# Patient Record
Sex: MALE | Race: WHITE | NOT HISPANIC OR LATINO | Employment: OTHER | URBAN - METROPOLITAN AREA
[De-identification: names, ages, dates, MRNs, and addresses within clinical notes are randomized per-mention and may not be internally consistent; named-entity substitution may affect disease eponyms.]

---

## 2017-03-29 ENCOUNTER — HISTORICAL (OUTPATIENT)
Dept: ADMINISTRATIVE | Facility: HOSPITAL | Age: 77
End: 2017-03-29

## 2017-03-29 LAB
ALBUMIN SERPL-MCNC: 4.2 G/DL (ref 3.1–4.7)
ALP SERPL-CCNC: 37 IU/L (ref 40–104)
ALT (SGPT): 23 IU/L (ref 3–33)
AST SERPL-CCNC: 19 IU/L (ref 10–40)
BASOPHILS NFR BLD: 0 K/UL (ref 0–0.2)
BASOPHILS NFR BLD: 0.5 %
BILIRUB SERPL-MCNC: 0.6 MG/DL (ref 0.3–1)
BUN SERPL-MCNC: 24 MG/DL (ref 8–20)
CALCIUM SERPL-MCNC: 9 MG/DL (ref 7.7–10.4)
CHLORIDE: 103 MMOL/L (ref 98–110)
CO2 SERPL-SCNC: 29.4 MMOL/L (ref 22.8–31.6)
CREATININE RANDOM URINE: 263 MG/DL
CREATININE: 1.29 MG/DL (ref 0.6–1.4)
EOSINOPHIL NFR BLD: 0.2 K/UL (ref 0–0.7)
EOSINOPHIL NFR BLD: 3.8 %
ERYTHROCYTE [DISTWIDTH] IN BLOOD BY AUTOMATED COUNT: 14 % (ref 11.7–14.9)
GLUCOSE: 86 MG/DL (ref 70–99)
GRAN #: 3.6 K/UL (ref 1.4–6.5)
GRAN%: 57 %
HBA1C MFR BLD: 6 % (ref 3.1–6.5)
HCT VFR BLD AUTO: 46.3 % (ref 39–55)
HGB BLD-MCNC: 14.5 G/DL (ref 14–16)
IMMATURE GRANS (ABS): 0 K/UL (ref 0–1)
IMMATURE GRANULOCYTES: 0.2 %
LYMPH #: 1.8 K/UL (ref 1.2–3.4)
LYMPH%: 28.8 %
MCH RBC QN AUTO: 29.3 PG (ref 25–35)
MCHC RBC AUTO-ENTMCNC: 31.3 G/DL (ref 31–36)
MCV RBC AUTO: 93.5 FL (ref 80–100)
MICROALBUM.,U,RANDOM: 15.2 MCG/ML (ref 0–19.9)
MICROALBUMIN/CREATININE RATIO: 6 (ref 0–30)
MONO #: 0.6 K/UL (ref 0.1–0.6)
MONO%: 9.7 %
NUCLEATED RBCS: 0 %
PLATELET # BLD AUTO: 195 K/UL (ref 140–440)
PMV BLD AUTO: 9.8 FL (ref 8.8–12.7)
POTASSIUM SERPL-SCNC: 4.6 MMOL/L (ref 3.5–5)
PROT SERPL-MCNC: 6.9 G/DL (ref 6–8.2)
RBC # BLD AUTO: 4.95 M/UL (ref 4.3–5.9)
SODIUM: 140 MMOL/L (ref 134–144)
TSH SERPL DL<=0.005 MIU/L-ACNC: 2.49 ULU/ML (ref 0.3–5.6)
WBC # BLD AUTO: 6.3 K/UL (ref 5–10)

## 2017-04-12 ENCOUNTER — HISTORICAL (OUTPATIENT)
Dept: ADMINISTRATIVE | Facility: HOSPITAL | Age: 77
End: 2017-04-12

## 2017-08-01 LAB — HBA1C MFR BLD: 6.5 % (ref 3.1–6.5)

## 2017-08-08 ENCOUNTER — OFFICE VISIT (OUTPATIENT)
Dept: FAMILY MEDICINE | Facility: CLINIC | Age: 77
End: 2017-08-08
Payer: MEDICARE

## 2017-08-08 VITALS
DIASTOLIC BLOOD PRESSURE: 64 MMHG | OXYGEN SATURATION: 96 % | HEART RATE: 54 BPM | RESPIRATION RATE: 20 BRPM | SYSTOLIC BLOOD PRESSURE: 110 MMHG | BODY MASS INDEX: 38.55 KG/M2 | HEIGHT: 68 IN | WEIGHT: 254.38 LBS

## 2017-08-08 DIAGNOSIS — E11.9 CONTROLLED TYPE 2 DIABETES MELLITUS WITHOUT COMPLICATION, WITHOUT LONG-TERM CURRENT USE OF INSULIN: ICD-10-CM

## 2017-08-08 DIAGNOSIS — I10 ESSENTIAL HYPERTENSION: ICD-10-CM

## 2017-08-08 DIAGNOSIS — M48.061 SPINAL STENOSIS OF LUMBAR REGION: ICD-10-CM

## 2017-08-08 DIAGNOSIS — R09.89 CHRONIC SINUS COMPLAINTS: Primary | ICD-10-CM

## 2017-08-08 DIAGNOSIS — E78.01 FAMILIAL HYPERCHOLESTEROLEMIA: ICD-10-CM

## 2017-08-08 DIAGNOSIS — I48.20 CHRONIC ATRIAL FIBRILLATION: ICD-10-CM

## 2017-08-08 PROBLEM — I48.91 ATRIAL FIBRILLATION: Status: ACTIVE | Noted: 2017-08-08

## 2017-08-08 PROCEDURE — 3078F DIAST BP <80 MM HG: CPT | Mod: ,,, | Performed by: INTERNAL MEDICINE

## 2017-08-08 PROCEDURE — 1159F MED LIST DOCD IN RCRD: CPT | Mod: ,,, | Performed by: INTERNAL MEDICINE

## 2017-08-08 PROCEDURE — 99215 OFFICE O/P EST HI 40 MIN: CPT | Mod: ,,, | Performed by: INTERNAL MEDICINE

## 2017-08-08 PROCEDURE — 3008F BODY MASS INDEX DOCD: CPT | Mod: ,,, | Performed by: INTERNAL MEDICINE

## 2017-08-08 PROCEDURE — 3074F SYST BP LT 130 MM HG: CPT | Mod: ,,, | Performed by: INTERNAL MEDICINE

## 2017-08-08 RX ORDER — ASPIRIN 81 MG/1
TABLET ORAL
COMMUNITY
End: 2017-08-08 | Stop reason: SDUPTHER

## 2017-08-08 RX ORDER — FLUTICASONE PROPIONATE 50 MCG
2 SPRAY, SUSPENSION (ML) NASAL DAILY
Qty: 3 BOTTLE | Refills: 3 | Status: SHIPPED | OUTPATIENT
Start: 2017-08-08 | End: 2018-04-12 | Stop reason: SDUPTHER

## 2017-08-08 RX ORDER — METOPROLOL TARTRATE 25 MG/1
TABLET, FILM COATED ORAL
COMMUNITY
End: 2017-12-12

## 2017-08-08 RX ORDER — NAFTIFINE HYDROCHLORIDE 20 MG/G
CREAM TOPICAL DAILY PRN
Status: ON HOLD | COMMUNITY
End: 2021-04-25 | Stop reason: HOSPADM

## 2017-08-08 RX ORDER — FUROSEMIDE 40 MG/1
TABLET ORAL
Qty: 45 TABLET | Refills: 3 | Status: SHIPPED | OUTPATIENT
Start: 2017-08-08 | End: 2018-10-11 | Stop reason: SDUPTHER

## 2017-08-08 RX ORDER — MONTELUKAST SODIUM 10 MG/1
10 TABLET ORAL NIGHTLY
Qty: 90 TABLET | Refills: 3 | Status: SHIPPED | OUTPATIENT
Start: 2017-08-08 | End: 2017-09-07

## 2017-08-08 RX ORDER — DICLOFENAC SODIUM 10 MG/G
GEL TOPICAL
COMMUNITY
End: 2020-01-23

## 2017-08-08 RX ORDER — CETIRIZINE HYDROCHLORIDE 10 MG/1
TABLET ORAL
COMMUNITY
End: 2017-08-08

## 2017-08-08 RX ORDER — METFORMIN HYDROCHLORIDE 500 MG/1
500 TABLET, EXTENDED RELEASE ORAL
Qty: 90 TABLET | Refills: 3 | Status: SHIPPED | OUTPATIENT
Start: 2017-08-08 | End: 2017-12-12

## 2017-08-08 RX ORDER — ATORVASTATIN CALCIUM 10 MG/1
TABLET, FILM COATED ORAL
COMMUNITY
End: 2017-08-08 | Stop reason: SDUPTHER

## 2017-08-08 RX ORDER — LISINOPRIL 20 MG/1
20 TABLET ORAL DAILY
Qty: 90 TABLET | Refills: 3 | Status: SHIPPED | OUTPATIENT
Start: 2017-08-08 | End: 2018-04-12 | Stop reason: SDUPTHER

## 2017-08-08 RX ORDER — HYDROCODONE BITARTRATE AND ACETAMINOPHEN 7.5; 325 MG/1; MG/1
TABLET ORAL
COMMUNITY
End: 2017-08-08

## 2017-08-08 RX ORDER — GLIMEPIRIDE 1 MG/1
TABLET ORAL
COMMUNITY
End: 2017-08-08

## 2017-08-08 RX ORDER — MINERAL OIL
ENEMA (ML) RECTAL
COMMUNITY
End: 2018-04-12

## 2017-08-08 RX ORDER — FLUTICASONE PROPIONATE 50 MCG
SPRAY, SUSPENSION (ML) NASAL
COMMUNITY
End: 2017-08-08 | Stop reason: SDUPTHER

## 2017-08-08 RX ORDER — POTASSIUM CHLORIDE 750 MG/1
CAPSULE, EXTENDED RELEASE ORAL
COMMUNITY
End: 2017-08-08 | Stop reason: SDUPTHER

## 2017-08-08 RX ORDER — FUROSEMIDE 40 MG/1
TABLET ORAL
COMMUNITY
End: 2017-08-08 | Stop reason: SDUPTHER

## 2017-08-08 RX ORDER — MONTELUKAST SODIUM 10 MG/1
TABLET ORAL
COMMUNITY
End: 2017-08-08

## 2017-08-08 RX ORDER — POTASSIUM CHLORIDE 750 MG/1
CAPSULE, EXTENDED RELEASE ORAL
Qty: 45 CAPSULE | Refills: 3 | Status: SHIPPED | OUTPATIENT
Start: 2017-08-08 | End: 2017-12-12 | Stop reason: SDUPTHER

## 2017-08-08 RX ORDER — LISINOPRIL 20 MG/1
TABLET ORAL
COMMUNITY
End: 2017-08-08 | Stop reason: SDUPTHER

## 2017-08-08 RX ORDER — ASPIRIN 81 MG/1
81 TABLET ORAL DAILY
Qty: 90 TABLET | Refills: 3 | Status: SHIPPED | OUTPATIENT
Start: 2017-08-08 | End: 2018-04-12 | Stop reason: SDUPTHER

## 2017-08-08 RX ORDER — ATORVASTATIN CALCIUM 10 MG/1
TABLET, FILM COATED ORAL
Qty: 90 TABLET | Refills: 3 | Status: SHIPPED | OUTPATIENT
Start: 2017-08-08 | End: 2018-04-12 | Stop reason: SDUPTHER

## 2017-08-08 RX ORDER — TRAZODONE HYDROCHLORIDE 100 MG/1
TABLET ORAL
COMMUNITY
End: 2018-04-12

## 2017-08-08 NOTE — PROGRESS NOTES
"Subjective:       Patient ID: Yeyo Hollingsworth is a 77 y.o. male.    Chief Complaint: Follow-up; Results; Diabetes; and Hyperlipidemia    Back has been bothering him more and more and he would like to see a "back doctor". Former patient of dr bhatia    Also asked about what dose of tylenol he could take for pain. He deferred pain meds.     He sees Callie, brii, monty, kiera and ralph for sleep apnea (patient's cpap mask comes off at night bc he cannot breath out of his nose).  He is on allegra daily and as needed singular    Recalls having allergies since he was a kid with puffy eyes.       Diabetes   Pertinent negatives for hypoglycemia include no confusion, headaches, nervousness/anxiousness, pallor or tremors. Pertinent negatives for diabetes include no chest pain, no fatigue and no weakness.   Hyperlipidemia   Pertinent negatives include no chest pain, myalgias or shortness of breath.     Review of Systems   Constitutional: Negative for activity change, diaphoresis, fatigue and fever.   HENT: Positive for congestion, facial swelling and sinus pressure. Negative for ear pain, postnasal drip, sore throat and trouble swallowing.    Eyes: Negative for pain.   Respiratory: Negative for cough, chest tightness, shortness of breath and wheezing.    Cardiovascular: Negative for chest pain, palpitations and leg swelling.   Gastrointestinal: Negative for abdominal distention, abdominal pain, blood in stool, constipation and diarrhea.   Endocrine: Negative for cold intolerance and heat intolerance.   Genitourinary: Negative for decreased urine volume, difficulty urinating, dysuria, flank pain, frequency and hematuria.   Musculoskeletal: Positive for back pain. Negative for arthralgias, joint swelling, myalgias and neck pain.   Skin: Negative for pallor, rash and wound.   Neurological: Negative for tremors, syncope, weakness, light-headedness, numbness and headaches.   Hematological: Negative for " adenopathy.   Psychiatric/Behavioral: Negative for confusion, decreased concentration, hallucinations, self-injury, sleep disturbance and suicidal ideas. The patient is not nervous/anxious.        Past Medical History:   Diagnosis Date    Allergy     Anticoagulant long-term use     aspirin    Anxiety     Atrial fibrillation     Cataract     Clotting disorder     Diabetes mellitus     Diabetes mellitus, type 2     Hyperlipidemia     Hypertension        Past Surgical History:   Procedure Laterality Date    ABDOMINAL SURGERY      origunal surg 1986-mult surgeries since    CARPAL TUNNEL RELEASE      right wrist 2009-left wrist 2010    COLON SURGERY      partial colon removal    COLOSTOMY  1986    colostomy reversal  1986    EYE SURGERY      hay fever      HERNIA REPAIR  1949    TONSILLECTOMY  1947       History reviewed. No pertinent family history.    Social History     Social History    Marital status:      Spouse name: N/A    Number of children: N/A    Years of education: N/A     Social History Main Topics    Smoking status: Former Smoker     Types: Cigarettes     Quit date: 2/27/2006    Smokeless tobacco: Never Used    Alcohol use No    Drug use: No    Sexual activity: Not Asked     Other Topics Concern    None     Social History Narrative    None       Current Outpatient Prescriptions   Medication Sig Dispense Refill    apixaban (ELIQUIS) 5 mg Tab Take 1 tablet (5 mg total) by mouth 2 (two) times daily. 180 tablet 3    aspirin (ECOTRIN) 81 MG EC tablet Take 1 tablet (81 mg total) by mouth once daily. 90 tablet 3    atorvastatin (LIPITOR) 10 MG tablet nightly 90 tablet 3    blood sugar diagnostic (BLOOD GLUCOSE TEST) Strp 1 strip by Misc.(Non-Drug; Combo Route) route 2 (two) times daily. 200 strip 3    blood sugar diagnostic Strp 200 strips by Misc.(Non-Drug; Combo Route) route 2 (two) times daily. 100 strip 3    diclofenac sodium (VOLTAREN) 1 % Gel VOLTAREN, 1% (Transdermal  Gel)   as needed (1 %)  Active   Comments: Medication taken as needed.       fexofenadine (ALLEGRA) 180 MG tablet HM Fexofenadine HCl 180 MG Oral Tablet   1 (one) Tablet daily for 90 days   Quantity: 90   Refills: 3     Ordered:10-Apr-2017    Citlali Ward MD, Linda MD;  Start 10-Apr-2017  Active      fluticasone (FLONASE) 50 mcg/actuation nasal spray 2 sprays by Each Nare route once daily. 3 Bottle 3    furosemide (LASIX) 40 MG tablet Every other day 45 tablet 3    lancets Misc 2 Box by Misc.(Non-Drug; Combo Route) route 2 (two) times daily. 100 each 3    lisinopril (PRINIVIL,ZESTRIL) 20 MG tablet Take 1 tablet (20 mg total) by mouth once daily. 90 tablet 3    metoprolol tartrate (LOPRESSOR) 25 MG tablet METOPROLOL TARTRATE, 25MG (Oral Tablet)   1 two times daily (25 MG)  Active      multivit-iron-min-folic acid (MULTIVITAMIN-IRON-MINERALS-FOLIC ACID) 3,500-18-0.4 unit-mg-mg Chew Take by mouth.        naftifine (NAFTIN) 2 % Crea NAFTIN, 2% (External Cream)   as needed (2 %)  Active   Comments: Medication taken as needed.       polyethylene glycol (GLYCOLAX) 17 gram/dose powder Take 17 g by mouth once daily. 6 Bottle 3    potassium chloride (MICRO-K) 10 MEQ CpSR Every other day 45 capsule 3    trazodone (DESYREL) 100 MG tablet TRAZODONE HCL, 100MG (Oral Tablet)   1 (one) Tablet Tablet nightly for 90 days   Quantity: 90  Refills: 4     Ordered:3-May-2016    Citlali Ward MD, Linda MD;  Start 3-May-2016  Active      metformin (GLUCOPHAGE-XR) 500 MG 24 hr tablet Take 1 tablet (500 mg total) by mouth daily with breakfast. 90 tablet 3    montelukast (SINGULAIR) 10 mg tablet Take 1 tablet (10 mg total) by mouth every evening. 90 tablet 3     No current facility-administered medications for this visit.        Review of patient's allergies indicates:   Allergen Reactions    Ativan [lorazepam] Other (See Comments)     Mood alternating      Dilaudid [hydromorphone (bulk)] Other (See  Comments)     Mood alternating      Morphine Other (See Comments)     Mood alternating      Hydromorphone        Objective:      Physical Exam   Constitutional: He is oriented to person, place, and time. He appears well-developed and well-nourished. No distress.   HENT:   Head: Normocephalic and atraumatic.   Right Ear: External ear normal.   Left Ear: External ear normal.   Nose: Nose normal.   Mouth/Throat: Oropharynx is clear and moist.   Eyes: Conjunctivae and EOM are normal. Right eye exhibits no discharge. Left eye exhibits no discharge. No scleral icterus.   Neck: Normal range of motion. Neck supple. No JVD present. No tracheal deviation present. No thyromegaly present.   Cardiovascular: Normal rate, normal heart sounds and intact distal pulses.  Exam reveals no gallop.    No murmur heard.  Irregularly irregular   Pulmonary/Chest: Effort normal and breath sounds normal. No respiratory distress. He has no wheezes. He has no rales.   Abdominal: Soft. Bowel sounds are normal. He exhibits no distension and no mass. There is no tenderness.   Musculoskeletal: Normal range of motion. He exhibits no edema or tenderness.   Lymphadenopathy:     He has no cervical adenopathy.   Neurological: He is alert and oriented to person, place, and time.   Skin: Skin is warm and dry. No rash noted. He is not diaphoretic. No erythema.   Psychiatric: He has a normal mood and affect. His behavior is normal. Judgment and thought content normal.       Lab Results   Component Value Date    HGBA1C 6.5 07/31/2017     Assessment:       1. Chronic sinus complaints    2. Controlled type 2 diabetes mellitus without complication, without long-term current use of insulin    3. Essential hypertension    4. Chronic atrial fibrillation    5. Familial hypercholesterolemia    6. Spinal stenosis of lumbar region        Plan:       Chronic sinus complaints  -     X-Ray Sinuses Min 3 Views; Future; Expected date: 08/08/2017  -     [facility]  ([facility]) 10 mg tablet; Take 1 tablet (10 mg total) by mouth every evening.  Dispense: 90 tablet; Refill: 3  -     fluticasone (FLONASE) 50 mcg/actuation nasal spray; 2 sprays by Each Nare route once daily.  Dispense: 3 Bottle; Refill: 3    Controlled type 2 diabetes mellitus without complication, without long-term current use of insulin  -     metformin (GLUCOPHAGE-XR) 500 MG 24 hr tablet; Take 1 tablet (500 mg total) by mouth daily with breakfast.  Dispense: 90 tablet; Refill: 3    Essential hypertension  -     potassium chloride (MICRO-K) 10 MEQ CpSR; Every other day  Dispense: 45 capsule; Refill: 3  -     lisinopril (PRINIVIL,ZESTRIL) 20 MG tablet; Take 1 tablet (20 mg total) by mouth once daily.  Dispense: 90 tablet; Refill: 3  -     furosemide (LASIX) 40 MG tablet; Every other day  Dispense: 45 tablet; Refill: 3    Chronic atrial fibrillation  -     apixaban (ELIQUIS) 5 mg Tab; Take 1 tablet (5 mg total) by mouth 2 (two) times daily.  Dispense: 180 tablet; Refill: 3  -     aspirin (ECOTRIN) 81 MG EC tablet; Take 1 tablet (81 mg total) by mouth once daily.  Dispense: 90 tablet; Refill: 3    Familial hypercholesterolemia  -     atorvastatin (LIPITOR) 10 MG tablet; nightly  Dispense: 90 tablet; Refill: 3    Spinal stenosis of lumbar region  -     Ambulatory referral to Neurosurgery    Time spent with the patient was 40 min and 50 percent of that was in face to face contact

## 2017-08-08 NOTE — PATIENT INSTRUCTIONS
Chronic Sinusitis  Chronic sinusitis is a long-term swelling or infection of the sinuses. If sinusitis lasts more than 12 weeks, it is called chronic.    What is chronic sinusitis?  Sinuses are air-filled spaces in the skull behind the face. They are kept moist and clean by a lining of mucosa. Things such as pollen, smoke, and chemical fumes can irritate the mucosa. Constant exposure to irritants can cause ongoing inflammation of this lining. It can also damage tiny hairlike cilia that cover the mucosa. Cilia help carry mucus toward the opening of the sinus. Damage to cilia keeps mucus from draining from the sinuses.  Causes of chronic sinusitis  Problems that irritate the mucosa or block drainage can lead to chronic sinusitis. These may include:  · Infections  · Chronic allergies  · Nasal polyps, deviated septum, or other blockages  · Constant exposure to irritants, such as cigarette smoke or fumes  · Asthma  · Acute sinusitis that keeps coming back  Common symptoms of chronic sinusitis  Symptoms may include:  · Facial pain and pressure  · Headache and sinus pain  · Nasal congestion  · Thick, colored drainage from the nose  · Thick mucus draining down the back of the throat (postnasal drainage)  · Loss of smell  · Fever  · Cough  · Sore throat  Diagnosing chronic sinusitis  Your doctor will ask about your symptoms and health history. The doctor will look at your nose and face. You may have imaging studies such as an X-ray or CT scan of the sinuses. Your doctor may also take a sample of the drainage to check for bacteria. You may also have an endoscopy. During this test, the doctor puts a lighted tube through your nose up into your sinuses to look at the sinuses.  Treating chronic sinusitis  Treatment involves reducing irritation and inflammation. Your plan may include:  · Taking medicines. Your doctor may prescribe medicines to reduce the amount of mucus and swelling. These help unblock the sinuses and allow them  to drain. You will need to take antibiotics if you have a bacterial infection.  · Flushing your sinuses. Your doctor may suggest sinus irrigation. This is flushing your sinuses with saltwater or saline solution. This helps to clear out mucus.  · Controlling allergies. If you have allergies, you should have a plan to help control them. This plan may include medicines or allergy shots.  · Having surgery. In some cases, you may need surgery on the nose, sinuses, or both. This can improve sinus drainage or remove nasal blockages.  Date Last Reviewed: 10/1/2016  © [id number] [employer]. [address] [place] [address], [place] [address]. All rights reserved. This information is not intended as a substitute for professional medical care. Always follow your healthcare professional's instructions.

## 2017-08-10 ENCOUNTER — TELEPHONE (OUTPATIENT)
Dept: FAMILY MEDICINE | Facility: CLINIC | Age: 77
End: 2017-08-10

## 2017-08-10 DIAGNOSIS — R19.5 POSITIVE COLORECTAL CANCER SCREENING USING DNA-BASED STOOL TEST: Primary | ICD-10-CM

## 2017-08-10 NOTE — TELEPHONE ENCOUNTER
pat is POSITIVE so he needs a colonoscopy. I will place referral for gastro as he is on blood thinners and they will need to see him first in the office.    Did he have a colonoscopy in April of this year??? As [facility] in  has it there???

## 2017-09-20 ENCOUNTER — OFFICE VISIT (OUTPATIENT)
Dept: PAIN MEDICINE | Facility: CLINIC | Age: 77
End: 2017-09-20
Payer: MEDICARE

## 2017-09-20 VITALS
HEIGHT: 68 IN | DIASTOLIC BLOOD PRESSURE: 63 MMHG | WEIGHT: 254 LBS | BODY MASS INDEX: 38.49 KG/M2 | SYSTOLIC BLOOD PRESSURE: 131 MMHG | HEART RATE: 57 BPM

## 2017-09-20 DIAGNOSIS — M48.8X6 OTHER SPECIFIED SPONDYLOPATHIES, LUMBAR REGION: ICD-10-CM

## 2017-09-20 DIAGNOSIS — M48.061 LUMBAR STENOSIS: ICD-10-CM

## 2017-09-20 DIAGNOSIS — M51.36 DDD (DEGENERATIVE DISC DISEASE), LUMBAR: Primary | ICD-10-CM

## 2017-09-20 PROCEDURE — 1125F AMNT PAIN NOTED PAIN PRSNT: CPT | Mod: ,,, | Performed by: ANESTHESIOLOGY

## 2017-09-20 PROCEDURE — 99999 PR PBB SHADOW E&M-EST. PATIENT-LVL III: CPT | Mod: PBBFAC,,, | Performed by: ANESTHESIOLOGY

## 2017-09-20 PROCEDURE — 1159F MED LIST DOCD IN RCRD: CPT | Mod: ,,, | Performed by: ANESTHESIOLOGY

## 2017-09-20 PROCEDURE — 3075F SYST BP GE 130 - 139MM HG: CPT | Mod: ,,, | Performed by: ANESTHESIOLOGY

## 2017-09-20 PROCEDURE — 3078F DIAST BP <80 MM HG: CPT | Mod: ,,, | Performed by: ANESTHESIOLOGY

## 2017-09-20 PROCEDURE — 99204 OFFICE O/P NEW MOD 45 MIN: CPT | Mod: S$PBB,,, | Performed by: ANESTHESIOLOGY

## 2017-09-20 PROCEDURE — 99213 OFFICE O/P EST LOW 20 MIN: CPT | Mod: PBBFAC,PO | Performed by: ANESTHESIOLOGY

## 2017-09-20 NOTE — PROGRESS NOTES
This note was completed with dictation software and grammatical errors may exist.    Referring Physician: No ref. provider found    PCP: Citlali Parish MD      CC:low back and left leg pain    HPI:   Yeyo Hollingsworth is a 77 y.o. male ho presents with lower back and left leg pain.  Pain is been present since 2010.  No recent traumatic incident.  His intermittent aching, throbbing, electric pain in his lower back.  Pain radiates down his left buttock into his left posterior thigh.  Pain worsens with prolonged standing, walking, getting up and coughing.  Pain improves with rest.  His tried physical therapy with minimal benefit.  He has undergone lumbar OJSE's with Dr. Rendon in the past with moderate benefit.  Most recent injection was performed in September 2016.  Pain has since returned.  He desires repeat injections with us.  He denies any weakness.  No bowel bladder changes.  He rates his pain 6/10 today.    ROS:  CONSTITUTIONAL: No fevers, chills, night sweats, wt. loss, appetite changes  SKIN: no rashes or itching  ENT: No headaches, head trauma, vision changes, or eye pain  LYMPH NODES: None noticed   CV: No chest pain, palpitations.   RESP: No shortness of breath, dyspnea on exertion, cough, wheezing, or hemoptysis  GI: No nausea, emesis, diarrhea, constipation, melena, hematochezia, pain.    : No dysuria, hematuria, urgency, or frequency   HEME: No easy bruising, bleeding problems  PSYCHIATRIC: No depression, anxiety, psychosis, hallucinations.  NEURO: No seizures, memory loss, dizziness or difficulty sleeping  MSK: + history of present illness      Past Medical History:   Diagnosis Date    Allergy     Anticoagulant long-term use     aspirin    Anxiety     Atrial fibrillation     Cataract     Clotting disorder     Diabetes mellitus     Diabetes mellitus, type 2     Hyperlipidemia     Hypertension      Past Surgical History:   Procedure Laterality Date    ABDOMINAL SURGERY      origunal surg  "1986-mult surgeries since    CARPAL TUNNEL RELEASE      right wrist 2009-left wrist 2010    COLON SURGERY      partial colon removal    COLOSTOMY  1986    colostomy reversal  1986    EYE SURGERY      hay fever      HERNIA REPAIR  1949    TONSILLECTOMY  1947     History reviewed. No pertinent family history.  Social History     Social History    Marital status:      Spouse name: N/A    Number of children: N/A    Years of education: N/A     Social History Main Topics    Smoking status: Former Smoker     Types: Cigarettes     Quit date: 2/27/2006    Smokeless tobacco: Never Used    Alcohol use No    Drug use: No    Sexual activity: Not Asked     Other Topics Concern    None     Social History Narrative    None         Medications/Allergies: See med card    Vitals:    09/20/17 1339   BP: 131/63   Pulse: (!) 57   Weight: 115.2 kg (254 lb)   Height: 5' 8" (1.727 m)   PainSc:   6   PainLoc: Back         Physical exam:    GENERAL: A and O x3, the patient appears well groomed and is in no acute distress.  Skin: No rashes or obvious lesions  HEENT: normocephalic, atraumatic  CARDIOVASCULAR:  Palpable peripheral pulses  LUNGS: easy work of breathing  ABDOMEN: soft, nontender   UPPER EXTREMITIES: Normal alignment, normal range of motion, no atrophy, no skin changes,  hair growth and nail growth normal and equal bilaterally. No swelling, no tenderness.    LOWER EXTREMITIES:  Normal alignment, normal range of motion, no atrophy, no skin changes,  hair growth and nail growth normal and equal bilaterally. No swelling, no tenderness.    LUMBAR SPINE  Lumbar spine: ROM is limited with flexion extension and oblique extension with moderate increased pain.    Caden's test causes no increased pain on either side.    Supine straight leg raise is negative bilaterally.    Internal and external rotation of the hip causes no increased pain on either side.  Myofascial exam: No tenderness to palpation across lumbar " paraspinous muscles.      MENTAL STATUS: normal orientation, speech, language, and fund of knowledge for social situation.  Emotional state appropriate.    CRANIAL NERVES:  II:  PERRL bilaterally,   III,IV,VI: EOMI.    V:  Facial sensation equal bilaterally  VII:  Facial motor function normal.  VIII:  Hearing equal to finger rub bilaterally  IX/X: Gag normal, palate symmetric  XI:  Shoulder shrug equal, head turn equal  XII:  Tongue midline without fasciculations      MOTOR: Tone and bulk: normal bilateral upper and lower Strength: normal   Delt Bi Tri WE WF     R 5 5 5 5 5 5   L 5 5 5 5 5 5     IP ADD ABD Quad TA Gas HAM  R 5 5 5 5 5 5 5  L 5 5 5 5 5 5 5    SENSATION: Light touch and pinprick intact bilaterally  REFLEXES: normal, symmetric, nonbrisk.  Toes down, no clonus. No hoffmans.  GAIT: normal rise, base, steps, and arm swing.        Imaging:  Requesting      Assessment:  Patient presents with lower back and left leg pain  1. DDD (degenerative disc disease), lumbar    2. Lumbar stenosis    3. Other specified spondylopathies, lumbar region        Plan:  - I have stressed the importance of physical activity and exercise to improve overall health  - Request records from [facility] and Dr. Rendon  - I think that the patient's back pain and radicular leg symptoms are due to degenerative disc disease and have recommended a lumbar epidural steroid injection to the Left L4-5 and L5-S1 level.    - Follow up after procedure        Thank you for referring this interesting patient, and I look forward to continuing to collaborate in his care.

## 2017-09-21 ENCOUNTER — DOCUMENTATION ONLY (OUTPATIENT)
Dept: PAIN MEDICINE | Facility: CLINIC | Age: 77
End: 2017-09-21

## 2017-09-21 DIAGNOSIS — M54.16 LUMBAR RADICULOPATHY: Primary | ICD-10-CM

## 2017-09-21 NOTE — PROGRESS NOTES
mRI lumbar spine 9/2017 ([facility])  Severe facet arthrosis and ligamentum flavum at L4-5 results in severe spinal canal stenosis. Possible impingement of the bilateral L5 nerve as well as bilateral L4 nerves.  There is a grade 1 anterolisthesis of L4 and L5.  Moderate to severe bilateral neuroforaminal L3-4.

## 2017-09-28 ENCOUNTER — HOSPITAL ENCOUNTER (OUTPATIENT)
Facility: AMBULARY SURGERY CENTER | Age: 77
Discharge: HOME | End: 2017-09-28
Attending: ANESTHESIOLOGY | Admitting: ANESTHESIOLOGY
Payer: MEDICARE

## 2017-09-28 ENCOUNTER — SURGERY (OUTPATIENT)
Age: 77
End: 2017-09-28

## 2017-09-28 DIAGNOSIS — M51.36 DDD (DEGENERATIVE DISC DISEASE), LUMBAR: Primary | ICD-10-CM

## 2017-09-28 PROBLEM — M51.369 DDD (DEGENERATIVE DISC DISEASE), LUMBAR: Status: ACTIVE | Noted: 2017-09-28

## 2017-09-28 LAB — GLUCOSE SERPL-MCNC: 118 MG/DL (ref 70–110)

## 2017-09-28 PROCEDURE — G8907 PT DOC NO EVENTS ON DISCHARG: HCPCS | Performed by: ANESTHESIOLOGY

## 2017-09-28 PROCEDURE — 99152 MOD SED SAME PHYS/QHP 5/>YRS: CPT | Mod: ,,, | Performed by: ANESTHESIOLOGY

## 2017-09-28 PROCEDURE — 64484 NJX AA&/STRD TFRM EPI L/S EA: CPT | Mod: LT,,, | Performed by: ANESTHESIOLOGY

## 2017-09-28 PROCEDURE — 64483 NJX AA&/STRD TFRM EPI L/S 1: CPT | Performed by: ANESTHESIOLOGY

## 2017-09-28 PROCEDURE — 64483 NJX AA&/STRD TFRM EPI L/S 1: CPT | Mod: LT,,, | Performed by: ANESTHESIOLOGY

## 2017-09-28 PROCEDURE — 64484 NJX AA&/STRD TFRM EPI L/S EA: CPT | Performed by: ANESTHESIOLOGY

## 2017-09-28 PROCEDURE — G8918 PT W/O PREOP ORDER IV AB PRO: HCPCS | Performed by: ANESTHESIOLOGY

## 2017-09-28 RX ORDER — ALPRAZOLAM 0.25 MG/1
1 TABLET ORAL ONCE AS NEEDED
Status: COMPLETED | OUTPATIENT
Start: 2017-09-28 | End: 2017-09-28

## 2017-09-28 RX ORDER — SODIUM CHLORIDE, SODIUM LACTATE, POTASSIUM CHLORIDE, CALCIUM CHLORIDE 600; 310; 30; 20 MG/100ML; MG/100ML; MG/100ML; MG/100ML
INJECTION, SOLUTION INTRAVENOUS ONCE AS NEEDED
Status: DISCONTINUED | OUTPATIENT
Start: 2017-09-28 | End: 2017-09-28 | Stop reason: HOSPADM

## 2017-09-28 RX ORDER — DEXAMETHASONE SODIUM PHOSPHATE 10 MG/ML
INJECTION INTRAMUSCULAR; INTRAVENOUS
Status: DISCONTINUED | OUTPATIENT
Start: 2017-09-28 | End: 2017-09-28 | Stop reason: HOSPADM

## 2017-09-28 RX ORDER — BUPIVACAINE HYDROCHLORIDE 2.5 MG/ML
INJECTION, SOLUTION EPIDURAL; INFILTRATION; INTRACAUDAL
Status: DISCONTINUED | OUTPATIENT
Start: 2017-09-28 | End: 2017-09-28 | Stop reason: HOSPADM

## 2017-09-28 RX ORDER — LIDOCAINE HYDROCHLORIDE 10 MG/ML
INJECTION, SOLUTION EPIDURAL; INFILTRATION; INTRACAUDAL; PERINEURAL
Status: DISCONTINUED | OUTPATIENT
Start: 2017-09-28 | End: 2017-09-28 | Stop reason: HOSPADM

## 2017-09-28 RX ADMIN — DEXAMETHASONE SODIUM PHOSPHATE 10 MG: 10 INJECTION INTRAMUSCULAR; INTRAVENOUS at 02:09

## 2017-09-28 RX ADMIN — ALPRAZOLAM 1 MG: 0.25 TABLET ORAL at 02:09

## 2017-09-28 RX ADMIN — LIDOCAINE HYDROCHLORIDE 4 ML: 10 INJECTION, SOLUTION EPIDURAL; INFILTRATION; INTRACAUDAL; PERINEURAL at 02:09

## 2017-09-28 RX ADMIN — BUPIVACAINE HYDROCHLORIDE 3 ML: 2.5 INJECTION, SOLUTION EPIDURAL; INFILTRATION; INTRACAUDAL at 02:09

## 2017-09-28 NOTE — PLAN OF CARE
Pt received from OR via stretcher. VS stable pt denies pain at this time. Offered po fluids. Reviewed discharge instructions with patient and spouse, Jody. Pt ready to go home and eat. Sat on side of bed to dress. Pt ambulated to car with min assist accompanied by RN and spouse without incident.

## 2017-09-28 NOTE — H&P (VIEW-ONLY)
This note was completed with dictation software and grammatical errors may exist.    Referring Physician: No ref. provider found    PCP: Citlali Parish MD      CC:low back and left leg pain    HPI:   Yeyo Hollingsworth is a 77 y.o. male ho presents with lower back and left leg pain.  Pain is been present since 2010.  No recent traumatic incident.  His intermittent aching, throbbing, electric pain in his lower back.  Pain radiates down his left buttock into his left posterior thigh.  Pain worsens with prolonged standing, walking, getting up and coughing.  Pain improves with rest.  His tried physical therapy with minimal benefit.  He has undergone lumbar JOSE's with Dr. Rendon in the past with moderate benefit.  Most recent injection was performed in September 2016.  Pain has since returned.  He desires repeat injections with us.  He denies any weakness.  No bowel bladder changes.  He rates his pain 6/10 today.    ROS:  CONSTITUTIONAL: No fevers, chills, night sweats, wt. loss, appetite changes  SKIN: no rashes or itching  ENT: No headaches, head trauma, vision changes, or eye pain  LYMPH NODES: None noticed   CV: No chest pain, palpitations.   RESP: No shortness of breath, dyspnea on exertion, cough, wheezing, or hemoptysis  GI: No nausea, emesis, diarrhea, constipation, melena, hematochezia, pain.    : No dysuria, hematuria, urgency, or frequency   HEME: No easy bruising, bleeding problems  PSYCHIATRIC: No depression, anxiety, psychosis, hallucinations.  NEURO: No seizures, memory loss, dizziness or difficulty sleeping  MSK: + history of present illness      Past Medical History:   Diagnosis Date    Allergy     Anticoagulant long-term use     aspirin    Anxiety     Atrial fibrillation     Cataract     Clotting disorder     Diabetes mellitus     Diabetes mellitus, type 2     Hyperlipidemia     Hypertension      Past Surgical History:   Procedure Laterality Date    ABDOMINAL SURGERY      origunal surg  "1986-mult surgeries since    CARPAL TUNNEL RELEASE      right wrist 2009-left wrist 2010    COLON SURGERY      partial colon removal    COLOSTOMY  1986    colostomy reversal  1986    EYE SURGERY      hay fever      HERNIA REPAIR  1949    TONSILLECTOMY  1947     History reviewed. No pertinent family history.  Social History     Social History    Marital status:      Spouse name: N/A    Number of children: N/A    Years of education: N/A     Social History Main Topics    Smoking status: Former Smoker     Types: Cigarettes     Quit date: 2/27/2006    Smokeless tobacco: Never Used    Alcohol use No    Drug use: No    Sexual activity: Not Asked     Other Topics Concern    None     Social History Narrative    None         Medications/Allergies: See med card    Vitals:    09/20/17 1339   BP: 131/63   Pulse: (!) 57   Weight: 115.2 kg (254 lb)   Height: 5' 8" (1.727 m)   PainSc:   6   PainLoc: Back         Physical exam:    GENERAL: A and O x3, the patient appears well groomed and is in no acute distress.  Skin: No rashes or obvious lesions  HEENT: normocephalic, atraumatic  CARDIOVASCULAR:  Palpable peripheral pulses  LUNGS: easy work of breathing  ABDOMEN: soft, nontender   UPPER EXTREMITIES: Normal alignment, normal range of motion, no atrophy, no skin changes,  hair growth and nail growth normal and equal bilaterally. No swelling, no tenderness.    LOWER EXTREMITIES:  Normal alignment, normal range of motion, no atrophy, no skin changes,  hair growth and nail growth normal and equal bilaterally. No swelling, no tenderness.    LUMBAR SPINE  Lumbar spine: ROM is limited with flexion extension and oblique extension with moderate increased pain.    Caden's test causes no increased pain on either side.    Supine straight leg raise is negative bilaterally.    Internal and external rotation of the hip causes no increased pain on either side.  Myofascial exam: No tenderness to palpation across lumbar " paraspinous muscles.      MENTAL STATUS: normal orientation, speech, language, and fund of knowledge for social situation.  Emotional state appropriate.    CRANIAL NERVES:  II:  PERRL bilaterally,   III,IV,VI: EOMI.    V:  Facial sensation equal bilaterally  VII:  Facial motor function normal.  VIII:  Hearing equal to finger rub bilaterally  IX/X: Gag normal, palate symmetric  XI:  Shoulder shrug equal, head turn equal  XII:  Tongue midline without fasciculations      MOTOR: Tone and bulk: normal bilateral upper and lower Strength: normal   Delt Bi Tri WE WF     R 5 5 5 5 5 5   L 5 5 5 5 5 5     IP ADD ABD Quad TA Gas HAM  R 5 5 5 5 5 5 5  L 5 5 5 5 5 5 5    SENSATION: Light touch and pinprick intact bilaterally  REFLEXES: normal, symmetric, nonbrisk.  Toes down, no clonus. No hoffmans.  GAIT: normal rise, base, steps, and arm swing.        Imaging:  Requesting      Assessment:  Patient presents with lower back and left leg pain  1. DDD (degenerative disc disease), lumbar    2. Lumbar stenosis    3. Other specified spondylopathies, lumbar region        Plan:  - I have stressed the importance of physical activity and exercise to improve overall health  - Request records from [facility] and Dr. Rendon  - I think that the patient's back pain and radicular leg symptoms are due to degenerative disc disease and have recommended a lumbar epidural steroid injection to the Left L4-5 and L5-S1 level.    - Follow up after procedure        Thank you for referring this interesting patient, and I look forward to continuing to collaborate in his care.

## 2017-09-28 NOTE — DISCHARGE INSTRUCTIONS
Home care instructions  You may apply ice pack to the injection site for 20 minutes periods for the first 24hrs, NO HEAT for 2 days   You may shower today but dont soak in a tub above the injection site for 2 days  Resume Aspirin, Plavix, or Coumadin the day after the procedure unless otherwise instructed.  Gradually increase activity  Do not drive for 24 hr  If diabetic monitor your glucose carefully. Follow up with your primary MD if this is a problem    SEEK  IMMEDIATE MEDICAL HELP FOR:  Severe increase in your usual pain or appearance of new pain  Prolonged or increasing weakness or numbness in the legs or arms       -numbing medicine was injected that may affect the nerves that carry information from  Your muscles to your brain and vice versa. Numbness can last up to 6 hours , so be very careful walking.    Drainage, redness, active bleeding, or increased swelling at the injection site  Temperature over 100.0 degrees F.  Headache that increases when your head is upright and decreases when you lie flat  Shortness of breath, chest pain, or problems breathing

## 2017-09-28 NOTE — OP NOTE
PROCEDURE DATE: 9/28/2017    PROCEDURE: Left L4-5 and L5-S1 transforaminal epidural steroid injection under fluoroscopy    DIAGNOSIS: lumbar disc displacement without myelopathy  Post op diagnosis: Same    PHYSICIAN: Grant Wright MD    MEDICATIONS INJECTED:  Dexamethasone 5mg (0.5ml) and 1.5ml 0.25% bupivicaine at each nerve root.     LOCAL ANESTHETIC INJECTED:  Lidocaine 1%. 2 ml per site.    SEDATION MEDICATIONS: RN IV sedation    ESTIMATED BLOOD LOSS:  None    COMPLICATIONS:  None    TECHNIQUE:   A time-out was taken to identify patient and procedure side prior to starting the procedure. The patient was placed in a prone position, prepped and draped in the usual sterile fashion using ChloraPrep and sterile towels.  The area to be injected was determined under fluoroscopic guidance in AP and oblique view.  Local anesthetic was given by raising a wheal and going down to the hub of a 25-gauge 1.5 inch needle.  In oblique view, a 5 inch 22-gauge bent-tip spinal needle was introduced towards 6 oclock position of the pedicle of each above named nerve root level.  The needle was walked medially then hinged into the neural foramen and position was confirmed in AP and lateral views.  1ml contrast dye was injected to confirm appropriate placement and that there was no vascular uptake.  After negative aspiration for blood or CSF, the medication was then injected. This was performed at the left L4-5 and L5-S1 level(s). The patient tolerated the procedure well.    The patient was monitored after the procedure.  Patient was given post procedure and discharge instructions to follow at home. The patient was discharged in a stable condition.

## 2017-09-28 NOTE — DISCHARGE SUMMARY
[facility]  Discharge Note  Short Stay    Admit Date: 9/28/2017    Discharge Date and Time: 9/28/2017    Attending Physician: Grant Wright MD     Discharge Provider: Grant Wright    Diagnoses:  Active Hospital Problems    Diagnosis  POA    *DDD (degenerative disc disease), lumbar [M51.36]  Yes      Resolved Hospital Problems    Diagnosis Date Resolved POA   No resolved problems to display.       Hospital Course: Lumbar JOSE  Discharged Condition: Good    Final Diagnoses:   Active Hospital Problems    Diagnosis  POA    *DDD (degenerative disc disease), lumbar [M51.36]  Yes      Resolved Hospital Problems    Diagnosis Date Resolved POA   No resolved problems to display.       Disposition: Home or Self Care    Follow up/Patient Instructions:    Medications:  Reconciled Home Medications:   Current Discharge Medication List      CONTINUE these medications which have NOT CHANGED    Details   lisinopril (PRINIVIL,ZESTRIL) 20 MG tablet Take 1 tablet (20 mg total) by mouth once daily.  Qty: 90 tablet, Refills: 3    Associated Diagnoses: Essential hypertension      metoprolol tartrate (LOPRESSOR) 25 MG tablet METOPROLOL TARTRATE, 25MG (Oral Tablet)   1 two times daily (25 MG)  Active      apixaban (ELIQUIS) 5 mg Tab Take 1 tablet (5 mg total) by mouth 2 (two) times daily.  Qty: 180 tablet, Refills: 3    Associated Diagnoses: Chronic atrial fibrillation      aspirin (ECOTRIN) 81 MG EC tablet Take 1 tablet (81 mg total) by mouth once daily.  Qty: 90 tablet, Refills: 3    Associated Diagnoses: Chronic atrial fibrillation      atorvastatin (LIPITOR) 10 MG tablet nightly  Qty: 90 tablet, Refills: 3    Associated Diagnoses: Familial hypercholesterolemia      !! blood sugar diagnostic (BLOOD GLUCOSE TEST) Strp 1 strip by Misc.(Non-Drug; Combo Route) route 2 (two) times daily.  Qty: 200 strip, Refills: 3    Associated Diagnoses: Diabetes mellitus type 2, uncontrolled      !! blood sugar diagnostic Strp 200 strips by  Misc.(Non-Drug; Combo Route) route 2 (two) times daily.  Qty: 100 strip, Refills: 3    Associated Diagnoses: Diabetes mellitus type 2, uncontrolled      diclofenac sodium (VOLTAREN) 1 % Gel VOLTAREN, 1% (Transdermal Gel)   as needed (1 %)  Active   Comments: Medication taken as needed.       fexofenadine (ALLEGRA) 180 MG tablet HM Fexofenadine HCl 180 MG Oral Tablet   1 (one) Tablet daily for 90 days   Quantity: 90 Tablet;  Refills: 3     Ordered:10-Apr-2017    Citlali Ward MD, Linda MD;  Start 10-Apr-2017  Active      fluticasone (FLONASE) 50 mcg/actuation nasal spray 2 sprays by Each Nare route once daily.  Qty: 3 Bottle, Refills: 3    Associated Diagnoses: Chronic sinus complaints      furosemide (LASIX) 40 MG tablet Every other day  Qty: 45 tablet, Refills: 3    Associated Diagnoses: Essential hypertension      lancets Misc 2 Box by Misc.(Non-Drug; Combo Route) route 2 (two) times daily.  Qty: 100 each, Refills: 3    Associated Diagnoses: Diabetes mellitus type 2, uncontrolled      metformin (GLUCOPHAGE-XR) 500 MG 24 hr tablet Take 1 tablet (500 mg total) by mouth daily with breakfast.  Qty: 90 tablet, Refills: 3    Associated Diagnoses: Controlled type 2 diabetes mellitus without complication, without long-term current use of insulin      multivit-iron-min-folic acid (MULTIVITAMIN-IRON-MINERALS-FOLIC ACID) 3,500-18-0.4 unit-mg-mg Chew Take by mouth.        naftifine (NAFTIN) 2 % Crea NAFTIN, 2% (External Cream)   as needed (2 %)  Active   Comments: Medication taken as needed.       polyethylene glycol (GLYCOLAX) 17 gram/dose powder Take 17 g by mouth once daily.  Qty: 6 Bottle, Refills: 3    Associated Diagnoses: Constipation, chronic      potassium chloride (MICRO-K) 10 MEQ CpSR Every other day  Qty: 45 capsule, Refills: 3    Associated Diagnoses: Essential hypertension      trazodone (DESYREL) 100 MG tablet TRAZODONE HCL, 100MG (Oral Tablet)   1 (one) Tablet Tablet nightly for 90 days   Quantity:  90 Tablet;  Refills: 4     Ordered:3-May-2016    Citlali Ward MD, Linda MD;  Start 3-May-2016  Active       !! - Potential duplicate medications found. Please discuss with provider.          Discharge Procedure Orders  Diet general     Activity as tolerated     Call MD for:  temperature >100.4     Call MD for:  persistent nausea and vomiting or diarrhea     Call MD for:  severe uncontrolled pain     Call MD for:  redness, tenderness, or signs of infection (pain, swelling, redness, odor or green/yellow discharge around incision site)     Call MD for:  difficulty breathing or increased cough     Call MD for:  severe persistent headache          Follow up with MD in 2-3 weeks    Discharge Procedure Orders (must include Diet, Follow-up, Activity):    Discharge Procedure Orders (must include Diet, Follow-up, Activity)  Diet general     Activity as tolerated     Call MD for:  temperature >100.4     Call MD for:  persistent nausea and vomiting or diarrhea     Call MD for:  severe uncontrolled pain     Call MD for:  redness, tenderness, or signs of infection (pain, swelling, redness, odor or green/yellow discharge around incision site)     Call MD for:  difficulty breathing or increased cough     Call MD for:  severe persistent headache

## 2017-09-29 VITALS
HEART RATE: 55 BPM | OXYGEN SATURATION: 93 % | DIASTOLIC BLOOD PRESSURE: 63 MMHG | RESPIRATION RATE: 17 BRPM | BODY MASS INDEX: 38.49 KG/M2 | HEIGHT: 68 IN | SYSTOLIC BLOOD PRESSURE: 135 MMHG | WEIGHT: 254 LBS | TEMPERATURE: 97 F

## 2017-10-19 ENCOUNTER — OFFICE VISIT (OUTPATIENT)
Dept: PAIN MEDICINE | Facility: CLINIC | Age: 77
End: 2017-10-19
Payer: MEDICARE

## 2017-10-19 VITALS — DIASTOLIC BLOOD PRESSURE: 68 MMHG | SYSTOLIC BLOOD PRESSURE: 116 MMHG | HEART RATE: 54 BPM

## 2017-10-19 DIAGNOSIS — M51.36 DDD (DEGENERATIVE DISC DISEASE), LUMBAR: ICD-10-CM

## 2017-10-19 DIAGNOSIS — M54.16 LUMBAR RADICULOPATHY: Primary | ICD-10-CM

## 2017-10-19 DIAGNOSIS — M48.061 SPINAL STENOSIS OF LUMBAR REGION, UNSPECIFIED WHETHER NEUROGENIC CLAUDICATION PRESENT: ICD-10-CM

## 2017-10-19 PROCEDURE — 99213 OFFICE O/P EST LOW 20 MIN: CPT | Mod: PBBFAC,PO | Performed by: PHYSICIAN ASSISTANT

## 2017-10-19 PROCEDURE — 99214 OFFICE O/P EST MOD 30 MIN: CPT | Mod: S$PBB,,, | Performed by: PHYSICIAN ASSISTANT

## 2017-10-19 PROCEDURE — 99999 PR PBB SHADOW E&M-EST. PATIENT-LVL III: CPT | Mod: PBBFAC,,, | Performed by: PHYSICIAN ASSISTANT

## 2017-10-19 RX ORDER — MONTELUKAST SODIUM 10 MG/1
TABLET ORAL
COMMUNITY
Start: 2017-10-16 | End: 2018-05-17 | Stop reason: SDUPTHER

## 2017-10-19 RX ORDER — DIAZEPAM 5 MG/1
TABLET ORAL
COMMUNITY
Start: 2017-09-11 | End: 2018-04-12 | Stop reason: SDUPTHER

## 2017-10-19 RX ORDER — GLIMEPIRIDE 2 MG/1
2 TABLET ORAL
COMMUNITY
End: 2017-12-12

## 2017-10-19 RX ORDER — NAPROXEN SODIUM 220 MG/1
TABLET, FILM COATED ORAL
COMMUNITY
Start: 2017-10-16 | End: 2017-12-12

## 2017-10-19 RX ORDER — DESOXIMETASONE 0.5 MG/G
CREAM TOPICAL
COMMUNITY
Start: 2017-09-15 | End: 2019-04-03

## 2017-10-19 NOTE — PROGRESS NOTES
Referring Physician: No ref. provider found    PCP: Citlali Parish MD      CC:low back and left leg pain    Interval History:  Mr. Hollingsworth is a 77 y.o. male with a low back and left leg pain who presents today for f/u s/p lumbar TFESI on left at L4-5 and L5-S1. Reports 80% relief of low back and radiating left LE pain. Continues to have some pain after cutting his 1 acre land. Pain improves with rest. He denies any LE weakness or b/b changes. Pain today is rated 2/10.  Pt has been seen in the clinic before, however pt is new to me.     History below per Dr. Wright    HPI:   Yeyo Hollingsworth is a 77 y.o. male ho presents with lower back and left leg pain.  Pain is been present since 2010.  No recent traumatic incident.  His intermittent aching, throbbing, electric pain in his lower back.  Pain radiates down his left buttock into his left posterior thigh.  Pain worsens with prolonged standing, walking, getting up and coughing.  Pain improves with rest.  His tried physical therapy with minimal benefit.  He has undergone lumbar JOSE's with Dr. Rendon in the past with moderate benefit.  Most recent injection was performed in September 2016.  Pain has since returned.  He desires repeat injections with us.  He denies any weakness.  No bowel bladder changes.  He rates his pain 6/10 today.    ROS:  CONSTITUTIONAL: No fevers, chills, night sweats, wt. loss, appetite changes  SKIN: no rashes or itching  ENT: No headaches, head trauma, vision changes, or eye pain  LYMPH NODES: None noticed   CV: No chest pain, palpitations.   RESP: No shortness of breath, dyspnea on exertion, cough, wheezing, or hemoptysis  GI: No nausea, emesis, diarrhea, constipation, melena, hematochezia, pain.    : No dysuria, hematuria, urgency, or frequency   HEME: No easy bruising, bleeding problems  PSYCHIATRIC: No depression, anxiety, psychosis, hallucinations.  NEURO: No seizures, memory loss, dizziness or difficulty sleeping  MSK: + history of  present illness      Past Medical History:   Diagnosis Date    Allergy     Anticoagulant long-term use     aspirin    Anxiety     Atrial fibrillation     Cataract     Clotting disorder     left leg    Diabetes mellitus     Diabetes mellitus, type 2     Hyperlipidemia     Hypertension     BRANDI on CPAP      Past Surgical History:   Procedure Laterality Date    ABDOMINAL SURGERY      origunal surg 1986-mult surgeries since    CARPAL TUNNEL RELEASE      right wrist 2009-left wrist 2010    COLON SURGERY      partial colon removal    COLOSTOMY  1986    colostomy reversal  1986    EYE SURGERY      hay fever      HERNIA REPAIR  1949    TONSILLECTOMY  1947     No family history on file.  Social History     Social History    Marital status:      Spouse name: N/A    Number of children: N/A    Years of education: N/A     Social History Main Topics    Smoking status: Former Smoker     Types: Cigarettes     Quit date: 2/27/2006    Smokeless tobacco: Never Used    Alcohol use No    Drug use: No    Sexual activity: Not Asked     Other Topics Concern    None     Social History Narrative    None         Medications/Allergies: See med card    Vitals:    10/19/17 0926 10/19/17 0929   BP: 116/68    Pulse: (!) 54    PainSc:   2   1   PainLoc: Back          Physical exam:    GENERAL: A and O x3, the patient appears well groomed and is in no acute distress.  Skin: No rashes or obvious lesions  HEENT: normocephalic, atraumatic  CARDIOVASCULAR:  Bradycardia  LUNGS: non labored breathing  ABDOMEN: soft, nontender   UPPER EXTREMITIES: Normal alignment, normal range of motion, no atrophy, no skin changes,  hair growth and nail growth normal and equal bilaterally. No swelling, no tenderness.    LOWER EXTREMITIES:  Normal alignment, normal range of motion, no atrophy, no skin changes,  hair growth and nail growth normal and equal bilaterally. No swelling, no tenderness.    LUMBAR SPINE  Lumbar spine: ROM is  limited with flexion extension and oblique extension with moderate increased pain.    Caden's test causes no increased pain on either side.    Supine straight leg raise is negative bilaterally.    Internal and external rotation of the hip causes no increased pain on either side.  Myofascial exam: No tenderness to palpation across lumbar paraspinous muscles.      MENTAL STATUS: normal orientation, speech, language, and fund of knowledge for social situation.  Emotional state appropriate.    CRANIAL NERVES:  II:  PERRL bilaterally,   III,IV,VI: EOMI.    V:  Facial sensation equal bilaterally  VII:  Facial motor function normal.  VIII:  Hearing equal to finger rub bilaterally  IX/X: Gag normal, palate symmetric  XI:  Shoulder shrug equal, head turn equal  XII:  Tongue midline without fasciculations      MOTOR: Tone and bulk: normal bilateral upper and lower Strength: normal   Delt Bi Tri WE WF     R 5 5 5 5 5 5   L 5 5 5 5 5 5     IP ADD ABD Quad TA Gas HAM  R 5 5 5 5 5 5 5  L 5 5 5 5 5 5 5    SENSATION: Light touch and pinprick intact bilaterally  REFLEXES: normal, symmetric, nonbrisk.  Toes down, no clonus. No hoffmans.  GAIT: normal rise, base, steps, and arm swing.        Imaging:  mRI lumbar spine 9/2017 ([facility])  Severe facet arthrosis and ligamentum flavum at L4-5 results in severe spinal canal stenosis. Possible impingement of the bilateral L5 nerve as well as bilateral L4 nerves.  There is a grade 1 anterolisthesis of L4 and L5.  Moderate to severe bilateral neuroforaminal L3-4.  This note was completed with dictation software and grammatical errors may exist.      Assessment:  Mr. Hollingsworth is a 77 y.o. male with low back and left leg pain  1. Lumbar radiculopathy    2. DDD (degenerative disc disease), lumbar    3. Spinal stenosis of lumbar region, unspecified whether neurogenic claudication present        Plan:  1.  I have stressed the importance of physical activity and exercise to improve overall  health  2. Monitor progress and consider repeat lumbar epidural steroid injection to the Left L4-5 and L5-S1 level.    3. Reviewed lumbar [facility]  4. Low back pain may benefit from lumbar MBB work up in the future  5. F/u prn

## 2017-12-12 ENCOUNTER — OFFICE VISIT (OUTPATIENT)
Dept: FAMILY MEDICINE | Facility: CLINIC | Age: 77
End: 2017-12-12
Payer: MEDICARE

## 2017-12-12 VITALS
HEIGHT: 68 IN | OXYGEN SATURATION: 96 % | BODY MASS INDEX: 38.51 KG/M2 | RESPIRATION RATE: 16 BRPM | WEIGHT: 254.13 LBS | HEART RATE: 60 BPM | DIASTOLIC BLOOD PRESSURE: 60 MMHG | SYSTOLIC BLOOD PRESSURE: 130 MMHG

## 2017-12-12 DIAGNOSIS — E11.9 CONTROLLED TYPE 2 DIABETES MELLITUS WITHOUT COMPLICATION, WITHOUT LONG-TERM CURRENT USE OF INSULIN: ICD-10-CM

## 2017-12-12 DIAGNOSIS — I48.20 CHRONIC ATRIAL FIBRILLATION: ICD-10-CM

## 2017-12-12 DIAGNOSIS — Z79.01 ANTICOAGULANT LONG-TERM USE: ICD-10-CM

## 2017-12-12 DIAGNOSIS — Z86.718 HISTORY OF DVT (DEEP VEIN THROMBOSIS): ICD-10-CM

## 2017-12-12 DIAGNOSIS — J30.89 CHRONIC NONSEASONAL ALLERGIC RHINITIS DUE TO FUNGAL SPORES: ICD-10-CM

## 2017-12-12 DIAGNOSIS — M48.061 SPINAL STENOSIS OF LUMBAR REGION WITHOUT NEUROGENIC CLAUDICATION: Primary | ICD-10-CM

## 2017-12-12 DIAGNOSIS — I10 ESSENTIAL HYPERTENSION: ICD-10-CM

## 2017-12-12 PROCEDURE — 99215 OFFICE O/P EST HI 40 MIN: CPT | Mod: ,,, | Performed by: INTERNAL MEDICINE

## 2017-12-12 RX ORDER — POTASSIUM CHLORIDE 750 MG/1
CAPSULE, EXTENDED RELEASE ORAL
Qty: 45 CAPSULE | Refills: 3 | Status: SHIPPED | OUTPATIENT
Start: 2017-12-12 | End: 2018-10-11 | Stop reason: SDUPTHER

## 2017-12-12 RX ORDER — METOPROLOL TARTRATE 25 MG/1
25 TABLET, FILM COATED ORAL 2 TIMES DAILY
Qty: 180 TABLET | Refills: 3 | Status: SHIPPED | OUTPATIENT
Start: 2017-12-12 | End: 2018-04-12 | Stop reason: SDUPTHER

## 2017-12-12 RX ORDER — IPRATROPIUM BROMIDE 21 UG/1
2 SPRAY, METERED NASAL 2 TIMES DAILY
Qty: 30 ML | Refills: 5 | Status: SHIPPED | OUTPATIENT
Start: 2017-12-12 | End: 2022-07-26

## 2017-12-12 RX ORDER — TRAMADOL HYDROCHLORIDE 50 MG/1
50 TABLET ORAL 2 TIMES DAILY PRN
Qty: 45 TABLET | Refills: 5 | Status: SHIPPED | OUTPATIENT
Start: 2017-12-12 | End: 2017-12-22

## 2017-12-12 RX ORDER — METFORMIN HYDROCHLORIDE 1000 MG/1
1000 TABLET ORAL 2 TIMES DAILY WITH MEALS
Qty: 180 TABLET | Refills: 3 | Status: SHIPPED | OUTPATIENT
Start: 2017-12-12 | End: 2017-12-18

## 2017-12-12 RX ORDER — AZELASTINE HCL 205.5 UG/1
2 SPRAY NASAL DAILY
COMMUNITY
End: 2017-12-12 | Stop reason: SDUPTHER

## 2017-12-12 NOTE — PATIENT INSTRUCTIONS
Back Care Tips    Caring for your back  These are things you can do to prevent a recurrence of acute back pain and to reduce symptoms from chronic back pain:  · Maintain a healthy weight. If you are overweight, losing weight will help most types of back pain.  · Exercise is an important part of recovery from most types of back pain. The muscles behind and in front of the spine support the back. This means strengthening both the back muscles and the abdominal muscles will provide better support for your spine.   · Swimming and brisk walking are good overall exercises to improve your fitness level.  · Practice safe lifting methods (below).  · Practice good posture when sitting, standing and walking. Avoid prolonged sitting. This puts more stress on the lower back than standing or walking.  · Wear quality shoes with sufficient arch support. Foot and ankle alignment can affect back symptoms. Women should avoid wearing high heels.  · Therapeutic massage can help relax the back muscles without stretching them.  · During the first 24 to 72 hours after an acute injury or flare-up of chronic back pain, apply an ice pack to the painful area for 20 minutes and then remove it for 20 minutes, over a period of 60 to 90 minutes, or several times a day. As a safety precaution, do not use a heating pad at bedtime. Sleeping on a heating pad can lead to skin burns or tissue damage.  · You can alternate ice and heat therapies.  Medications  Talk to your healthcare provider before using medicines, especially if you have other medical problems or are taking other medicines.  · You may use acetaminophen or ibuprofen to control pain, unless your healthcare provider prescribed other pain medicine. If you have chronic conditions like diabetes, liver or kidney disease, stomach ulcers, or gastrointestinal bleeding, or are taking blood thinners, talk with your healthcare provider before taking any medicines.  · Be careful if you are given  prescription pain medicines, narcotics, or medicine for muscle spasm. They can cause drowsiness, affect your coordination, reflexes, and judgment. Do not drive or operate heavy machinery while taking these types of medicines. Take prescription pain medicine only as prescribed by your healthcare provider.  Lumbar stretch  Here is a simple stretching exercise that will help relax muscle spasm and keep your back more limber. If exercise makes your back pain worse, dont do it.  · Lie on your back with your knees bent and both feet on the ground.  · Slowly raise your left knee to your chest as you flatten your lower back against the floor. Hold for 5 seconds.  · Relax and repeat the exercise with your right knee.  · Do 10 of these exercises for each leg.  Safe lifting method  · Dont bend over at the waist to lift an object off the floor.  Instead, bend your knees and hips in a squat.   · Keep your back and head upright  · Hold the object close to your body, directly in front of you.  · Straighten your legs to lift the object.   · Lower the object to the floor in the reverse fashion.  · If you must slide something across the floor, push it.  Posture tips  Sitting  Sit in chairs with straight backs or low-back support. Keep your knees lower than your hips, with your feet flat on the floor.  When driving, sit up straight. Adjust the seat forward so you are not leaning toward the steering wheel.  A small pillow or rolled towel behind your lower back may help if you are driving long distances.   Standing  When standing for long periods, shift most of your weight to one leg at a time. Alternate legs every few minutes.   Sleeping  The best way to sleep is on your side with your knees bent. Put a low pillow under your head to support your neck in a neutral spine position. Avoid thick pillows that bend your neck to one side. Put a pillow between your legs to further relax your lower back. If you sleep on your back, put pillows  under your knees to support your legs in a slightly flexed position. Use a firm mattress. If your mattress sags, replace it, or use a 1/2-inch plywood board under the mattress to add support.  Follow-up care  Follow up with your healthcare provider, or as advised.  If X-rays, a CT scan or an MRI scan were taken, they will be reviewed by a radiologist. You will be notified of any new findings that may affect your care.  Call 911  Seek emergency medical care if any of the following occur:  · Trouble breathing  · Confusion  · Very drowsy  · Fainting or loss of consciousness  · Rapid or very slow heart rate  · Loss of  bowel or bladder control  When to seek medical care  Call your healthcare provider if any of the following occur:  · Pain becomes worse or spreads to your arms or legs  · Weakness or numbness in one or both arms or legs  · Numbness in the groin area  Date Last Reviewed: 6/1/2016  © 1438-2472 [employer]. [address] [place] [address]. All rights reserved. This information is not intended as a substitute for professional medical care. Always follow your healthcare professional's instructions.        A1C  Does this test have other names?  Hemoglobin A1c; HbA1c; glycosylated hemoglobin; glycohemoglobin; Glycated hemoglobin  What is this test?  A1C is a blood test used to screen people to find out whether they have diabetes or prediabetes. It's also used in people who know they have diabetes to measure how well they are controlling their blood sugar and to guide their treatment decisions over time.  Why do I need this test?  You may need this test to check for prediabetes or diabetes. If you already know that you have diabetes or prediabetes, you may need this test to see how well you are controlling your blood sugar.  People with diabetes must track their blood sugar (glucose) levels every day to make sure they arent too high or too low. The A1C test gives results for a longer  period of time. It shows whether your blood sugar has been too high on average in the previous two to three months. When blood sugar is high, more glucose builds up and sticks to your hemoglobin, a protein that carries oxygen in red blood cells. The A1C test measures the percentage of hemoglobin that is coated with blood sugar.  Depending on the type of diabetes you have, how well it's controlled, and your health care providers preferences, you may need to have the A1C test two or more times a year. The American Diabetes Association (ADA) recommends that you have an A1C test at least twice a year if you are meeting your blood sugar goals and your blood sugar is well-controlled. If you arent meeting your goals or your medication has changed lately, you should have the A1C test more often. You also may have the test when your health care provider first starts working with you to treat your diabetes.  What other tests might I have along with this test?   If your health care provider is testing you for diabetes, you may also take a fasting plasma glucose test, or FPG, or an oral glucose tolerance test, or OGTT, as part of your screening and diagnosis. You may also be tested for sugar, ketones or protein in the urine.  What do my test results mean?  Laboratory test results may vary depending on your age, gender, health history, the method used for the test, and many other factors. If your results are different from the results suggested below, this may not mean that you have a problem. Ask your health care provider to explain what the results mean for you.   A1C is reported as a percentage:  · Normal A1C is considered to be below 5.7 percent. Results between 5.7 and 6.4 percent may mean you have prediabetes. This means you have a higher risk of developing diabetes.  · Results of 6.5 percent or higher on two separate occasions may mean that you have diabetes.  · The ADA  recommends that people with diabetes should  maintain an A1C below 7 percent. The American Association of Clinical Endocrinologists recommends an A1C of 6.5 percent or less. Recommendations may vary based on the person's age, medical conditions, or other factors.   How is this test done?  The test requires a blood sample, which is drawn through a needle from a vein in your arm.   Does this test pose any risks?  Taking a blood sample with a needle carries risks that include bleeding, infection, bruising, and a sense of lightheadedness. When the needle pricks your arm, you may feel a slight stinging sensation or pain. Afterward, the site may be slightly sore.  What might affect my test results?  Your blood sugar levels usually vary throughout the day. These variations won't affect the A1C.  If you have sickle cell anemia or other blood disorders, a standard A1C test may be less useful for diagnosing or monitoring diabetes. (Your health care provider may be able to find another diabetes test that will better serve you.) In addition, the test results may be skewed if you have anemia, heavy bleeding, an iron deficiency, kidney failure, or liver disease.  How do I get ready for this test?  You don't need any special preparation for the test.    © 3236-4865 [employer]. [address] [place] [address], [place] [address]. All rights reserved. This information is not intended as a substitute for professional medical care. Always follow your healthcare professional's instructions.

## 2017-12-12 NOTE — PROGRESS NOTES
Subjective:       Patient ID: Yeyo Hollingsworth is a 77 y.o. male.    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 He is requesting a refill on this as well.  Patient is here for his hemoglobin A1c but he did have a check by his cardiologist back at the end of September and it was normal at 6.4 which is actually within normal range and not even considered a diabetic. We tried to get him off of Amaryl at 2 mg in view of this very well controlled diabetes and he was hesitant as he believes that normal blood sugar should be any fasting in the morning. He tried 3 Glucophage is and was still not getting readings of 80 in the morning and so we reviewed the mechanism action of both areas as well as metformin and the importance of his age group is not having any hypoglycemic episodes. He is in agreement to try the metformin again.  He also needs refill on a few other medications and is requesting my office visit the coincided with the cardiologist so he does not have to repeat blood work and see any reason why he cannot do this.  Patient has seen  for a one time epidural injection for his back and this is improved greatly and we discussed having him do some physical therapy now that his pain has quieted down using agreement with this.  He is also requesting a refill on the tramadol I gave him many months ago and he's having a lot of morning stiffness and joint symptoms and of course he cannot take NSAIDs due to his Eliquis  therapy.      Review of Systems   Constitutional: Negative for activity change, diaphoresis, fatigue and fever.   HENT: Negative for congestion, ear pain, postnasal drip, sinus pressure, sore throat and trouble swallowing.    Eyes: Negative for pain.   Respiratory: Negative for cough, chest tightness, shortness of breath and wheezing.    Cardiovascular: Negative for chest pain, palpitations and leg swelling.   Gastrointestinal: Negative for abdominal distention, abdominal pain, blood in stool, constipation and diarrhea.   Endocrine: Negative for cold intolerance and heat intolerance.   Genitourinary: Negative for decreased urine volume, difficulty urinating, dysuria, flank pain, frequency and hematuria.   Musculoskeletal: Positive for arthralgias, back pain and joint swelling. Negative for myalgias and neck pain.   Skin: Negative for pallor, rash and wound.   Neurological: Negative for tremors, syncope, weakness, light-headedness, numbness and headaches.   Hematological: Negative for adenopathy.   Psychiatric/Behavioral: Negative for confusion, decreased concentration, hallucinations, self-injury, sleep disturbance and suicidal ideas. The patient is not nervous/anxious.        Past Medical History:   Diagnosis Date    Allergy     Anticoagulant long-term use     aspirin    Anxiety     Atrial fibrillation     Cataract     Clotting disorder     left leg    Diabetes mellitus     Diabetes mellitus, type 2     Hyperlipidemia     Hypertension     BRANDI on CPAP        Past Surgical History:   Procedure Laterality Date    ABDOMINAL SURGERY      origunal surg 1986-mult surgeries since    CARPAL TUNNEL RELEASE      right wrist 2009-left wrist 2010    COLON SURGERY      partial colon removal    COLOSTOMY  1986    colostomy reversal  1986    EYE SURGERY      hay fever      HERNIA REPAIR  1949    TONSILLECTOMY  1947       History reviewed. No pertinent family history.    Social History     Social History     Marital status:      Spouse name: N/A    Number of children: N/A    Years of education: N/A     Social History Main Topics    Smoking status: Former Smoker     Types: Cigarettes     Quit date: 2/27/2006    Smokeless tobacco: Never Used    Alcohol use No    Drug use: No    Sexual activity: Not Asked     Other Topics Concern    None     Social History Narrative    None       Current Outpatient Prescriptions   Medication Sig Dispense Refill    apixaban (ELIQUIS) 5 mg Tab Take 1 tablet (5 mg total) by mouth 2 (two) times daily. 180 tablet 3    aspirin (ECOTRIN) 81 MG EC tablet Take 1 tablet (81 mg total) by mouth once daily. 90 tablet 3    atorvastatin (LIPITOR) 10 MG tablet nightly 90 tablet 3    blood sugar diagnostic (BLOOD GLUCOSE TEST) Strp 1 strip by Misc.(Non-Drug; Combo Route) route 2 (two) times daily. 200 strip 3    blood sugar diagnostic Strp 200 strips by Misc.(Non-Drug; Combo Route) route 2 (two) times daily. 100 strip 3    desoximetasone (TOPICORT) 0.05 % cream       diclofenac sodium (VOLTAREN) 1 % Gel VOLTAREN, 1% (Transdermal Gel)   as needed (1 %)  Active   Comments: Medication taken as needed.       fexofenadine (ALLEGRA) 180 MG tablet HM Fexofenadine HCl 180 MG Oral Tablet   1 (one) Tablet daily for 90 days   Quantity: 90 ;  Refills: 3     Ordered:10-Apr-2017    Citlali Ward MD, Linda MD;  Start 10-Apr-2017  Active      fluticasone (FLONASE) 50 mcg/actuation nasal spray 2 sprays by Each Nare route once daily. 3 Bottle 3    furosemide (LASIX) 40 MG tablet Every other day 45 tablet 3    ipratropium (ATROVENT) 0.03 % nasal spray 2 sprays by Nasal route 2 (two) times daily. 30 mL 5    lancets Misc 2 Box by Misc.(Non-Drug; Combo Route) route 2 (two) times daily. 100 each 3    lisinopril (PRINIVIL,ZESTRIL) 20 MG tablet Take 1 tablet (20 mg total) by mouth once daily. 90 tablet 3    montelukast (SINGULAIR) 10 mg tablet       multivit-iron-min-folic acid  (MULTIVITAMIN-IRON-MINERALS-FOLIC ACID) 3,500-18-0.4 unit-mg-mg Chew Take by mouth.        naftifine (NAFTIN) 2 % Crea NAFTIN, 2% (External Cream)   as needed (2 %)  Active   Comments: Medication taken as needed.       polyethylene glycol (GLYCOLAX) 17 gram/dose powder Take 17 g by mouth once daily. 6 Bottle 3    potassium chloride (MICRO-K) 10 MEQ CpSR Every other day 45 capsule 3    trazodone (DESYREL) 100 MG tablet TRAZODONE HCL, 100MG (Oral Tablet)   1 (one) Tablet Tablet nightly for 90 days   Quantity: 90 ;  Refills: 4     Ordered:3-May-2016    Citalli Ward MD, Linda MD;  Start 3-May-2016  Active      diazePAM (VALIUM) 5 MG tablet       metFORMIN (GLUCOPHAGE) 1000 MG tablet Take 1 tablet (1,000 mg total) by mouth 2 (two) times daily with meals. 180 tablet 3    metoprolol tartrate (LOPRESSOR) 25 MG tablet Take 1 tablet (25 mg total) by mouth 2 (two) times daily. 180 tablet 3    traMADol (ULTRAM) 50 mg tablet Take 1 tablet (50 mg total) by mouth 2 (two) times daily as needed for Pain. 45 tablet 5     No current facility-administered medications for this visit.        Review of patient's allergies indicates:   Allergen Reactions    Ativan [lorazepam] Other (See Comments)     Mood alternating      Dilaudid [hydromorphone (bulk)] Other (See Comments)     Mood alternating      Morphine Other (See Comments)     Mood alternating      Hydromorphone        Objective:      Physical Exam   Constitutional: He is oriented to person, place, and time. He appears well-developed and well-nourished. No distress.   HENT:   Head: Normocephalic and atraumatic.   Right Ear: External ear normal.   Left Ear: External ear normal.   Nose: Nose normal.   Mouth/Throat: Oropharynx is clear and moist.   Eyes: Conjunctivae and EOM are normal. Right eye exhibits no discharge. Left eye exhibits no discharge. No scleral icterus.   Neck: Normal range of motion. Neck supple. No JVD present. No tracheal deviation present. No  thyromegaly present.   Cardiovascular: Normal rate, regular rhythm, normal heart sounds and intact distal pulses.  Exam reveals no gallop.    No murmur heard.  Pulmonary/Chest: Effort normal and breath sounds normal. No respiratory distress. He has no wheezes. He has no rales.   Abdominal: Soft. Bowel sounds are normal. He exhibits no distension and no mass. There is no tenderness.   Musculoskeletal: Normal range of motion. He exhibits tenderness. He exhibits no edema.   Patient has mild crepitus in both knees with no joint laxity appreciated on precursor exam nor any effusions.  In his PIP and DIP joints does have osteoarthritic changes.   Lymphadenopathy:     He has no cervical adenopathy.   Neurological: He is alert and oriented to person, place, and time.   Skin: Skin is warm and dry. No rash noted. He is not diaphoretic. No erythema.   Psychiatric: He has a normal mood and affect. His behavior is normal. Judgment and thought content normal.       Assessment:       1. Spinal stenosis of lumbar region without neurogenic claudication    2. Controlled type 2 diabetes mellitus without complication, without long-term current use of insulin    3. Essential hypertension    4. Chronic nonseasonal allergic rhinitis due to fungal spores    5. Anticoagulant long-term use    6. History of DVT (deep vein thrombosis)    7. Chronic atrial fibrillation        Plan:       Spinal stenosis of lumbar region without neurogenic claudication  -     Ambulatory consult to Physical Therapy  -     traMADol (ULTRAM) 50 mg tablet; Take 1 tablet (50 mg total) by mouth 2 (two) times daily as needed for Pain.  Dispense: 45 tablet; Refill: 5    Controlled type 2 diabetes mellitus without complication, without long-term current use of insulin  -     metFORMIN (GLUCOPHAGE) 1000 MG tablet; Take 1 tablet (1,000 mg total) by mouth 2 (two) times daily with meals.  Dispense: 180 tablet; Refill: 3  -     Comprehensive metabolic panel; Future; Expected  date: 04/16/2018  -     Hemoglobin A1c; Future; Expected date: 04/16/2018  -     Microalbumin/creatinine urine ratio; Future; Expected date: 04/16/2018    Essential hypertension  -     metoprolol tartrate (LOPRESSOR) 25 MG tablet; Take 1 tablet (25 mg total) by mouth 2 (two) times daily.  Dispense: 180 tablet; Refill: 3  -     potassium chloride (MICRO-K) 10 MEQ CpSR; Every other day  Dispense: 45 capsule; Refill: 3  -     TSH; Future; Expected date: 04/16/2018  -     T4, free; Future; Expected date: 04/16/2018  -     Urinalysis Microscopic; Future; Expected date: 04/16/2018    Chronic nonseasonal allergic rhinitis due to fungal spores  -     ipratropium (ATROVENT) 0.03 % nasal spray; 2 sprays by Nasal route 2 (two) times daily.  Dispense: 30 mL; Refill: 5    Time spent with the patient was 40 min and 50 percent of that was in face to face contact  Opiates prescribed  Diagnosis:   Estimated length of time:   Risk and benefits discussed.  Non-narcotic alternatives discussed.

## 2017-12-18 RX ORDER — GLIMEPIRIDE 2 MG/1
2 TABLET ORAL
Qty: 90 TABLET | Refills: 0
Start: 2017-12-18 | End: 2018-04-12

## 2018-03-27 LAB
ALBUMIN SERPL-MCNC: 4 G/DL (ref 3.1–4.7)
ALP SERPL-CCNC: 32 IU/L (ref 40–104)
ALT (SGPT): 37 IU/L (ref 3–33)
AST SERPL-CCNC: 27 IU/L (ref 10–40)
BILIRUB SERPL-MCNC: 0.6 MG/DL (ref 0.3–1)
BUN SERPL-MCNC: 22 MG/DL (ref 8–20)
CALCIUM SERPL-MCNC: 9.1 MG/DL (ref 7.7–10.4)
CHLORIDE: 105 MMOL/L (ref 98–110)
CO2 SERPL-SCNC: 26.9 MMOL/L (ref 22.8–31.6)
CREATININE RANDOM URINE: 219 MG/DL
CREATININE: 1.14 MG/DL (ref 0.6–1.4)
GLUCOSE: 107 MG/DL (ref 70–99)
HBA1C MFR BLD: 6.6 % (ref 3.1–6.5)
MICROALBUM.,U,RANDOM: 15.2 MCG/ML (ref 0–19.9)
MICROALBUMIN/CREATININE RATIO: 7 (ref 0–30)
POTASSIUM SERPL-SCNC: 4.2 MMOL/L (ref 3.5–5)
PROT SERPL-MCNC: 7 G/DL (ref 6–8.2)
SODIUM: 141 MMOL/L (ref 134–144)
T4 FREE SP9 P CHAL SERPL-SCNC: 0.8 NG/DL (ref 0.45–1.27)
TSH SERPL DL<=0.005 MIU/L-ACNC: 1.67 ULU/ML (ref 0.3–5.6)

## 2018-04-02 ENCOUNTER — TELEPHONE (OUTPATIENT)
Dept: FAMILY MEDICINE | Facility: CLINIC | Age: 78
End: 2018-04-02

## 2018-04-02 NOTE — TELEPHONE ENCOUNTER
----- Message from Citlali Ward MD sent at 4/2/2018  8:49 AM CDT -----  A few abn with lab work but nothing really bad - will discuss in full on follow up next week

## 2018-04-12 ENCOUNTER — OFFICE VISIT (OUTPATIENT)
Dept: FAMILY MEDICINE | Facility: CLINIC | Age: 78
End: 2018-04-12
Payer: MEDICARE

## 2018-04-12 VITALS
SYSTOLIC BLOOD PRESSURE: 110 MMHG | OXYGEN SATURATION: 96 % | HEART RATE: 64 BPM | BODY MASS INDEX: 38.57 KG/M2 | RESPIRATION RATE: 16 BRPM | WEIGHT: 254.5 LBS | DIASTOLIC BLOOD PRESSURE: 62 MMHG | HEIGHT: 68 IN

## 2018-04-12 DIAGNOSIS — F51.01 PRIMARY INSOMNIA: ICD-10-CM

## 2018-04-12 DIAGNOSIS — E11.9 CONTROLLED TYPE 2 DIABETES MELLITUS WITHOUT COMPLICATION, WITHOUT LONG-TERM CURRENT USE OF INSULIN: Primary | ICD-10-CM

## 2018-04-12 DIAGNOSIS — I10 ESSENTIAL HYPERTENSION: ICD-10-CM

## 2018-04-12 DIAGNOSIS — E78.01 FAMILIAL HYPERCHOLESTEROLEMIA: ICD-10-CM

## 2018-04-12 DIAGNOSIS — M43.10 ANTEROLISTHESIS: ICD-10-CM

## 2018-04-12 DIAGNOSIS — Z79.01 ANTICOAGULANT LONG-TERM USE: ICD-10-CM

## 2018-04-12 DIAGNOSIS — M48.061 SPINAL STENOSIS OF LUMBAR REGION WITHOUT NEUROGENIC CLAUDICATION: ICD-10-CM

## 2018-04-12 DIAGNOSIS — R09.89 CHRONIC SINUS COMPLAINTS: ICD-10-CM

## 2018-04-12 DIAGNOSIS — I48.20 CHRONIC ATRIAL FIBRILLATION: ICD-10-CM

## 2018-04-12 DIAGNOSIS — M19.072 OSTEOARTHRITIS OF TOE JOINT, LEFT: ICD-10-CM

## 2018-04-12 PROCEDURE — 99215 OFFICE O/P EST HI 40 MIN: CPT | Mod: ,,, | Performed by: INTERNAL MEDICINE

## 2018-04-12 RX ORDER — TRAMADOL HYDROCHLORIDE 50 MG/1
50 TABLET ORAL EVERY 12 HOURS PRN
Qty: 45 TABLET | Refills: 5 | Status: SHIPPED | OUTPATIENT
Start: 2018-05-07 | End: 2018-07-10 | Stop reason: SDUPTHER

## 2018-04-12 RX ORDER — TRAMADOL HYDROCHLORIDE 50 MG/1
50 TABLET ORAL EVERY 12 HOURS PRN
COMMUNITY
End: 2018-04-12 | Stop reason: SDUPTHER

## 2018-04-12 RX ORDER — ASPIRIN 81 MG/1
81 TABLET ORAL DAILY
Qty: 90 TABLET | Refills: 3 | Status: SHIPPED | OUTPATIENT
Start: 2018-04-12 | End: 2019-07-31 | Stop reason: SDUPTHER

## 2018-04-12 RX ORDER — DIAZEPAM 5 MG/1
5 TABLET ORAL DAILY PRN
Qty: 30 TABLET | Refills: 2 | Status: SHIPPED | OUTPATIENT
Start: 2018-04-12 | End: 2018-10-11 | Stop reason: SDUPTHER

## 2018-04-12 RX ORDER — FLUTICASONE PROPIONATE 50 MCG
2 SPRAY, SUSPENSION (ML) NASAL DAILY
Qty: 3 BOTTLE | Refills: 3 | Status: SHIPPED | OUTPATIENT
Start: 2018-04-12 | End: 2019-04-24 | Stop reason: SDUPTHER

## 2018-04-12 RX ORDER — ATORVASTATIN CALCIUM 10 MG/1
TABLET, FILM COATED ORAL
Qty: 90 TABLET | Refills: 3 | Status: SHIPPED | OUTPATIENT
Start: 2018-04-12 | End: 2019-02-05 | Stop reason: SDUPTHER

## 2018-04-12 RX ORDER — TRAZODONE HYDROCHLORIDE 100 MG/1
100 TABLET ORAL NIGHTLY
Qty: 90 TABLET | Refills: 3 | Status: SHIPPED | OUTPATIENT
Start: 2018-04-12 | End: 2019-02-05 | Stop reason: SDUPTHER

## 2018-04-12 RX ORDER — AZELASTINE 1 MG/ML
1 SPRAY, METERED NASAL 2 TIMES DAILY
COMMUNITY
End: 2018-10-11 | Stop reason: SDUPTHER

## 2018-04-12 RX ORDER — LISINOPRIL 20 MG/1
20 TABLET ORAL DAILY
Qty: 90 TABLET | Refills: 3 | Status: SHIPPED | OUTPATIENT
Start: 2018-04-12 | End: 2019-04-03

## 2018-04-12 RX ORDER — MINERAL OIL
ENEMA (ML) RECTAL
Qty: 90 TABLET | Refills: 3 | Status: CANCELLED | OUTPATIENT
Start: 2018-04-12

## 2018-04-12 RX ORDER — TRAZODONE HYDROCHLORIDE 100 MG/1
TABLET ORAL
Qty: 90 TABLET | Refills: 3 | Status: CANCELLED | OUTPATIENT
Start: 2018-04-12

## 2018-04-12 RX ORDER — MINERAL OIL
180 ENEMA (ML) RECTAL DAILY
Qty: 90 TABLET | Refills: 3 | Status: SHIPPED | OUTPATIENT
Start: 2018-04-12 | End: 2019-02-05 | Stop reason: SDUPTHER

## 2018-04-12 RX ORDER — METOPROLOL TARTRATE 25 MG/1
25 TABLET, FILM COATED ORAL 2 TIMES DAILY
Qty: 180 TABLET | Refills: 3 | Status: SHIPPED | OUTPATIENT
Start: 2018-04-12 | End: 2019-02-05 | Stop reason: SDUPTHER

## 2018-04-12 NOTE — PROGRESS NOTES
Subjective:       Patient ID: Yeyo Hollingsworth is a 77 y.o. male.    Chief Complaint: Follow-up (4 month f/u); Hypertension; and Diabetes    77-year-old was here for follow-up on his diabetes and hypertension. Hemoglobin A1c was 6.6%. Over the past 2 weeks he's been trying to wean off his Amaryl and has been keeping daily records of his fasting blood sugar but no other sugars have been recorded. At one point he was on 1 mg Amaryl none and alternating 2 mg left A1c is possibly reflective of 1 mg, as this would be an average. He cannot tolerate metformin. He denies any low sugars. And all his morning sugars looked within normal limits with most of them below 130 and above 100.  He is also complaining about severe left-sided sciatica or more specifically L4-L5 radiculopathy. He has been seeing pain management did have an epidural injection a few months ago that helped significantly. He also has a severely arthritic left great toe which is a podiatrist wants to surgery on. He is unsure which surgery he should do first. Discomfort on the left side starting about really prohibited him from even ambulating short distances.  He is requesting a motor vehicle disability tag.  We had along discussion on the physiology of insulin resistance and type 2 diabetes and medical to get him off the Amaryl with the need to put him on something POSTPRANDIAL glucoses.      Review of Systems   Constitutional: Negative for activity change, diaphoresis and fever.   HENT: Negative for congestion, ear pain, postnasal drip, sinus pressure, sore throat and trouble swallowing.    Eyes: Negative for pain.   Respiratory: Negative for cough, chest tightness, shortness of breath and wheezing.    Cardiovascular: Negative for palpitations and leg swelling.   Gastrointestinal: Negative for abdominal distention, abdominal pain, blood in stool, constipation and diarrhea.   Endocrine: Negative for cold intolerance and heat intolerance.   Genitourinary:  Negative for decreased urine volume, difficulty urinating, dysuria, flank pain, frequency and hematuria.   Musculoskeletal: Positive for arthralgias and back pain. Negative for joint swelling, myalgias and neck pain.        Per hpi   Skin: Negative for rash and wound.   Neurological: Negative for syncope, light-headedness and numbness.   Hematological: Negative for adenopathy.   Psychiatric/Behavioral: Negative for decreased concentration, hallucinations, self-injury, sleep disturbance and suicidal ideas.       Past Medical History:   Diagnosis Date    Allergy     Anticoagulant long-term use     aspirin    Anxiety     Atrial fibrillation     Cataract     Clotting disorder     left leg    Diabetes mellitus     Diabetes mellitus, type 2     Hyperlipidemia     Hypertension     BRANDI on CPAP        Past Surgical History:   Procedure Laterality Date    ABDOMINAL SURGERY      origunal surg 1986-mult surgeries since    CARPAL TUNNEL RELEASE      right wrist 2009-left wrist 2010    COLON SURGERY      partial colon removal    COLOSTOMY  1986    colostomy reversal  1986    EYE SURGERY      hay fever      HERNIA REPAIR  1949    TONSILLECTOMY  1947       History reviewed. No pertinent family history.    Social History     Social History    Marital status:      Spouse name: N/A    Number of children: N/A    Years of education: N/A     Social History Main Topics    Smoking status: Former Smoker     Types: Cigarettes     Quit date: 2/27/2006    Smokeless tobacco: Never Used    Alcohol use No    Drug use: No    Sexual activity: Not Asked     Other Topics Concern    None     Social History Narrative    None       Current Outpatient Prescriptions   Medication Sig Dispense Refill    apixaban (ELIQUIS) 5 mg Tab Take 1 tablet (5 mg total) by mouth 2 (two) times daily. 180 tablet 3    aspirin (ECOTRIN) 81 MG EC tablet Take 1 tablet (81 mg total) by mouth once daily. 90 tablet 3    atorvastatin  (LIPITOR) 10 MG tablet nightly 90 tablet 3    azelastine (ASTELIN) 137 mcg (0.1 %) nasal spray 1 spray by Nasal route 2 (two) times daily.      blood sugar diagnostic (BLOOD GLUCOSE TEST) Strp 1 strip by Misc.(Non-Drug; Combo Route) route 2 (two) times daily. 200 strip 3    blood sugar diagnostic Strp 200 strips by Misc.(Non-Drug; Combo Route) route 2 (two) times daily. 100 strip 3    desoximetasone (TOPICORT) 0.05 % cream       diclofenac sodium (VOLTAREN) 1 % Gel VOLTAREN, 1% (Transdermal Gel)   as needed (1 %)  Active   Comments: Medication taken as needed.       fluticasone (FLONASE) 50 mcg/actuation nasal spray 2 sprays (100 mcg total) by Each Nare route once daily. 3 Bottle 3    furosemide (LASIX) 40 MG tablet Every other day 45 tablet 3    lancets Misc 2 Box by Misc.(Non-Drug; Combo Route) route 2 (two) times daily. 100 each 3    lisinopril (PRINIVIL,ZESTRIL) 20 MG tablet Take 1 tablet (20 mg total) by mouth once daily. 90 tablet 3    metoprolol tartrate (LOPRESSOR) 25 MG tablet Take 1 tablet (25 mg total) by mouth 2 (two) times daily. 180 tablet 3    montelukast (SINGULAIR) 10 mg tablet       multivit-iron-min-folic acid (MULTIVITAMIN-IRON-MINERALS-FOLIC ACID) 3,500-18-0.4 unit-mg-mg Chew Take by mouth.        naftifine (NAFTIN) 2 % Crea NAFTIN, 2% (External Cream)   as needed (2 %)  Active   Comments: Medication taken as needed.       polyethylene glycol (GLYCOLAX) 17 gram/dose powder Take 17 g by mouth once daily. 6 Bottle 3    potassium chloride (MICRO-K) 10 MEQ CpSR Every other day 45 capsule 3    [START ON 5/7/2018] traMADol (ULTRAM) 50 mg tablet Take 1 tablet (50 mg total) by mouth every 12 (twelve) hours as needed for Pain. 45 tablet 5    diazePAM (VALIUM) 5 MG tablet Take 1 tablet (5 mg total) by mouth daily as needed for Anxiety. 30 tablet 2    fexofenadine (ALLEGRA) 180 MG tablet Take 1 tablet (180 mg total) by mouth once daily. 90 tablet 3    ipratropium (ATROVENT) 0.03 % nasal  spray 2 sprays by Nasal route 2 (two) times daily. 30 mL 5    SITagliptin (JANUVIA) 100 MG Tab Take 1 tablet (100 mg total) by mouth once daily. 90 tablet 3    traZODone (DESYREL) 100 MG tablet Take 1 tablet (100 mg total) by mouth every evening. 90 tablet 3     No current facility-administered medications for this visit.        Review of patient's allergies indicates:   Allergen Reactions    Ativan [lorazepam] Other (See Comments)     Mood alternating      Dilaudid [hydromorphone (bulk)] Other (See Comments)     Mood alternating      Morphine Other (See Comments)     Mood alternating      Hydromorphone        Objective:      Physical Exam   Constitutional: He is oriented to person, place, and time. He appears well-developed and well-nourished. No distress.   HENT:   Head: Normocephalic and atraumatic.   Right Ear: External ear normal.   Left Ear: External ear normal.   Nose: Nose normal.   Mouth/Throat: Oropharynx is clear and moist.   Eyes: Conjunctivae and EOM are normal. Right eye exhibits no discharge. Left eye exhibits no discharge. No scleral icterus.   Neck: Normal range of motion. Neck supple. No JVD present. No tracheal deviation present. No thyromegaly present.   Cardiovascular: Normal rate, regular rhythm, normal heart sounds and intact distal pulses.  Exam reveals no gallop.    No murmur heard.  Pulmonary/Chest: Effort normal and breath sounds normal. No respiratory distress. He has no wheezes. He has no rales.   Abdominal: Soft. Bowel sounds are normal. He exhibits no distension and no mass. There is no tenderness.   Musculoskeletal: He exhibits no edema.        Left knee: Tenderness found.        Lumbar back: He exhibits decreased range of motion, tenderness and spasm. He exhibits no bony tenderness.   Left sided sciatica symptoms - plus positive and crossed straight leg sign at 35 degrees bilaterally   Pt's great toe has tenderness in the DIP and PIP  line   Lymphadenopathy:     He has no  cervical adenopathy.   Neurological: He is alert and oriented to person, place, and time.   Skin: Skin is warm and dry. No rash noted. He is not diaphoretic. No erythema.   Psychiatric: He has a normal mood and affect. His behavior is normal. Judgment and thought content normal.       Lab Results   Component Value Date    WBC 6.3 03/29/2017    HGB 14.5 03/29/2017    HCT 46.3 03/29/2017     03/29/2017    CHOL 214 (H) 03/09/2016    TRIG 180 (H) 03/09/2016    HDL 41 03/09/2016    ALT 41 10/27/2015    AST 27 03/27/2018     03/27/2018    K 4.2 03/27/2018     03/27/2018    CREATININE 1.14 03/27/2018    BUN 22 (H) 03/27/2018    CO2 26.9 03/27/2018    TSH 1.67 03/27/2018    INR 1.0 05/17/2013    GLUF 96 03/09/2016    HGBA1C 6.6 (H) 03/27/2018     Assessment:       1. Controlled type 2 diabetes mellitus without complication, without long-term current use of insulin    2. Anterolisthesis-lumbar L4/l5    3. Spinal stenosis of lumbar region without neurogenic claudication    4. Osteoarthritis of toe joint, left    5. Essential hypertension    6. Familial hypercholesterolemia    7. Chronic sinus complaints    8. Chronic atrial fibrillation    9. Anticoagulant long-term use    10. Primary insomnia        Plan:       Controlled type 2 diabetes mellitus without complication, without long-term current use of insulin  -     SITagliptin (JANUVIA) 100 MG Tab; Take 1 tablet (100 mg total) by mouth once daily.  Dispense: 90 tablet; Refill: 3    Anterolisthesis-lumbar L4/l5  -     diazePAM (VALIUM) 5 MG tablet; Take 1 tablet (5 mg total) by mouth daily as needed for Anxiety.  Dispense: 30 tablet; Refill: 2  -     traMADol (ULTRAM) 50 mg tablet; Take 1 tablet (50 mg total) by mouth every 12 (twelve) hours as needed for Pain.  Dispense: 45 tablet; Refill: 5  Secondary to  Spinal stenosis of lumbar region without neurogenic claudication-sees dr Florencio machado and pt considering surgery    Osteoarthritis of toe joint,  left-suggest podiatry do surgery on this first    Essential hypertension  -     metoprolol tartrate (LOPRESSOR) 25 MG tablet; Take 1 tablet (25 mg total) by mouth 2 (two) times daily.  Dispense: 180 tablet; Refill: 3  -     lisinopril (PRINIVIL,ZESTRIL) 20 MG tablet; Take 1 tablet (20 mg total) by mouth once daily.  Dispense: 90 tablet; Refill: 3    Familial hypercholesterolemia  -     atorvastatin (LIPITOR) 10 MG tablet; nightly  Dispense: 90 tablet; Refill: 3    Chronic sinus complaints  -     fluticasone (FLONASE) 50 mcg/actuation nasal spray; 2 sprays (100 mcg total) by Each Nare route once daily.  Dispense: 3 Bottle; Refill: 3  -     fexofenadine (ALLEGRA) 180 MG tablet; Take 1 tablet (180 mg total) by mouth once daily.  Dispense: 90 tablet; Refill: 3    Chronic atrial fibrillation  -     aspirin (ECOTRIN) 81 MG EC tablet; Take 1 tablet (81 mg total) by mouth once daily.  Dispense: 90 tablet; Refill: 3    Anticoagulant long-term use-on eliquis , noted    Primary insomnia  -     traZODone (DESYREL) 100 MG tablet; Take 1 tablet (100 mg total) by mouth every evening.  Dispense: 90 tablet; Refill: 3    Time spent with the patient was 40 min and 50 percent of that was in face to face contact

## 2018-04-13 ENCOUNTER — TELEPHONE (OUTPATIENT)
Dept: PAIN MEDICINE | Facility: CLINIC | Age: 78
End: 2018-04-13

## 2018-04-13 NOTE — TELEPHONE ENCOUNTER
----- Message from Makenzie Cardozo sent at 4/13/2018 10:01 AM CDT -----  Contact: self  Patient [phone] is calling to schedule another injection/please call patient to schedule

## 2018-04-17 DIAGNOSIS — M54.16 LUMBAR RADICULOPATHY: Primary | ICD-10-CM

## 2018-04-30 ENCOUNTER — SURGERY (OUTPATIENT)
Age: 78
End: 2018-04-30

## 2018-04-30 ENCOUNTER — HOSPITAL ENCOUNTER (OUTPATIENT)
Facility: AMBULARY SURGERY CENTER | Age: 78
Discharge: HOME | End: 2018-04-30
Attending: ANESTHESIOLOGY | Admitting: ANESTHESIOLOGY
Payer: MEDICARE

## 2018-04-30 DIAGNOSIS — M51.36 DDD (DEGENERATIVE DISC DISEASE), LUMBAR: Primary | ICD-10-CM

## 2018-04-30 DIAGNOSIS — M54.16 LUMBAR RADICULITIS: ICD-10-CM

## 2018-04-30 LAB — POCT GLUCOSE: 120 MG/DL (ref 70–110)

## 2018-04-30 PROCEDURE — 64483 NJX AA&/STRD TFRM EPI L/S 1: CPT | Mod: LT,,, | Performed by: ANESTHESIOLOGY

## 2018-04-30 PROCEDURE — 64484 NJX AA&/STRD TFRM EPI L/S EA: CPT | Mod: LT,,, | Performed by: ANESTHESIOLOGY

## 2018-04-30 PROCEDURE — 64484 NJX AA&/STRD TFRM EPI L/S EA: CPT | Performed by: ANESTHESIOLOGY

## 2018-04-30 PROCEDURE — 99152 MOD SED SAME PHYS/QHP 5/>YRS: CPT | Mod: ,,, | Performed by: ANESTHESIOLOGY

## 2018-04-30 PROCEDURE — 64483 NJX AA&/STRD TFRM EPI L/S 1: CPT | Performed by: ANESTHESIOLOGY

## 2018-04-30 PROCEDURE — G8907 PT DOC NO EVENTS ON DISCHARG: HCPCS | Performed by: ANESTHESIOLOGY

## 2018-04-30 RX ORDER — SODIUM CHLORIDE, SODIUM LACTATE, POTASSIUM CHLORIDE, CALCIUM CHLORIDE 600; 310; 30; 20 MG/100ML; MG/100ML; MG/100ML; MG/100ML
INJECTION, SOLUTION INTRAVENOUS ONCE AS NEEDED
Status: COMPLETED | OUTPATIENT
Start: 2018-04-30 | End: 2018-04-30

## 2018-04-30 RX ORDER — DEXAMETHASONE SODIUM PHOSPHATE 10 MG/ML
INJECTION INTRAMUSCULAR; INTRAVENOUS
Status: DISCONTINUED | OUTPATIENT
Start: 2018-04-30 | End: 2018-04-30 | Stop reason: HOSPADM

## 2018-04-30 RX ORDER — MIDAZOLAM HYDROCHLORIDE 1 MG/ML
INJECTION INTRAMUSCULAR; INTRAVENOUS
Status: DISCONTINUED | OUTPATIENT
Start: 2018-04-30 | End: 2018-04-30 | Stop reason: HOSPADM

## 2018-04-30 RX ORDER — MIDAZOLAM HYDROCHLORIDE 1 MG/ML
INJECTION INTRAMUSCULAR; INTRAVENOUS
Status: DISCONTINUED
Start: 2018-04-30 | End: 2018-04-30 | Stop reason: HOSPADM

## 2018-04-30 RX ORDER — BUPIVACAINE HYDROCHLORIDE 2.5 MG/ML
INJECTION, SOLUTION EPIDURAL; INFILTRATION; INTRACAUDAL
Status: DISCONTINUED | OUTPATIENT
Start: 2018-04-30 | End: 2018-04-30 | Stop reason: HOSPADM

## 2018-04-30 RX ORDER — FENTANYL CITRATE 50 UG/ML
INJECTION, SOLUTION INTRAMUSCULAR; INTRAVENOUS
Status: DISCONTINUED
Start: 2018-04-30 | End: 2018-04-30 | Stop reason: HOSPADM

## 2018-04-30 RX ORDER — FENTANYL CITRATE 50 UG/ML
INJECTION, SOLUTION INTRAMUSCULAR; INTRAVENOUS
Status: DISCONTINUED | OUTPATIENT
Start: 2018-04-30 | End: 2018-04-30 | Stop reason: HOSPADM

## 2018-04-30 RX ORDER — DEXAMETHASONE SODIUM PHOSPHATE 10 MG/ML
INJECTION INTRAMUSCULAR; INTRAVENOUS
Status: DISCONTINUED
Start: 2018-04-30 | End: 2018-04-30 | Stop reason: HOSPADM

## 2018-04-30 RX ORDER — BUPIVACAINE HYDROCHLORIDE 2.5 MG/ML
INJECTION, SOLUTION EPIDURAL; INFILTRATION; INTRACAUDAL
Status: DISCONTINUED
Start: 2018-04-30 | End: 2018-04-30 | Stop reason: HOSPADM

## 2018-04-30 RX ORDER — LIDOCAINE HYDROCHLORIDE 10 MG/ML
INJECTION, SOLUTION EPIDURAL; INFILTRATION; INTRACAUDAL; PERINEURAL
Status: DISCONTINUED
Start: 2018-04-30 | End: 2018-04-30 | Stop reason: HOSPADM

## 2018-04-30 RX ORDER — LIDOCAINE HYDROCHLORIDE 10 MG/ML
INJECTION, SOLUTION EPIDURAL; INFILTRATION; INTRACAUDAL; PERINEURAL
Status: DISCONTINUED | OUTPATIENT
Start: 2018-04-30 | End: 2018-04-30 | Stop reason: HOSPADM

## 2018-04-30 RX ORDER — METHYLPREDNISOLONE ACETATE 80 MG/ML
INJECTION, SUSPENSION INTRA-ARTICULAR; INTRALESIONAL; INTRAMUSCULAR; SOFT TISSUE
Status: DISCONTINUED
Start: 2018-04-30 | End: 2018-04-30 | Stop reason: WASHOUT

## 2018-04-30 RX ADMIN — FENTANYL CITRATE 25 MCG: 50 INJECTION, SOLUTION INTRAMUSCULAR; INTRAVENOUS at 12:04

## 2018-04-30 RX ADMIN — LIDOCAINE HYDROCHLORIDE 5 ML: 10 INJECTION, SOLUTION EPIDURAL; INFILTRATION; INTRACAUDAL; PERINEURAL at 12:04

## 2018-04-30 RX ADMIN — MIDAZOLAM HYDROCHLORIDE 2 MG: 1 INJECTION INTRAMUSCULAR; INTRAVENOUS at 12:04

## 2018-04-30 RX ADMIN — SODIUM CHLORIDE, SODIUM LACTATE, POTASSIUM CHLORIDE, CALCIUM CHLORIDE: 600; 310; 30; 20 INJECTION, SOLUTION INTRAVENOUS at 11:04

## 2018-04-30 RX ADMIN — BUPIVACAINE HYDROCHLORIDE 3 ML: 2.5 INJECTION, SOLUTION EPIDURAL; INFILTRATION; INTRACAUDAL at 12:04

## 2018-04-30 RX ADMIN — DEXAMETHASONE SODIUM PHOSPHATE 10 MG: 10 INJECTION INTRAMUSCULAR; INTRAVENOUS at 12:04

## 2018-04-30 NOTE — DISCHARGE SUMMARY
[facility]  Discharge Note  Short Stay    Admit Date: 4/30/2018    Discharge Date and Time: 4/30/2018    Attending Physician: Grant Wright MD     Discharge Provider: Grant Wright    Diagnoses:  Active Hospital Problems    Diagnosis  POA    *Lumbar radiculitis [M54.16]  Yes      Resolved Hospital Problems    Diagnosis Date Resolved POA   No resolved problems to display.       Hospital Course: Lumbar JOSE  Discharged Condition: Good    Final Diagnoses:   Active Hospital Problems    Diagnosis  POA    *Lumbar radiculitis [M54.16]  Yes      Resolved Hospital Problems    Diagnosis Date Resolved POA   No resolved problems to display.       Disposition: Home or Self Care    Follow up/Patient Instructions:    Medications:  Reconciled Home Medications:      Medication List      CONTINUE taking these medications    apixaban 5 mg Tab  Commonly known as:  ELIQUIS  Take 1 tablet (5 mg total) by mouth 2 (two) times daily.     aspirin 81 MG EC tablet  Commonly known as:  ECOTRIN  Take 1 tablet (81 mg total) by mouth once daily.     atorvastatin 10 MG tablet  Commonly known as:  LIPITOR  nightly     azelastine 137 mcg (0.1 %) nasal spray  Commonly known as:  ASTELIN  1 spray by Nasal route 2 (two) times daily.     * blood sugar diagnostic Strp  Commonly known as:  BLOOD GLUCOSE TEST  1 strip by Misc.(Non-Drug; Combo Route) route 2 (two) times daily.     * blood sugar diagnostic Strp  200 strips by Misc.(Non-Drug; Combo Route) route 2 (two) times daily.     CENTRUM 3,500-18-0.4 unit-mg-mg Chew  Generic drug:  multivit-iron-min-folic acid  Take by mouth.     desoximetasone 0.05 % cream  Commonly known as:  TOPICORT     diazePAM 5 MG tablet  Commonly known as:  VALIUM  Take 1 tablet (5 mg total) by mouth daily as needed for Anxiety.     fexofenadine 180 MG tablet  Commonly known as:  ALLEGRA  Take 1 tablet (180 mg total) by mouth once daily.     fluticasone 50 mcg/actuation nasal spray  Commonly known as:  FLONASE  2 sprays  (100 mcg total) by Each Nare route once daily.     furosemide 40 MG tablet  Commonly known as:  LASIX  Every other day     ipratropium 0.03 % nasal spray  Commonly known as:  ATROVENT  2 sprays by Nasal route 2 (two) times daily.     lancets Misc  2 Box by Misc.(Non-Drug; Combo Route) route 2 (two) times daily.     lisinopril 20 MG tablet  Commonly known as:  PRINIVIL,ZESTRIL  Take 1 tablet (20 mg total) by mouth once daily.     metoprolol tartrate 25 MG tablet  Commonly known as:  LOPRESSOR  Take 1 tablet (25 mg total) by mouth 2 (two) times daily.     montelukast 10 mg tablet  Commonly known as:  SINGULAIR     NAFTIN 2 % Crea  Generic drug:  naftifine  NAFTIN, 2% (External Cream)  as needed (2 %) Active  Comments: Medication taken as needed.     polyethylene glycol 17 gram/dose powder  Commonly known as:  GLYCOLAX  Take 17 g by mouth once daily.     potassium chloride 10 MEQ Cpsr  Commonly known as:  MICRO-K  Every other day     SITagliptin 100 MG Tab  Commonly known as:  JANUVIA  Take 1 tablet (100 mg total) by mouth once daily.     traMADol 50 mg tablet  Commonly known as:  ULTRAM  Take 1 tablet (50 mg total) by mouth every 12 (twelve) hours as needed for Pain.  Start taking on:  5/7/2018     traZODone 100 MG tablet  Commonly known as:  DESYREL  Take 1 tablet (100 mg total) by mouth every evening.     VOLTAREN 1 % Gel  Generic drug:  diclofenac sodium  VOLTAREN, 1% (Transdermal Gel)  as needed (1 %) Active  Comments: Medication taken as needed.        * This list has 2 medication(s) that are the same as other medications prescribed for you. Read the directions carefully, and ask your doctor or other care provider to review them with you.                Discharge Procedure Orders  Call MD for:  temperature >100.4     Call MD for:  persistent nausea and vomiting or diarrhea     Call MD for:  severe uncontrolled pain     Call MD for:  redness, tenderness, or signs of infection (pain, swelling, redness, odor or  green/yellow discharge around incision site)     Call MD for:  difficulty breathing or increased cough     Call MD for:  severe persistent headache          Follow up with MD in 2-3 weeks    Discharge Procedure Orders (must include Diet, Follow-up, Activity):    Discharge Procedure Orders (must include Diet, Follow-up, Activity)  Call MD for:  temperature >100.4     Call MD for:  persistent nausea and vomiting or diarrhea     Call MD for:  severe uncontrolled pain     Call MD for:  redness, tenderness, or signs of infection (pain, swelling, redness, odor or green/yellow discharge around incision site)     Call MD for:  difficulty breathing or increased cough     Call MD for:  severe persistent headache

## 2018-04-30 NOTE — DISCHARGE INSTRUCTIONS
Recovery After Procedural Sedation (Adult)  You have been given medicine by vein to make you sleep during your surgery. This may have included both a pain medicine and sleeping medicine. Most of the effects have worn off. But you may still have some drowsiness for the next 6 to 8 hours.  Home care  Follow these guidelines when you get home:  · For the next 8 hours, you should be watched by a responsible adult. This person should make sure your condition is not getting worse.  · Don't drink any alcohol for the next 24 hours.  · Don't drive, operate dangerous machinery, or make important business or personal decisions during the next 24 hours.  Note: Your healthcare provider may tell you not to take any medicine by mouth for pain or sleep in the next 4 hours. These medicines may react with the medicines you were given in the hospital. This could cause a much stronger response than usual.  Follow-up care  Follow up with your healthcare provider if you are not alert and back to your usual level of activity within 12 hours.  When to seek medical advice  Call your healthcare provider right away if any of these occur:  · Drowsiness gets worse  · Weakness or dizziness gets worse  · Repeated vomiting  · You can't be awakened   Date Last Reviewed: 10/18/2016  © 9561-1330 [employer]. [address] [place] [address], [place] [address]. All rights reserved. This information is not intended as a substitute for professional medical care. Always follow your healthcare professional's instructions.      Pain injection instructions:     Steroids take about a 2 weeks to relieve pain.  Initially you may get pain relief from the local anesthetic but this may wear off before the steroid works.    No driving for 24 hrs.   Activity as tolerated- gradually increase activities.  Dont lift over 10 lbs for 24 hrs   No heat at injection sites x 2 days. No heating pads, hot tubs, saunas, or swimming in any body of water or pool for 2  days.  Use ice pack for mild swelling and for comfort , apply for 20 minutes, remove for 20 minute intervals. No direct contact of ice itself  to skin.  May shower today. No tub baths for two days.      Resume Aspirin, Plavix, or Coumadin the day after the procedure unless otherwise instructed.   If diabetic,monitor your glucose carefully as steroids can increase your glucose level    Seek immediate medical help for:   Severe increase in your usual pain or appearance of new pain.  Prolonged (mor than 8 hours) or increasing weakness or numbness in the legs or arms.    - Numbing medicine was injected that affects nerves that carry information from       muscles to the  brain and the brain to the muscles.  This numbness can last 6-8 hrs so be very careful and get assistance when standing or walking.    Fever above 101 ,Drainage,redness,active bleeding, or increased swelling at the injection site.  Headache, shortness of breath, chest pain, or breathing problems.

## 2018-04-30 NOTE — OP NOTE
PROCEDURE DATE: 4/30/2018    PROCEDURE: Left L4-5 and L5-S1 transforaminal epidural steroid injection under fluoroscopy    DIAGNOSIS: lumbar disc displacement without myelopathy  Post op diagnosis: Same    PHYSICIAN: Grant Wright MD    MEDICATIONS INJECTED:  Dexamethasone 5mg (0.5ml) and 1.5ml 0.25% bupivicaine at each nerve root.     LOCAL ANESTHETIC INJECTED:  Lidocaine 1%. 2 ml per site.    SEDATION MEDICATIONS: RN IV sedation    ESTIMATED BLOOD LOSS:  None    COMPLICATIONS:  None    TECHNIQUE:   A time-out was taken to identify patient and procedure side prior to starting the procedure. The patient was placed in a prone position, prepped and draped in the usual sterile fashion using ChloraPrep and sterile towels.  The area to be injected was determined under fluoroscopic guidance in AP and oblique view.  Local anesthetic was given by raising a wheal and going down to the hub of a 25-gauge 1.5 inch needle.  In oblique view, a 5 inch 22-gauge bent-tip spinal needle was introduced towards 6 oclock position of the pedicle of each above named nerve root level.  The needle was walked medially then hinged into the neural foramen and position was confirmed in AP and lateral views.  1ml contrast dye was injected to confirm appropriate placement and that there was no vascular uptake.  After negative aspiration for blood or CSF, the medication was then injected. This was performed at the left L4-5 and L5-S1 level(s). The patient tolerated the procedure well.    The patient was monitored after the procedure.  Patient was given post procedure and discharge instructions to follow at home. The patient was discharged in a stable condition.

## 2018-04-30 NOTE — PLAN OF CARE
Patient able to lift each leg and stand. He was able to walk to the car. His wife is driving him home.

## 2018-05-01 VITALS
HEIGHT: 68 IN | BODY MASS INDEX: 38.49 KG/M2 | RESPIRATION RATE: 18 BRPM | HEART RATE: 54 BPM | TEMPERATURE: 98 F | WEIGHT: 254 LBS | DIASTOLIC BLOOD PRESSURE: 68 MMHG | OXYGEN SATURATION: 94 % | SYSTOLIC BLOOD PRESSURE: 104 MMHG

## 2018-05-17 ENCOUNTER — OFFICE VISIT (OUTPATIENT)
Dept: FAMILY MEDICINE | Facility: CLINIC | Age: 78
End: 2018-05-17
Payer: MEDICARE

## 2018-05-17 VITALS
HEIGHT: 68 IN | SYSTOLIC BLOOD PRESSURE: 100 MMHG | OXYGEN SATURATION: 98 % | BODY MASS INDEX: 38.16 KG/M2 | DIASTOLIC BLOOD PRESSURE: 56 MMHG | WEIGHT: 251.81 LBS | RESPIRATION RATE: 16 BRPM | HEART RATE: 62 BPM

## 2018-05-17 DIAGNOSIS — E11.65 UNCONTROLLED TYPE 2 DIABETES MELLITUS WITH COMPLICATION, UNSPECIFIED WHETHER LONG TERM INSULIN USE: Primary | ICD-10-CM

## 2018-05-17 DIAGNOSIS — E11.8 UNCONTROLLED TYPE 2 DIABETES MELLITUS WITH COMPLICATION, UNSPECIFIED WHETHER LONG TERM INSULIN USE: Primary | ICD-10-CM

## 2018-05-17 DIAGNOSIS — K59.09 CONSTIPATION, CHRONIC: ICD-10-CM

## 2018-05-17 DIAGNOSIS — I10 ESSENTIAL HYPERTENSION: ICD-10-CM

## 2018-05-17 DIAGNOSIS — I48.20 CHRONIC ATRIAL FIBRILLATION: ICD-10-CM

## 2018-05-17 DIAGNOSIS — Z79.01 ANTICOAGULANT LONG-TERM USE: ICD-10-CM

## 2018-05-17 DIAGNOSIS — J30.89 CHRONIC NONSEASONAL ALLERGIC RHINITIS DUE TO FUNGAL SPORES: ICD-10-CM

## 2018-05-17 PROCEDURE — 99214 OFFICE O/P EST MOD 30 MIN: CPT | Mod: ,,, | Performed by: INTERNAL MEDICINE

## 2018-05-17 RX ORDER — POLYETHYLENE GLYCOL 3350 17 G/17G
17 POWDER, FOR SOLUTION ORAL DAILY
Qty: 6 BOTTLE | Refills: 3 | Status: SHIPPED | OUTPATIENT
Start: 2018-05-17 | End: 2019-06-24 | Stop reason: SDUPTHER

## 2018-05-17 RX ORDER — MONTELUKAST SODIUM 10 MG/1
10 TABLET ORAL NIGHTLY
Qty: 90 TABLET | Refills: 3 | Status: SHIPPED | OUTPATIENT
Start: 2018-05-17 | End: 2019-02-05 | Stop reason: SDUPTHER

## 2018-05-17 RX ORDER — LANCETS
EACH MISCELLANEOUS
Qty: 100 EACH | Refills: 3 | Status: SHIPPED | OUTPATIENT
Start: 2018-05-17 | End: 2019-04-03

## 2018-05-17 NOTE — PATIENT INSTRUCTIONS
A1C  Does this test have other names?  Hemoglobin A1c; HbA1c; glycosylated hemoglobin; glycohemoglobin; Glycated hemoglobin  What is this test?  A1C is a blood test used to screen people to find out whether they have diabetes or prediabetes. It's also used in people who know they have diabetes to measure how well they are controlling their blood sugar and to guide their treatment decisions over time.  Why do I need this test?  You may need this test to check for prediabetes or diabetes. If you already know that you have diabetes or prediabetes, you may need this test to see how well you are controlling your blood sugar.  People with diabetes must track their blood sugar (glucose) levels every day to make sure they arent too high or too low. The A1C test gives results for a longer period of time. It shows whether your blood sugar has been too high on average in the previous two to three months. When blood sugar is high, more glucose builds up and sticks to your hemoglobin, a protein that carries oxygen in red blood cells. The A1C test measures the percentage of hemoglobin that is coated with blood sugar.  Depending on the type of diabetes you have, how well it's controlled, and your health care providers preferences, you may need to have the A1C test two or more times a year. The American Diabetes Association (ADA) recommends that you have an A1C test at least twice a year if you are meeting your blood sugar goals and your blood sugar is well-controlled. If you arent meeting your goals or your medication has changed lately, you should have the A1C test more often. You also may have the test when your health care provider first starts working with you to treat your diabetes.  What other tests might I have along with this test?   If your health care provider is testing you for diabetes, you may also take a fasting plasma glucose test, or FPG, or an oral glucose tolerance test, or OGTT, as part of your screening  and diagnosis. You may also be tested for sugar, ketones or protein in the urine.  What do my test results mean?  Laboratory test results may vary depending on your age, gender, health history, the method used for the test, and many other factors. If your results are different from the results suggested below, this may not mean that you have a problem. Ask your health care provider to explain what the results mean for you.   A1C is reported as a percentage:  · Normal A1C is considered to be below 5.7 percent. Results between 5.7 and 6.4 percent may mean you have prediabetes. This means you have a higher risk of developing diabetes.  · Results of 6.5 percent or higher on two separate occasions may mean that you have diabetes.  · The ADA  recommends that people with diabetes should maintain an A1C below 7 percent. The American Association of Clinical Endocrinologists recommends an A1C of 6.5 percent or less. Recommendations may vary based on the person's age, medical conditions, or other factors.   How is this test done?  The test requires a blood sample, which is drawn through a needle from a vein in your arm.   Does this test pose any risks?  Taking a blood sample with a needle carries risks that include bleeding, infection, bruising, and a sense of lightheadedness. When the needle pricks your arm, you may feel a slight stinging sensation or pain. Afterward, the site may be slightly sore.  What might affect my test results?  Your blood sugar levels usually vary throughout the day. These variations won't affect the A1C.  If you have sickle cell anemia or other blood disorders, a standard A1C test may be less useful for diagnosing or monitoring diabetes. (Your health care provider may be able to find another diabetes test that will better serve you.) In addition, the test results may be skewed if you have anemia, heavy bleeding, an iron deficiency, kidney failure, or liver disease.  How do I get ready for this  test?  You don't need any special preparation for the test.    © 7230-4034 [employer]. [address] [place] [address], [place] [address]. All rights reserved. This information is not intended as a substitute for professional medical care. Always follow your healthcare professional's instructions.

## 2018-05-20 NOTE — PROGRESS NOTES
Subjective:       Patient ID: Yeyo Hollingsworth is a 77 y.o. male.    Chief Complaint: Follow-up; Diabetes; and Medication Problem (wants to discuss DM medications)    Patient is a 77 yr old type 2 diabetic here after 6 weeks of a medication change and to follow up on his multiple orthopedic issues most importantly to include lumbar stenosis with right sided back pain. The patient did see Dr Wright and got a MBB with some relief. He has also been keeping tract of his FBS, pre-dinner and pre-bedtime sugars on januvia 100 and off of amaryl 1 mg. He did not like some of his pre-dinner and pre-bedtime sugars so he decided to take two of the januvia. This did not seem to change his acuu-checks much at all. He is also continuing to have constipation and we will begin the Miralax daily as a prescription.       Review of Systems   Constitutional: Negative for activity change, diaphoresis, fatigue and fever.   HENT: Positive for rhinorrhea and sneezing. Negative for congestion, ear pain, postnasal drip, sinus pressure, sore throat and trouble swallowing.    Eyes: Negative for pain.   Respiratory: Negative for cough, chest tightness, shortness of breath and wheezing.    Cardiovascular: Negative for chest pain, palpitations and leg swelling.   Gastrointestinal: Positive for constipation. Negative for abdominal distention, abdominal pain, blood in stool and diarrhea.   Endocrine: Negative for cold intolerance and heat intolerance.   Genitourinary: Negative for decreased urine volume, difficulty urinating, dysuria, flank pain, frequency and hematuria.   Musculoskeletal: Positive for back pain. Negative for arthralgias, joint swelling, myalgias and neck pain.   Skin: Negative for pallor, rash and wound.   Neurological: Negative for tremors, syncope, weakness, light-headedness, numbness and headaches.   Hematological: Negative for adenopathy.   Psychiatric/Behavioral: Negative for confusion, decreased concentration, hallucinations,  self-injury, sleep disturbance and suicidal ideas. The patient is not nervous/anxious.        Past Medical History:   Diagnosis Date    Allergy     Anticoagulant long-term use     aspirin    Anxiety     Atrial fibrillation     Cataract     Clotting disorder     left leg    Diabetes mellitus     Diabetes mellitus, type 2     Hyperlipidemia     Hypertension     BRANDI on CPAP        Past Surgical History:   Procedure Laterality Date    ABDOMINAL SURGERY      origunal surg 1986-mult surgeries since    CARPAL TUNNEL RELEASE      right wrist 2009-left wrist 2010    COLON SURGERY      partial colon removal    COLOSTOMY  1986    colostomy reversal  1986    EYE SURGERY      hay fever      HERNIA REPAIR  1949    TONSILLECTOMY  1947       History reviewed. No pertinent family history.    Social History     Social History    Marital status:      Spouse name: N/A    Number of children: N/A    Years of education: N/A     Social History Main Topics    Smoking status: Former Smoker     Types: Cigarettes     Quit date: 2/27/2006    Smokeless tobacco: Never Used    Alcohol use No    Drug use: No    Sexual activity: Not Asked     Other Topics Concern    None     Social History Narrative    None       Current Outpatient Prescriptions   Medication Sig Dispense Refill    apixaban (ELIQUIS) 5 mg Tab Take 1 tablet (5 mg total) by mouth 2 (two) times daily. 180 tablet 3    aspirin (ECOTRIN) 81 MG EC tablet Take 1 tablet (81 mg total) by mouth once daily. 90 tablet 3    atorvastatin (LIPITOR) 10 MG tablet nightly 90 tablet 3    azelastine (ASTELIN) 137 mcg (0.1 %) nasal spray 1 spray by Nasal route 2 (two) times daily.      blood sugar diagnostic (BLOOD GLUCOSE TEST) Strp FREESTYLE LITE BG TEST STRIPS. current use of insulin  - Primary ICD-10-CM: E11.9 200 strip 3    blood sugar diagnostic Strp 200 strips by Misc.(Non-Drug; Combo Route) route 2 (two) times daily. 100 strip 3    desoximetasone  (TOPICORT) 0.05 % cream       diazePAM (VALIUM) 5 MG tablet Take 1 tablet (5 mg total) by mouth daily as needed for Anxiety. 30 tablet 2    diclofenac sodium (VOLTAREN) 1 % Gel VOLTAREN, 1% (Transdermal Gel)   as needed (1 %)  Active   Comments: Medication taken as needed.       fexofenadine (ALLEGRA) 180 MG tablet Take 1 tablet (180 mg total) by mouth once daily. 90 tablet 3    fluticasone (FLONASE) 50 mcg/actuation nasal spray 2 sprays (100 mcg total) by Each Nare route once daily. 3 Bottle 3    furosemide (LASIX) 40 MG tablet Every other day 45 tablet 3    ipratropium (ATROVENT) 0.03 % nasal spray 2 sprays by Nasal route 2 (two) times daily. 30 mL 5    lancets Misc LANCETS: FREESTYLE LITE OR GENERIC THAT IS ACCEPTED.  current use of insulin  - Primary ICD-10-CM: E11.9 100 each 3    lisinopril (PRINIVIL,ZESTRIL) 20 MG tablet Take 1 tablet (20 mg total) by mouth once daily. 90 tablet 3    metoprolol tartrate (LOPRESSOR) 25 MG tablet Take 1 tablet (25 mg total) by mouth 2 (two) times daily. 180 tablet 3    montelukast (SINGULAIR) 10 mg tablet Take 1 tablet (10 mg total) by mouth every evening. 90 tablet 3    multivit-iron-min-folic acid (MULTIVITAMIN-IRON-MINERALS-FOLIC ACID) 3,500-18-0.4 unit-mg-mg Chew Take by mouth.        naftifine (NAFTIN) 2 % Crea NAFTIN, 2% (External Cream)   as needed (2 %)  Active   Comments: Medication taken as needed.       polyethylene glycol (GLYCOLAX) 17 gram/dose powder Take 17 g by mouth once daily. 6 Bottle 3    potassium chloride (MICRO-K) 10 MEQ CpSR Every other day 45 capsule 3    SITagliptin (JANUVIA) 100 MG Tab Take 1 tablet (100 mg total) by mouth once daily. 90 tablet 3    traMADol (ULTRAM) 50 mg tablet Take 1 tablet (50 mg total) by mouth every 12 (twelve) hours as needed for Pain. 45 tablet 5    traZODone (DESYREL) 100 MG tablet Take 1 tablet (100 mg total) by mouth every evening. 90 tablet 3     No current facility-administered medications for this visit.         Review of patient's allergies indicates:   Allergen Reactions    Ativan [lorazepam] Other (See Comments)     Mood alternating      Dilaudid [hydromorphone (bulk)] Other (See Comments)     Mood alternating      Morphine Other (See Comments)     Mood alternating      Hydromorphone        Objective:      Physical Exam   Constitutional: He is oriented to person, place, and time. He appears well-developed and well-nourished. No distress.   HENT:   Head: Normocephalic and atraumatic.   Right Ear: External ear normal.   Left Ear: External ear normal.   Nose: Nose normal.   Mouth/Throat: Oropharynx is clear and moist.   Eyes: Conjunctivae and EOM are normal. Right eye exhibits no discharge. Left eye exhibits no discharge. No scleral icterus.   Neck: Normal range of motion. Neck supple. No JVD present. No tracheal deviation present. No thyromegaly present.   Cardiovascular: Normal rate, regular rhythm, normal heart sounds and intact distal pulses.  Exam reveals no gallop.    No murmur heard.  Pulmonary/Chest: Effort normal and breath sounds normal. No respiratory distress. He has no wheezes. He has no rales.   Abdominal: Soft. Bowel sounds are normal. He exhibits no distension and no mass. There is no tenderness.   Musculoskeletal: Normal range of motion. He exhibits no edema or tenderness.   Lymphadenopathy:     He has no cervical adenopathy.   Neurological: He is alert and oriented to person, place, and time.   Skin: Skin is warm and dry. No rash noted. He is not diaphoretic. No erythema.   Psychiatric: He has a normal mood and affect. His behavior is normal. Judgment and thought content normal.       Lab Results   Component Value Date    WBC 6.3 03/29/2017    HGB 14.5 03/29/2017    HCT 46.3 03/29/2017     03/29/2017    CHOL 214 (H) 03/09/2016    TRIG 180 (H) 03/09/2016    HDL 41 03/09/2016    ALT 41 10/27/2015    AST 27 03/27/2018     03/27/2018    K 4.2 03/27/2018     03/27/2018    CREATININE  1.14 03/27/2018    BUN 22 (H) 03/27/2018    CO2 26.9 03/27/2018    TSH 1.67 03/27/2018    INR 1.0 05/17/2013    GLUF 96 03/09/2016    HGBA1C 6.6 (H) 03/27/2018     Assessment:       1. Uncontrolled type 2 diabetes mellitus with complication, unspecified whether long term insulin use    2. Constipation, chronic    3. Chronic nonseasonal allergic rhinitis due to fungal spores    4. Essential hypertension    5. BMI 38.0-38.9,adult    6. Chronic atrial fibrillation    7. Anticoagulant long-term use        Plan:       Uncontrolled type 2 diabetes mellitus with complication, unspecified whether long term insulin use  -     lancets Misc; LANCETS: FREESTYLE LITE OR GENERIC THAT IS ACCEPTED.  current use of insulin  - Primary ICD-10-CM: E11.9  Dispense: 100 each; Refill: 3  -     blood sugar diagnostic (BLOOD GLUCOSE TEST) Strp; FREESTYLE LITE BG TEST STRIPS. current use of insulin  - Primary ICD-10-CM: E11.9  Dispense: 200 strip; Refill: 3    Constipation, chronic  -     polyethylene glycol (GLYCOLAX) 17 gram/dose powder; Take 17 g by mouth once daily.  Dispense: 6 Bottle; Refill: 3    Chronic nonseasonal allergic rhinitis due to fungal spores  -     montelukast (SINGULAIR) 10 mg tablet; Take 1 tablet (10 mg total) by mouth every evening.  Dispense: 90 tablet; Refill: 3    Essential hypertension and chronic atrial fib - well controlled on ace and beta blocker

## 2018-05-21 ENCOUNTER — OFFICE VISIT (OUTPATIENT)
Dept: PAIN MEDICINE | Facility: CLINIC | Age: 78
End: 2018-05-21
Payer: MEDICARE

## 2018-05-21 VITALS
HEIGHT: 68 IN | BODY MASS INDEX: 38.04 KG/M2 | WEIGHT: 251 LBS | DIASTOLIC BLOOD PRESSURE: 69 MMHG | SYSTOLIC BLOOD PRESSURE: 130 MMHG | HEART RATE: 59 BPM

## 2018-05-21 DIAGNOSIS — M62.838 MUSCLE SPASM: ICD-10-CM

## 2018-05-21 DIAGNOSIS — M48.061 SPINAL STENOSIS OF LUMBAR REGION, UNSPECIFIED WHETHER NEUROGENIC CLAUDICATION PRESENT: Primary | ICD-10-CM

## 2018-05-21 DIAGNOSIS — M54.16 LUMBAR RADICULOPATHY: ICD-10-CM

## 2018-05-21 PROCEDURE — 99999 PR PBB SHADOW E&M-EST. PATIENT-LVL IV: CPT | Mod: PBBFAC,,, | Performed by: PHYSICIAN ASSISTANT

## 2018-05-21 PROCEDURE — 99213 OFFICE O/P EST LOW 20 MIN: CPT | Mod: S$PBB,,, | Performed by: PHYSICIAN ASSISTANT

## 2018-05-21 PROCEDURE — 99214 OFFICE O/P EST MOD 30 MIN: CPT | Mod: PBBFAC,PN | Performed by: PHYSICIAN ASSISTANT

## 2018-05-21 RX ORDER — LANCETS 28 GAUGE
EACH MISCELLANEOUS
COMMUNITY
Start: 2018-05-18 | End: 2020-04-27 | Stop reason: SDUPTHER

## 2018-05-21 RX ORDER — METFORMIN HYDROCHLORIDE 500 MG/1
TABLET, EXTENDED RELEASE ORAL
COMMUNITY
Start: 2018-03-19 | End: 2018-07-10 | Stop reason: SDUPTHER

## 2018-05-21 RX ORDER — NAPROXEN SODIUM 220 MG/1
TABLET, FILM COATED ORAL
COMMUNITY
Start: 2018-03-19 | End: 2018-05-21 | Stop reason: SDUPTHER

## 2018-05-21 RX ORDER — POTASSIUM CHLORIDE 750 MG/1
TABLET, FILM COATED, EXTENDED RELEASE ORAL
COMMUNITY
Start: 2018-04-11 | End: 2019-04-03

## 2018-05-21 NOTE — PROGRESS NOTES
Referring Physician: No ref. provider found    PCP: Citlali Parish MD      CC:low back and left leg pain    Interval History:  Mr. Hollingsworth is a 77 y.o. male with a low back and left leg pain who presents today for f/u s/p lumbar TFESI on left at L4-5 and L5-S1. Reports 80% relief of low back and radiating left LE pain. Today c/o right sided mid back pain. Pain occurs when car hits bumps. Pain is tolerable.. He denies any LE weakness or b/b changes. Pain today is rated 2/10.      HPI:   Yeyo Hollingsworth is a 77 y.o. male ho presents with lower back and left leg pain.  Pain is been present since 2010.  No recent traumatic incident.  His intermittent aching, throbbing, electric pain in his lower back.  Pain radiates down his left buttock into his left posterior thigh.  Pain worsens with prolonged standing, walking, getting up and coughing.  Pain improves with rest.  His tried physical therapy with minimal benefit.  He has undergone lumbar JOSE's with Dr. Rendon in the past with moderate benefit.  Most recent injection was performed in September 2016.  Pain has since returned.  He desires repeat injections with us.  He denies any weakness.  No bowel bladder changes.  He rates his pain 6/10 today.    ROS:  CONSTITUTIONAL: No fevers, chills, night sweats, wt. loss, appetite changes  SKIN: no rashes or itching  ENT: No headaches, head trauma, vision changes, or eye pain  LYMPH NODES: None noticed   CV: No chest pain, palpitations.   RESP: No shortness of breath, dyspnea on exertion, cough, wheezing, or hemoptysis  GI: No nausea, emesis, diarrhea, constipation, melena, hematochezia, pain.    : No dysuria, hematuria, urgency, or frequency   HEME: No easy bruising, bleeding problems  PSYCHIATRIC: No depression, anxiety, psychosis, hallucinations.  NEURO: No seizures, memory loss, dizziness or difficulty sleeping  MSK: + history of present illness      Past Medical History:   Diagnosis Date    Allergy      "Anticoagulant long-term use     aspirin    Anxiety     Atrial fibrillation     Cataract     Clotting disorder     left leg    Diabetes mellitus     Diabetes mellitus, type 2     Hyperlipidemia     Hypertension     BRANDI on CPAP      Past Surgical History:   Procedure Laterality Date    ABDOMINAL SURGERY      origunal surg 1986-mult surgeries since    CARPAL TUNNEL RELEASE      right wrist 2009-left wrist 2010    COLON SURGERY      partial colon removal    COLOSTOMY  1986    colostomy reversal  1986    EYE SURGERY      hay fever      HERNIA REPAIR  1949    TONSILLECTOMY  1947     History reviewed. No pertinent family history.  Social History     Social History    Marital status:      Spouse name: N/A    Number of children: N/A    Years of education: N/A     Social History Main Topics    Smoking status: Former Smoker     Types: Cigarettes     Quit date: 2/27/2006    Smokeless tobacco: Never Used    Alcohol use No    Drug use: No    Sexual activity: Not Asked     Other Topics Concern    None     Social History Narrative    None         Medications/Allergies: See med card    Vitals:    05/21/18 1331   BP: 130/69   Pulse: (!) 59   Weight: 113.9 kg (251 lb)   Height: 5' 8" (1.727 m)   PainSc:   2   PainLoc: Back         Physical exam:    GENERAL: A and O x3, the patient appears well groomed and is in no acute distress.  Skin: No rashes or obvious lesions  HEENT: normocephalic, atraumatic  CARDIOVASCULAR:  Bradycardia  LUNGS: non labored breathing  ABDOMEN: soft, nontender   UPPER EXTREMITIES: Normal alignment, normal range of motion, no atrophy, no skin changes,  hair growth and nail growth normal and equal bilaterally. No swelling, no tenderness.    LOWER EXTREMITIES:  Normal alignment, normal range of motion, no atrophy, no skin changes,  hair growth and nail growth normal and equal bilaterally. No swelling, no tenderness.    LUMBAR SPINE  Lumbar spine: ROM is limited with flexion " extension and oblique extension with moderate increased pain.    Caden's test causes no increased pain on either side.    Supine straight leg raise is negative bilaterally.    Internal and external rotation of the hip causes no increased pain on either side.  Myofascial exam: No tenderness to palpation across lumbar paraspinous muscles. Moderate tenderness of right thoracic Paraspinous muscles       MENTAL STATUS: normal orientation, speech, language, and fund of knowledge for social situation.  Emotional state appropriate.    CRANIAL NERVES:  II:  PERRL bilaterally,   III,IV,VI: EOMI.    V:  Facial sensation equal bilaterally  VII:  Facial motor function normal.  VIII:  Hearing equal to finger rub bilaterally  IX/X: Gag normal, palate symmetric  XI:  Shoulder shrug equal, head turn equal  XII:  Tongue midline without fasciculations      MOTOR: Tone and bulk: normal bilateral upper and lower Strength: normal   Delt Bi Tri WE WF     R 5 5 5 5 5 5   L 5 5 5 5 5 5     IP ADD ABD Quad TA Gas HAM  R 5 5 5 5 5 5 5  L 5 5 5 5 5 5 5    SENSATION: Light touch and pinprick intact bilaterally  REFLEXES: normal, symmetric, nonbrisk.  Toes down, no clonus. No hoffmans.  GAIT: normal rise, base, steps, and arm swing.        Imaging:  MRI lumbar spine 9/2017 ([facility])  Severe facet arthrosis and ligamentum flavum at L4-5 results in severe spinal canal stenosis. Possible impingement of the bilateral L5 nerve as well as bilateral L4 nerves.  There is a grade 1 anterolisthesis of L4 and L5.  Moderate to severe bilateral neuroforaminal L3-4.  This note was completed with dictation software and grammatical errors may exist.      Assessment:  Mr. Hollingsworth is a 77 y.o. male with low back and left leg pain  1. Spinal stenosis of lumbar region, unspecified whether neurogenic claudication present    2. Lumbar radiculopathy    3. Muscle spasm        Plan:  1.  I have stressed the importance of physical activity and exercise to improve  overall health  2. Monitor progress and consider repeat lumbar epidural steroid injection to the Left L4-5 and L5-S1 level.    3.  Low back pain may benefit from lumbar MBB work up in the future  4. F/u prn

## 2018-07-10 ENCOUNTER — OFFICE VISIT (OUTPATIENT)
Dept: FAMILY MEDICINE | Facility: CLINIC | Age: 78
End: 2018-07-10
Payer: MEDICARE

## 2018-07-10 VITALS
DIASTOLIC BLOOD PRESSURE: 60 MMHG | SYSTOLIC BLOOD PRESSURE: 112 MMHG | BODY MASS INDEX: 38.55 KG/M2 | OXYGEN SATURATION: 96 % | WEIGHT: 254.38 LBS | RESPIRATION RATE: 16 BRPM | HEIGHT: 68 IN | HEART RATE: 65 BPM

## 2018-07-10 DIAGNOSIS — R07.81 RIB PAIN ON RIGHT SIDE: ICD-10-CM

## 2018-07-10 DIAGNOSIS — E78.01 FAMILIAL HYPERCHOLESTEROLEMIA: ICD-10-CM

## 2018-07-10 DIAGNOSIS — I10 ESSENTIAL HYPERTENSION: ICD-10-CM

## 2018-07-10 DIAGNOSIS — M48.061 SPINAL STENOSIS OF LUMBAR REGION WITHOUT NEUROGENIC CLAUDICATION: ICD-10-CM

## 2018-07-10 DIAGNOSIS — Z79.01 ON CONTINUOUS ORAL ANTICOAGULATION: ICD-10-CM

## 2018-07-10 DIAGNOSIS — E11.9 CONTROLLED TYPE 2 DIABETES MELLITUS WITHOUT COMPLICATION, WITHOUT LONG-TERM CURRENT USE OF INSULIN: ICD-10-CM

## 2018-07-10 DIAGNOSIS — M43.10 ANTEROLISTHESIS: ICD-10-CM

## 2018-07-10 DIAGNOSIS — I48.20 CHRONIC ATRIAL FIBRILLATION: Primary | ICD-10-CM

## 2018-07-10 LAB — HBA1C MFR BLD: 7.4 %

## 2018-07-10 PROCEDURE — 99214 OFFICE O/P EST MOD 30 MIN: CPT | Mod: ,,, | Performed by: INTERNAL MEDICINE

## 2018-07-10 PROCEDURE — 83036 HEMOGLOBIN GLYCOSYLATED A1C: CPT | Mod: QW,,, | Performed by: INTERNAL MEDICINE

## 2018-07-10 RX ORDER — METFORMIN HYDROCHLORIDE 500 MG/1
500 TABLET, EXTENDED RELEASE ORAL DAILY
Qty: 90 TABLET | Refills: 3
Start: 2018-07-10 | End: 2018-10-11

## 2018-07-10 RX ORDER — TRAMADOL HYDROCHLORIDE 50 MG/1
50 TABLET ORAL EVERY 12 HOURS PRN
Qty: 180 TABLET | Refills: 1 | Status: SHIPPED | OUTPATIENT
Start: 2018-07-10 | End: 2018-10-08

## 2018-07-10 RX ORDER — METFORMIN HYDROCHLORIDE 500 MG/1
500 TABLET, EXTENDED RELEASE ORAL DAILY
Qty: 90 TABLET | Refills: 3 | Status: SHIPPED | OUTPATIENT
Start: 2018-07-10 | End: 2018-07-10 | Stop reason: SDUPTHER

## 2018-07-10 NOTE — PATIENT INSTRUCTIONS
A1C  Does this test have other names?  Hemoglobin A1c; HbA1c; glycosylated hemoglobin; glycohemoglobin; Glycated hemoglobin  What is this test?  A1C is a blood test used to screen people to find out whether they have diabetes or prediabetes. It's also used in people who know they have diabetes to measure how well they are controlling their blood sugar and to guide their treatment decisions over time.  Why do I need this test?  You may need this test to check for prediabetes or diabetes. If you already know that you have diabetes or prediabetes, you may need this test to see how well you are controlling your blood sugar.  People with diabetes must track their blood sugar (glucose) levels every day to make sure they arent too high or too low. The A1C test gives results for a longer period of time. It shows whether your blood sugar has been too high on average in the previous two to three months. When blood sugar is high, more glucose builds up and sticks to your hemoglobin, a protein that carries oxygen in red blood cells. The A1C test measures the percentage of hemoglobin that is coated with blood sugar.  Depending on the type of diabetes you have, how well it's controlled, and your health care providers preferences, you may need to have the A1C test two or more times a year. The American Diabetes Association (ADA) recommends that you have an A1C test at least twice a year if you are meeting your blood sugar goals and your blood sugar is well-controlled. If you arent meeting your goals or your medication has changed lately, you should have the A1C test more often. You also may have the test when your health care provider first starts working with you to treat your diabetes.  What other tests might I have along with this test?   If your health care provider is testing you for diabetes, you may also take a fasting plasma glucose test, or FPG, or an oral glucose tolerance test, or OGTT, as part of your screening  and diagnosis. You may also be tested for sugar, ketones or protein in the urine.  What do my test results mean?  Laboratory test results may vary depending on your age, gender, health history, the method used for the test, and many other factors. If your results are different from the results suggested below, this may not mean that you have a problem. Ask your health care provider to explain what the results mean for you.   A1C is reported as a percentage:  · Normal A1C is considered to be below 5.7 percent. Results between 5.7 and 6.4 percent may mean you have prediabetes. This means you have a higher risk of developing diabetes.  · Results of 6.5 percent or higher on two separate occasions may mean that you have diabetes.  · The ADA  recommends that people with diabetes should maintain an A1C below 7 percent. The American Association of Clinical Endocrinologists recommends an A1C of 6.5 percent or less. Recommendations may vary based on the person's age, medical conditions, or other factors.   How is this test done?  The test requires a blood sample, which is drawn through a needle from a vein in your arm.   Does this test pose any risks?  Taking a blood sample with a needle carries risks that include bleeding, infection, bruising, and a sense of lightheadedness. When the needle pricks your arm, you may feel a slight stinging sensation or pain. Afterward, the site may be slightly sore.  What might affect my test results?  Your blood sugar levels usually vary throughout the day. These variations won't affect the A1C.  If you have sickle cell anemia or other blood disorders, a standard A1C test may be less useful for diagnosing or monitoring diabetes. (Your health care provider may be able to find another diabetes test that will better serve you.) In addition, the test results may be skewed if you have anemia, heavy bleeding, an iron deficiency, kidney failure, or liver disease.  How do I get ready for this  test?  You don't need any special preparation for the test.    © 9290-4558 [employer]. [address] [place] [address], [place] [address]. All rights reserved. This information is not intended as a substitute for professional medical care. Always follow your healthcare professional's instructions.

## 2018-07-11 NOTE — PROGRESS NOTES
Subjective:       Patient ID: Yeyo Hollingsworth is a 78 y.o. male.    Chief Complaint: Follow-up (2 month f/u) and Diabetes    78-year-old gentleman here for a four-month follow-up for his type 2 diabetes. He originally was on Amaryl over 6 months ago and we did switch into Januvia at 100 mg once a day. He cannot tolerate metformin due to bowel issues. However he did go on a [address] trip with his wife and ran out of his Januvia. He had a few metformin leftover 500 mg extended release as opposed to 1000 extended-release daily and had no problems with his bowels. Then he ran out of this and took the Amaryl 2 mg the following 2 weeks. Essentially the A1c today is not on Januvia 100 mg daily. A1c today is elevated at 7.4% in 4 months ago it was 6.6%. He does admit to eating on the road and making poor choices as well.  As far as his sciatica and low back daily get an epidural which really help with that pain. He did fall but he was out of town and has some right-sided posterior chest wall tenderness. He is unsure whether he broke anything is still sore today and this was over a week ago. No external bruising.  He is to have surgery on his toes and removal of his ingrown fungal toenails in the next week.      Review of Systems   Constitutional: Negative for activity change, diaphoresis, fatigue and fever.   HENT: Negative for congestion, ear pain, postnasal drip, sinus pressure, sore throat and trouble swallowing.    Eyes: Negative for pain.   Respiratory: Negative for cough, chest tightness, shortness of breath and wheezing.    Cardiovascular: Negative for chest pain, palpitations and leg swelling.   Gastrointestinal: Negative for abdominal distention, abdominal pain, blood in stool, constipation and diarrhea.   Endocrine: Negative for cold intolerance and heat intolerance.   Genitourinary: Negative for decreased urine volume, difficulty urinating, dysuria, flank pain, frequency and hematuria.   Musculoskeletal:  Positive for back pain. Negative for arthralgias, joint swelling, myalgias and neck pain.        Rib pain on the right as per HPI   Skin: Negative for pallor, rash and wound.   Neurological: Negative for tremors, syncope, weakness, light-headedness, numbness and headaches.   Hematological: Negative for adenopathy.   Psychiatric/Behavioral: Negative for confusion, decreased concentration, hallucinations, self-injury, sleep disturbance and suicidal ideas. The patient is not nervous/anxious.        Past Medical History:   Diagnosis Date    Allergy     Anticoagulant long-term use     aspirin    Anxiety     Atrial fibrillation     Cataract     Clotting disorder     left leg    Diabetes mellitus     Diabetes mellitus, type 2     Hyperlipidemia     Hypertension     BRANDI on CPAP        Past Surgical History:   Procedure Laterality Date    ABDOMINAL SURGERY      origunal surg 1986-mult surgeries since    CARPAL TUNNEL RELEASE      right wrist 2009-left wrist 2010    COLON SURGERY      partial colon removal    COLOSTOMY  1986    colostomy reversal  1986    EYE SURGERY      hay fever      HERNIA REPAIR  1949    TONSILLECTOMY  1947       History reviewed. No pertinent family history.    Social History     Social History    Marital status:      Spouse name: N/A    Number of children: N/A    Years of education: N/A     Social History Main Topics    Smoking status: Former Smoker     Types: Cigarettes     Quit date: 2/27/2006    Smokeless tobacco: Never Used    Alcohol use No    Drug use: No    Sexual activity: Not Asked     Other Topics Concern    None     Social History Narrative    None       Current Outpatient Prescriptions   Medication Sig Dispense Refill    apixaban (ELIQUIS) 5 mg Tab Take 1 tablet (5 mg total) by mouth 2 (two) times daily. 180 tablet 3    aspirin (ECOTRIN) 81 MG EC tablet Take 1 tablet (81 mg total) by mouth once daily. 90 tablet 3    atorvastatin (LIPITOR) 10 MG tablet  nightly 90 tablet 3    azelastine (ASTELIN) 137 mcg (0.1 %) nasal spray 1 spray by Nasal route 2 (two) times daily.      blood sugar diagnostic (BLOOD GLUCOSE TEST) Strp FREESTYLE LITE BG TEST STRIPS. current use of insulin  - Primary ICD-10-CM: E11.9 200 strip 3    blood sugar diagnostic Strp 200 strips by Misc.(Non-Drug; Combo Route) route 2 (two) times daily. 100 strip 3    desoximetasone (TOPICORT) 0.05 % cream       diazePAM (VALIUM) 5 MG tablet Take 1 tablet (5 mg total) by mouth daily as needed for Anxiety. 30 tablet 2    diclofenac sodium (VOLTAREN) 1 % Gel VOLTAREN, 1% (Transdermal Gel)   as needed (1 %)  Active   Comments: Medication taken as needed.       fexofenadine (ALLEGRA) 180 MG tablet Take 1 tablet (180 mg total) by mouth once daily. 90 tablet 3    fluticasone (FLONASE) 50 mcg/actuation nasal spray 2 sprays (100 mcg total) by Each Nare route once daily. 3 Bottle 3    FREESTYLE LANCETS 28 gauge lancets       furosemide (LASIX) 40 MG tablet Every other day 45 tablet 3    ipratropium (ATROVENT) 0.03 % nasal spray 2 sprays by Nasal route 2 (two) times daily. 30 mL 5    K-TAB 10 mEq TbSR       lancets Misc LANCETS: FREESTYLE LITE OR GENERIC THAT IS ACCEPTED.  current use of insulin  - Primary ICD-10-CM: E11.9 100 each 3    lisinopril (PRINIVIL,ZESTRIL) 20 MG tablet Take 1 tablet (20 mg total) by mouth once daily. 90 tablet 3    metFORMIN (GLUCOPHAGE-XR) 500 MG 24 hr tablet Take 1 tablet (500 mg total) by mouth once daily. 90 tablet 3    metoprolol tartrate (LOPRESSOR) 25 MG tablet Take 1 tablet (25 mg total) by mouth 2 (two) times daily. 180 tablet 3    montelukast (SINGULAIR) 10 mg tablet Take 1 tablet (10 mg total) by mouth every evening. 90 tablet 3    multivit-iron-min-folic acid (MULTIVITAMIN-IRON-MINERALS-FOLIC ACID) 3,500-18-0.4 unit-mg-mg Chew Take by mouth.        naftifine (NAFTIN) 2 % Crea NAFTIN, 2% (External Cream)   as needed (2 %)  Active   Comments: Medication taken as  needed.       polyethylene glycol (GLYCOLAX) 17 gram/dose powder Take 17 g by mouth once daily. 6 Bottle 3    potassium chloride (MICRO-K) 10 MEQ CpSR Every other day 45 capsule 3    SITagliptin (JANUVIA) 100 MG Tab Take 1 tablet (100 mg total) by mouth once daily. 90 tablet 3    traMADol (ULTRAM) 50 mg tablet Take 1 tablet (50 mg total) by mouth every 12 (twelve) hours as needed for Pain. 180 tablet 1    traZODone (DESYREL) 100 MG tablet Take 1 tablet (100 mg total) by mouth every evening. 90 tablet 3     No current facility-administered medications for this visit.        Review of patient's allergies indicates:   Allergen Reactions    Ativan [lorazepam] Other (See Comments)     Mood alternating      Dilaudid [hydromorphone (bulk)] Other (See Comments)     Mood alternating      Morphine Other (See Comments)     Mood alternating      Hydromorphone        Objective:      Physical Exam   Constitutional: He is oriented to person, place, and time. He appears well-developed and well-nourished. No distress.   HENT:   Head: Normocephalic and atraumatic.   Right Ear: External ear normal.   Left Ear: External ear normal.   Nose: Nose normal.   Mouth/Throat: Oropharynx is clear and moist.   Eyes: Conjunctivae and EOM are normal. Right eye exhibits no discharge. Left eye exhibits no discharge. No scleral icterus.   Neck: Normal range of motion. Neck supple. No JVD present. No tracheal deviation present. No thyromegaly present.   Cardiovascular: Normal rate, regular rhythm, normal heart sounds and intact distal pulses.  Exam reveals no gallop.    No murmur heard.  Pulmonary/Chest: Effort normal and breath sounds normal. No respiratory distress. He has no wheezes. He has no rales.   Abdominal: Soft. Bowel sounds are normal. He exhibits no distension and no mass. There is no tenderness.   Musculoskeletal: Normal range of motion. He exhibits no edema or tenderness.   Right lower lateral chest wall tenderness without  ecchymosis   Lymphadenopathy:     He has no cervical adenopathy.   Neurological: He is alert and oriented to person, place, and time.   Skin: Skin is warm and dry. No rash noted. He is not diaphoretic. No erythema.   Psychiatric: He has a normal mood and affect. His behavior is normal. Judgment and thought content normal.       Lab Results   Component Value Date    WBC 6.3 03/29/2017    HGB 14.5 03/29/2017    HCT 46.3 03/29/2017     03/29/2017    CHOL 214 (H) 03/09/2016    TRIG 180 (H) 03/09/2016    HDL 41 03/09/2016    ALT 41 10/27/2015    AST 27 03/27/2018     03/27/2018    K 4.2 03/27/2018     03/27/2018    CREATININE 1.14 03/27/2018    BUN 22 (H) 03/27/2018    CO2 26.9 03/27/2018    TSH 1.67 03/27/2018    INR 1.0 05/17/2013    GLUF 96 03/09/2016    HGBA1C 7.4 07/10/2018     Assessment:       1. Chronic atrial fibrillation    2. Spinal stenosis of lumbar region without neurogenic claudication-left sciatica    3. Rib pain on right side    4. Controlled type 2 diabetes mellitus without complication, without long-term current use of insulin    5. Essential hypertension    6. BMI 38.0-38.9,adult    7. On continuous oral anticoagulation    8. Anterolisthesis-lumbar L4/l5    9. Familial hypercholesterolemia        Plan:       Chronic atrial fibrillation  -     apixaban (ELIQUIS) 5 mg Tab; Take 1 tablet (5 mg total) by mouth 2 (two) times daily.  Dispense: 180 tablet; Refill: 3    Spinal stenosis of lumbar region without neurogenic claudication-left sciatica-improved after injection    Rib pain on right side  -     X-Ray Ribs 2 View Right; Future    Controlled type 2 diabetes mellitus without complication, without long-term current use of insulin  -     Hemoglobin A1C, POCT  -     metFORMIN (GLUCOPHAGE-XR) 500 MG 24 hr tablet; Take 1 tablet (500 mg total) by mouth once daily.  Dispense: 90 tablet; Refill: 3  -     Hemoglobin A1c; Future  -     Comprehensive metabolic panel; Future  -     SITagliptin  (JANUVIA) 100 MG Tab; Take 1 tablet (100 mg total) by mouth once daily.  Dispense: 90 tablet; Refill: 3    Essential hypertension-Controlled on above med regimen to include an ARB/ACE    On continuous oral anticoagulation  -     CBC auto differential; Future    Anterolisthesis-lumbar L4/l5  -     traMADol (ULTRAM) 50 mg tablet; Take 1 tablet (50 mg total) by mouth every 12 (twelve) hours as needed for Pain.  Dispense: 180 tablet; Refill: 1    Familial hypercholesterolemia  -     Lipid panel; Future

## 2018-07-12 ENCOUNTER — TELEPHONE (OUTPATIENT)
Dept: FAMILY MEDICINE | Facility: CLINIC | Age: 78
End: 2018-07-12

## 2018-10-05 LAB
ALBUMIN SERPL-MCNC: 3.9 G/DL (ref 3.1–4.7)
ALP SERPL-CCNC: 29 IU/L (ref 40–104)
ALT (SGPT): 34 IU/L (ref 3–33)
AST SERPL-CCNC: 24 IU/L (ref 10–40)
BASOPHILS NFR BLD: 0 K/UL (ref 0–0.2)
BASOPHILS NFR BLD: 0.5 %
BILIRUB SERPL-MCNC: 0.5 MG/DL (ref 0.3–1)
BUN SERPL-MCNC: 21 MG/DL (ref 8–20)
CALCIUM SERPL-MCNC: 8.8 MG/DL (ref 7.7–10.4)
CHLORIDE: 104 MMOL/L (ref 98–110)
CO2 SERPL-SCNC: 30.7 MMOL/L (ref 22.8–31.6)
CREATININE: 1.19 MG/DL (ref 0.6–1.4)
EOSINOPHIL NFR BLD: 0.3 K/UL (ref 0–0.7)
EOSINOPHIL NFR BLD: 4.5 %
ERYTHROCYTE [DISTWIDTH] IN BLOOD BY AUTOMATED COUNT: 13.6 % (ref 11.7–14.9)
GLUCOSE: 119 MG/DL (ref 70–99)
GRAN #: 3.2 K/UL (ref 1.4–6.5)
GRAN%: 51.4 %
HBA1C MFR BLD: 6.6 % (ref 3.1–6.5)
HCT VFR BLD AUTO: 44.3 % (ref 39–55)
HGB BLD-MCNC: 14 G/DL (ref 14–16)
IMMATURE GRANS (ABS): 0 K/UL (ref 0–1)
IMMATURE GRANULOCYTES: 0.3 %
LYMPH #: 2 K/UL (ref 1.2–3.4)
LYMPH%: 32.6 %
MCH RBC QN AUTO: 30.2 PG (ref 25–35)
MCHC RBC AUTO-ENTMCNC: 31.6 G/DL (ref 31–36)
MCV RBC AUTO: 95.7 FL (ref 80–100)
MONO #: 0.7 K/UL (ref 0.1–0.6)
MONO%: 10.7 %
NUCLEATED RBCS: 0 %
PLATELET # BLD AUTO: 174 K/UL (ref 140–440)
PMV BLD AUTO: 9.8 FL (ref 8.8–12.7)
POTASSIUM SERPL-SCNC: 4.1 MMOL/L (ref 3.5–5)
PROT SERPL-MCNC: 6.3 G/DL (ref 6–8.2)
RBC # BLD AUTO: 4.63 M/UL (ref 4.3–5.9)
SODIUM: 142 MMOL/L (ref 134–144)
WBC # BLD AUTO: 6.2 K/UL (ref 5–10)

## 2018-10-11 ENCOUNTER — OFFICE VISIT (OUTPATIENT)
Dept: FAMILY MEDICINE | Facility: CLINIC | Age: 78
End: 2018-10-11
Payer: MEDICARE

## 2018-10-11 ENCOUNTER — TELEPHONE (OUTPATIENT)
Dept: FAMILY MEDICINE | Facility: CLINIC | Age: 78
End: 2018-10-11

## 2018-10-11 VITALS
SYSTOLIC BLOOD PRESSURE: 110 MMHG | HEIGHT: 68 IN | TEMPERATURE: 98 F | WEIGHT: 254 LBS | HEART RATE: 61 BPM | RESPIRATION RATE: 16 BRPM | BODY MASS INDEX: 38.49 KG/M2 | DIASTOLIC BLOOD PRESSURE: 68 MMHG | OXYGEN SATURATION: 97 %

## 2018-10-11 DIAGNOSIS — R19.5 POSITIVE COLORECTAL CANCER SCREENING USING COLOGUARD TEST: ICD-10-CM

## 2018-10-11 DIAGNOSIS — M25.50 ARTHRALGIA, UNSPECIFIED JOINT: ICD-10-CM

## 2018-10-11 DIAGNOSIS — I10 ESSENTIAL HYPERTENSION: ICD-10-CM

## 2018-10-11 DIAGNOSIS — M43.10 ANTEROLISTHESIS: ICD-10-CM

## 2018-10-11 DIAGNOSIS — M25.562 PAIN IN BOTH KNEES, UNSPECIFIED CHRONICITY: ICD-10-CM

## 2018-10-11 DIAGNOSIS — J30.89 CHRONIC NONSEASONAL ALLERGIC RHINITIS DUE TO FUNGAL SPORES: ICD-10-CM

## 2018-10-11 DIAGNOSIS — Z79.01 ANTICOAGULANT LONG-TERM USE: ICD-10-CM

## 2018-10-11 DIAGNOSIS — M25.561 PAIN IN BOTH KNEES, UNSPECIFIED CHRONICITY: ICD-10-CM

## 2018-10-11 DIAGNOSIS — M25.512 ACUTE PAIN OF LEFT SHOULDER: ICD-10-CM

## 2018-10-11 DIAGNOSIS — E11.9 CONTROLLED TYPE 2 DIABETES MELLITUS WITHOUT COMPLICATION, WITHOUT LONG-TERM CURRENT USE OF INSULIN: Primary | ICD-10-CM

## 2018-10-11 DIAGNOSIS — Z98.890 HISTORY OF SURGERY: ICD-10-CM

## 2018-10-11 LAB
CK SERPL-CCNC: 102 IU/L (ref 18–170)
CRP SERPL-MCNC: 0.34 MG/DL (ref 0–1.4)

## 2018-10-11 PROCEDURE — 99214 OFFICE O/P EST MOD 30 MIN: CPT | Mod: ,,, | Performed by: INTERNAL MEDICINE

## 2018-10-11 RX ORDER — DIAZEPAM 5 MG/1
5 TABLET ORAL DAILY PRN
Qty: 30 TABLET | Refills: 5 | Status: SHIPPED | OUTPATIENT
Start: 2018-10-11 | End: 2019-04-29 | Stop reason: SDUPTHER

## 2018-10-11 RX ORDER — METFORMIN HYDROCHLORIDE 500 MG/1
500 TABLET ORAL 2 TIMES DAILY WITH MEALS
Qty: 180 TABLET | Refills: 3 | Status: SHIPPED | OUTPATIENT
Start: 2018-10-11 | End: 2019-04-03

## 2018-10-11 RX ORDER — FUROSEMIDE 40 MG/1
TABLET ORAL
Qty: 45 TABLET | Refills: 3 | Status: SHIPPED | OUTPATIENT
Start: 2018-10-11 | End: 2019-07-08

## 2018-10-11 RX ORDER — HYDROCODONE BITARTRATE AND ACETAMINOPHEN 7.5; 325 MG/1; MG/1
1 TABLET ORAL DAILY PRN
Qty: 30 TABLET | Refills: 0 | Status: SHIPPED | OUTPATIENT
Start: 2018-10-11 | End: 2018-12-04 | Stop reason: SDUPTHER

## 2018-10-11 RX ORDER — METFORMIN HYDROCHLORIDE 1000 MG/1
1 TABLET ORAL DAILY
COMMUNITY
Start: 2018-09-17 | End: 2018-10-11

## 2018-10-11 RX ORDER — AZELASTINE 1 MG/ML
1 SPRAY, METERED NASAL 2 TIMES DAILY
Qty: 30 ML | Refills: 5 | Status: SHIPPED | OUTPATIENT
Start: 2018-10-11 | End: 2019-04-24 | Stop reason: SDUPTHER

## 2018-10-11 RX ORDER — POTASSIUM CHLORIDE 750 MG/1
CAPSULE, EXTENDED RELEASE ORAL
Qty: 45 CAPSULE | Refills: 3 | Status: SHIPPED | OUTPATIENT
Start: 2018-10-11 | End: 2019-10-14 | Stop reason: SDUPTHER

## 2018-10-11 NOTE — PATIENT INSTRUCTIONS
Rheumatoid Arthritis  You have rheumatoid arthritis (RA). This is a chronic disease that mainly affects the joints. Sometimes, it also affects other parts of the body. RA is an autoimmune disease. This means that the bodys immune system, which normally protects the body, causes harm instead. With RA, the immune system attacks the joints. The reason for this is unknown.  In most cases, RA affects pairs of joints on both sides of the body. These can include joints in both elbows, wrists, hands, knees, feet, or ankles. The disease often starts slowly. Early symptoms include stiffness, muscle aches, weakness, and fatigue. Over time, the joints may begin to hurt. They may also become warm, swollen, or tender. Symptoms may feel worse in the morning after a nights rest and may get better with activity.  With RA, you may have periods of active disease (when symptoms worsen) followed by periods of remission (when symptoms improve or go away). There is no known cure for RA. But medical treatment can slow or stop the progress of the disease. It can also help relieve symptoms. For advanced disease, surgery, such as joint replacement, may be the best option.  Home care  · If you were prescribed a medicine, take it as directed.  · To help control swelling and pain, acetaminophen, ibuprofen, or another NSAID (non-steroidal anti-inflammatory drug) may be recommended. Note: If you have chronic liver or kidney disease or ever had a stomach ulcer or gastrointestinal bleeding, tell your healthcare provider before taking any of these medicines.  · Some persons find relief with heat (hot shower, hot bath, or heating pad). Others prefer cold (ice in a plastic bag, wrapped in a towel). Try both. Then use the method you like best. Use heat or cold for about 20 minutes, a few times a day.  · Exercise is a key part of treatment for RA. It helps reduce pain. It may also improve flexibility. Do your best to be active daily. Move your joints  through their full range of motion each morning. Avoid staying in any one position for long periods of time. Take breaks throughout the day and move around. Also, ask your healthcare provider or physical therapist what exercises are best for you.  · If you are overweight, lose weight. Extra weight puts stress on your joints.  · If you smoke, quit. Smoking raises the risk of other problems linked to RA.  · No herbal product or nutritional supplement has been proven to help RA. But treatments such as acupuncture and massage may help relieve pain.  · Talk to your healthcare provider or occupational therapist about easier ways to perform daily tasks. This may include the use of assistive devices. These are special tools that can help with things like dressing, bathing, cooking, driving, and moving or getting around.  Follow-up care  Follow up with your healthcare provider, or as advised.   When to seek medical advice  Call your healthcare provider right away if any of these occur:  · Increasing weakness, pale color of the skin, fainting  · Chest pain or shortness of breath  · Blood in vomit or stool (black or red color)  · Changes in vision  · Skin ulcers  · Fever of 100.4ºF (38ºC) or higher, or as directed by your healthcare provider  · New joint pain  · New rash  Resources  To learn more about RA, contact:  · Arth[employer] Foundation, 226.302.4495, www.arthritis.org  · National [place] of Arthritis and Musculoskeletal and Skin Diseases (NIAMS), 672.181.6415, www.niams.nih.gov     Date Last Reviewed: 3/1/2017  © 5851-6453 [employer]. [address] [place] [address], [place] [address]. All rights reserved. This information is not intended as a substitute for professional medical care. Always follow your healthcare professional's instructions.

## 2018-10-12 ENCOUNTER — TELEPHONE (OUTPATIENT)
Dept: FAMILY MEDICINE | Facility: CLINIC | Age: 78
End: 2018-10-12

## 2018-10-12 LAB
ANA SER-ACNC: NEGATIVE
RHEUMATOID FACT SERPL-ACNC: <10 IU/ML (ref 0–13.9)

## 2018-10-12 NOTE — TELEPHONE ENCOUNTER
----- Message from Citlali Ward MD sent at 10/12/2018 10:29 AM CDT -----  Some arthritis in the acromioclavicular joints but negative knee xrays for arthritis which is amazing for his age - keep ov with ortho for his shoulder

## 2018-10-12 NOTE — TELEPHONE ENCOUNTER
----- Message from Citlali Ward MD sent at 10/12/2018 10:30 AM CDT -----  Negative work up for inflammatory arthritis like rheumatoid - some mild osteoarthritis in the fingers

## 2018-10-14 NOTE — PROGRESS NOTES
Subjective:       Patient ID: Yeyo Hollingsworth is a 78 y.o. male.    Chief Complaint: Diabetes (lab review)    78-year-old gentleman who is here for a four-month follow-up on his type 2 diabetes as well as hypertension, osteoarthritis, dyslipidemia and monitoring for chronic anticoagulation. The patient recently had extensive surgery on his left ankle with good success in pain. He still has some mild swelling of the ankle. Since his immobility with the ankle stiffness and this is going on for a few weeks. He is having difficulty in raising his left shoulder above 90 degrees. He also is complaining of achiness in his knees and hands. Many years ago he thinks he was told he had rheumatoid arthritis. He has not had imaging of his knees or shoulders in quite some time. She does not recall any injury of his shoulder.  He keeps excellent records were reviewed with the patient. Hemoglobin A1c is excellent today and is listed below.      Review of Systems   Constitutional: Negative for activity change, diaphoresis, fatigue and fever.   HENT: Negative for congestion, ear pain, postnasal drip, sinus pressure, sore throat and trouble swallowing.    Eyes: Negative for pain.   Respiratory: Negative for cough, chest tightness, shortness of breath and wheezing.    Cardiovascular: Negative for chest pain, palpitations and leg swelling.   Gastrointestinal: Negative for abdominal distention, abdominal pain, blood in stool, constipation and diarrhea.   Endocrine: Negative for cold intolerance and heat intolerance.   Genitourinary: Negative for decreased urine volume, difficulty urinating, dysuria, flank pain, frequency and hematuria.   Musculoskeletal: Positive for arthralgias, back pain and myalgias. Negative for joint swelling and neck pain.   Skin: Negative for pallor, rash and wound.   Neurological: Negative for tremors, syncope, weakness, light-headedness, numbness and headaches.   Hematological: Negative for adenopathy.    Psychiatric/Behavioral: Negative for confusion, decreased concentration, hallucinations, self-injury, sleep disturbance and suicidal ideas. The patient is not nervous/anxious.        Past Medical History:   Diagnosis Date    Allergy     Anticoagulant long-term use     aspirin    Anxiety     Atrial fibrillation     Cataract     Clotting disorder     left leg    Diabetes mellitus     Diabetes mellitus, type 2     Hyperlipidemia     Hypertension     BRANDI on CPAP        Past Surgical History:   Procedure Laterality Date    ABDOMINAL SURGERY      origunal surg -mult surgeries since    CARPAL TUNNEL RELEASE      right wrist -left wrist     COLON SURGERY      partial colon removal    COLOSTOMY      colostomy reversal      EYE SURGERY      hay fever      HERNIA REPAIR      INJECTION-STEROID-EPIDURAL-TRANSFORAMINAL Left 2018    [facility]    INJECTION-STEROID-EPIDURAL-TRANSFORAMINAL Left 2017    [facility]    TONSILLECTOMY         History reviewed. No pertinent family history.    Social History     Socioeconomic History    Marital status:      Spouse name: None    Number of children: None    Years of education: None    Highest education level: None   Social Needs    Financial resource strain: None    Food insecurity - worry: None    Food insecurity - inability: None    Transportation needs - medical: None    Transportation needs - non-medical: None   Occupational History    None   Tobacco Use    Smoking status: Former Smoker     Types: Cigarettes     Last attempt to quit: 2006     Years since quittin.6    Smokeless tobacco: Never Used   Substance and Sexual Activity    Alcohol use: No    Drug use: No    Sexual activity: None   Other Topics Concern    None   Social History Narrative    None       Current Outpatient Medications   Medication Sig Dispense Refill    apixaban  (ELIQUIS) 5 mg Tab Take 1 tablet (5 mg total) by mouth 2 (two) times daily. 180 tablet 3    aspirin (ECOTRIN) 81 MG EC tablet Take 1 tablet (81 mg total) by mouth once daily. 90 tablet 3    atorvastatin (LIPITOR) 10 MG tablet nightly 90 tablet 3    azelastine (ASTELIN) 137 mcg (0.1 %) nasal spray 1 spray (137 mcg total) by Nasal route 2 (two) times daily. 30 mL 5    blood sugar diagnostic (BLOOD GLUCOSE TEST) Strp FREESTYLE LITE BG TEST STRIPS. current use of insulin  - Primary ICD-10-CM: E11.9 200 strip 3    blood sugar diagnostic Strp 200 strips by Misc.(Non-Drug; Combo Route) route 2 (two) times daily. 100 strip 3    desoximetasone (TOPICORT) 0.05 % cream       diazePAM (VALIUM) 5 MG tablet Take 1 tablet (5 mg total) by mouth daily as needed for Anxiety. 30 tablet 5    diclofenac sodium (VOLTAREN) 1 % Gel VOLTAREN, 1% (Transdermal Gel)   as needed (1 %)  Active   Comments: Medication taken as needed.       fexofenadine (ALLEGRA) 180 MG tablet Take 1 tablet (180 mg total) by mouth once daily. 90 tablet 3    fluticasone (FLONASE) 50 mcg/actuation nasal spray 2 sprays (100 mcg total) by Each Nare route once daily. 3 Bottle 3    FLUZONE HIGH-DOSE 2018-19, PF, 180 mcg/0.5 mL vaccine Inject 1 mL into the muscle once.  0    FREESTYLE LANCETS 28 gauge lancets       furosemide (LASIX) 40 MG tablet Every other day 45 tablet 3    ipratropium (ATROVENT) 0.03 % nasal spray 2 sprays by Nasal route 2 (two) times daily. 30 mL 5    K-TAB 10 mEq TbSR       lancets Misc LANCETS: FREESTYLE LITE OR GENERIC THAT IS ACCEPTED.  current use of insulin  - Primary ICD-10-CM: E11.9 100 each 3    lisinopril (PRINIVIL,ZESTRIL) 20 MG tablet Take 1 tablet (20 mg total) by mouth once daily. 90 tablet 3    metoprolol tartrate (LOPRESSOR) 25 MG tablet Take 1 tablet (25 mg total) by mouth 2 (two) times daily. 180 tablet 3    montelukast (SINGULAIR) 10 mg tablet Take 1 tablet (10 mg total) by mouth every evening. 90 tablet 3     multivit-iron-min-folic acid (MULTIVITAMIN-IRON-MINERALS-FOLIC ACID) 3,500-18-0.4 unit-mg-mg Chew Take by mouth.        naftifine (NAFTIN) 2 % Crea NAFTIN, 2% (External Cream)   as needed (2 %)  Active   Comments: Medication taken as needed.       polyethylene glycol (GLYCOLAX) 17 gram/dose powder Take 17 g by mouth once daily. 6 Bottle 3    potassium chloride (MICRO-K) 10 MEQ CpSR Every other day 45 capsule 3    SITagliptin (JANUVIA) 100 MG Tab Take 1 tablet (100 mg total) by mouth once daily. 90 tablet 3    traZODone (DESYREL) 100 MG tablet Take 1 tablet (100 mg total) by mouth every evening. 90 tablet 3    HYDROcodone-acetaminophen (NORCO) 7.5-325 mg per tablet Take 1 tablet by mouth daily as needed for Pain. 30 tablet 0    metFORMIN (GLUCOPHAGE) 500 MG tablet Take 1 tablet (500 mg total) by mouth 2 (two) times daily with meals. 180 tablet 3     No current facility-administered medications for this visit.        Review of patient's allergies indicates:   Allergen Reactions    Ativan [lorazepam] Other (See Comments)     Mood alternating      Dilaudid [hydromorphone (bulk)] Other (See Comments)     Mood alternating      Morphine Other (See Comments)     Mood alternating      Hydromorphone        Objective:      Physical Exam   Constitutional: He is oriented to person, place, and time. He appears well-developed and well-nourished. No distress.   HENT:   Head: Normocephalic and atraumatic.   Right Ear: External ear normal.   Left Ear: External ear normal.   Nose: Nose normal.   Mouth/Throat: Oropharynx is clear and moist.   Eyes: Conjunctivae and EOM are normal. Right eye exhibits no discharge. Left eye exhibits no discharge. No scleral icterus.   Neck: Normal range of motion. Neck supple. No JVD present. No tracheal deviation present. No thyromegaly present.   Cardiovascular: Normal rate, regular rhythm, normal heart sounds and intact distal pulses. Exam reveals no gallop.   No murmur  heard.  Pulmonary/Chest: Effort normal and breath sounds normal. No respiratory distress. He has no wheezes. He has no rales.   Abdominal: Soft. Bowel sounds are normal. He exhibits no distension and no mass. There is no tenderness.   Musculoskeletal: He exhibits no edema.        Left shoulder: He exhibits decreased range of motion and tenderness.   Lymphadenopathy:     He has no cervical adenopathy.   Neurological: He is alert and oriented to person, place, and time.   Skin: Skin is warm and dry. No rash noted. He is not diaphoretic. No erythema.   Psychiatric: He has a normal mood and affect. His behavior is normal. Judgment and thought content normal.       Lab Results   Component Value Date    WBC 6.2 10/05/2018    HGB 14.0 10/05/2018    HCT 44.3 10/05/2018     10/05/2018    CHOL 214 (H) 03/09/2016    TRIG 180 (H) 03/09/2016    HDL 41 03/09/2016    ALT 41 10/27/2015    AST 24 10/05/2018     10/05/2018    K 4.1 10/05/2018     10/05/2018    CREATININE 1.19 10/05/2018    BUN 21 (H) 10/05/2018    CO2 30.7 10/05/2018    TSH 1.67 03/27/2018    INR 1.0 05/17/2013    GLUF 96 03/09/2016    HGBA1C 6.6 (H) 10/05/2018       Assessment:       1. Controlled type 2 diabetes mellitus without complication, without long-term current use of insulin    2. Anticoagulant long-term use    3. Anterolisthesis-lumbar L4/l5    4. Essential hypertension    5. Acute pain of left shoulder greater than right     6. Pain in both knees, unspecified chronicity    7. Arthralgia, unspecified joint    8. Chronic nonseasonal allergic rhinitis due to fungal spores    9. History of surgery- left great toe    10. Positive colorectal cancer screening using Cologuard test        Plan:       Controlled type 2 diabetes mellitus without complication, without long-term current use of insulin  -     metFORMIN (GLUCOPHAGE) 500 MG tablet; Take 1 tablet (500 mg total) by mouth 2 (two) times daily with meals.  Dispense: 180 tablet; Refill:  3    Anticoagulant long-term use-noted and unfortunately mitigates the use of anti-inflammatories for arthritic pains.    Anterolisthesis-lumbar L4/l5-improving after epidural injections  -     diazePAM (VALIUM) 5 MG tablet; Take 1 tablet (5 mg total) by mouth daily as needed for Anxiety.  Dispense: 30 tablet; Refill: 5    Essential hypertension  -     furosemide (LASIX) 40 MG tablet; Every other day  Dispense: 45 tablet; Refill: 3  -     potassium chloride (MICRO-K) 10 MEQ CpSR; Every other day  Dispense: 45 capsule; Refill: 3    Acute pain of left shoulder greater than right   -     Ambulatory Referral to Physical/Occupational Therapy  -     X-Ray Shoulder 2 or More Views Left  -     Cancel: Ambulatory referral to Orthopedics  -     Ambulatory referral to Orthopedics    Pain in both knees, unspecified chronicity  -     HYDROcodone-acetaminophen (NORCO) 7.5-325 mg per tablet; Take 1 tablet by mouth daily as needed for Pain.  Dispense: 30 tablet; Refill: 0  -     X-Ray Knee Complete 4 Or More Views Bilat    Arthralgia, unspecified joint  -     Sedimentation rate; Future; Expected date: 10/11/2018  -     AISHA; Future; Expected date: 10/11/2018  -     Rheumatoid factor; Future; Expected date: 10/11/2018  -     CK; Future; Expected date: 10/11/2018  -     C-reactive protein; Future; Expected date: 10/11/2018  -     X-Ray Hand 2 View Bilat    Chronic nonseasonal allergic rhinitis due to fungal spores  -     azelastine (ASTELIN) 137 mcg (0.1 %) nasal spray; 1 spray (137 mcg total) by Nasal route 2 (two) times daily.  Dispense: 30 mL; Refill: 5  -     Ambulatory Referral to Physical/Occupational Therapy    History of surgery- left great toe-with some residual swelling and atrophy of the left half muscle group. Will recommend physical therapy    Positive colorectal cancer screening using Cologuard test  -     Ambulatory referral to Gastroenterology

## 2018-10-31 ENCOUNTER — OFFICE VISIT (OUTPATIENT)
Dept: ORTHOPEDICS | Facility: CLINIC | Age: 78
End: 2018-10-31
Payer: MEDICARE

## 2018-10-31 VITALS — WEIGHT: 254 LBS | HEIGHT: 68 IN | BODY MASS INDEX: 38.49 KG/M2

## 2018-10-31 DIAGNOSIS — M75.112 INCOMPLETE TEAR OF LEFT ROTATOR CUFF: Primary | ICD-10-CM

## 2018-10-31 PROCEDURE — 99203 OFFICE O/P NEW LOW 30 MIN: CPT | Mod: ,,, | Performed by: ORTHOPAEDIC SURGERY

## 2018-10-31 NOTE — LETTER
October 31, 2018      Citlali Ward MD  [address]  [address]  [place] [address]           [facility]  [address]  [address]  [place] [address]  Phone: [phone]  Fax: [phone]          Patient: Yeyo Hollingsworth   MR Number: [id number]   YOB: 1940   Date of Visit: 10/31/2018       Dear Dr. Citlali Ward:    Thank you for referring Yeoy Hollingsworth to me for evaluation. Attached you will find relevant portions of my assessment and plan of care.    If you have questions, please do not hesitate to call me. I look forward to following Yeyo Hollingsworth along with you.    Sincerely,    Ladarius Ruvalcaba Jr., MD    Enclosure  CC:  No Recipients    If you would like to receive this communication electronically, please contact [email or web address] or [phone] to request more information on [employer] Link access.    For providers and/or their staff who would like to refer a patient to [facility], please contact us through our one-stop-shop provider referral line, [facility], at [phone].    If you feel you have received this communication in error or would no longer like to receive these types of communications, please e-mail [email or web address]

## 2018-10-31 NOTE — PROGRESS NOTES
Subjective:       Chief Complaint    Chief Complaint   Patient presents with    Shoulder Pain     Left shoulder pain.Left shoulder x-rays taken on 10/11/18.Pt states that he had foot sx on 8/8/18 and was using a cane for assistance with ambulation. He states that he thinks that using the cane was causing his shoulder pain. He went on a cruise for 7 days using the cane. He states that once he returnrd from the cruise he stopped using the cane and his shoulder pain improved. ROM has improved as well.        HPI  Yeyo Hollingsworth is a 78 y.o.  male who presents       Past History  Past Medical History:   Diagnosis Date    Allergy     Anticoagulant long-term use     aspirin    Anxiety     Atrial fibrillation     Cataract     Clotting disorder     left leg    Diabetes mellitus     Diabetes mellitus, type 2     Hyperlipidemia     Hypertension     BRANDI on CPAP      Past Surgical History:   Procedure Laterality Date    ABDOMINAL SURGERY      origunal surg 1986-mult surgeries since    CARPAL TUNNEL RELEASE      right wrist 2009-left wrist 2010    COLON SURGERY      partial colon removal    COLOSTOMY  1986    colostomy reversal  1986    EYE SURGERY      hay fever      HERNIA REPAIR  1949    INJECTION-STEROID-EPIDURAL-TRANSFORAMINAL Left 4/30/2018    [facility]    INJECTION-STEROID-EPIDURAL-TRANSFORAMINAL Left 9/28/2017    [facility]    TONSILLECTOMY  1947     Social History     Socioeconomic History    Marital status:      Spouse name: Not on file    Number of children: Not on file    Years of education: Not on file    Highest education level: Not on file   Social Needs    Financial resource strain: Not on file    Food insecurity - worry: Not on file    Food insecurity - inability: Not on file    Transportation needs - medical: Not on file    Transportation needs - non-medical: Not on file   Occupational History    Not on file    Tobacco Use    Smoking status: Former Smoker     Types: Cigarettes     Last attempt to quit: 2006     Years since quittin.6    Smokeless tobacco: Never Used   Substance and Sexual Activity    Alcohol use: No    Drug use: No    Sexual activity: Not on file   Other Topics Concern    Not on file   Social History Narrative    Not on file         Medications  Current Outpatient Medications   Medication Sig    apixaban (ELIQUIS) 5 mg Tab Take 1 tablet (5 mg total) by mouth 2 (two) times daily.    aspirin (ECOTRIN) 81 MG EC tablet Take 1 tablet (81 mg total) by mouth once daily.    atorvastatin (LIPITOR) 10 MG tablet nightly    azelastine (ASTELIN) 137 mcg (0.1 %) nasal spray 1 spray (137 mcg total) by Nasal route 2 (two) times daily.    blood sugar diagnostic (BLOOD GLUCOSE TEST) Strp FREESTYLE LITE BG TEST STRIPS. current use of insulin  - Primary ICD-10-CM: E11.9    blood sugar diagnostic Strp 200 strips by Misc.(Non-Drug; Combo Route) route 2 (two) times daily.    desoximetasone (TOPICORT) 0.05 % cream     diazePAM (VALIUM) 5 MG tablet Take 1 tablet (5 mg total) by mouth daily as needed for Anxiety.    diclofenac sodium (VOLTAREN) 1 % Gel VOLTAREN, 1% (Transdermal Gel)   as needed (1 %)  Active   Comments: Medication taken as needed.     fexofenadine (ALLEGRA) 180 MG tablet Take 1 tablet (180 mg total) by mouth once daily.    fluticasone (FLONASE) 50 mcg/actuation nasal spray 2 sprays (100 mcg total) by Each Nare route once daily.    FLUZONE HIGH-DOSE -, PF, 180 mcg/0.5 mL vaccine Inject 1 mL into the muscle once.    FREESTYLE LANCETS 28 gauge lancets     furosemide (LASIX) 40 MG tablet Every other day    HYDROcodone-acetaminophen (NORCO) 7.5-325 mg per tablet Take 1 tablet by mouth daily as needed for Pain.    ipratropium (ATROVENT) 0.03 % nasal spray 2 sprays by Nasal route 2 (two) times daily.    K-TAB 10 mEq TbSR     lancets Misc LANCETS: FREESTYLE LITE OR GENERIC THAT IS  ACCEPTED.  current use of insulin  - Primary ICD-10-CM: E11.9    lisinopril (PRINIVIL,ZESTRIL) 20 MG tablet Take 1 tablet (20 mg total) by mouth once daily.    metFORMIN (GLUCOPHAGE) 500 MG tablet Take 1 tablet (500 mg total) by mouth 2 (two) times daily with meals.    metoprolol tartrate (LOPRESSOR) 25 MG tablet Take 1 tablet (25 mg total) by mouth 2 (two) times daily.    montelukast (SINGULAIR) 10 mg tablet Take 1 tablet (10 mg total) by mouth every evening.    multivit-iron-min-folic acid (MULTIVITAMIN-IRON-MINERALS-FOLIC ACID) 3,500-18-0.4 unit-mg-mg Chew Take by mouth.      naftifine (NAFTIN) 2 % Crea NAFTIN, 2% (External Cream)   as needed (2 %)  Active   Comments: Medication taken as needed.     polyethylene glycol (GLYCOLAX) 17 gram/dose powder Take 17 g by mouth once daily.    potassium chloride (MICRO-K) 10 MEQ CpSR Every other day    SITagliptin (JANUVIA) 100 MG Tab Take 1 tablet (100 mg total) by mouth once daily.    traZODone (DESYREL) 100 MG tablet Take 1 tablet (100 mg total) by mouth every evening.     No current facility-administered medications for this visit.        Allergies  Review of patient's allergies indicates:   Allergen Reactions    Ativan [lorazepam] Other (See Comments)     Mood alternating      Dilaudid [hydromorphone (bulk)] Other (See Comments)     Mood alternating      Morphine Other (See Comments)     Mood alternating      Hydromorphone          Review of Systems     Constitutional: Negative    HENT: Negative  Eyes: Negative  Respiratory: Negative  Cardiovascular: Negative  Musculoskeletal: HPI  Skin: Negative  Neurological: Negative  Hematological: Negative  Endocrine: Negative      Physical Exam    There were no vitals filed for this visit.  Physical Examination:     Skin-  General appearance -  well appearing, and in no distress  Mental status - awake  Neck - supple  Chest -  symmetric air entry  Heart - normal rate   Abdomen - soft      Assessment/Plan   Incomplete  tear of left rotator cuff            This note was dictated using voice recognition software and may contain grammatical errors.

## 2018-10-31 NOTE — PROGRESS NOTES
Subjective:       Chief Complaint    Chief Complaint   Patient presents with    Shoulder Pain     Left shoulder pain.Left shoulder x-rays taken on 10/11/18.Pt states that he had foot sx on 8/8/18 and was using a cane for assistance with ambulation. He states that he thinks that using the cane was causing his shoulder pain. He went on a cruise for 7 days using the cane. He states that once he returnrd from the cruise he stopped using the cane and his shoulder pain improved. ROM has improved as well.        HPI  Yeyo Hollingsworth is a 78 y.o.  male who presents follow-up on left shoulder pain. States his pain levels 1/10 on a day-to-day basis. Previously was as high as 6/10. Does not exercise. He is on eliquis 5 mg every 12 hours and he uses metformin for his diabetes. Does have some difficulty laying on either shoulder.      Past History  Past Medical History:   Diagnosis Date    Allergy     Anticoagulant long-term use     aspirin    Anxiety     Atrial fibrillation     Cataract     Clotting disorder     left leg    Diabetes mellitus     Diabetes mellitus, type 2     Hyperlipidemia     Hypertension     BRANDI on CPAP      Past Surgical History:   Procedure Laterality Date    ABDOMINAL SURGERY      origunal surg 1986-mult surgeries since    CARPAL TUNNEL RELEASE      right wrist 2009-left wrist 2010    COLON SURGERY      partial colon removal    COLOSTOMY  1986    colostomy reversal  1986    EYE SURGERY      hay fever      HERNIA REPAIR  1949    INJECTION-STEROID-EPIDURAL-TRANSFORAMINAL Left 4/30/2018    [facility]    INJECTION-STEROID-EPIDURAL-TRANSFORAMINAL Left 9/28/2017    [facility] at [facility]    TONSILLECTOMY  1947     Social History     Socioeconomic History    Marital status:      Spouse name: Not on file    Number of children: Not on file    Years of education: Not on file    Highest education level: Not on file   Social Needs     Financial resource strain: Not on file    Food insecurity - worry: Not on file    Food insecurity - inability: Not on file    Transportation needs - medical: Not on file    Transportation needs - non-medical: Not on file   Occupational History    Not on file   Tobacco Use    Smoking status: Former Smoker     Types: Cigarettes     Last attempt to quit: 2006     Years since quittin.6    Smokeless tobacco: Never Used   Substance and Sexual Activity    Alcohol use: No    Drug use: No    Sexual activity: Not on file   Other Topics Concern    Not on file   Social History Narrative    Not on file         Medications  Current Outpatient Medications   Medication Sig    apixaban (ELIQUIS) 5 mg Tab Take 1 tablet (5 mg total) by mouth 2 (two) times daily.    aspirin (ECOTRIN) 81 MG EC tablet Take 1 tablet (81 mg total) by mouth once daily.    atorvastatin (LIPITOR) 10 MG tablet nightly    azelastine (ASTELIN) 137 mcg (0.1 %) nasal spray 1 spray (137 mcg total) by Nasal route 2 (two) times daily.    blood sugar diagnostic (BLOOD GLUCOSE TEST) Strp FREESTYLE LITE BG TEST STRIPS. current use of insulin  - Primary ICD-10-CM: E11.9    blood sugar diagnostic Strp 200 strips by Misc.(Non-Drug; Combo Route) route 2 (two) times daily.    desoximetasone (TOPICORT) 0.05 % cream     diazePAM (VALIUM) 5 MG tablet Take 1 tablet (5 mg total) by mouth daily as needed for Anxiety.    diclofenac sodium (VOLTAREN) 1 % Gel VOLTAREN, 1% (Transdermal Gel)   as needed (1 %)  Active   Comments: Medication taken as needed.     fexofenadine (ALLEGRA) 180 MG tablet Take 1 tablet (180 mg total) by mouth once daily.    fluticasone (FLONASE) 50 mcg/actuation nasal spray 2 sprays (100 mcg total) by Each Nare route once daily.    FLUZONE HIGH-DOSE , PF, 180 mcg/0.5 mL vaccine Inject 1 mL into the muscle once.    FREESTYLE LANCETS 28 gauge lancets     furosemide (LASIX) 40 MG tablet Every other day     HYDROcodone-acetaminophen (NORCO) 7.5-325 mg per tablet Take 1 tablet by mouth daily as needed for Pain.    ipratropium (ATROVENT) 0.03 % nasal spray 2 sprays by Nasal route 2 (two) times daily.    K-TAB 10 mEq TbSR     lancets Misc LANCETS: FREESTYLE LITE OR GENERIC THAT IS ACCEPTED.  current use of insulin  - Primary ICD-10-CM: E11.9    lisinopril (PRINIVIL,ZESTRIL) 20 MG tablet Take 1 tablet (20 mg total) by mouth once daily.    metFORMIN (GLUCOPHAGE) 500 MG tablet Take 1 tablet (500 mg total) by mouth 2 (two) times daily with meals.    metoprolol tartrate (LOPRESSOR) 25 MG tablet Take 1 tablet (25 mg total) by mouth 2 (two) times daily.    montelukast (SINGULAIR) 10 mg tablet Take 1 tablet (10 mg total) by mouth every evening.    multivit-iron-min-folic acid (MULTIVITAMIN-IRON-MINERALS-FOLIC ACID) 3,500-18-0.4 unit-mg-mg Chew Take by mouth.      naftifine (NAFTIN) 2 % Crea NAFTIN, 2% (External Cream)   as needed (2 %)  Active   Comments: Medication taken as needed.     polyethylene glycol (GLYCOLAX) 17 gram/dose powder Take 17 g by mouth once daily.    potassium chloride (MICRO-K) 10 MEQ CpSR Every other day    SITagliptin (JANUVIA) 100 MG Tab Take 1 tablet (100 mg total) by mouth once daily.    traZODone (DESYREL) 100 MG tablet Take 1 tablet (100 mg total) by mouth every evening.     No current facility-administered medications for this visit.        Allergies  Review of patient's allergies indicates:   Allergen Reactions    Ativan [lorazepam] Other (See Comments)     Mood alternating      Dilaudid [hydromorphone (bulk)] Other (See Comments)     Mood alternating      Morphine Other (See Comments)     Mood alternating      Hydromorphone          Review of Systems     Constitutional: Negative    HENT: Negative  Eyes: Negative  Respiratory: Negative  Cardiovascular: Negative  Musculoskeletal: HPI  Skin: Negative  Neurological: Negative  Hematological: Negative  Endocrine: Negative      Physical  Exam    There were no vitals filed for this visit.  Physical Examination: Left shoulder examination reveals no tenderness over the ac joint. Discomfort over the bicipital groove and greater tuberosity. Mild to moderate positive impingement signs with good rotator cuff strength. Subscapularis appears to be totally intact. External rotation 45° internal rotation upper lumbar. Scaption 90°.     Skin-clear  General appearance -  well appearing, and in no distress  Mental status - awake  Neck - supple  Chest -  symmetric air entry  Heart - normal rate   Abdomen - soft      Assessment/Plan   Incomplete tear of left rotator cuff      X-rays of the shoulder show no evidence of fracture or dislocation. Degenerative changes at the acromioclavicular joint noted. X-rays dated 10/11/2018 showed a mild type II acromion. Reviews were taken. Glenohumeral cartilage space was normal in the humeral head was well-positioned.      This note was dictated using voice recognition software and may contain grammatical errors.

## 2018-11-01 ENCOUNTER — OFFICE VISIT (OUTPATIENT)
Dept: FAMILY MEDICINE | Facility: CLINIC | Age: 78
End: 2018-11-01
Payer: MEDICARE

## 2018-11-01 VITALS
DIASTOLIC BLOOD PRESSURE: 70 MMHG | SYSTOLIC BLOOD PRESSURE: 138 MMHG | BODY MASS INDEX: 38.49 KG/M2 | HEIGHT: 68 IN | HEART RATE: 62 BPM | WEIGHT: 254 LBS | OXYGEN SATURATION: 96 % | RESPIRATION RATE: 16 BRPM

## 2018-11-01 DIAGNOSIS — I10 ESSENTIAL HYPERTENSION: Primary | ICD-10-CM

## 2018-11-01 DIAGNOSIS — R31.9 HEMATURIA, UNSPECIFIED TYPE: ICD-10-CM

## 2018-11-01 DIAGNOSIS — E78.01 FAMILIAL HYPERCHOLESTEROLEMIA: ICD-10-CM

## 2018-11-01 DIAGNOSIS — Z79.01 ANTICOAGULANT LONG-TERM USE: ICD-10-CM

## 2018-11-01 DIAGNOSIS — E11.9 CONTROLLED TYPE 2 DIABETES MELLITUS WITHOUT COMPLICATION, WITHOUT LONG-TERM CURRENT USE OF INSULIN: ICD-10-CM

## 2018-11-01 PROCEDURE — 99214 OFFICE O/P EST MOD 30 MIN: CPT | Mod: ,,, | Performed by: INTERNAL MEDICINE

## 2018-11-01 RX ORDER — TRAMADOL HYDROCHLORIDE 50 MG/1
50 TABLET ORAL 2 TIMES DAILY
COMMUNITY
End: 2019-02-05 | Stop reason: SDUPTHER

## 2018-11-01 NOTE — PROGRESS NOTES
Subjective:       Patient ID: Yeyo Hollingsworth is a 78 y.o. male.    Chief Complaint: Follow-up (3 wk f/u) and Results (x ray)    78-year-old patient here for follow-up on radiographic studies as well as lab work for arthralgias and possible rheumatoid arthritis which she was told when he was in his 30s he thought he had it. The plain x-rays of the knees were entirely normal. The x-ray of the shoulder showed some acromioclavicular joint osteoarthritis and he has seen orthopedics for the shoulder who gave him some exercises and did offer an injection; however the patient deferred. He is on chronic anticoagulation and therefore cannot take NSAIDs on a regular basis. He gave me a report of twice a day sugars which is before breakfast and dinner for his type 2 diabetes with an excellent A1c on metformin and Januvia. His rheumatoid factor, inflammatory markers AISHA were all negative and these were reviewed with the patient.      Review of Systems   Constitutional: Negative for activity change, diaphoresis, fatigue and fever.   HENT: Negative for congestion, ear pain, postnasal drip, sinus pressure, sore throat and trouble swallowing.    Eyes: Negative for pain.   Respiratory: Negative for cough, chest tightness, shortness of breath and wheezing.    Cardiovascular: Negative for chest pain, palpitations and leg swelling.   Gastrointestinal: Negative for abdominal distention, abdominal pain, blood in stool, constipation and diarrhea.   Endocrine: Negative for cold intolerance and heat intolerance.   Genitourinary: Negative for decreased urine volume, difficulty urinating, dysuria, flank pain, frequency and hematuria.   Musculoskeletal: Negative for arthralgias, back pain, joint swelling, myalgias and neck pain.   Skin: Negative for pallor, rash and wound.   Neurological: Negative for tremors, syncope, weakness, light-headedness, numbness and headaches.   Hematological: Negative for adenopathy.   Psychiatric/Behavioral:  Negative for confusion, decreased concentration, hallucinations, self-injury, sleep disturbance and suicidal ideas. The patient is not nervous/anxious.        Past Medical History:   Diagnosis Date    Allergy     Anticoagulant long-term use     aspirin    Anxiety     Atrial fibrillation     Cataract     Clotting disorder     left leg    Diabetes mellitus     Diabetes mellitus, type 2     Hyperlipidemia     Hypertension     BRANDI on CPAP        Past Surgical History:   Procedure Laterality Date    ABDOMINAL SURGERY      origunal surg -mult surgeries since    CARPAL TUNNEL RELEASE      right wrist -left wrist     COLON SURGERY      partial colon removal    COLOSTOMY  1986    colostomy reversal      EYE SURGERY      hay fever      HERNIA REPAIR      INJECTION-STEROID-EPIDURAL-TRANSFORAMINAL Left 2018    [facility] [place]    INJECTION-STEROID-EPIDURAL-TRANSFORAMINAL Left 2017    [facility] [place]    TONSILLECTOMY         History reviewed. No pertinent family history.    Social History     Socioeconomic History    Marital status:      Spouse name: None    Number of children: None    Years of education: None    Highest education level: None   Social Needs    Financial resource strain: None    Food insecurity - worry: None    Food insecurity - inability: None    Transportation needs - medical: None    Transportation needs - non-medical: None   Occupational History    None   Tobacco Use    Smoking status: Former Smoker     Types: Cigarettes     Last attempt to quit: 2006     Years since quittin.6    Smokeless tobacco: Never Used   Substance and Sexual Activity    Alcohol use: No    Drug use: No    Sexual activity: None   Other Topics Concern    None   Social History Narrative    None       Current Outpatient Medications   Medication Sig Dispense Refill    apixaban (ELIQUIS) 5 mg Tab Take 1  tablet (5 mg total) by mouth 2 (two) times daily. 180 tablet 3    aspirin (ECOTRIN) 81 MG EC tablet Take 1 tablet (81 mg total) by mouth once daily. 90 tablet 3    atorvastatin (LIPITOR) 10 MG tablet nightly 90 tablet 3    azelastine (ASTELIN) 137 mcg (0.1 %) nasal spray 1 spray (137 mcg total) by Nasal route 2 (two) times daily. 30 mL 5    blood sugar diagnostic (BLOOD GLUCOSE TEST) Strp FREESTYLE LITE BG TEST STRIPS. current use of insulin  - Primary ICD-10-CM: E11.9 200 strip 3    blood sugar diagnostic Strp 200 strips by Misc.(Non-Drug; Combo Route) route 2 (two) times daily. 100 strip 3    desoximetasone (TOPICORT) 0.05 % cream       diazePAM (VALIUM) 5 MG tablet Take 1 tablet (5 mg total) by mouth daily as needed for Anxiety. 30 tablet 5    diclofenac sodium (VOLTAREN) 1 % Gel VOLTAREN, 1% (Transdermal Gel)   as needed (1 %)  Active   Comments: Medication taken as needed.       fexofenadine (ALLEGRA) 180 MG tablet Take 1 tablet (180 mg total) by mouth once daily. 90 tablet 3    fluticasone (FLONASE) 50 mcg/actuation nasal spray 2 sprays (100 mcg total) by Each Nare route once daily. 3 Bottle 3    FLUZONE HIGH-DOSE 2018-19, PF, 180 mcg/0.5 mL vaccine Inject 1 mL into the muscle once.  0    FREESTYLE LANCETS 28 gauge lancets       furosemide (LASIX) 40 MG tablet Every other day 45 tablet 3    HYDROcodone-acetaminophen (NORCO) 7.5-325 mg per tablet Take 1 tablet by mouth daily as needed for Pain. 30 tablet 0    ipratropium (ATROVENT) 0.03 % nasal spray 2 sprays by Nasal route 2 (two) times daily. 30 mL 5    K-TAB 10 mEq TbSR       lancets Misc LANCETS: FREESTYLE LITE OR GENERIC THAT IS ACCEPTED.  current use of insulin  - Primary ICD-10-CM: E11.9 100 each 3    lisinopril (PRINIVIL,ZESTRIL) 20 MG tablet Take 1 tablet (20 mg total) by mouth once daily. 90 tablet 3    metFORMIN (GLUCOPHAGE) 500 MG tablet Take 1 tablet (500 mg total) by mouth 2 (two) times daily with meals. 180 tablet 3     metoprolol tartrate (LOPRESSOR) 25 MG tablet Take 1 tablet (25 mg total) by mouth 2 (two) times daily. 180 tablet 3    montelukast (SINGULAIR) 10 mg tablet Take 1 tablet (10 mg total) by mouth every evening. 90 tablet 3    multivit-iron-min-folic acid (MULTIVITAMIN-IRON-MINERALS-FOLIC ACID) 3,500-18-0.4 unit-mg-mg Chew Take by mouth.        naftifine (NAFTIN) 2 % Crea NAFTIN, 2% (External Cream)   as needed (2 %)  Active   Comments: Medication taken as needed.       polyethylene glycol (GLYCOLAX) 17 gram/dose powder Take 17 g by mouth once daily. 6 Bottle 3    potassium chloride (MICRO-K) 10 MEQ CpSR Every other day 45 capsule 3    SITagliptin (JANUVIA) 100 MG Tab Take 1 tablet (100 mg total) by mouth once daily. 90 tablet 3    traMADol (ULTRAM) 50 mg tablet Take 50 mg by mouth 2 (two) times daily.      traZODone (DESYREL) 100 MG tablet Take 1 tablet (100 mg total) by mouth every evening. 90 tablet 3     No current facility-administered medications for this visit.        Review of patient's allergies indicates:   Allergen Reactions    Ativan [lorazepam] Other (See Comments)     Mood alternating      Dilaudid [hydromorphone (bulk)] Other (See Comments)     Mood alternating      Morphine Other (See Comments)     Mood alternating      Hydromorphone        Objective:      Physical Exam   Constitutional: He is oriented to person, place, and time. He appears well-developed and well-nourished. No distress.   HENT:   Head: Normocephalic and atraumatic.   Right Ear: External ear normal.   Left Ear: External ear normal.   Nose: Nose normal.   Mouth/Throat: Oropharynx is clear and moist.   Eyes: Conjunctivae and EOM are normal. Right eye exhibits no discharge. Left eye exhibits no discharge. No scleral icterus.   Neck: Normal range of motion. Neck supple. No JVD present. No tracheal deviation present. No thyromegaly present.   Cardiovascular: Normal rate, regular rhythm, normal heart sounds and intact distal  pulses. Exam reveals no gallop.   No murmur heard.  Pulmonary/Chest: Effort normal and breath sounds normal. No respiratory distress. He has no wheezes. He has no rales.   Abdominal: Soft. Bowel sounds are normal. He exhibits no distension and no mass. There is no tenderness.   Musculoskeletal: Normal range of motion. He exhibits no edema or tenderness.   Lymphadenopathy:     He has no cervical adenopathy.   Neurological: He is alert and oriented to person, place, and time.   Skin: Skin is warm and dry. No rash noted. He is not diaphoretic. No erythema.   Psychiatric: He has a normal mood and affect. His behavior is normal. Judgment and thought content normal.       Lab Results   Component Value Date    WBC 6.2 10/05/2018    HGB 14.0 10/05/2018    HCT 44.3 10/05/2018     10/05/2018    CHOL 214 (H) 03/09/2016    TRIG 180 (H) 03/09/2016    HDL 41 03/09/2016    ALT 41 10/27/2015    AST 24 10/05/2018     10/05/2018    K 4.1 10/05/2018     10/05/2018    CREATININE 1.19 10/05/2018    BUN 21 (H) 10/05/2018    CO2 30.7 10/05/2018    TSH 1.67 03/27/2018    INR 1.0 05/17/2013    GLUF 96 03/09/2016    HGBA1C 6.6 (H) 10/05/2018       Assessment:       1. Essential hypertension    2. Familial hypercholesterolemia    3. Controlled type 2 diabetes mellitus without complication, without long-term current use of insulin    4. Anticoagulant long-term use    5. Hematuria, unspecified type        Plan:       Essential hypertension -Controlled on above med regimen to include an ARB/ACE  -     T4, free; Future; Expected date: 11/01/2018  -     TSH; Future; Expected date: 11/01/2018    Familial hypercholesterolemia-continue statin    Controlled type 2 diabetes mellitus without complication, without long-term current use of insulin  -     Hemoglobin A1c; Future; Expected date: 11/01/2018  -     Comprehensive metabolic panel; Future; Expected date: 11/01/2018    Anticoagulant long-term use- noted and negates the use of  nsaids    Hematuria, unspecified type- recheck   -     Urinalysis w/reflex to Microscopic; Future; Expected date: 11/01/2018

## 2018-11-01 NOTE — PATIENT INSTRUCTIONS
Shoulder Flexion (Flexibility)    1. Sit in a chair sideways next to a table. Lay your right arm on the table, pointed forward.  2. Slowly lean forward.  Slide your arm forward on the table. Feel the stretch in your right shoulder.  3. Hold for 5 seconds. Slowly sit back up.  4. Repeat 5 times.  5. Repeat this exercise 3 times a day, or as instructed.  Date Last Reviewed: 3/10/2016  © 4877-1148 [employer]. [address] [place] [address]. All rights reserved. This information is not intended as a substitute for professional medical care. Always follow your healthcare professional's instructions.        Shoulder Abduction (Flexibility)    6. Stand up straight. Or lie on your back on the floor with legs straight. Hold a broomstick horizontally. Cup the top of the broomstick with your right hand. Hold the broomstick just above the broom with your left hand, palm facing down.  7. Push the broomstick to the right with your left hand, raising your right arm up. Raise it only as high as feels comfortable.  8. Hold for a few seconds. Return to the starting position.  9. Repeat 3 to 5 times, or as instructed. Build up to holding each stretch for 10 to 30 seconds.     Tip: If you dont have a broom, you can also do this exercise with a cane, mop, or yardstick.      Date Last Reviewed: 3/10/2016  © 1428-9293 [employer]. [address] [place] [address]. All rights reserved. This information is not intended as a substitute for professional medical care. Always follow your healthcare professional's instructions.

## 2018-11-21 ENCOUNTER — TELEPHONE (OUTPATIENT)
Dept: PAIN MEDICINE | Facility: CLINIC | Age: 78
End: 2018-11-21

## 2018-11-21 NOTE — TELEPHONE ENCOUNTER
----- Message from Makenzie Cardozo sent at 11/21/2018  2:06 PM CST -----  Contact: self  Patient [phone] is calling to discuss scheduling another injection/please call

## 2018-11-26 DIAGNOSIS — M54.16 LUMBAR RADICULOPATHY: Primary | ICD-10-CM

## 2018-12-04 DIAGNOSIS — M25.561 PAIN IN BOTH KNEES, UNSPECIFIED CHRONICITY: ICD-10-CM

## 2018-12-04 DIAGNOSIS — M25.562 PAIN IN BOTH KNEES, UNSPECIFIED CHRONICITY: ICD-10-CM

## 2018-12-04 NOTE — TELEPHONE ENCOUNTER
Pt request to be filled ASAP and would like prescription filled before they leave today. Pt's wife in with Dr Esparza today for an appt @ 11:20am.

## 2018-12-05 RX ORDER — HYDROCODONE BITARTRATE AND ACETAMINOPHEN 7.5; 325 MG/1; MG/1
1 TABLET ORAL DAILY PRN
Qty: 30 TABLET | Refills: 0 | Status: SHIPPED | OUTPATIENT
Start: 2018-12-05 | End: 2019-02-05 | Stop reason: SDUPTHER

## 2018-12-10 ENCOUNTER — HOSPITAL ENCOUNTER (OUTPATIENT)
Facility: AMBULARY SURGERY CENTER | Age: 78
Discharge: HOME | End: 2018-12-10
Attending: ANESTHESIOLOGY | Admitting: ANESTHESIOLOGY
Payer: MEDICARE

## 2018-12-10 DIAGNOSIS — M54.16 LUMBAR RADICULITIS: Primary | ICD-10-CM

## 2018-12-10 LAB — POCT GLUCOSE: 95 MG/DL (ref 70–110)

## 2018-12-10 PROCEDURE — G8907 PT DOC NO EVENTS ON DISCHARG: HCPCS | Performed by: ANESTHESIOLOGY

## 2018-12-10 PROCEDURE — 64483 NJX AA&/STRD TFRM EPI L/S 1: CPT | Mod: 50,,, | Performed by: ANESTHESIOLOGY

## 2018-12-10 PROCEDURE — 64483 NJX AA&/STRD TFRM EPI L/S 1: CPT | Mod: RT | Performed by: ANESTHESIOLOGY

## 2018-12-10 PROCEDURE — 99152 MOD SED SAME PHYS/QHP 5/>YRS: CPT | Mod: ,,, | Performed by: ANESTHESIOLOGY

## 2018-12-10 RX ORDER — FENTANYL CITRATE 50 UG/ML
INJECTION, SOLUTION INTRAMUSCULAR; INTRAVENOUS
Status: DISCONTINUED
Start: 2018-12-10 | End: 2018-12-10 | Stop reason: HOSPADM

## 2018-12-10 RX ORDER — FENTANYL CITRATE 50 UG/ML
INJECTION, SOLUTION INTRAMUSCULAR; INTRAVENOUS
Status: DISCONTINUED | OUTPATIENT
Start: 2018-12-10 | End: 2018-12-10 | Stop reason: HOSPADM

## 2018-12-10 RX ORDER — DEXAMETHASONE SODIUM PHOSPHATE 10 MG/ML
INJECTION INTRAMUSCULAR; INTRAVENOUS
Status: DISCONTINUED | OUTPATIENT
Start: 2018-12-10 | End: 2018-12-10 | Stop reason: HOSPADM

## 2018-12-10 RX ORDER — DEXAMETHASONE SODIUM PHOSPHATE 10 MG/ML
INJECTION INTRAMUSCULAR; INTRAVENOUS
Status: DISCONTINUED
Start: 2018-12-10 | End: 2018-12-10 | Stop reason: HOSPADM

## 2018-12-10 RX ORDER — BUPIVACAINE HYDROCHLORIDE 2.5 MG/ML
INJECTION, SOLUTION EPIDURAL; INFILTRATION; INTRACAUDAL
Status: DISCONTINUED | OUTPATIENT
Start: 2018-12-10 | End: 2018-12-10 | Stop reason: HOSPADM

## 2018-12-10 RX ORDER — MIDAZOLAM HYDROCHLORIDE 1 MG/ML
INJECTION INTRAMUSCULAR; INTRAVENOUS
Status: DISCONTINUED | OUTPATIENT
Start: 2018-12-10 | End: 2018-12-10 | Stop reason: HOSPADM

## 2018-12-10 RX ORDER — LIDOCAINE HYDROCHLORIDE 10 MG/ML
INJECTION, SOLUTION EPIDURAL; INFILTRATION; INTRACAUDAL; PERINEURAL
Status: DISCONTINUED | OUTPATIENT
Start: 2018-12-10 | End: 2018-12-10 | Stop reason: HOSPADM

## 2018-12-10 RX ORDER — MIDAZOLAM HYDROCHLORIDE 1 MG/ML
INJECTION INTRAMUSCULAR; INTRAVENOUS
Status: DISCONTINUED
Start: 2018-12-10 | End: 2018-12-10 | Stop reason: HOSPADM

## 2018-12-10 RX ORDER — SODIUM CHLORIDE, SODIUM LACTATE, POTASSIUM CHLORIDE, CALCIUM CHLORIDE 600; 310; 30; 20 MG/100ML; MG/100ML; MG/100ML; MG/100ML
INJECTION, SOLUTION INTRAVENOUS ONCE AS NEEDED
Status: COMPLETED | OUTPATIENT
Start: 2018-12-10 | End: 2018-12-10

## 2018-12-10 RX ADMIN — SODIUM CHLORIDE, SODIUM LACTATE, POTASSIUM CHLORIDE, CALCIUM CHLORIDE: 600; 310; 30; 20 INJECTION, SOLUTION INTRAVENOUS at 02:12

## 2018-12-10 NOTE — DISCHARGE SUMMARY
[facility]  Discharge Note  Short Stay    Admit Date: 12/10/2018    Discharge Date and Time: 12/10/2018    Attending Physician: Grant Wright MD     Discharge Provider: Grant Wright    Diagnoses:  Active Hospital Problems    Diagnosis  POA    *Lumbar radiculitis [M54.16]  Yes      Resolved Hospital Problems   No resolved problems to display.       Hospital Course: Lumbar JOSE  Discharged Condition: Good    Final Diagnoses:   Active Hospital Problems    Diagnosis  POA    *Lumbar radiculitis [M54.16]  Yes      Resolved Hospital Problems   No resolved problems to display.       Disposition: Home or Self Care    Follow up/Patient Instructions:    Medications:  Reconciled Home Medications:      Medication List      CONTINUE taking these medications    apixaban 5 mg Tab  Commonly known as:  ELIQUIS  Take 1 tablet (5 mg total) by mouth 2 (two) times daily.     aspirin 81 MG EC tablet  Commonly known as:  ECOTRIN  Take 1 tablet (81 mg total) by mouth once daily.     atorvastatin 10 MG tablet  Commonly known as:  LIPITOR  nightly     azelastine 137 mcg (0.1 %) nasal spray  Commonly known as:  ASTELIN  1 spray (137 mcg total) by Nasal route 2 (two) times daily.     * blood sugar diagnostic Strp  200 strips by Misc.(Non-Drug; Combo Route) route 2 (two) times daily.     * blood sugar diagnostic Strp  Commonly known as:  BLOOD GLUCOSE TEST  FREESTYLE LITE BG TEST STRIPS. current use of insulin  - Primary ICD-10-CM: E11.9     CENTRUM 3,500-18-0.4 unit-mg-mg Chew  Generic drug:  multivit-iron-min-folic acid  Take by mouth.     desoximetasone 0.05 % cream  Commonly known as:  TOPICORT     diazePAM 5 MG tablet  Commonly known as:  VALIUM  Take 1 tablet (5 mg total) by mouth daily as needed for Anxiety.     fexofenadine 180 MG tablet  Commonly known as:  ALLEGRA  Take 1 tablet (180 mg total) by mouth once daily.     fluticasone 50 mcg/actuation nasal spray  Commonly known as:  FLONASE  2 sprays (100 mcg total) by Each Nare  route once daily.     FLUZONE HIGH-DOSE 2018-19 (PF) 180 mcg/0.5 mL vaccine  Generic drug:  influenza  Inject 1 mL into the muscle once.     furosemide 40 MG tablet  Commonly known as:  LASIX  Every other day     HYDROcodone-acetaminophen 7.5-325 mg per tablet  Commonly known as:  NORCO  Take 1 tablet by mouth daily as needed for Pain.     ipratropium 0.03 % nasal spray  Commonly known as:  ATROVENT  2 sprays by Nasal route 2 (two) times daily.     * K-TAB 10 MEQ Tbsr  Generic drug:  potassium chloride     * potassium chloride 10 MEQ Cpsr  Commonly known as:  MICRO-K  Every other day     * lancets Misc  LANCETS: FREESTYLE LITE OR GENERIC THAT IS ACCEPTED.  current use of insulin  - Primary ICD-10-CM: E11.9     * FREESTYLE LANCETS 28 gauge Misc  Generic drug:  lancets     lisinopril 20 MG tablet  Commonly known as:  PRINIVIL,ZESTRIL  Take 1 tablet (20 mg total) by mouth once daily.     metFORMIN 500 MG tablet  Commonly known as:  GLUCOPHAGE  Take 1 tablet (500 mg total) by mouth 2 (two) times daily with meals.     metoprolol tartrate 25 MG tablet  Commonly known as:  LOPRESSOR  Take 1 tablet (25 mg total) by mouth 2 (two) times daily.     montelukast 10 mg tablet  Commonly known as:  SINGULAIR  Take 1 tablet (10 mg total) by mouth every evening.     NAFTIN 2 % Crea  Generic drug:  naftifine  NAFTIN, 2% (External Cream)   as needed (2 %)  Active   Comments: Medication taken as needed.     polyethylene glycol 17 gram/dose powder  Commonly known as:  GLYCOLAX  Take 17 g by mouth once daily.     SITagliptin 100 MG Tab  Commonly known as:  JANUVIA  Take 1 tablet (100 mg total) by mouth once daily.     traMADol 50 mg tablet  Commonly known as:  ULTRAM  Take 50 mg by mouth 2 (two) times daily.     traZODone 100 MG tablet  Commonly known as:  DESYREL  Take 1 tablet (100 mg total) by mouth every evening.     VOLTAREN 1 % Gel  Generic drug:  diclofenac sodium  VOLTAREN, 1% (Transdermal Gel)   as needed (1 %)  Active    Comments: Medication taken as needed.         * This list has 6 medication(s) that are the same as other medications prescribed for you. Read the directions carefully, and ask your doctor or other care provider to review them with you.              Discharge Procedure Orders   Call MD for:  temperature >100.4     Call MD for:  persistent nausea and vomiting or diarrhea     Call MD for:  severe uncontrolled pain     Call MD for:  redness, tenderness, or signs of infection (pain, swelling, redness, odor or green/yellow discharge around incision site)     Call MD for:  difficulty breathing or increased cough     Call MD for:  severe persistent headache        Follow up with MD in 2-3 weeks    Discharge Procedure Orders (must include Diet, Follow-up, Activity):   Discharge Procedure Orders (must include Diet, Follow-up, Activity)   Call MD for:  temperature >100.4     Call MD for:  persistent nausea and vomiting or diarrhea     Call MD for:  severe uncontrolled pain     Call MD for:  redness, tenderness, or signs of infection (pain, swelling, redness, odor or green/yellow discharge around incision site)     Call MD for:  difficulty breathing or increased cough     Call MD for:  severe persistent headache

## 2018-12-10 NOTE — OP NOTE
PROCEDURE DATE: 12/10/2018    PROCEDURE: Bilateral L4-5 transforaminal epidural steroid injection under fluoroscopy    DIAGNOSIS: Lumbar disc displacement without myelopathy  Post op diagnosis: Same    PHYSICIAN: Grant Wright MD    MEDICATIONS INJECTED:  Dexamethasone 5mg (0.5ml) and 1.5ml 0.25% bupivicaine at each nerve root.     LOCAL ANESTHETIC INJECTED:  Lidocaine 1%. 2 ml per site.    SEDATION MEDICATIONS: RN IV sedation    ESTIMATED BLOOD LOSS:  None    COMPLICATIONS:  None    TECHNIQUE:   A time-out was taken to identify patient and procedure side prior to starting the procedure. The patient was placed in a prone position, prepped and draped in the usual sterile fashion using ChloraPrep and sterile towels.  The area to be injected was determined under fluoroscopic guidance in AP and oblique view.  Local anesthetic was given by raising a wheal and going down to the hub of a 25-gauge 1.5 inch needle.  In oblique view, a 5 inch 22-gauge bent-tip spinal needle was introduced towards 6 oclock position of the pedicle of each above named nerve root level.  The needle was walked medially then hinged into the neural foramen and position was confirmed in AP and lateral views.  1ml contrast dye was injected to confirm appropriate placement and that there was no vascular uptake.  After negative aspiration for blood or CSF, the medication was then injected. This was performed at the bilateral L4-5 level(s). The patient tolerated the procedure well.    The patient was monitored after the procedure.  Patient was given post procedure and discharge instructions to follow at home. The patient was discharged in a stable condition.

## 2018-12-10 NOTE — PLAN OF CARE
Pt states ready to go home , stable, maria teresa po fluids, ambulatory, denies pain, Leg raises performed in bed without diff and instructed on fall risk. PT and spouse verbalized understanding

## 2018-12-10 NOTE — DISCHARGE INSTRUCTIONS
Recovery After Procedural Sedation (Adult)  You have been given medicine by vein to make you sleep during your surgery. This may have included both a pain medicine and sleeping medicine. Most of the effects have worn off. But you may still have some drowsiness for the next 6 to 8 hours.  Home care  Follow these guidelines when you get home:  · For the next 8 hours, you should be watched by a responsible adult. This person should make sure your condition is not getting worse.  · Don't drink any alcohol for the next 24 hours.  · Don't drive, operate dangerous machinery, or make important business or personal decisions during the next 24 hours.  Note: Your healthcare provider may tell you not to take any medicine by mouth for pain or sleep in the next 4 hours. These medicines may react with the medicines you were given in the hospital. This could cause a much stronger response than usual.  Follow-up care  Follow up with your healthcare provider if you are not alert and back to your usual level of activity within 12 hours.  When to seek medical advice  Call your healthcare provider right away if any of these occur:  · Drowsiness gets worse  · Weakness or dizziness gets worse  · Repeated vomiting  · You can't be awakened   Date Last Reviewed: 10/18/2016  © 9337-9884 [employer]. [address] [place] [address], [place] [address]. All rights reserved. This information is not intended as a substitute for professional medical care. Always follow your healthcare professional's instructions.      Pain injection instructions:     This procedure may take a couple weeks to relieve pain  You may get some pain relief from the local anesthetic initally.    No driving for 24 hrs.   Activity as tolerated- gradually increase activities.  Dont lift over 10 lbs for 24 hrs   No heat at injection sites x 2 days. No heating pads, hot tubs, saunas, or swimming in any body of water or pool for 2 days.  Use ice pack for mild swelling  and for comfort , apply for 20 minutes, remove for 20 minute intervals. No direct contact of ice itself  to skin.  May shower today. No tub baths for two days.      Resume Aspirin, Plavix, or Coumadin the day after the procedure unless otherwise instructed.   If diabetic,monitor your glucose carefully as steroids can increase your glucose level    Seek immediate medical help for:   Severe increase in your usual pain or appearance of new pain.  Prolonged (mor than 8 hours) or increasing weakness or numbness in the legs or arms. Numbing medicine was injected and can affect the messages to and from the brain and legs or arms.  .    Fever above 101 ,Drainage,redness,active bleeding, or increased swelling at the injection site.  Headache, shortness of breath, chest pain, or breathing problems.

## 2018-12-10 NOTE — H&P
CC: low back and leg pain    HPI: The patient is a 78 y.o. male with a history of lumbar stenosis here for lumbar JOSE. There are no major changes in history and physical from 18 by Ivette.    Past Medical History:   Diagnosis Date    Allergy     Anticoagulant long-term use     aspirin    Anxiety     Atrial fibrillation     Cataract     Clotting disorder     left leg    Diabetes mellitus     Diabetes mellitus, type 2     Hyperlipidemia     Hypertension     BRANDI on CPAP        Past Surgical History:   Procedure Laterality Date    ABDOMINAL SURGERY      origunal surg -mult surgeries since    CARPAL TUNNEL RELEASE      right wrist -left wrist     COLON SURGERY      partial colon removal    COLOSTOMY      colostomy reversal      EYE SURGERY      hay fever      HERNIA REPAIR      INJECTION-STEROID-EPIDURAL-TRANSFORAMINAL Left 2018    [facility]    INJECTION-STEROID-EPIDURAL-TRANSFORAMINAL Left 2017    [facility]    TONSILLECTOMY         History reviewed. No pertinent family history.    Social History     Socioeconomic History    Marital status:      Spouse name: None    Number of children: None    Years of education: None    Highest education level: None   Social Needs    Financial resource strain: None    Food insecurity - worry: None    Food insecurity - inability: None    Transportation needs - medical: None    Transportation needs - non-medical: None   Occupational History    None   Tobacco Use    Smoking status: Former Smoker     Types: Cigarettes     Last attempt to quit: 2006     Years since quittin.7    Smokeless tobacco: Never Used   Substance and Sexual Activity    Alcohol use: No    Drug use: No    Sexual activity: None   Other Topics Concern    None   Social History Narrative    None       Current Facility-Administered Medications   Medication Dose Route  "Frequency Provider Last Rate Last Dose    lactated ringers infusion   Intravenous Once PRN Grant Wright MD           Review of patient's allergies indicates:   Allergen Reactions    Ativan [lorazepam] Other (See Comments)     Mood alternating      Dilaudid [hydromorphone (bulk)] Other (See Comments)     Mood alternating      Morphine Other (See Comments)     Mood alternating      Hydromorphone        Vitals:    12/03/18 0753 12/10/18 1403   BP:  (!) 175/83   Pulse:  60   Resp:  19   Temp:  97.7 °F (36.5 °C)   TempSrc:  Skin   SpO2:  96%   Weight: 115.2 kg (254 lb)    Height: 5' 8" (1.727 m)        REVIEW OF SYSTEMS:     GENERAL: No weight loss, malaise or fevers.  HEENT:  No recent changes in vision or hearing  NECK: Negative for lumps, no difficulty with swallowing.  RESPIRATORY: Negative for cough, wheezing or shortness of breath, patient denies any recent URI.  CARDIOVASCULAR: Negative for chest pain, leg swelling or palpitations.  GI: Negative for abdominal discomfort, blood in stools or black stools or change in bowel habits.  MUSCULOSKELETAL: See HPI.  SKIN: Negative for lesions, rash, and itching.  PSYCH: No suicidal or homicidal ideations, no current mood disturbances.  HEMATOLOGY/LYMPHOLOGY: Negative for prolonged bleeding, bruising easily or swollen nodes. Patient is not currently taking any anti-coagulants  ENDO: No history of diabetes or thyroid dysfunction  NEURO: No history of syncope, paralysis, seizures or tremors.All other reviewed and negative other than HPI.    Physical exam:  Gen: A and O x3, pleasant, well-groomed  Skin: No rashes or obvious lesions  HEENT: PERRLA, no obvious deformities on ears or in canals. No thyroid masses, trachea midline, no palpable lymph nodes in neck, axilla.  CVS: Regular rate and rhythm, normal S1 and S2, no murmurs.  Resp: Clear to auscultation bilaterally.  Abdomen: Soft, NT/ND, normal bowel sounds present.  Musculoskeletal/Neuro: Moving all " extremities    Assessment:  Lumbar radiculitis  -     Place in Outpatient; Standing  -     Vital signs; Standing  -     Insert peripheral IV; Standing  -     Verify informed consent; Standing  -     Notify physician ; Standing  -     Notify physician ; Standing  -     Notify physician (specify); Standing  -     Diet NPO; Standing    Other orders  -     lactated ringers infusion  -     IP VTE LOW RISK PATIENT; Standing          PLAN: Lumbar JOSE      This patient has been cleared for surgery in an ambulatory surgical facility    ASA 3,  Mallampatti Score 3  No history of anesthetic complications  Plan for RN IV sedation

## 2018-12-11 VITALS
BODY MASS INDEX: 38.49 KG/M2 | DIASTOLIC BLOOD PRESSURE: 55 MMHG | SYSTOLIC BLOOD PRESSURE: 112 MMHG | WEIGHT: 254 LBS | RESPIRATION RATE: 18 BRPM | OXYGEN SATURATION: 94 % | TEMPERATURE: 98 F | HEART RATE: 62 BPM | HEIGHT: 68 IN

## 2018-12-14 ENCOUNTER — TELEPHONE (OUTPATIENT)
Dept: PAIN MEDICINE | Facility: CLINIC | Age: 78
End: 2018-12-14

## 2018-12-14 NOTE — TELEPHONE ENCOUNTER
----- Message from Netta Claudio sent at 12/14/2018  9:13 AM CST -----  Contact: Yeyo  Type: Needs Medical Advice    Who Called:  Yeyo Chu Call Back Number: [phone]  Additional Information:  Patient requesting call back regarding a procedure he had on Monday & he has a question about an issue--please advise--thank you

## 2018-12-14 NOTE — TELEPHONE ENCOUNTER
Pt c/o itching at IV site from procedure on Monday. He is using OTC itching cream and will keep us posted.

## 2019-01-09 ENCOUNTER — OFFICE VISIT (OUTPATIENT)
Dept: PAIN MEDICINE | Facility: CLINIC | Age: 79
End: 2019-01-09
Payer: MEDICARE

## 2019-01-09 VITALS
SYSTOLIC BLOOD PRESSURE: 163 MMHG | BODY MASS INDEX: 38.49 KG/M2 | HEART RATE: 62 BPM | DIASTOLIC BLOOD PRESSURE: 74 MMHG | HEIGHT: 68 IN | WEIGHT: 254 LBS

## 2019-01-09 DIAGNOSIS — M54.16 LUMBAR RADICULOPATHY: Primary | ICD-10-CM

## 2019-01-09 DIAGNOSIS — M51.36 DDD (DEGENERATIVE DISC DISEASE), LUMBAR: ICD-10-CM

## 2019-01-09 DIAGNOSIS — M48.061 SPINAL STENOSIS OF LUMBAR REGION, UNSPECIFIED WHETHER NEUROGENIC CLAUDICATION PRESENT: ICD-10-CM

## 2019-01-09 PROCEDURE — 99999 PR PBB SHADOW E&M-EST. PATIENT-LVL III: ICD-10-PCS | Mod: PBBFAC,,, | Performed by: PHYSICIAN ASSISTANT

## 2019-01-09 PROCEDURE — 99999 PR PBB SHADOW E&M-EST. PATIENT-LVL III: CPT | Mod: PBBFAC,,, | Performed by: PHYSICIAN ASSISTANT

## 2019-01-09 PROCEDURE — 99213 OFFICE O/P EST LOW 20 MIN: CPT | Mod: PBBFAC,PN | Performed by: PHYSICIAN ASSISTANT

## 2019-01-09 PROCEDURE — 99213 PR OFFICE/OUTPT VISIT, EST, LEVL III, 20-29 MIN: ICD-10-PCS | Mod: S$PBB,,, | Performed by: PHYSICIAN ASSISTANT

## 2019-01-09 PROCEDURE — 99213 OFFICE O/P EST LOW 20 MIN: CPT | Mod: S$PBB,,, | Performed by: PHYSICIAN ASSISTANT

## 2019-01-09 NOTE — H&P (VIEW-ONLY)
Referring Physician: No ref. provider found    PCP: Citlali Parish MD      CC:low back and left leg pain    Interval History:  Mr. Hollingsworth is a 78 y.o. male with a low back and left leg pain who presents today for f/u s/p lumbar TFESI bilaterally at L4-5 and L5-S1. Reports 80% relief for <2 weeks. Reports he has had several trip and falls recently while installing new floors in his trailer. Pain improves with rest and occurs with standing for long periods. He denies any LE weakness or b/b changes. He takes Hydrocodone 7.5 mg sparingly prescribed by Dr. Parish. Pain today is rated 8/10.      HPI:   Yeyo Hollingsworth is a 78 y.o. male ho presents with lower back and left leg pain.  Pain is been present since 2010.  No recent traumatic incident.  His intermittent aching, throbbing, electric pain in his lower back.  Pain radiates down his left buttock into his left posterior thigh.  Pain worsens with prolonged standing, walking, getting up and coughing.  Pain improves with rest.  His tried physical therapy with minimal benefit.  He has undergone lumbar JOSE's with Dr. Rendon in the past with moderate benefit.  Most recent injection was performed in September 2016.  Pain has since returned.  He desires repeat injections with us.  He denies any weakness.  No bowel bladder changes.  He rates his pain 6/10 today.    ROS:  CONSTITUTIONAL: No fevers, chills, night sweats, wt. loss, appetite changes  SKIN: no rashes or itching  ENT: No headaches, head trauma, vision changes, or eye pain  LYMPH NODES: None noticed   CV: No chest pain, palpitations.   RESP: No shortness of breath, dyspnea on exertion, cough, wheezing, or hemoptysis  GI: No nausea, emesis, diarrhea, constipation, melena, hematochezia, pain.    : No dysuria, hematuria, urgency, or frequency   HEME: No easy bruising, bleeding problems  PSYCHIATRIC: No depression, anxiety, psychosis, hallucinations.  NEURO: No seizures, memory loss, dizziness or difficulty  sleeping  MSK: + history of present illness      Past Medical History:   Diagnosis Date    Allergy     Anticoagulant long-term use     aspirin    Anxiety     Atrial fibrillation     Cataract     Clotting disorder     left leg    Diabetes mellitus     Diabetes mellitus, type 2     Hyperlipidemia     Hypertension     BRANDI on CPAP      Past Surgical History:   Procedure Laterality Date    ABDOMINAL SURGERY      origunal surg -mult surgeries since    CARPAL TUNNEL RELEASE      right wrist -left wrist     COLON SURGERY      partial colon removal    COLOSTOMY      colostomy reversal      EYE SURGERY      hay fever      HERNIA REPAIR      Injection,steroid,epidural,transforaminal approach Bilateral 12/10/2018    [facility]    INJECTION-STEROID-EPIDURAL-TRANSFORAMINAL Left 2018    [facility]    INJECTION-STEROID-EPIDURAL-TRANSFORAMINAL Left 2017    [facility]    TONSILLECTOMY       History reviewed. No pertinent family history.  Social History     Socioeconomic History    Marital status:      Spouse name: None    Number of children: None    Years of education: None    Highest education level: None   Social Needs    Financial resource strain: None    Food insecurity - worry: None    Food insecurity - inability: None    Transportation needs - medical: None    Transportation needs - non-medical: None   Occupational History    None   Tobacco Use    Smoking status: Former Smoker     Types: Cigarettes     Last attempt to quit: 2006     Years since quittin.8    Smokeless tobacco: Never Used   Substance and Sexual Activity    Alcohol use: No    Drug use: No    Sexual activity: None   Other Topics Concern    None   Social History Narrative    None         Medications/Allergies: See med card    Vitals:    19 1029   BP: (!) 163/74   Pulse: 62   Weight:  "115.2 kg (254 lb)   Height: 5' 8" (1.727 m)   PainSc:   8   PainLoc: Back         Physical exam:    GENERAL: A and O x3, the patient appears well groomed and is in no acute distress.  Skin: No rashes or obvious lesions  HEENT: normocephalic, atraumatic  CARDIOVASCULAR:  RRR  LUNGS: non labored breathing  ABDOMEN: soft, nontender   UPPER EXTREMITIES: Normal alignment, normal range of motion, no atrophy, no skin changes,  hair growth and nail growth normal and equal bilaterally. No swelling, no tenderness.    LOWER EXTREMITIES:  Normal alignment, normal range of motion, no atrophy, no skin changes,  hair growth and nail growth normal and equal bilaterally. No swelling, no tenderness.    LUMBAR SPINE  Lumbar spine: ROM is limited with flexion extension and oblique extension with moderate increased pain.    Caden's test causes no increased pain on either side.    Supine straight leg raise is negative bilaterally.    Internal and external rotation of the hip causes no increased pain on either side.  Myofascial exam: No tenderness to palpation across lumbar paraspinous muscles. Moderate tenderness of right thoracic Paraspinous muscles       MENTAL STATUS: normal orientation, speech, language, and fund of knowledge for social situation.  Emotional state appropriate.    CRANIAL NERVES:  II:  PERRL bilaterally,   III,IV,VI: EOMI.    V:  Facial sensation equal bilaterally  VII:  Facial motor function normal.  VIII:  Hearing equal to finger rub bilaterally  IX/X: Gag normal, palate symmetric  XI:  Shoulder shrug equal, head turn equal  XII:  Tongue midline without fasciculations      MOTOR: Tone and bulk: normal bilateral upper and lower Strength: normal   Delt Bi Tri WE WF     R 5 5 5 5 5 5   L 5 5 5 5 5 5     IP ADD ABD Quad TA Gas HAM  R 5 5 5 5 5 5 5  L 5 5 5 5 5 5 5    SENSATION: Light touch and pinprick intact bilaterally  REFLEXES: normal, symmetric, nonbrisk.  Toes down, no clonus. No hoffmans.  GAIT: normal " rise, base, steps, and arm swing.        Imaging:  MRI lumbar spine 9/2017 ([facility])  Severe facet arthrosis and ligamentum flavum at L4-5 results in severe spinal canal stenosis. Possible impingement of the bilateral L5 nerve as well as bilateral L4 nerves.  There is a grade 1 anterolisthesis of L4 and L5.  Moderate to severe bilateral neuroforaminal L3-4.  This note was completed with dictation software and grammatical errors may exist.      Assessment:  Mr. Hollingsworth is a 78 y.o. male with low back and left leg pain  1. Lumbar radiculopathy    2. Spinal stenosis of lumbar region, unspecified whether neurogenic claudication present    3. DDD (degenerative disc disease), lumbar        Plan:  1.  I have stressed the importance of physical activity and exercise to improve overall health  2. Schedule repeat lumbar epidural steroid injection to the Left L4-5 and L5-S1 level.  I have explained the risks, benefits, and alternatives of the procedure in detail. The patient voices understanding and all questions have been answered. The patient agrees to proceed as planned. Written Consent obtained.   3.  Low back pain may benefit from lumbar MBB work up in the future  4. F/u prn

## 2019-01-09 NOTE — PROGRESS NOTES
Referring Physician: No ref. provider found    PCP: Citlali Parish MD      CC:low back and left leg pain    Interval History:  Mr. Hollingsworth is a 78 y.o. male with a low back and left leg pain who presents today for f/u s/p lumbar TFESI bilaterally at L4-5 and L5-S1. Reports 80% relief for <2 weeks. Reports he has had several trip and falls recently while installing new floors in his trailer. Pain improves with rest and occurs with standing for long periods. He denies any LE weakness or b/b changes. He takes Hydrocodone 7.5 mg sparingly prescribed by Dr. Parish. Pain today is rated 8/10.      HPI:   Yeyo Hollingsworth is a 78 y.o. male ho presents with lower back and left leg pain.  Pain is been present since 2010.  No recent traumatic incident.  His intermittent aching, throbbing, electric pain in his lower back.  Pain radiates down his left buttock into his left posterior thigh.  Pain worsens with prolonged standing, walking, getting up and coughing.  Pain improves with rest.  His tried physical therapy with minimal benefit.  He has undergone lumbar JOSE's with Dr. Rendon in the past with moderate benefit.  Most recent injection was performed in September 2016.  Pain has since returned.  He desires repeat injections with us.  He denies any weakness.  No bowel bladder changes.  He rates his pain 6/10 today.    ROS:  CONSTITUTIONAL: No fevers, chills, night sweats, wt. loss, appetite changes  SKIN: no rashes or itching  ENT: No headaches, head trauma, vision changes, or eye pain  LYMPH NODES: None noticed   CV: No chest pain, palpitations.   RESP: No shortness of breath, dyspnea on exertion, cough, wheezing, or hemoptysis  GI: No nausea, emesis, diarrhea, constipation, melena, hematochezia, pain.    : No dysuria, hematuria, urgency, or frequency   HEME: No easy bruising, bleeding problems  PSYCHIATRIC: No depression, anxiety, psychosis, hallucinations.  NEURO: No seizures, memory loss, dizziness or difficulty  sleeping  MSK: + history of present illness      Past Medical History:   Diagnosis Date    Allergy     Anticoagulant long-term use     aspirin    Anxiety     Atrial fibrillation     Cataract     Clotting disorder     left leg    Diabetes mellitus     Diabetes mellitus, type 2     Hyperlipidemia     Hypertension     BRANDI on CPAP      Past Surgical History:   Procedure Laterality Date    ABDOMINAL SURGERY      origunal surg -mult surgeries since    CARPAL TUNNEL RELEASE      right wrist -left wrist     COLON SURGERY      partial colon removal    COLOSTOMY      colostomy reversal      EYE SURGERY      hay fever      HERNIA REPAIR      Injection,steroid,epidural,transforaminal approach Bilateral 12/10/2018    Performed by Grant [facility] at [facility]    INJECTION-STEROID-EPIDURAL-TRANSFORAMINAL Left 2018    Performed by Grant [facility] at [facility] OR    INJECTION-STEROID-EPIDURAL-TRANSFORAMINAL Left 2017    Performed by Grant [facility] at [facility] OR    TONSILLECTOMY       History reviewed. No pertinent family history.  Social History     Socioeconomic History    Marital status:      Spouse name: None    Number of children: None    Years of education: None    Highest education level: None   Social Needs    Financial resource strain: None    Food insecurity - worry: None    Food insecurity - inability: None    Transportation needs - medical: None    Transportation needs - non-medical: None   Occupational History    None   Tobacco Use    Smoking status: Former Smoker     Types: Cigarettes     Last attempt to quit: 2006     Years since quittin.8    Smokeless tobacco: Never Used   Substance and Sexual Activity    Alcohol use: No    Drug use: No    Sexual activity: None   Other Topics Concern    None   Social History Narrative    None         Medications/Allergies: See med card    Vitals:    19 1029   BP: (!) 163/74   Pulse: 62   Weight:  "115.2 kg (254 lb)   Height: 5' 8" (1.727 m)   PainSc:   8   PainLoc: Back         Physical exam:    GENERAL: A and O x3, the patient appears well groomed and is in no acute distress.  Skin: No rashes or obvious lesions  HEENT: normocephalic, atraumatic  CARDIOVASCULAR:  RRR  LUNGS: non labored breathing  ABDOMEN: soft, nontender   UPPER EXTREMITIES: Normal alignment, normal range of motion, no atrophy, no skin changes,  hair growth and nail growth normal and equal bilaterally. No swelling, no tenderness.    LOWER EXTREMITIES:  Normal alignment, normal range of motion, no atrophy, no skin changes,  hair growth and nail growth normal and equal bilaterally. No swelling, no tenderness.    LUMBAR SPINE  Lumbar spine: ROM is limited with flexion extension and oblique extension with moderate increased pain.    Caden's test causes no increased pain on either side.    Supine straight leg raise is negative bilaterally.    Internal and external rotation of the hip causes no increased pain on either side.  Myofascial exam: No tenderness to palpation across lumbar paraspinous muscles. Moderate tenderness of right thoracic Paraspinous muscles       MENTAL STATUS: normal orientation, speech, language, and fund of knowledge for social situation.  Emotional state appropriate.    CRANIAL NERVES:  II:  PERRL bilaterally,   III,IV,VI: EOMI.    V:  Facial sensation equal bilaterally  VII:  Facial motor function normal.  VIII:  Hearing equal to finger rub bilaterally  IX/X: Gag normal, palate symmetric  XI:  Shoulder shrug equal, head turn equal  XII:  Tongue midline without fasciculations      MOTOR: Tone and bulk: normal bilateral upper and lower Strength: normal   Delt Bi Tri WE WF     R 5 5 5 5 5 5   L 5 5 5 5 5 5     IP ADD ABD Quad TA Gas HAM  R 5 5 5 5 5 5 5  L 5 5 5 5 5 5 5    SENSATION: Light touch and pinprick intact bilaterally  REFLEXES: normal, symmetric, nonbrisk.  Toes down, no clonus. No hoffmans.  GAIT: normal " rise, base, steps, and arm swing.        Imaging:  MRI lumbar spine 9/2017 ([facility])  Severe facet arthrosis and ligamentum flavum at L4-5 results in severe spinal canal stenosis. Possible impingement of the bilateral L5 nerve as well as bilateral L4 nerves.  There is a grade 1 anterolisthesis of L4 and L5.  Moderate to severe bilateral neuroforaminal L3-4.  This note was completed with dictation software and grammatical errors may exist.      Assessment:  Mr. Hollingsworth is a 78 y.o. male with low back and left leg pain  1. Lumbar radiculopathy    2. Spinal stenosis of lumbar region, unspecified whether neurogenic claudication present    3. DDD (degenerative disc disease), lumbar        Plan:  1.  I have stressed the importance of physical activity and exercise to improve overall health  2. Schedule repeat lumbar epidural steroid injection to the Left L4-5 and L5-S1 level.  I have explained the risks, benefits, and alternatives of the procedure in detail. The patient voices understanding and all questions have been answered. The patient agrees to proceed as planned. Written Consent obtained.   3.  Low back pain may benefit from lumbar MBB work up in the future  4. F/u prn

## 2019-01-28 ENCOUNTER — HOSPITAL ENCOUNTER (OUTPATIENT)
Facility: AMBULARY SURGERY CENTER | Age: 79
Discharge: HOME | End: 2019-01-28
Attending: ANESTHESIOLOGY | Admitting: ANESTHESIOLOGY
Payer: MEDICARE

## 2019-01-28 DIAGNOSIS — M54.16 LUMBAR RADICULOPATHY: ICD-10-CM

## 2019-01-28 LAB — POCT GLUCOSE: 122 MG/DL (ref 70–110)

## 2019-01-28 PROCEDURE — 64483 PR EPIDURAL INJ, ANES/STEROID, TRANSFORAMINAL, LUMB/SACR, SNGL LEVL: ICD-10-PCS | Mod: 50,,, | Performed by: ANESTHESIOLOGY

## 2019-01-28 PROCEDURE — 64484 NJX AA&/STRD TFRM EPI L/S EA: CPT | Mod: 50,,, | Performed by: ANESTHESIOLOGY

## 2019-01-28 PROCEDURE — 99152 PR MOD CONSCIOUS SEDATION, SAME PHYS, 5+ YRS, FIRST 15 MIN: ICD-10-PCS | Mod: ,,, | Performed by: ANESTHESIOLOGY

## 2019-01-28 PROCEDURE — 99152 MOD SED SAME PHYS/QHP 5/>YRS: CPT | Mod: ,,, | Performed by: ANESTHESIOLOGY

## 2019-01-28 PROCEDURE — 64483 NJX AA&/STRD TFRM EPI L/S 1: CPT | Mod: 50,,, | Performed by: ANESTHESIOLOGY

## 2019-01-28 PROCEDURE — 64483 NJX AA&/STRD TFRM EPI L/S 1: CPT | Mod: LT | Performed by: ANESTHESIOLOGY

## 2019-01-28 PROCEDURE — 64484 PRA INJECT ANES/STEROID FORAMEN LUMBAR/SACRAL W IMG GUIDE ,EA ADD LEVEL: ICD-10-PCS | Mod: 50,,, | Performed by: ANESTHESIOLOGY

## 2019-01-28 PROCEDURE — 64484 NJX AA&/STRD TFRM EPI L/S EA: CPT | Mod: RT | Performed by: ANESTHESIOLOGY

## 2019-01-28 RX ORDER — SODIUM CHLORIDE, SODIUM LACTATE, POTASSIUM CHLORIDE, CALCIUM CHLORIDE 600; 310; 30; 20 MG/100ML; MG/100ML; MG/100ML; MG/100ML
INJECTION, SOLUTION INTRAVENOUS CONTINUOUS
Status: DISCONTINUED | OUTPATIENT
Start: 2019-01-28 | End: 2019-01-28 | Stop reason: HOSPADM

## 2019-01-28 RX ORDER — FENTANYL CITRATE 50 UG/ML
INJECTION, SOLUTION INTRAMUSCULAR; INTRAVENOUS
Status: DISCONTINUED | OUTPATIENT
Start: 2019-01-28 | End: 2019-01-28 | Stop reason: HOSPADM

## 2019-01-28 RX ORDER — MIDAZOLAM HYDROCHLORIDE 1 MG/ML
INJECTION INTRAMUSCULAR; INTRAVENOUS
Status: DISCONTINUED | OUTPATIENT
Start: 2019-01-28 | End: 2019-01-28 | Stop reason: HOSPADM

## 2019-01-28 RX ORDER — DEXAMETHASONE SODIUM PHOSPHATE 10 MG/ML
INJECTION INTRAMUSCULAR; INTRAVENOUS
Status: DISCONTINUED | OUTPATIENT
Start: 2019-01-28 | End: 2019-01-28 | Stop reason: HOSPADM

## 2019-01-28 RX ORDER — FENTANYL CITRATE 50 UG/ML
INJECTION, SOLUTION INTRAMUSCULAR; INTRAVENOUS
Status: DISCONTINUED
Start: 2019-01-28 | End: 2019-01-28 | Stop reason: HOSPADM

## 2019-01-28 RX ORDER — LIDOCAINE HYDROCHLORIDE 10 MG/ML
INJECTION, SOLUTION EPIDURAL; INFILTRATION; INTRACAUDAL; PERINEURAL
Status: DISCONTINUED | OUTPATIENT
Start: 2019-01-28 | End: 2019-01-28 | Stop reason: HOSPADM

## 2019-01-28 RX ORDER — BUPIVACAINE HYDROCHLORIDE 5 MG/ML
INJECTION, SOLUTION EPIDURAL; INTRACAUDAL
Status: DISCONTINUED
Start: 2019-01-28 | End: 2019-01-28 | Stop reason: WASHOUT

## 2019-01-28 RX ORDER — BUPIVACAINE HYDROCHLORIDE 2.5 MG/ML
INJECTION, SOLUTION EPIDURAL; INFILTRATION; INTRACAUDAL
Status: DISCONTINUED | OUTPATIENT
Start: 2019-01-28 | End: 2019-01-28 | Stop reason: HOSPADM

## 2019-01-28 RX ORDER — LIDOCAINE HYDROCHLORIDE 10 MG/ML
INJECTION, SOLUTION EPIDURAL; INFILTRATION; INTRACAUDAL; PERINEURAL
Status: DISCONTINUED
Start: 2019-01-28 | End: 2019-01-28 | Stop reason: HOSPADM

## 2019-01-28 RX ORDER — MIDAZOLAM HYDROCHLORIDE 1 MG/ML
INJECTION INTRAMUSCULAR; INTRAVENOUS
Status: DISCONTINUED
Start: 2019-01-28 | End: 2019-01-28 | Stop reason: HOSPADM

## 2019-01-28 RX ORDER — DEXAMETHASONE SODIUM PHOSPHATE 10 MG/ML
INJECTION INTRAMUSCULAR; INTRAVENOUS
Status: DISCONTINUED
Start: 2019-01-28 | End: 2019-01-28 | Stop reason: HOSPADM

## 2019-01-28 RX ADMIN — SODIUM CHLORIDE, SODIUM LACTATE, POTASSIUM CHLORIDE, CALCIUM CHLORIDE: 600; 310; 30; 20 INJECTION, SOLUTION INTRAVENOUS at 11:01

## 2019-01-28 NOTE — PLAN OF CARE
Patient sitting in chair and states  he is ready to go home; denies pain nausea or any weakness. Patient able to stand up and transfer from chair to wheelchair without assistance. Patient's spouse, Nayeli present at chairside and statess he is ready to take patient home and thats he is driving the patient home.

## 2019-01-28 NOTE — OP NOTE
PROCEDURE DATE: 1/28/2019    PROCEDURE: Bilateral L4-5, L5-S1 transforaminal epidural steroid injection under fluoroscopy    DIAGNOSIS: Lumbar disc displacement without myelopathy  Post op diagnosis: Same    PHYSICIAN: Grant Wright MD    MEDICATIONS INJECTED:  Dexamethasone 2.5mg and 1.0ml 0.25% bupivicaine at each nerve root.     LOCAL ANESTHETIC INJECTED:  Lidocaine 1%. 1 ml per site.    SEDATION MEDICATIONS: RN IV sedation    ESTIMATED BLOOD LOSS:  None    COMPLICATIONS:  None    TECHNIQUE:   A time-out was taken to identify patient and procedure side prior to starting the procedure. The patient was placed in a prone position, prepped and draped in the usual sterile fashion using ChloraPrep and sterile towels.  The area to be injected was determined under fluoroscopic guidance in AP and oblique view.  Local anesthetic was given by raising a wheal and going down to the hub of a 25-gauge 1.5 inch needle.  In oblique view, a 5 inch 22-gauge bent-tip spinal needle was introduced towards 6 oclock position of the pedicle of each above named nerve root level.  The needle was walked medially then hinged into the neural foramen and position was confirmed in AP and lateral views.  1ml contrast dye was injected to confirm appropriate placement and that there was no vascular uptake.  After negative aspiration for blood or CSF, the medication was then injected. This was performed at the bilateral L4-5 and L5-S1 level(s). The patient tolerated the procedure well.    The patient was monitored after the procedure.  Patient was given post procedure and discharge instructions to follow at home. The patient was discharged in a stable condition.

## 2019-01-28 NOTE — DISCHARGE SUMMARY
[facility]  Discharge Note  Short Stay    Admit Date: 1/28/2019    Discharge Date and Time: 1/28/2019    Attending Physician: Grant Wright MD     Discharge Provider: Grant Wright    Diagnoses:  Active Hospital Problems    Diagnosis  POA    *Lumbar radiculopathy [M54.16]  Yes      Resolved Hospital Problems   No resolved problems to display.       Hospital Course: Lumbar JOSE  Discharged Condition: Good    Final Diagnoses:   Active Hospital Problems    Diagnosis  POA    *Lumbar radiculopathy [M54.16]  Yes      Resolved Hospital Problems   No resolved problems to display.       Disposition: Home or Self Care    Follow up/Patient Instructions:    Medications:  Reconciled Home Medications:      Medication List      CONTINUE taking these medications    apixaban 5 mg Tab  Commonly known as:  ELIQUIS  Take 1 tablet (5 mg total) by mouth 2 (two) times daily.     aspirin 81 MG EC tablet  Commonly known as:  ECOTRIN  Take 1 tablet (81 mg total) by mouth once daily.     atorvastatin 10 MG tablet  Commonly known as:  LIPITOR  nightly     azelastine 137 mcg (0.1 %) nasal spray  Commonly known as:  ASTELIN  1 spray (137 mcg total) by Nasal route 2 (two) times daily.     * blood sugar diagnostic Strp  200 strips by Misc.(Non-Drug; Combo Route) route 2 (two) times daily.     * blood sugar diagnostic Strp  Commonly known as:  BLOOD GLUCOSE TEST  FREESTYLE LITE BG TEST STRIPS. current use of insulin  - Primary ICD-10-CM: E11.9     CENTRUM 3,500-18-0.4 unit-mg-mg Chew  Generic drug:  multivit-iron-min-folic acid  Take by mouth.     desoximetasone 0.05 % cream  Commonly known as:  TOPICORT     diazePAM 5 MG tablet  Commonly known as:  VALIUM  Take 1 tablet (5 mg total) by mouth daily as needed for Anxiety.     fexofenadine 180 MG tablet  Commonly known as:  ALLEGRA  Take 1 tablet (180 mg total) by mouth once daily.     fluticasone 50 mcg/actuation nasal spray  Commonly known as:  FLONASE  2 sprays (100 mcg total) by Each Nare  route once daily.     FLUZONE HIGH-DOSE 2018-19 (PF) 180 mcg/0.5 mL vaccine  Generic drug:  influenza  Inject 1 mL into the muscle once.     furosemide 40 MG tablet  Commonly known as:  LASIX  Every other day     HYDROcodone-acetaminophen 7.5-325 mg per tablet  Commonly known as:  NORCO  Take 1 tablet by mouth daily as needed for Pain.     ipratropium 0.03 % nasal spray  Commonly known as:  ATROVENT  2 sprays by Nasal route 2 (two) times daily.     * K-TAB 10 MEQ Tbsr  Generic drug:  potassium chloride     * potassium chloride 10 MEQ Cpsr  Commonly known as:  MICRO-K  Every other day     * lancets Misc  LANCETS: FREESTYLE LITE OR GENERIC THAT IS ACCEPTED.  current use of insulin  - Primary ICD-10-CM: E11.9     * FREESTYLE LANCETS 28 gauge Misc  Generic drug:  lancets     lisinopril 20 MG tablet  Commonly known as:  PRINIVIL,ZESTRIL  Take 1 tablet (20 mg total) by mouth once daily.     metFORMIN 500 MG tablet  Commonly known as:  GLUCOPHAGE  Take 1 tablet (500 mg total) by mouth 2 (two) times daily with meals.     metoprolol tartrate 25 MG tablet  Commonly known as:  LOPRESSOR  Take 1 tablet (25 mg total) by mouth 2 (two) times daily.     montelukast 10 mg tablet  Commonly known as:  SINGULAIR  Take 1 tablet (10 mg total) by mouth every evening.     NAFTIN 2 % Crea  Generic drug:  naftifine  NAFTIN, 2% (External Cream)   as needed (2 %)  Active   Comments: Medication taken as needed.     polyethylene glycol 17 gram/dose powder  Commonly known as:  GLYCOLAX  Take 17 g by mouth once daily.     SITagliptin 100 MG Tab  Commonly known as:  JANUVIA  Take 1 tablet (100 mg total) by mouth once daily.     traMADol 50 mg tablet  Commonly known as:  ULTRAM  Take 50 mg by mouth 2 (two) times daily.     traZODone 100 MG tablet  Commonly known as:  DESYREL  Take 1 tablet (100 mg total) by mouth every evening.     VOLTAREN 1 % Gel  Generic drug:  diclofenac sodium  VOLTAREN, 1% (Transdermal Gel)   as needed (1 %)  Active    Comments: Medication taken as needed.         * This list has 6 medication(s) that are the same as other medications prescribed for you. Read the directions carefully, and ask your doctor or other care provider to review them with you.              Discharge Procedure Orders   Call MD for:  temperature >100.4     Call MD for:  persistent nausea and vomiting or diarrhea     Call MD for:  severe uncontrolled pain     Call MD for:  redness, tenderness, or signs of infection (pain, swelling, redness, odor or green/yellow discharge around incision site)     Call MD for:  difficulty breathing or increased cough     Call MD for:  severe persistent headache        Follow up with MD in 2-3 weeks    Discharge Procedure Orders (must include Diet, Follow-up, Activity):   Discharge Procedure Orders (must include Diet, Follow-up, Activity)   Call MD for:  temperature >100.4     Call MD for:  persistent nausea and vomiting or diarrhea     Call MD for:  severe uncontrolled pain     Call MD for:  redness, tenderness, or signs of infection (pain, swelling, redness, odor or green/yellow discharge around incision site)     Call MD for:  difficulty breathing or increased cough     Call MD for:  severe persistent headache

## 2019-01-28 NOTE — DISCHARGE INSTRUCTIONS
Recovery After Procedural Sedation (Adult)  You have been given medicine by vein to make you sleep during your surgery. This may have included both a pain medicine and sleeping medicine. Most of the effects have worn off. But you may still have some drowsiness for the next 6 to 8 hours.  Home care  Follow these guidelines when you get home:  · For the next 8 hours, you should be watched by a responsible adult. This person should make sure your condition is not getting worse.  · Don't drink any alcohol for the next 24 hours.  · Don't drive, operate dangerous machinery, or make important business or personal decisions during the next 24 hours.  Note: Your healthcare provider may tell you not to take any medicine by mouth for pain or sleep in the next 4 hours. These medicines may react with the medicines you were given in the hospital. This could cause a much stronger response than usual.  Follow-up care  Follow up with your healthcare provider if you are not alert and back to your usual level of activity within 12 hours.  When to seek medical advice  Call your healthcare provider right away if any of these occur:  · Drowsiness gets worse  · Weakness or dizziness gets worse  · Repeated vomiting  · You can't be awakened   Date Last Reviewed: 10/18/2016  © 0207-0904 [employer]. [address] [address], [place] [address]. All rights reserved. This information is not intended as a substitute for professional medical care. Always follow your healthcare professional's instructions.      Pain injection instructions:     This procedure may take a couple weeks to relieve pain  You may get some pain relief from the local anesthetic initally.    No driving for 24 hrs.   Activity as tolerated- gradually increase activities.  Dont lift over 10 lbs for 24 hrs   No heat at injection sites x 2 days. No heating pads, hot packs, hot tubs, saunas, or swimming in any body of water or pool for 2 days.  Use ice pack for mild  swelling and for comfort , apply for 20 minutes, remove for 20 minute intervals. No direct contact of ice itself  to skin.  May shower today Do not allow shower water to hit injection sites for 2 days. No tub baths for two days.      Resume Aspirin, Plavix, or Coumadin the day after the procedure unless otherwise instructed.   If diabetic,monitor your glucose carefully as steroids can increase your glucose level    Seek immediate medical help for:   Severe increase in your usual pain or appearance of new pain.  Prolonged (mor than 8 hours) or increasing weakness or numbness in the legs or arms. Numbing medicine was injected and can affect the messages to and from the brain and legs or arms.  .    Fever above 100.4F ,Drainage,redness,active bleeding, or increased swelling at the injection site.  Headache, shortness of breath, chest pain, or breathing problems.

## 2019-01-29 VITALS
DIASTOLIC BLOOD PRESSURE: 81 MMHG | HEIGHT: 68 IN | HEART RATE: 60 BPM | OXYGEN SATURATION: 94 % | WEIGHT: 254 LBS | RESPIRATION RATE: 18 BRPM | BODY MASS INDEX: 38.49 KG/M2 | TEMPERATURE: 98 F | SYSTOLIC BLOOD PRESSURE: 152 MMHG

## 2019-02-05 ENCOUNTER — OFFICE VISIT (OUTPATIENT)
Dept: FAMILY MEDICINE | Facility: CLINIC | Age: 79
End: 2019-02-05
Payer: MEDICARE

## 2019-02-05 VITALS
SYSTOLIC BLOOD PRESSURE: 100 MMHG | OXYGEN SATURATION: 96 % | RESPIRATION RATE: 16 BRPM | DIASTOLIC BLOOD PRESSURE: 62 MMHG | BODY MASS INDEX: 38.7 KG/M2 | HEART RATE: 67 BPM | HEIGHT: 68 IN | WEIGHT: 255.38 LBS

## 2019-02-05 DIAGNOSIS — M25.562 CHRONIC PAIN OF BOTH KNEES: ICD-10-CM

## 2019-02-05 DIAGNOSIS — J30.89 CHRONIC NONSEASONAL ALLERGIC RHINITIS DUE TO FUNGAL SPORES: ICD-10-CM

## 2019-02-05 DIAGNOSIS — G89.29 CHRONIC PAIN OF BOTH KNEES: ICD-10-CM

## 2019-02-05 DIAGNOSIS — E78.01 FAMILIAL HYPERCHOLESTEROLEMIA: ICD-10-CM

## 2019-02-05 DIAGNOSIS — E11.9 CONTROLLED TYPE 2 DIABETES MELLITUS WITHOUT COMPLICATION, WITHOUT LONG-TERM CURRENT USE OF INSULIN: Primary | ICD-10-CM

## 2019-02-05 DIAGNOSIS — I48.0 PAROXYSMAL ATRIAL FIBRILLATION: ICD-10-CM

## 2019-02-05 DIAGNOSIS — M25.561 CHRONIC PAIN OF BOTH KNEES: ICD-10-CM

## 2019-02-05 DIAGNOSIS — F51.01 PRIMARY INSOMNIA: ICD-10-CM

## 2019-02-05 DIAGNOSIS — I10 ESSENTIAL HYPERTENSION: ICD-10-CM

## 2019-02-05 PROCEDURE — 99214 PR OFFICE/OUTPT VISIT, EST, LEVL IV, 30-39 MIN: ICD-10-PCS | Mod: ,,, | Performed by: INTERNAL MEDICINE

## 2019-02-05 PROCEDURE — 99214 OFFICE O/P EST MOD 30 MIN: CPT | Mod: ,,, | Performed by: INTERNAL MEDICINE

## 2019-02-05 RX ORDER — ATORVASTATIN CALCIUM 10 MG/1
TABLET, FILM COATED ORAL
Qty: 90 TABLET | Refills: 3 | Status: SHIPPED | OUTPATIENT
Start: 2019-02-05 | End: 2020-01-24 | Stop reason: SDUPTHER

## 2019-02-05 RX ORDER — MINERAL OIL
180 ENEMA (ML) RECTAL DAILY
Qty: 90 TABLET | Refills: 3 | Status: SHIPPED | OUTPATIENT
Start: 2019-02-05 | End: 2019-12-19 | Stop reason: SDUPTHER

## 2019-02-05 RX ORDER — TRAMADOL HYDROCHLORIDE 50 MG/1
50 TABLET ORAL 2 TIMES DAILY
Qty: 60 TABLET | Refills: 4 | Status: SHIPPED | OUTPATIENT
Start: 2019-02-05 | End: 2019-04-24 | Stop reason: SDUPTHER

## 2019-02-05 RX ORDER — MONTELUKAST SODIUM 10 MG/1
10 TABLET ORAL NIGHTLY
Qty: 90 TABLET | Refills: 3 | Status: SHIPPED | OUTPATIENT
Start: 2019-02-05 | End: 2020-01-24 | Stop reason: SDUPTHER

## 2019-02-05 RX ORDER — TRAZODONE HYDROCHLORIDE 100 MG/1
100 TABLET ORAL NIGHTLY
Qty: 90 TABLET | Refills: 3 | Status: SHIPPED | OUTPATIENT
Start: 2019-02-05 | End: 2019-12-19 | Stop reason: SDUPTHER

## 2019-02-05 RX ORDER — HYDROCODONE BITARTRATE AND ACETAMINOPHEN 7.5; 325 MG/1; MG/1
1 TABLET ORAL DAILY PRN
Qty: 30 TABLET | Refills: 0 | Status: SHIPPED | OUTPATIENT
Start: 2019-02-05 | End: 2019-04-24 | Stop reason: SDUPTHER

## 2019-02-05 RX ORDER — METOPROLOL TARTRATE 25 MG/1
25 TABLET, FILM COATED ORAL 2 TIMES DAILY
Qty: 180 TABLET | Refills: 3 | Status: SHIPPED | OUTPATIENT
Start: 2019-02-05 | End: 2020-04-01 | Stop reason: SDUPTHER

## 2019-02-05 NOTE — PATIENT INSTRUCTIONS
Chronic Venous Insufficiency: Treating Ulcers    If leg swelling because of chronic venous insufficiency isnt controlled, an open wound (ulcer) can form. Although ulcers vary in size and shape, they usually appear on the inside of the ankle.  Treating an ulcer  · Visit your healthcare provider. Ulcers need frequent medical care. Special dressings may be applied. You may be given antibiotics to fight infection.  · Your healthcare provider may prescribe medicines, such as aspirin or pentoxifylline, to help the ulcer heal.  · Your healthcare provider may prescribe compression hose to help with the swelling.   · Elevate your legs often to reduce swelling. The ulcer needs oxygen-rich blood to heal. This blood cant reach the ulcer until swelling is reduced.  When to call your healthcare provider  Seek immediate medical attention if:   · You have an increase in pain  · You develop a fever of 100.4°F (38°C) or higher, or as directed by your healthcare provider  · The area around the ulcer becomes red, tender, or both  · The ulcer oozes discolored fluid or smells bad  · Swelling increases suddenly or the dressing feels tight   Date Last Reviewed: 5/1/2016  © 9916-8976 [employer]. [address] [place] [address]. All rights reserved. This information is not intended as a substitute for professional medical care. Always follow your healthcare professional's instructions.

## 2019-02-05 NOTE — PROGRESS NOTES
Subjective:       Patient ID: Yeyo Hollingsworth is a 78 y.o. male.    Chief Complaint: Follow-up; Diabetes; and Hypertension    78-year-old gentleman with a history of type 2 diabetes maintained on Januvia and metformin, hypertension, dyslipidemia as well as chronic osteoarthritis allergic rhinitis and chronic Eliquis therapy due to paroxysmal atrial fibrillation. The patient has had 2 spinal epidurals to the lumbar spine with minimal relief and is to have rhizotomy trial. He's also insisted and minimally invasive surgery. 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 Patient had no loss of consciousness but did get a very bad bruise being on Eliquis on his flank. This was a month ago. He has no residual pain. He did bring labs to me from his cardiologist history hemoglobin A1c of 7.0. He is taking regular metformin 500 mg in the morning and extended-release metformin in the evening as well as Januvia 100 mg once a day. His lipid profile was excellent and one of his transaminases were slightly elevated. His thyroid functions were normal as well.      Review of Systems   Constitutional: Negative for activity change, diaphoresis, fatigue and fever.   HENT: Negative for congestion, ear pain, postnasal drip, sinus pressure, sore throat and trouble swallowing.    Eyes: Negative for pain.   Respiratory: Negative for cough, chest tightness, shortness of breath and wheezing.    Cardiovascular: Negative for chest pain, palpitations and leg swelling.   Gastrointestinal: Negative for abdominal distention, abdominal pain, blood in stool, constipation and diarrhea.   Endocrine: Negative for cold intolerance and heat intolerance.   Genitourinary: Negative for decreased urine volume, difficulty urinating, dysuria, flank pain, frequency and hematuria.   Musculoskeletal: Negative for arthralgias, back pain, joint swelling, myalgias and neck pain.   Skin:  Negative for pallor, rash and wound.   Neurological: Negative for tremors, syncope, weakness, light-headedness, numbness and headaches.   Hematological: Negative for adenopathy.   Psychiatric/Behavioral: Negative for confusion, decreased concentration, hallucinations, self-injury, sleep disturbance and suicidal ideas. The patient is not nervous/anxious.        Past Medical History:   Diagnosis Date    Allergy     Anticoagulant long-term use     aspirin    Anxiety     Atrial fibrillation     Cataract     Clotting disorder     left leg    Diabetes mellitus     Diabetes mellitus, type 2     Hyperlipidemia     Hypertension     BRANDI on CPAP        Past Surgical History:   Procedure Laterality Date    ABDOMINAL SURGERY      origunal surg 1986-mult surgeries since    CARPAL TUNNEL RELEASE      right wrist 2009-left wrist 2010    COLON SURGERY      partial colon removal    COLOSTOMY  1986    colostomy reversal  1986    EYE SURGERY      hay fever      HERNIA REPAIR  1949    Injection,steroid,epidural,transforaminal approach Bilateral 12/10/2018    [facility]    Injection,steroid,epidural,transforaminal approach L4-5, L5-S1 Bilateral 1/28/2019    [facility]    INJECTION-STEROID-EPIDURAL-TRANSFORAMINAL Left 4/30/2018    [facility]    INJECTION-STEROID-EPIDURAL-TRANSFORAMINAL Left 9/28/2017    [facility]    TONSILLECTOMY  1947       History reviewed. No pertinent family history.    Social History     Socioeconomic History    Marital status:      Spouse name: None    Number of children: None    Years of education: None    Highest education level: None   Social Needs    Financial resource strain: None    Food insecurity - worry: None    Food insecurity - inability: None    Transportation needs - medical: None    Transportation needs - non-medical: None   Occupational History     None   Tobacco Use    Smoking status: Former Smoker     Types: Cigarettes     Last attempt to quit: 2006     Years since quittin.9    Smokeless tobacco: Never Used   Substance and Sexual Activity    Alcohol use: No    Drug use: No    Sexual activity: None   Other Topics Concern    None   Social History Narrative    None       Current Outpatient Medications   Medication Sig Dispense Refill    apixaban (ELIQUIS) 5 mg Tab Take 1 tablet (5 mg total) by mouth 2 (two) times daily. 180 tablet 3    aspirin (ECOTRIN) 81 MG EC tablet Take 1 tablet (81 mg total) by mouth once daily. 90 tablet 3    atorvastatin (LIPITOR) 10 MG tablet nightly 90 tablet 3    azelastine (ASTELIN) 137 mcg (0.1 %) nasal spray 1 spray (137 mcg total) by Nasal route 2 (two) times daily. 30 mL 5    blood sugar diagnostic (BLOOD GLUCOSE TEST) Strp FREESTYLE LITE BG TEST STRIPS. current use of insulin  - Primary ICD-10-CM: E11.9 200 strip 3    blood sugar diagnostic Strp 200 strips by Misc.(Non-Drug; Combo Route) route 2 (two) times daily. 100 strip 3    desoximetasone (TOPICORT) 0.05 % cream       diazePAM (VALIUM) 5 MG tablet Take 1 tablet (5 mg total) by mouth daily as needed for Anxiety. 30 tablet 5    diclofenac sodium (VOLTAREN) 1 % Gel VOLTAREN, 1% (Transdermal Gel)   as needed (1 %)  Active   Comments: Medication taken as needed.       fexofenadine (ALLEGRA) 180 MG tablet Take 1 tablet (180 mg total) by mouth once daily. 90 tablet 3    fluticasone (FLONASE) 50 mcg/actuation nasal spray 2 sprays (100 mcg total) by Each Nare route once daily. 3 Bottle 3    FLUZONE HIGH-DOSE -, PF, 180 mcg/0.5 mL vaccine Inject 1 mL into the muscle once.  0    FREESTYLE LANCETS 28 gauge lancets       furosemide (LASIX) 40 MG tablet Every other day 45 tablet 3    HYDROcodone-acetaminophen (NORCO) 7.5-325 mg per tablet Take 1 tablet by mouth daily as needed for Pain. 30 tablet 0    ipratropium (ATROVENT) 0.03 % nasal spray 2  sprays by Nasal route 2 (two) times daily. 30 mL 5    K-TAB 10 mEq TbSR       lancets Misc LANCETS: FREESTYLE LITE OR GENERIC THAT IS ACCEPTED.  current use of insulin  - Primary ICD-10-CM: E11.9 100 each 3    lisinopril (PRINIVIL,ZESTRIL) 20 MG tablet Take 1 tablet (20 mg total) by mouth once daily. 90 tablet 3    metFORMIN (GLUCOPHAGE) 500 MG tablet Take 1 tablet (500 mg total) by mouth 2 (two) times daily with meals. 180 tablet 3    metoprolol tartrate (LOPRESSOR) 25 MG tablet Take 1 tablet (25 mg total) by mouth 2 (two) times daily. 180 tablet 3    montelukast (SINGULAIR) 10 mg tablet Take 1 tablet (10 mg total) by mouth every evening. 90 tablet 3    multivit-iron-min-folic acid (MULTIVITAMIN-IRON-MINERALS-FOLIC ACID) 3,500-18-0.4 unit-mg-mg Chew Take by mouth.        naftifine (NAFTIN) 2 % Crea NAFTIN, 2% (External Cream)   as needed (2 %)  Active   Comments: Medication taken as needed.       polyethylene glycol (GLYCOLAX) 17 gram/dose powder Take 17 g by mouth once daily. 6 Bottle 3    potassium chloride (MICRO-K) 10 MEQ CpSR Every other day 45 capsule 3    SITagliptin (JANUVIA) 100 MG Tab Take 1 tablet (100 mg total) by mouth once daily. 90 tablet 3    traMADol (ULTRAM) 50 mg tablet Take 1 tablet (50 mg total) by mouth 2 (two) times daily. 60 tablet 4    traZODone (DESYREL) 100 MG tablet Take 1 tablet (100 mg total) by mouth every evening. 90 tablet 3     No current facility-administered medications for this visit.        Review of patient's allergies indicates:   Allergen Reactions    Ativan [lorazepam] Other (See Comments)     Mood alternating      Dilaudid [hydromorphone (bulk)] Other (See Comments)     Mood alternating      Morphine Other (See Comments)     Mood alternating      Hydromorphone        Objective:      Physical Exam   Constitutional: He is oriented to person, place, and time. He appears well-developed and well-nourished. No distress.   HENT:   Head: Normocephalic and  atraumatic.   Right Ear: External ear normal.   Left Ear: External ear normal.   Nose: Nose normal.   Mouth/Throat: Oropharynx is clear and moist.   Eyes: Conjunctivae and EOM are normal. Right eye exhibits no discharge. Left eye exhibits no discharge. No scleral icterus.   Neck: Normal range of motion. Neck supple. No JVD present. No tracheal deviation present. No thyromegaly present.   Cardiovascular: Normal rate, regular rhythm, normal heart sounds and intact distal pulses. Exam reveals no gallop.   No murmur heard.  Pulmonary/Chest: Effort normal and breath sounds normal. No respiratory distress. He has no wheezes. He has no rales.   Abdominal: Soft. Bowel sounds are normal. He exhibits no distension and no mass. There is no tenderness.   Musculoskeletal: Normal range of motion. He exhibits no edema or tenderness.   Lymphadenopathy:     He has no cervical adenopathy.   Neurological: He is alert and oriented to person, place, and time.   Protective Sensation (w/ 10 gram monofilament):  Right: Intact  Left: Intact    Visual Inspection:-  No toenails  Bilateral    Pedal Pulses:   Right: Present  Left: Present    Posterior tibialis:   Right:Present  Left: Present  Some pedal edema on the left due to old injury   Skin: Skin is warm and dry. No rash noted. He is not diaphoretic. No erythema.   Psychiatric: He has a normal mood and affect. His behavior is normal. Judgment and thought content normal.       Assessment:       1. Controlled type 2 diabetes mellitus without complication, without long-term current use of insulin    2. Chronic pain of both knees    3. Essential hypertension    4. Familial hypercholesterolemia    5. Chronic nonseasonal allergic rhinitis due to fungal spores    6. Paroxysmal atrial fibrillation    7. Primary insomnia    8. BMI 38.0-38.9,adult        Plan:       Controlled type 2 diabetes mellitus without complication, without long-term current use of insulin- continue present regimen of  metformin and Januvia-    Chronic pain of both knees-cannot take NSAIDs due to Eliquis  -     HYDROcodone-acetaminophen (NORCO) 7.5-325 mg per tablet; Take 1 tablet by mouth daily as needed for Pain.  Dispense: 30 tablet; Refill: 0  -     traMADol (ULTRAM) 50 mg tablet; Take 1 tablet (50 mg total) by mouth 2 (two) times daily.  Dispense: 60 tablet; Refill: 4    Essential hypertension  -     metoprolol tartrate (LOPRESSOR) 25 MG tablet; Take 1 tablet (25 mg total) by mouth 2 (two) times daily.  Dispense: 180 tablet; Refill: 3    Familial hypercholesterolemia-continue statin-continue Zestril and metoprolol as well as Lasix  -     atorvastatin (LIPITOR) 10 MG tablet; nightly  Dispense: 90 tablet; Refill: 3    Chronic nonseasonal allergic rhinitis due to fungal spores  -     fexofenadine (ALLEGRA) 180 MG tablet; Take 1 tablet (180 mg total) by mouth once daily.  Dispense: 90 tablet; Refill: 3  -     montelukast (SINGULAIR) 10 mg tablet; Take 1 tablet (10 mg total) by mouth every evening.  Dispense: 90 tablet; Refill: 3    Paroxysmal atrial fibrillation-in normal sinus rhythm maintained on Eliquis and metoprolol.    Primary insomnia  -     traZODone (DESYREL) 100 MG tablet; Take 1 tablet (100 mg total) by mouth every evening.  Dispense: 90 tablet; Refill: 3    BMI 38.0-38.9,adult-encourage weight loss and exercise

## 2019-02-28 ENCOUNTER — OFFICE VISIT (OUTPATIENT)
Dept: PAIN MEDICINE | Facility: CLINIC | Age: 79
End: 2019-02-28
Payer: MEDICARE

## 2019-02-28 VITALS
BODY MASS INDEX: 38.65 KG/M2 | DIASTOLIC BLOOD PRESSURE: 71 MMHG | HEART RATE: 58 BPM | SYSTOLIC BLOOD PRESSURE: 153 MMHG | HEIGHT: 68 IN | WEIGHT: 255 LBS

## 2019-02-28 DIAGNOSIS — M54.16 LUMBAR RADICULOPATHY: Primary | ICD-10-CM

## 2019-02-28 DIAGNOSIS — M51.36 DDD (DEGENERATIVE DISC DISEASE), LUMBAR: ICD-10-CM

## 2019-02-28 DIAGNOSIS — M48.061 SPINAL STENOSIS OF LUMBAR REGION, UNSPECIFIED WHETHER NEUROGENIC CLAUDICATION PRESENT: ICD-10-CM

## 2019-02-28 PROCEDURE — 99213 PR OFFICE/OUTPT VISIT, EST, LEVL III, 20-29 MIN: ICD-10-PCS | Mod: S$PBB,,, | Performed by: PHYSICIAN ASSISTANT

## 2019-02-28 PROCEDURE — 99999 PR PBB SHADOW E&M-EST. PATIENT-LVL III: ICD-10-PCS | Mod: PBBFAC,,, | Performed by: PHYSICIAN ASSISTANT

## 2019-02-28 PROCEDURE — 99213 OFFICE O/P EST LOW 20 MIN: CPT | Mod: PBBFAC,PN | Performed by: PHYSICIAN ASSISTANT

## 2019-02-28 PROCEDURE — 99213 OFFICE O/P EST LOW 20 MIN: CPT | Mod: S$PBB,,, | Performed by: PHYSICIAN ASSISTANT

## 2019-02-28 PROCEDURE — 99999 PR PBB SHADOW E&M-EST. PATIENT-LVL III: CPT | Mod: PBBFAC,,, | Performed by: PHYSICIAN ASSISTANT

## 2019-02-28 NOTE — PROGRESS NOTES
Referring Physician: No ref. provider found    PCP: Citlali Parish MD      CC:low back and left leg pain    Interval History:  Mr. Hollingsworth is a 78 y.o. male with a low back and left leg pain who presents today for f/u s/p lumbar TFESI bilaterally at L4-5 and L5-S1. Reports 70% relief.  Pain improves with rest and occurs with standing for long periods. He denies any LE weakness or b/b changes. He takes Hydrocodone 7.5 mg sparingly prescribed by Dr. Parish. He was evaluated by [facility] yesterday. They have ordered a updated MRI and recommended surgery. Pain today is rated 2/10.      HPI:   Yeyo Hollingsworth is a 78 y.o. male ho presents with lower back and left leg pain.  Pain is been present since 2010.  No recent traumatic incident.  His intermittent aching, throbbing, electric pain in his lower back.  Pain radiates down his left buttock into his left posterior thigh.  Pain worsens with prolonged standing, walking, getting up and coughing.  Pain improves with rest.  His tried physical therapy with minimal benefit.  He has undergone lumbar JOSE's with Dr. Rendon in the past with moderate benefit.  Most recent injection was performed in September 2016.  Pain has since returned.  He desires repeat injections with us.  He denies any weakness.  No bowel bladder changes.  He rates his pain 6/10 today.    ROS:  CONSTITUTIONAL: No fevers, chills, night sweats, wt. loss, appetite changes  SKIN: no rashes or itching  ENT: No headaches, head trauma, vision changes, or eye pain  LYMPH NODES: None noticed   CV: No chest pain, palpitations.   RESP: No shortness of breath, dyspnea on exertion, cough, wheezing, or hemoptysis  GI: No nausea, emesis, diarrhea, constipation, melena, hematochezia, pain.    : No dysuria, hematuria, urgency, or frequency   HEME: No easy bruising, bleeding problems  PSYCHIATRIC: No depression, anxiety, psychosis, hallucinations.  NEURO: No seizures, memory loss, dizziness or difficulty  sleeping  MSK: + history of present illness      Past Medical History:   Diagnosis Date    Allergy     Anticoagulant long-term use     aspirin    Anxiety     Atrial fibrillation     Cataract     Clotting disorder     left leg    Diabetes mellitus     Diabetes mellitus, type 2     Hyperlipidemia     Hypertension     BRANDI on CPAP      Past Surgical History:   Procedure Laterality Date    ABDOMINAL SURGERY      origunal surg -mult surgeries since    CARPAL TUNNEL RELEASE      right wrist -left wrist     COLON SURGERY      partial colon removal    COLOSTOMY      colostomy reversal      EYE SURGERY      hay fever      HERNIA REPAIR      Injection,steroid,epidural,transforaminal approach Bilateral 12/10/2018    [facility]    Injection,steroid,epidural,transforaminal approach L4-5, L5-S1 Bilateral 2019    [facility]    INJECTION-STEROID-EPIDURAL-TRANSFORAMINAL Left 2018    [facility]    INJECTION-STEROID-EPIDURAL-TRANSFORAMINAL Left 2017    [facility]    TONSILLECTOMY       History reviewed. No pertinent family history.  Social History     Socioeconomic History    Marital status:      Spouse name: None    Number of children: None    Years of education: None    Highest education level: None   Social Needs    Financial resource strain: None    Food insecurity - worry: None    Food insecurity - inability: None    Transportation needs - medical: None    Transportation needs - non-medical: None   Occupational History    None   Tobacco Use    Smoking status: Former Smoker     Types: Cigarettes     Last attempt to quit: 2006     Years since quittin.0    Smokeless tobacco: Never Used   Substance and Sexual Activity    Alcohol use: No    Drug use: No    Sexual activity: None   Other Topics Concern    None   Social History  "Narrative    None         Medications/Allergies: See med card    Vitals:    02/28/19 1056   BP: (!) 153/71   Pulse: (!) 58   Weight: 115.7 kg (255 lb)   Height: 5' 8" (1.727 m)   PainSc:   2   PainLoc: Back         Physical exam:    GENERAL: A and O x3, the patient appears well groomed and is in no acute distress.  Skin: No rashes or obvious lesions  HEENT: normocephalic, atraumatic  CARDIOVASCULAR:  Bradycardia  LUNGS: non labored breathing  ABDOMEN: soft, nontender   UPPER EXTREMITIES: Normal alignment, normal range of motion, no atrophy, no skin changes,  hair growth and nail growth normal and equal bilaterally. No swelling, no tenderness.    LOWER EXTREMITIES:  Normal alignment, normal range of motion, no atrophy, no skin changes,  hair growth and nail growth normal and equal bilaterally. No swelling, no tenderness.    LUMBAR SPINE  Lumbar spine: ROM is limited with flexion extension and oblique extension with moderate increased pain.    Caden's test causes no increased pain on either side.    Supine straight leg raise is negative bilaterally.    Internal and external rotation of the hip causes no increased pain on either side.  Myofascial exam: No tenderness to palpation across lumbar paraspinous muscles. Moderate tenderness of right thoracic Paraspinous muscles       MENTAL STATUS: normal orientation, speech, language, and fund of knowledge for social situation.  Emotional state appropriate.    CRANIAL NERVES:  II:  PERRL bilaterally,   III,IV,VI: EOMI.    V:  Facial sensation equal bilaterally  VII:  Facial motor function normal.  VIII:  Hearing equal to finger rub bilaterally  IX/X: Gag normal, palate symmetric  XI:  Shoulder shrug equal, head turn equal  XII:  Tongue midline without fasciculations      MOTOR: Tone and bulk: normal bilateral upper and lower Strength: normal "   Delt Bi Tri WE WF     R 5 5 5 5 5 5   L 5 5 5 5 5 5     IP ADD ABD Quad TA Gas HAM  R 5 5 5 5 5 5 5  L 5 5 5 5 5 5 5    SENSATION: Light touch and pinprick intact bilaterally  REFLEXES: normal, symmetric, nonbrisk.  Toes down, no clonus. No hoffmans.  GAIT: normal rise, base, steps, and arm swing.        Imaging:  MRI lumbar spine 9/2017 ([facility])  Severe facet arthrosis and ligamentum flavum at L4-5 results in severe spinal canal stenosis. Possible impingement of the bilateral L5 nerve as well as bilateral L4 nerves.  There is a grade 1 anterolisthesis of L4 and L5.  Moderate to severe bilateral neuroforaminal L3-4.  This note was completed with dictation software and grammatical errors may exist.      Assessment:  Mr. Hollingsworth is a 78 y.o. male with low back and left leg pain  1. Lumbar radiculopathy    2. Spinal stenosis of lumbar region, unspecified whether neurogenic claudication present    3. DDD (degenerative disc disease), lumbar        Plan:  1.  I have stressed the importance of physical activity and exercise to improve overall health  2. Monitor progress and consider a repeat lumbar epidural steroid injection to the Left L4-5 and L5-S1 level.    3.  He will update MRI  4. F/u prn

## 2019-03-12 ENCOUNTER — TELEPHONE (OUTPATIENT)
Dept: PODIATRY | Facility: CLINIC | Age: 79
End: 2019-03-12

## 2019-03-12 DIAGNOSIS — B35.1 ONYCHOMYCOSIS DUE TO DERMATOPHYTE: Primary | ICD-10-CM

## 2019-04-03 ENCOUNTER — TELEPHONE (OUTPATIENT)
Dept: FAMILY MEDICINE | Facility: CLINIC | Age: 79
End: 2019-04-03

## 2019-04-03 ENCOUNTER — OFFICE VISIT (OUTPATIENT)
Dept: FAMILY MEDICINE | Facility: CLINIC | Age: 79
End: 2019-04-03
Payer: MEDICARE

## 2019-04-03 VITALS
HEART RATE: 62 BPM | TEMPERATURE: 98 F | BODY MASS INDEX: 38.34 KG/M2 | RESPIRATION RATE: 16 BRPM | HEIGHT: 68 IN | DIASTOLIC BLOOD PRESSURE: 60 MMHG | SYSTOLIC BLOOD PRESSURE: 124 MMHG | WEIGHT: 253 LBS | OXYGEN SATURATION: 97 %

## 2019-04-03 DIAGNOSIS — M48.02 CERVICAL STENOSIS OF SPINE: ICD-10-CM

## 2019-04-03 DIAGNOSIS — S01.01XD LACERATION OF SCALP, SUBSEQUENT ENCOUNTER: ICD-10-CM

## 2019-04-03 DIAGNOSIS — S01.01XS SCALP LACERATION, SEQUELA: ICD-10-CM

## 2019-04-03 DIAGNOSIS — Z86.718 HISTORY OF DVT (DEEP VEIN THROMBOSIS): ICD-10-CM

## 2019-04-03 DIAGNOSIS — G89.29 CHRONIC PAIN OF LEFT ANKLE: ICD-10-CM

## 2019-04-03 DIAGNOSIS — Z86.73 OLD LACUNAR STROKE WITHOUT LATE EFFECT: ICD-10-CM

## 2019-04-03 DIAGNOSIS — M85.00 SCLEROSING BONE DYSPLASIA: ICD-10-CM
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
## 2019-04-03 DIAGNOSIS — M25.572 CHRONIC PAIN OF LEFT ANKLE: ICD-10-CM

## 2019-04-03 DIAGNOSIS — E11.9 CONTROLLED TYPE 2 DIABETES MELLITUS WITHOUT COMPLICATION, WITHOUT LONG-TERM CURRENT USE OF INSULIN: ICD-10-CM

## 2019-04-03 DIAGNOSIS — G44.52 NEW DAILY PERSISTENT HEADACHE: ICD-10-CM
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
## 2019-04-03 PROBLEM — S01.01XA SCALP LACERATION: Status: ACTIVE | Noted: 2019-04-03

## 2019-04-03 PROCEDURE — 99215 OFFICE O/P EST HI 40 MIN: CPT | Mod: ,,, | Performed by: INTERNAL MEDICINE

## 2019-04-03 PROCEDURE — 99215 PR OFFICE/OUTPT VISIT, EST, LEVL V, 40-54 MIN: ICD-10-PCS | Mod: ,,, | Performed by: INTERNAL MEDICINE

## 2019-04-03 RX ORDER — DOXYCYCLINE HYCLATE 100 MG
100 TABLET ORAL EVERY 12 HOURS
Qty: 14 TABLET | Refills: 0 | Status: SHIPPED | OUTPATIENT
Start: 2019-04-03 | End: 2019-04-10

## 2019-04-03 RX ORDER — MUPIROCIN 20 MG/G
OINTMENT TOPICAL 2 TIMES DAILY
Qty: 22 G | Refills: 1 | Status: ON HOLD | OUTPATIENT
Start: 2019-04-03 | End: 2021-04-25 | Stop reason: HOSPADM

## 2019-04-03 NOTE — PATIENT INSTRUCTIONS
Calf Raise (Strength)    1. Stand up straight with both feet flat on the floor, slightly apart. Hold onto a sturdy chair, railing, counter, or table.  2. Raise both heels so youre standing on the balls of your feet. Dont lock your knees or arch your back. Hold for 5 seconds. Then slowly lower your heels back down to the floor.  3. Repeat 10 times, or as instructed.  4. Do this exercise 3 times a day, or as instructed.     Challenge yourself  As you become stronger, do this exercise on one foot at a time.   Date Last Reviewed: 3/10/2016  © 2789-8224 [employer]. [address] [place] [address], [place] [address]. All rights reserved. This information is not intended as a substitute for professional medical care. Always follow your healthcare professional's instructions.

## 2019-04-04 ENCOUNTER — TELEPHONE (OUTPATIENT)
Dept: PAIN MEDICINE | Facility: CLINIC | Age: 79
End: 2019-04-04

## 2019-04-04 NOTE — TELEPHONE ENCOUNTER
Pt had a fall and was seen by Dr. Parish. SHe ordered a MRI of his brain and pt is asking for us to order MRI of neck. He would like them done at the same time.

## 2019-04-04 NOTE — TELEPHONE ENCOUNTER
----- Message from Jessi Nobles sent at 4/4/2019  8:50 AM CDT -----  Contact: Patient  Patient would like to speak with the office regarding getting an MRI for his neck.  Call Back#[phone]  Thanks

## 2019-04-04 NOTE — PROGRESS NOTES
Subjective:       Patient ID: Yeyo Hollingsworth is a 78 y.o. male.    Chief Complaint: Hospital Follow Up and Diabetes    78-year-old gentleman who is here today as an emergency room follow-up from yesterday after a mechanical fall with his foot getting caught in a coffee table arm. He did go to drink as he had a aeration in the left posterior parietal lobe and saw quite a bit of blood. Patient is on blood thinner for paroxysmal atrial fibrillation which she has not had in quite some time. He does follow cardiology regularly as well. The patient has been having left-sided neck pain with headache radiating up into the back of his head prior to his fall. The fall did not consists of any loss of consciousness or any prodrome. He called out to his wife and she came immediately. Emergency room he had a CT scan of his head without contrast and it showed 2 small regions with the right being greater than the left cerebellar remote infarcts he also had some mucosal thickening in the sinuses and most importantly rounded areas of sclerosis within the left parietal lobe potentially representing an incidental bone island versus metastases in the setting of a known malignancy. The patient has no known malignancy and I am on sure if the radiologist new that he had a laceration in that exact area. He also had a cervical spine CT without contrast and although it did not show any fractures he has significant spinal central canal stenosis along with foraminal stenosis most significantly at the level of C5/C6. He has been seen pain anesthesiology for lumbar stenosis. He is willing to go back to see them again. He is also complaining of left ankle pain that seems to throb mostly at night and response to massaging. No swelling or outward signs of infection. Patient is also complaining about loose stools again as he is back on metformin. He has some old metformin at a short acting and decided to take a half a tablet other thousand  milligrams in the morning and continue on with his extended release metformin in the evening. The patient has had loose bowels from short acting metformin the past unfortunately. He has some Januvia at home that he could restart. He does have sutures in his forehead.    Review of Systems   Constitutional: Negative for activity change, diaphoresis, fatigue and fever.   HENT: Negative for congestion, ear pain, postnasal drip, sinus pressure, sore throat and trouble swallowing.    Eyes: Negative for pain.   Respiratory: Negative for cough, chest tightness, shortness of breath and wheezing.    Cardiovascular: Negative for chest pain, palpitations and leg swelling.   Gastrointestinal: Negative for abdominal distention, abdominal pain, blood in stool, constipation and diarrhea.   Endocrine: Negative for cold intolerance and heat intolerance.   Genitourinary: Negative for decreased urine volume, difficulty urinating, dysuria, flank pain, frequency and hematuria.   Musculoskeletal: Positive for neck pain. Negative for arthralgias, back pain, joint swelling and myalgias.        Left ankle pain    Skin: Negative for pallor, rash and wound.   Neurological: Positive for headaches. Negative for tremors, syncope, weakness, light-headedness and numbness.   Hematological: Negative for adenopathy.   Psychiatric/Behavioral: Negative for confusion, decreased concentration, hallucinations, self-injury, sleep disturbance and suicidal ideas. The patient is not nervous/anxious.        Past Medical History:   Diagnosis Date    Allergy     Anticoagulant long-term use     aspirin    Anxiety     Atrial fibrillation     Cataract     Clotting disorder     left leg    Diabetes mellitus     Diabetes mellitus, type 2     Hyperlipidemia     Hypertension     BRANDI on CPAP        Past Surgical History:   Procedure Laterality Date    ABDOMINAL SURGERY      origunal surg 1986-mult surgeries since    CARPAL TUNNEL RELEASE      right wrist  2009-left wrist 2010    COLON SURGERY      partial colon removal    COLOSTOMY  1986    colostomy reversal      EYE SURGERY      hay fever      HERNIA REPAIR      Injection,steroid,epidural,transforaminal approach Bilateral 12/10/2018    [facility]    Injection,steroid,epidural,transforaminal approach L4-5, L5-S1 Bilateral 2019    [facility]    INJECTION-STEROID-EPIDURAL-TRANSFORAMINAL Left 2018    [facility]    INJECTION-STEROID-EPIDURAL-TRANSFORAMINAL Left 2017    [facility]    TONSILLECTOMY         History reviewed. No pertinent family history.    Social History     Socioeconomic History    Marital status:      Spouse name: Not on file    Number of children: Not on file    Years of education: Not on file    Highest education level: Not on file   Occupational History    Not on file   Social Needs    Financial resource strain: Not on file    Food insecurity:     Worry: Not on file     Inability: Not on file    Transportation needs:     Medical: Not on file     Non-medical: Not on file   Tobacco Use    Smoking status: Former Smoker     Types: Cigarettes     Last attempt to quit: 2006     Years since quittin.1    Smokeless tobacco: Never Used   Substance and Sexual Activity    Alcohol use: No    Drug use: No    Sexual activity: Not on file   Lifestyle    Physical activity:     Days per week: Not on file     Minutes per session: Not on file    Stress: Not on file   Relationships    Social connections:     Talks on phone: Not on file     Gets together: Not on file     Attends [employer] service: Not on file     Active member of club or organization: Not on file     Attends meetings of clubs or organizations: Not on file     Relationship status: Not on file   Other Topics Concern    Not on file   Social History Narrative    Not on file        Current Outpatient Medications   Medication Sig Dispense Refill    apixaban (ELIQUIS) 5 mg Tab Take 1 tablet (5 mg total) by mouth 2 (two) times daily. 180 tablet 3    aspirin (ECOTRIN) 81 MG EC tablet Take 1 tablet (81 mg total) by mouth once daily. 90 tablet 3    atorvastatin (LIPITOR) 10 MG tablet nightly 90 tablet 3    azelastine (ASTELIN) 137 mcg (0.1 %) nasal spray 1 spray (137 mcg total) by Nasal route 2 (two) times daily. 30 mL 5    blood sugar diagnostic (BLOOD GLUCOSE TEST) Strp FREESTYLE LITE BG TEST STRIPS. current use of insulin  - Primary ICD-10-CM: E11.9 200 strip 3    blood sugar diagnostic Strp 200 strips by Misc.(Non-Drug; Combo Route) route 2 (two) times daily. 100 strip 3    diazePAM (VALIUM) 5 MG tablet Take 1 tablet (5 mg total) by mouth daily as needed for Anxiety. 30 tablet 5    diclofenac sodium (VOLTAREN) 1 % Gel VOLTAREN, 1% (Transdermal Gel)   as needed (1 %)  Active   Comments: Medication taken as needed.       fexofenadine (ALLEGRA) 180 MG tablet Take 1 tablet (180 mg total) by mouth once daily. 90 tablet 3    fluticasone (FLONASE) 50 mcg/actuation nasal spray 2 sprays (100 mcg total) by Each Nare route once daily. 3 Bottle 3    FREESTYLE LANCETS 28 gauge lancets       furosemide (LASIX) 40 MG tablet Every other day 45 tablet 3    HYDROcodone-acetaminophen (NORCO) 7.5-325 mg per tablet Take 1 tablet by mouth daily as needed for Pain. 30 tablet 0    ipratropium (ATROVENT) 0.03 % nasal spray 2 sprays by Nasal route 2 (two) times daily. 30 mL 5    metoprolol tartrate (LOPRESSOR) 25 MG tablet Take 1 tablet (25 mg total) by mouth 2 (two) times daily. 180 tablet 3    montelukast (SINGULAIR) 10 mg tablet Take 1 tablet (10 mg total) by mouth every evening. 90 tablet 3    multivit-iron-min-folic acid (MULTIVITAMIN-IRON-MINERALS-FOLIC ACID) 3,500-18-0.4 unit-mg-mg Chew Take by mouth.        naftifine (NAFTIN) 2 % Crea NAFTIN, 2% (External Cream)   as needed (2 %)  Active    Comments: Medication taken as needed.       polyethylene glycol (GLYCOLAX) 17 gram/dose powder Take 17 g by mouth once daily. 6 Bottle 3    potassium chloride (MICRO-K) 10 MEQ CpSR Every other day 45 capsule 3    traMADol (ULTRAM) 50 mg tablet Take 1 tablet (50 mg total) by mouth 2 (two) times daily. 60 tablet 4    traZODone (DESYREL) 100 MG tablet Take 1 tablet (100 mg total) by mouth every evening. 90 tablet 3    doxycycline (VIBRA-TABS) 100 MG tablet Take 1 tablet (100 mg total) by mouth every 12 (twelve) hours. for 7 days 14 tablet 0    FLUZONE HIGH-DOSE 2018-19, PF, 180 mcg/0.5 mL vaccine Inject 1 mL into the muscle once.  0    mupirocin (BACTROBAN) 2 % ointment Apply topically 2 (two) times daily. 22 g 1    SITagliptin (JANUVIA) 100 MG Tab Take 1 tablet (100 mg total) by mouth once daily. 90 tablet 3     No current facility-administered medications for this visit.        Review of patient's allergies indicates:   Allergen Reactions    Ativan [lorazepam] Other (See Comments)     Mood alternating      Dilaudid [hydromorphone (bulk)] Other (See Comments)     Mood alternating      Morphine Other (See Comments)     Mood alternating      Hydromorphone      Objective:      Physical Exam   Constitutional: He is oriented to person, place, and time. He appears well-developed and well-nourished. No distress.   HENT:   Head: Normocephalic and atraumatic.   Right Ear: External ear normal.   Left Ear: External ear normal.   Nose: Nose normal.   Mouth/Throat: Oropharynx is clear and moist.   Eyes: Conjunctivae and EOM are normal. Right eye exhibits no discharge. Left eye exhibits no discharge. No scleral icterus.   Neck: Normal range of motion. Neck supple. No JVD present. No tracheal deviation present. No thyromegaly present.   Cardiovascular: Normal rate, regular rhythm, normal heart sounds and intact distal pulses. Exam reveals no gallop.   No murmur heard.  Pulmonary/Chest: Effort normal and breath sounds  normal. No respiratory distress. He has no wheezes. He has no rales.   Abdominal: Soft. Bowel sounds are normal. He exhibits no distension and no mass. There is no tenderness.   Musculoskeletal: He exhibits no edema.        Right ankle: He exhibits decreased range of motion. He exhibits no swelling. Tenderness. Lateral malleolus tenderness found.   Lymphadenopathy:     He has no cervical adenopathy.   Neurological: He is alert and oriented to person, place, and time.   Skin: Skin is warm and dry. No rash noted. He is not diaphoretic. No erythema.        1 inch fairly shallow incision with metallic sutures 3 going across dried blood area. No surrounding erythema or fluctuance.   Psychiatric: He has a normal mood and affect. His behavior is normal. Judgment and thought content normal.       Lab Results   Component Value Date    WBC 6.2 10/05/2018    HGB 14.0 10/05/2018    HCT 44.3 10/05/2018     10/05/2018    CHOL 214 (H) 03/09/2016    TRIG 180 (H) 03/09/2016    HDL 41 03/09/2016    ALT 41 10/27/2015    AST 24 10/05/2018     10/05/2018    K 4.1 10/05/2018     10/05/2018    CREATININE 1.19 10/05/2018    BUN 21 (H) 10/05/2018    CO2 30.7 10/05/2018    TSH 1.67 03/27/2018    INR 1.0 05/17/2013    GLUF 96 03/09/2016    HGBA1C 6.6 (H) 10/05/2018       Assessment:       1. Accident due to mechanical fall without injury, sequela    2. Laceration of scalp, subsequent encounter    3. Old lacunar stroke without late effect    4. Cervical stenosis of spine    5. New daily persistent headache    6. Chronic pain of left ankle    7. Controlled type 2 diabetes mellitus without complication, without long-term current use of insulin    8. Sclerosing bone dysplasia    9. History of DVT (deep vein thrombosis)- 9/2012    10. Scalp laceration, sequela        Plan:       Accident due to mechanical fall without injury, sequela  -     MRI Brain W WO Contrast; Future; Expected date: 04/03/2019    Laceration of scalp,  subsequent encounter  -     MRI Brain W WO Contrast; Future; Expected date: 04/03/2019    Old lacunar stroke without late effect- ? Bilateral occipital lobe - during time of atrial fib - embolic vs artificat , need mri  -     MRI Brain W WO Contrast; Future; Expected date: 04/03/2019    Cervical stenosis of spine  -     Ambulatory referral to Pain Clinic    New daily persistent headache- likely from cervical stenosis seen on ct scan  -     MRI Brain W WO Contrast; Future; Expected date: 04/03/2019    Chronic pain of left ankle- recent toe surgery  -     X-Ray Ankle Complete Left; Future; Expected date: 04/03/2019    Controlled type 2 diabetes mellitus without complication, without long-term current use of insulin  -     Creatinine, serum; Future; Expected date: 04/03/2019  -     Hemoglobin A1c; Future; Expected date: 04/03/2019  -     Comprehensive metabolic panel; Future; Expected date: 04/03/2019  -     SITagliptin (JANUVIA) 100 MG Tab; Take 1 tablet (100 mg total) by mouth once daily.  Dispense: 90 tablet; Refill: 3    Sclerosing bone dysplasia- seen on ct of head without contrast  -     MRI Brain W WO Contrast; Future; Expected date: 04/03/2019    History of DVT (deep vein thrombosis)- 9/2012- noted    Scalp laceration  -     doxycycline (VIBRA-TABS) 100 MG tablet; Take 1 tablet (100 mg total) by mouth every 12 (twelve) hours. for 7 days  Dispense: 14 tablet; Refill: 0  -     mupirocin (BACTROBAN) 2 % ointment; Apply topically 2 (two) times daily.  Dispense: 22 g; Refill: 1    Time spent with the patient was 40 min and 50 percent of that was in face to face contact  I personally reviewed patient's actual radiographic studies and discussed them with the patient  - ct head  Patient signed a consent to obtain outside records to review after clinic visit so as to improve clinical care for this patient- cardiology

## 2019-04-08 ENCOUNTER — OFFICE VISIT (OUTPATIENT)
Dept: PAIN MEDICINE | Facility: CLINIC | Age: 79
End: 2019-04-08
Payer: MEDICARE

## 2019-04-08 VITALS
DIASTOLIC BLOOD PRESSURE: 69 MMHG | SYSTOLIC BLOOD PRESSURE: 141 MMHG | BODY MASS INDEX: 38.34 KG/M2 | HEIGHT: 68 IN | WEIGHT: 253 LBS | HEART RATE: 62 BPM

## 2019-04-08 DIAGNOSIS — M48.061 SPINAL STENOSIS OF LUMBAR REGION, UNSPECIFIED WHETHER NEUROGENIC CLAUDICATION PRESENT: ICD-10-CM

## 2019-04-08 DIAGNOSIS — M50.30 DDD (DEGENERATIVE DISC DISEASE), CERVICAL: ICD-10-CM

## 2019-04-08 DIAGNOSIS — M79.18 MYOFASCIAL PAIN: Primary | ICD-10-CM

## 2019-04-08 PROCEDURE — 99213 OFFICE O/P EST LOW 20 MIN: CPT | Mod: S$PBB,,, | Performed by: PHYSICIAN ASSISTANT

## 2019-04-08 PROCEDURE — 99213 PR OFFICE/OUTPT VISIT, EST, LEVL III, 20-29 MIN: ICD-10-PCS | Mod: S$PBB,,, | Performed by: PHYSICIAN ASSISTANT

## 2019-04-08 PROCEDURE — 99999 PR PBB SHADOW E&M-EST. PATIENT-LVL V: ICD-10-PCS | Mod: PBBFAC,,, | Performed by: PHYSICIAN ASSISTANT

## 2019-04-08 PROCEDURE — 99999 PR PBB SHADOW E&M-EST. PATIENT-LVL V: CPT | Mod: PBBFAC,,, | Performed by: PHYSICIAN ASSISTANT

## 2019-04-08 PROCEDURE — 99215 OFFICE O/P EST HI 40 MIN: CPT | Mod: PBBFAC,PN | Performed by: PHYSICIAN ASSISTANT

## 2019-04-08 RX ORDER — METHOCARBAMOL 500 MG/1
500 TABLET, FILM COATED ORAL 3 TIMES DAILY PRN
Qty: 45 TABLET | Refills: 0 | Status: SHIPPED | OUTPATIENT
Start: 2019-04-08 | End: 2019-04-23

## 2019-04-08 NOTE — PATIENT INSTRUCTIONS
Exercises To Strengthen Your Lower Back  Strong lower-back and abdominal muscles work together to support your spine. These exercises will help strengthen the muscles of the lower back. It is important that you begin exercising slowly and increase levels gradually.  Always begin any exercise program with stretching. If you feel pain while doing any of these exercises, stop and talk to your doctor about a more specific exercise program that suits your condition better.  Low Back Stretch  · Lie on your back with your knees bent and both feet on the ground.  ·   Slowly raise your left knee to your chest as you flatten your lower back against the floor. Hold for 5 seconds.  · Relax and repeat the exercise with your right knee.  · Do 10 of these exercises for each leg.  · Repeat hugging both knees to your chest at the same time.  Building Lower Back Strength  1. Kneeling Lumbar Extension: Begin on your hands and knees. Simultaneously raise and straighten your right arm and left leg until they are parallel to the ground. Hold for 2 seconds and come back slowly to a starting position. Repeat with left arm and right leg, alternating 10 times.  2. Prone Lumbar Extension: Lie face down, arms extended overhead, palms on the floor. Simultaneously raise your right arm and left leg as high as comfortably possible. Hold for 10 seconds and slowly return to start. Repeat with left arm and right leg, alternating 10 times. Gradually build up to 20 times. (Advanced: Repeat this exercise raising both arms and both legs a few inches off the floor at the same time. Hold for 5 seconds and release.)  3. Pelvic Tilt: Lie on the floor on your back with your knees bent at 90 degrees. Your feet should be flat on the floor. Inhale, exhale, then slowly contract your abdominal muscles bringing your navel toward your spine. Let your pelvis rock back until your lower back is flat on the floor. Hold for 10 seconds while breathing  smoothly.  4. Abdominal Crunch: Perform a Pelvic Tilt (above) flattening your lower back against the floor. Holding the tension in your abdominal muscles, take another breath and raise your shoulder blades off the ground (this is not a full sit-up). Keep your head in line with your body (dont bend your neck forward). Hold for 2 seconds, then slowly lower.      Back Exercises: Abdominal Lift  The Abdominal Lift strengthens your lower abdominal muscles, helping you keep your pelvis and back stable.  · Lie on the floor with both knees bent. Put your feet flat on the floor and your arms by your sides. Tighten your abdominal muscles.  · Lift one bent knee and move it toward your upper body. Keep your abdominal muscles tight and your back flat on the floor. Hold for 10 seconds.  · Repeat 3 times. Then, switch legs.         Back Exercises: Bridge  The Bridge exercise strengthens your abdominal, buttocks, and hamstring muscles. This helps keep your back stable and aligned when you walk.  · Lie on the floor with your back and palms flat. Bend your knees. Keep your feet flat on the floor.  · Contract your abdominal and buttocks muscles. Slowly lift your buttocks off the floor until there is a straight line from your knees to your shoulders.  · Hold for 5  seconds. Repeat 10 times.          Back Exercises: Hip Lift        To start, lie on your back with your knees bent and feet flat on the floor. Dont press your neck or lower back to the floor. Breathe deeply. You should feel comfortable and relaxed in this position.  · Slowly raise your hips upward.  · Tighten your abdomen and buttocks. Be careful not to arch your back.  · Hold for 5 seconds. Lower your hips to the floor.  · Repeat 10 times.  For your safety, check with your healthcare provider before starting an exercise program.       Back Exercises: Hip Rotator Stretch        To start, lie on your back with your knees bent and feet flat on the floor. Dont press your  neck or lower back to the floor. Breathe deeply. You should feel comfortable and relaxed in this position.  · Rest your right ankle on your left knee.  · Place a towel behind your left thigh and use it to pull the knee toward your chest. Feel the stretch in your buttocks.  · Hold for 30-60 seconds. Release.  · Repeat 2 times.  · Switch legs.   For your safety, check with your healthcare provider before starting an exercise program.       Back Exercises: Knee Lift        To start, lie on your back with your knees bent and feet flat on the floor. Dont press your neck or lower back to the floor. Breathe deeply. You should feel comfortable and relaxed in this position.  · Lift one bent knee and move it toward your upper body. Keep your abdominal muscles tight and your back flat on the floor.  · Hold for 10 seconds. Return to start position.  · Repeat 3 times.  · Switch legs.        Back Exercises: Leg Pull        To start, lie on your back with your knees bent and feet flat on the floor. Dont press your neck or lower back to the floor. Breathe deeply. You should feel comfortable and relaxed in this position.  · Pull one knee to your chest.  · Hold for 30-60 seconds. Return to starting position.  · Repeat 2 times.  · Switch legs.  · For a double leg pull, pull both legs to your chest at the same time. Repeat 2 times.  For your safety, check with your healthcare provider before starting an exercise program.       Back Exercises: Leg Reach        Do this exercise on your hands and knees. Keep your knees under your hips and your hands under your shoulders. Keep your spine in a neutral position (not arched or sagging). Be sure to maintain your necks natural curve.  · Extend one leg straight back. Dont arch your back or let your head or body sag.  · Hold for 5 seconds. Return to starting position.  · Repeat 5 times.  · Switch legs.       Back Exercises: Lower Back Rotation  To start, lie on your back with your knees bent  and feet flat on the floor. Dont press your neck or lower back to the floor. Breathe deeply. You should feel comfortable and relaxed in this position.  · Drop both knees to one side. Turn your head to the other side. Keep your shoulders flat on the floor.  · Hold for 20 seconds.  · Slowly switch sides.  · Repeat 2 times.                                   Back Exercises: Lower Back Stretch                        To start, sit in a chair with your feet flat on the floor. Shift your weight slightly forward to avoid rounding your back. Relax, and keep your ears, shoulders, and hips aligned.  · Sit with your feet well apart.  · Bend forward and touch the floor with the backs of your hands. Relax and let your body drop.  · Hold for 20 seconds. Return to starting position.  · Repeat 2 times.       Back Exercises: Partial Curl-Ups        To start, lie on your back with your knees bent and feet flat on the floor. Dont press your neck or lower back to the floor. Breathe deeply. You should feel comfortable and relaxed in this position.  · Cross your arms loosely.  · Tighten your abdomen and curl intermediate up, keeping your head in line with your shoulders.  · Hold for 5 seconds. Uncurl to lie down.  · Repeat 5 times.       Back Exercises: Pelvic Tilt  To start, lie on your back with your knees bent and feet flat on the floor. Dont press your neck or lower back to the floor. Breathe deeply. You should feel comfortable and relaxed in this position.  · Tighten your abdomen and buttocks, and press your lower back toward the floor. This should be a small, subtle movement.  · Hold for 5 seconds. Release.  · Repeat 5 times.                           Back Exercises: Seated Rotation      To start, sit in a chair with your feet flat on the floor. Shift your weight slightly forward to avoid rounding your back. Relax, and keep your ears, shoulders, and hips aligned.  · Fold your arms, elbows just below shoulder height.  · Turn from the  waist with hips forward. Turn your head last.  · Hold for a count of 5. Return to starting position.  · Repeat 5 times on one side. Then switch sides.          Back Exercises: Side Stretch  To start, sit in a chair with your feet flat on the floor. Shift your weight slightly forward to avoid rounding your back. Relax. Keep your ears, shoulders, and hips aligned.  · Stretch your right arm overhead.  · Slowly bend to the left. Dont twist your torso.  · Hold for 20 seconds. Return to starting position.  · Repeat 2 times. Then, switch to the other side.      © 7719-7296 [employer]. [address] [place] [address], [place] [address]. All rights reserved. This information is not intended as a substitute for professional medical care. Always follow your healthcare professional's instructions.

## 2019-04-08 NOTE — PROGRESS NOTES
Referring Physician: No ref. provider found    PCP: Citlali Parish MD      CC:low back and left leg pain    Interval History:  Mr. Hollingsworth is a 78 y.o. male with a low back and left leg pain who presents today for evaluation of neck pain. Neck pain is located on the left. Denies any radiation. Pain is pulling in nature. Reports he had a fall on 4/2/19. Pain has improved since the fall. He has had lumbar TFESI bilaterally at L4-5 and L5-S1 in the past that provided 70% relief.  Continues to benefit.  He denies any LE weakness or b/b changes. He takes Hydrocodone 7.5 mg sparingly prescribed by Dr. Parish.In the past he was evaluated by [facility] and lumbar surgery was  recommended. Pain today is rated 6/10.      HPI:   Yeyo Hollingsworth is a 78 y.o. male ho presents with lower back and left leg pain.  Pain is been present since 2010.  No recent traumatic incident.  His intermittent aching, throbbing, electric pain in his lower back.  Pain radiates down his left buttock into his left posterior thigh.  Pain worsens with prolonged standing, walking, getting up and coughing.  Pain improves with rest.  His tried physical therapy with minimal benefit.  He has undergone lumbar JOSE's with Dr. Rendon in the past with moderate benefit.  Most recent injection was performed in September 2016.  Pain has since returned.  He desires repeat injections with us.  He denies any weakness.  No bowel bladder changes.  He rates his pain 6/10 today.    ROS:  CONSTITUTIONAL: No fevers, chills, night sweats, wt. loss, appetite changes  SKIN: no rashes or itching  ENT: No headaches, head trauma, vision changes, or eye pain  LYMPH NODES: None noticed   CV: No chest pain, palpitations.   RESP: No shortness of breath, dyspnea on exertion, cough, wheezing, or hemoptysis  GI: No nausea, emesis, diarrhea, constipation, melena, hematochezia, pain.    : No dysuria, hematuria, urgency, or frequency   HEME: No easy bruising, bleeding  problems  PSYCHIATRIC: No depression, anxiety, psychosis, hallucinations.  NEURO: No seizures, memory loss, dizziness or difficulty sleeping  MSK: + history of present illness      Past Medical History:   Diagnosis Date    Allergy     Anticoagulant long-term use     aspirin    Anxiety     Atrial fibrillation     Cataract     Clotting disorder     left leg    Diabetes mellitus     Diabetes mellitus, type 2     Hyperlipidemia     Hypertension     BRANDI on CPAP      Past Surgical History:   Procedure Laterality Date    ABDOMINAL SURGERY      origunal surg -mult surgeries since    CARPAL TUNNEL RELEASE      right wrist -left wrist     COLON SURGERY      partial colon removal    COLOSTOMY  1986    colostomy reversal      EYE SURGERY      hay fever      HERNIA REPAIR  194    Injection,steroid,epidural,transforaminal approach Bilateral 12/10/2018    [facility]    Injection,steroid,epidural,transforaminal approach L4-5, L5-S1 Bilateral 2019    [facility]    INJECTION-STEROID-EPIDURAL-TRANSFORAMINAL Left 2018    [facility]    INJECTION-STEROID-EPIDURAL-TRANSFORAMINAL Left 2017    [facility]    TONSILLECTOMY       History reviewed. No pertinent family history.  Social History     Socioeconomic History    Marital status:      Spouse name: Not on file    Number of children: Not on file    Years of education: Not on file    Highest education level: Not on file   Occupational History    Not on file   Social Needs    Financial resource strain: Not on file    Food insecurity:     Worry: Not on file     Inability: Not on file    Transportation needs:     Medical: Not on file     Non-medical: Not on file   Tobacco Use    Smoking status: Former Smoker     Types: Cigarettes     Last attempt to quit: 2006     Years since quittin.1    Smokeless  "tobacco: Never Used   Substance and Sexual Activity    Alcohol use: No    Drug use: No    Sexual activity: Not on file   Lifestyle    Physical activity:     Days per week: Not on file     Minutes per session: Not on file    Stress: Not on file   Relationships    Social connections:     Talks on phone: Not on file     Gets together: Not on file     Attends [employer] service: Not on file     Active member of club or organization: Not on file     Attends meetings of clubs or organizations: Not on file     Relationship status: Not on file   Other Topics Concern    Not on file   Social History Narrative    Not on file         Medications/Allergies: See med card    Vitals:    04/08/19 0832   BP: (!) 141/69   Pulse: 62   Weight: 114.8 kg (253 lb)   Height: 5' 8" (1.727 m)   PainSc:   6   PainLoc: Neck         Physical exam:    GENERAL: A and O x3, the patient appears well groomed and is in no acute distress.  Skin: No rashes or obvious lesions  HEENT: normocephalic, atraumatic  CARDIOVASCULAR:  Bradycardia  LUNGS: non labored breathing  ABDOMEN: soft, nontender   UPPER EXTREMITIES: Normal alignment, normal range of motion, no atrophy, no skin changes,  hair growth and nail growth normal and equal bilaterally. No swelling, no tenderness.    LOWER EXTREMITIES:  Normal alignment, normal range of motion, no atrophy, no skin changes,  hair growth and nail growth normal and equal bilaterally. No swelling, no tenderness.    LUMBAR SPINE  Lumbar spine: ROM is limited with flexion extension and oblique extension with moderate increased pain.    Caden's test causes no increased pain on either side.    Supine straight leg raise is negative bilaterally.    Internal and external rotation of the hip causes no increased pain on either side.  Myofascial exam: No tenderness to palpation across lumbar paraspinous muscles. Moderate tenderness of right thoracic Paraspinous muscles       MENTAL STATUS: normal orientation, speech, " language, and fund of knowledge for social situation.  Emotional state appropriate.    CRANIAL NERVES:  II:  PERRL bilaterally,   III,IV,VI: EOMI.    V:  Facial sensation equal bilaterally  VII:  Facial motor function normal.  VIII:  Hearing equal to finger rub bilaterally  IX/X: Gag normal, palate symmetric  XI:  Shoulder shrug equal, head turn equal  XII:  Tongue midline without fasciculations      MOTOR: Tone and bulk: normal bilateral upper and lower Strength: normal   Delt Bi Tri WE WF     R 5 5 5 5 5 5   L 5 5 5 5 5 5     IP ADD ABD Quad TA Gas HAM  R 5 5 5 5 5 5 5  L 5 5 5 5 5 5 5    SENSATION: Light touch and pinprick intact bilaterally  REFLEXES: normal, symmetric, nonbrisk.  Toes down, no clonus. No hoffmans.  GAIT: normal rise, base, steps, and arm swing.        Imaging:  Cervical CT [facility] 4/2019  Significant spinal central canal stenosis along with foraminal stenosis most significantly at the level of C5/C6    MRI lumbar spine 9/2017 ([facility])  Severe facet arthrosis and ligamentum flavum at L4-5 results in severe spinal canal stenosis. Possible impingement of the bilateral L5 nerve as well as bilateral L4 nerves.  There is a grade 1 anterolisthesis of L4 and L5.  Moderate to severe bilateral neuroforaminal L3-4.  This note was completed with dictation software and grammatical errors may exist.      Assessment:  Mr. Hollingsworth is a 78 y.o. male with low back and left leg pain  1. Myofascial pain    2. DDD (degenerative disc disease), cervical    3. Spinal stenosis of lumbar region, unspecified whether neurogenic claudication present        Plan:  1.  I have stressed the importance of physical activity and exercise to improve overall health  2. Apply heat and do active ROM exercises  3. If pain changes in nature, will consider cervical JOSE  4. Start Robaxin 500 mg q 8 h prn #45  5. Monitor progress and consider a repeat lumbar epidural steroid injection to the Left L4-5 and L5-S1 level.    6. Continue  Hydrocodone 7.5 mg sparingly prescribed by Dr. Parish  7. F/u prn

## 2019-04-09 LAB
ALBUMIN SERPL-MCNC: 4 G/DL (ref 3.1–4.7)
ALP SERPL-CCNC: 30 IU/L (ref 40–104)
ALT (SGPT): 41 IU/L (ref 3–33)
AST SERPL-CCNC: 30 IU/L (ref 10–40)
BILIRUB SERPL-MCNC: 0.5 MG/DL (ref 0.3–1)
BUN SERPL-MCNC: 30 MG/DL (ref 8–20)
CALCIUM SERPL-MCNC: 8.9 MG/DL (ref 7.7–10.4)
CHLORIDE: 102 MMOL/L (ref 98–110)
CO2 SERPL-SCNC: 29.7 MMOL/L (ref 22.8–31.6)
CREATININE: 1.02 MG/DL (ref 0.6–1.4)
GLUCOSE: 130 MG/DL (ref 70–99)
HBA1C MFR BLD: 6.7 % (ref 3.1–6.5)
POTASSIUM SERPL-SCNC: 3.9 MMOL/L (ref 3.5–5)
PROT SERPL-MCNC: 7 G/DL (ref 6–8.2)
SODIUM: 140 MMOL/L (ref 134–144)

## 2019-04-10 ENCOUNTER — TELEPHONE (OUTPATIENT)
Dept: FAMILY MEDICINE | Facility: CLINIC | Age: 79
End: 2019-04-10

## 2019-04-10 NOTE — TELEPHONE ENCOUNTER
----- Message from Citlali Ward MD sent at 4/9/2019  3:47 PM CDT -----  Xray - shows a moderate sized plantar bone spur and a tendon thickening in the ankle. Does he have a podiatrist he would like to see?

## 2019-04-11 ENCOUNTER — OFFICE VISIT (OUTPATIENT)
Dept: PODIATRY | Facility: CLINIC | Age: 79
End: 2019-04-11
Payer: MEDICARE

## 2019-04-11 VITALS
HEART RATE: 62 BPM | HEIGHT: 68 IN | BODY MASS INDEX: 38.34 KG/M2 | DIASTOLIC BLOOD PRESSURE: 81 MMHG | SYSTOLIC BLOOD PRESSURE: 133 MMHG | WEIGHT: 253 LBS

## 2019-04-11 DIAGNOSIS — Z86.718 HISTORY OF DVT (DEEP VEIN THROMBOSIS): ICD-10-CM

## 2019-04-11 DIAGNOSIS — M51.36 DDD (DEGENERATIVE DISC DISEASE), LUMBAR: ICD-10-CM

## 2019-04-11 DIAGNOSIS — M21.6X9 EQUINUS DEFORMITY OF FOOT: ICD-10-CM

## 2019-04-11 DIAGNOSIS — G89.29 CHRONIC PAIN OF LEFT ANKLE: ICD-10-CM

## 2019-04-11 DIAGNOSIS — E11.9 CONTROLLED TYPE 2 DIABETES MELLITUS WITHOUT COMPLICATION, WITHOUT LONG-TERM CURRENT USE OF INSULIN: ICD-10-CM

## 2019-04-11 DIAGNOSIS — M48.02 CERVICAL STENOSIS OF SPINE: ICD-10-CM

## 2019-04-11 DIAGNOSIS — Z86.73 OLD LACUNAR STROKE WITHOUT LATE EFFECT: Primary | ICD-10-CM

## 2019-04-11 DIAGNOSIS — M25.572 CHRONIC PAIN OF LEFT ANKLE: ICD-10-CM

## 2019-04-11 DIAGNOSIS — E11.9 ENCOUNTER FOR COMPREHENSIVE DIABETIC FOOT EXAMINATION, TYPE 2 DIABETES MELLITUS: ICD-10-CM

## 2019-04-11 PROCEDURE — 99213 PR OFFICE/OUTPT VISIT, EST, LEVL III, 20-29 MIN: ICD-10-PCS | Mod: ,,, | Performed by: PODIATRIST

## 2019-04-11 PROCEDURE — 99213 OFFICE O/P EST LOW 20 MIN: CPT | Mod: ,,, | Performed by: PODIATRIST

## 2019-04-11 NOTE — PROGRESS NOTES
[address]  [place] [address]  Phone: [phone]   [phone]    Patient's PCP:Citlali Parish MD  Referring Provider: No ref. provider found    Subjective:      Chief Complaint:: Foot Pain (left foot and ankle feel like I have a vasiliy horse)    HPI  Yeyo Hollingsworth is a 78 y.o. male who presents with a complaint my left foot and ankle feel like it has a vasiliy horse started after I had a blood clot in 2012 .My left foot feels different than my right, like its dead.Also swelling. Current symptoms include pain and swelling.  Symptoms have remained the same.Treatment to date have included elevating my legs and I saw my primary care doctor 4-8-19 and I mentioned this to her and had some xrays done, told I had a bone spur in the ankle. Patients rates pain 0/10 on pain scale. A week ago I fell and hit my head so I went [facility] and had a ct done, was told at some time I had a mini stroke.    Systemic Doctor: Citlali Parish MD  Date Last Seen: 4-8-19  Blood Sugar: 120-150  Hemoglobin A1c: 6.7 4-19    Vitals:    04/11/19 1350   BP: 133/81   Pulse: 62     Shoe Size:     Past Surgical History:   Procedure Laterality Date    ABDOMINAL SURGERY      origunal surg 1986-mult surgeries since    CARPAL TUNNEL RELEASE      right wrist 2009-left wrist 2010    COLON SURGERY      partial colon removal    COLOSTOMY  1986    colostomy reversal  1986    EYE SURGERY      hay fever      HERNIA REPAIR  1949    Injection,steroid,epidural,transforaminal approach Bilateral 12/10/2018    [facility]    Injection,steroid,epidural,transforaminal approach L4-5, L5-S1 Bilateral 1/28/2019    [facility]    INJECTION-STEROID-EPIDURAL-TRANSFORAMINAL Left 4/30/2018    [facility]    INJECTION-STEROID-EPIDURAL-TRANSFORAMINAL Left 9/28/2017    [facility] at [facility]    TONSILLECTOMY  1947     Past  Medical History:   Diagnosis Date    Allergy     Anticoagulant long-term use     aspirin    Anxiety     Atrial fibrillation     Cataract     Clotting disorder     left leg    Diabetes mellitus     Diabetes mellitus, type 2     Hyperlipidemia     Hypertension     BRANDI on CPAP      History reviewed. No pertinent family history.     Social History:   Marital Status:   Alcohol History:  reports that he does not drink alcohol.  Tobacco History:  reports that he quit smoking about 13 years ago. His smoking use included cigarettes. He has never used smokeless tobacco.  Drug History:  reports that he does not use drugs.    Review of patient's allergies indicates:   Allergen Reactions    Ativan [lorazepam] Other (See Comments)     Mood alternating      Dilaudid [hydromorphone (bulk)] Other (See Comments)     Mood alternating      Morphine Other (See Comments)     Mood alternating      Hydromorphone        Current Outpatient Medications   Medication Sig Dispense Refill    apixaban (ELIQUIS) 5 mg Tab Take 1 tablet (5 mg total) by mouth 2 (two) times daily. 180 tablet 3    aspirin (ECOTRIN) 81 MG EC tablet Take 1 tablet (81 mg total) by mouth once daily. 90 tablet 3    atorvastatin (LIPITOR) 10 MG tablet nightly 90 tablet 3    azelastine (ASTELIN) 137 mcg (0.1 %) nasal spray 1 spray (137 mcg total) by Nasal route 2 (two) times daily. 30 mL 5    blood sugar diagnostic (BLOOD GLUCOSE TEST) Strp FREESTYLE LITE BG TEST STRIPS. current use of insulin  - Primary ICD-10-CM: E11.9 200 strip 3    blood sugar diagnostic Strp 200 strips by Misc.(Non-Drug; Combo Route) route 2 (two) times daily. 100 strip 3    diazePAM (VALIUM) 5 MG tablet Take 1 tablet (5 mg total) by mouth daily as needed for Anxiety. 30 tablet 5    diclofenac sodium (VOLTAREN) 1 % Gel VOLTAREN, 1% (Transdermal Gel)   as needed (1 %)  Active   Comments: Medication taken as needed.       fexofenadine (ALLEGRA) 180 MG tablet Take 1 tablet (180  mg total) by mouth once daily. 90 tablet 3    fluticasone (FLONASE) 50 mcg/actuation nasal spray 2 sprays (100 mcg total) by Each Nare route once daily. 3 Bottle 3    FLUZONE HIGH-DOSE 2018-19, PF, 180 mcg/0.5 mL vaccine Inject 1 mL into the muscle once.  0    FREESTYLE LANCETS 28 gauge lancets       furosemide (LASIX) 40 MG tablet Every other day 45 tablet 3    HYDROcodone-acetaminophen (NORCO) 7.5-325 mg per tablet Take 1 tablet by mouth daily as needed for Pain. 30 tablet 0    ipratropium (ATROVENT) 0.03 % nasal spray 2 sprays by Nasal route 2 (two) times daily. 30 mL 5    methocarbamol (ROBAXIN) 500 MG Tab Take 1 tablet (500 mg total) by mouth 3 (three) times daily as needed. 45 tablet 0    metoprolol tartrate (LOPRESSOR) 25 MG tablet Take 1 tablet (25 mg total) by mouth 2 (two) times daily. 180 tablet 3    montelukast (SINGULAIR) 10 mg tablet Take 1 tablet (10 mg total) by mouth every evening. 90 tablet 3    multivit-iron-min-folic acid (MULTIVITAMIN-IRON-MINERALS-FOLIC ACID) 3,500-18-0.4 unit-mg-mg Chew Take by mouth.        mupirocin (BACTROBAN) 2 % ointment Apply topically 2 (two) times daily. 22 g 1    naftifine (NAFTIN) 2 % Crea NAFTIN, 2% (External Cream)   as needed (2 %)  Active   Comments: Medication taken as needed.       polyethylene glycol (GLYCOLAX) 17 gram/dose powder Take 17 g by mouth once daily. 6 Bottle 3    potassium chloride (MICRO-K) 10 MEQ CpSR Every other day 45 capsule 3    SITagliptin (JANUVIA) 100 MG Tab Take 1 tablet (100 mg total) by mouth once daily. 90 tablet 3    traMADol (ULTRAM) 50 mg tablet Take 1 tablet (50 mg total) by mouth 2 (two) times daily. 60 tablet 4    traZODone (DESYREL) 100 MG tablet Take 1 tablet (100 mg total) by mouth every evening. 90 tablet 3     No current facility-administered medications for this visit.        Review of Systems      Objective:        Physical Exam:   Foot Exam    General  General Appearance: appears stated age and healthy    Orientation: alert and oriented to person, place, and time   Affect: appropriate   Gait: antalgic       Right Foot/Ankle     Inspection and Palpation  Arch: pes planus  Skin Exam: skin intact;     Neurovascular  Dorsalis pedis: 2+  Posterior tibial: 2+  Saphenous nerve sensation: normal  Tibial nerve sensation: normal  Superficial peroneal nerve sensation: normal  Deep peroneal nerve sensation: normal  Sural nerve sensation: normal    Muscle Strength  Ankle dorsiflexion: 5  Ankle plantar flexion: 5  Ankle inversion: 5  Ankle eversion: 5  Great toe extension: 5  Great toe flexion: 5      Left Foot/Ankle      Inspection and Palpation  Arch: pes planus  Skin Exam: skin intact;     Neurovascular  Dorsalis pedis: 2+  Posterior tibial: 2+  Saphenous nerve sensation: diminished  Tibial nerve sensation: diminished  Superficial peroneal nerve sensation: diminished  Deep peroneal nerve sensation: diminished  Sural nerve sensation: diminished    Muscle Strength  Ankle dorsiflexion: 4+  Ankle plantar flexion: 4+  Ankle inversion: 4+  Ankle eversion: 4+  Great toe extension: 4+  Great toe flexion: 4+    Range of Motion    Passive  Ankle dorsiflexion: 5, pain  Plantar flexion: 20, pain  Ankle eversion: 5, pain  Ankle inversion: 10, pain      Comments  Left lower extremity with more severe varicosities and venous stasis and venous incompetency.  Patient has decreased sensation on the toes of the left foot compared to the right foot. 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    Physical Exam   Cardiovascular:   Pulses:       Dorsalis pedis pulses are 2+ on the right side, and 2+ on the left side.        Posterior tibial pulses are 2+ on the right side, and 2+ on the left side.   Musculoskeletal:        Legs:       Feet:        Imaging:            Assessment:       1. Old lacunar stroke without late effect    2. Cervical stenosis of  spine    3. Chronic pain of left ankle    4. Controlled type 2 diabetes mellitus without complication, without long-term current use of insulin    5. DDD (degenerative disc disease), lumbar    6. Equinus deformity of foot    7. Encounter for comprehensive diabetic foot examination, type 2 diabetes mellitus    8. History of DVT (deep vein thrombosis)      Plan:   Old lacunar stroke without late effect    Cervical stenosis of spine    Chronic pain of left ankle    Controlled type 2 diabetes mellitus without complication, without long-term current use of insulin    DDD (degenerative disc disease), lumbar    Equinus deformity of foot    Encounter for comprehensive diabetic foot examination, type 2 diabetes mellitus    History of DVT (deep vein thrombosis)    I have recommend the patient use lower extremity compression socks to control the edema is a Place him on first thing in the morning were normal day. I explained to me needs a work on range of motion strengthening. I told him this may be a residual of his stroke but he also has a history of lumbar spinal stenosis and herniated disc. I told him I believe is evident radiculopathy secondary to the herniated disc and that he should return to see his back doctor. Patient states that he's been told he would need surgery he does not want to have that done on his back. I told him to then work on range of motion and strength if he continues to have pain and he should see the back doctor. He'll return as needed  No follow-ups on file.    Procedures - None    Counseling:     I provided patient education verbally regarding:   Patient diagnosis, treatment options, as well as alternatives, risks, and benefits.     This note was created using Dragon voice recognition software that occasionally misinterpreted phrases or words.

## 2019-04-17 ENCOUNTER — TELEPHONE (OUTPATIENT)
Dept: FAMILY MEDICINE | Facility: CLINIC | Age: 79
End: 2019-04-17

## 2019-04-17 NOTE — TELEPHONE ENCOUNTER
----- Message from Citlali Ward MD sent at 4/16/2019  5:43 PM CDT -----  MR the brain does show some small vessel disease within small area in the right cerebellar hemisphere consistent with an old lacunar infarct. He does have a bony island in the area that is seen on the left parietal bone but no abnormalities concerning. We will discuss this in full on follow-up in a week.

## 2019-04-24 ENCOUNTER — OFFICE VISIT (OUTPATIENT)
Dept: FAMILY MEDICINE | Facility: CLINIC | Age: 79
End: 2019-04-24
Payer: MEDICARE

## 2019-04-24 ENCOUNTER — TELEPHONE (OUTPATIENT)
Dept: SURGERY | Facility: CLINIC | Age: 79
End: 2019-04-24

## 2019-04-24 VITALS
RESPIRATION RATE: 16 BRPM | HEART RATE: 67 BPM | OXYGEN SATURATION: 95 % | BODY MASS INDEX: 38.65 KG/M2 | HEIGHT: 68 IN | TEMPERATURE: 98 F | SYSTOLIC BLOOD PRESSURE: 132 MMHG | WEIGHT: 255 LBS | DIASTOLIC BLOOD PRESSURE: 70 MMHG

## 2019-04-24 DIAGNOSIS — I10 ESSENTIAL HYPERTENSION: ICD-10-CM

## 2019-04-24 DIAGNOSIS — R21 RASH: ICD-10-CM

## 2019-04-24 DIAGNOSIS — R09.89 CHRONIC SINUS COMPLAINTS: ICD-10-CM

## 2019-04-24 DIAGNOSIS — R79.89 PRERENAL AZOTEMIA: ICD-10-CM

## 2019-04-24 DIAGNOSIS — G89.29 CHRONIC PAIN OF BOTH KNEES: Primary | ICD-10-CM

## 2019-04-24 DIAGNOSIS — M25.572 CHRONIC PAIN OF LEFT ANKLE: ICD-10-CM

## 2019-04-24 DIAGNOSIS — I48.20 CHRONIC ATRIAL FIBRILLATION: ICD-10-CM

## 2019-04-24 DIAGNOSIS — J30.89 CHRONIC NONSEASONAL ALLERGIC RHINITIS DUE TO FUNGAL SPORES: ICD-10-CM

## 2019-04-24 DIAGNOSIS — M25.562 CHRONIC PAIN OF BOTH KNEES: Primary | ICD-10-CM

## 2019-04-24 DIAGNOSIS — G89.29 CHRONIC PAIN OF LEFT ANKLE: ICD-10-CM

## 2019-04-24 DIAGNOSIS — E11.9 DIABETES MELLITUS TYPE 2 IN NONOBESE: ICD-10-CM

## 2019-04-24 DIAGNOSIS — M25.561 CHRONIC PAIN OF BOTH KNEES: Primary | ICD-10-CM

## 2019-04-24 DIAGNOSIS — K64.4 EXTERNAL HEMORRHOIDS WITH COMPLICATION: ICD-10-CM

## 2019-04-24 PROCEDURE — 99214 OFFICE O/P EST MOD 30 MIN: CPT | Mod: ,,, | Performed by: INTERNAL MEDICINE

## 2019-04-24 PROCEDURE — 99214 PR OFFICE/OUTPT VISIT, EST, LEVL IV, 30-39 MIN: ICD-10-PCS | Mod: ,,, | Performed by: INTERNAL MEDICINE

## 2019-04-24 RX ORDER — LISINOPRIL 10 MG/1
10 TABLET ORAL DAILY
COMMUNITY
End: 2019-04-24 | Stop reason: SDUPTHER

## 2019-04-24 RX ORDER — GABAPENTIN 100 MG/1
CAPSULE ORAL
Qty: 180 CAPSULE | Refills: 2 | Status: SHIPPED | OUTPATIENT
Start: 2019-04-24 | End: 2019-07-31

## 2019-04-24 RX ORDER — BETAMETHASONE DIPROPIONATE 0.5 MG/G
CREAM TOPICAL
COMMUNITY
Start: 2019-04-12 | End: 2020-01-23

## 2019-04-24 RX ORDER — FLUTICASONE PROPIONATE 50 MCG
2 SPRAY, SUSPENSION (ML) NASAL DAILY
Qty: 3 BOTTLE | Refills: 3 | Status: SHIPPED | OUTPATIENT
Start: 2019-04-24 | End: 2022-06-28

## 2019-04-24 RX ORDER — LISINOPRIL 10 MG/1
10 TABLET ORAL DAILY
Qty: 90 TABLET | Refills: 3 | Status: SHIPPED | OUTPATIENT
Start: 2019-04-24 | End: 2019-04-29

## 2019-04-24 RX ORDER — HYDROCODONE BITARTRATE AND ACETAMINOPHEN 7.5; 325 MG/1; MG/1
1 TABLET ORAL EVERY 12 HOURS PRN
Qty: 45 TABLET | Refills: 0 | Status: SHIPPED | OUTPATIENT
Start: 2019-04-24 | End: 2019-06-25 | Stop reason: SDUPTHER

## 2019-04-24 RX ORDER — AZELASTINE 1 MG/ML
1 SPRAY, METERED NASAL 2 TIMES DAILY
Qty: 30 ML | Refills: 5 | Status: SHIPPED | OUTPATIENT
Start: 2019-04-24 | End: 2020-01-23

## 2019-04-24 RX ORDER — TRAMADOL HYDROCHLORIDE 50 MG/1
50 TABLET ORAL 2 TIMES DAILY
Qty: 60 TABLET | Refills: 4 | Status: SHIPPED | OUTPATIENT
Start: 2019-04-24 | End: 2019-09-09 | Stop reason: SDUPTHER

## 2019-04-24 RX ORDER — TRIAMCINOLONE ACETONIDE 1 MG/G
OINTMENT TOPICAL 2 TIMES DAILY
Qty: 80 G | Refills: 3 | Status: ON HOLD | OUTPATIENT
Start: 2019-04-24 | End: 2021-04-25 | Stop reason: HOSPADM

## 2019-04-24 RX ORDER — HYDROCORTISONE ACETATE 25 MG/1
SUPPOSITORY RECTAL
COMMUNITY
Start: 2019-04-12 | End: 2019-07-08

## 2019-04-24 NOTE — TELEPHONE ENCOUNTER
I called patient and scheduled him on Tuesday, 5/7/19 at 3:30pm in [place] and gave him the address.  González

## 2019-04-24 NOTE — PATIENT INSTRUCTIONS
A1C  Does this test have other names?  Hemoglobin A1c; HbA1c; glycosylated hemoglobin; glycohemoglobin; Glycated hemoglobin  What is this test?  A1C is a blood test used to screen people to find out whether they have diabetes or prediabetes. It's also used in people who know they have diabetes to measure how well they are controlling their blood sugar and to guide their treatment decisions over time.  Why do I need this test?  You may need this test to check for prediabetes or diabetes. If you already know that you have diabetes or prediabetes, you may need this test to see how well you are controlling your blood sugar.  People with diabetes must track their blood sugar (glucose) levels every day to make sure they arent too high or too low. The A1C test gives results for a longer period of time. It shows whether your blood sugar has been too high on average in the previous two to three months. When blood sugar is high, more glucose builds up and sticks to your hemoglobin, a protein that carries oxygen in red blood cells. The A1C test measures the percentage of hemoglobin that is coated with blood sugar.  Depending on the type of diabetes you have, how well it's controlled, and your health care providers preferences, you may need to have the A1C test two or more times a year. The American Diabetes Association (ADA) recommends that you have an A1C test at least twice a year if you are meeting your blood sugar goals and your blood sugar is well-controlled. If you arent meeting your goals or your medication has changed lately, you should have the A1C test more often. You also may have the test when your health care provider first starts working with you to treat your diabetes.  What other tests might I have along with this test?   If your health care provider is testing you for diabetes, you may also take a fasting plasma glucose test, or FPG, or an oral glucose tolerance test, or OGTT, as part of your screening  and diagnosis. You may also be tested for sugar, ketones or protein in the urine.  What do my test results mean?  Laboratory test results may vary depending on your age, gender, health history, the method used for the test, and many other factors. If your results are different from the results suggested below, this may not mean that you have a problem. Ask your health care provider to explain what the results mean for you.   A1C is reported as a percentage:  · Normal A1C is considered to be below 5.7 percent. Results between 5.7 and 6.4 percent may mean you have prediabetes. This means you have a higher risk of developing diabetes.  · Results of 6.5 percent or higher on two separate occasions may mean that you have diabetes.  · The ADA  recommends that people with diabetes should maintain an A1C below 7 percent. The American Association of Clinical Endocrinologists recommends an A1C of 6.5 percent or less. Recommendations may vary based on the person's age, medical conditions, or other factors.   How is this test done?  The test requires a blood sample, which is drawn through a needle from a vein in your arm.   Does this test pose any risks?  Taking a blood sample with a needle carries risks that include bleeding, infection, bruising, and a sense of lightheadedness. When the needle pricks your arm, you may feel a slight stinging sensation or pain. Afterward, the site may be slightly sore.  What might affect my test results?  Your blood sugar levels usually vary throughout the day. These variations won't affect the A1C.  If you have sickle cell anemia or other blood disorders, a standard A1C test may be less useful for diagnosing or monitoring diabetes. (Your health care provider may be able to find another diabetes test that will better serve you.) In addition, the test results may be skewed if you have anemia, heavy bleeding, an iron deficiency, kidney failure, or liver disease.  How do I get ready for this  test?  You don't need any special preparation for the test.    © 4785-2709 [employer]. [address] [place] [address], [place] [address]. All rights reserved. This information is not intended as a substitute for professional medical care. Always follow your healthcare professional's instructions.

## 2019-04-24 NOTE — PROGRESS NOTES
Subjective:       Patient ID: Yeyo Hollingsworth is a 78 y.o. male.    Chief Complaint: Follow-up; Diabetes (mri review); and Hypertension    78-year-old gentleman who on CN on a three-week follow-up after falling and hitting his head without loss of consciousness. He is on Eliquis for chronic atrial fibrillation with the fall was simply mechanical. The patient has since had his sutures out. He at that time was complaining of worsening left ankle pain that we did x-ray and have him reevaluated by his podiatrist who actually did surgery on his great toe and foot earlier in the year. He also has a chronic allergy symptoms and osteoarthritis of the knee and back which is being followed by pain anesthesia as well as physical medicine rehabilitation. With the fall at the beginning of the month he had a CT scan of the head which led to an MRI of the brain possibly showed bilateral occipital areas of old ischemia. 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 On last office visit we completely discontinued his metformin as it gave him frequent loose stools and he has been on Januvia 100 mg for the past 3 weeks with a very good sugar log.    Review of Systems   Constitutional: Negative for activity change, diaphoresis, fatigue and fever.   HENT: Negative for congestion, ear pain, postnasal drip, sinus pressure, sore throat and trouble swallowing.    Eyes: Negative for pain.   Respiratory: Negative for cough, chest tightness, shortness of breath and wheezing.    Cardiovascular: Negative for chest pain, palpitations and leg swelling.   Gastrointestinal: Negative for abdominal distention, abdominal pain, blood in stool, constipation and diarrhea.   Endocrine: Negative for cold intolerance and heat intolerance.   Genitourinary: Negative for decreased urine volume, difficulty urinating, dysuria, flank pain, frequency and hematuria.   Musculoskeletal:  Negative for arthralgias, back pain, joint swelling, myalgias and neck pain.        SEE hpi   Skin: Negative for pallor, rash and wound.   Neurological: Negative for tremors, syncope, weakness, light-headedness, numbness and headaches.   Hematological: Negative for adenopathy.   Psychiatric/Behavioral: Negative for confusion, decreased concentration, hallucinations, self-injury, sleep disturbance and suicidal ideas. The patient is not nervous/anxious.        Past Medical History:   Diagnosis Date    Allergy     Anticoagulant long-term use     aspirin    Anxiety     Atrial fibrillation     Cataract     Clotting disorder     left leg    Diabetes mellitus     Diabetes mellitus, type 2     Hyperlipidemia     Hypertension     BRANDI on CPAP        Past Surgical History:   Procedure Laterality Date    ABDOMINAL SURGERY      origunal surg 1986-mult surgeries since    CARPAL TUNNEL RELEASE      right wrist 2009-left wrist 2010    COLON SURGERY      partial colon removal    COLOSTOMY  1986    colostomy reversal  1986    EYE SURGERY      hay fever      HERNIA REPAIR  1949    Injection,steroid,epidural,transforaminal approach Bilateral 12/10/2018    Performed by Grant Wright MD at [facility] OR    Injection,steroid,epidural,transforaminal approach L4-5, L5-S1 Bilateral 1/28/2019    Performed by Grant [facility] at [facility]    INJECTION-STEROID-EPIDURAL-TRANSFORAMINAL Left 4/30/2018    [facility]formed by [facility]    INJECTION-STEROID-EPIDURAL-TRANSFORAMINAL Left 9/28/2017    Performed by [facility] at [facility]    TONSILLECTOMY  1947       History reviewed. No pertinent family history.    Social History     Socioeconomic History    Marital status:      Spouse name: Not on file    Number of children: Not on file    Years of education: Not on file    Highest education level: Not on file   Occupational History    Not on file   Social Needs    Financial resource strain: Not on file    Food  insecurity:     Worry: Not on file     Inability: Not on file    Transportation needs:     Medical: Not on file     Non-medical: Not on file   Tobacco Use    Smoking status: Former Smoker     Types: Cigarettes     Last attempt to quit: 2006     Years since quittin.1    Smokeless tobacco: Never Used   Substance and Sexual Activity    Alcohol use: No    Drug use: No    Sexual activity: Yes   Lifestyle    Physical activity:     Days per week: Not on file     Minutes per session: Not on file    Stress: Only a little   Relationships    Social connections:     Talks on phone: Not on file     Gets together: Not on file     Attends [employer] service: Not on file     Active member of club or organization: Not on file     Attends meetings of clubs or organizations: Not on file     Relationship status: Not on file   Other Topics Concern    Not on file   Social History Narrative    Not on file       Current Outpatient Medications   Medication Sig Dispense Refill    apixaban (ELIQUIS) 5 mg Tab Take 1 tablet (5 mg total) by mouth 2 (two) times daily. 180 tablet 3    aspirin (ECOTRIN) 81 MG EC tablet Take 1 tablet (81 mg total) by mouth once daily. 90 tablet 3    atorvastatin (LIPITOR) 10 MG tablet nightly 90 tablet 3    azelastine (ASTELIN) 137 mcg (0.1 %) nasal spray 1 spray (137 mcg total) by Nasal route 2 (two) times daily. 30 mL 5    betamethasone dipropionate (DIPROLENE) 0.05 % cream       blood sugar diagnostic (BLOOD GLUCOSE TEST) Strp FREESTYLE LITE BG TEST STRIPS. current use of insulin  - Primary ICD-10-CM: E11.9 200 strip 3    blood sugar diagnostic Strp 200 strips by Misc.(Non-Drug; Combo Route) route 2 (two) times daily. 100 strip 3    diazePAM (VALIUM) 5 MG tablet Take 1 tablet (5 mg total) by mouth daily as needed for Anxiety. 30 tablet 5    diclofenac sodium (VOLTAREN) 1 % Gel VOLTAREN, 1% (Transdermal Gel)   as needed (1 %)  Active   Comments: Medication taken as needed.        fexofenadine (ALLEGRA) 180 MG tablet Take 1 tablet (180 mg total) by mouth once daily. 90 tablet 3    fluticasone (FLONASE) 50 mcg/actuation nasal spray 2 sprays (100 mcg total) by Each Nare route once daily. 3 Bottle 3    FLUZONE HIGH-DOSE 2018-19, PF, 180 mcg/0.5 mL vaccine Inject 1 mL into the muscle once.  0    FREESTYLE LANCETS 28 gauge lancets       furosemide (LASIX) 40 MG tablet Every other day 45 tablet 3    HYDROcodone-acetaminophen (NORCO) 7.5-325 mg per tablet Take 1 tablet by mouth every 12 (twelve) hours as needed for Pain. 45 tablet 0    ipratropium (ATROVENT) 0.03 % nasal spray 2 sprays by Nasal route 2 (two) times daily. 30 mL 5    lisinopril 10 MG tablet Take 1 tablet (10 mg total) by mouth once daily. 90 tablet 3    metoprolol tartrate (LOPRESSOR) 25 MG tablet Take 1 tablet (25 mg total) by mouth 2 (two) times daily. 180 tablet 3    montelukast (SINGULAIR) 10 mg tablet Take 1 tablet (10 mg total) by mouth every evening. 90 tablet 3    multivit-iron-min-folic acid (MULTIVITAMIN-IRON-MINERALS-FOLIC ACID) 3,500-18-0.4 unit-mg-mg Chew Take by mouth.        mupirocin (BACTROBAN) 2 % ointment Apply topically 2 (two) times daily. 22 g 1    naftifine (NAFTIN) 2 % Crea NAFTIN, 2% (External Cream)   as needed (2 %)  Active   Comments: Medication taken as needed.       polyethylene glycol (GLYCOLAX) 17 gram/dose powder Take 17 g by mouth once daily. 6 Bottle 3    potassium chloride (MICRO-K) 10 MEQ CpSR Every other day 45 capsule 3    SITagliptin (JANUVIA) 100 MG Tab Take 1 tablet (100 mg total) by mouth once daily. 90 tablet 3    traMADol (ULTRAM) 50 mg tablet Take 1 tablet (50 mg total) by mouth 2 (two) times daily. 60 tablet 4    traZODone (DESYREL) 100 MG tablet Take 1 tablet (100 mg total) by mouth every evening. 90 tablet 3    gabapentin (NEURONTIN) 100 MG capsule 1-2 nightly 180 capsule 2    hydrocortisone (ANUSOL-HC) 25 mg suppository       triamcinolone acetonide 0.1% (KENALOG)  0.1 % ointment Apply topically 2 (two) times daily. 80 g 3     No current facility-administered medications for this visit.        Review of patient's allergies indicates:   Allergen Reactions    Ativan [lorazepam] Other (See Comments)     Mood alternating      Dilaudid [hydromorphone (bulk)] Other (See Comments)     Mood alternating      Morphine Other (See Comments)     Mood alternating      Hydromorphone      Objective:      Physical Exam   Constitutional: He is oriented to person, place, and time. He appears well-developed and well-nourished. No distress.   HENT:   Head: Normocephalic and atraumatic.   Right Ear: External ear normal.   Left Ear: External ear normal.   Nose: Nose normal.   Mouth/Throat: Oropharynx is clear and moist.   Eyes: Conjunctivae and EOM are normal. Right eye exhibits no discharge. Left eye exhibits no discharge. No scleral icterus.   Neck: Normal range of motion. Neck supple. No JVD present. No tracheal deviation present. No thyromegaly present.   Cardiovascular: Normal rate, regular rhythm, normal heart sounds and intact distal pulses. Exam reveals no gallop.   No murmur heard.  Pulmonary/Chest: Effort normal and breath sounds normal. No respiratory distress. He has no wheezes. He has no rales.   Abdominal: Soft. Bowel sounds are normal. He exhibits no distension and no mass. There is no tenderness.   Musculoskeletal: He exhibits no edema.        Left ankle: He exhibits decreased range of motion and deformity. Tenderness. Lateral malleolus tenderness found.   Lymphadenopathy:     He has no cervical adenopathy.   Neurological: He is alert and oriented to person, place, and time.   Skin: Skin is warm and dry. No rash noted. He is not diaphoretic. No erythema.   Psychiatric: He has a normal mood and affect. His behavior is normal. Judgment and thought content normal.       Lab Results   Component Value Date    WBC 6.2 10/05/2018    HGB 14.0 10/05/2018    HCT 44.3 10/05/2018      10/05/2018    CHOL 214 (H) 03/09/2016    TRIG 180 (H) 03/09/2016    HDL 41 03/09/2016    ALT 41 10/27/2015    AST 30 04/09/2019     04/09/2019    K 3.9 04/09/2019     04/09/2019    CREATININE 1.02 04/09/2019    BUN 30 (H) 04/09/2019    CO2 29.7 04/09/2019    TSH 1.67 03/27/2018    INR 1.0 05/17/2013    GLUF 96 03/09/2016    HGBA1C 6.7 (H) 04/09/2019       Assessment:       1. Chronic pain of both knees    2. Chronic atrial fibrillation    3. Chronic nonseasonal allergic rhinitis due to fungal spores    4. Essential hypertension    5. Chronic pain of left ankle    6. Prerenal azotemia    7. Chronic sinus complaints    8. External hemorrhoids with complication    9. Rash    10. Diabetes mellitus type 2 in nonobese        Plan:       Chronic pain of both knees  -     traMADol (ULTRAM) 50 mg tablet; Take 1 tablet (50 mg total) by mouth 2 (two) times daily.  Dispense: 60 tablet; Refill: 4  -     HYDROcodone-acetaminophen (NORCO) 7.5-325 mg per tablet; Take 1 tablet by mouth every 12 (twelve) hours as needed for Pain.  Dispense: 45 tablet; Refill: 0    Chronic atrial fibrillation  -     apixaban (ELIQUIS) 5 mg Tab; Take 1 tablet (5 mg total) by mouth 2 (two) times daily.  Dispense: 180 tablet; Refill: 3    Chronic nonseasonal allergic rhinitis due to fungal spores  -     azelastine (ASTELIN) 137 mcg (0.1 %) nasal spray; 1 spray (137 mcg total) by Nasal route 2 (two) times daily.  Dispense: 30 mL; Refill: 5   -     fluticasone (FLONASE) 50 mcg/actuation nasal spray; 2 sprays (100 mcg total) by Each Nare route once daily.  Dispense: 3 Bottle; Refill: 3    Essential hypertension  -     lisinopril 10 MG tablet; Take 1 tablet (10 mg total) by mouth once daily.  Dispense: 90 tablet; Refill: 3    Chronic pain of left ankle  -     gabapentin (NEURONTIN) 100 MG capsule; 1-2 nightly  Dispense: 180 capsule; Refill: 2    Prerenal azotemia   On lasix every other day - maybe decrease even further    External hemorrhoids  with complication  -     Ambulatory referral to General Surgery    Rash  -     triamcinolone acetonide 0.1% (KENALOG) 0.1 % ointment; Apply topically 2 (two) times daily.  Dispense: 80 g; Refill: 3    Diabetes mellitus type 2 in nonobese   Continue januvia with good results

## 2019-04-26 ENCOUNTER — PATIENT MESSAGE (OUTPATIENT)
Dept: FAMILY MEDICINE | Facility: CLINIC | Age: 79
End: 2019-04-26

## 2019-04-26 DIAGNOSIS — I10 ESSENTIAL HYPERTENSION: Primary | ICD-10-CM

## 2019-04-29 DIAGNOSIS — M43.10 ANTEROLISTHESIS: ICD-10-CM

## 2019-04-29 RX ORDER — LISINOPRIL 20 MG/1
20 TABLET ORAL DAILY
Qty: 90 TABLET | Refills: 3 | Status: SHIPPED | OUTPATIENT
Start: 2019-04-29 | End: 2020-04-27 | Stop reason: SDUPTHER

## 2019-04-29 RX ORDER — DIAZEPAM 5 MG/1
5 TABLET ORAL DAILY PRN
Qty: 30 TABLET | Refills: 5 | Status: SHIPPED | OUTPATIENT
Start: 2019-04-29 | End: 2020-01-24 | Stop reason: SDUPTHER

## 2019-04-29 NOTE — TELEPHONE ENCOUNTER
Pt states you wrote script for lisinopril 10mg and he takes 20mg. Can you re-print for correct dosage and he will pick it up. Thanks. You may need to open encounter to see the order.

## 2019-05-07 ENCOUNTER — OFFICE VISIT (OUTPATIENT)
Dept: SURGERY | Facility: CLINIC | Age: 79
End: 2019-05-07
Payer: MEDICARE

## 2019-05-07 VITALS
HEIGHT: 68 IN | TEMPERATURE: 97 F | BODY MASS INDEX: 37.46 KG/M2 | DIASTOLIC BLOOD PRESSURE: 63 MMHG | SYSTOLIC BLOOD PRESSURE: 121 MMHG | WEIGHT: 247.13 LBS | HEART RATE: 59 BPM

## 2019-05-07 DIAGNOSIS — K64.9 HEMORRHOIDS, UNSPECIFIED HEMORRHOID TYPE: Primary | ICD-10-CM

## 2019-05-07 PROCEDURE — 46600 DIAGNOSTIC ANOSCOPY SPX: CPT | Mod: S$PBB,,, | Performed by: SURGERY

## 2019-05-07 PROCEDURE — 46600 PR DIAG2STIC A2SCOPY: ICD-10-PCS | Mod: S$PBB,,, | Performed by: SURGERY

## 2019-05-07 PROCEDURE — 99203 OFFICE O/P NEW LOW 30 MIN: CPT | Mod: 25,S$PBB,, | Performed by: SURGERY

## 2019-05-07 PROCEDURE — 99213 OFFICE O/P EST LOW 20 MIN: CPT | Mod: PBBFAC,PO | Performed by: SURGERY

## 2019-05-07 PROCEDURE — 99999 PR PBB SHADOW E&M-EST. PATIENT-LVL III: CPT | Mod: PBBFAC,,, | Performed by: SURGERY

## 2019-05-07 PROCEDURE — 46600 DIAGNOSTIC ANOSCOPY SPX: CPT | Mod: PBBFAC,PO | Performed by: SURGERY

## 2019-05-07 PROCEDURE — 99999 PR PBB SHADOW E&M-EST. PATIENT-LVL III: ICD-10-PCS | Mod: PBBFAC,,, | Performed by: SURGERY

## 2019-05-07 PROCEDURE — 99203 PR OFFICE/OUTPT VISIT, NEW, LEVL III, 30-44 MIN: ICD-10-PCS | Mod: 25,S$PBB,, | Performed by: SURGERY

## 2019-05-07 NOTE — LETTER
May 7, 2019      Citlali Ward MD  [address]  [address]  [facility] [address]           [facility] - General Surgery  [address]  [place] [address]  Phone: [phone]          Patient: Yeyo Hollingsworth   MR Number: [id number]   YOB: 1940   Date of Visit: 5/7/2019       Dear Dr. Citlali Ward:    Thank you for referring Yeyo Hollingsworth to me for evaluation. Attached you will find relevant portions of my assessment and plan of care.    If you have questions, please do not hesitate to call me. I look forward to following Yeyo Hollingsworth along with you.    Sincerely,    Olvin Thorne MD    Enclosure  CC:  No Recipients    If you would like to receive this communication electronically, please contact [email or web address] or [phone] to request more information on [employer] access.    For providers and/or their staff who would like to refer a patient to [facility], please contact us through our one-stop-shop provider referral line, [facility] [place] operated [facility], at [phone].    If you feel you have received this communication in error or would no longer like to receive these types of communications, please e-mail [email or web address]

## 2019-05-07 NOTE — PROGRESS NOTES
Subjective:       Patient ID: Yeyo Hollingsworth is a 78 y.o. male.    Chief Complaint: Consult (Hemorrhoids)    HPI   Pleasant 77 yo M referred tome for evaluation of hemorrhoids.  PT notes that he has been having pressure and discomfort with his BMs.  Pt notes that he ahs had difficulty with perianal hygiene noting that he has soiling many times following his BM.  He has also been having blood following BMs.  He notes blood both when he wipes and in the perianal area.  He deniesn /v.  No fever/chills.  NO abd pain.  Does notes that with recent changes in his DM medicaiton he has been more constipated.  He had a colonoscopy last years.  He does have a PMHx significant for A fib, HTN, DM and obesity.  He is on eliquis.  Pshx significant for ex lap, hartmans, colostomy reversal and multiple obstructions  Review of Systems   Constitutional: Negative for activity change, appetite change, diaphoresis, fever and unexpected weight change.   HENT: Negative for congestion and ear pain.    Respiratory: Negative for cough, chest tightness and wheezing.    Cardiovascular: Negative for chest pain and palpitations.   Gastrointestinal: Positive for anal bleeding and rectal pain. Negative for abdominal distention, abdominal pain, blood in stool, constipation, diarrhea, nausea and vomiting.   Genitourinary: Negative for difficulty urinating, dysuria and hematuria.   Musculoskeletal: Negative for back pain and gait problem.   Skin: Negative for color change and wound.   Neurological: Negative for tremors and seizures.   Hematological: Negative for adenopathy. Does not bruise/bleed easily.   Psychiatric/Behavioral: Negative for agitation and decreased concentration.       Objective:      Physical Exam   Constitutional: He is oriented to person, place, and time. He appears well-developed and well-nourished.   HENT:   Head: Normocephalic and atraumatic.   Eyes: Pupils are equal, round, and reactive to light.   Neck: Normal range of  motion. No tracheal deviation present. No thyromegaly present.   Cardiovascular: Normal rate and normal heart sounds. Exam reveals no gallop and no friction rub.   No murmur heard.  Pulmonary/Chest: Effort normal and breath sounds normal. He has no wheezes. He exhibits no tenderness.   Abdominal: He exhibits no distension and no mass. There is no tenderness. There is no rebound and no guarding. No hernia.   obese   Genitourinary:   Genitourinary Comments: Ext exam demonstrates slight skin changes to the perianal skin area.  No significant ext hemorrhoids.  MOOSE with normal sphincter tone. Anoscopy demonstrating int hemorrhoids.     Musculoskeletal: Normal range of motion. He exhibits no edema, tenderness or deformity.   Lymphadenopathy:     He has no cervical adenopathy.   Neurological: He is alert and oriented to person, place, and time. Coordination normal.   Skin: Skin is warm.   Psychiatric: He has a normal mood and affect.   Vitals reviewed.      Assessment:     Int hemorrhoids  Pruritus ani    No diagnosis found.    Plan:       D/w pt.  Encouraged daily fiber use bid.  I have also recommended barrier cream and minimizing topical agents.  Regarding hemorrhoids did d/w him importance of fiber.  NOtes that if he continues to have bleeding and difficulty with hygiene may need to consider banding off of eliquis if approved by his cardiologist.  He will RTC in 6 weeks for evaluation if symptoms persist

## 2019-06-12 NOTE — PATIENT INSTRUCTIONS
What Is Arthritis in the Foot?  Degenerative arthritis is a condition that slowly wears away joints, the area where bones meet and move. In the beginning, you may notice that the affected joint seems stiff. It may even ache. As the joint lining (cartilage) breaks down, the bones rub against each other, causing pain and swelling. Over time, small pieces of rough or splintered bone (bone spurs) develop, and the joints range of motion becomes limited. But movement doesnt have to cause pain. The effects of arthritis can be reduced.    The big-toe joint  When arthritis affects your big toe, your foot hurts when it pushes off the ground. Arthritis often appears in the big-toe joint along with a bunion (a bony bump at the side of the joint) or a bone spur on top of the joint.    Other joints  When arthritis affects the rear or midfoot joints, you feel pain when you put weight on your foot. Arthritis may affect the joint where the ankle and foot meet. It may also affect other joints nearby.  Date Last Reviewed: 7/1/2016 © 2000-2017 [employer]. [address] [place] [address], [place] [address]. All rights reserved. This information is not intended as a substitute for professional medical care. Always follow your healthcare professional's instructions.        
.

## 2019-06-24 DIAGNOSIS — K59.09 CONSTIPATION, CHRONIC: ICD-10-CM

## 2019-06-24 RX ORDER — POLYETHYLENE GLYCOL 3350 17 G/17G
17 POWDER, FOR SOLUTION ORAL DAILY
Qty: 6 BOTTLE | Refills: 3 | Status: SHIPPED | OUTPATIENT
Start: 2019-06-24 | End: 2020-01-23 | Stop reason: SDUPTHER

## 2019-06-25 DIAGNOSIS — G89.29 CHRONIC PAIN OF BOTH KNEES: ICD-10-CM

## 2019-06-25 DIAGNOSIS — M25.562 CHRONIC PAIN OF BOTH KNEES: ICD-10-CM

## 2019-06-25 DIAGNOSIS — M25.561 CHRONIC PAIN OF BOTH KNEES: ICD-10-CM

## 2019-06-25 RX ORDER — HYDROCODONE BITARTRATE AND ACETAMINOPHEN 7.5; 325 MG/1; MG/1
1 TABLET ORAL EVERY 12 HOURS PRN
Qty: 45 TABLET | Refills: 0 | Status: SHIPPED | OUTPATIENT
Start: 2019-06-25 | End: 2019-10-09 | Stop reason: SDUPTHER

## 2019-07-08 ENCOUNTER — OFFICE VISIT (OUTPATIENT)
Dept: FAMILY MEDICINE | Facility: CLINIC | Age: 79
End: 2019-07-08
Payer: MEDICARE

## 2019-07-08 ENCOUNTER — TELEPHONE (OUTPATIENT)
Dept: FAMILY MEDICINE | Facility: CLINIC | Age: 79
End: 2019-07-08

## 2019-07-08 VITALS
RESPIRATION RATE: 18 BRPM | HEIGHT: 68 IN | BODY MASS INDEX: 39.4 KG/M2 | TEMPERATURE: 97 F | WEIGHT: 260 LBS | HEART RATE: 65 BPM | OXYGEN SATURATION: 96 % | DIASTOLIC BLOOD PRESSURE: 66 MMHG | SYSTOLIC BLOOD PRESSURE: 130 MMHG

## 2019-07-08 DIAGNOSIS — E11.9 CONTROLLED TYPE 2 DIABETES MELLITUS WITHOUT COMPLICATION, WITHOUT LONG-TERM CURRENT USE OF INSULIN: Primary | ICD-10-CM

## 2019-07-08 DIAGNOSIS — I48.20 CHRONIC ATRIAL FIBRILLATION: ICD-10-CM

## 2019-07-08 DIAGNOSIS — E78.01 FAMILIAL HYPERCHOLESTEROLEMIA: ICD-10-CM

## 2019-07-08 DIAGNOSIS — I10 ESSENTIAL HYPERTENSION: ICD-10-CM

## 2019-07-08 DIAGNOSIS — G89.29 CHRONIC PAIN OF LEFT ANKLE: ICD-10-CM

## 2019-07-08 DIAGNOSIS — M25.572 CHRONIC PAIN OF LEFT ANKLE: ICD-10-CM

## 2019-07-08 DIAGNOSIS — R53.83 FATIGUE, UNSPECIFIED TYPE: ICD-10-CM

## 2019-07-08 PROBLEM — R79.89 PRERENAL AZOTEMIA: Status: ACTIVE | Noted: 2019-07-08

## 2019-07-08 LAB — HBA1C MFR BLD: 7.4 %

## 2019-07-08 PROCEDURE — 83036 POCT HEMOGLOBIN A1C: ICD-10-PCS | Mod: QW,,, | Performed by: INTERNAL MEDICINE

## 2019-07-08 PROCEDURE — 83036 HEMOGLOBIN GLYCOSYLATED A1C: CPT | Mod: QW,,, | Performed by: INTERNAL MEDICINE

## 2019-07-08 PROCEDURE — 99214 OFFICE O/P EST MOD 30 MIN: CPT | Mod: ,,, | Performed by: INTERNAL MEDICINE

## 2019-07-08 PROCEDURE — 99214 PR OFFICE/OUTPT VISIT, EST, LEVL IV, 30-39 MIN: ICD-10-PCS | Mod: ,,, | Performed by: INTERNAL MEDICINE

## 2019-07-08 RX ORDER — FUROSEMIDE 20 MG/1
20 TABLET ORAL
Qty: 45 TABLET | Refills: 1 | Status: SHIPPED | OUTPATIENT
Start: 2019-07-08 | End: 2019-10-14 | Stop reason: SDUPTHER

## 2019-07-08 NOTE — PROGRESS NOTES
Subjective:       Patient ID: Yeyo Hollingsworth is a 79 y.o. male.    Chief Complaint: Follow-up and Diabetes    79-year-old gentleman who is here for follow-up on his type 2 diabetes. His hemoglobin A1c on last check was 6.7% at time he was on Januvia and taking intermittent metformin despite the fact that he gave him loose bowels. He has been totally off of the metformin in the past 3 months only on the Januvia and his A1c today 7.4%. Is also has some dietary indiscretion his birthday and Father's Day and is gained a few pounds due to immobility as well secondary to osteoarthritis both knees, back and of the left ankle where he has a plantar heel spur that is moderately large and some thickening of the plantar to tendon. His discomfort is mostly laterally but in the ankle area itself and the exercises he does 2, loosen it up is to do an Achilles tendon stretch. He will go back to podiatry as concerning this discomfort. He is on a blood thinner so cannot take NSAIDs.    Review of Systems   Constitutional: Negative for activity change, diaphoresis, fatigue and fever.   HENT: Negative for congestion, ear pain, postnasal drip, sinus pressure, sore throat and trouble swallowing.    Eyes: Negative for pain.   Respiratory: Negative for cough, chest tightness, shortness of breath and wheezing.    Cardiovascular: Negative for chest pain, palpitations and leg swelling.   Gastrointestinal: Negative for abdominal distention, abdominal pain, blood in stool, constipation and diarrhea.   Endocrine: Negative for cold intolerance and heat intolerance.   Genitourinary: Negative for decreased urine volume, difficulty urinating, dysuria, flank pain, frequency and hematuria.   Musculoskeletal: Positive for arthralgias and myalgias. Negative for back pain, joint swelling and neck pain.        Per HPI   Skin: Negative for pallor, rash and wound.   Neurological: Negative for tremors, syncope, weakness, light-headedness, numbness and  headaches.   Hematological: Negative for adenopathy.   Psychiatric/Behavioral: Negative for confusion, decreased concentration, hallucinations, self-injury, sleep disturbance and suicidal ideas. The patient is not nervous/anxious.        Past Medical History:   Diagnosis Date    Allergy     Anticoagulant long-term use     aspirin    Anxiety     Atrial fibrillation     Cataract     Clotting disorder     left leg    Diabetes mellitus     Diabetes mellitus, type 2     Hyperlipidemia     Hypertension     BRANDI on CPAP        Past Surgical History:   Procedure Laterality Date    ABDOMINAL SURGERY      origunal surg 1986-mult surgeries since    CARPAL TUNNEL RELEASE      right wrist 2009-left wrist 2010    COLON SURGERY      partial colon removal    COLOSTOMY  1986    colostomy reversal  1986    EYE SURGERY      hay fever      HERNIA REPAIR  1949    Injection,steroid,epidural,transforaminal approach Bilateral 12/10/2018    [facility]    Injection,steroid,epidural,transforaminal approach L4-5, L5-S1 Bilateral 1/28/2019    [facility]    INJECTION-STEROID-EPIDURAL-TRANSFORAMINAL Left 4/30/2018    [facility]    INJECTION-STEROID-EPIDURAL-TRANSFORAMINAL Left 9/28/2017    [facility]    TONSILLECTOMY  1947       History reviewed. No pertinent family history.    Social History     Socioeconomic History    Marital status:      Spouse name: Not on file    Number of children: Not on file    Years of education: Not on file    Highest education level: Not on file   Occupational History    Not on file   Social Needs    Financial resource strain: Not on file    Food insecurity:     Worry: Not on file     Inability: Not on file    Transportation needs:     Medical: Not on file     Non-medical: Not on file   Tobacco Use    Smoking status: Former Smoker     Types: Cigarettes     Last attempt  to quit: 2006     Years since quittin.3    Smokeless tobacco: Never Used   Substance and Sexual Activity    Alcohol use: No    Drug use: No    Sexual activity: Yes     Partners: Female   Lifestyle    Physical activity:     Days per week: Not on file     Minutes per session: Not on file    Stress: Only a little   Relationships    Social connections:     Talks on phone: Not on file     Gets together: Not on file     Attends [employer]oration[employer] service: Not on file     Active member of club or organization: Not on file     Attends meetings of clubs or organizations: Not on file     Relationship status: Not on file   Other Topics Concern    Not on file   Social History Narrative    Not on file       Current Outpatient Medications   Medication Sig Dispense Refill    apixaban (ELIQUIS) 5 mg Tab Take 1 tablet (5 mg total) by mouth 2 (two) times daily. 180 tablet 3    aspirin (ECOTRIN) 81 MG EC tablet Take 1 tablet (81 mg total) by mouth once daily. 90 tablet 3    atorvastatin (LIPITOR) 10 MG tablet nightly 90 tablet 3    azelastine (ASTELIN) 137 mcg (0.1 %) nasal spray 1 spray (137 mcg total) by Nasal route 2 (two) times daily. 30 mL 5    betamethasone dipropionate (DIPROLENE) 0.05 % cream       blood sugar diagnostic (BLOOD GLUCOSE TEST) Strp FREESTYLE LITE BG TEST STRIPS. current use of insulin  - Primary ICD-10-CM: E11.9 200 strip 3    blood sugar diagnostic Strp 200 strips by Misc.(Non-Drug; Combo Route) route 2 (two) times daily. 100 strip 3    diazePAM (VALIUM) 5 MG tablet Take 1 tablet (5 mg total) by mouth daily as needed for Anxiety. 30 tablet 5    diclofenac sodium (VOLTAREN) 1 % Gel VOLTAREN, 1% (Transdermal Gel)   as needed (1 %)  Active   Comments: Medication taken as needed.       fexofenadine (ALLEGRA) 180 MG tablet Take 1 tablet (180 mg total) by mouth once daily. 90 tablet 3    fluticasone (FLONASE) 50 mcg/actuation nasal spray 2 sprays (100 mcg total) by Each Nare route once daily. 3  Bottle 3    FLUZONE HIGH-DOSE 2018-19, PF, 180 mcg/0.5 mL vaccine Inject 1 mL into the muscle once.  0    FREESTYLE LANCETS 28 gauge lancets       gabapentin (NEURONTIN) 100 MG capsule 1-2 nightly 180 capsule 2    HYDROcodone-acetaminophen (NORCO) 7.5-325 mg per tablet Take 1 tablet by mouth every 12 (twelve) hours as needed for Pain. 45 tablet 0    ipratropium (ATROVENT) 0.03 % nasal spray 2 sprays by Nasal route 2 (two) times daily. 30 mL 5    lisinopril (PRINIVIL,ZESTRIL) 20 MG tablet Take 1 tablet (20 mg total) by mouth once daily. 90 tablet 3    metoprolol tartrate (LOPRESSOR) 25 MG tablet Take 1 tablet (25 mg total) by mouth 2 (two) times daily. 180 tablet 3    montelukast (SINGULAIR) 10 mg tablet Take 1 tablet (10 mg total) by mouth every evening. 90 tablet 3    multivit-iron-min-folic acid (MULTIVITAMIN-IRON-MINERALS-FOLIC ACID) 3,500-18-0.4 unit-mg-mg Chew Take by mouth.        mupirocin (BACTROBAN) 2 % ointment Apply topically 2 (two) times daily. 22 g 1    naftifine (NAFTIN) 2 % Crea NAFTIN, 2% (External Cream)   as needed (2 %)  Active   Comments: Medication taken as needed.       polyethylene glycol (GLYCOLAX) 17 gram/dose powder Take 17 g by mouth once daily. 6 Bottle 3    potassium chloride (MICRO-K) 10 MEQ CpSR Every other day 45 capsule 3    psyllium (METAMUCIL) powder Take 1 packet by mouth 3 (three) times daily.      SITagliptin (JANUVIA) 100 MG Tab Take 1 tablet (100 mg total) by mouth once daily. 90 tablet 3    traMADol (ULTRAM) 50 mg tablet Take 1 tablet (50 mg total) by mouth 2 (two) times daily. 60 tablet 4    traZODone (DESYREL) 100 MG tablet Take 1 tablet (100 mg total) by mouth every evening. 90 tablet 3    triamcinolone acetonide 0.1% (KENALOG) 0.1 % ointment Apply topically 2 (two) times daily. 80 g 3    empagliflozin (JARDIANCE) 10 mg Tab Take 10 mg by mouth once daily. 90 tablet 2    furosemide (LASIX) 20 MG tablet Take 1 tablet (20 mg total) by mouth every Mon,  Wed, Fri. 45 tablet 1     No current facility-administered medications for this visit.        Review of patient's allergies indicates:   Allergen Reactions    Ativan [lorazepam] Other (See Comments)     Mood alternating      Dilaudid [hydromorphone (bulk)] Other (See Comments)     Mood alternating      Morphine Other (See Comments)     Mood alternating      Hydromorphone      Objective:      Physical Exam   Constitutional: He is oriented to person, place, and time. He appears well-developed and well-nourished. No distress.   HENT:   Head: Normocephalic and atraumatic.   Right Ear: External ear normal.   Left Ear: External ear normal.   Nose: Nose normal.   Mouth/Throat: Oropharynx is clear and moist.   Eyes: Conjunctivae and EOM are normal. Right eye exhibits no discharge. Left eye exhibits no discharge. No scleral icterus.   Neck: Normal range of motion. Neck supple. No JVD present. No tracheal deviation present. No thyromegaly present.   Cardiovascular: Normal rate, normal heart sounds and intact distal pulses. An irregularly irregular rhythm present. Exam reveals no gallop.   No murmur heard.  Pulmonary/Chest: Effort normal and breath sounds normal. No respiratory distress. He has no wheezes. He has no rales.   Abdominal: Soft. Bowel sounds are normal. He exhibits no distension and no mass. There is no tenderness.   Musculoskeletal: He exhibits no edema.        Left ankle: He exhibits decreased range of motion. Tenderness.   Lymphadenopathy:     He has no cervical adenopathy.   Neurological: He is alert and oriented to person, place, and time.   Skin: Skin is warm and dry. No rash noted. He is not diaphoretic. No erythema.   Psychiatric: He has a normal mood and affect. His behavior is normal. Judgment and thought content normal.       Lab Results   Component Value Date    WBC 6.2 10/05/2018    HGB 14.0 10/05/2018    HCT 44.3 10/05/2018     10/05/2018    CHOL 214 (H) 03/09/2016    TRIG 180 (H)  03/09/2016    HDL 41 03/09/2016    ALT 41 10/27/2015    AST 30 04/09/2019     04/09/2019    K 3.9 04/09/2019     04/09/2019    CREATININE 1.02 04/09/2019    BUN 30 (H) 04/09/2019    CO2 29.7 04/09/2019    TSH 1.67 03/27/2018    INR 1.0 05/17/2013    GLUF 96 03/09/2016    HGBA1C 7.4 07/08/2019     Assessment:       1. Controlled type 2 diabetes mellitus without complication, without long-term current use of insulin    2. Chronic pain of left ankle    3. Chronic atrial fibrillation    4. Familial hypercholesterolemia    5. Essential hypertension    6. Fatigue, unspecified type        Plan:       Controlled type 2 diabetes mellitus without complication, without long-term current use of insulin  -     POCT HEMOGLOBIN A1C  -     empagliflozin (JARDIANCE) 10 mg Tab; Take 10 mg by mouth once daily.  Dispense: 90 tablet; Refill: 2  -     Comprehensive metabolic panel; Future; Expected date: 07/08/2019  -     Hemoglobin A1c; Future; Expected date: 07/08/2019    Prerenal azotemia   Monitor decrease lasix to 20 mg MWF    Chronic pain of left ankle   Revisit with podiatry    Chronic atrial fibrillation   Stable and on eliquis    Familial hypercholesterolemia  -     Lipid panel; Future; Expected date: 07/08/2019    Essential hypertension  -     TSH; Future; Expected date: 07/08/2019  -     T4, free; Future; Expected date: 07/08/2019    Fatigue, unspecified type  -     CBC auto differential; Future; Expected date: 07/08/2019

## 2019-07-08 NOTE — TELEPHONE ENCOUNTER
Pt called stating that he called [facility] and Orlin Morrison advised pt that orders for BIPAP were not rec'd. Faxed orders to [facility] with Orlin Morrison to follow up. Make sure pt gets contacted to be set up. Valerie, patient was admitted in 2016 to [facility] and was in atrial fibrillation can we get a copy of the echocardiogram from her gone and printed out and put on my desk please think you

## 2019-07-31 ENCOUNTER — PATIENT MESSAGE (OUTPATIENT)
Dept: FAMILY MEDICINE | Facility: CLINIC | Age: 79
End: 2019-07-31

## 2019-07-31 DIAGNOSIS — I48.20 CHRONIC ATRIAL FIBRILLATION: ICD-10-CM

## 2019-08-01 RX ORDER — ASPIRIN 81 MG/1
81 TABLET ORAL DAILY
Qty: 90 TABLET | Refills: 3 | Status: SHIPPED | OUTPATIENT
Start: 2019-08-01 | End: 2020-05-27 | Stop reason: SDUPTHER

## 2019-08-01 RX ORDER — GABAPENTIN 300 MG/1
300 CAPSULE ORAL 3 TIMES DAILY
Qty: 270 CAPSULE | Refills: 1 | Status: SHIPPED | OUTPATIENT
Start: 2019-08-01 | End: 2020-04-01 | Stop reason: SDUPTHER

## 2019-08-14 ENCOUNTER — HOSPITAL ENCOUNTER (EMERGENCY)
Facility: HOSPITAL | Age: 79
Discharge: HOME | End: 2019-08-14
Admitting: EMERGENCY MEDICINE
Payer: MEDICARE

## 2019-08-14 VITALS
WEIGHT: 248 LBS | DIASTOLIC BLOOD PRESSURE: 88 MMHG | SYSTOLIC BLOOD PRESSURE: 125 MMHG | HEART RATE: 69 BPM | OXYGEN SATURATION: 99 % | HEIGHT: 68 IN | TEMPERATURE: 98 F | RESPIRATION RATE: 20 BRPM | BODY MASS INDEX: 37.59 KG/M2

## 2019-08-14 DIAGNOSIS — T63.461A WASP STING, ACCIDENTAL OR UNINTENTIONAL, INITIAL ENCOUNTER: Primary | ICD-10-CM

## 2019-08-14 PROCEDURE — 63600175 PHARM REV CODE 636 W HCPCS: Performed by: NURSE PRACTITIONER

## 2019-08-14 PROCEDURE — 99284 EMERGENCY DEPT VISIT MOD MDM: CPT

## 2019-08-14 PROCEDURE — 96372 THER/PROPH/DIAG INJ SC/IM: CPT | Mod: 59

## 2019-08-14 RX ORDER — DEXAMETHASONE SODIUM PHOSPHATE 4 MG/ML
8 INJECTION, SOLUTION INTRA-ARTICULAR; INTRALESIONAL; INTRAMUSCULAR; INTRAVENOUS; SOFT TISSUE
Status: COMPLETED | OUTPATIENT
Start: 2019-08-14 | End: 2019-08-14

## 2019-08-14 RX ADMIN — DEXAMETHASONE SODIUM PHOSPHATE 8 MG: 4 INJECTION, SOLUTION INTRAMUSCULAR; INTRAVENOUS at 07:08

## 2019-08-14 NOTE — ED TRIAGE NOTES
"Present to the with c/o " to get a shot a wasp bite" reports being stung by wasp to L eye at approx 16:30, reports he is allergic to them and he usually comes to ER for treatment, significant other reports pt took benadryl and zantac, denies SOB/wheezing, swelling to upper/lower lid  "

## 2019-08-15 NOTE — ED PROVIDER NOTES
Encounter Date: 8/14/2019       History     Chief Complaint   Patient presents with    Insect Bite     WASP STING TO L SIDE FACE     79 year old male presents to the ER for evaluation of a wasp sting to his left orbit region. Injury occurred at 1630 today. He has a known allergy to wasp but has never experienced anaphylaxis. He denies itching, throat tightness, shortness of breath or difficulty swallowing. No aggravating or alleviating factors.         Review of patient's allergies indicates:   Allergen Reactions    Ativan [lorazepam] Other (See Comments)     Mood alternating      Dilaudid [hydromorphone (bulk)] Other (See Comments)     Mood alternating      Morphine Other (See Comments)     Mood alternating      Hydromorphone      Past Medical History:   Diagnosis Date    Allergy     Anticoagulant long-term use     aspirin    Anxiety     Atrial fibrillation     Cataract     Clotting disorder     left leg    Diabetes mellitus     Diabetes mellitus, type 2     Hyperlipidemia     Hypertension     Obese     BRANDI on CPAP      Past Surgical History:   Procedure Laterality Date    ABDOMINAL SURGERY      origunal surg 1986-mult surgeries since    CARPAL TUNNEL RELEASE      right wrist 2009-left wrist 2010    COLON SURGERY      partial colon removal    COLOSTOMY  1986    colostomy reversal  1986    EYE SURGERY      hay fever      HERNIA REPAIR  1949    Injection,steroid,epidural,transforaminal approach Bilateral 12/10/2018    [facility]    Injection,steroid,epidural,transforaminal approach L4-5, L5-S1 Bilateral 1/28/2019    [facility]    INJECTION-STEROID-EPIDURAL-TRANSFORAMINAL Left 4/30/2018    [facility]    INJECTION-STEROID-EPIDURAL-TRANSFORAMINAL Left 9/28/2017    [facility]    TONSILLECTOMY  1947     No family history on file.  Social History     Tobacco Use    Smoking status:  Former Smoker     Types: Cigarettes     Last attempt to quit: 2006     Years since quittin.4    Smokeless tobacco: Never Used   Substance Use Topics    Alcohol use: No    Drug use: No     Review of Systems   Constitutional: Negative.    HENT: Negative.    Eyes:        Orbital swelling and erythema   Respiratory: Negative.    Cardiovascular: Negative.    Gastrointestinal: Negative.    Musculoskeletal: Negative.    Neurological: Negative.    Hematological: Negative.    Psychiatric/Behavioral: Negative.        Physical Exam     Initial Vitals [19 1734]   BP Pulse Resp Temp SpO2   (!) 158/67 73 18 97.8 °F (36.6 °C) 96 %      MAP       --         Physical Exam    Nursing note and vitals reviewed.  Constitutional: He appears well-developed and well-nourished. He is not diaphoretic. No distress.   HENT:   Head: Normocephalic.       Right Ear: External ear normal.   Left Ear: External ear normal.   Mouth/Throat: Oropharynx is clear and moist.   Eyes: Conjunctivae and EOM are normal. Pupils are equal, round, and reactive to light.   Neck: Normal range of motion. Neck supple.   Cardiovascular: Normal rate, regular rhythm, normal heart sounds and intact distal pulses.   Pulmonary/Chest: Breath sounds normal. No respiratory distress. He has no wheezes. He has no rhonchi. He has no rales. He exhibits no tenderness.   Abdominal: Soft. Bowel sounds are normal. There is no tenderness.   Musculoskeletal: Normal range of motion.   Neurological: He is alert and oriented to person, place, and time. He has normal strength. No sensory deficit.   Skin: Skin is warm and dry. Capillary refill takes less than 2 seconds. There is erythema.   Psychiatric: He has a normal mood and affect.         ED Course   Procedures  Labs Reviewed - No data to display       Imaging Results    None          Medical Decision Making:   History:   Old Medical Records: I decided to obtain old medical records.  Differential Diagnosis:   Allergic  reaction, anaphylaxis  ED Management:  Vitals stable. Mild erythema and swelling to left lower orbital region. Lungs clear. No stridor or wheezing. No airway involvement. He took 50mg of Benadry and 150mg of Zantac prior to arrival and his symptoms improved. He is requesting a steroid injection. I discussed with him the possibility of hyperglycemia and he reports that he has received steroid injections in the past for wasp stings without complications. I discussed the importance of close glucose monitoring. Recommend that he continue using benadryl q 6 hours and Zantac q 12 hours. He is stable for discharge and outpatient management. Detailed return precautions discussed.   Other:   I have discussed this case with another health care provider.       <> Summary of the Discussion: Dr. Díaz.                      Clinical Impression:   Wasp Sting to Left Orbit                             Yeyo Addison NP  08/14/19 [id number]

## 2019-08-15 NOTE — ED NOTES
"Noted pt with curtains open, informed due to privacy, curtains to room have to remain closed, pt opens curtains as soon as it is closed, reports he is ready to go home, informed to be patient, that I will be with him as soon as possible, to please be patient, that I am doing the best I can, pt walks out of room, and report he is leaving " you have a big mouth you know"   "

## 2019-09-09 DIAGNOSIS — M25.561 CHRONIC PAIN OF BOTH KNEES: ICD-10-CM

## 2019-09-09 DIAGNOSIS — M25.562 CHRONIC PAIN OF BOTH KNEES: ICD-10-CM

## 2019-09-09 DIAGNOSIS — G89.29 CHRONIC PAIN OF BOTH KNEES: ICD-10-CM

## 2019-09-09 RX ORDER — TRAMADOL HYDROCHLORIDE 50 MG/1
50 TABLET ORAL 2 TIMES DAILY
Qty: 60 TABLET | Refills: 4 | Status: SHIPPED | OUTPATIENT
Start: 2019-09-09 | End: 2019-10-09

## 2019-09-30 DIAGNOSIS — E11.9 CONTROLLED TYPE 2 DIABETES MELLITUS WITHOUT COMPLICATION, WITHOUT LONG-TERM CURRENT USE OF INSULIN: ICD-10-CM

## 2019-10-01 ENCOUNTER — LAB VISIT (OUTPATIENT)
Dept: LAB | Facility: HOSPITAL | Age: 79
End: 2019-10-01
Attending: INTERNAL MEDICINE
Payer: MEDICARE

## 2019-10-01 DIAGNOSIS — I10 ESSENTIAL HYPERTENSION, MALIGNANT: ICD-10-CM

## 2019-10-01 DIAGNOSIS — E78.01 ESSENTIAL FAMILIAL HYPERCHOLESTEROLEMIA: ICD-10-CM

## 2019-10-01 DIAGNOSIS — R53.83 FATIGUE: ICD-10-CM

## 2019-10-01 DIAGNOSIS — E11.9 DIABETES MELLITUS WITHOUT COMPLICATION: ICD-10-CM

## 2019-10-01 DIAGNOSIS — E11.9 DIABETES MELLITUS WITHOUT COMPLICATION: Primary | ICD-10-CM

## 2019-10-01 LAB
ALBUMIN SERPL BCP-MCNC: 3.9 G/DL (ref 3.5–5.2)
ALP SERPL-CCNC: 33 U/L (ref 55–135)
ALT SERPL W/O P-5'-P-CCNC: 30 U/L (ref 10–44)
ANION GAP SERPL CALC-SCNC: 5 MMOL/L (ref 8–16)
AST SERPL-CCNC: 22 U/L (ref 10–40)
BASOPHILS # BLD AUTO: 0.04 K/UL (ref 0–0.2)
BASOPHILS NFR BLD: 0.7 % (ref 0–1.9)
BILIRUB SERPL-MCNC: 0.6 MG/DL (ref 0.1–1)
BUN SERPL-MCNC: 23 MG/DL (ref 8–23)
CALCIUM SERPL-MCNC: 8.8 MG/DL (ref 8.7–10.5)
CHLORIDE SERPL-SCNC: 105 MMOL/L (ref 95–110)
CHOLEST SERPL-MCNC: 120 MG/DL (ref 120–199)
CHOLEST/HDLC SERPL: 3.2 {RATIO} (ref 2–5)
CO2 SERPL-SCNC: 31 MMOL/L (ref 23–29)
CREAT SERPL-MCNC: 1.2 MG/DL (ref 0.5–1.4)
DIFFERENTIAL METHOD: ABNORMAL
EOSINOPHIL # BLD AUTO: 0.4 K/UL (ref 0–0.5)
EOSINOPHIL NFR BLD: 6.8 % (ref 0–8)
ERYTHROCYTE [DISTWIDTH] IN BLOOD BY AUTOMATED COUNT: 14.9 % (ref 11.5–14.5)
EST. GFR  (AFRICAN AMERICAN): >60 ML/MIN/1.73 M^2
EST. GFR  (NON AFRICAN AMERICAN): 57.6 ML/MIN/1.73 M^2
ESTIMATED AVG GLUCOSE: 123 MG/DL (ref 68–131)
GLUCOSE SERPL-MCNC: 135 MG/DL (ref 70–110)
HBA1C MFR BLD HPLC: 5.9 % (ref 4.5–6.2)
HCT VFR BLD AUTO: 50.3 % (ref 40–54)
HDLC SERPL-MCNC: 37 MG/DL (ref 40–75)
HDLC SERPL: 30.8 % (ref 20–50)
HGB BLD-MCNC: 15.4 G/DL (ref 14–18)
IMM GRANULOCYTES # BLD AUTO: 0.02 K/UL (ref 0–0.04)
IMM GRANULOCYTES NFR BLD AUTO: 0.3 % (ref 0–0.5)
LDLC SERPL CALC-MCNC: 58.8 MG/DL (ref 63–159)
LYMPHOCYTES # BLD AUTO: 1.8 K/UL (ref 1–4.8)
LYMPHOCYTES NFR BLD: 28.8 % (ref 18–48)
MCH RBC QN AUTO: 29.8 PG (ref 27–31)
MCHC RBC AUTO-ENTMCNC: 30.6 G/DL (ref 32–36)
MCV RBC AUTO: 97 FL (ref 82–98)
MONOCYTES # BLD AUTO: 0.7 K/UL (ref 0.3–1)
MONOCYTES NFR BLD: 10.7 % (ref 4–15)
NEUTROPHILS # BLD AUTO: 3.2 K/UL (ref 1.8–7.7)
NEUTROPHILS NFR BLD: 52.7 % (ref 38–73)
NONHDLC SERPL-MCNC: 83 MG/DL
NRBC BLD-RTO: 0 /100 WBC
PLATELET # BLD AUTO: 171 K/UL (ref 150–350)
PMV BLD AUTO: 9.6 FL (ref 9.2–12.9)
POTASSIUM SERPL-SCNC: 4.3 MMOL/L (ref 3.5–5.1)
PROT SERPL-MCNC: 6.9 G/DL (ref 6–8.4)
RBC # BLD AUTO: 5.17 M/UL (ref 4.6–6.2)
SODIUM SERPL-SCNC: 141 MMOL/L (ref 136–145)
T4 FREE SERPL-MCNC: 0.8 NG/DL (ref 0.71–1.51)
TRIGL SERPL-MCNC: 121 MG/DL (ref 30–150)
TSH SERPL DL<=0.005 MIU/L-ACNC: 2.01 UIU/ML (ref 0.34–5.6)
WBC # BLD AUTO: 6.07 K/UL (ref 3.9–12.7)

## 2019-10-01 PROCEDURE — 36415 COLL VENOUS BLD VENIPUNCTURE: CPT

## 2019-10-01 PROCEDURE — 80061 LIPID PANEL: CPT

## 2019-10-01 PROCEDURE — 84439 ASSAY OF FREE THYROXINE: CPT

## 2019-10-01 PROCEDURE — 80053 COMPREHEN METABOLIC PANEL: CPT

## 2019-10-01 PROCEDURE — 85025 COMPLETE CBC W/AUTO DIFF WBC: CPT

## 2019-10-01 PROCEDURE — 84443 ASSAY THYROID STIM HORMONE: CPT

## 2019-10-01 PROCEDURE — 83036 HEMOGLOBIN GLYCOSYLATED A1C: CPT

## 2019-10-02 DIAGNOSIS — E11.9 CONTROLLED TYPE 2 DIABETES MELLITUS WITHOUT COMPLICATION, WITHOUT LONG-TERM CURRENT USE OF INSULIN: ICD-10-CM

## 2019-10-09 ENCOUNTER — OFFICE VISIT (OUTPATIENT)
Dept: FAMILY MEDICINE | Facility: CLINIC | Age: 79
End: 2019-10-09
Payer: MEDICARE

## 2019-10-09 VITALS
HEIGHT: 68 IN | SYSTOLIC BLOOD PRESSURE: 120 MMHG | HEART RATE: 55 BPM | RESPIRATION RATE: 17 BRPM | OXYGEN SATURATION: 95 % | TEMPERATURE: 98 F | BODY MASS INDEX: 38.04 KG/M2 | DIASTOLIC BLOOD PRESSURE: 70 MMHG | WEIGHT: 251 LBS

## 2019-10-09 DIAGNOSIS — G89.29 CHRONIC PAIN OF BOTH KNEES: ICD-10-CM

## 2019-10-09 DIAGNOSIS — I10 ESSENTIAL HYPERTENSION: ICD-10-CM

## 2019-10-09 DIAGNOSIS — M48.02 CERVICAL STENOSIS OF SPINE: ICD-10-CM

## 2019-10-09 DIAGNOSIS — M25.561 CHRONIC PAIN OF BOTH KNEES: ICD-10-CM

## 2019-10-09 DIAGNOSIS — M25.562 CHRONIC PAIN OF BOTH KNEES: ICD-10-CM

## 2019-10-09 DIAGNOSIS — E78.01 FAMILIAL HYPERCHOLESTEROLEMIA: ICD-10-CM

## 2019-10-09 DIAGNOSIS — E11.9 CONTROLLED TYPE 2 DIABETES MELLITUS WITHOUT COMPLICATION, WITHOUT LONG-TERM CURRENT USE OF INSULIN: Primary | ICD-10-CM

## 2019-10-09 PROCEDURE — 99214 OFFICE O/P EST MOD 30 MIN: CPT | Mod: 25 | Performed by: INTERNAL MEDICINE

## 2019-10-09 PROCEDURE — 90662 IIV NO PRSV INCREASED AG IM: CPT | Mod: PBBFAC | Performed by: INTERNAL MEDICINE

## 2019-10-09 PROCEDURE — 99214 OFFICE O/P EST MOD 30 MIN: CPT | Mod: S$PBB,,, | Performed by: INTERNAL MEDICINE

## 2019-10-09 PROCEDURE — 99214 PR OFFICE/OUTPT VISIT, EST, LEVL IV, 30-39 MIN: ICD-10-PCS | Mod: S$PBB,,, | Performed by: INTERNAL MEDICINE

## 2019-10-09 RX ORDER — METHOCARBAMOL 500 MG/1
500 TABLET, FILM COATED ORAL 3 TIMES DAILY
Qty: 270 TABLET | Refills: 1 | Status: SHIPPED | OUTPATIENT
Start: 2019-10-09 | End: 2020-01-24 | Stop reason: SDUPTHER

## 2019-10-09 RX ORDER — HYDROCODONE BITARTRATE AND ACETAMINOPHEN 7.5; 325 MG/1; MG/1
1 TABLET ORAL EVERY 12 HOURS PRN
Qty: 60 TABLET | Refills: 0 | Status: SHIPPED | OUTPATIENT
Start: 2019-10-09 | End: 2020-01-24 | Stop reason: SDUPTHER

## 2019-10-09 RX ORDER — METHOCARBAMOL 500 MG/1
TABLET, FILM COATED ORAL
COMMUNITY
Start: 2019-09-12 | End: 2019-10-09 | Stop reason: SDUPTHER

## 2019-10-09 RX ORDER — TRAMADOL HYDROCHLORIDE AND ACETAMINOPHEN 37.5; 325 MG/1; MG/1
1 TABLET, FILM COATED ORAL EVERY 12 HOURS PRN
Qty: 180 TABLET | Refills: 1 | Status: SHIPPED | OUTPATIENT
Start: 2019-10-09 | End: 2020-01-23

## 2019-10-10 NOTE — PROGRESS NOTES
Subjective:       Patient ID: Yeyo Hollingsworth is a 79 y.o. male.    Chief Complaint: Follow-up (lab review) and Diabetes    Patient is a  79-year-old a   who is here for follow-up on his type 2 diabetes with a hemoglobin A1c of 5.9% and is never been this low in the past. (prior 7.4 3 months ago). He is on Januvia 100 mg and jardiance 10 mg and totally off the metformin which he struggled with due to loose stools.  On this particular medication regimen he has lost 10 lb despite a cruise rendered him in.  He has significant osteoarthritis as well and takes a tramadol twice a day with [facility] [employer] is not covering this particular medication but will cover it with the addition of acetaminophen.  He also takes 1 hydrocodone 7.5 mg usually 5 at a 7 days a week.  He is on gabapentin as well for his chronic lumbar radiculopathy and cervical spondylosis.  He does fat follow with pain management as well for intermittent epidurals.  He has history of paroxysmal atrial fibrillation and is on blood thinner so this prevent his uses of NSAIDs.  He has a history of chronic constipation but does quite well MiraLax.  He has a history of allergic rhinitis and is on Allegra, singular and Astelin.    Review of Systems   Constitutional: Negative for activity change, diaphoresis, fatigue and fever.   HENT: Negative for congestion, ear pain, postnasal drip, sinus pressure, sore throat and trouble swallowing.    Eyes: Negative for pain.   Respiratory: Negative for cough, chest tightness, shortness of breath and wheezing.    Cardiovascular: Negative for chest pain, palpitations and leg swelling.   Gastrointestinal: Negative for abdominal distention, abdominal pain, blood in stool, constipation and diarrhea.   Endocrine: Negative for cold intolerance and heat intolerance.   Genitourinary: Negative for decreased urine volume, difficulty urinating, dysuria, flank pain, frequency and hematuria.   Musculoskeletal:  Positive for arthralgias. Negative for back pain, joint swelling, myalgias and neck pain.   Skin: Negative for pallor, rash and wound.   Neurological: Negative for tremors, syncope, weakness, light-headedness, numbness and headaches.   Hematological: Negative for adenopathy.   Psychiatric/Behavioral: Negative for confusion, decreased concentration, hallucinations, self-injury, sleep disturbance and suicidal ideas. The patient is not nervous/anxious.        Past Medical History:   Diagnosis Date    Allergy     Anticoagulant long-term use     aspirin    Anxiety     Atrial fibrillation     Cataract     Clotting disorder     left leg    Diabetes mellitus     Diabetes mellitus, type 2     Hyperlipidemia     Hypertension     Obese     BRANDI on CPAP        Past Surgical History:   Procedure Laterality Date    ABDOMINAL SURGERY      origunal surg -mult surgeries since    CARPAL TUNNEL RELEASE      right wrist -left wrist     COLON SURGERY      partial colon removal    COLOSTOMY      colostomy reversal      EYE SURGERY      hay fever      HERNIA REPAIR      TONSILLECTOMY         History reviewed. No pertinent family history.    Social History     Socioeconomic History    Marital status:      Spouse name: Not on file    Number of children: Not on file    Years of education: Not on file    Highest education level: Not on file   Occupational History    Not on file   Social Needs    Financial resource strain: Not on file    Food insecurity:     Worry: Not on file     Inability: Not on file    Transportation needs:     Medical: Not on file     Non-medical: Not on file   Tobacco Use    Smoking status: Former Smoker     Types: Cigarettes     Last attempt to quit: 2006     Years since quittin.6    Smokeless tobacco: Never Used   Substance and Sexual Activity    Alcohol use: No    Drug use: No    Sexual activity: Yes     Partners: Female   Lifestyle     Physical activity:     Days per week: Not on file     Minutes per session: Not on file    Stress: Only a little   Relationships    Social connections:     Talks on phone: Not on file     Gets together: Not on file     Attends Spiritism service: Not on file     Active member of club or organization: Not on file     Attends meetings of clubs or organizations: Not on file     Relationship status: Not on file   Other Topics Concern    Not on file   Social History Narrative    Not on file       Current Outpatient Medications   Medication Sig Dispense Refill    apixaban (ELIQUIS) 5 mg Tab Take 1 tablet (5 mg total) by mouth 2 (two) times daily. 180 tablet 3    aspirin (ECOTRIN) 81 MG EC tablet Take 1 tablet (81 mg total) by mouth once daily. 90 tablet 3    atorvastatin (LIPITOR) 10 MG tablet nightly 90 tablet 3    azelastine (ASTELIN) 137 mcg (0.1 %) nasal spray 1 spray (137 mcg total) by Nasal route 2 (two) times daily. 30 mL 5    betamethasone dipropionate (DIPROLENE) 0.05 % cream       blood sugar diagnostic (BLOOD GLUCOSE TEST) Strp FREESTYLE LITE BG TEST STRIPS. current use of insulin  - Primary ICD-10-CM: E11.9 200 strip 3    blood sugar diagnostic Strp 200 strips by Misc.(Non-Drug; Combo Route) route 2 (two) times daily. 100 strip 3    diazePAM (VALIUM) 5 MG tablet Take 1 tablet (5 mg total) by mouth daily as needed for Anxiety. 30 tablet 5    diclofenac sodium (VOLTAREN) 1 % Gel VOLTAREN, 1% (Transdermal Gel)   as needed (1 %)  Active   Comments: Medication taken as needed.       empagliflozin (JARDIANCE) 10 mg Tab Take 10 mg by mouth once daily. 90 tablet 2    fexofenadine (ALLEGRA) 180 MG tablet Take 1 tablet (180 mg total) by mouth once daily. 90 tablet 3    fluticasone (FLONASE) 50 mcg/actuation nasal spray 2 sprays (100 mcg total) by Each Nare route once daily. 3 Bottle 3    FLUZONE HIGH-DOSE 2018-19, PF, 180 mcg/0.5 mL vaccine Inject 1 mL into the muscle once.  0    FREESTYLE LANCETS 28  gauge lancets       furosemide (LASIX) 20 MG tablet Take 1 tablet (20 mg total) by mouth every Mon, Wed, Fri. 45 tablet 1    gabapentin (NEURONTIN) 300 MG capsule Take 1 capsule (300 mg total) by mouth 3 (three) times daily. 270 capsule 1    HYDROcodone-acetaminophen (NORCO) 7.5-325 mg per tablet Take 1 tablet by mouth every 12 (twelve) hours as needed for Pain. 60 tablet 0    lisinopril (PRINIVIL,ZESTRIL) 20 MG tablet Take 1 tablet (20 mg total) by mouth once daily. 90 tablet 3    metoprolol tartrate (LOPRESSOR) 25 MG tablet Take 1 tablet (25 mg total) by mouth 2 (two) times daily. 180 tablet 3    montelukast (SINGULAIR) 10 mg tablet Take 1 tablet (10 mg total) by mouth every evening. 90 tablet 3    multivit-iron-min-folic acid (MULTIVITAMIN-IRON-MINERALS-FOLIC ACID) 3,500-18-0.4 unit-mg-mg Chew Take by mouth.        mupirocin (BACTROBAN) 2 % ointment Apply topically 2 (two) times daily. 22 g 1    naftifine (NAFTIN) 2 % Crea NAFTIN, 2% (External Cream)   as needed (2 %)  Active   Comments: Medication taken as needed.       polyethylene glycol (GLYCOLAX) 17 gram/dose powder Take 17 g by mouth once daily. 6 Bottle 3    potassium chloride (MICRO-K) 10 MEQ CpSR Every other day 45 capsule 3    psyllium (METAMUCIL) powder Take 1 packet by mouth 3 (three) times daily.      SITagliptin (JANUVIA) 100 MG Tab Take 1 tablet (100 mg total) by mouth once daily. 90 tablet 3    traZODone (DESYREL) 100 MG tablet Take 1 tablet (100 mg total) by mouth every evening. 90 tablet 3    triamcinolone acetonide 0.1% (KENALOG) 0.1 % ointment Apply topically 2 (two) times daily. 80 g 3    ipratropium (ATROVENT) 0.03 % nasal spray 2 sprays by Nasal route 2 (two) times daily. (Patient not taking: Reported on 10/9/2019) 30 mL 5    methocarbamol (ROBAXIN) 500 MG Tab Take 1 tablet (500 mg total) by mouth 3 (three) times daily. 270 tablet 1    tramadol-acetaminophen 37.5-325 mg (ULTRACET) 37.5-325 mg Tab Take 1 tablet by mouth  every 12 (twelve) hours as needed for Pain. 180 tablet 1     No current facility-administered medications for this visit.        Review of patient's allergies indicates:   Allergen Reactions    Ativan [lorazepam] Other (See Comments)     Mood alternating      Dilaudid [hydromorphone (bulk)] Other (See Comments)     Mood alternating      Morphine Other (See Comments)     Mood alternating      Hydromorphone      Objective:      Physical Exam   Constitutional: He is oriented to person, place, and time. He appears well-developed and well-nourished. No distress.   HENT:   Head: Normocephalic and atraumatic.   Right Ear: External ear normal.   Left Ear: External ear normal.   Nose: Nose normal.   Mouth/Throat: Oropharynx is clear and moist.   Eyes: Conjunctivae and EOM are normal. Right eye exhibits no discharge. Left eye exhibits no discharge. No scleral icterus.   Neck: Normal range of motion. Neck supple. No JVD present. No tracheal deviation present. No thyromegaly present.   Cardiovascular: Normal rate, regular rhythm, normal heart sounds and intact distal pulses. Exam reveals no gallop.   No murmur heard.  Pulmonary/Chest: Effort normal and breath sounds normal. No respiratory distress. He has no wheezes. He has no rales.   Abdominal: Soft. Bowel sounds are normal. He exhibits no distension and no mass. There is no tenderness.   Musculoskeletal: Normal range of motion. He exhibits no edema or tenderness.   Lymphadenopathy:     He has no cervical adenopathy.   Neurological: He is alert and oriented to person, place, and time.   Skin: Skin is warm and dry. No rash noted. He is not diaphoretic. No erythema.   Psychiatric: He has a normal mood and affect. His behavior is normal. Judgment and thought content normal.       Lab Results   Component Value Date    WBC 6.07 10/01/2019    HGB 15.4 10/01/2019    HCT 50.3 10/01/2019     10/01/2019    CHOL 120 10/01/2019    TRIG 121 10/01/2019    HDL 37 (L) 10/01/2019     ALT 30 10/01/2019    AST 22 10/01/2019     10/01/2019    K 4.3 10/01/2019     10/01/2019    CREATININE 1.2 10/01/2019    BUN 23 10/01/2019    CO2 31 (H) 10/01/2019    TSH 2.010 10/01/2019    INR 1.0 05/17/2013    GLUF 96 03/09/2016    HGBA1C 5.9 10/01/2019       Assessment:       1. Controlled type 2 diabetes mellitus without complication, without long-term current use of insulin    2. Essential hypertension    3. Familial hypercholesterolemia    4. Chronic pain of both knees    5. Cervical stenosis of spine        Plan:       Controlled type 2 diabetes mellitus without complication, without long-term current use of insulin   Under excellent control with the after mentioned regimen the HPI  Essential hypertension   Continue his blood pressure regimen which includes an Ace  Familial hypercholesterolemia   He continues statin  Chronic pain of both knees  -     tramadol-acetaminophen 37.5-325 mg (ULTRACET) 37.5-325 mg Tab; Take 1 tablet by mouth every 12 (twelve) hours as needed for Pain.  Dispense: 180 tablet; Refill: 1  -     HYDROcodone-acetaminophen (NORCO) 7.5-325 mg per tablet; Take 1 tablet by mouth every 12 (twelve) hours as needed for Pain.  Dispense: 60 tablet; Refill: 0  Cervical stenosis of spine-with exacerbates shin and patient will see pain management as well again.  -     methocarbamol (ROBAXIN) 500 MG Tab; Take 1 tablet (500 mg total) by mouth 3 (three) times daily.  Dispense: 270 tablet; Refill: 1  Other orders  -     Influenza - High Dose (65+) (PF) (IM)    Length of time spent with the patient greater than 50% in face-to-face encounter.  We discussed incomplete health maintenance as well.

## 2019-10-14 DIAGNOSIS — I10 ESSENTIAL HYPERTENSION: ICD-10-CM

## 2019-10-15 RX ORDER — FUROSEMIDE 20 MG/1
20 TABLET ORAL
Qty: 45 TABLET | Refills: 3 | Status: SHIPPED | OUTPATIENT
Start: 2019-10-16 | End: 2021-01-08 | Stop reason: SDUPTHER

## 2019-10-15 RX ORDER — POTASSIUM CHLORIDE 750 MG/1
CAPSULE, EXTENDED RELEASE ORAL
Qty: 45 CAPSULE | Refills: 3 | Status: SHIPPED | OUTPATIENT
Start: 2019-10-15 | End: 2021-01-08 | Stop reason: SDUPTHER

## 2019-10-28 ENCOUNTER — TELEPHONE (OUTPATIENT)
Dept: PHYSICAL MEDICINE AND REHAB | Facility: CLINIC | Age: 79
End: 2019-10-28

## 2019-10-28 ENCOUNTER — TELEPHONE (OUTPATIENT)
Dept: PAIN MEDICINE | Facility: CLINIC | Age: 79
End: 2019-10-28

## 2019-10-28 DIAGNOSIS — M54.16 LUMBAR RADICULOPATHY: Primary | ICD-10-CM

## 2019-10-28 NOTE — TELEPHONE ENCOUNTER
Pt had JOSE 1/9/2019, last seen 4/8/2019. Pt states having more pain in back and wants to know if maybe he can be scheduled for injections again. Please advise if procedure should be scheduled or if pt should schedule an appt to discuss.

## 2019-10-28 NOTE — TELEPHONE ENCOUNTER
Patient is scheduled for a Transforaminal epidural steroid injection Bilateral L4-5, L5-S1 on 11/13/19. Pt states he is currently on Eliquis prescribed by Dr. Castaneda. Please advise if ok to hold Eliquis 48 hours prior to procedure as recommended.

## 2019-11-13 ENCOUNTER — HOSPITAL ENCOUNTER (OUTPATIENT)
Facility: AMBULARY SURGERY CENTER | Age: 79
Discharge: HOME | End: 2019-11-13
Attending: ANESTHESIOLOGY | Admitting: ANESTHESIOLOGY
Payer: MEDICARE

## 2019-11-13 DIAGNOSIS — M54.16 LUMBAR RADICULITIS: Primary | ICD-10-CM

## 2019-11-13 LAB — POCT GLUCOSE: 80 MG/DL (ref 70–110)

## 2019-11-13 PROCEDURE — 64484 PRA INJECT ANES/STEROID FORAMEN LUMBAR/SACRAL W IMG GUIDE ,EA ADD LEVEL: ICD-10-PCS | Mod: 50,,, | Performed by: ANESTHESIOLOGY

## 2019-11-13 PROCEDURE — 64483 NJX AA&/STRD TFRM EPI L/S 1: CPT | Mod: LT | Performed by: ANESTHESIOLOGY

## 2019-11-13 PROCEDURE — 99152 MOD SED SAME PHYS/QHP 5/>YRS: CPT | Mod: ,,, | Performed by: ANESTHESIOLOGY

## 2019-11-13 PROCEDURE — 99152 PR MOD CONSCIOUS SEDATION, SAME PHYS, 5+ YRS, FIRST 15 MIN: ICD-10-PCS | Mod: ,,, | Performed by: ANESTHESIOLOGY

## 2019-11-13 PROCEDURE — 64484 NJX AA&/STRD TFRM EPI L/S EA: CPT | Mod: LT | Performed by: ANESTHESIOLOGY

## 2019-11-13 PROCEDURE — 64483 NJX AA&/STRD TFRM EPI L/S 1: CPT | Mod: 50,,, | Performed by: ANESTHESIOLOGY

## 2019-11-13 PROCEDURE — 64483 PR EPIDURAL INJ, ANES/STEROID, TRANSFORAMINAL, LUMB/SACR, SNGL LEVL: ICD-10-PCS | Mod: 50,,, | Performed by: ANESTHESIOLOGY

## 2019-11-13 PROCEDURE — 64484 NJX AA&/STRD TFRM EPI L/S EA: CPT | Mod: 50,,, | Performed by: ANESTHESIOLOGY

## 2019-11-13 RX ORDER — BUPIVACAINE HYDROCHLORIDE 2.5 MG/ML
INJECTION, SOLUTION EPIDURAL; INFILTRATION; INTRACAUDAL
Status: DISCONTINUED | OUTPATIENT
Start: 2019-11-13 | End: 2019-11-13 | Stop reason: HOSPADM

## 2019-11-13 RX ORDER — MIDAZOLAM HYDROCHLORIDE 1 MG/ML
INJECTION INTRAMUSCULAR; INTRAVENOUS
Status: DISCONTINUED | OUTPATIENT
Start: 2019-11-13 | End: 2019-11-13 | Stop reason: HOSPADM

## 2019-11-13 RX ORDER — LIDOCAINE HYDROCHLORIDE 10 MG/ML
INJECTION, SOLUTION EPIDURAL; INFILTRATION; INTRACAUDAL; PERINEURAL
Status: DISCONTINUED | OUTPATIENT
Start: 2019-11-13 | End: 2019-11-13 | Stop reason: HOSPADM

## 2019-11-13 RX ORDER — DEXAMETHASONE SODIUM PHOSPHATE 10 MG/ML
INJECTION INTRAMUSCULAR; INTRAVENOUS
Status: DISCONTINUED | OUTPATIENT
Start: 2019-11-13 | End: 2019-11-13 | Stop reason: HOSPADM

## 2019-11-13 RX ORDER — SODIUM CHLORIDE, SODIUM LACTATE, POTASSIUM CHLORIDE, CALCIUM CHLORIDE 600; 310; 30; 20 MG/100ML; MG/100ML; MG/100ML; MG/100ML
INJECTION, SOLUTION INTRAVENOUS ONCE AS NEEDED
Status: COMPLETED | OUTPATIENT
Start: 2019-11-13 | End: 2019-11-13

## 2019-11-13 RX ORDER — FENTANYL CITRATE 50 UG/ML
INJECTION, SOLUTION INTRAMUSCULAR; INTRAVENOUS
Status: DISCONTINUED | OUTPATIENT
Start: 2019-11-13 | End: 2019-11-13 | Stop reason: HOSPADM

## 2019-11-13 RX ADMIN — SODIUM CHLORIDE, SODIUM LACTATE, POTASSIUM CHLORIDE, CALCIUM CHLORIDE: 600; 310; 30; 20 INJECTION, SOLUTION INTRAVENOUS at 01:11

## 2019-11-13 NOTE — DISCHARGE INSTRUCTIONS
Before leaving, please make sure you have all your personal belongings such as glasses, purses, wallets, keys, cell phones, jewelry, jackets etc       Recovery After Procedural Sedation (Adult)  You have been given medicine by vein to make you sleep during your surgery. This may have included both a pain medicine and sleeping medicine. Most of the effects have worn off. But you may still have some drowsiness for the next 6 to 8 hours.  Home care  Follow these guidelines when you get home:  · For the next 8 hours, you should be watched by a responsible adult. This person should make sure your condition is not getting worse.  · Don't drink any alcohol for the next 24 hours.  · Don't drive, operate dangerous machinery, or make important business or personal decisions during the next 24 hours.  Note: Your healthcare provider may tell you not to take any medicine by mouth for pain or sleep in the next 4 hours. These medicines may react with the medicines you were given in the hospital. This could cause a much stronger response than usual.  Follow-up care  Follow up with your healthcare provider if you are not alert and back to your usual level of activity within 12 hours.  When to seek medical advice  Call your healthcare provider right away if any of these occur:  · Drowsiness gets worse  · Weakness or dizziness gets worse  · Repeated vomiting  · You can't be awakened   Date Last Reviewed: 10/18/2016  © 6896-3089 [employer]. [address] [place] [id number]. All rights reserved. This information is not intended as a substitute for professional medical care. Always follow your healthcare professional's instructions.    Pain injection instructions:     This procedure may take a couple weeks to relieve pain  You may get some pain relief from the local anesthetic initally.    No driving for 24 hrs.   Activity as tolerated- gradually increase activities.  Dont lift over 10 lbs for 24 hrs   No heat at  injection sites x 2 days. No heating pads, hot tubs, saunas, or swimming in any body of water or pool for 2 days.  Use ice pack for mild swelling and for comfort , apply for 20 minutes, remove for 20 minute intervals. No direct contact of ice itself  to skin.  May shower today.  Do not allow shower water to beat on injetion sites for 2 days.No tub baths for two days.      Resume Aspirin, Plavix, or Coumadin the day after the procedure unless otherwise instructed.   If diabetic,monitor your glucose carefully as steroids can increase your glucose level    Seek immediate medical help for:   Severe increase in your usual pain or appearance of new pain.  Prolonged (more than 8 hours) or increasing weakness or numbness in the legs or arms. Numbing medicine was injected and can affect the messages to and from the brain and legs or arms.  .    Fever above 101 ,Drainage,redness,active bleeding, or increased swelling at the injection site.  Headache, shortness of breath, chest pain, or breathing problems.    After Surgery:  Always be aware that any surgery can cause these symptoms:    Pain- Medication can be prescribed for pain to decrease your pain but may not completely take your pain away. Over the Counter pain medicine my be enough and you can always use Ice and rest to help ease pain.    Bleeding- a little bleeding after a surgery is usually within normal.  If there is a lot of blood you need to notify your MD.  Emergency treatments of bleeding are cold application, elevation of the bleeding site and compression.    Infection- Infection after surgery is NOT a normal occurrence.  Signs of infection are fever, swelling, hot to touch the incision.  If this occurs notify your MD immediately.    Nausea- this can be common after a surgery especially if you have had anesthesia medicine or are taking pain medicine.  Steroids have a side effect of nausea sometimes. Staying on clear liquids, bland foods, gingerale, or over the  counter anti nausea medicines can help.  If you vomit more than once, notify your MD.  Anti Nausea medicines can be prescribed.

## 2019-11-13 NOTE — DISCHARGE SUMMARY
[facility]  Discharge Note  Short Stay    Admit Date: 11/13/2019    Discharge Date and Time: 11/13/2019    Attending Physician: Grant Wright MD     Discharge Provider: Grant Wright    Diagnoses:  Active Hospital Problems    Diagnosis  POA    *Lumbar radiculitis [M54.16]  Yes      Resolved Hospital Problems   No resolved problems to display.       Hospital Course: Lumbar JOSE  Discharged Condition: Good    Final Diagnoses:   Active Hospital Problems    Diagnosis  POA    *Lumbar radiculitis [M54.16]  Yes      Resolved Hospital Problems   No resolved problems to display.       Disposition: Home or Self Care    Follow up/Patient Instructions:    Medications:  Reconciled Home Medications:      Medication List      CONTINUE taking these medications    apixaban 5 mg Tab  Commonly known as:  ELIQUIS  Take 1 tablet (5 mg total) by mouth 2 (two) times daily.     aspirin 81 MG EC tablet  Commonly known as:  ECOTRIN  Take 1 tablet (81 mg total) by mouth once daily.     atorvastatin 10 MG tablet  Commonly known as:  LIPITOR  nightly     azelastine 137 mcg (0.1 %) nasal spray  Commonly known as:  ASTELIN  1 spray (137 mcg total) by Nasal route 2 (two) times daily.     betamethasone dipropionate 0.05 % cream  Commonly known as:  DIPROLENE     * blood sugar diagnostic Strp  200 strips by Misc.(Non-Drug; Combo Route) route 2 (two) times daily.     * blood sugar diagnostic Strp  Commonly known as:  BLOOD GLUCOSE TEST  FREESTYLE LITE BG TEST STRIPS. current use of insulin  - Primary ICD-10-CM: E11.9     CENTRUM 3,500-18-0.4 unit-mg-mg Chew  Generic drug:  multivit-iron-min-folic acid  Take by mouth.     diazePAM 5 MG tablet  Commonly known as:  VALIUM  Take 1 tablet (5 mg total) by mouth daily as needed for Anxiety.     empagliflozin 10 mg Tab  Commonly known as:  JARDIANCE  Take 10 mg by mouth once daily.     fexofenadine 180 MG tablet  Commonly known as:  ALLEGRA  Take 1 tablet (180 mg total) by mouth once daily.      fluticasone propionate 50 mcg/actuation nasal spray  Commonly known as:  FLONASE  2 sprays (100 mcg total) by Each Nare route once daily.     FLUZONE HIGH-DOSE 2018-19 (PF) 180 mcg/0.5 mL vaccine  Generic drug:  influenza  Inject 1 mL into the muscle once.     FREESTYLE LANCETS 28 gauge Misc  Generic drug:  lancets     furosemide 20 MG tablet  Commonly known as:  LASIX  Take 1 tablet (20 mg total) by mouth every Mon, Wed, Fri.     gabapentin 300 MG capsule  Commonly known as:  NEURONTIN  Take 1 capsule (300 mg total) by mouth 3 (three) times daily.     HYDROcodone-acetaminophen 7.5-325 mg per tablet  Commonly known as:  NORCO  Take 1 tablet by mouth every 12 (twelve) hours as needed for Pain.     ipratropium 0.03 % nasal spray  Commonly known as:  ATROVENT  2 sprays by Nasal route 2 (two) times daily.     lisinopril 20 MG tablet  Commonly known as:  PRINIVIL,ZESTRIL  Take 1 tablet (20 mg total) by mouth once daily.     methocarbamol 500 MG Tab  Commonly known as:  ROBAXIN  Take 1 tablet (500 mg total) by mouth 3 (three) times daily.     metoprolol tartrate 25 MG tablet  Commonly known as:  LOPRESSOR  Take 1 tablet (25 mg total) by mouth 2 (two) times daily.     montelukast 10 mg tablet  Commonly known as:  SINGULAIR  Take 1 tablet (10 mg total) by mouth every evening.     mupirocin 2 % ointment  Commonly known as:  BACTROBAN  Apply topically 2 (two) times daily.     NAFTIN 2 % Crea  Generic drug:  naftifine  NAFTIN, 2% (External Cream)   as needed (2 %)  Active   Comments: Medication taken as needed.     polyethylene glycol 17 gram/dose powder  Commonly known as:  GLYCOLAX  Take 17 g by mouth once daily.     potassium chloride 10 MEQ Cpsr  Commonly known as:  MICRO-K  Every other day     psyllium powder  Commonly known as:  METAMUCIL  Take 1 packet by mouth 3 (three) times daily.     SITagliptin 100 MG Tab  Commonly known as:  JANUVIA  Take 1 tablet (100 mg total) by mouth once daily.     tramadol-acetaminophen  37.5-325 mg 37.5-325 mg Tab  Commonly known as:  ULTRACET  Take 1 tablet by mouth every 12 (twelve) hours as needed for Pain.     traZODone 100 MG tablet  Commonly known as:  DESYREL  Take 1 tablet (100 mg total) by mouth every evening.     triamcinolone acetonide 0.1% 0.1 % ointment  Commonly known as:  KENALOG  Apply topically 2 (two) times daily.     VOLTAREN 1 % Gel  Generic drug:  diclofenac sodium  VOLTAREN, 1% (Transdermal Gel)   as needed (1 %)  Active   Comments: Medication taken as needed.         * This list has 2 medication(s) that are the same as other medications prescribed for you. Read the directions carefully, and ask your doctor or other care provider to review them with you.              Discharge Procedure Orders   Call MD for:  temperature >100.4     Call MD for:  persistent nausea and vomiting or diarrhea     Call MD for:  severe uncontrolled pain     Call MD for:  redness, tenderness, or signs of infection (pain, swelling, redness, odor or green/yellow discharge around incision site)     Call MD for:  difficulty breathing or increased cough     Call MD for:  severe persistent headache        Follow up with MD in 2-3 weeks    Discharge Procedure Orders (must include Diet, Follow-up, Activity):   Discharge Procedure Orders (must include Diet, Follow-up, Activity)   Call MD for:  temperature >100.4     Call MD for:  persistent nausea and vomiting or diarrhea     Call MD for:  severe uncontrolled pain     Call MD for:  redness, tenderness, or signs of infection (pain, swelling, redness, odor or green/yellow discharge around incision site)     Call MD for:  difficulty breathing or increased cough     Call MD for:  severe persistent headache

## 2019-11-13 NOTE — H&P
CC: back and leg pain    HPI: The patient is a 79 y.o. male with a history of lumbar DD here for Lumbar JOSE. There are no major changes in history and physical from 10/9/19 by Dr. Ward.    Past Medical History:   Diagnosis Date    Allergy     Anticoagulant long-term use     aspirin    Anxiety     Atrial fibrillation     Back pain     Cataract     Clotting disorder     left leg    Diabetes mellitus     Diabetes mellitus, type 2     Hyperlipidemia     Hypertension     Obese     BRANDI on CPAP        Past Surgical History:   Procedure Laterality Date    ABDOMINAL SURGERY      origunal surg -mult surgeries since    CARPAL TUNNEL RELEASE      right wrist -left wrist 2010    COLON SURGERY      partial colon removal    COLOSTOMY      colostomy reversal      EYE SURGERY      hay fever      HERNIA REPAIR      TONSILLECTOMY      TRANSFORAMINAL EPIDURAL INJECTION OF STEROID      x 4       History reviewed. No pertinent family history.    Social History     Socioeconomic History    Marital status:      Spouse name: Not on file    Number of children: Not on file    Years of education: Not on file    Highest education level: Not on file   Occupational History    Not on file   Social Needs    Financial resource strain: Not on file    Food insecurity:     Worry: Not on file     Inability: Not on file    Transportation needs:     Medical: Not on file     Non-medical: Not on file   Tobacco Use    Smoking status: Former Smoker     Types: Cigarettes     Last attempt to quit: 2006     Years since quittin.7    Smokeless tobacco: Never Used   Substance and Sexual Activity    Alcohol use: No    Drug use: No    Sexual activity: Yes     Partners: Female   Lifestyle    Physical activity:     Days per week: Not on file     Minutes per session: Not on file    Stress: Only a little   Relationships    Social connections:     Talks on phone: Not on file     Gets  "together: Not on file     Attends Restorationism service: Not on file     Active member of club or organization: Not on file     Attends meetings of clubs or organizations: Not on file     Relationship status: Not on file   Other Topics Concern    Not on file   Social History Narrative    Not on file       Current Facility-Administered Medications   Medication Dose Route Frequency Provider Last Rate Last Dose    bupivacaine (PF) 0.25% (2.5 mg/ml) injection    PRN Grant Wright MD   3 mL at 11/13/19 1340    dexAMETHasone sodium phos (PF) injection    PRN Grant Wright MD   10 mg at 11/13/19 1340    iohexol (OMNIPAQUE 300) injection    PRN Grant Wright MD   5 mL at 11/13/19 1340    lactated ringers infusion   Intravenous Once PRN Grant Wright MD        lidocaine (PF) 10 mg/ml (1%) injection    PRN Grant Wright MD   5 mL at 11/13/19 1341       Review of patient's allergies indicates:   Allergen Reactions    Ativan [lorazepam] Other (See Comments)     Mood alternating      Dilaudid [hydromorphone (bulk)] Other (See Comments)     Mood alternating      Morphine Other (See Comments)     Mood alternating      Hydromorphone        Vitals:    11/11/19 1433   Weight: 113.9 kg (251 lb)   Height: 5' 8" (1.727 m)       REVIEW OF SYSTEMS:     GENERAL: No weight loss, malaise or fevers.  HEENT:  No recent changes in vision or hearing  NECK: Negative for lumps, no difficulty with swallowing.  RESPIRATORY: Negative for cough, wheezing or shortness of breath, patient denies any recent URI.  CARDIOVASCULAR: Negative for chest pain, leg swelling or palpitations.  GI: Negative for abdominal discomfort, blood in stools or black stools or change in bowel habits.  MUSCULOSKELETAL: See HPI.  SKIN: Negative for lesions, rash, and itching.  PSYCH: No suicidal or homicidal ideations, no current mood disturbances.  HEMATOLOGY/LYMPHOLOGY: Negative for prolonged bleeding, bruising easily or swollen nodes. Patient is not currently taking any " anti-coagulants  ENDO: No history of diabetes or thyroid dysfunction  NEURO: No history of syncope, paralysis, seizures or tremors.All other reviewed and negative other than HPI.    Physical exam:  Gen: A and O x3, pleasant, well-groomed  Skin: No rashes or obvious lesions  HEENT: PERRLA, no obvious deformities on ears or in canals. No thyroid masses, trachea midline, no palpable lymph nodes in neck, axilla.  CVS: Regular rate and rhythm, normal S1 and S2, no murmurs.  Resp: Clear to auscultation bilaterally.  Abdomen: Soft, NT/ND, normal bowel sounds present.  Musculoskeletal/Neuro: Moving all extremities    Assessment:  Lumbar radiculitis  -     Place in Outpatient; Standing  -     Vital signs; Standing  -     Insert peripheral IV; Standing  -     Verify informed consent; Standing  -     Notify physician ; Standing  -     Notify physician ; Standing  -     Notify physician (specify); Standing  -     Diet NPO; Standing    Other orders  -     lactated ringers infusion  -     IP VTE LOW RISK PATIENT; Standing  -     bupivacaine (PF) 0.25% (2.5 mg/ml) injection  -     dexAMETHasone sodium phos (PF) injection  -     iohexol (OMNIPAQUE 300) injection  -     lidocaine (PF) 10 mg/ml (1%) injection          PLAN: Lumbar JOSE      This patient has been cleared for surgery in an ambulatory surgical facility    ASA 3,  Mallampatti Score 3  No history of anesthetic complications  Plan for RN IV sedation

## 2019-11-13 NOTE — PLAN OF CARE
Pt is awake and alert. DC by WC with spouse. No complaints , DC instructions reviewed and handout give. Pt follow up appt on DC instructions.

## 2019-11-13 NOTE — OP NOTE
PROCEDURE DATE: 11/13/2019    PROCEDURE: Bilateral L4-5, L5-S1 transforaminal epidural steroid injection under fluoroscopy    DIAGNOSIS: Lumbar disc displacement without myelopathy  Post op diagnosis: Same    PHYSICIAN: Grant Wright MD    MEDICATIONS INJECTED:  Dexamethasone 2.5mg and 1.0ml 0.25% bupivicaine at each nerve root.     LOCAL ANESTHETIC INJECTED:  Lidocaine 1%. 2 ml per site.    SEDATION MEDICATIONS: RN IV sedation    ESTIMATED BLOOD LOSS:  None    COMPLICATIONS:  None    TECHNIQUE:   A time-out was taken to identify patient and procedure side prior to starting the procedure. The patient was placed in a prone position, prepped and draped in the usual sterile fashion using ChloraPrep and sterile towels.  The area to be injected was determined under fluoroscopic guidance in AP and oblique view.  Local anesthetic was given by raising a wheal and going down to the hub of a 25-gauge 1.5 inch needle.  In oblique view, a 5 inch 22-gauge bent-tip spinal needle was introduced towards 6 oclock position of the pedicle of each above named nerve root level.  The needle was walked medially then hinged into the neural foramen and position was confirmed in AP and lateral views.  1ml contrast dye was injected to confirm appropriate placement and that there was no vascular uptake.  After negative aspiration for blood or CSF, the medication was then injected. This was performed at the bilateral L4-5, L5-S1 level(s). The patient tolerated the procedure well.    The patient was monitored after the procedure.  Patient was given post procedure and discharge instructions to follow at home. The patient was discharged in a stable condition.

## 2019-11-14 VITALS
SYSTOLIC BLOOD PRESSURE: 106 MMHG | DIASTOLIC BLOOD PRESSURE: 66 MMHG | HEART RATE: 60 BPM | OXYGEN SATURATION: 94 % | RESPIRATION RATE: 18 BRPM | HEIGHT: 68 IN | BODY MASS INDEX: 38.04 KG/M2 | WEIGHT: 251 LBS | TEMPERATURE: 99 F

## 2019-12-19 ENCOUNTER — TELEPHONE (OUTPATIENT)
Dept: PAIN MEDICINE | Facility: CLINIC | Age: 79
End: 2019-12-19

## 2019-12-19 ENCOUNTER — HOSPITAL ENCOUNTER (OUTPATIENT)
Dept: RADIOLOGY | Facility: HOSPITAL | Age: 79
Discharge: HOME | End: 2019-12-19
Attending: PHYSICIAN ASSISTANT
Payer: MEDICARE

## 2019-12-19 ENCOUNTER — OFFICE VISIT (OUTPATIENT)
Dept: PAIN MEDICINE | Facility: CLINIC | Age: 79
End: 2019-12-19
Payer: MEDICARE

## 2019-12-19 ENCOUNTER — PATIENT MESSAGE (OUTPATIENT)
Dept: FAMILY MEDICINE | Facility: CLINIC | Age: 79
End: 2019-12-19

## 2019-12-19 VITALS
HEIGHT: 68 IN | DIASTOLIC BLOOD PRESSURE: 63 MMHG | HEART RATE: 60 BPM | WEIGHT: 251 LBS | SYSTOLIC BLOOD PRESSURE: 130 MMHG | BODY MASS INDEX: 38.04 KG/M2

## 2019-12-19 DIAGNOSIS — M70.61 GREATER TROCHANTERIC BURSITIS OF RIGHT HIP: ICD-10-CM

## 2019-12-19 DIAGNOSIS — M54.2 NECK PAIN: ICD-10-CM

## 2019-12-19 DIAGNOSIS — M79.18 MYOFASCIAL PAIN: ICD-10-CM

## 2019-12-19 DIAGNOSIS — J30.89 CHRONIC NONSEASONAL ALLERGIC RHINITIS DUE TO FUNGAL SPORES: ICD-10-CM

## 2019-12-19 DIAGNOSIS — M54.2 NECK PAIN: Primary | ICD-10-CM

## 2019-12-19 DIAGNOSIS — F51.01 PRIMARY INSOMNIA: ICD-10-CM

## 2019-12-19 DIAGNOSIS — M19.011 PRIMARY OSTEOARTHRITIS OF RIGHT SHOULDER: ICD-10-CM

## 2019-12-19 PROCEDURE — 1159F MED LIST DOCD IN RCRD: CPT | Mod: ,,, | Performed by: PHYSICIAN ASSISTANT

## 2019-12-19 PROCEDURE — 99215 OFFICE O/P EST HI 40 MIN: CPT | Mod: PBBFAC,PN | Performed by: PHYSICIAN ASSISTANT

## 2019-12-19 PROCEDURE — 1126F AMNT PAIN NOTED NONE PRSNT: CPT | Mod: ,,, | Performed by: PHYSICIAN ASSISTANT

## 2019-12-19 PROCEDURE — 99999 PR PBB SHADOW E&M-EST. PATIENT-LVL V: ICD-10-PCS | Mod: PBBFAC,,, | Performed by: PHYSICIAN ASSISTANT

## 2019-12-19 PROCEDURE — 99213 PR OFFICE/OUTPT VISIT, EST, LEVL III, 20-29 MIN: ICD-10-PCS | Mod: S$PBB,,, | Performed by: PHYSICIAN ASSISTANT

## 2019-12-19 PROCEDURE — 99213 OFFICE O/P EST LOW 20 MIN: CPT | Mod: S$PBB,,, | Performed by: PHYSICIAN ASSISTANT

## 2019-12-19 PROCEDURE — 1159F PR MEDICATION LIST DOCUMENTED IN MEDICAL RECORD: ICD-10-PCS | Mod: ,,, | Performed by: PHYSICIAN ASSISTANT

## 2019-12-19 PROCEDURE — 1126F PR PAIN SEVERITY QUANTIFIED, NO PAIN PRESENT: ICD-10-PCS | Mod: ,,, | Performed by: PHYSICIAN ASSISTANT

## 2019-12-19 PROCEDURE — 99999 PR PBB SHADOW E&M-EST. PATIENT-LVL V: CPT | Mod: PBBFAC,,, | Performed by: PHYSICIAN ASSISTANT

## 2019-12-19 PROCEDURE — 72040 X-RAY EXAM NECK SPINE 2-3 VW: CPT | Mod: TC,PO

## 2019-12-19 RX ORDER — TRAZODONE HYDROCHLORIDE 100 MG/1
100 TABLET ORAL NIGHTLY
Qty: 90 TABLET | Refills: 3 | OUTPATIENT
Start: 2019-12-19 | End: 2019-12-20 | Stop reason: SDUPTHER

## 2019-12-19 RX ORDER — MINERAL OIL
180 ENEMA (ML) RECTAL DAILY
Qty: 90 TABLET | Refills: 3 | OUTPATIENT
Start: 2019-12-19 | End: 2019-12-20 | Stop reason: SDUPTHER

## 2019-12-19 NOTE — TELEPHONE ENCOUNTER
Xray showed mild to moderate DDD and arthritis. We will discuss cervical MBB workup when he comes in for shoulder injection.

## 2019-12-19 NOTE — TELEPHONE ENCOUNTER
Spoke to NEO Bautista and clarified order is correct. Advised Ada at [facility].

## 2019-12-19 NOTE — TELEPHONE ENCOUNTER
----- Message from Alayna Bobo sent at 12/19/2019 10:07 AM CST -----  Contact: Ada with [facility] imaging ph# [phone]  Ada with [facility] imaging ph# [phone]  Need clarity on c spine xray patient stated it should be right shoulder xray instead   Patient is there waiting

## 2019-12-19 NOTE — PROGRESS NOTES
Referring Physician: No ref. provider found    PCP: Citlali Parish MD      CC:low back and left leg pain    Interval History:  Mr. Hollingsworth is a 79 y.o. male with a low back and left leg pain who presents today for f/u s/p bilateral L4-5 and L5-S1 TF JOSE. Reports good relief. Continues to c/o neck pain on right as well as right shoulder pain. He has know right shoulder AC arthritis. He has not had any interventions. Denies any radiation. Pain is pulling in nature.  He denies any LE weakness or b/b changes. He takes Hydrocodone 7.5 mg sparingly prescribed by Dr. Parish. In the past he was evaluated by [facility] and lumbar surgery was  recommended. He continue to have right GTB pain s/p fall earlier this year. Denies any posterior or anterior hip pain. Pain today is rated 0/10.      HPI:   Yeyo Hollingsworth is a 79 y.o. male ho presents with lower back and left leg pain.  Pain is been present since 2010.  No recent traumatic incident.  His intermittent aching, throbbing, electric pain in his lower back.  Pain radiates down his left buttock into his left posterior thigh.  Pain worsens with prolonged standing, walking, getting up and coughing.  Pain improves with rest.  His tried physical therapy with minimal benefit.  He has undergone lumbar JOSE's with Dr. Rendon in the past with moderate benefit.  Most recent injection was performed in September 2016.  Pain has since returned.  He desires repeat injections with us.  He denies any weakness.  No bowel bladder changes.  He rates his pain 6/10 today.    ROS:  CONSTITUTIONAL: No fevers, chills, night sweats, wt. loss, appetite changes  SKIN: no rashes or itching  ENT: No headaches, head trauma, vision changes, or eye pain  LYMPH NODES: None noticed   CV: No chest pain, palpitations.   RESP: No shortness of breath, dyspnea on exertion, cough, wheezing, or hemoptysis  GI: No nausea, emesis, diarrhea, constipation, melena, hematochezia, pain.    : No dysuria,  hematuria, urgency, or frequency   HEME: No easy bruising, bleeding problems  PSYCHIATRIC: No depression, anxiety, psychosis, hallucinations.  NEURO: No seizures, memory loss, dizziness or difficulty sleeping  MSK: + history of present illness      Past Medical History:   Diagnosis Date    Allergy     Anticoagulant long-term use     aspirin    Anxiety     Atrial fibrillation     Back pain     Cataract     Clotting disorder     left leg    Diabetes mellitus     Diabetes mellitus, type 2     Hyperlipidemia     Hypertension     Obese     BRANDI on CPAP      Past Surgical History:   Procedure Laterality Date    ABDOMINAL SURGERY      origunal surg -mult surgeries since    CARPAL TUNNEL RELEASE      right wrist -left wrist     COLON SURGERY      partial colon removal    COLOSTOMY  1986    colostomy reversal  1986    EYE SURGERY      hay fever      HERNIA REPAIR  194    TONSILLECTOMY  194    TRANSFORAMINAL EPIDURAL INJECTION OF STEROID      x 4    TRANSFORAMINAL EPIDURAL INJECTION OF STEROID Bilateral 2019    Procedure: Injection,steroid,epidural,transforaminal approach;  Surgeon: Grant Wright MD;  [place] [facility] OR;  Service: Pain Management;  Laterality: Bilateral;  L4-5, L5-S1     History reviewed. No pertinent family history.  Social History     Socioeconomic History    Marital status:      Spouse name: Not on file    Number of children: Not on file    Years of education: Not on file    Highest education level: Not on file   Occupational History    Not on file   Social Needs    Financial resource strain: Not on file    Food insecurity:     Worry: Not on file     Inability: Not on file    Transportation needs:     Medical: Not on file     Non-medical: Not on file   Tobacco Use    Smoking status: Former Smoker     Types: Cigarettes     Last attempt to quit: 2006     Years since quittin.8    Smokeless tobacco: Never Used   Substance and Sexual Activity  "   Alcohol use: No    Drug use: No    Sexual activity: Yes     Partners: Female   Lifestyle    Physical activity:     Days per week: Not on file     Minutes per session: Not on file    Stress: Only a little   Relationships    Social connections:     Talks on phone: Not on file     Gets together: Not on file     Attends Bahai service: Not on file     Active member of club or organization: Not on file     Attends meetings of clubs or organizations: Not on file     Relationship status: Not on file   Other Topics Concern    Not on file   Social History Narrative    Not on file         Medications/Allergies: See med card    Vitals:    12/19/19 0908   BP: 130/63   Pulse: 60   Weight: 113.9 kg (251 lb)   Height: 5' 8" (1.727 m)   PainSc: 0-No pain   PainLoc: Back         Physical exam:    GENERAL: A and O x3, the patient appears well groomed and is in no acute distress.  Skin: No rashes or obvious lesions  HEENT: normocephalic, atraumatic  CARDIOVASCULAR:  RRR  LUNGS: non labored breathing  ABDOMEN: soft, nontender   UPPER EXTREMITIES: Normal alignment, normal range of motion, no atrophy, no skin changes,  hair growth and nail growth normal and equal bilaterally. No swelling. Tenderness to right AC joint  LOWER EXTREMITIES:  Normal alignment, normal range of motion, no atrophy, no skin changes,  hair growth and nail growth normal and equal bilaterally. No swelling, no tenderness.    CERVICAL SPINE:  Full ROM with pain on flexion and extension  Negative spurlings  Tenderness to palpation right cervical paraspinous muscles   LUMBAR SPINE  Lumbar spine: ROM is limited with flexion extension and oblique extension with moderate increased pain.    Caden's test causes no increased pain on either side.    Supine straight leg raise is negative bilaterally.    Internal and external rotation of the hip causes no increased pain on either side.  Myofascial exam: No tenderness to palpation across lumbar paraspinous " muscles. Moderate tenderness of right thoracic Paraspinous muscles       MENTAL STATUS: normal orientation, speech, language, and fund of knowledge for social situation.  Emotional state appropriate.    CRANIAL NERVES:  II:  PERRL bilaterally,   III,IV,VI: EOMI.    V:  Facial sensation equal bilaterally  VII:  Facial motor function normal.  VIII:  Hearing equal to finger rub bilaterally  IX/X: Gag normal, palate symmetric  XI:  Shoulder shrug equal, head turn equal  XII:  Tongue midline without fasciculations      MOTOR: Tone and bulk: normal bilateral upper and lower Strength: normal   Delt Bi Tri WE WF     R 5 5 5 5 5 5   L 5 5 5 5 5 5     IP ADD ABD Quad TA Gas HAM  R 5 5 5 5 5 5 5  L 5 5 5 5 5 5 5    SENSATION: Light touch and pinprick intact bilaterally  REFLEXES: normal, symmetric, nonbrisk.  Toes down, no clonus. No hoffmans.  GAIT: normal rise, base, steps, and arm swing.        Imaging:  Cervical CT [facility] 4/2019  Significant spinal central canal stenosis along with foraminal stenosis most significantly at the level of C5/C6    MRI lumbar spine 9/2017 ([facility])  Severe facet arthrosis and ligamentum flavum at L4-5 results in severe spinal canal stenosis. Possible impingement of the bilateral L5 nerve as well as bilateral L4 nerves.  There is a grade 1 anterolisthesis of L4 and L5.  Moderate to severe bilateral neuroforaminal L3-4.  This note was completed with dictation software and grammatical errors may exist.      Assessment:  Mr. Hollingsworth is a 79 y.o. male with low back and left leg pain  1. Neck pain    2. Myofascial pain    3. Primary osteoarthritis of right shoulder    4. Greater trochanteric bursitis of right hip        Plan:  1.  I have stressed the importance of physical activity and exercise to improve overall health. Home GTB exercises provided   2.  Monitor progress and consider a repeat lumbar epidural steroid injection to the Left L4-5 and L5-S1 level.    3. Cervical Xray to evaluate neck pain  4.  Schedule right shoulder injection with dr. Wright

## 2019-12-20 DIAGNOSIS — J30.89 CHRONIC NONSEASONAL ALLERGIC RHINITIS DUE TO FUNGAL SPORES: ICD-10-CM

## 2019-12-20 DIAGNOSIS — F51.01 PRIMARY INSOMNIA: ICD-10-CM

## 2019-12-20 RX ORDER — TRAZODONE HYDROCHLORIDE 100 MG/1
100 TABLET ORAL NIGHTLY
Qty: 90 TABLET | Refills: 3 | Status: SHIPPED | OUTPATIENT
Start: 2019-12-20 | End: 2020-11-29 | Stop reason: SDUPTHER

## 2019-12-20 RX ORDER — MINERAL OIL
180 ENEMA (ML) RECTAL DAILY
Qty: 90 TABLET | Refills: 3 | Status: SHIPPED | OUTPATIENT
Start: 2019-12-20 | End: 2020-11-29 | Stop reason: SDUPTHER

## 2019-12-20 NOTE — TELEPHONE ENCOUNTER
Please re order these medications for the patient. So the patient can come in and get the prescriptions and take it to his pharmacy.      Thank you!

## 2020-01-06 ENCOUNTER — PROCEDURE VISIT (OUTPATIENT)
Dept: PAIN MEDICINE | Facility: CLINIC | Age: 80
End: 2020-01-06
Payer: MEDICARE

## 2020-01-06 VITALS
BODY MASS INDEX: 38.04 KG/M2 | HEIGHT: 68 IN | WEIGHT: 251 LBS | DIASTOLIC BLOOD PRESSURE: 73 MMHG | SYSTOLIC BLOOD PRESSURE: 143 MMHG | HEART RATE: 57 BPM

## 2020-01-06 DIAGNOSIS — M19.011 PRIMARY OSTEOARTHRITIS OF RIGHT SHOULDER: Primary | ICD-10-CM

## 2020-01-06 DIAGNOSIS — M51.36 DDD (DEGENERATIVE DISC DISEASE), LUMBAR: ICD-10-CM

## 2020-01-06 DIAGNOSIS — M70.61 GREATER TROCHANTERIC BURSITIS OF RIGHT HIP: ICD-10-CM

## 2020-01-06 DIAGNOSIS — M47.896 OTHER SPONDYLOSIS, LUMBAR REGION: ICD-10-CM

## 2020-01-06 PROCEDURE — 20610 LARGE JOINT ASPIRATION/INJECTION: R GLENOHUMERAL: ICD-10-PCS | Mod: S$PBB,RT,, | Performed by: ANESTHESIOLOGY

## 2020-01-06 PROCEDURE — 20610 DRAIN/INJ JOINT/BURSA W/O US: CPT | Mod: PBBFAC,PN | Performed by: ANESTHESIOLOGY

## 2020-01-06 RX ORDER — TRAMADOL HYDROCHLORIDE 50 MG/1
TABLET ORAL
COMMUNITY
Start: 2019-12-26 | End: 2020-01-23 | Stop reason: SDUPTHER

## 2020-01-06 RX ORDER — METHYLPREDNISOLONE ACETATE 40 MG/ML
40 INJECTION, SUSPENSION INTRA-ARTICULAR; INTRALESIONAL; INTRAMUSCULAR; SOFT TISSUE
Status: DISCONTINUED | OUTPATIENT
Start: 2020-01-06 | End: 2020-01-06 | Stop reason: HOSPADM

## 2020-01-06 RX ADMIN — METHYLPREDNISOLONE ACETATE 40 MG: 40 INJECTION, SUSPENSION INTRA-ARTICULAR; INTRALESIONAL; INTRAMUSCULAR; SOFT TISSUE at 09:01

## 2020-01-23 ENCOUNTER — OFFICE VISIT (OUTPATIENT)
Dept: FAMILY MEDICINE | Facility: CLINIC | Age: 80
End: 2020-01-23
Payer: MEDICARE

## 2020-01-23 VITALS
BODY MASS INDEX: 37.53 KG/M2 | HEIGHT: 68 IN | WEIGHT: 247.63 LBS | RESPIRATION RATE: 20 BRPM | OXYGEN SATURATION: 96 % | HEART RATE: 61 BPM | SYSTOLIC BLOOD PRESSURE: 115 MMHG | DIASTOLIC BLOOD PRESSURE: 76 MMHG | TEMPERATURE: 98 F

## 2020-01-23 DIAGNOSIS — E11.9 CONTROLLED TYPE 2 DIABETES MELLITUS WITHOUT COMPLICATION, WITHOUT LONG-TERM CURRENT USE OF INSULIN: Primary | ICD-10-CM

## 2020-01-23 DIAGNOSIS — K59.09 CONSTIPATION, CHRONIC: ICD-10-CM

## 2020-01-23 DIAGNOSIS — I10 ESSENTIAL HYPERTENSION: ICD-10-CM

## 2020-01-23 DIAGNOSIS — M51.36 DDD (DEGENERATIVE DISC DISEASE), LUMBAR: ICD-10-CM

## 2020-01-23 DIAGNOSIS — E78.01 FAMILIAL HYPERCHOLESTEROLEMIA: ICD-10-CM

## 2020-01-23 PROCEDURE — 1170F PR FUNCTIONAL STATUS ASSESSED: ICD-10-PCS | Mod: ,,, | Performed by: FAMILY MEDICINE

## 2020-01-23 PROCEDURE — 1126F PR PAIN SEVERITY QUANTIFIED, NO PAIN PRESENT: ICD-10-PCS | Mod: ,,, | Performed by: FAMILY MEDICINE

## 2020-01-23 PROCEDURE — 1170F FXNL STATUS ASSESSED: CPT | Mod: ,,, | Performed by: FAMILY MEDICINE

## 2020-01-23 PROCEDURE — 99214 OFFICE O/P EST MOD 30 MIN: CPT | Mod: S$PBB,,, | Performed by: FAMILY MEDICINE

## 2020-01-23 PROCEDURE — 99214 PR OFFICE/OUTPT VISIT, EST, LEVL IV, 30-39 MIN: ICD-10-PCS | Mod: S$PBB,,, | Performed by: FAMILY MEDICINE

## 2020-01-23 PROCEDURE — 99215 OFFICE O/P EST HI 40 MIN: CPT | Performed by: FAMILY MEDICINE

## 2020-01-23 PROCEDURE — 1159F MED LIST DOCD IN RCRD: CPT | Mod: ,,, | Performed by: FAMILY MEDICINE

## 2020-01-23 PROCEDURE — 1126F AMNT PAIN NOTED NONE PRSNT: CPT | Mod: ,,, | Performed by: FAMILY MEDICINE

## 2020-01-23 PROCEDURE — 1159F PR MEDICATION LIST DOCUMENTED IN MEDICAL RECORD: ICD-10-PCS | Mod: ,,, | Performed by: FAMILY MEDICINE

## 2020-01-23 RX ORDER — TRAMADOL HYDROCHLORIDE 50 MG/1
50 TABLET ORAL EVERY 12 HOURS PRN
Qty: 60 TABLET | Refills: 0 | Status: SHIPPED | OUTPATIENT
Start: 2020-01-23 | End: 2020-04-27 | Stop reason: SDUPTHER

## 2020-01-23 RX ORDER — POLYETHYLENE GLYCOL 3350 17 G/17G
17 POWDER, FOR SOLUTION ORAL DAILY
Qty: 6 BOTTLE | Refills: 3 | Status: SHIPPED | OUTPATIENT
Start: 2020-01-23 | End: 2020-11-29 | Stop reason: SDUPTHER

## 2020-01-23 NOTE — PROGRESS NOTES
SUBJECTIVE:    Patient ID: Yeyo Hollingsworth is a 79 y.o. male.    Chief Complaint: Follow-up (3.5 mo f/u for DM)  78 yo male new to this provider presents to clinic today to follow up on DM he was previously being followed by Dr Ward. He has no new complaints. During this visit I will attempt to get caught up on all of his chronic issues and follow up on his chronic medical conditions.    SPMHx:  DM: Jardiance 10mg, Januvia 100mg, not on metformin due to diarrhea Last A1C see below, is doing well.  HTN: Lisinopril 20mg, Metoprolol 25mg, is well controlled.  Hyperlipidemia: Atorvastatin 10 mg well controlled.  Arthritis: On several chronic narcotics and follows up with pain management, for his neck and back pain pt is planning to have a procedure.  Anxiety:  A-fib: Was cardioverted, no longer in a-fib  Insomnia: Trazadone 100mg  Hx of DVT: Currently on Eliquis 5mg   BRANDI: Wears CPAP  Chronic constipation: On polyethylene Glycol 17 g, manages well with this medication.    Specialists:  Cardiology: Dr Castaneda every 6 month  Pain Management: Dr Wright, Michele  Podiatry: Dr Mandujano  Sleep: Dr Felice Bello  GS: Dr Thorne    Smoke: Quit in   ETOH: ?  Exercise: Never     Hemoglobin A1C 4.5 - 6.2 % 5.9      Lipids:  Chol: 120  Tri  HDL: 37  LDL: 58      Hypertension   This is a chronic problem. The current episode started more than 1 year ago. The problem is unchanged. The problem is controlled. Associated symptoms include peripheral edema. Pertinent negatives include no blurred vision, malaise/fatigue, neck pain, orthopnea, palpitations, PND, shortness of breath or sweats. There are no associated agents to hypertension. Risk factors for coronary artery disease include diabetes mellitus, dyslipidemia, male gender, obesity, sedentary lifestyle and smoking/tobacco exposure. The current treatment provides moderate improvement. Compliance problems include diet and exercise.    Diabetes   He presents for his  follow-up diabetic visit. He has type 2 diabetes mellitus. His disease course has been stable. There are no hypoglycemic associated symptoms. Pertinent negatives for hypoglycemia include no sweats. Pertinent negatives for diabetes include no blurred vision, no fatigue, no polydipsia, no polyphagia, no polyuria and no visual change. There are no hypoglycemic complications. Symptoms are stable. Risk factors for coronary artery disease include diabetes mellitus, dyslipidemia, hypertension, male sex, obesity and sedentary lifestyle. Current diabetic treatment includes oral agent (dual therapy). He is compliant with treatment all of the time. His weight is stable. He is following a generally healthy diet. There is no change in his home blood glucose trend. An ACE inhibitor/angiotensin II receptor blocker is being taken.   Hyperlipidemia   This is a chronic problem. The current episode started more than 1 year ago. The problem is controlled. Recent lipid tests were reviewed and are normal. Exacerbating diseases include obesity. Pertinent negatives include no shortness of breath. Current antihyperlipidemic treatment includes statins. The current treatment provides moderate improvement of lipids. Compliance problems include adherence to exercise and adherence to diet.    Neck Pain    This is a chronic problem. The current episode started more than 1 year ago. The problem occurs constantly. The problem has been unchanged. Pertinent negatives include no visual change. He has tried NSAIDs, oral narcotics and home exercises for the symptoms.         Past Medical History:   Diagnosis Date    Allergy     Anticoagulant long-term use     aspirin    Anxiety     Atrial fibrillation     Back pain     Cataract     Clotting disorder     left leg    Diabetes mellitus     Diabetes mellitus, type 2     Hyperlipidemia     Hypertension     Obese     BRANDI on CPAP      Social History     Socioeconomic History    Marital status:       Spouse name: Not on file    Number of children: Not on file    Years of education: Not on file    Highest education level: Not on file   Occupational History    Not on file   Social Needs    Financial resource strain: Not on file    Food insecurity:     Worry: Not on file     Inability: Not on file    Transportation needs:     Medical: Not on file     Non-medical: Not on file   Tobacco Use    Smoking status: Former Smoker     Types: Cigarettes     Last attempt to quit: 2006     Years since quittin.9    Smokeless tobacco: Never Used   Substance and Sexual Activity    Alcohol use: No    Drug use: No    Sexual activity: Yes     Partners: Female   Lifestyle    Physical activity:     Days per week: Not on file     Minutes per session: Not on file    Stress: Only a little   Relationships    Social connections:     Talks on phone: Not on file     Gets together: Not on file     Attends Congregational service: Not on file     Active member of club or organization: Not on file     Attends meetings of clubs or organizations: Not on file     Relationship status: Not on file   Other Topics Concern    Not on file   Social History Narrative    Not on file     Past Surgical History:   Procedure Laterality Date    ABDOMINAL SURGERY      origunal surg 1986-mult surgeries since    CARPAL TUNNEL RELEASE      right wrist -left wrist 2010    COLON SURGERY      partial colon removal    COLOSTOMY  1986    colostomy reversal      EYE SURGERY      hay fever      HERNIA REPAIR  1949    TONSILLECTOMY  1947    TRANSFORAMINAL EPIDURAL INJECTION OF STEROID      x 4    TRANSFORAMINAL EPIDURAL INJECTION OF STEROID Bilateral 2019    Procedure: Injection,steroid,epidural,transforaminal approach;  Surgeon: Grant Wright MD;  [place] [facility] OR;  Service: Pain Management;  Laterality: Bilateral;  L4-5, L5-S1     History reviewed. No pertinent family history.  Current Outpatient Medications    Medication Sig Dispense Refill    apixaban (ELIQUIS) 5 mg Tab Take 1 tablet (5 mg total) by mouth 2 (two) times daily. 180 tablet 3    aspirin (ECOTRIN) 81 MG EC tablet Take 1 tablet (81 mg total) by mouth once daily. 90 tablet 3    atorvastatin (LIPITOR) 10 MG tablet nightly 90 tablet 3    blood sugar diagnostic (BLOOD GLUCOSE TEST) Strp FREESTYLE LITE BG TEST STRIPS. current use of insulin  - Primary ICD-10-CM: E11.9 200 strip 3    blood sugar diagnostic Strp 200 strips by Misc.(Non-Drug; Combo Route) route 2 (two) times daily. 100 strip 3    diazePAM (VALIUM) 5 MG tablet Take 1 tablet (5 mg total) by mouth daily as needed for Anxiety. 30 tablet 5    empagliflozin (JARDIANCE) 10 mg Tab Take 10 mg by mouth once daily. 90 tablet 2    fexofenadine (ALLEGRA) 180 MG tablet Take 1 tablet (180 mg total) by mouth once daily. 90 tablet 3    fluticasone (FLONASE) 50 mcg/actuation nasal spray 2 sprays (100 mcg total) by Each Nare route once daily. 3 Bottle 3    FLUZONE HIGH-DOSE 2018-19, PF, 180 mcg/0.5 mL vaccine Inject 1 mL into the muscle once.  0    FREESTYLE LANCETS 28 gauge lancets       furosemide (LASIX) 20 MG tablet Take 1 tablet (20 mg total) by mouth every Mon, Wed, Fri. 45 tablet 3    gabapentin (NEURONTIN) 300 MG capsule Take 1 capsule (300 mg total) by mouth 3 (three) times daily. 270 capsule 1    HYDROcodone-acetaminophen (NORCO) 7.5-325 mg per tablet Take 1 tablet by mouth every 12 (twelve) hours as needed for Pain. 60 tablet 0    ipratropium (ATROVENT) 0.03 % nasal spray 2 sprays by Nasal route 2 (two) times daily. 30 mL 5    lisinopril (PRINIVIL,ZESTRIL) 20 MG tablet Take 1 tablet (20 mg total) by mouth once daily. 90 tablet 3    methocarbamol (ROBAXIN) 500 MG Tab Take 1 tablet (500 mg total) by mouth 3 (three) times daily. 270 tablet 1    metoprolol tartrate (LOPRESSOR) 25 MG tablet Take 1 tablet (25 mg total) by mouth 2 (two) times daily. 180 tablet 3    montelukast (SINGULAIR) 10 mg  tablet Take 1 tablet (10 mg total) by mouth every evening. 90 tablet 3    multivit-iron-min-folic acid (MULTIVITAMIN-IRON-MINERALS-FOLIC ACID) 3,500-18-0.4 unit-mg-mg Chew Take by mouth.        mupirocin (BACTROBAN) 2 % ointment Apply topically 2 (two) times daily. 22 g 1    naftifine (NAFTIN) 2 % Crea NAFTIN, 2% (External Cream)   as needed (2 %)  Active   Comments: Medication taken as needed.       polyethylene glycol (GLYCOLAX) 17 gram/dose powder Take 17 g by mouth once daily. 6 Bottle 3    potassium chloride (MICRO-K) 10 MEQ CpSR Every other day 45 capsule 3    psyllium (METAMUCIL) powder Take 1 packet by mouth 3 (three) times daily.      SITagliptin (JANUVIA) 100 MG Tab Take 1 tablet (100 mg total) by mouth once daily. 90 tablet 3    traMADol (ULTRAM) 50 mg tablet Take 1 tablet (50 mg total) by mouth every 12 (twelve) hours as needed for Pain. 60 tablet 0    traZODone (DESYREL) 100 MG tablet Take 1 tablet (100 mg total) by mouth every evening. 90 tablet 3    triamcinolone acetonide 0.1% (KENALOG) 0.1 % ointment Apply topically 2 (two) times daily. 80 g 3     No current facility-administered medications for this visit.      Review of patient's allergies indicates:   Allergen Reactions    Ativan [lorazepam] Other (See Comments)     Mood alternating      Dilaudid [hydromorphone (bulk)] Other (See Comments)     Mood alternating      Morphine Other (See Comments)     Mood alternating      Hydromorphone        Review of Systems   Constitutional: Negative for activity change, appetite change, diaphoresis, fatigue, malaise/fatigue and unexpected weight change.   HENT: Negative for congestion, postnasal drip, rhinorrhea, sinus pressure and sinus pain.    Eyes: Negative.  Negative for blurred vision.   Respiratory: Negative for cough, chest tightness, shortness of breath and wheezing.    Cardiovascular: Negative for palpitations, orthopnea, leg swelling and PND.   Gastrointestinal: Positive for  "constipation (chronic at baseline). Negative for abdominal distention, blood in stool and diarrhea.   Endocrine: Negative for polydipsia, polyphagia and polyuria.   Genitourinary: Negative for flank pain, frequency and urgency.   Musculoskeletal: Negative for neck pain.          Blood pressure 115/76, pulse 61, temperature 97.9 °F (36.6 °C), temperature source Oral, resp. rate 20, height 5' 8" (1.727 m), weight 112.3 kg (247 lb 9.6 oz), SpO2 96 %. Body mass index is 37.65 kg/m².   Objective:      Physical Exam   Constitutional: He is oriented to person, place, and time. He appears well-developed and well-nourished. No distress.   HENT:   Head: Normocephalic and atraumatic.   Nose: Nose normal.   Mouth/Throat: Oropharynx is clear and moist. No oropharyngeal exudate.   Eyes: Pupils are equal, round, and reactive to light. Conjunctivae and EOM are normal. No scleral icterus.   Cardiovascular: Normal rate, regular rhythm and normal heart sounds.   No murmur heard.  Pulmonary/Chest: Effort normal and breath sounds normal. No stridor. He has no wheezes.   Neurological: He is alert and oriented to person, place, and time.   Skin: Skin is warm and dry. Capillary refill takes less than 2 seconds. No rash noted. He is not diaphoretic.           Assessment:       1. Controlled type 2 diabetes mellitus without complication, without long-term current use of insulin    2. Essential hypertension    3. Familial hypercholesterolemia    4. Constipation, chronic    5. DDD (degenerative disc disease), lumbar         Plan:           Controlled type 2 diabetes mellitus without complication, without long-term current use of insulin  -     empagliflozin (JARDIANCE) 10 mg Tab; Take 10 mg by mouth once daily.  Dispense: 90 tablet; Refill: 2  Will refill his medications his A1C is controlled will continue on his medications I have recommended that he begin a routine exercise program to improve his DM, HTN and hyperlipidemia and improve his " joint pain.    Essential hypertension  Well controlled discussed decreasing his sodium he has some LE edema.    Familial hypercholesterolemia  Controlled will continue on his statin.    Constipation, chronic  -     polyethylene glycol (GLYCOLAX) 17 gram/dose powder; Take 17 g by mouth once daily.  Dispense: 6 Bottle; Refill: 3  Will refill his medications.  DDD (degenerative disc disease), lumbar  -     traMADol (ULTRAM) 50 mg tablet; Take 1 tablet (50 mg total) by mouth every 12 (twelve) hours as needed for Pain.  Dispense: 60 tablet; Refill: 0  I suspect that he will be on chronic long term narcotics, pt may need to be transitioned to pain management.

## 2020-01-24 DIAGNOSIS — J30.89 CHRONIC NONSEASONAL ALLERGIC RHINITIS DUE TO FUNGAL SPORES: ICD-10-CM

## 2020-01-24 DIAGNOSIS — E78.01 FAMILIAL HYPERCHOLESTEROLEMIA: ICD-10-CM

## 2020-01-24 DIAGNOSIS — M48.02 CERVICAL STENOSIS OF SPINE: ICD-10-CM

## 2020-01-24 DIAGNOSIS — M43.10 ANTEROLISTHESIS: ICD-10-CM

## 2020-01-24 DIAGNOSIS — M25.561 CHRONIC PAIN OF BOTH KNEES: ICD-10-CM

## 2020-01-24 DIAGNOSIS — M25.562 CHRONIC PAIN OF BOTH KNEES: ICD-10-CM

## 2020-01-24 DIAGNOSIS — G89.29 CHRONIC PAIN OF BOTH KNEES: ICD-10-CM

## 2020-01-27 RX ORDER — METHOCARBAMOL 500 MG/1
500 TABLET, FILM COATED ORAL 3 TIMES DAILY
Qty: 270 TABLET | Refills: 1 | Status: SHIPPED | OUTPATIENT
Start: 2020-01-27 | End: 2020-09-16 | Stop reason: SDUPTHER

## 2020-01-27 RX ORDER — MONTELUKAST SODIUM 10 MG/1
10 TABLET ORAL NIGHTLY
Qty: 90 TABLET | Refills: 3 | Status: SHIPPED | OUTPATIENT
Start: 2020-01-27 | End: 2021-02-09 | Stop reason: SDUPTHER

## 2020-01-27 RX ORDER — DIAZEPAM 5 MG/1
5 TABLET ORAL DAILY PRN
Qty: 30 TABLET | Refills: 5 | Status: ON HOLD | OUTPATIENT
Start: 2020-01-27 | End: 2021-04-25 | Stop reason: HOSPADM

## 2020-01-27 RX ORDER — ATORVASTATIN CALCIUM 10 MG/1
TABLET, FILM COATED ORAL
Qty: 90 TABLET | Refills: 3 | Status: SHIPPED | OUTPATIENT
Start: 2020-01-27 | End: 2021-02-09 | Stop reason: SDUPTHER

## 2020-01-27 RX ORDER — HYDROCODONE BITARTRATE AND ACETAMINOPHEN 7.5; 325 MG/1; MG/1
1 TABLET ORAL EVERY 12 HOURS PRN
Qty: 60 TABLET | Refills: 0 | Status: SHIPPED | OUTPATIENT
Start: 2020-01-27 | End: 2020-04-27 | Stop reason: SDUPTHER

## 2020-02-11 ENCOUNTER — OFFICE VISIT (OUTPATIENT)
Dept: PODIATRY | Facility: CLINIC | Age: 80
End: 2020-02-11
Payer: MEDICARE

## 2020-02-11 VITALS
WEIGHT: 245 LBS | SYSTOLIC BLOOD PRESSURE: 116 MMHG | DIASTOLIC BLOOD PRESSURE: 73 MMHG | HEIGHT: 68 IN | HEART RATE: 60 BPM | BODY MASS INDEX: 37.13 KG/M2

## 2020-02-11 DIAGNOSIS — M25.572 CHRONIC PAIN OF LEFT ANKLE: Primary | ICD-10-CM

## 2020-02-11 DIAGNOSIS — G89.29 CHRONIC PAIN OF LEFT ANKLE: Primary | ICD-10-CM

## 2020-02-11 DIAGNOSIS — M77.52 TENDONITIS OF ANKLE, LEFT: ICD-10-CM

## 2020-02-11 DIAGNOSIS — E11.9 CONTROLLED TYPE 2 DIABETES MELLITUS WITHOUT COMPLICATION, WITHOUT LONG-TERM CURRENT USE OF INSULIN: ICD-10-CM

## 2020-02-11 PROCEDURE — 99213 PR OFFICE/OUTPT VISIT, EST, LEVL III, 20-29 MIN: ICD-10-PCS | Mod: S$PBB,,, | Performed by: PODIATRIST

## 2020-02-11 PROCEDURE — 99213 OFFICE O/P EST LOW 20 MIN: CPT | Performed by: PODIATRIST

## 2020-02-11 PROCEDURE — 99213 OFFICE O/P EST LOW 20 MIN: CPT | Mod: S$PBB,,, | Performed by: PODIATRIST

## 2020-02-11 NOTE — PROGRESS NOTES
[address] [facility] [address]  [place] [address]  Phone: [phone]   [phone]    Patient's PCP:Garland Ocampo MD  Referring Provider: No ref. provider found    Subjective:      Chief Complaint:: Diabetic Foot Exam    HPI  Yeyo Hollingsworth is a 79 y.o. male who presents today for a diabetic foot exam. Still having some pain in my left ankle. I have tried some hemp creams and icy hot, icy hot seems to work the best.Pain scale 10/10.Pain is in the front of the ankle. Patient denies calf muscle pain.  Systemic Doctor: Garland Ocampo Md  Date Last Seen: 1-23-20  Blood Sugar: 150  Hemoglobin A1c: 6.7    Vitals:    02/11/20 0902   BP: 116/73   Pulse: 60     Shoe Size: 10    Past Surgical History:   Procedure Laterality Date    ABDOMINAL SURGERY      origunal surg 1986-mult surgeries since    CARPAL TUNNEL RELEASE      right wrist 2009-left wrist 2010    COLON SURGERY      partial colon removal    COLOSTOMY  1986    colostomy reversal  1986    EYE SURGERY      hay fever      HERNIA REPAIR  1949    TONSILLECTOMY  1947    TRANSFORAMINAL EPIDURAL INJECTION OF STEROID      x 4    TRANSFORAMINAL EPIDURAL INJECTION OF STEROID Bilateral 11/13/2019    Procedure: Injection,steroid,epidural,transforaminal approach;  Surgeon: Grant Wright MD;  [place] [facility] OR;  Service: Pain Management;  Laterality: Bilateral;  L4-5, L5-S1     Past Medical History:   Diagnosis Date    Allergy     Anticoagulant long-term use     aspirin    Anxiety     Atrial fibrillation     Back pain     Cataract     Clotting disorder     left leg    Diabetes mellitus     Diabetes mellitus, type 2     Hyperlipidemia     Hypertension     Obese     BRANDI on CPAP      History reviewed. No pertinent family history.     Social History:   Marital Status:   Alcohol History:  reports that he does not drink alcohol.  Tobacco History:  reports that he quit smoking about 13 years ago. His smoking use included cigarettes. He has never  used smokeless tobacco.  Drug History:  reports that he does not use drugs.    Review of patient's allergies indicates:   Allergen Reactions    Ativan [lorazepam] Other (See Comments)     Mood alternating      Dilaudid [hydromorphone (bulk)] Other (See Comments)     Mood alternating      Morphine Other (See Comments)     Mood alternating      Hydromorphone        Current Outpatient Medications   Medication Sig Dispense Refill    apixaban (ELIQUIS) 5 mg Tab Take 1 tablet (5 mg total) by mouth 2 (two) times daily. 180 tablet 3    aspirin (ECOTRIN) 81 MG EC tablet Take 1 tablet (81 mg total) by mouth once daily. 90 tablet 3    atorvastatin (LIPITOR) 10 MG tablet nightly 90 tablet 3    blood sugar diagnostic (BLOOD GLUCOSE TEST) Strp FREESTYLE LITE BG TEST STRIPS. current use of insulin  - Primary ICD-10-CM: E11.9 200 strip 3    blood sugar diagnostic Strp 200 strips by Misc.(Non-Drug; Combo Route) route 2 (two) times daily. 100 strip 3    diazePAM (VALIUM) 5 MG tablet Take 1 tablet (5 mg total) by mouth daily as needed for Anxiety. 30 tablet 5    empagliflozin (JARDIANCE) 10 mg Tab Take 10 mg by mouth once daily. 90 tablet 2    fexofenadine (ALLEGRA) 180 MG tablet Take 1 tablet (180 mg total) by mouth once daily. 90 tablet 3    fluticasone (FLONASE) 50 mcg/actuation nasal spray 2 sprays (100 mcg total) by Each Nare route once daily. 3 Bottle 3    FLUZONE HIGH-DOSE 2018-19, PF, 180 mcg/0.5 mL vaccine Inject 1 mL into the muscle once.  0    FREESTYLE LANCETS 28 gauge lancets       furosemide (LASIX) 20 MG tablet Take 1 tablet (20 mg total) by mouth every Mon, Wed, Fri. 45 tablet 3    gabapentin (NEURONTIN) 300 MG capsule Take 1 capsule (300 mg total) by mouth 3 (three) times daily. 270 capsule 1    HYDROcodone-acetaminophen (NORCO) 7.5-325 mg per tablet Take 1 tablet by mouth every 12 (twelve) hours as needed for Pain. 60 tablet 0    ipratropium (ATROVENT) 0.03 % nasal spray 2 sprays by Nasal route 2  (two) times daily. 30 mL 5    lisinopril (PRINIVIL,ZESTRIL) 20 MG tablet Take 1 tablet (20 mg total) by mouth once daily. 90 tablet 3    methocarbamol (ROBAXIN) 500 MG Tab Take 1 tablet (500 mg total) by mouth 3 (three) times daily. 270 tablet 1    montelukast (SINGULAIR) 10 mg tablet Take 1 tablet (10 mg total) by mouth every evening. 90 tablet 3    multivit-iron-min-folic acid (MULTIVITAMIN-IRON-MINERALS-FOLIC ACID) 3,500-18-0.4 unit-mg-mg Chew Take by mouth.        mupirocin (BACTROBAN) 2 % ointment Apply topically 2 (two) times daily. 22 g 1    naftifine (NAFTIN) 2 % Crea NAFTIN, 2% (External Cream)   as needed (2 %)  Active   Comments: Medication taken as needed.       polyethylene glycol (GLYCOLAX) 17 gram/dose powder Take 17 g by mouth once daily. 6 Bottle 3    potassium chloride (MICRO-K) 10 MEQ CpSR Every other day 45 capsule 3    psyllium (METAMUCIL) powder Take 1 packet by mouth 3 (three) times daily.      SITagliptin (JANUVIA) 100 MG Tab Take 1 tablet (100 mg total) by mouth once daily. 90 tablet 3    traMADol (ULTRAM) 50 mg tablet Take 1 tablet (50 mg total) by mouth every 12 (twelve) hours as needed for Pain. 60 tablet 0    traZODone (DESYREL) 100 MG tablet Take 1 tablet (100 mg total) by mouth every evening. 90 tablet 3    triamcinolone acetonide 0.1% (KENALOG) 0.1 % ointment Apply topically 2 (two) times daily. 80 g 3    metoprolol tartrate (LOPRESSOR) 25 MG tablet Take 1 tablet (25 mg total) by mouth 2 (two) times daily. 180 tablet 3     No current facility-administered medications for this visit.        Review of Systems      Objective:        Physical Exam:   Foot Exam    General  General Appearance: appears stated age and healthy   Orientation: alert and oriented to person, place, and time   Affect: appropriate   Gait: antalgic       Right Foot/Ankle     Inspection and Palpation  Ecchymosis: none  Tenderness: none   Swelling: (Plus one secondary to venous incompetency)  Arch: pes  cavus  Skin Exam: skin intact;     Neurovascular  Dorsalis pedis: 2+  Posterior tibial: 2+  Saphenous nerve sensation: normal  Tibial nerve sensation: normal  Superficial peroneal nerve sensation: normal  Deep peroneal nerve sensation: normal  Sural nerve sensation: normal    Muscle Strength  Ankle dorsiflexion: 5  Ankle plantar flexion: 5  Ankle inversion: 5  Ankle eversion: 5  Great toe extension: 5  Great toe flexion: 5      Left Foot/Ankle      Inspection and Palpation  Ecchymosis: none  Tenderness: (Lateral ankle along the course of the peroneal tendons and anterior lateral ankle by anterior talofibular ligament.)  Swelling: (Plus two pitting edema secondary to venous incompetency and previous DVT)  Arch: pes cavus  Skin Exam: skin intact;     Neurovascular  Dorsalis pedis: 2+  Posterior tibial: 2+  Saphenous nerve sensation: normal  Tibial nerve sensation: normal  Superficial peroneal nerve sensation: normal  Deep peroneal nerve sensation: normal  Sural nerve sensation: normal    Muscle Strength  Ankle dorsiflexion: 5  Ankle plantar flexion: 5  Ankle inversion: 5  Ankle eversion: 5  Great toe extension: 5  Great toe flexion: 5    Range of Motion    Passive  Ankle eversion: pain  Ankle inversion: pain    Active  Ankle eversion: pain  Ankle inversion: pain          Physical Exam   Cardiovascular:   Pulses:       Dorsalis pedis pulses are 2+ on the right side, and 2+ on the left side.        Posterior tibial pulses are 2+ on the right side, and 2+ on the left side.   Musculoskeletal:        Feet:              Imaging:            Assessment:       1. Chronic pain of left ankle    2. Tendonitis of ankle, left    3. Controlled type 2 diabetes mellitus without complication, without long-term current use of insulin      Plan:   Chronic pain of left ankle  -     IMMOBILIZER FOR HOME USE    Tendonitis of ankle, left  -     IMMOBILIZER FOR HOME USE    Controlled type 2 diabetes mellitus without complication, without  long-term current use of insulin  -      DIABETES FOOT EXAM     I am placing the patient ankle brace on the left side to be used for ambulation he will continue using topical anti-inflammatories and icing area as needed.  Recommend compression hose.  If pain still continues he will return in 6-8 weeks for possible MRI of left ankle.  Follow up in about 1 year (around 2/11/2021), or if symptoms worsen or fail to improve.    Procedures - None    Counseling:   Counseling/Education:  I provided patient education verbally regarding:   The aspects of diabetes and how it pertains to the feet. I explained the importance of proper diabetic foot care and how it is essential for the health of their feet.    I discussed the importance of knowing their Hemoglobin A1c and that the level needs to be as close to 6 as possible. I discussed the increase complications of high blood sugar including stroke, blindness, heart attack, kidney failure and loss of limb secondary to neuropathy and PVD.     With neuropathy, beware of any breaks in the skin or redness. These areas are not recognized early due to the numbness.    I discussed Diabetes, lower back issues, metabolic disorders, systemic causes, chemotherapy, vitamin deficiency, heavy metal exposure, as some of the causes. I also explained that as much as 40% of the time we can not find a cause. I discussed different treatments available to control the symptoms but which may not cure the problem.     I discussed ankle sprain with pt and the different grades/severity of sprain and different ligaments that can be torn.  I also discussed that most ankle sprains are treated conservatively and the pt does well.  Occasionally some sprains do not heal normally and there is insatbility of the joint leading to pain and possible arthritis.  these are usually evaluated by MRI, stress test.    I discussed conservative treatment of minor tendon tear with cast for 6 to 8 weeks, MRI, ultrasound if  needed for evaluation of the rupture and possible need for surgical repair.  I provided patient education verbally regarding:   Patient diagnosis, treatment options, as well as alternatives, risks, and benefits.     This note was created using Dragon voice recognition software that occasionally misinterpreted phrases or words.

## 2020-04-01 DIAGNOSIS — I10 ESSENTIAL HYPERTENSION: ICD-10-CM

## 2020-04-01 DIAGNOSIS — I48.20 CHRONIC ATRIAL FIBRILLATION: ICD-10-CM

## 2020-04-01 RX ORDER — GABAPENTIN 300 MG/1
300 CAPSULE ORAL 3 TIMES DAILY
Qty: 270 CAPSULE | Refills: 1 | Status: SHIPPED | OUTPATIENT
Start: 2020-04-01 | End: 2021-03-29 | Stop reason: SDUPTHER

## 2020-04-01 RX ORDER — METOPROLOL TARTRATE 25 MG/1
25 TABLET, FILM COATED ORAL 2 TIMES DAILY
Qty: 180 TABLET | Refills: 3 | Status: SHIPPED | OUTPATIENT
Start: 2020-04-01 | End: 2021-03-29 | Stop reason: SDUPTHER

## 2020-04-03 ENCOUNTER — LAB VISIT (OUTPATIENT)
Dept: LAB | Facility: HOSPITAL | Age: 80
End: 2020-04-03
Attending: INTERNAL MEDICINE
Payer: MEDICARE

## 2020-04-03 DIAGNOSIS — I48.0 PAROXYSMAL ATRIAL FIBRILLATION: ICD-10-CM

## 2020-04-03 DIAGNOSIS — E11.9 DIABETES MELLITUS WITHOUT COMPLICATION: Primary | ICD-10-CM

## 2020-04-03 DIAGNOSIS — I50.9 HEART FAILURE, UNSPECIFIED: ICD-10-CM

## 2020-04-03 DIAGNOSIS — E78.5 HYPERLIPEMIA: ICD-10-CM

## 2020-04-03 LAB
ALBUMIN SERPL BCP-MCNC: 4 G/DL (ref 3.5–5.2)
ALP SERPL-CCNC: 38 U/L (ref 55–135)
ALT SERPL W/O P-5'-P-CCNC: 29 U/L (ref 10–44)
ANION GAP SERPL CALC-SCNC: 5 MMOL/L (ref 8–16)
AST SERPL-CCNC: 23 U/L (ref 10–40)
BASOPHILS # BLD AUTO: 0.03 K/UL (ref 0–0.2)
BASOPHILS NFR BLD: 0.5 % (ref 0–1.9)
BILIRUB SERPL-MCNC: 0.7 MG/DL (ref 0.1–1)
BUN SERPL-MCNC: 29 MG/DL (ref 8–23)
CALCIUM SERPL-MCNC: 8.9 MG/DL (ref 8.7–10.5)
CHLORIDE SERPL-SCNC: 107 MMOL/L (ref 95–110)
CHOLEST SERPL-MCNC: 134 MG/DL (ref 120–199)
CHOLEST/HDLC SERPL: 3.4 {RATIO} (ref 2–5)
CO2 SERPL-SCNC: 28 MMOL/L (ref 23–29)
CREAT SERPL-MCNC: 1.2 MG/DL (ref 0.5–1.4)
DIFFERENTIAL METHOD: ABNORMAL
EOSINOPHIL # BLD AUTO: 0.3 K/UL (ref 0–0.5)
EOSINOPHIL NFR BLD: 5 % (ref 0–8)
ERYTHROCYTE [DISTWIDTH] IN BLOOD BY AUTOMATED COUNT: 13.6 % (ref 11.5–14.5)
EST. GFR  (AFRICAN AMERICAN): >60 ML/MIN/1.73 M^2
EST. GFR  (NON AFRICAN AMERICAN): 57.6 ML/MIN/1.73 M^2
ESTIMATED AVG GLUCOSE: 137 MG/DL (ref 68–131)
GLUCOSE SERPL-MCNC: 120 MG/DL (ref 70–110)
HBA1C MFR BLD HPLC: 6.4 % (ref 4.5–6.2)
HCT VFR BLD AUTO: 49.8 % (ref 40–54)
HDLC SERPL-MCNC: 39 MG/DL (ref 40–75)
HDLC SERPL: 29.1 % (ref 20–50)
HGB BLD-MCNC: 15.5 G/DL (ref 14–18)
IMM GRANULOCYTES # BLD AUTO: 0.01 K/UL (ref 0–0.04)
IMM GRANULOCYTES NFR BLD AUTO: 0.2 % (ref 0–0.5)
LDLC SERPL CALC-MCNC: 59.8 MG/DL (ref 63–159)
LYMPHOCYTES # BLD AUTO: 2.1 K/UL (ref 1–4.8)
LYMPHOCYTES NFR BLD: 36.7 % (ref 18–48)
MAGNESIUM SERPL-MCNC: 2.3 MG/DL (ref 1.6–2.6)
MCH RBC QN AUTO: 30.2 PG (ref 27–31)
MCHC RBC AUTO-ENTMCNC: 31.1 G/DL (ref 32–36)
MCV RBC AUTO: 97 FL (ref 82–98)
MONOCYTES # BLD AUTO: 0.6 K/UL (ref 0.3–1)
MONOCYTES NFR BLD: 9.6 % (ref 4–15)
NEUTROPHILS # BLD AUTO: 2.8 K/UL (ref 1.8–7.7)
NEUTROPHILS NFR BLD: 48 % (ref 38–73)
NONHDLC SERPL-MCNC: 95 MG/DL
NRBC BLD-RTO: 0 /100 WBC
PLATELET # BLD AUTO: 157 K/UL (ref 150–350)
PMV BLD AUTO: 10 FL (ref 9.2–12.9)
POTASSIUM SERPL-SCNC: 4 MMOL/L (ref 3.5–5.1)
PROT SERPL-MCNC: 7.1 G/DL (ref 6–8.4)
RBC # BLD AUTO: 5.14 M/UL (ref 4.6–6.2)
SODIUM SERPL-SCNC: 140 MMOL/L (ref 136–145)
TRIGL SERPL-MCNC: 176 MG/DL (ref 30–150)
WBC # BLD AUTO: 5.81 K/UL (ref 3.9–12.7)

## 2020-04-03 PROCEDURE — 83735 ASSAY OF MAGNESIUM: CPT

## 2020-04-03 PROCEDURE — 80061 LIPID PANEL: CPT

## 2020-04-03 PROCEDURE — 36415 COLL VENOUS BLD VENIPUNCTURE: CPT

## 2020-04-03 PROCEDURE — 85025 COMPLETE CBC W/AUTO DIFF WBC: CPT

## 2020-04-03 PROCEDURE — 83036 HEMOGLOBIN GLYCOSYLATED A1C: CPT

## 2020-04-03 PROCEDURE — 80053 COMPREHEN METABOLIC PANEL: CPT

## 2020-04-24 ENCOUNTER — PATIENT MESSAGE (OUTPATIENT)
Dept: FAMILY MEDICINE | Facility: CLINIC | Age: 80
End: 2020-04-24

## 2020-04-27 DIAGNOSIS — I10 ESSENTIAL HYPERTENSION: ICD-10-CM

## 2020-04-27 DIAGNOSIS — E11.65 UNCONTROLLED TYPE 2 DIABETES MELLITUS WITH COMPLICATION, UNSPECIFIED WHETHER LONG TERM INSULIN USE: ICD-10-CM

## 2020-04-27 DIAGNOSIS — E11.9 CONTROLLED TYPE 2 DIABETES MELLITUS WITHOUT COMPLICATION, WITHOUT LONG-TERM CURRENT USE OF INSULIN: Primary | ICD-10-CM

## 2020-04-27 DIAGNOSIS — M25.562 CHRONIC PAIN OF BOTH KNEES: ICD-10-CM

## 2020-04-27 DIAGNOSIS — M25.561 CHRONIC PAIN OF BOTH KNEES: ICD-10-CM

## 2020-04-27 DIAGNOSIS — E11.8 UNCONTROLLED TYPE 2 DIABETES MELLITUS WITH COMPLICATION, UNSPECIFIED WHETHER LONG TERM INSULIN USE: ICD-10-CM

## 2020-04-27 DIAGNOSIS — M51.36 DDD (DEGENERATIVE DISC DISEASE), LUMBAR: ICD-10-CM

## 2020-04-27 DIAGNOSIS — G89.29 CHRONIC PAIN OF BOTH KNEES: ICD-10-CM

## 2020-04-27 RX ORDER — LISINOPRIL 20 MG/1
20 TABLET ORAL DAILY
Qty: 90 TABLET | Refills: 3 | Status: SHIPPED | OUTPATIENT
Start: 2020-04-27 | End: 2020-11-29 | Stop reason: SDUPTHER

## 2020-04-27 RX ORDER — TRAMADOL HYDROCHLORIDE 50 MG/1
50 TABLET ORAL EVERY 12 HOURS PRN
Qty: 60 TABLET | Refills: 0 | Status: SHIPPED | OUTPATIENT
Start: 2020-04-27 | End: 2020-07-22

## 2020-04-27 RX ORDER — LANCETS 28 GAUGE
1 EACH MISCELLANEOUS 3 TIMES DAILY
Qty: 100 EACH | Refills: 3 | Status: SHIPPED | OUTPATIENT
Start: 2020-04-27 | End: 2022-09-28 | Stop reason: SDUPTHER

## 2020-04-27 RX ORDER — HYDROCODONE BITARTRATE AND ACETAMINOPHEN 7.5; 325 MG/1; MG/1
1 TABLET ORAL EVERY 12 HOURS PRN
Qty: 60 TABLET | Refills: 0 | Status: SHIPPED | OUTPATIENT
Start: 2020-04-27 | End: 2020-07-07

## 2020-04-27 NOTE — TELEPHONE ENCOUNTER
Phillip did we already order     I have sent in order for his Lancets I think it may have printed.

## 2020-05-05 ENCOUNTER — OFFICE VISIT (OUTPATIENT)
Dept: FAMILY MEDICINE | Facility: CLINIC | Age: 80
End: 2020-05-05
Payer: MEDICARE

## 2020-05-05 VITALS
BODY MASS INDEX: 36.98 KG/M2 | HEART RATE: 60 BPM | OXYGEN SATURATION: 94 % | SYSTOLIC BLOOD PRESSURE: 130 MMHG | WEIGHT: 244 LBS | HEIGHT: 68 IN | TEMPERATURE: 98 F | DIASTOLIC BLOOD PRESSURE: 70 MMHG | RESPIRATION RATE: 18 BRPM

## 2020-05-05 DIAGNOSIS — E11.9 CONTROLLED TYPE 2 DIABETES MELLITUS WITHOUT COMPLICATION, WITHOUT LONG-TERM CURRENT USE OF INSULIN: Primary | ICD-10-CM

## 2020-05-05 DIAGNOSIS — I10 ESSENTIAL HYPERTENSION: ICD-10-CM

## 2020-05-05 DIAGNOSIS — E78.01 FAMILIAL HYPERCHOLESTEROLEMIA: ICD-10-CM

## 2020-05-05 PROCEDURE — 99214 OFFICE O/P EST MOD 30 MIN: CPT | Mod: S$PBB,,, | Performed by: FAMILY MEDICINE

## 2020-05-05 PROCEDURE — 99215 OFFICE O/P EST HI 40 MIN: CPT | Performed by: FAMILY MEDICINE

## 2020-05-05 PROCEDURE — 99214 PR OFFICE/OUTPT VISIT, EST, LEVL IV, 30-39 MIN: ICD-10-PCS | Mod: S$PBB,,, | Performed by: FAMILY MEDICINE

## 2020-05-05 NOTE — PROGRESS NOTES
SUBJECTIVE:    Patient ID: Yeyo Hollingsworth is a 79 y.o. male.    Chief Complaint: Hyperlipidemia; Hypertension; and Diabetes    80 yo male here today to follow up on his chronic medical conditions, he is doing well and has no new complaints and does not need medications refilled. He has been following up with cardiology but his last appointment was canceled, we discussed the need to start loosing weight and trying to get some form of exercise.    Pt has chronic left foot pain has been evaluated by Podiatry, he was given a brace for activity, but has not been wearing routinely. I reviewed Podiatry's note and they recommended follow up with possible MRI in 6-8 weeks if pain persists.    Pt has lost 3 lbs in 4 months      SPMHx:  DM: Jardiance 10mg, Januvia 100mg, not on metformin due to diarrhea Last A1C 6.4  is doing well.  HTN: Lisinopril 20mg, Metoprolol 25mg, is well controlled.  Hyperlipidemia: Atorvastatin 10 mg well controlled. LDL 59  Arthritis: On several chronic narcotics and follows up with pain management, for his neck and back pain pt is planning to have a procedure.  Anxiety: Takes Valium 5mg, PRN  A-fib: 2012 Was cardioverted, no longer in a-fib  Insomnia: Trazadone 100mg  Hx of DVT: Currently on Eliquis 5mg   BRANDI: Wears CPAP  Chronic constipation: On polyethylene Glycol 17 g, manages well with this medication.     Specialists:  Cardiology: Dr Castaneda every 6 month  Pain Management: Michele Martinez  Podiatry: Dr Mandujano  Sleep: Dr Felice Bello  GS: Dr Thorne     Smoke: Quit in   ETOH: None  Exercise: Never    Lipids:  Chol 134  Tri  HDL: 39  LDL: 59    A1C:  6.4    Hyperlipidemia   This is a chronic problem. The current episode started more than 1 year ago. The problem is controlled. Recent lipid tests were reviewed and are normal. Exacerbating diseases include diabetes and obesity. There are no known factors aggravating his hyperlipidemia. Associated symptoms include leg pain. Pertinent  negatives include no chest pain, focal sensory loss or shortness of breath. Current antihyperlipidemic treatment includes statins. Compliance problems include adherence to exercise.    Hypertension   This is a chronic problem. The current episode started more than 1 year ago. The problem is unchanged. The problem is controlled. Pertinent negatives include no blurred vision, chest pain, headaches, palpitations, shortness of breath or sweats. Past treatments include beta blockers and ACE inhibitors. The current treatment provides moderate improvement. Compliance problems include exercise.  There is no history of CVA, PVD or retinopathy.   Diabetes   He presents for his follow-up diabetic visit. He has type 2 diabetes mellitus. No MedicAlert identification noted. The initial diagnosis of diabetes was made 7 years ago. His disease course has been stable. Hypoglycemia symptoms include sleepiness. Pertinent negatives for hypoglycemia include no confusion, dizziness, headaches, hunger, mood changes, nervousness/anxiousness, pallor, seizures, speech difficulty, sweats or tremors. Associated symptoms include polyuria, weakness and weight loss. Pertinent negatives for diabetes include no blurred vision, no chest pain, no fatigue, no foot paresthesias, no foot ulcerations, no polydipsia, no polyphagia and no visual change. Pertinent negatives for hypoglycemia complications include no blackouts, no hospitalization, no nocturnal hypoglycemia, no required assistance and no required glucagon injection. Symptoms are stable. Pertinent negatives for diabetic complications include no autonomic neuropathy, CVA, heart disease, impotence, nephropathy, peripheral neuropathy, PVD or retinopathy. Risk factors for coronary artery disease include family history, hypertension, obesity, tobacco exposure, diabetes mellitus and male sex. Current diabetic treatment includes diet and oral agent (dual therapy). He is compliant with treatment most of  the time. His weight is decreasing steadily. He is following a generally healthy diet. Meal planning includes avoidance of concentrated sweets. He has not had a previous visit with a dietitian. He never participates in exercise. He monitors blood glucose at home 3-4 x per week. Blood glucose monitoring compliance is excellent. His home blood glucose trend is fluctuating minimally. He sees a podiatrist.Eye exam is current.         Past Medical History:   Diagnosis Date    Allergy     Anticoagulant long-term use     aspirin    Anxiety     Atrial fibrillation     Back pain     Cataract     Clotting disorder     left leg    Diabetes mellitus     Diabetes mellitus, type 2     Hay fever     Hyperlipidemia     Hypertension     Obese     BRANDI on CPAP      Social History     Socioeconomic History    Marital status:      Spouse name: Not on file    Number of children: Not on file    Years of education: Not on file    Highest education level: Not on file   Occupational History    Not on file   Social Needs    Financial resource strain: Not hard at all    Food insecurity:     Worry: Never true     Inability: Never true    Transportation needs:     Medical: No     Non-medical: No   Tobacco Use    Smoking status: Former Smoker     Types: Cigarettes     Last attempt to quit: 2006     Years since quittin.2    Smokeless tobacco: Never Used   Substance and Sexual Activity    Alcohol use: No     Frequency: Monthly or less     Drinks per session: Patient refused     Binge frequency: Never    Drug use: No    Sexual activity: Yes     Partners: Female   Lifestyle    Physical activity:     Days per week: 0 days     Minutes per session: 0 min    Stress: To some extent   Relationships    Social connections:     Talks on phone: Three times a week     Gets together: Three times a week     Attends [employer] service: Not on file     Active member of club or organization: No     Attends meetings of  clubs or organizations: Never     Relationship status:    Other Topics Concern    Not on file   Social History Narrative    Not on file     Past Surgical History:   Procedure Laterality Date    ABDOMINAL SURGERY      origunal surg 1986-mult surgeries since    CARPAL TUNNEL RELEASE      right wrist 2009-left wrist 2010    COLON SURGERY      partial colon removal    COLOSTOMY  1986    colostomy reversal  1986    EYE SURGERY      hay fever      HERNIA REPAIR  1949    TONSILLECTOMY  1947    TRANSFORAMINAL EPIDURAL INJECTION OF STEROID      x 4    TRANSFORAMINAL EPIDURAL INJECTION OF STEROID Bilateral 11/13/2019    Procedure: Injection,steroid,epidural,transforaminal approach;  Surgeon: Grant Wright MD;  [place] [facility] OR;  Service: Pain Management;  Laterality: Bilateral;  L4-5, L5-S1     History reviewed. No pertinent family history.  Current Outpatient Medications   Medication Sig Dispense Refill    atorvastatin (LIPITOR) 10 MG tablet nightly 90 tablet 3    blood sugar diagnostic (BLOOD GLUCOSE TEST) Strp FREESTYLE LITE BG TEST STRIPS. current use of insulin  - Primary ICD-10-CM: E11.9 200 strip 3    blood sugar diagnostic Strp 200 strips by Misc.(Non-Drug; Combo Route) route 2 (two) times daily. 100 strip 3    diazePAM (VALIUM) 5 MG tablet Take 1 tablet (5 mg total) by mouth daily as needed for Anxiety. 30 tablet 5    empagliflozin (JARDIANCE) 10 mg Tab Take 10 mg by mouth once daily. 90 tablet 2    fexofenadine (ALLEGRA) 180 MG tablet Take 1 tablet (180 mg total) by mouth once daily. 90 tablet 3    fluticasone (FLONASE) 50 mcg/actuation nasal spray 2 sprays (100 mcg total) by Each Nare route once daily. 3 Bottle 3    FREESTYLE LANCETS 28 gauge lancets Inject 1 lancet into the skin 3 (three) times daily. 100 each 3    furosemide (LASIX) 20 MG tablet Take 1 tablet (20 mg total) by mouth every Mon, Wed, Fri. 45 tablet 3    gabapentin (NEURONTIN) 300 MG capsule Take 1 capsule (300 mg total)  by mouth 3 (three) times daily. 270 capsule 1    HYDROcodone-acetaminophen (NORCO) 7.5-325 mg per tablet Take 1 tablet by mouth every 12 (twelve) hours as needed for Pain. 60 tablet 0    ipratropium (ATROVENT) 0.03 % nasal spray 2 sprays by Nasal route 2 (two) times daily. 30 mL 5    lisinopriL (PRINIVIL,ZESTRIL) 20 MG tablet Take 1 tablet (20 mg total) by mouth once daily. 90 tablet 3    methocarbamol (ROBAXIN) 500 MG Tab Take 1 tablet (500 mg total) by mouth 3 (three) times daily. 270 tablet 1    metoprolol tartrate (LOPRESSOR) 25 MG tablet Take 1 tablet (25 mg total) by mouth 2 (two) times daily. 180 tablet 3    montelukast (SINGULAIR) 10 mg tablet Take 1 tablet (10 mg total) by mouth every evening. 90 tablet 3    multivit-iron-min-folic acid (MULTIVITAMIN-IRON-MINERALS-FOLIC ACID) 3,500-18-0.4 unit-mg-mg Chew Take by mouth.        mupirocin (BACTROBAN) 2 % ointment Apply topically 2 (two) times daily. 22 g 1    naftifine (NAFTIN) 2 % Crea NAFTIN, 2% (External Cream)   as needed (2 %)  Active   Comments: Medication taken as needed.       polyethylene glycol (GLYCOLAX) 17 gram/dose powder Take 17 g by mouth once daily. 6 Bottle 3    potassium chloride (MICRO-K) 10 MEQ CpSR Every other day 45 capsule 3    SITagliptin (JANUVIA) 100 MG Tab Take 1 tablet (100 mg total) by mouth once daily. 90 tablet 3    traMADoL (ULTRAM) 50 mg tablet Take 1 tablet (50 mg total) by mouth every 12 (twelve) hours as needed for Pain. 60 tablet 0    traZODone (DESYREL) 100 MG tablet Take 1 tablet (100 mg total) by mouth every evening. 90 tablet 3    triamcinolone acetonide 0.1% (KENALOG) 0.1 % ointment Apply topically 2 (two) times daily. 80 g 3    apixaban (ELIQUIS) 5 mg Tab Take 1 tablet (5 mg total) by mouth 2 (two) times daily. 180 tablet 3    aspirin (ECOTRIN) 81 MG EC tablet Take 1 tablet (81 mg total) by mouth once daily. 90 tablet 3     No current facility-administered medications for this visit.      Review of  "patient's allergies indicates:   Allergen Reactions    Ativan [lorazepam] Other (See Comments)     Mood alternating      Dilaudid [hydromorphone (bulk)] Other (See Comments)     Mood alternating      Morphine Other (See Comments)     Mood alternating      Hydromorphone        Review of Systems   Constitutional: Positive for weight loss. Negative for activity change, appetite change, diaphoresis, fatigue, fever and unexpected weight change.   HENT: Negative for congestion, postnasal drip, rhinorrhea, sinus pressure and sinus pain.    Eyes: Negative for blurred vision.   Respiratory: Negative for cough, chest tightness, shortness of breath and wheezing.    Cardiovascular: Positive for leg swelling. Negative for chest pain and palpitations.   Endocrine: Positive for polyuria. Negative for polydipsia and polyphagia.   Genitourinary: Negative for impotence.   Skin: Negative for pallor.   Neurological: Positive for weakness. Negative for dizziness, tremors, seizures, speech difficulty and headaches.   Psychiatric/Behavioral: Negative for confusion. The patient is not nervous/anxious.           Blood pressure 130/70, pulse 60, temperature 97.9 °F (36.6 °C), temperature source Oral, resp. rate 18, height 5' 8" (1.727 m), weight 110.7 kg (244 lb), SpO2 (!) 94 %. Body mass index is 37.1 kg/m².   Objective:      Physical Exam   Constitutional: He is oriented to person, place, and time. He appears well-developed and well-nourished. No distress.   HENT:   Head: Normocephalic and atraumatic.   Right Ear: External ear normal.   Left Ear: External ear normal.   Nose: Nose normal.   Mouth/Throat: Oropharynx is clear and moist. No oropharyngeal exudate.   Eyes: Pupils are equal, round, and reactive to light. Conjunctivae and EOM are normal. Right eye exhibits no discharge. Left eye exhibits no discharge.   Pseudophakia    Cardiovascular: Normal rate, regular rhythm and normal heart sounds.   No murmur heard.  Pulmonary/Chest: " Effort normal and breath sounds normal. No respiratory distress. He has no wheezes.   Musculoskeletal: He exhibits edema (1+ lower extremity edema).   Neurological: He is alert and oriented to person, place, and time.   Skin: Skin is warm and dry. Capillary refill takes less than 2 seconds. No rash noted. He is not diaphoretic. No pallor.           Assessment:       1. Controlled type 2 diabetes mellitus without complication, without long-term current use of insulin    2. Essential hypertension    3. Familial hypercholesterolemia         Plan:           Controlled type 2 diabetes mellitus without complication, without long-term current use of insulin  Currently on 2 medications his A1C is WNL will continue on his current medications.  Pt encouraged to increase his activity and decrease his carb intake.    Essential hypertension  Discussed sodium reduction, and exercise along with improved diet and weight loss. Pt has lost 5 lbs since our last vist and I encourage continued weight loss.    Familial hypercholesterolemia  LDL of 59, his lipids are controlled. Continue his current statin therapy

## 2020-05-14 ENCOUNTER — OFFICE VISIT (OUTPATIENT)
Dept: PODIATRY | Facility: CLINIC | Age: 80
End: 2020-05-14
Payer: MEDICARE

## 2020-05-14 VITALS — TEMPERATURE: 98 F | RESPIRATION RATE: 16 BRPM | WEIGHT: 247.5 LBS | HEIGHT: 68 IN | BODY MASS INDEX: 37.51 KG/M2

## 2020-05-14 DIAGNOSIS — M25.572 CHRONIC PAIN OF LEFT ANKLE: ICD-10-CM

## 2020-05-14 DIAGNOSIS — M77.52 TENDONITIS OF ANKLE, LEFT: ICD-10-CM

## 2020-05-14 DIAGNOSIS — S86.312D PERONEAL TENDON TEAR, LEFT, SUBSEQUENT ENCOUNTER: Primary | ICD-10-CM

## 2020-05-14 DIAGNOSIS — M76.72 PERONEAL TENDINITIS OF LEFT LOWER EXTREMITY: ICD-10-CM

## 2020-05-14 DIAGNOSIS — G89.29 CHRONIC PAIN OF LEFT ANKLE: ICD-10-CM

## 2020-05-14 DIAGNOSIS — E11.9 CONTROLLED TYPE 2 DIABETES MELLITUS WITHOUT COMPLICATION, WITHOUT LONG-TERM CURRENT USE OF INSULIN: ICD-10-CM

## 2020-05-14 PROCEDURE — 99213 OFFICE O/P EST LOW 20 MIN: CPT | Mod: S$PBB,,, | Performed by: PODIATRIST

## 2020-05-14 PROCEDURE — 99213 PR OFFICE/OUTPT VISIT, EST, LEVL III, 20-29 MIN: ICD-10-PCS | Mod: S$PBB,,, | Performed by: PODIATRIST

## 2020-05-14 PROCEDURE — 99213 OFFICE O/P EST LOW 20 MIN: CPT | Performed by: PODIATRIST

## 2020-05-14 NOTE — PROGRESS NOTES
[address] [facility] [address]  [place] [address]  Phone: [phone]   [phone]    Patient's PCP:Garland Ocampo MD  Referring Provider: No ref. provider found    Subjective:      Chief Complaint:: Follow-up (Chronic ankle pain)    POOJA Hollingsworth is a 79 y.o. male who presents for follow-up on complaint of chronic left ankle pain.   Current symptoms include constant dull aching pain and swelling.  Aggravating factors are walking and weight bearing. Patients rates pain 4/10 on pain scale. He would like to try an injection and has previously discussed MRI.    Systemic Doctor: Garland Ocampo Md  Date Last Seen: April 2020  Blood Sugar:  98  Hemoglobin A1c: 6.4    Vitals:    05/14/20 0900   Resp: 16   Temp: 97.8 °F (36.6 °C)     Shoe Size:     Past Surgical History:   Procedure Laterality Date    ABDOMINAL SURGERY      origunal surg 1986-mult surgeries since    CARPAL TUNNEL RELEASE      right wrist 2009-left wrist 2010    COLON SURGERY      partial colon removal    COLOSTOMY  1986    colostomy reversal  1986    EYE SURGERY      hay fever      HERNIA REPAIR  1949    TONSILLECTOMY  1947    TRANSFORAMINAL EPIDURAL INJECTION OF STEROID      x 4    TRANSFORAMINAL EPIDURAL INJECTION OF STEROID Bilateral 11/13/2019    Procedure: Injection,steroid,epidural,transforaminal approach;  Surgeon: Grant Wright MD;  [place] [facility] OR;  Service: Pain Management;  Laterality: Bilateral;  L4-5, L5-S1     Past Medical History:   Diagnosis Date    Allergy     Anticoagulant long-term use     aspirin    Anxiety     Atrial fibrillation     Back pain     Cataract     Clotting disorder     left leg    Diabetes mellitus     Diabetes mellitus, type 2     Hay fever     Hyperlipidemia     Hypertension     Obese     BRANDI on CPAP      History reviewed. No pertinent family history.     Social History:   Marital Status:   Alcohol History:  reports that he does not drink alcohol.  Tobacco History:  reports  that he quit smoking about 14 years ago. His smoking use included cigarettes. He has never used smokeless tobacco.  Drug History:  reports that he does not use drugs.    Review of patient's allergies indicates:   Allergen Reactions    Ativan [lorazepam] Other (See Comments)     Mood alternating      Dilaudid [hydromorphone (bulk)] Other (See Comments)     Mood alternating      Morphine Other (See Comments)     Mood alternating      Hydromorphone        Current Outpatient Medications   Medication Sig Dispense Refill    apixaban (ELIQUIS) 5 mg Tab Take 1 tablet (5 mg total) by mouth 2 (two) times daily. 180 tablet 3    aspirin (ECOTRIN) 81 MG EC tablet Take 1 tablet (81 mg total) by mouth once daily. 90 tablet 3    atorvastatin (LIPITOR) 10 MG tablet nightly 90 tablet 3    blood sugar diagnostic (BLOOD GLUCOSE TEST) Strp FREESTYLE LITE BG TEST STRIPS. current use of insulin  - Primary ICD-10-CM: E11.9 200 strip 3    blood sugar diagnostic Strp 200 strips by Misc.(Non-Drug; Combo Route) route 2 (two) times daily. 100 strip 3    diazePAM (VALIUM) 5 MG tablet Take 1 tablet (5 mg total) by mouth daily as needed for Anxiety. 30 tablet 5    empagliflozin (JARDIANCE) 10 mg Tab Take 10 mg by mouth once daily. 90 tablet 2    fexofenadine (ALLEGRA) 180 MG tablet Take 1 tablet (180 mg total) by mouth once daily. 90 tablet 3    fluticasone (FLONASE) 50 mcg/actuation nasal spray 2 sprays (100 mcg total) by Each Nare route once daily. 3 Bottle 3    FREESTYLE LANCETS 28 gauge lancets Inject 1 lancet into the skin 3 (three) times daily. 100 each 3    furosemide (LASIX) 20 MG tablet Take 1 tablet (20 mg total) by mouth every Mon, Wed, Fri. 45 tablet 3    gabapentin (NEURONTIN) 300 MG capsule Take 1 capsule (300 mg total) by mouth 3 (three) times daily. 270 capsule 1    HYDROcodone-acetaminophen (NORCO) 7.5-325 mg per tablet Take 1 tablet by mouth every 12 (twelve) hours as needed for Pain. 60 tablet 0    ipratropium  (ATROVENT) 0.03 % nasal spray 2 sprays by Nasal route 2 (two) times daily. 30 mL 5    lisinopriL (PRINIVIL,ZESTRIL) 20 MG tablet Take 1 tablet (20 mg total) by mouth once daily. 90 tablet 3    methocarbamol (ROBAXIN) 500 MG Tab Take 1 tablet (500 mg total) by mouth 3 (three) times daily. 270 tablet 1    metoprolol tartrate (LOPRESSOR) 25 MG tablet Take 1 tablet (25 mg total) by mouth 2 (two) times daily. 180 tablet 3    montelukast (SINGULAIR) 10 mg tablet Take 1 tablet (10 mg total) by mouth every evening. 90 tablet 3    multivit-iron-min-folic acid (MULTIVITAMIN-IRON-MINERALS-FOLIC ACID) 3,500-18-0.4 unit-mg-mg Chew Take by mouth.        mupirocin (BACTROBAN) 2 % ointment Apply topically 2 (two) times daily. 22 g 1    naftifine (NAFTIN) 2 % Crea NAFTIN, 2% (External Cream)   as needed (2 %)  Active   Comments: Medication taken as needed.       polyethylene glycol (GLYCOLAX) 17 gram/dose powder Take 17 g by mouth once daily. 6 Bottle 3    potassium chloride (MICRO-K) 10 MEQ CpSR Every other day 45 capsule 3    SITagliptin (JANUVIA) 100 MG Tab Take 1 tablet (100 mg total) by mouth once daily. 90 tablet 3    traMADoL (ULTRAM) 50 mg tablet Take 1 tablet (50 mg total) by mouth every 12 (twelve) hours as needed for Pain. 60 tablet 0    traZODone (DESYREL) 100 MG tablet Take 1 tablet (100 mg total) by mouth every evening. 90 tablet 3    triamcinolone acetonide 0.1% (KENALOG) 0.1 % ointment Apply topically 2 (two) times daily. 80 g 3     No current facility-administered medications for this visit.        Review of Systems   Musculoskeletal: Positive for arthralgias and myalgias.   Neurological: Positive for weakness.         Objective:        Physical Exam:   Foot Exam    General  General Appearance: appears stated age and healthy   Orientation: alert and oriented to person, place, and time   Affect: appropriate   Gait: antalgic       Left Foot/Ankle      Inspection and Palpation  Ecchymosis:  none  Tenderness: (Pain along the peroneal tendons and posterior tibial tendon medial lateral ankle pain and anterior lateral ankle pain)  Swelling: (Plus one pitting edema of lower extremity)  Arch: pes cavus  Skin Exam: skin intact;     Neurovascular  Dorsalis pedis: 2+  Posterior tibial: 2+  Saphenous nerve sensation: normal  Tibial nerve sensation: normal  Superficial peroneal nerve sensation: normal  Deep peroneal nerve sensation: normal  Sural nerve sensation: normal    Muscle Strength  Ankle dorsiflexion: 5  Ankle plantar flexion: 5  Ankle inversion: 5  Ankle eversion: 5  Great toe extension: 5  Great toe flexion: 5    Range of Motion    Passive  Ankle eversion: pain  Ankle inversion: pain    Active  Ankle eversion: pain  Ankle inversion: pain          Physical Exam   Cardiovascular:   Pulses:       Dorsalis pedis pulses are 2+ on the left side.        Posterior tibial pulses are 2+ on the left side.   Musculoskeletal:        Feet:        Imaging:            Assessment:       1. Peroneal tendon tear, left, subsequent encounter    2. Chronic pain of left ankle    3. Controlled type 2 diabetes mellitus without complication, without long-term current use of insulin    4. Peroneal tendinitis of left lower extremity    5. Tendonitis of ankle, left      Plan:   Peroneal tendon tear, left, subsequent encounter  -     MRI Ankle Without Contrast Left; Future; Expected date: 05/14/2020    Chronic pain of left ankle    Controlled type 2 diabetes mellitus without complication, without long-term current use of insulin    Peroneal tendinitis of left lower extremity    Tendonitis of ankle, left     1.  Patient will continue ankle brace running shoes ice to the affected left ankle.  2.  Because of continued pain and lack of response to conservative care with ankle brace and immobilization I am ordering an MRI of the left ankle to evaluate peroneal tendons and posterior tibial tendon.  He return to see me to review test  results or will do it by telemedicine.  Follow up for Test Results.    Procedures - None    Counseling:     I provided patient education verbally regarding:   Patient diagnosis, treatment options, as well as alternatives, risks, and benefits.     This note was created using Dragon voice recognition software that occasionally misinterpreted phrases or words.               Answers for HPI/ROS submitted by the patient on 5/13/2020   Chronicity: recurrent  Onset: more than 1 year ago  Frequency: constantly  Progression since onset: gradually worsening  Aggravating factors: standing, walking  Treatments tried: heat, ice, immobilization, lying down, position changes, relaxation, sleep, walking  Improvement on treatment: mild

## 2020-05-18 ENCOUNTER — HOSPITAL ENCOUNTER (OUTPATIENT)
Dept: RADIOLOGY | Facility: HOSPITAL | Age: 80
Discharge: HOME | End: 2020-05-18
Attending: PODIATRIST
Payer: MEDICARE

## 2020-05-18 DIAGNOSIS — S86.312D PERONEAL TENDON TEAR, LEFT, SUBSEQUENT ENCOUNTER: ICD-10-CM

## 2020-05-18 PROCEDURE — 73721 MRI JNT OF LWR EXTRE W/O DYE: CPT | Mod: TC,PO,LT

## 2020-05-19 ENCOUNTER — TELEPHONE (OUTPATIENT)
Dept: PODIATRY | Facility: CLINIC | Age: 80
End: 2020-05-19

## 2020-05-19 NOTE — TELEPHONE ENCOUNTER
----- Message from Justin Mandujano DPM sent at 5/18/2020 12:04 PM CDT -----  Call patient toe and he can either see me in person are do a telemedicine visit to review his MRI.  He does have a small tear 1 of the tendons and a tear of 1 the ankle ligaments.  We can discuss treatment plan either through telemedicine or person a person visit.

## 2020-05-22 ENCOUNTER — OFFICE VISIT (OUTPATIENT)
Dept: PODIATRY | Facility: CLINIC | Age: 80
End: 2020-05-22
Payer: MEDICARE

## 2020-05-22 VITALS — TEMPERATURE: 97 F | WEIGHT: 242 LBS | HEIGHT: 68 IN | BODY MASS INDEX: 36.68 KG/M2

## 2020-05-22 DIAGNOSIS — M76.72 PERONEAL TENDINITIS OF LEFT LOWER EXTREMITY: Primary | ICD-10-CM

## 2020-05-22 DIAGNOSIS — S86.312D RUPTURE OF PERONEAL TENDON, LEFT, SUBSEQUENT ENCOUNTER: ICD-10-CM

## 2020-05-22 PROCEDURE — 99214 PR OFFICE/OUTPT VISIT, EST, LEVL IV, 30-39 MIN: ICD-10-PCS | Mod: S$PBB,,, | Performed by: PODIATRIST

## 2020-05-22 PROCEDURE — 99214 OFFICE O/P EST MOD 30 MIN: CPT | Mod: S$PBB,,, | Performed by: PODIATRIST

## 2020-05-22 PROCEDURE — 99213 OFFICE O/P EST LOW 20 MIN: CPT | Performed by: PODIATRIST

## 2020-05-22 NOTE — PROGRESS NOTES
[address] [facility] [address]  [place] [address]  Phone: [phone]   [phone]    Patient's PCP:Garland Ocampo MD  Referring Provider: No ref. provider found    Subjective:      Chief Complaint:: Results    HPI  Yeyo Hollingsworth is a 79 y.o. male who presents to clinic to review MRI test results.  Wearing ankle brace, states has been helping a little bit.  Patient rates pain 10/10 on a pain scale at its worse.       Systemic Doctor: Garland Ocampo Md  Date Last Seen: April 2020  Blood Sugar:  115  Hemoglobin A1c: 6.4    Vitals:    05/22/20 1352   Temp: 96.9 °F (36.1 °C)     Shoe Size:     Past Surgical History:   Procedure Laterality Date    ABDOMINAL SURGERY      origunal surg 1986-mult surgeries since    CARPAL TUNNEL RELEASE      right wrist 2009-left wrist 2010    COLON SURGERY      partial colon removal    COLOSTOMY  1986    colostomy reversal  1986    EYE SURGERY      hay fever      HERNIA REPAIR  1949    TONSILLECTOMY  1947    TRANSFORAMINAL EPIDURAL INJECTION OF STEROID      x 4    TRANSFORAMINAL EPIDURAL INJECTION OF STEROID Bilateral 11/13/2019    Procedure: Injection,steroid,epidural,transforaminal approach;  Surgeon: Grant Wright MD;  [facility]  Service: Pain Management;  Laterality: Bilateral;  L4-5, L5-S1     Past Medical History:   Diagnosis Date    Allergy     Anticoagulant long-term use     aspirin    Anxiety     Atrial fibrillation     Back pain     Cataract     Clotting disorder     left leg    Diabetes mellitus     Diabetes mellitus, type 2     Hay fever     Hyperlipidemia     Hypertension     Obese     BRANDI on CPAP      History reviewed. No pertinent family history.     Social History:   Marital Status:   Alcohol History:  reports that he does not drink alcohol.  Tobacco History:  reports that he quit smoking about 14 years ago. His smoking use included cigarettes. He has never used smokeless tobacco.  Drug History:  reports that he does not use  drugs.    Review of patient's allergies indicates:   Allergen Reactions    Ativan [lorazepam] Other (See Comments)     Mood alternating      Dilaudid [hydromorphone (bulk)] Other (See Comments)     Mood alternating      Morphine Other (See Comments)     Mood alternating      Hydromorphone        Current Outpatient Medications   Medication Sig Dispense Refill    apixaban (ELIQUIS) 5 mg Tab Take 1 tablet (5 mg total) by mouth 2 (two) times daily. 180 tablet 3    aspirin (ECOTRIN) 81 MG EC tablet Take 1 tablet (81 mg total) by mouth once daily. 90 tablet 3    atorvastatin (LIPITOR) 10 MG tablet nightly 90 tablet 3    blood sugar diagnostic (BLOOD GLUCOSE TEST) Strp FREESTYLE LITE BG TEST STRIPS. current use of insulin  - Primary ICD-10-CM: E11.9 200 strip 3    blood sugar diagnostic Strp 200 strips by Misc.(Non-Drug; Combo Route) route 2 (two) times daily. 100 strip 3    diazePAM (VALIUM) 5 MG tablet Take 1 tablet (5 mg total) by mouth daily as needed for Anxiety. 30 tablet 5    empagliflozin (JARDIANCE) 10 mg Tab Take 10 mg by mouth once daily. 90 tablet 2    fexofenadine (ALLEGRA) 180 MG tablet Take 1 tablet (180 mg total) by mouth once daily. 90 tablet 3    fluticasone (FLONASE) 50 mcg/actuation nasal spray 2 sprays (100 mcg total) by Each Nare route once daily. 3 Bottle 3    FREESTYLE LANCETS 28 gauge lancets Inject 1 lancet into the skin 3 (three) times daily. 100 each 3    furosemide (LASIX) 20 MG tablet Take 1 tablet (20 mg total) by mouth every Mon, Wed, Fri. 45 tablet 3    gabapentin (NEURONTIN) 300 MG capsule Take 1 capsule (300 mg total) by mouth 3 (three) times daily. 270 capsule 1    HYDROcodone-acetaminophen (NORCO) 7.5-325 mg per tablet Take 1 tablet by mouth every 12 (twelve) hours as needed for Pain. 60 tablet 0    ipratropium (ATROVENT) 0.03 % nasal spray 2 sprays by Nasal route 2 (two) times daily. 30 mL 5    lisinopriL (PRINIVIL,ZESTRIL) 20 MG tablet Take 1 tablet (20 mg total) by  mouth once daily. 90 tablet 3    methocarbamol (ROBAXIN) 500 MG Tab Take 1 tablet (500 mg total) by mouth 3 (three) times daily. 270 tablet 1    metoprolol tartrate (LOPRESSOR) 25 MG tablet Take 1 tablet (25 mg total) by mouth 2 (two) times daily. 180 tablet 3    montelukast (SINGULAIR) 10 mg tablet Take 1 tablet (10 mg total) by mouth every evening. 90 tablet 3    multivit-iron-min-folic acid (MULTIVITAMIN-IRON-MINERALS-FOLIC ACID) 3,500-18-0.4 unit-mg-mg Chew Take by mouth.        mupirocin (BACTROBAN) 2 % ointment Apply topically 2 (two) times daily. 22 g 1    naftifine (NAFTIN) 2 % Crea NAFTIN, 2% (External Cream)   as needed (2 %)  Active   Comments: Medication taken as needed.       polyethylene glycol (GLYCOLAX) 17 gram/dose powder Take 17 g by mouth once daily. 6 Bottle 3    potassium chloride (MICRO-K) 10 MEQ CpSR Every other day 45 capsule 3    SITagliptin (JANUVIA) 100 MG Tab Take 1 tablet (100 mg total) by mouth once daily. 90 tablet 3    traMADoL (ULTRAM) 50 mg tablet Take 1 tablet (50 mg total) by mouth every 12 (twelve) hours as needed for Pain. 60 tablet 0    traZODone (DESYREL) 100 MG tablet Take 1 tablet (100 mg total) by mouth every evening. 90 tablet 3    triamcinolone acetonide 0.1% (KENALOG) 0.1 % ointment Apply topically 2 (two) times daily. 80 g 3     No current facility-administered medications for this visit.        Review of Systems      Objective:        Physical Exam:   Foot Exam    General  General Appearance: appears stated age and healthy   Orientation: alert and oriented to person, place, and time   Affect: appropriate   Gait: antalgic   Assistance: (Ankle brace on left ankle)      Left Foot/Ankle      Inspection and Palpation  Ecchymosis: none  Tenderness: (Anterior talofibular ligament and peroneus brevis tendon proximal lateral ankle to distal lateral ankle)  Swelling: (Lateral ankle by anterior will talofibular ligament and peroneal tendons)  Arch: normal  Skin Exam:  abnormal color (Venous stasis dermatitis of the leg and foot);     Neurovascular  Dorsalis pedis: 1+  Posterior tibial: 2+  Saphenous nerve sensation: normal  Tibial nerve sensation: normal  Superficial peroneal nerve sensation: normal  Deep peroneal nerve sensation: normal  Sural nerve sensation: normal    Muscle Strength  Ankle dorsiflexion: 5  Ankle plantar flexion: 5  Ankle inversion: 5  Ankle eversion: 5  Great toe extension: 5  Great toe flexion: 5    Range of Motion    Passive  Ankle eversion: pain  Passive eversion: Pain peroneus brevis tendon.  Ankle inversion: pain  Passive inversion: Pain peroneus brevis tendon anterior talofibular ligament.    Active  Ankle eversion: pain  Left ankle active eversion: Pain peroneus brevis tendon.  Ankle inversion: pain  Left ankle active inversion: Pain peroneus brevis tendon anterior talofibular ligament.    Tests  Anterior drawer: positive   Varus Tilt: positive   Syndesmosis squeeze test: negative   Talar tilt: positive           Physical Exam   Cardiovascular:   Pulses:       Dorsalis pedis pulses are 1+ on the left side.        Posterior tibial pulses are 2+ on the left side.   Musculoskeletal:        Feet:        Imaging:   MRI Ankle Without Contrast Left  MRI of the left ankle without contrast    HISTORY: Peroneal tendon tear, chronic ankle pain.    Multiplanar noncontrast imaging is performed.    There is no evidence of fracture or pathologic marrow replacement.  There is no significant marrow edema. Small amounts of joint fluid  involving the ankle and posterior subtalar joint.    There are diffuse changes of subcutaneous edema within the lower leg  and hindfoot without localized fluid collection. Intramuscular edema  is observed within the visualized portion of the flexor hallucis  longus, peroneal muscles and distal portion of the  soleus/gastrocnemius complex.    There is abnormal signal within the peroneus brevis tendon just beyond  the ankle joint compatible  with at least type II partial thickness  tearing. The remaining ankle tendons appear intact. There is an  attenuated appearance of the anterior talofibular ligament which may  relate to previous injury. Spurring is seen along the calcaneal  tuberosity at the origin of the plantar fascia.    IMPRESSION:    Intramuscular edema involving the distal musculature of the lower leg  could relate to muscle strain or myositis is nonspecific. There is  also scattered subcutaneous edema, most notably in the lower leg.    Partial-thickness tearing of the peroneus brevis tendon.    Small amounts of joint fluid.    Calcaneal spur formation.    Attenuated appearance of the anterior talofibular ligament which may  reflect the sequela of remote injury.    Electronically Signed by Jesusita Lewis M.D. on 5/18/2020 11:47 AM           Assessment:       1. Peroneal tendinitis of left lower extremity    2. Rupture of peroneal tendon, left, subsequent encounter    3. Grade 2 ankle sprain, left, subsequent encounter      Plan:   Peroneal tendinitis of left lower extremity    Rupture of peroneal tendon, left, subsequent encounter    Grade 2 ankle sprain, left, subsequent encounter     1.  Had a long discussion with the patient about his MRI findings.  I recommended he consider doing nothing living with ankle brace if he can tolerate the pain.  He states that the pain is become worse he does not want to use the ankle brace if he can have surgery done that may help correct this problem.  2.Patient was educated about the entire pre, rupal and post-operative time period.  They  were educated about the pro's and con's of surgery.  All conservative measures have been exhausted. Patient understands the particular risks involved which may occur in connection with the procedure proposed including pain, swelling, infection, stiffness, decreased ROM, recurrence, rejection, numbness, delayed healing, scar formation.    Patient was educated that their  diagnosis may be surgically treated.  The patient's problem will probably advance and usually will not get better without surgery.  All of the pre-operative treatment plans have been exhausted or will no longer be successful at this point in time.  Patient was told of the possible outcomes and expectations of the surgical procedure.  They  will need to be followed-up post-operatively.  Today, pictures were drawn, questions answered.      We discussed the following surgical procedures:  Repair of longitudinal tear peroneus brevis tendon left ankle and repair of anterior talofibular ligament left.  Patient will get clearance from Cardiology stop his anticoagulation medicine 2-3 days before surgery.  He will arrange date with my registered nurse.    No follow-ups on file.    Procedures - None    Counseling:     I provided patient education verbally regarding:   Patient diagnosis, treatment options, as well as alternatives, risks, and benefits.     This note was created using Dragon voice recognition software that occasionally misinterpreted phrases or words.

## 2020-05-27 DIAGNOSIS — I48.20 CHRONIC ATRIAL FIBRILLATION: ICD-10-CM

## 2020-05-27 RX ORDER — ASPIRIN 81 MG/1
81 TABLET ORAL DAILY
Qty: 90 TABLET | Refills: 3 | Status: SHIPPED | OUTPATIENT
Start: 2020-05-27 | End: 2020-07-02 | Stop reason: SDUPTHER

## 2020-05-28 DIAGNOSIS — S86.312D RUPTURE OF PERONEAL TENDON, LEFT, SUBSEQUENT ENCOUNTER: ICD-10-CM

## 2020-05-28 DIAGNOSIS — M76.72 PERONEAL TENDINITIS OF LEFT LOWER EXTREMITY: Primary | ICD-10-CM

## 2020-06-11 DIAGNOSIS — I48.0 PAROXYSMAL ATRIAL FIBRILLATION: Primary | ICD-10-CM

## 2020-06-11 DIAGNOSIS — Z01.818 OTHER SPECIFIED PRE-OPERATIVE EXAMINATION: ICD-10-CM

## 2020-06-11 DIAGNOSIS — I50.9 HEART FAILURE, UNSPECIFIED: ICD-10-CM

## 2020-06-16 ENCOUNTER — TELEPHONE (OUTPATIENT)
Dept: CARDIOLOGY | Facility: HOSPITAL | Age: 80
End: 2020-06-16

## 2020-06-17 ENCOUNTER — HOSPITAL ENCOUNTER (OUTPATIENT)
Dept: RADIOLOGY | Facility: HOSPITAL | Age: 80
Discharge: HOME | End: 2020-06-17
Attending: INTERNAL MEDICINE
Payer: MEDICARE

## 2020-06-17 ENCOUNTER — CLINICAL SUPPORT (OUTPATIENT)
Dept: CARDIOLOGY | Facility: HOSPITAL | Age: 80
End: 2020-06-17
Attending: INTERNAL MEDICINE
Payer: MEDICARE

## 2020-06-17 DIAGNOSIS — I50.9 HEART FAILURE, UNSPECIFIED: ICD-10-CM

## 2020-06-17 DIAGNOSIS — I48.0 PAROXYSMAL ATRIAL FIBRILLATION: ICD-10-CM

## 2020-06-17 DIAGNOSIS — Z01.818 OTHER SPECIFIED PRE-OPERATIVE EXAMINATION: ICD-10-CM

## 2020-06-17 LAB
CV PHARM DOSE: 0.4 MG
CV STRESS BASE HR: 53 BPM
DIASTOLIC BLOOD PRESSURE: 71 MMHG
OHS CV CPX 85 PERCENT MAX PREDICTED HEART RATE MALE: 121
OHS CV CPX MAX PREDICTED HEART RATE: 142
OHS CV CPX PATIENT IS FEMALE: 0
OHS CV CPX PATIENT IS MALE: 1
OHS CV CPX PEAK DIASTOLIC BLOOD PRESSURE: 59 MMHG
OHS CV CPX PEAK HEAR RATE: 73 BPM
OHS CV CPX PEAK RATE PRESSURE PRODUCT: 9490
OHS CV CPX PEAK SYSTOLIC BLOOD PRESSURE: 130 MMHG
OHS CV CPX PERCENT MAX PREDICTED HEART RATE ACHIEVED: 51
OHS CV CPX RATE PRESSURE PRODUCT PRESENTING: 6890
STRESS ECHO TARGET HR: 121 BPM
SYSTOLIC BLOOD PRESSURE: 130 MMHG

## 2020-06-17 PROCEDURE — 93017 CV STRESS TEST TRACING ONLY: CPT

## 2020-06-17 PROCEDURE — 63600175 PHARM REV CODE 636 W HCPCS: Performed by: INTERNAL MEDICINE

## 2020-06-17 PROCEDURE — A9502 TC99M TETROFOSMIN: HCPCS

## 2020-06-17 RX ORDER — REGADENOSON 0.08 MG/ML
0.4 INJECTION, SOLUTION INTRAVENOUS ONCE
Status: COMPLETED | OUTPATIENT
Start: 2020-06-17 | End: 2020-06-17

## 2020-06-17 RX ADMIN — REGADENOSON 0.4 MG: 0.08 INJECTION, SOLUTION INTRAVENOUS at 09:06

## 2020-06-22 ENCOUNTER — TELEPHONE (OUTPATIENT)
Dept: PODIATRY | Facility: CLINIC | Age: 80
End: 2020-06-22

## 2020-06-22 ENCOUNTER — HOSPITAL ENCOUNTER (OUTPATIENT)
Dept: RADIOLOGY | Facility: HOSPITAL | Age: 80
Discharge: HOME | End: 2020-06-22
Attending: PODIATRIST
Payer: MEDICARE

## 2020-06-22 ENCOUNTER — HOSPITAL ENCOUNTER (OUTPATIENT)
Dept: PREADMISSION TESTING | Facility: HOSPITAL | Age: 80
Discharge: HOME | End: 2020-06-22
Attending: PODIATRIST
Payer: MEDICARE

## 2020-06-22 VITALS
DIASTOLIC BLOOD PRESSURE: 74 MMHG | HEIGHT: 68 IN | BODY MASS INDEX: 36.42 KG/M2 | HEART RATE: 54 BPM | SYSTOLIC BLOOD PRESSURE: 128 MMHG | OXYGEN SATURATION: 96 % | RESPIRATION RATE: 18 BRPM | WEIGHT: 240.31 LBS | TEMPERATURE: 98 F

## 2020-06-22 DIAGNOSIS — S86.312D RUPTURE OF PERONEAL TENDON, LEFT, SUBSEQUENT ENCOUNTER: ICD-10-CM

## 2020-06-22 DIAGNOSIS — M76.72 PERONEAL TENDINITIS OF LEFT LOWER EXTREMITY: ICD-10-CM

## 2020-06-22 PROCEDURE — 93005 ELECTROCARDIOGRAM TRACING: CPT | Performed by: SPECIALIST

## 2020-06-22 PROCEDURE — U0003 INFECTIOUS AGENT DETECTION BY NUCLEIC ACID (DNA OR RNA); SEVERE ACUTE RESPIRATORY SYNDROME CORONAVIRUS 2 (SARS-COV-2) (CORONAVIRUS DISEASE [COVID-19]), AMPLIFIED PROBE TECHNIQUE, MAKING USE OF HIGH THROUGHPUT TECHNOLOGIES AS DESCRIBED BY CMS-2020-01-R: HCPCS

## 2020-06-22 PROCEDURE — 71046 X-RAY EXAM CHEST 2 VIEWS: CPT | Mod: TC

## 2020-06-22 NOTE — DISCHARGE INSTRUCTIONS
To confirm, Your doctor has instructed you that surgery is scheduled for: June 24    Pre-Op will call the afternoon prior to surgery between 4:00 and 6:00 PM with the final arrival time.      Enter at [facility] [address] and come through front entrance.  You will be screened/ have temperature checked.    You may have 1 visitor who will also be screened.     Check in at registration.   After registration, proceed past gift shop and through glass door ( [facility]) Check in at the nurses station to the left.   Do not eat or drink anything after midnight the night before your surgery - THIS INCLUDES  WATER, GUM, MINTS AND CANDY.  YOU MAY BRUSH YOUR TEETH BUT DO NOT SWALLOW     TAKE ONLY THESE MEDICATIONS WITH A SMALL SIP OF WATER THE MORNING OF YOUR PROCEDURE: Lopressor   Gabapentin   Do not take Lisinopril morning of surgery   BRING CPAP morning of surgery   Eliquis has been stopped .   ONLY if you are diabetic, check your sugar in the morning before your procedure.       Do not take any diabetic medicines or insulin the morning of surgery .     PLEASE NOTE:  The surgery schedule has many variables which may affect the time of your surgery case.  Family members should be available if your surgery time changes.  Plan to be here the day of your procedure between 4-6 hours.      ONLY if you are prescribed any types of blood thinners such as:  Aspirin, Coumadin, Plavix, Pradaxa, Xarelto, Aggrenox, Effient, Eliquis, Savasya, Brilinta, or any other, ask your surgeon whether you should stop taking them and how long before surgery you should stop.  You may also need to verify with the prescribing physician if it is ok to stop your medication.                                                        IMPORTANT INSTRUCTIONS      · Do not smoke, vape or drink alcoholic beverages 24 hours prior to your procedure.  · Shower the night before AND the morning of your procedure with a Chlorhexidine wash such as Hibiclens or Dial  antibacterial soap from the neck down.   ·  Do not get it on your face or in your eyes.  You may use your own shampoo and face wash. This helps your skin to be as bacteria free as possible.   ·  DO NOT remove hair from the surgery site.  Do not shave the incision site unless you are given specific instructions to do so.    · Sleep in a bed with clean sheets.  Do not sleep with a pet in the bed.   · If you wear contact lenses, dentures, hearing aids or glasses, bring a container to put them in during surgery and give to a family member for safe keeping.    · Please leave all jewelry, piercing's and valuables at home.   · Wear rubber soled shoes (no flip flops).  · ONLY if you have been diagnosed with sleep apnea please bring your C-PAP machine.  · ONLY if you wear home oxygen please bring your portable oxygen tank the day of your procedure.   · ONLY for patients requiring bowel prep, written instructions will be given by your doctor's office.  · ONLY if you have a neuro stimulator, please bring the controller with you the morning of surgery  · ONLY if a type and screen test is needed before surgery, please return:  · If your doctor has scheduled you for an overnight stay, bring a small overnight bag with any personal items you need.    · Make arrangements in advance for transportation home by a responsible adult.      · You must make arrangements for transportation, TAXI'S, UBER'S OR LYFTS ARE NOT ALLOWED.        If you have any questions about these instructions, call (Monday - Friday) Pre-Op Admit  Nursing  at [phone] or the Pre-Op Day Surgery Unit at [phone].

## 2020-06-23 LAB — SARS-COV-2 RNA RESP QL NAA+PROBE: NOT DETECTED

## 2020-06-24 ENCOUNTER — ANESTHESIA (OUTPATIENT)
Dept: SURGERY | Facility: HOSPITAL | Age: 80
End: 2020-06-24
Payer: OTHER GOVERNMENT

## 2020-06-24 ENCOUNTER — HOSPITAL ENCOUNTER (OUTPATIENT)
Facility: HOSPITAL | Age: 80
Discharge: HOME | End: 2020-06-24
Attending: PODIATRIST | Admitting: PODIATRIST
Payer: MEDICARE

## 2020-06-24 ENCOUNTER — ANESTHESIA EVENT (OUTPATIENT)
Dept: SURGERY | Facility: HOSPITAL | Age: 80
End: 2020-06-24
Payer: MEDICARE

## 2020-06-24 VITALS
TEMPERATURE: 97 F | BODY MASS INDEX: 36.37 KG/M2 | OXYGEN SATURATION: 96 % | DIASTOLIC BLOOD PRESSURE: 64 MMHG | SYSTOLIC BLOOD PRESSURE: 110 MMHG | WEIGHT: 240 LBS | HEART RATE: 57 BPM | RESPIRATION RATE: 16 BRPM | HEIGHT: 68 IN

## 2020-06-24 DIAGNOSIS — S86.312D RUPTURE OF PERONEAL TENDON, LEFT, SUBSEQUENT ENCOUNTER: Primary | ICD-10-CM

## 2020-06-24 DIAGNOSIS — M76.72 PERONEAL TENDINITIS OF LEFT LOWER EXTREMITY: ICD-10-CM

## 2020-06-24 LAB
GLUCOSE SERPL-MCNC: 111 MG/DL (ref 70–110)
GLUCOSE SERPL-MCNC: 124 MG/DL (ref 70–110)

## 2020-06-24 PROCEDURE — 27675 REPAIR LOWER LEG TENDONS: CPT | Mod: 51,LT,GC, | Performed by: PODIATRIST

## 2020-06-24 PROCEDURE — C9290 INJ, BUPIVACAINE LIPOSOME: HCPCS | Performed by: PODIATRIST

## 2020-06-24 PROCEDURE — 25000003 PHARM REV CODE 250: Performed by: ANESTHESIOLOGY

## 2020-06-24 PROCEDURE — 36000709 HC OR TIME LEV III EA ADD 15 MIN: Performed by: PODIATRIST

## 2020-06-24 PROCEDURE — 25000003 PHARM REV CODE 250: Performed by: NURSE ANESTHETIST, CERTIFIED REGISTERED

## 2020-06-24 PROCEDURE — 27202105 HC BIS BILATERAL SENSOR: Performed by: ANESTHESIOLOGY

## 2020-06-24 PROCEDURE — 37000009 HC ANESTHESIA EA ADD 15 MINS: Performed by: PODIATRIST

## 2020-06-24 PROCEDURE — S0020 INJECTION, BUPIVICAINE HYDRO: HCPCS | Performed by: PODIATRIST

## 2020-06-24 PROCEDURE — 63600175 PHARM REV CODE 636 W HCPCS: Performed by: NURSE ANESTHETIST, CERTIFIED REGISTERED

## 2020-06-24 PROCEDURE — 71000015 HC POSTOP RECOV 1ST HR: Performed by: PODIATRIST

## 2020-06-24 PROCEDURE — 27675 PR REPAIR PERONEAL TENDONS: ICD-10-PCS | Mod: 51,LT,GC, | Performed by: PODIATRIST

## 2020-06-24 PROCEDURE — 63600175 PHARM REV CODE 636 W HCPCS: Performed by: PODIATRIST

## 2020-06-24 PROCEDURE — 27201423 OPTIME MED/SURG SUP & DEVICES STERILE SUPPLY: Performed by: PODIATRIST

## 2020-06-24 PROCEDURE — 37000008 HC ANESTHESIA 1ST 15 MINUTES: Performed by: PODIATRIST

## 2020-06-24 PROCEDURE — 27000673 HC TUBING BLOOD Y: Performed by: ANESTHESIOLOGY

## 2020-06-24 PROCEDURE — 01470 ANES PX NRV MSC LW L/A/F NOS: CPT | Performed by: PODIATRIST

## 2020-06-24 PROCEDURE — 25000003 PHARM REV CODE 250: Performed by: PODIATRIST

## 2020-06-24 PROCEDURE — 27000080 OPTIME MED/SURG SUP & DEVICES GENERAL CLASSIFICATION: Performed by: PODIATRIST

## 2020-06-24 PROCEDURE — 27698 REPAIR OF ANKLE LIGAMENT: CPT | Mod: LT,GC,, | Performed by: PODIATRIST

## 2020-06-24 PROCEDURE — 36000708 HC OR TIME LEV III 1ST 15 MIN: Performed by: PODIATRIST

## 2020-06-24 PROCEDURE — 27201107 HC STYLET, STANDARD: Performed by: ANESTHESIOLOGY

## 2020-06-24 PROCEDURE — 27000653 HC CATH, IV CATHLN: Performed by: ANESTHESIOLOGY

## 2020-06-24 PROCEDURE — 71000033 HC RECOVERY, INTIAL HOUR: Performed by: PODIATRIST

## 2020-06-24 PROCEDURE — 27698 PR REPAIR COLLAT ANKLE LIGMNT,SECONDARY: ICD-10-PCS | Mod: LT,GC,, | Performed by: PODIATRIST

## 2020-06-24 PROCEDURE — 82962 GLUCOSE BLOOD TEST: CPT | Performed by: PODIATRIST

## 2020-06-24 PROCEDURE — C1713 ANCHOR/SCREW BN/BN,TIS/BN: HCPCS | Performed by: PODIATRIST

## 2020-06-24 PROCEDURE — 27000671 HC TUBING MICROBORE EXT: Performed by: ANESTHESIOLOGY

## 2020-06-24 DEVICE — IMPLANTABLE DEVICE: Type: IMPLANTABLE DEVICE | Site: ANKLE | Status: FUNCTIONAL

## 2020-06-24 RX ORDER — EPHEDRINE SULFATE 50 MG/ML
INJECTION, SOLUTION INTRAVENOUS
Status: DISCONTINUED | OUTPATIENT
Start: 2020-06-24 | End: 2020-06-24

## 2020-06-24 RX ORDER — ONDANSETRON 2 MG/ML
INJECTION INTRAMUSCULAR; INTRAVENOUS
Status: DISCONTINUED | OUTPATIENT
Start: 2020-06-24 | End: 2020-06-24

## 2020-06-24 RX ORDER — DIPHENHYDRAMINE HYDROCHLORIDE 50 MG/ML
12.5 INJECTION INTRAMUSCULAR; INTRAVENOUS
Status: DISCONTINUED | OUTPATIENT
Start: 2020-06-24 | End: 2020-06-24 | Stop reason: HOSPADM

## 2020-06-24 RX ORDER — CELECOXIB 100 MG/1
100 CAPSULE ORAL ONCE
Status: COMPLETED | OUTPATIENT
Start: 2020-06-24 | End: 2020-06-24

## 2020-06-24 RX ORDER — CEFAZOLIN SODIUM 1 G/3ML
INJECTION, POWDER, FOR SOLUTION INTRAMUSCULAR; INTRAVENOUS
Status: DISCONTINUED | OUTPATIENT
Start: 2020-06-24 | End: 2020-06-24

## 2020-06-24 RX ORDER — GABAPENTIN 300 MG/1
300 CAPSULE ORAL ONCE
Status: COMPLETED | OUTPATIENT
Start: 2020-06-24 | End: 2020-06-24

## 2020-06-24 RX ORDER — NEOSTIGMINE METHYLSULFATE 1 MG/ML
INJECTION, SOLUTION INTRAVENOUS
Status: DISCONTINUED | OUTPATIENT
Start: 2020-06-24 | End: 2020-06-24

## 2020-06-24 RX ORDER — ONDANSETRON 2 MG/ML
4 INJECTION INTRAMUSCULAR; INTRAVENOUS DAILY PRN
Status: DISCONTINUED | OUTPATIENT
Start: 2020-06-24 | End: 2020-06-24 | Stop reason: HOSPADM

## 2020-06-24 RX ORDER — CEPHALEXIN 500 MG/1
500 CAPSULE ORAL EVERY 12 HOURS
Qty: 14 CAPSULE | Refills: 0
Start: 2020-06-24 | End: 2020-07-21

## 2020-06-24 RX ORDER — FENTANYL CITRATE 50 UG/ML
25 INJECTION, SOLUTION INTRAMUSCULAR; INTRAVENOUS
Status: DISCONTINUED | OUTPATIENT
Start: 2020-06-24 | End: 2020-06-24 | Stop reason: HOSPADM

## 2020-06-24 RX ORDER — ROCURONIUM BROMIDE 10 MG/ML
INJECTION, SOLUTION INTRAVENOUS
Status: DISCONTINUED | OUTPATIENT
Start: 2020-06-24 | End: 2020-06-24

## 2020-06-24 RX ORDER — PROPOFOL 10 MG/ML
VIAL (ML) INTRAVENOUS
Status: DISCONTINUED | OUTPATIENT
Start: 2020-06-24 | End: 2020-06-24

## 2020-06-24 RX ORDER — HYDROCODONE BITARTRATE AND ACETAMINOPHEN 7.5; 325 MG/1; MG/1
1-2 TABLET ORAL EVERY 4 HOURS PRN
Qty: 28 TABLET | Refills: 0
Start: 2020-06-24 | End: 2020-06-29 | Stop reason: SDUPTHER

## 2020-06-24 RX ORDER — SODIUM CHLORIDE, SODIUM LACTATE, POTASSIUM CHLORIDE, CALCIUM CHLORIDE 600; 310; 30; 20 MG/100ML; MG/100ML; MG/100ML; MG/100ML
INJECTION, SOLUTION INTRAVENOUS CONTINUOUS PRN
Status: DISCONTINUED | OUTPATIENT
Start: 2020-06-24 | End: 2020-06-24

## 2020-06-24 RX ORDER — GLYCOPYRROLATE 0.2 MG/ML
INJECTION INTRAMUSCULAR; INTRAVENOUS
Status: DISCONTINUED | OUTPATIENT
Start: 2020-06-24 | End: 2020-06-24

## 2020-06-24 RX ORDER — BUPIVACAINE HYDROCHLORIDE 5 MG/ML
INJECTION, SOLUTION EPIDURAL; INTRACAUDAL
Status: DISCONTINUED | OUTPATIENT
Start: 2020-06-24 | End: 2020-06-24 | Stop reason: HOSPADM

## 2020-06-24 RX ORDER — LIDOCAINE HCL/PF 100 MG/5ML
SYRINGE (ML) INTRAVENOUS
Status: DISCONTINUED | OUTPATIENT
Start: 2020-06-24 | End: 2020-06-24

## 2020-06-24 RX ORDER — SODIUM CHLORIDE 0.9 % (FLUSH) 0.9 %
10 SYRINGE (ML) INJECTION
Status: DISCONTINUED | OUTPATIENT
Start: 2020-06-24 | End: 2020-06-24 | Stop reason: HOSPADM

## 2020-06-24 RX ORDER — FENTANYL CITRATE 50 UG/ML
INJECTION, SOLUTION INTRAMUSCULAR; INTRAVENOUS
Status: DISCONTINUED | OUTPATIENT
Start: 2020-06-24 | End: 2020-06-24

## 2020-06-24 RX ORDER — SUCCINYLCHOLINE CHLORIDE 20 MG/ML
INJECTION INTRAMUSCULAR; INTRAVENOUS
Status: DISCONTINUED | OUTPATIENT
Start: 2020-06-24 | End: 2020-06-24

## 2020-06-24 RX ORDER — ACETAMINOPHEN 10 MG/ML
INJECTION, SOLUTION INTRAVENOUS
Status: DISCONTINUED | OUTPATIENT
Start: 2020-06-24 | End: 2020-06-24

## 2020-06-24 RX ADMIN — SUCCINYLCHOLINE CHLORIDE 160 MG: 20 INJECTION, SOLUTION INTRAMUSCULAR; INTRAVENOUS at 07:06

## 2020-06-24 RX ADMIN — PROPOFOL 50 MG: 10 INJECTION, EMULSION INTRAVENOUS at 08:06

## 2020-06-24 RX ADMIN — GLYCOPYRROLATE 0.4 MG: 0.2 INJECTION INTRAMUSCULAR; INTRAVENOUS at 09:06

## 2020-06-24 RX ADMIN — CELECOXIB 100 MG: 100 CAPSULE ORAL at 06:06

## 2020-06-24 RX ADMIN — PROPOFOL 150 MG: 10 INJECTION, EMULSION INTRAVENOUS at 07:06

## 2020-06-24 RX ADMIN — LIDOCAINE HYDROCHLORIDE 75 MG: 20 INJECTION, SOLUTION INTRAVENOUS at 07:06

## 2020-06-24 RX ADMIN — ONDANSETRON 4 MG: 2 INJECTION INTRAMUSCULAR; INTRAVENOUS at 07:06

## 2020-06-24 RX ADMIN — SODIUM CHLORIDE, SODIUM LACTATE, POTASSIUM CHLORIDE, AND CALCIUM CHLORIDE: .6; .31; .03; .02 INJECTION, SOLUTION INTRAVENOUS at 06:06

## 2020-06-24 RX ADMIN — SODIUM CHLORIDE, SODIUM LACTATE, POTASSIUM CHLORIDE, AND CALCIUM CHLORIDE: .6; .31; .03; .02 INJECTION, SOLUTION INTRAVENOUS at 08:06

## 2020-06-24 RX ADMIN — ACETAMINOPHEN 1000 MG: 10 INJECTION, SOLUTION INTRAVENOUS at 07:06

## 2020-06-24 RX ADMIN — EPHEDRINE SULFATE 5 MG: 50 INJECTION, SOLUTION INTRAVENOUS at 08:06

## 2020-06-24 RX ADMIN — EPHEDRINE SULFATE 10 MG: 50 INJECTION, SOLUTION INTRAVENOUS at 07:06

## 2020-06-24 RX ADMIN — ROCURONIUM BROMIDE 40 MG: 10 INJECTION, SOLUTION INTRAVENOUS at 07:06

## 2020-06-24 RX ADMIN — FENTANYL CITRATE 50 MCG: 50 INJECTION INTRAMUSCULAR; INTRAVENOUS at 07:06

## 2020-06-24 RX ADMIN — GABAPENTIN 300 MG: 300 CAPSULE ORAL at 06:06

## 2020-06-24 RX ADMIN — CEFAZOLIN 2 G: 330 INJECTION, POWDER, FOR SOLUTION INTRAMUSCULAR; INTRAVENOUS at 07:06

## 2020-06-24 RX ADMIN — NEOSTIGMINE METHYLSULFATE 3 MG: 1 INJECTION INTRAVENOUS at 09:06

## 2020-06-24 NOTE — TRANSFER OF CARE
"Anesthesia Transfer of Care Note    Patient: Yeyo Hollingsworth    Procedure(s) Performed: Procedure(s) (LRB):  REPAIR, TENDON, LOWER EXTREMITY (Left)    Patient location: PACU    Anesthesia Type: general    Transport from OR: Transported from OR on room air with adequate spontaneous ventilation    Post pain: adequate analgesia    Post assessment: no apparent anesthetic complications    Post vital signs: stable    Level of consciousness: awake and alert    Nausea/Vomiting: no nausea/vomiting    Complications: none    Transfer of care protocol was followed      Last vitals:   Visit Vitals  BP (!) 145/78 (BP [place] Right arm, Patient Position: Lying)   Pulse 67   Temp 36.4 °C (97.6 °F) (Oral)   Resp 17   Ht 5' 8" (1.727 m)   Wt 108.9 kg (240 lb)   SpO2 97%   BMI 36.49 kg/m²     "

## 2020-06-24 NOTE — DISCHARGE INSTRUCTIONS
Protecting Your Foot After Foot Surgery    To help the bone heal properly, you may need to wear a cast. If you do, always keep it dry. Your healthcare provider will tell you whether you can bear weight on your foot while it heals. He or she may also prescribe a surgical shoe for you to wear.  Casts  A cast is sometimes needed after foot surgery to help the bone heal right. When you wear a cast, your foot stays in place during the healing process. Some casts are weight-bearing, while others are not. Your affected bone is strong enough for weight-bearing in about six weeks. But the bone takes about six months to regain normal strength.  Weight-bearing  Bearing weight and walking can improve blood flow and promote healing. But dont overuse your foot. If you do, you may have a harder time healing after surgery. So, be sure to follow your healthcare provider's instructions.  Walking aids  Your healthcare provider may also tell you to use a cane, crutches, or a walker. That way, you can keep all or part of your weight off your foot. These devices also give you support as you walk. You will be shown how to use walking aids properly.  Surgical shoes  A surgical shoe can protect the foot as it heals. Your healthcare provider will tell you when you can start wearing your own shoe again.  Date Last Reviewed: 10/15/2015  © 9956-4253 [employer]. [address] [place] [id number]. All rights reserved. This information is not intended as a substitute for professional medical care. Always follow your healthcare professional's instructions.        Anesthesia: Before You Receive Anesthesia  You are scheduled for surgery. Youll receive medicine called anesthesia to keep you from feeling pain during the surgery. This sheet explains steps you may need to take to prepare for anesthesia.    Tests  Your healthcare provider may send you to have certain tests before your procedure. These may include:  · Blood  tests. These help show how anesthesia may affect you.  · Electrocardiography (ECG or EKG). This helps show how your heart is working.  · Chest X-ray. This image helps show the health of your heart and lungs.  Medicines  In the weeks before your surgery:  · Tell your healthcare provider and anesthesia provider what medicines you take. This includes aspirin, other over-the-counter medicines, herbs, and vitamins. Be sure to mention if you take illegal drugs. (This will be kept confidential.) Giving this information helps to keep you safe.  · You may be told to change certain medicines you take. Or you may be told to stop taking medicines for a certain amount of time.  · Mention how much alcohol you drink and if you smoke. Also mention whether youre allergic to any medicines.  Other preparations  · Follow any directions you are given for not eating or drinking before surgery.  · If you dont talk to your anesthesia provider before surgery, you will meet the day of the procedure. He or she will explain your anesthesia and answer your questions.  · Arrange for an adult family member or friend to drive you home after the surgery.  Be sure to follow all your healthcare providers instructions. If you dont, your procedure may have to be rescheduled.   Date Last Reviewed: 12/1/2016  © 5689-8151 [employer]. [address] [place] [address], [place] [address]. All rights reserved. This information is not intended as a substitute for professional medical care. Always follow your healthcare professional's instructions.        Keep extremity elevated  Ice to surgical site  Keep dressing clean and dry

## 2020-06-24 NOTE — ANESTHESIA PREPROCEDURE EVALUATION
06/24/2020  Yeyo Hollingsworth is a 79 y.o., male.      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 obesity)    Urethritis    Hematuria    Acute bronchitis with asthma    Encounter for comprehensive diabetic foot examination, type 2 diabetes mellitus    HTN (hypertension)    Slowing of urinary stream     Hyperlipemia    Diabetes mellitus type 2, controlled    BPH (benign prostatic hyperplasia)    Constipation, chronic    Back pain    BMI 38.0-38.9,adult    Screening for prostate cancer    Obesity    Essential hypertension    Plantar fasciitis    Foot pain, left    Equinus deformity of foot    Paroxysmal atrial fibrillation- 4/2016 - transient ER visit elpidiothivaldemar- on eliquis    Chronic sinus complaints    Spinal stenosis of lumbar region-dr miladis CLIFTON (degenerative disc disease), lumbar    Chronic nonseasonal allergic rhinitis due to fungal spores    Anticoagulant long-term use    Lumbar radiculitis    Rib pain on right side    Anterolisthesis-lumbar L4/l5    Acute pain of left shoulder greater than right     Pain in both knees    History of surgery- left great toe    Positive colorectal cancer screening using Cologuard test    Lumbar radiculopathy    Primary insomnia    Accident due to mechanical fall without injury    Scalp laceration    Old lacunar stroke without late effect    Cervical stenosis of spine    New daily persistent headache    Chronic pain of left ankle    Sclerosing bone dysplasia    Chronic atrial fibrillation    Prerenal azotemia    Fatigue    Tendonitis of ankle, left    Peroneal tendon tear, left, subsequent encounter     Peroneal tendinitis of left lower extremity    Grade 2 ankle sprain, left, subsequent encounter    Rupture of peroneal tendon, left, subsequent encounter     Past Surgical History:   Procedure Laterality Date    ABDOMINAL SURGERY      origunal surg 1986-mult surgeries since    CARPAL TUNNEL RELEASE      right wrist 2009-left wrist 2010    COLON SURGERY      partial colon removal    COLOSTOMY  1986    colostomy reversal  1986    EYE SURGERY      hay fever      HERNIA REPAIR  1949    TOE SURGERY Left 2017    replaced a joint     TONSILLECTOMY  1947    TRANSFORAMINAL EPIDURAL INJECTION OF STEROID      x 4    TRANSFORAMINAL EPIDURAL INJECTION OF STEROID Bilateral 11/13/2019    Procedure: Injection,steroid,epidural,transforaminal approach;  Surgeon: Grant Wright MD;  [place] [facility];  Service: Pain Management;  Laterality: Bilateral;  L4-5, L5-S1        Tobacco Use:  The patient  reports that he quit smoking about 14 years ago. His smoking use included cigarettes. He has never used smokeless tobacco.     Results for orders placed or performed during the hospital encounter of 06/22/20   EKG 12-lead    Collection Time: 06/22/20  8:34 AM    Narrative    Test Reason : S86.312D,S93.402D,M76.72,    Vent. Rate : 052 BPM     Atrial Rate : 052 BPM     P-R Int : 170 ms          QRS Dur : 096 ms      QT Int : 436 ms       P-R-T Axes : 025 003 038 degrees     QTc Int : 405 ms    Sinus bradycardia with sinus arrhythmia  Otherwise normal ECG  When compared with ECG of 11-JUL-2012 13:51,  No significant change was found  Confirmed by Stas HODGE, David PERDOMO ([id number]) on 6/22/2020 9:33:43 AM    Referred By:  ARNAV           Confirmed By:David Mcclelland MD             Lab Results   Component Value Date    WBC 5.72 06/22/2020    HGB 15.6 06/22/2020    HCT 50.0 06/22/2020    MCV 96 06/22/2020     06/22/2020     BMP  Lab Results   Component Value Date     06/22/2020    K 4.4 06/22/2020     06/22/2020    CO2 29  06/22/2020    BUN 28 (H) 06/22/2020    CREATININE 1.3 06/22/2020    CALCIUM 8.9 06/22/2020    ANIONGAP 7 (L) 06/22/2020     (H) 06/22/2020     (H) 04/03/2020     (H) 10/01/2019       No results found for this or any previous visit.          Anesthesia Evaluation    I have reviewed the Patient Summary Reports.    I have reviewed the Nursing Notes. I have reviewed the NPO Status.   I have reviewed the Medications.     Review of Systems  Anesthesia Hx:  History of prior surgery of interest to airway management or planning: Previous anesthesia: General Denies Family Hx of Anesthesia complications.   Denies Personal Hx of Anesthesia complications.   Hematology/Oncology:        Hematology Comments: Remote hx of DVT  Stopped Eliquis 6/20/20   Cardiovascular:   Hypertension, well controlled Dysrhythmias (hx of A fib, currently sinus) atrial fibrillation    Pulmonary:   Asthma mild Sleep Apnea, CPAP    Education provided regarding risk of obstructive sleep apnea     Musculoskeletal:   Arthritis (lumbar degenerative disease)   Spine Disorders: (chronic intermittent LBP with occ bilat. leg symptoms, no recent exacerbations, hx of JOSE with good results) lumbar Chronic Pain    Neurological:   Neuromuscular Disease, (lumbar radiculopathy)    Endocrine:   Diabetes, well controlled, type 2        Physical Exam  General:  Obesity, Morbid Obesity    Airway/Jaw/Neck:  Airway Findings: Mouth Opening: Normal Tongue: Normal  General Airway Assessment: Adult  Mallampati: III  TM Distance: Normal, at least 6 cm  Jaw/Neck Findings:  Neck ROM: Normal ROM      Dental:  Dental Findings: Upper partial dentures   Chest/Lungs:  Chest/Lungs Findings: Clear to auscultation, Normal Respiratory Rate     Heart/Vascular:  Heart Findings: Rate: Normal  Rhythm: Regular Rhythm  Sounds: Normal     Abdomen:  Abdomen Findings: Normal    Musculoskeletal:  Musculoskeletal Findings: Normal   Skin:  Skin Findings: Normal    Mental  Status:  Mental Status Findings:  Cooperative, Alert and Oriented         Anesthesia Plan  Type of Anesthesia, risks & benefits discussed:  Anesthesia Type:  general  Patient's Preference:   Intra-op Monitoring Plan: standard ASA monitors  Intra-op Monitoring Plan Comments:   Post Op Pain Control Plan: multimodal analgesia  Post Op Pain Control Plan Comments:   Induction:   IV  Beta Blocker:  Patient is not currently on a Beta-Blocker (No further documentation required).       Informed Consent: Patient understands risks and agrees with Anesthesia plan.  Questions answered. Anesthesia consent signed with patient.  ASA Score: 3     Day of Surgery Review of History & Physical: I have interviewed and examined the patient. I have reviewed the patient's H&P dated:  There are no significant changes.      Anesthesia Plan Notes: GETA - have Glide scope available  Neurontin 300, Celebrex 100 po in ASU given  [facility] intra op  Zofran        Ready For Surgery From Anesthesia Perspective.

## 2020-06-24 NOTE — OP NOTE
Operative Report     Patient name: Yeyo Hollingsworth   MRN: [id number]  Date of surgery: 6/24/2020    Surgeon: Justin Mandujano DPM    Assistant:  Harvey Colon DPM, PGY 2    Preoperative diagnosis:  1.  Longitudinal tear peroneus brevis tendon left  2.  Chronic tear anterior talofibular ligament left  Postoperative diagnosis:  Same as the above  Procedure:  1.  Repair of longitudinal tear peroneal tendon with repair peroneal retinaculum left.  2.  Repair of anterior talofibular ligament by modified Brostrom procedure left  3.  CAM Walker boot cast  Anesthesia:  General anesthesia supplemented with a postoperative local anesthetic block of Exparel and Marcaine  Hemostasis:  Thigh tourniquet 300 mm of mercury  Estimated blood loss:  3 cc   Specimen:  None  Complications: None  Condition upon discharge: Stable    Procedure in detail:  Patient brought the operating room placed on the operating table in a supine position and general anesthesia obtained after the usual aseptic prep and drape of the left foot the thigh tourniquet was inflated to 300 mm of mercury.  A 8 cm longitudinal incision was made running from the posterior aspect of the fibula to the peroneal tubercle I slightly incurvated to the anterior part of the fibula to allow room for the modified Brostrom through 1 incision.  I deepened this by sharp and blunt dissection coagulated superficial vessels.  Identified the peroneal tendon sheath and size it and identified the peroneus brevis which showed a longitudinal tear that extended from the peroneal tubercle to approximately 3-4 cm proximal to the distal fibula.  I debrided the necrotic synovitic type tissue in the tendon tear and repaired with a combination of 2.0 Vicryl suture and 3.0 Vicryl suture.  I was able to tubularized into a nice round tendon with good repair.  I irrigated the area the peroneus longus tendon was normal.  I repaired the peroneal retinaculum in since the tendon posteriorly behind the  fibula.  I placed my attention to the anterior talofibular ligament I incised the periosteum over the fibula I prepared the fibula with 2 anchors for the modified Brostrom and then I repaired the anterior talofibular ligament healing using the modified Blake Brostrom procedure.  The tissue was very tenuous in this 80-year-old male but I get good apposition of the ligament.  I then irrigated the area the ankle felt more stable.  Closed the subcutaneous tissue with 3.0 Vicryl sutures.  Skin closed with a running subcuticular 4.0 Vicryl suture.  The foot ankle was injected with Marcaine and Exparel.  The area was dressed with Mastisol Steri-Strips 4 x 4 Yumi and Ace wrap.  Tourniquet was deflated vascular status of digits were intact.  Cam Walker boot cast applied to the left lower extremity.  The patient had anesthesia reversed left the operating room with stable vitals.    1. Keep dressings, clean, dry, and intact to surgical extremity.  2. Rest, ice, and elevate the surgical extremity.  3.  Cam Walker boot cast to surgical extremity in in operating room  4. Take all medication as discussed at discharge.  Norco 7.5, Keflex 5 mg  5. Contact the clinic with any postoperative concerns or complications.  6. Follow up in one week for 1st post op.

## 2020-06-24 NOTE — H&P
78 yo male      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 obesity)    Urethritis    Hematuria    Acute bronchitis with asthma    Encounter for comprehensive diabetic foot examination, type 2 diabetes mellitus    HTN (hypertension)    Slowing of urinary stream     Hyperlipemia    Diabetes mellitus type 2, controlled    BPH (benign prostatic hyperplasia)    Constipation, chronic    Back pain    BMI 38.0-38.9,adult    Screening for prostate cancer    Obesity    Essential hypertension    Plantar fasciitis    Foot pain, left    Equinus deformity of foot    Paroxysmal atrial fibrillation- 4/2016 - transient ER visit nayeli- on eliquis    Chronic sinus complaints    Spinal stenosis of lumbar region-dr redding    DDD (degenerative disc disease), lumbar    Chronic nonseasonal allergic rhinitis due to fungal spores    Anticoagulant long-term use    Lumbar radiculitis    Rib pain on right side    Anterolisthesis-lumbar L4/l5    Acute pain of left shoulder greater than right     Pain in both knees    History of surgery- left great toe    Positive colorectal cancer screening using Cologuard test    Lumbar radiculopathy    Primary insomnia    Accident due to mechanical fall without injury    Scalp laceration    Old lacunar stroke without late effect    Cervical stenosis of spine    New daily persistent headache    Chronic pain of left ankle    Sclerosing bone dysplasia    Chronic atrial fibrillation    Prerenal azotemia    Fatigue    Tendonitis of ankle, left    Peroneal tendon tear, left, subsequent encounter    Peroneal tendinitis of left lower extremity    Grade 2 ankle sprain, left, subsequent encounter    Rupture of peroneal tendon, left, subsequent  encounter       Review of Systems  Anesthesia Hx:  History of prior surgery of interest to airway management or planning: Previous anesthesia: General Denies Family Hx of Anesthesia complications.   Denies Personal Hx of Anesthesia complications.   Hematology/Oncology:        Hematology Comments: Remote hx of DVT  Stopped Eliquis 6/20/20   Cardiovascular:   Hypertension, well controlled Dysrhythmias (hx of A fib, currently sinus) atrial fibrillation    Pulmonary:   Asthma mild Sleep Apnea, CPAP    Education provided regarding risk of obstructive sleep apnea     Musculoskeletal:   Arthritis (lumbar degenerative disease)   Spine Disorders: (chronic intermittent LBP with occ bilat. leg symptoms, no recent exacerbations, hx of JOSE with good results) lumbar Chronic Pain    Neurological:   Neuromuscular Disease, (lumbar radiculopathy)    Endocrine:   Diabetes, well controlled, type 2    Physical Exam  General:  Obesity, Morbid Obesity    Airway/Jaw/Neck:  Airway Findings: Mouth Opening: Normal Tongue: Normal  General Airway Assessment: Adult  Mallampati: III  TM Distance: Normal, at least 6 cm  Jaw/Neck Findings:  Neck ROM: Normal ROM      Dental:  Dental Findings: Upper partial dentures   Chest/Lungs:  Chest/Lungs Findings: Clear to auscultation, Normal Respiratory Rate     Heart/Vascular:  Heart Findings: Rate: Normal  Rhythm: Regular Rhythm  Sounds: Normal     Abdomen:  Abdomen Findings: Normal    Musculoskeletal:  Musculoskeletal Findings: Normal   Skin:  Skin Findings: Normal    Mental Status:  Mental Status Findings:  Cooperative, Alert and Oriented       A/P    Left peroneal tendon injury    Repair of above

## 2020-06-24 NOTE — ANESTHESIA POSTPROCEDURE EVALUATION
Anesthesia Post Evaluation    Patient: Yeyo Hollingsworth    Procedure(s) Performed: Procedure(s) (LRB):  REPAIR, TENDON, LOWER EXTREMITY (Left)    Final Anesthesia Type: general    Patient location during evaluation: PACU  Patient participation: Yes- Able to Participate  Level of consciousness: awake and alert  Post-procedure vital signs: reviewed and stable  Pain management: adequate  Airway patency: patent  BRANDI mitigation strategies: Multimodal analgesia and Extubation while patient is awake  PONV status at discharge: No PONV  Anesthetic complications: no      Cardiovascular status: blood pressure returned to baseline and stable  Respiratory status: unassisted and room air  Hydration status: euvolemic  Follow-up not needed.          Vitals Value Taken Time   /57 06/24/20 1000   Temp 36.3 °C (97.3 °F) 06/24/20 0945   Pulse 54 06/24/20 1008   Resp 15 06/24/20 1008   SpO2 92 % 06/24/20 1008   Vitals shown include unvalidated device data.      No case tracking events are documented in the log.      Pain/Hector Score: Pain Rating Prior to Med Admin: 0 (6/24/2020  6:36 AM)  Hector Score: 10 (6/24/2020  9:45 AM)

## 2020-06-24 NOTE — DISCHARGE SUMMARY
Identifying data:  79-year-old male    Admitting diagnosis:  1.  Longitudinal tear peroneus brevis tendon left ankle   2.  Chronic tear anterior talofibular ligament left    Discharge diagnosis:  Same as the above    Disposition:  Patient being discharged home    Surgery:  1.  Repair of longitudinal tear peroneus brevis tendon left ankle repair peroneal retinaculum  2.  Impaired anterior talofibular ligament tear by modified Brostrom Blake repair with Arthrex anchors left.  3.  CAM Walker boot cast    Anesthesia:  General anesthesia supplemented with postoperative local anesthetic block with Marcaine and Exparel.    Instructions:  1.  Ice elevation bathroom privileges for 5 days  2.  Dressing remain dry and intact weight-bearing as tolerated with Cam Walker boot cast on left lower extremity  3.  Prescriptions for Norco 7.5 and Keflex 500 mg.  4.  See me in the office in 5 days.

## 2020-06-29 ENCOUNTER — OFFICE VISIT (OUTPATIENT)
Dept: PODIATRY | Facility: CLINIC | Age: 80
End: 2020-06-29
Payer: MEDICARE

## 2020-06-29 VITALS — TEMPERATURE: 99 F | WEIGHT: 240 LBS | HEIGHT: 68 IN | BODY MASS INDEX: 36.37 KG/M2 | RESPIRATION RATE: 16 BRPM

## 2020-06-29 DIAGNOSIS — S86.312D RUPTURE OF PERONEAL TENDON, LEFT, SUBSEQUENT ENCOUNTER: Primary | ICD-10-CM

## 2020-06-29 DIAGNOSIS — M76.72 PERONEAL TENDINITIS OF LEFT LOWER EXTREMITY: ICD-10-CM

## 2020-06-29 PROCEDURE — 99024 PR POST-OP FOLLOW-UP VISIT: ICD-10-PCS | Mod: POP,,, | Performed by: PODIATRIST

## 2020-06-29 PROCEDURE — 99024 POSTOP FOLLOW-UP VISIT: CPT | Mod: POP,,, | Performed by: PODIATRIST

## 2020-06-29 PROCEDURE — 99213 OFFICE O/P EST LOW 20 MIN: CPT | Performed by: PODIATRIST

## 2020-06-29 RX ORDER — HYDROCODONE BITARTRATE AND ACETAMINOPHEN 7.5; 325 MG/1; MG/1
1-2 TABLET ORAL EVERY 4 HOURS PRN
Qty: 28 TABLET | Refills: 0 | Status: SHIPPED | OUTPATIENT
Start: 2020-06-29 | End: 2020-07-06

## 2020-06-29 NOTE — PROGRESS NOTES
[address] [facility] [address]  [place] [address]  Phone: [phone]   [phone]    Patient's PCP:Garland Ocampo MD  Referring Provider:No ref. provider found    Subjective:      Chief Complaint: Post-Op    Systemic Doctor: Garland Ocampo Md  Date Last Seen: April 2020  Blood Sugar:  92  Hemoglobin A1c: 6.4    Date of Surgery:06/24/2020  Procedure:Repair of longitudinal tear peroneal tendon with repair peroneal retinaculum left.  2.  Repair of anterior talofibular ligament by modified Brostrom procedure left  HPI:   Yeyo Hollingsworth is a 79 y.o. male who returns to the clinic today for his post-operative visit. Yeyo Hollingsworth rates pain a 0/10 on a pain scale. Compliance of Care:  Weight bearing in cam walker boot cast, ace wrap, dressing intact with minor drainage. Patient states she been taking to pain pills every four hours. Patient is requesting refill on pain medication   Do you have a cough? yes  Have you had a cough since your surgical procedure ?yes  Are you short of breath?no  Have you experienced shortness of breath since your surgical procedure?no  Do you have a fever? no   Did you have a fever since your surgical procedure? no   Any redness at the surgery site? no Warm to the touch? no  Have you been tested for COVID-19 since your surgical procedure? no    Vitals:    06/29/20 0849   Resp: 16       Past Surgical History:   Procedure Laterality Date    ABDOMINAL SURGERY      origunal surg 1986-mult surgeries since    CARPAL TUNNEL RELEASE      right wrist 2009-left wrist 2010    COLON SURGERY      partial colon removal    COLOSTOMY  1986    colostomy reversal  1986    EYE SURGERY      hay fever      HERNIA REPAIR  1949    REPAIR OF TENDON OF LOWER EXTREMITY Left 6/24/2020    Procedure: REPAIR, TENDON, LOWER EXTREMITY;  Surgeon: Justin Mandujano DPM;  [place] [facility] OR;  Service: Podiatry;  Laterality: Left;  ARTHEX NOTIFIED IN CASE HE WANTS ANCHOR    TOE SURGERY Left 2017     replaced a joint     TONSILLECTOMY  1947    TRANSFORAMINAL EPIDURAL INJECTION OF STEROID      x 4    TRANSFORAMINAL EPIDURAL INJECTION OF STEROID Bilateral 11/13/2019    Procedure: Injection,steroid,epidural,transforaminal approach;  Surgeon: Grant Wright MD;  [place] [facility];  Service: Pain Management;  Laterality: Bilateral;  L4-5, L5-S1     Past Medical History:   Diagnosis Date    Allergy     Ankle pain     left    Anticoagulant long-term use     aspirin    Anxiety     Atrial fibrillation     Back pain     Bruises easily     Cataract     Clotting disorder     left leg    Diabetes mellitus     Diabetes mellitus, type 2     Hay fever     Hyperlipidemia     Hypertension     Obese     BRANDI on CPAP      No family history on file.     Social History:   Marital Status:   Alcohol History:  reports previous alcohol use.  Tobacco History:  reports that he quit smoking about 14 years ago. His smoking use included cigarettes. He has never used smokeless tobacco.  Drug History:  reports no history of drug use.    Review of patient's allergies indicates:   Allergen Reactions    Ativan [lorazepam] Other (See Comments)     Mood alternating      Dilaudid [hydromorphone (bulk)] Other (See Comments)     Mood alternating      Morphine Other (See Comments)     Mood alternating      Hydromorphone        Current Outpatient Medications   Medication Sig Dispense Refill    apixaban (ELIQUIS) 5 mg Tab Take 1 tablet (5 mg total) by mouth 2 (two) times daily. 180 tablet 3    aspirin (ECOTRIN) 81 MG EC tablet Take 1 tablet (81 mg total) by mouth once daily. 90 tablet 3    atorvastatin (LIPITOR) 10 MG tablet nightly (Patient taking differently: Take 10 mg by mouth once daily. nightly) 90 tablet 3    blood sugar diagnostic (BLOOD GLUCOSE TEST) Strp FREESTYLE LITE BG TEST STRIPS. current use of insulin  - Primary ICD-10-CM: E11.9 200 strip 3    blood sugar diagnostic Strp 200 strips by Misc.(Non-Drug; Combo  Route) route 2 (two) times daily. 100 strip 3    cephALEXin (KEFLEX) 500 MG capsule Take 1 capsule (500 mg total) by mouth every 12 (twelve) hours. 14 capsule 0    diazePAM (VALIUM) 5 MG tablet Take 1 tablet (5 mg total) by mouth daily as needed for Anxiety. 30 tablet 5    empagliflozin (JARDIANCE) 10 mg Tab Take 10 mg by mouth once daily. 90 tablet 2    fexofenadine (ALLEGRA) 180 MG tablet Take 1 tablet (180 mg total) by mouth once daily. 90 tablet 3    fluticasone (FLONASE) 50 mcg/actuation nasal spray 2 sprays (100 mcg total) by Each Nare route once daily. 3 Bottle 3    FREESTYLE LANCETS 28 gauge lancets Inject 1 lancet into the skin 3 (three) times daily. 100 each 3    furosemide (LASIX) 20 MG tablet Take 1 tablet (20 mg total) by mouth every Mon, Wed, Fri. 45 tablet 3    gabapentin (NEURONTIN) 300 MG capsule Take 1 capsule (300 mg total) by mouth 3 (three) times daily. 270 capsule 1    HYDROcodone-acetaminophen (NORCO) 7.5-325 mg per tablet Take 1 tablet by mouth every 12 (twelve) hours as needed for Pain. 60 tablet 0    HYDROcodone-acetaminophen (NORCO) 7.5-325 mg per tablet Take 1-2 tablets by mouth every 4 (four) hours as needed for Pain. 28 tablet 0    ipratropium (ATROVENT) 0.03 % nasal spray 2 sprays by Nasal route 2 (two) times daily. 30 mL 5    lisinopriL (PRINIVIL,ZESTRIL) 20 MG tablet Take 1 tablet (20 mg total) by mouth once daily. 90 tablet 3    methocarbamol (ROBAXIN) 500 MG Tab Take 1 tablet (500 mg total) by mouth 3 (three) times daily. 270 tablet 1    metoprolol tartrate (LOPRESSOR) 25 MG tablet Take 1 tablet (25 mg total) by mouth 2 (two) times daily. 180 tablet 3    montelukast (SINGULAIR) 10 mg tablet Take 1 tablet (10 mg total) by mouth every evening. 90 tablet 3    multivit-iron-min-folic acid (MULTIVITAMIN-IRON-MINERALS-FOLIC ACID) 3,500-18-0.4 unit-mg-mg Chew Take 1 tablet by mouth once daily.       mupirocin (BACTROBAN) 2 % ointment Apply topically 2 (two) times daily.  22 g 1    naftifine (NAFTIN) 2 % Crea daily as needed.       polyethylene glycol (GLYCOLAX) 17 gram/dose powder Take 17 g by mouth once daily. 6 Bottle 3    potassium chloride (MICRO-K) 10 MEQ CpSR Every other day (Patient taking differently: Take 10 mEq by mouth 3 (three) times a week. Every other day) 45 capsule 3    SITagliptin (JANUVIA) 100 MG Tab Take 1 tablet (100 mg total) by mouth once daily. 90 tablet 3    traMADoL (ULTRAM) 50 mg tablet Take 1 tablet (50 mg total) by mouth every 12 (twelve) hours as needed for Pain. 60 tablet 0    traZODone (DESYREL) 100 MG tablet Take 1 tablet (100 mg total) by mouth every evening. 90 tablet 3    triamcinolone acetonide 0.1% (KENALOG) 0.1 % ointment Apply topically 2 (two) times daily. 80 g 3     No current facility-administered medications for this visit.        Review of Systems      Objective:        Post-op surgery of the repair of longitudinal tear peroneus brevis tendon and repair of anterior talofibular ligament left with normal healing, no signs of infection or dehiscence of wound. Hardware in place. Normal post op exam today. No redness, no drainage, no increase in local temperature, no significant swelling, sutures.steri-strips are intact.     Imaging:     Physical Exam:   Foot Exam    General  General Appearance: appears stated age and healthy   Orientation: alert and oriented to person, place, and time   Affect: appropriate   Gait: antalgic   Assistance: (Cam Walker boot cast left foot)          Physical Exam       Assessment:       1. Rupture of peroneal tendon, left, subsequent encounter    2. Grade 2 ankle sprain, left, subsequent encounter    3. Peroneal tendinitis of left lower extremity      Plan:   Rupture of peroneal tendon, left, subsequent encounter    Grade 2 ankle sprain, left, subsequent encounter    Peroneal tendinitis of left lower extremity     I evaluated the patient today re-dress the area he is continue Cam Walker boot cast and return  to see me in 9 days Steri-Strips removal.  Patient requested more Norco 7.5 which was given to him today.  I told him to spread the pain pills out as much as he can.  No follow-ups on file.    Procedures - None    The surgical dressing was removed showing no signs of infection, excess edema or malalignment. A new dry dressing was applied and patient was instructed to leave dressing intact until next visit or until instructed to remove.     This note was created using Dragon voice recognition software that occasionally misinterpreted phrases or words.

## 2020-07-07 ENCOUNTER — TELEPHONE (OUTPATIENT)
Dept: PODIATRY | Facility: CLINIC | Age: 80
End: 2020-07-07

## 2020-07-07 ENCOUNTER — OFFICE VISIT (OUTPATIENT)
Dept: PODIATRY | Facility: CLINIC | Age: 80
End: 2020-07-07
Payer: MEDICARE

## 2020-07-07 VITALS
TEMPERATURE: 99 F | HEIGHT: 68 IN | DIASTOLIC BLOOD PRESSURE: 80 MMHG | SYSTOLIC BLOOD PRESSURE: 137 MMHG | HEART RATE: 60 BPM | WEIGHT: 240 LBS | BODY MASS INDEX: 36.37 KG/M2

## 2020-07-07 DIAGNOSIS — M25.572 CHRONIC PAIN OF LEFT ANKLE: ICD-10-CM

## 2020-07-07 DIAGNOSIS — G89.29 CHRONIC PAIN OF LEFT ANKLE: ICD-10-CM

## 2020-07-07 DIAGNOSIS — S86.312D RUPTURE OF PERONEAL TENDON, LEFT, SUBSEQUENT ENCOUNTER: Primary | ICD-10-CM

## 2020-07-07 PROCEDURE — 99213 OFFICE O/P EST LOW 20 MIN: CPT | Performed by: PODIATRIST

## 2020-07-07 PROCEDURE — 99024 POSTOP FOLLOW-UP VISIT: CPT | Mod: POP,,, | Performed by: PODIATRIST

## 2020-07-07 PROCEDURE — 99024 PR POST-OP FOLLOW-UP VISIT: ICD-10-PCS | Mod: POP,,, | Performed by: PODIATRIST

## 2020-07-07 RX ORDER — HYDROCODONE BITARTRATE AND ACETAMINOPHEN 7.5; 325 MG/1; MG/1
1 TABLET ORAL EVERY 6 HOURS PRN
Qty: 28 TABLET | Refills: 0 | Status: SHIPPED | OUTPATIENT
Start: 2020-07-07 | End: 2020-07-22 | Stop reason: SDUPTHER

## 2020-07-07 NOTE — TELEPHONE ENCOUNTER
Mr. Hollingsworth needs his pain prescription sent to his pharmacy. He was seen today. I explained that you would sign it at the end of the day. The order is pended.

## 2020-07-07 NOTE — PROGRESS NOTES
[address] [facility] [address]  [place] [address]  Phone: [phone]   [phone]    Patient's PCP:Garland Ocampo MD  Referring Provider:No ref. provider found    Subjective:      Chief Complaint: Post-Op        Date of Surgery: 6-24-20  Procedure: Repair of longitudinal tear peroneal tendon with repair peroneal retinaculum left.  2.  Repair of anterior talofibular ligament by modified Brostrom procedure left    HPI:   Yeyo Hollingsworth is a 80 y.o. male who returns to the clinic today for his post-operative visit. Yeyo Hollingsworth rates pain a  1/10 on a pain scale. Compliance of Care:boot cast, ace wrap and dressing intact    Vitals:    07/07/20 0853   BP: 137/80   Pulse: 60   Temp: 98.7 °F (37.1 °C)       Past Surgical History:   Procedure Laterality Date    ABDOMINAL SURGERY      origunal surg 1986-mult surgeries since    CARPAL TUNNEL RELEASE      right wrist 2009-left wrist 2010    COLON SURGERY      partial colon removal    COLOSTOMY  1986    colostomy reversal  1986    EYE SURGERY      hay fever      HERNIA REPAIR  1949    REPAIR OF TENDON OF LOWER EXTREMITY Left 6/24/2020    Procedure: REPAIR, TENDON, LOWER EXTREMITY;  Surgeon: Justin Mandujano DPM;  [place] [facility] OR;  Service: Podiatry;  Laterality: Left;  ARTHEX NOTIFIED IN CASE HE WANTS ANCHOR    TOE SURGERY Left 2017    replaced a joint     TONSILLECTOMY  1947    TRANSFORAMINAL EPIDURAL INJECTION OF STEROID      x 4    TRANSFORAMINAL EPIDURAL INJECTION OF STEROID Bilateral 11/13/2019    Procedure: Injection,steroid,epidural,transforaminal approach;  Surgeon: Grant Wright MD;  [place] [facility] OR;  Service: Pain Management;  Laterality: Bilateral;  L4-5, L5-S1     Past Medical History:   Diagnosis Date    Allergy     Ankle pain     left    Anticoagulant long-term use     aspirin    Anxiety     Atrial fibrillation     Back pain     Bruises easily     Cataract     Clotting disorder     left leg    Diabetes mellitus      Diabetes mellitus, type 2     Hay fever     Hyperlipidemia     Hypertension     Obese     BRANDI on CPAP      History reviewed. No pertinent family history.     Social History:   Marital Status:   Alcohol History:  reports previous alcohol use.  Tobacco History:  reports that he quit smoking about 14 years ago. His smoking use included cigarettes. He has never used smokeless tobacco.  Drug History:  reports no history of drug use.    Review of patient's allergies indicates:   Allergen Reactions    Ativan [lorazepam] Other (See Comments)     Mood alternating      Dilaudid [hydromorphone (bulk)] Other (See Comments)     Mood alternating      Morphine Other (See Comments)     Mood alternating      Hydromorphone        Current Outpatient Medications   Medication Sig Dispense Refill    apixaban (ELIQUIS) 5 mg Tab Take 1 tablet (5 mg total) by mouth 2 (two) times daily. 180 tablet 3    aspirin (ECOTRIN) 81 MG EC tablet Take 1 tablet (81 mg total) by mouth once daily. 90 tablet 3    atorvastatin (LIPITOR) 10 MG tablet nightly (Patient taking differently: Take 10 mg by mouth once daily. nightly) 90 tablet 3    blood sugar diagnostic (BLOOD GLUCOSE TEST) Strp FREESTYLE LITE BG TEST STRIPS. current use of insulin  - Primary ICD-10-CM: E11.9 200 strip 3    blood sugar diagnostic Strp 200 strips by Misc.(Non-Drug; Combo Route) route 2 (two) times daily. 100 strip 3    cephALEXin (KEFLEX) 500 MG capsule Take 1 capsule (500 mg total) by mouth every 12 (twelve) hours. 14 capsule 0    diazePAM (VALIUM) 5 MG tablet Take 1 tablet (5 mg total) by mouth daily as needed for Anxiety. 30 tablet 5    empagliflozin (JARDIANCE) 10 mg Tab Take 10 mg by mouth once daily. 90 tablet 2    fexofenadine (ALLEGRA) 180 MG tablet Take 1 tablet (180 mg total) by mouth once daily. 90 tablet 3    fluticasone (FLONASE) 50 mcg/actuation nasal spray 2 sprays (100 mcg total) by Each Nare route once daily. 3 Bottle 3    FREESTYLE  LANCETS 28 gauge lancets Inject 1 lancet into the skin 3 (three) times daily. 100 each 3    furosemide (LASIX) 20 MG tablet Take 1 tablet (20 mg total) by mouth every Mon, Wed, Fri. 45 tablet 3    gabapentin (NEURONTIN) 300 MG capsule Take 1 capsule (300 mg total) by mouth 3 (three) times daily. 270 capsule 1    HYDROcodone-acetaminophen (NORCO) 7.5-325 mg per tablet Take 1 tablet by mouth every 6 (six) hours as needed for Pain. 28 tablet 0    ipratropium (ATROVENT) 0.03 % nasal spray 2 sprays by Nasal route 2 (two) times daily. 30 mL 5    lisinopriL (PRINIVIL,ZESTRIL) 20 MG tablet Take 1 tablet (20 mg total) by mouth once daily. 90 tablet 3    methocarbamol (ROBAXIN) 500 MG Tab Take 1 tablet (500 mg total) by mouth 3 (three) times daily. 270 tablet 1    metoprolol tartrate (LOPRESSOR) 25 MG tablet Take 1 tablet (25 mg total) by mouth 2 (two) times daily. 180 tablet 3    montelukast (SINGULAIR) 10 mg tablet Take 1 tablet (10 mg total) by mouth every evening. 90 tablet 3    multivit-iron-min-folic acid (MULTIVITAMIN-IRON-MINERALS-FOLIC ACID) 3,500-18-0.4 unit-mg-mg Chew Take 1 tablet by mouth once daily.       mupirocin (BACTROBAN) 2 % ointment Apply topically 2 (two) times daily. 22 g 1    naftifine (NAFTIN) 2 % Crea daily as needed.       polyethylene glycol (GLYCOLAX) 17 gram/dose powder Take 17 g by mouth once daily. 6 Bottle 3    potassium chloride (MICRO-K) 10 MEQ CpSR Every other day (Patient taking differently: Take 10 mEq by mouth 3 (three) times a week. Every other day) 45 capsule 3    SITagliptin (JANUVIA) 100 MG Tab Take 1 tablet (100 mg total) by mouth once daily. 90 tablet 3    traMADoL (ULTRAM) 50 mg tablet Take 1 tablet (50 mg total) by mouth every 12 (twelve) hours as needed for Pain. 60 tablet 0    traZODone (DESYREL) 100 MG tablet Take 1 tablet (100 mg total) by mouth every evening. 90 tablet 3    triamcinolone acetonide 0.1% (KENALOG) 0.1 % ointment Apply topically 2 (two) times  daily. 80 g 3     No current facility-administered medications for this visit.        Review of Systems      Objective:        Post-op surgery of the repair of longitudinal tear peroneal tendons and anterior talofibular ligament and peroneal retinaculum left ankle with normal healing, no signs of infection or dehiscence of wound. Hardware in place. Normal post op exam today. No redness, no drainage, no increase in local temperature, no significant swelling, sutures.steri-strips are intact.     Imaging:     Physical Exam:   Foot Exam  Physical Exam       Assessment:       1. Rupture of peroneal tendon, left, subsequent encounter    2. Chronic pain of left ankle      Plan:   Rupture of peroneal tendon, left, subsequent encounter  -     HYDROcodone-acetaminophen (NORCO) 7.5-325 mg per tablet; Take 1 tablet by mouth every 6 (six) hours as needed for Pain.  Dispense: 28 tablet; Refill: 0    Chronic pain of left ankle  -     HYDROcodone-acetaminophen (NORCO) 7.5-325 mg per tablet; Take 1 tablet by mouth every 6 (six) hours as needed for Pain.  Dispense: 28 tablet; Refill: 0     1.  Today evaluate the patient postoperatively he is doing normal.  2.  I removed the Steri-Strips  3.  He is continue Cam Walker boot cast may begin weight-bearing on boot cast.  He is return to see me in 2 weeks.  No follow-ups on file.    Procedures - None        This note was created using Dragon voice recognition software that occasionally misinterpreted phrases or words.

## 2020-07-21 ENCOUNTER — OFFICE VISIT (OUTPATIENT)
Dept: PODIATRY | Facility: CLINIC | Age: 80
End: 2020-07-21
Payer: MEDICARE

## 2020-07-21 VITALS
HEART RATE: 57 BPM | HEIGHT: 68 IN | DIASTOLIC BLOOD PRESSURE: 76 MMHG | TEMPERATURE: 97 F | BODY MASS INDEX: 36.49 KG/M2 | SYSTOLIC BLOOD PRESSURE: 126 MMHG

## 2020-07-21 DIAGNOSIS — S86.312D RUPTURE OF PERONEAL TENDON, LEFT, SUBSEQUENT ENCOUNTER: Primary | ICD-10-CM

## 2020-07-21 PROCEDURE — 99215 OFFICE O/P EST HI 40 MIN: CPT | Performed by: PODIATRIST

## 2020-07-21 PROCEDURE — 99024 PR POST-OP FOLLOW-UP VISIT: ICD-10-PCS | Mod: POP,,, | Performed by: PODIATRIST

## 2020-07-21 PROCEDURE — 99024 POSTOP FOLLOW-UP VISIT: CPT | Mod: POP,,, | Performed by: PODIATRIST

## 2020-07-21 NOTE — PROGRESS NOTES
[address] [facility] [address]  [place] [address]  Phone: [phone]   [phone]    Patient's PCP:Garland Ocampo MD  Referring Provider:No ref. provider found    Subjective:      Chief Complaint: Post-Op     Date of Surgery: 6-24-20  Procedure:   Repair of longitudinal tear peroneal tendon with repair peroneal retinaculum left  Repair of anterior talofibular ligament by modified Brostrom procedure left    HPI:   Yeyo Hollingsworth is a 80 y.o. male who returns to the clinic today for his post-operative visit. Yeyo Hollingsworth rates pain a 8/10 on a pain scale. Compliance of Care:  Boot cast, ace wrap.      Vitals:    07/21/20 0911   BP: 126/76   Pulse: (!) 57   Temp: 97 °F (36.1 °C)       Past Surgical History:   Procedure Laterality Date    ABDOMINAL SURGERY      origunal surg 1986-mult surgeries since    CARPAL TUNNEL RELEASE      right wrist 2009-left wrist 2010    COLON SURGERY      partial colon removal    COLOSTOMY  1986    colostomy reversal  1986    EYE SURGERY      hay fever      HERNIA REPAIR  1949    REPAIR OF TENDON OF LOWER EXTREMITY Left 6/24/2020    Procedure: REPAIR, TENDON, LOWER EXTREMITY;  Surgeon: Justin Mandujano DPM;  [place] [facility] OR;  Service: Podiatry;  Laterality: Left;  ARTHEX NOTIFIED IN CASE HE WANTS ANCHOR    TOE SURGERY Left 2017    replaced a joint     TONSILLECTOMY  1947    TRANSFORAMINAL EPIDURAL INJECTION OF STEROID      x 4    TRANSFORAMINAL EPIDURAL INJECTION OF STEROID Bilateral 11/13/2019    Procedure: Injection,steroid,epidural,transforaminal approach;  Surgeon: Grant Wright MD;  [place] [facility] OR;  Service: Pain Management;  Laterality: Bilateral;  L4-5, L5-S1     Past Medical History:   Diagnosis Date    Allergy     Ankle pain     left    Anticoagulant long-term use     aspirin    Anxiety     Atrial fibrillation     Back pain     Bruises easily     Cataract     Clotting disorder     left leg    Diabetes mellitus     Diabetes mellitus, type  2     Hay fever     Hyperlipidemia     Hypertension     Obese     BRANDI on CPAP      History reviewed. No pertinent family history.     Social History:   Marital Status:   Alcohol History:  reports previous alcohol use.  Tobacco History:  reports that he quit smoking about 14 years ago. His smoking use included cigarettes. He has never used smokeless tobacco.  Drug History:  reports no history of drug use.    Review of patient's allergies indicates:   Allergen Reactions    Ativan [lorazepam] Other (See Comments)     Mood alternating      Dilaudid [hydromorphone (bulk)] Other (See Comments)     Mood alternating      Morphine Other (See Comments)     Mood alternating      Hydromorphone        Current Outpatient Medications   Medication Sig Dispense Refill    apixaban (ELIQUIS) 5 mg Tab Take 1 tablet (5 mg total) by mouth 2 (two) times daily. 180 tablet 3    aspirin (ECOTRIN) 81 MG EC tablet Take 1 tablet (81 mg total) by mouth once daily. 90 tablet 3    atorvastatin (LIPITOR) 10 MG tablet nightly (Patient taking differently: Take 10 mg by mouth once daily. nightly) 90 tablet 3    blood sugar diagnostic (BLOOD GLUCOSE TEST) Strp FREESTYLE LITE BG TEST STRIPS. current use of insulin  - Primary ICD-10-CM: E11.9 200 strip 3    blood sugar diagnostic Strp 200 strips by Misc.(Non-Drug; Combo Route) route 2 (two) times daily. 100 strip 3    diazePAM (VALIUM) 5 MG tablet Take 1 tablet (5 mg total) by mouth daily as needed for Anxiety. 30 tablet 5    empagliflozin (JARDIANCE) 10 mg Tab Take 10 mg by mouth once daily. 90 tablet 2    fexofenadine (ALLEGRA) 180 MG tablet Take 1 tablet (180 mg total) by mouth once daily. 90 tablet 3    fluticasone (FLONASE) 50 mcg/actuation nasal spray 2 sprays (100 mcg total) by Each Nare route once daily. 3 Bottle 3    FREESTYLE LANCETS 28 gauge lancets Inject 1 lancet into the skin 3 (three) times daily. 100 each 3    furosemide (LASIX) 20 MG tablet Take 1 tablet (20 mg  total) by mouth every Mon, Wed, Fri. 45 tablet 3    gabapentin (NEURONTIN) 300 MG capsule Take 1 capsule (300 mg total) by mouth 3 (three) times daily. 270 capsule 1    HYDROcodone-acetaminophen (NORCO) 7.5-325 mg per tablet Take 1 tablet by mouth every 6 (six) hours as needed for Pain. 28 tablet 0    ipratropium (ATROVENT) 0.03 % nasal spray 2 sprays by Nasal route 2 (two) times daily. 30 mL 5    lisinopriL (PRINIVIL,ZESTRIL) 20 MG tablet Take 1 tablet (20 mg total) by mouth once daily. 90 tablet 3    methocarbamol (ROBAXIN) 500 MG Tab Take 1 tablet (500 mg total) by mouth 3 (three) times daily. 270 tablet 1    metoprolol tartrate (LOPRESSOR) 25 MG tablet Take 1 tablet (25 mg total) by mouth 2 (two) times daily. 180 tablet 3    montelukast (SINGULAIR) 10 mg tablet Take 1 tablet (10 mg total) by mouth every evening. 90 tablet 3    multivit-iron-min-folic acid (MULTIVITAMIN-IRON-MINERALS-FOLIC ACID) 3,500-18-0.4 unit-mg-mg Chew Take 1 tablet by mouth once daily.       mupirocin (BACTROBAN) 2 % ointment Apply topically 2 (two) times daily. 22 g 1    naftifine (NAFTIN) 2 % Crea daily as needed.       polyethylene glycol (GLYCOLAX) 17 gram/dose powder Take 17 g by mouth once daily. 6 Bottle 3    potassium chloride (MICRO-K) 10 MEQ CpSR Every other day (Patient taking differently: Take 10 mEq by mouth 3 (three) times a week. Every other day) 45 capsule 3    SITagliptin (JANUVIA) 100 MG Tab Take 1 tablet (100 mg total) by mouth once daily. 90 tablet 3    traMADoL (ULTRAM) 50 mg tablet Take 1 tablet (50 mg total) by mouth every 12 (twelve) hours as needed for Pain. 60 tablet 0    traZODone (DESYREL) 100 MG tablet Take 1 tablet (100 mg total) by mouth every evening. 90 tablet 3    triamcinolone acetonide 0.1% (KENALOG) 0.1 % ointment Apply topically 2 (two) times daily. 80 g 3     No current facility-administered medications for this visit.        Review of Systems      Objective:        Post-op surgery of  the 1.  Repair of longitudinal tear peroneus brevis tendon 2.  Repair of anterior talofibular ligament tear with modified Brostrom Blake procedure with normal healing, no signs of infection or dehiscence of wound. Hardware in place. Normal post op exam today. No redness, no drainage, no increase in local temperature, no significant swelling, sutures.steri-strips are intact.     Imaging:     Physical Exam:   Foot Exam  Physical Exam       Assessment:       1. Rupture of peroneal tendon, left, subsequent encounter      Plan:   Rupture of peroneal tendon, left, subsequent encounter  -     IMMOBILIZER FOR HOME USE     1.  I evaluated patient today is healing normally.  I recommend any increased range of motion on the left ankle continue Cam Walker boot cast for 2 weeks and then he will transition to ankle brace.  He is to return to see me in 4 weeks.  Follow up for Post-op.    Procedures - None    This note was created using Dragon voice recognition software that occasionally misinterpreted phrases or words.

## 2020-07-22 ENCOUNTER — OFFICE VISIT (OUTPATIENT)
Dept: FAMILY MEDICINE | Facility: CLINIC | Age: 80
End: 2020-07-22
Payer: MEDICARE

## 2020-07-22 VITALS
BODY MASS INDEX: 37.44 KG/M2 | SYSTOLIC BLOOD PRESSURE: 134 MMHG | HEIGHT: 68 IN | OXYGEN SATURATION: 96 % | TEMPERATURE: 99 F | WEIGHT: 247 LBS | RESPIRATION RATE: 18 BRPM | DIASTOLIC BLOOD PRESSURE: 60 MMHG | HEART RATE: 73 BPM

## 2020-07-22 DIAGNOSIS — M54.6 CHRONIC RIGHT-SIDED THORACIC BACK PAIN: Primary | ICD-10-CM

## 2020-07-22 DIAGNOSIS — G89.29 CHRONIC RIGHT-SIDED THORACIC BACK PAIN: Primary | ICD-10-CM

## 2020-07-22 PROCEDURE — 99213 OFFICE O/P EST LOW 20 MIN: CPT | Mod: S$PBB,,, | Performed by: FAMILY MEDICINE

## 2020-07-22 PROCEDURE — 99215 OFFICE O/P EST HI 40 MIN: CPT | Performed by: FAMILY MEDICINE

## 2020-07-22 PROCEDURE — 99213 PR OFFICE/OUTPT VISIT, EST, LEVL III, 20-29 MIN: ICD-10-PCS | Mod: S$PBB,,, | Performed by: FAMILY MEDICINE

## 2020-07-22 RX ORDER — TIZANIDINE HYDROCHLORIDE 6 MG/1
6 CAPSULE, GELATIN COATED ORAL 3 TIMES DAILY
Qty: 30 CAPSULE | Refills: 0 | Status: SHIPPED | OUTPATIENT
Start: 2020-07-22 | End: 2020-08-01

## 2020-07-22 RX ORDER — IBUPROFEN 800 MG/1
800 TABLET ORAL EVERY 6 HOURS PRN
Qty: 30 TABLET | Refills: 0 | Status: SHIPPED | OUTPATIENT
Start: 2020-07-22 | End: 2020-09-28 | Stop reason: ALTCHOICE

## 2020-07-22 RX ORDER — HYDROCODONE BITARTRATE AND ACETAMINOPHEN 7.5; 325 MG/1; MG/1
1 TABLET ORAL
Qty: 28 TABLET | Refills: 0 | Status: SHIPPED | OUTPATIENT
Start: 2020-07-22 | End: 2020-09-28 | Stop reason: SDUPTHER

## 2020-07-22 NOTE — PROGRESS NOTES
SUBJECTIVE:    Patient ID: Yeyo Hollingsworth is a 80 y.o. male.    Chief Complaint: Back Pain (pulled back muscle)  80 yo male here today for right upper back pain pt states that he has been having pain for > 1 month he denies any trauma, pt is described as sharp with movement dull ache with no movement.  Made worse with sneezing, coughing twisting.  Made better by holding still  Treated with Robaxin      Pt has lost 3 lbs in 4 months   244lbs     SPMHx:  DM: Jardiance 10mg, Januvia 100mg, not on metformin due to diarrhea Last A1C 6.4  is doing well.  HTN: Lisinopril 20mg, Metoprolol 25mg, is well controlled.  Hyperlipidemia: Atorvastatin 10 mg well controlled. LDL 59  Arthritis: On several chronic narcotics and follows up with pain management, for his neck and back pain pt is planning to have a procedure.  Anxiety: Takes Valium 5mg, PRN  A-fib:  Was cardioverted, no longer in a-fib  Insomnia: Trazadone 100mg  Hx of DVT: Currently on Eliquis 5mg   BRANDI: Wears CPAP  Chronic constipation: On polyethylene Glycol 17 g, manages well with this medication.     Specialists:  Cardiology: Dr Castaneda every 6 month  Pain Management: Michele Martinez  Podiatry: Dr Mandujano  Sleep: Dr Felice Bello  GS: Dr Thorne     Smoke: Quit in   ETOH: None  Exercise: Never    Hemoglobin A1C 4.5 - 6.2 % 6.4High      Lipids  Chol: 134  Tri  HDL: 39  LDL:  59    Back Pain  This is a recurrent problem. The current episode started more than 1 year ago. The problem occurs constantly. The problem has been waxing and waning since onset. The pain is present in the thoracic spine. The quality of the pain is described as shooting. The pain does not radiate. The pain is at a severity of 10/10. The pain is severe. The pain is the same all the time. The symptoms are aggravated by bending, coughing, sitting, standing and twisting. Stiffness is present all day. Pertinent negatives include no abdominal pain, bladder incontinence, bowel  incontinence, chest pain, dysuria, fever, headaches, leg pain, numbness, paresis, paresthesias, pelvic pain, perianal numbness, tingling, weakness or weight loss. Risk factors include lack of exercise, obesity and poor posture. He has tried analgesics for the symptoms. The treatment provided mild relief.         Past Medical History:   Diagnosis Date    Allergy     Ankle pain     left    Anticoagulant long-term use     aspirin    Anxiety     Atrial fibrillation     Back pain     Bruises easily     Cataract     Clotting disorder     left leg    Diabetes mellitus     Diabetes mellitus, type 2     Hay fever     Hyperlipidemia     Hypertension     Obese     BRANDI on CPAP      Social History     Socioeconomic History    Marital status:      Spouse name: Not on file    Number of children: Not on file    Years of education: Not on file    Highest education level: Not on file   Occupational History    Not on file   Social Needs    Financial resource strain: Not hard at all    Food insecurity     Worry: Never true     Inability: Never true    Transportation needs     Medical: No     Non-medical: No   Tobacco Use    Smoking status: Former Smoker     Types: Cigarettes     Quit date: 2006     Years since quittin.4    Smokeless tobacco: Never Used   Substance and Sexual Activity    Alcohol use: Not Currently     Frequency: Monthly or less     Drinks per session: Patient refused     Binge frequency: Never     Comment: rarely    Drug use: No    Sexual activity: Yes     Partners: Female   Lifestyle    Physical activity     Days per week: 0 days     Minutes per session: 0 min    Stress: To some extent   Relationships    Social connections     Talks on phone: Three times a week     Gets together: Three times a week     Attends Sabianist service: Not on file     Active member of club or organization: No     Attends meetings of clubs or organizations: Never     Relationship status:     Other Topics Concern    Not on file   Social History Narrative    Not on file     Past Surgical History:   Procedure Laterality Date    ABDOMINAL SURGERY      origunal surg 1986-mult surgeries since    CARPAL TUNNEL RELEASE      right wrist 2009-left wrist 2010    COLON SURGERY      partial colon removal    COLOSTOMY  1986    colostomy reversal  1986    EYE SURGERY      hay fever      HERNIA REPAIR  1949    REPAIR OF TENDON OF LOWER EXTREMITY Left 6/24/2020    Procedure: REPAIR, TENDON, LOWER EXTREMITY;  Surgeon: Justin Mandujano DPM;  [facility] OR;  Service: Podiatry;  Laterality: Left;  ARTHEX NOTIFIED IN CASE HE WANTS ANCHOR    TOE SURGERY Left 2017    replaced a joint     TONSILLECTOMY  1947    TRANSFORAMINAL EPIDURAL INJECTION OF STEROID      x 4    TRANSFORAMINAL EPIDURAL INJECTION OF STEROID Bilateral 11/13/2019    Procedure: Injection,steroid,epidural,transforaminal approach;  Surgeon: Grant Wright MD;  [place] [facility] OR;  Service: Pain Management;  Laterality: Bilateral;  L4-5, L5-S1     History reviewed. No pertinent family history.  Current Outpatient Medications   Medication Sig Dispense Refill    apixaban (ELIQUIS) 5 mg Tab Take 1 tablet (5 mg total) by mouth 2 (two) times daily. 180 tablet 3    aspirin (ECOTRIN) 81 MG EC tablet Take 1 tablet (81 mg total) by mouth once daily. 90 tablet 3    atorvastatin (LIPITOR) 10 MG tablet nightly (Patient taking differently: Take 10 mg by mouth once daily. nightly) 90 tablet 3    blood sugar diagnostic (BLOOD GLUCOSE TEST) Strp FREESTYLE LITE BG TEST STRIPS. current use of insulin  - Primary ICD-10-CM: E11.9 200 strip 3    blood sugar diagnostic Strp 200 strips by Misc.(Non-Drug; Combo Route) route 2 (two) times daily. 100 strip 3    diazePAM (VALIUM) 5 MG tablet Take 1 tablet (5 mg total) by mouth daily as needed for Anxiety. 30 tablet 5    empagliflozin (JARDIANCE) 10 mg Tab Take 10 mg by mouth once daily. 90 tablet 2     fexofenadine (ALLEGRA) 180 MG tablet Take 1 tablet (180 mg total) by mouth once daily. 90 tablet 3    fluticasone (FLONASE) 50 mcg/actuation nasal spray 2 sprays (100 mcg total) by Each Nare route once daily. 3 Bottle 3    FREESTYLE LANCETS 28 gauge lancets Inject 1 lancet into the skin 3 (three) times daily. 100 each 3    furosemide (LASIX) 20 MG tablet Take 1 tablet (20 mg total) by mouth every Mon, Wed, Fri. 45 tablet 3    gabapentin (NEURONTIN) 300 MG capsule Take 1 capsule (300 mg total) by mouth 3 (three) times daily. 270 capsule 1    HYDROcodone-acetaminophen (NORCO) 7.5-325 mg per tablet Take 1 tablet by mouth every 24 hours as needed for Pain. 28 tablet 0    ipratropium (ATROVENT) 0.03 % nasal spray 2 sprays by Nasal route 2 (two) times daily. 30 mL 5    lisinopriL (PRINIVIL,ZESTRIL) 20 MG tablet Take 1 tablet (20 mg total) by mouth once daily. 90 tablet 3    methocarbamol (ROBAXIN) 500 MG Tab Take 1 tablet (500 mg total) by mouth 3 (three) times daily. 270 tablet 1    metoprolol tartrate (LOPRESSOR) 25 MG tablet Take 1 tablet (25 mg total) by mouth 2 (two) times daily. 180 tablet 3    montelukast (SINGULAIR) 10 mg tablet Take 1 tablet (10 mg total) by mouth every evening. 90 tablet 3    multivit-iron-min-folic acid (MULTIVITAMIN-IRON-MINERALS-FOLIC ACID) 3,500-18-0.4 unit-mg-mg Chew Take 1 tablet by mouth once daily.       mupirocin (BACTROBAN) 2 % ointment Apply topically 2 (two) times daily. 22 g 1    naftifine (NAFTIN) 2 % Crea daily as needed.       polyethylene glycol (GLYCOLAX) 17 gram/dose powder Take 17 g by mouth once daily. 6 Bottle 3    potassium chloride (MICRO-K) 10 MEQ CpSR Every other day (Patient taking differently: Take 10 mEq by mouth 3 (three) times a week. Every other day) 45 capsule 3    SITagliptin (JANUVIA) 100 MG Tab Take 1 tablet (100 mg total) by mouth once daily. 90 tablet 3    traZODone (DESYREL) 100 MG tablet Take 1 tablet (100 mg total) by mouth every evening.  "90 tablet 3    triamcinolone acetonide 0.1% (KENALOG) 0.1 % ointment Apply topically 2 (two) times daily. 80 g 3    ibuprofen (ADVIL,MOTRIN) 800 MG tablet Take 1 tablet (800 mg total) by mouth every 6 (six) hours as needed for Pain. 30 tablet 0    tiZANidine (ZANAFLEX) 6 mg capsule Take 1 capsule (6 mg total) by mouth 3 (three) times daily. for 10 days 30 capsule 0     No current facility-administered medications for this visit.      Review of patient's allergies indicates:   Allergen Reactions    Ativan [lorazepam] Other (See Comments)     Mood alternating      Dilaudid [hydromorphone (bulk)] Other (See Comments)     Mood alternating      Morphine Other (See Comments)     Mood alternating      Hydromorphone        Review of Systems   Constitutional: Negative for activity change, appetite change, diaphoresis, fatigue, fever and weight loss.   Respiratory: Negative for cough, chest tightness, shortness of breath and wheezing.    Cardiovascular: Negative for chest pain and palpitations.   Gastrointestinal: Negative for abdominal pain and bowel incontinence.   Genitourinary: Negative for bladder incontinence, dysuria, hematuria and pelvic pain.   Musculoskeletal: Positive for back pain.   Neurological: Negative for tingling, weakness, numbness, headaches and paresthesias.          Blood pressure 134/60, pulse 73, temperature 98.6 °F (37 °C), temperature source Temporal, resp. rate 18, height 5' 8" (1.727 m), weight 112 kg (247 lb), SpO2 96 %. Body mass index is 37.56 kg/m².   Objective:      Physical Exam  Constitutional:       General: He is not in acute distress.     Appearance: Normal appearance. He is obese. He is not ill-appearing.   HENT:      Head: Normocephalic and atraumatic.   Cardiovascular:      Rate and Rhythm: Normal rate and regular rhythm.      Heart sounds: Normal heart sounds. No murmur.   Pulmonary:      Effort: Pulmonary effort is normal. No respiratory distress.      Breath sounds: Normal " breath sounds. No wheezing.   Musculoskeletal:      Thoracic back: He exhibits tenderness and pain. He exhibits normal range of motion, no swelling and no edema.        Back:         Arms:       Comments: Tender to palpation to the area blow the right scapula, the tenderness wraps around the side in a dermatomal distribution. He denies any electrical type pain, no bowel or bladder dysfunction.   Neurological:      Mental Status: He is alert.             Assessment:       1. Chronic right-sided thoracic back pain         Plan:           Chronic right-sided thoracic back pain  -     Ambulatory referral/consult to Physical/Occupational Therapy; Future; Expected date: 07/29/2020  -     Ambulatory referral/consult to Pain Clinic; Future; Expected date: 07/29/2020  -     HYDROcodone-acetaminophen (NORCO) 7.5-325 mg per tablet; Take 1 tablet by mouth every 24 hours as needed for Pain.  Dispense: 28 tablet; Refill: 0  -     tiZANidine (ZANAFLEX) 6 mg capsule; Take 1 capsule (6 mg total) by mouth 3 (three) times daily. for 10 days  Dispense: 30 capsule; Refill: 0  Pain has been ongoing for > 2 months, he has a long hx of different pans and has been followed by pain management, will attempt to get him into physical therapy and pain management early.  Other orders  -     ibuprofen (ADVIL,MOTRIN) 800 MG tablet; Take 1 tablet (800 mg total) by mouth every 6 (six) hours as needed for Pain.  Dispense: 30 tablet; Refill: 0

## 2020-07-28 ENCOUNTER — OFFICE VISIT (OUTPATIENT)
Dept: PAIN MEDICINE | Facility: CLINIC | Age: 80
End: 2020-07-28
Payer: MEDICARE

## 2020-07-28 ENCOUNTER — CLINICAL SUPPORT (OUTPATIENT)
Dept: REHABILITATION | Facility: HOSPITAL | Age: 80
End: 2020-07-28
Payer: MEDICARE

## 2020-07-28 VITALS
WEIGHT: 247 LBS | DIASTOLIC BLOOD PRESSURE: 69 MMHG | SYSTOLIC BLOOD PRESSURE: 134 MMHG | HEIGHT: 68 IN | HEART RATE: 59 BPM | BODY MASS INDEX: 37.44 KG/M2

## 2020-07-28 DIAGNOSIS — G89.29 CHRONIC RIGHT-SIDED THORACIC BACK PAIN: ICD-10-CM

## 2020-07-28 DIAGNOSIS — M54.6 CHRONIC RIGHT-SIDED THORACIC BACK PAIN: ICD-10-CM

## 2020-07-28 DIAGNOSIS — M54.9 MID BACK PAIN ON RIGHT SIDE: ICD-10-CM

## 2020-07-28 DIAGNOSIS — M54.9 MID BACK PAIN ON RIGHT SIDE: Primary | ICD-10-CM

## 2020-07-28 PROCEDURE — 99213 OFFICE O/P EST LOW 20 MIN: CPT | Mod: S$PBB,,, | Performed by: PHYSICIAN ASSISTANT

## 2020-07-28 PROCEDURE — 99999 PR PBB SHADOW E&M-EST. PATIENT-LVL III: CPT | Mod: PBBFAC,,, | Performed by: PHYSICIAN ASSISTANT

## 2020-07-28 PROCEDURE — 99213 PR OFFICE/OUTPT VISIT, EST, LEVL III, 20-29 MIN: ICD-10-PCS | Mod: S$PBB,,, | Performed by: PHYSICIAN ASSISTANT

## 2020-07-28 PROCEDURE — 99999 PR PBB SHADOW E&M-EST. PATIENT-LVL III: ICD-10-PCS | Mod: PBBFAC,,, | Performed by: PHYSICIAN ASSISTANT

## 2020-07-28 PROCEDURE — 97161 PT EVAL LOW COMPLEX 20 MIN: CPT | Mod: PN

## 2020-07-28 PROCEDURE — 99213 OFFICE O/P EST LOW 20 MIN: CPT | Mod: PBBFAC,PN | Performed by: PHYSICIAN ASSISTANT

## 2020-07-28 NOTE — PLAN OF CARE
[facility]  Physical Therapy Initial Evaluation    Date: 7/28/2020   Name: Yeyo Hollingsworth  Clinic Number: [id number]    Therapy Diagnosis:   Encounter Diagnoses   Name Primary?    Chronic right-sided thoracic back pain     Mid back pain on right side      Physician: Garland Ocampo MD    Physician Orders: PT Eval and Treat   Medical Diagnosis from Referral: chronic rt sided thoracic back pain  Evaluation Date: 7/28/2020  Authorization Period Expiration: 12/31/2020  Plan of Care Expiration: 08/29/2020  Visit # / Visits authorized: 1/ 20    Time In: 0950  Time Out: 1030  Total Appointment Time (timed & untimed codes): 40 minutes    Precautions: Diabetes and blood thinners       Subjective     Date of onset: 07/22/2020  History of current condition - Yeyo reports: long hx of chronic rt sided mid back pain; the pain is exacerbated by exertional activity and radiates across his ribs anteriorly.     Medical History:   Past Medical History:   Diagnosis Date    Allergy     Ankle pain     left    Anticoagulant long-term use     aspirin    Anxiety     Atrial fibrillation     Back pain     Bruises easily     Cataract     Clotting disorder     left leg    Diabetes mellitus     Diabetes mellitus, type 2     Hay fever     Hyperlipidemia     Hypertension     Obese     BRANDI on CPAP      Surgical History:   Yeyo Hollingsworth  has a past surgical history that includes Tonsillectomy (1947); Hernia repair (1949); Abdominal surgery; Carpal tunnel release; Colon surgery; colostomy reversal (1986); Colostomy (1986); hay fever; Eye surgery; Transforaminal epidural injection of steroid; Transforaminal epidural injection of steroid (Bilateral, 11/13/2019); Toe Surgery (Left, 2017); and Repair of tendon of lower extremity (Left, 6/24/2020).    Medications:   Yeyo has a current medication list which includes the following prescription(s): apixaban, aspirin, atorvastatin, blood sugar  diagnostic, blood sugar diagnostic, diazepam, empagliflozin, fexofenadine, fluticasone propionate, freestyle lancets, furosemide, gabapentin, hydrocodone-acetaminophen, ibuprofen, ipratropium, lisinopril, methocarbamol, metoprolol tartrate, montelukast, multivit-iron-min-folic acid, mupirocin, naftifine, polyethylene glycol, potassium chloride, sitagliptin, tizanidine, trazodone, and triamcinolone acetonide 0.1%.    Allergies:   Review of patient's allergies indicates:   Allergen Reactions    Ativan [lorazepam] Other (See Comments)     Mood alternating      Dilaudid [hydromorphone (bulk)] Other (See Comments)     Mood alternating      Morphine Other (See Comments)     Mood alternating      Hydromorphone      Imaging (x-ray of rt ribs on 07/11/18) : Negative right ribs    Prior Therapy: none  Social History: lives with his spouse in a raised trailer (4-5 steps)  Occupation: retired [employer]  Prior Level of Function: primary use of SC  Current Level of Function: moderate difficulty w/ ADL's    Pain:  Current 0/10, worst 10/10, best 0/10   [place] right mid back w/ radiation anteriorly around his ribs  Description: Aching and Sharp  Aggravating Factors: exertional activity  Easing Factors: rest    Patients goals: decrease c/o pain      Objective     Posture: guarded due to pain  Palpation: no pain w/ palpation to affected areas  Sensation: intact  DTRs:  Edema:  Left: absent  Right: absent    Range of Motion/Strength:     Hip  Right   Left  Pain/Dysfunction with Movement    AROM PROM MMT AROM PROM MMT    Flexion   WFL    NT  4-/5   WFL    NT  2+/5    Extension   WFL    NT   NT   WFL    NT   NT    Abduction   WFL    NT   NT   WFL    NT   NT    Adduction   WFL    NT   NT   WFL    NT   NT    Internal rotation   WFL    NT   NT   WFL    NT   NT    External rotation   WFL    NT   NT   WFL    NT   NT        Knee  Right   Left  Pain/Dysfunction with Movement    AROM PROM MMT AROM PROM MMT    Flexion   WFL    NT    NT   WFL    NT   NT    Extension   WFL    NT  4-/5   WFL      NT  3+/5        Ankle  Right   Left  Pain/Dysfunction with Movement    AROM PROM MMT AROM PROM MMT    Plantarflexion   WFL    NT   NT   WFL    NT   NT    Dorsiflexion   WFL    NT  4-/5   WFL    NT  4-/5    Inversion   WFL    NT   NT   WFL    NT    NT    Eversion   WFL    NT   NT   WFL    NT   NT       Gait: 2 x 50 ft w/ SC and SBA  Analysis: guarded due to pain  Falls: none  Bed Mobility: NT  Transfers: Assistance - SBA  Special Tests: Oswestry = 34/50    Limitation/Restriction for FOTO Thoracic Spine Survey    Therapist reviewed FOTO scores for Yeyo Hollingsworth on 7/28/2020.     Limitation Score: 58%         TREATMENT     Treatment Time In: n/a  Treatment Time Out: n/a  Total Treatment time (time-based codes) separate from Evaluation: n/a minutes     No treatment - PT not indicated      Home Exercises and Patient Education Provided    Education provided:   - n/a    Written Home Exercises Provided: n/a.      Assessment     Yeyo is a 80 y.o. male referred to outpatient Physical Therapy with a medical diagnosis of chronic rt sided thoracic back pain. Patient presents with chronic rt sided mid back pain that radiates anteriorly around his ribs.  PT not likely to benefit patient considering the location of the pain and the likelihood that any there ex would exacerbate his symptoms.    Patient prognosis is n/a.     Plan of care discussed with patient: Yes  Patient's spiritual, cultural and educational needs considered and patient is agreeable to the plan of care and goals as stated below:     Anticipated Barriers for therapy: n/a    Medical Necessity is demonstrated by the following  History  Co-morbidities and personal factors that may impact the plan of care Co-morbidities:   anxiety, diabetes, HTN and prior abdominal surgery    Personal Factors:   no deficits     low   Examination  Body Structures and Functions, activity limitations and participation  restrictions that may impact the plan of care Body Regions:   back    Body Systems:    n/a    Participation Restrictions:   n/a    Activity limitations:   Learning and applying knowledge  no deficits    General Tasks and Commands  no deficits    Communication  no deficits    Mobility  walking  driving (bike, car, motorcycle)    Self care  no deficits    Domestic Life  shopping  cooking  doing house work (cleaning house, washing dishes, laundry)    Interactions/Relationships  no deficits    Life Areas  no deficits    Community and Social Life  no deficits         low   Clinical Presentation stable and uncomplicated low   Decision Making/ Complexity Score: low     Goals:  Short Term Goals: n/a weeks     Long Term Goals: n/a weeks       Plan     Plan of care Certification: 7/28/2020 to 07/28/2020.    Outpatient Physical Therapy 1 times weekly for 1 weeks to include the following interventions: n/a.     Wilson Morton, PT

## 2020-07-28 NOTE — PROGRESS NOTES
Referring Physician: Garland Ocampo MD    PCP: Garland Ocampo MD      CC:low back and left leg pain    Interval History:  Mr. Hollingsworth is a 80 y.o. male with a low back and left leg pain who presents today for evaluation of mid back pain on the right. Pain is in the flank region with extension around his rib cage. Pain began insidiously. Denies any falls or injury. He did have surgery on his left foot. He is currently taking Zanaflex 6 mg TID and Ibuprofen 800 mg as well as his Chronic pain medication. Neck pain has worsened since he stopped Robaxin for Zanaflex.  Leg pain continues to be improved s/p bilateral L4-5 and L5-S1 TF JOSE.Continues to c/o neck pain on right as well as right shoulder pain. He has know right shoulder AC arthritis. He has not had any interventions. Denies any radiation. Pain is pulling in nature.  He denies any LE weakness or b/b changes. He takes Hydrocodone 7.5 mg sparingly prescribed by PCP.  In the past he was evaluated by [facility] and lumbar surgery was  Recommended. Pain today is rated 10/10.      HPI:   Yeyo Hollingsworth is a 80 y.o. male ho presents with lower back and left leg pain.  Pain is been present since 2010.  No recent traumatic incident.  His intermittent aching, throbbing, electric pain in his lower back.  Pain radiates down his left buttock into his left posterior thigh.  Pain worsens with prolonged standing, walking, getting up and coughing.  Pain improves with rest.  His tried physical therapy with minimal benefit.  He has undergone lumbar JOSE's with Dr. Rendon in the past with moderate benefit.  Most recent injection was performed in September 2016.  Pain has since returned.  He desires repeat injections with us.  He denies any weakness.  No bowel bladder changes.  He rates his pain 6/10 today.    ROS:  CONSTITUTIONAL: No fevers, chills, night sweats, wt. loss, appetite changes  SKIN: no rashes or itching  ENT: No headaches, head trauma, vision changes, or eye  pain  LYMPH NODES: None noticed   CV: No chest pain, palpitations.   RESP: No shortness of breath, dyspnea on exertion, cough, wheezing, or hemoptysis  GI: No nausea, emesis, diarrhea, constipation, melena, hematochezia, pain.    : No dysuria, hematuria, urgency, or frequency   HEME: No easy bruising, bleeding problems  PSYCHIATRIC: No depression, anxiety, psychosis, hallucinations.  NEURO: No seizures, memory loss, dizziness or difficulty sleeping  MSK: + history of present illness      Past Medical History:   Diagnosis Date    Allergy     Ankle pain     left    Anticoagulant long-term use     aspirin    Anxiety     Atrial fibrillation     Back pain     Bruises easily     Cataract     Clotting disorder     left leg    Diabetes mellitus     Diabetes mellitus, type 2     Hay fever     Hyperlipidemia     Hypertension     Obese     BRANDI on CPAP      Past Surgical History:   Procedure Laterality Date    ABDOMINAL SURGERY      origunal surg 1986-mult surgeries since    CARPAL TUNNEL RELEASE      right wrist 2009-left wrist 2010    COLON SURGERY      partial colon removal    COLOSTOMY  1986    colostomy reversal  1986    EYE SURGERY      hay fever      HERNIA REPAIR  1949    REPAIR OF TENDON OF LOWER EXTREMITY Left 6/24/2020    Procedure: REPAIR, TENDON, LOWER EXTREMITY;  Surgeon: Justin Mandujano DPM;  [place] [facility] OR;  Service: Podiatry;  Laterality: Left;  ARTHEX NOTIFIED IN CASE HE WANTS ANCHOR    TOE SURGERY Left 2017    replaced a joint     TONSILLECTOMY  1947    TRANSFORAMINAL EPIDURAL INJECTION OF STEROID      x 4    TRANSFORAMINAL EPIDURAL INJECTION OF STEROID Bilateral 11/13/2019    Procedure: Injection,steroid,epidural,transforaminal approach;  Surgeon: Grant Wright MD;  [place] [facility] OR;  Service: Pain Management;  Laterality: Bilateral;  L4-5, L5-S1     History reviewed. No pertinent family history.  Social History     Socioeconomic History    Marital status:       "Spouse name: Not on file    Number of children: Not on file    Years of education: Not on file    Highest education level: Not on file   Occupational History    Not on file   Social Needs    Financial resource strain: Not hard at all    Food insecurity     Worry: Never true     Inability: Never true    Transportation needs     Medical: No     Non-medical: No   Tobacco Use    Smoking status: Former Smoker     Types: Cigarettes     Quit date: 2006     Years since quittin.4    Smokeless tobacco: Never Used   Substance and Sexual Activity    Alcohol use: Not Currently     Frequency: Monthly or less     Drinks per session: Patient refused     Binge frequency: Never     Comment: rarely    Drug use: No    Sexual activity: Yes     Partners: Female   Lifestyle    Physical activity     Days per week: 0 days     Minutes per session: 0 min    Stress: To some extent   Relationships    Social connections     Talks on phone: Three times a week     Gets together: Three times a week     Attends Rastafarian service: Not on file     Active member of club or organization: No     Attends meetings of clubs or organizations: Never     Relationship status:    Other Topics Concern    Not on file   Social History Narrative    Not on file         Medications/Allergies: See med card    Vitals:    20 0828   BP: 134/69   Pulse: (!) 59   Weight: 112 kg (247 lb)   Height: 5' 8" (1.727 m)   PainSc: 10-Worst pain ever   PainLoc: Back         Physical exam:    GENERAL: A and O x3, the patient appears well groomed and is in no acute distress.  Skin: No rashes or obvious lesions  HEENT: normocephalic, atraumatic  CARDIOVASCULAR:  bradycardia  LUNGS: non labored breathing  ABDOMEN: soft, nontender   UPPER EXTREMITIES: Normal alignment, normal range of motion, no atrophy, no skin changes,  hair growth and nail growth normal and equal bilaterally. No swelling. Tenderness to right AC joint  LOWER EXTREMITIES:  Normal " alignment, normal range of motion, no atrophy, no skin changes,  hair growth and nail growth normal and equal bilaterally. No swelling, no tenderness.    CERVICAL SPINE:  Full ROM with pain on flexion and extension  Negative spurlings  Tenderness to palpation right cervical paraspinous muscles   LUMBAR SPINE  Lumbar spine: ROM is limited with flexion extension and oblique extension with moderate increased pain.    Caden's test causes no increased pain on either side.    Supine straight leg raise is negative bilaterally.    Internal and external rotation of the hip causes no increased pain on either side.  Myofascial exam: No tenderness to palpation across lumbar paraspinous muscles. Moderate tenderness of right thoracic Paraspinous muscles       MENTAL STATUS: normal orientation, speech, language, and fund of knowledge for social situation.  Emotional state appropriate.    CRANIAL NERVES:  II:  PERRL bilaterally,   III,IV,VI: EOMI.    V:  Facial sensation equal bilaterally  VII:  Facial motor function normal.  VIII:  Hearing equal to finger rub bilaterally  IX/X: Gag normal, palate symmetric  XI:  Shoulder shrug equal, head turn equal  XII:  Tongue midline without fasciculations      MOTOR: Tone and bulk: normal bilateral upper and lower Strength: normal   Delt Bi Tri WE WF     R 5 5 5 5 5 5   L 5 5 5 5 5 5     IP ADD ABD Quad TA Gas HAM  R 5 5 5 5 5 5 5  L 5 5 5 5 5 5 5    SENSATION: Light touch and pinprick intact bilaterally  REFLEXES: normal, symmetric, nonbrisk.  Toes down, no clonus. No hoffmans.  GAIT: normal rise, base, steps, and arm swing.        Imaging:  Cervical CT [facility] 4/2019  Significant spinal central canal stenosis along with foraminal stenosis most significantly at the level of C5/C6    MRI lumbar spine 9/2017 ([facility])  Severe facet arthrosis and ligamentum flavum at L4-5 results in severe spinal canal stenosis. Possible impingement of the bilateral L5 nerve as well as bilateral L4 nerves.  There  is a grade 1 anterolisthesis of L4 and L5.  Moderate to severe bilateral neuroforaminal L3-4.  This note was completed with dictation software and grammatical errors may exist.      Assessment:  Mr. Hollingsworth is a 80 y.o. male with low back and left leg pain  1. Mid back pain on right side    2. Chronic right-sided thoracic back pain        Plan:  1.  I have stressed the importance of physical activity and exercise to improve overall health. Home exercises provided  2. Ordered PT to focus on back. Will be beneifical to prevent further muscle atrophy and increase mobility and strength   3. Discontinue Tizanidine, restart Robaxin  4. Thoracic xray to evaluate further.   5. F/u after completion of PT

## 2020-07-28 NOTE — LETTER
July 28, 2020      Garland Ocampo MD  [address] [facility]  [address]  [place] [address]           [place] [facility]  [address] [place] [address] [address]  [place] [address]  Phone: [phone]  Fax: [phone]          Patient: Yeyo Hollingsworth   MR Number: [id number]   YOB: 1940   Date of Visit: 7/28/2020       Dear Dr. Garland Ocampo:    Thank you for referring Yeyo Hollingsworth to me for evaluation. Attached you will find relevant portions of my assessment and plan of care.    If you have questions, please do not hesitate to call me. I look forward to following Yeyo Hollingsworth along with you.    Sincerely,    Ivette Bautista PA-C    Enclosure  CC:  No Recipients    If you would like to receive this communication electronically, please contact [email or web address] or [phone] to request more information on [facility] [employer] access.    For providers and/or their staff who would like to refer a patient to [facility], please contact us through our one-stop-shop provider referral line, [facility], at [phone].    If you feel you have received this communication in error or would no longer like to receive these types of communications, please e-mail [email or web address]

## 2020-07-29 ENCOUNTER — HOSPITAL ENCOUNTER (OUTPATIENT)
Dept: RADIOLOGY | Facility: HOSPITAL | Age: 80
Discharge: HOME | End: 2020-07-29
Attending: PHYSICIAN ASSISTANT
Payer: MEDICARE

## 2020-07-29 DIAGNOSIS — M54.9 MID BACK PAIN ON RIGHT SIDE: ICD-10-CM

## 2020-07-29 PROCEDURE — 72070 X-RAY EXAM THORAC SPINE 2VWS: CPT | Mod: 26,,, | Performed by: RADIOLOGY

## 2020-07-29 PROCEDURE — 72070 X-RAY EXAM THORAC SPINE 2VWS: CPT | Mod: TC,FY

## 2020-07-29 PROCEDURE — 72070 XR THORACIC SPINE AP LATERAL: ICD-10-PCS | Mod: 26,,, | Performed by: RADIOLOGY

## 2020-07-30 ENCOUNTER — TELEPHONE (OUTPATIENT)
Dept: PAIN MEDICINE | Facility: CLINIC | Age: 80
End: 2020-07-30

## 2020-09-03 ENCOUNTER — OFFICE VISIT (OUTPATIENT)
Dept: PODIATRY | Facility: CLINIC | Age: 80
End: 2020-09-03
Payer: MEDICARE

## 2020-09-03 VITALS
SYSTOLIC BLOOD PRESSURE: 134 MMHG | TEMPERATURE: 98 F | HEART RATE: 64 BPM | RESPIRATION RATE: 18 BRPM | HEIGHT: 68 IN | BODY MASS INDEX: 37.44 KG/M2 | WEIGHT: 247 LBS | DIASTOLIC BLOOD PRESSURE: 70 MMHG

## 2020-09-03 DIAGNOSIS — S86.312D RUPTURE OF PERONEAL TENDON, LEFT, SUBSEQUENT ENCOUNTER: Primary | ICD-10-CM

## 2020-09-03 DIAGNOSIS — M25.572 CHRONIC PAIN OF LEFT ANKLE: ICD-10-CM

## 2020-09-03 DIAGNOSIS — M76.72 PERONEAL TENDINITIS OF LEFT LOWER EXTREMITY: ICD-10-CM

## 2020-09-03 DIAGNOSIS — G89.29 CHRONIC PAIN OF LEFT ANKLE: ICD-10-CM

## 2020-09-03 PROCEDURE — 99214 OFFICE O/P EST MOD 30 MIN: CPT | Performed by: PODIATRIST

## 2020-09-03 PROCEDURE — 99024 PR POST-OP FOLLOW-UP VISIT: ICD-10-PCS | Mod: POP,,, | Performed by: PODIATRIST

## 2020-09-03 PROCEDURE — 99024 POSTOP FOLLOW-UP VISIT: CPT | Mod: POP,,, | Performed by: PODIATRIST

## 2020-09-03 NOTE — PROGRESS NOTES
[address] [facility] [address]  [place] [address]  Phone: [phone]   [phone]    Patient's PCP:Garland Ocampo MD  Referring Provider:No ref. provider found    Subjective:      Chief Complaint: Post-Op    Date of Surgery:06/24/2020  Procedure: Repair of longitudinal tear peroneal tendon with repair peroneal retinaculum left Repair of anterior talofibular ligament by modified Brostrom procedure left    HPI:   Yeyo Hollingsworth is a 80 y.o. male who returns to the clinic today for his post-operative visit. Yeyo Hollingsworth rates pain a 5/10 on a pain scale. Compliance of Care:  Transitioned to standard shoe gear and discontinued using brace a few weeks ago due to swelling around toes. Patients pain has improved however, he still has occasional sharp pain.    Vitals:    09/03/20 1435   BP: 134/70   Pulse: 64   Resp: 18       Past Surgical History:   Procedure Laterality Date    ABDOMINAL SURGERY      origunal surg 1986-mult surgeries since    CARPAL TUNNEL RELEASE      right wrist 2009-left wrist 2010    COLON SURGERY      partial colon removal    COLOSTOMY  1986    colostomy reversal  1986    EYE SURGERY      hay fever      HERNIA REPAIR  1949    REPAIR OF TENDON OF LOWER EXTREMITY Left 6/24/2020    Procedure: REPAIR, TENDON, LOWER EXTREMITY;  Surgeon: Justin Mandujano DPM;  [place] [facility] OR;  Service: Podiatry;  Laterality: Left;  ARTHEX NOTIFIED IN CASE HE WANTS ANCHOR    TOE SURGERY Left 2017    replaced a joint     TONSILLECTOMY  1947    TRANSFORAMINAL EPIDURAL INJECTION OF STEROID      x 4    TRANSFORAMINAL EPIDURAL INJECTION OF STEROID Bilateral 11/13/2019    Procedure: Injection,steroid,epidural,transforaminal approach;  Surgeon: Grant Wright MD;  [place] [facility] OR;  Service: Pain Management;  Laterality: Bilateral;  L4-5, L5-S1     Past Medical History:   Diagnosis Date    Allergy     Ankle pain     left    Anticoagulant long-term use     aspirin    Anxiety     Atrial  fibrillation     Back pain     Bruises easily     Cataract     Clotting disorder     left leg    Diabetes mellitus     Diabetes mellitus, type 2     Hay fever     Hyperlipidemia     Hypertension     Obese     BRANDI on CPAP      No family history on file.     Social History:   Marital Status:   Alcohol History:  reports previous alcohol use.  Tobacco History:  reports that he quit smoking about 14 years ago. His smoking use included cigarettes. He has never used smokeless tobacco.  Drug History:  reports no history of drug use.    Review of patient's allergies indicates:   Allergen Reactions    Ativan [lorazepam] Other (See Comments)     Mood alternating      Dilaudid [hydromorphone (bulk)] Other (See Comments)     Mood alternating      Morphine Other (See Comments)     Mood alternating      Adhesive tape-silicones     Hydromorphone        Current Outpatient Medications   Medication Sig Dispense Refill    apixaban (ELIQUIS) 5 mg Tab Take 1 tablet (5 mg total) by mouth 2 (two) times daily. 180 tablet 3    aspirin (ECOTRIN) 81 MG EC tablet Take 1 tablet (81 mg total) by mouth once daily. 90 tablet 3    atorvastatin (LIPITOR) 10 MG tablet nightly (Patient taking differently: Take 10 mg by mouth once daily. nightly) 90 tablet 3    blood sugar diagnostic (BLOOD GLUCOSE TEST) Strp FREESTYLE LITE BG TEST STRIPS. current use of insulin  - Primary ICD-10-CM: E11.9 200 strip 3    blood sugar diagnostic Strp 200 strips by Misc.(Non-Drug; Combo Route) route 2 (two) times daily. 100 strip 3    diazePAM (VALIUM) 5 MG tablet Take 1 tablet (5 mg total) by mouth daily as needed for Anxiety. 30 tablet 5    empagliflozin (JARDIANCE) 10 mg Tab Take 10 mg by mouth once daily. 90 tablet 2    fexofenadine (ALLEGRA) 180 MG tablet Take 1 tablet (180 mg total) by mouth once daily. 90 tablet 3    fluticasone (FLONASE) 50 mcg/actuation nasal spray 2 sprays (100 mcg total) by Each Nare route once daily. 3 Bottle 3     FREESTYLE LANCETS 28 gauge lancets Inject 1 lancet into the skin 3 (three) times daily. 100 each 3    furosemide (LASIX) 20 MG tablet Take 1 tablet (20 mg total) by mouth every Mon, Wed, Fri. 45 tablet 3    gabapentin (NEURONTIN) 300 MG capsule Take 1 capsule (300 mg total) by mouth 3 (three) times daily. 270 capsule 1    HYDROcodone-acetaminophen (NORCO) 7.5-325 mg per tablet Take 1 tablet by mouth every 24 hours as needed for Pain. 28 tablet 0    ibuprofen (ADVIL,MOTRIN) 800 MG tablet Take 1 tablet (800 mg total) by mouth every 6 (six) hours as needed for Pain. 30 tablet 0    ipratropium (ATROVENT) 0.03 % nasal spray 2 sprays by Nasal route 2 (two) times daily. 30 mL 5    lisinopriL (PRINIVIL,ZESTRIL) 20 MG tablet Take 1 tablet (20 mg total) by mouth once daily. 90 tablet 3    methocarbamol (ROBAXIN) 500 MG Tab Take 1 tablet (500 mg total) by mouth 3 (three) times daily. 270 tablet 1    metoprolol tartrate (LOPRESSOR) 25 MG tablet Take 1 tablet (25 mg total) by mouth 2 (two) times daily. 180 tablet 3    montelukast (SINGULAIR) 10 mg tablet Take 1 tablet (10 mg total) by mouth every evening. 90 tablet 3    multivit-iron-min-folic acid (MULTIVITAMIN-IRON-MINERALS-FOLIC ACID) 3,500-18-0.4 unit-mg-mg Chew Take 1 tablet by mouth once daily.       mupirocin (BACTROBAN) 2 % ointment Apply topically 2 (two) times daily. 22 g 1    naftifine (NAFTIN) 2 % Crea daily as needed.       polyethylene glycol (GLYCOLAX) 17 gram/dose powder Take 17 g by mouth once daily. 6 Bottle 3    potassium chloride (MICRO-K) 10 MEQ CpSR Every other day (Patient taking differently: Take 10 mEq by mouth 3 (three) times a week. Every other day) 45 capsule 3    SITagliptin (JANUVIA) 100 MG Tab Take 1 tablet (100 mg total) by mouth once daily. 90 tablet 3    traZODone (DESYREL) 100 MG tablet Take 1 tablet (100 mg total) by mouth every evening. 90 tablet 3    triamcinolone acetonide 0.1% (KENALOG) 0.1 % ointment Apply topically 2  (two) times daily. 80 g 3     No current facility-administered medications for this visit.        Review of Systems      Objective:        Post-op surgery of the modified Brostrom Blake repair of torn anterior talofibular ligament left ankle and repair of longitudinal tear peroneus brevis tendon with normal healing, no signs of infection or dehiscence of wound. Hardware in place. Normal post op exam today. No redness, no drainage, no increase in local temperature, no significant swelling, sutures.steri-strips are intact.     Imaging:     Physical Exam:   Foot Exam    General  General Appearance: appears stated age and healthy   Orientation: alert and oriented to person, place, and time   Affect: appropriate   Gait: antalgic           Physical Exam       Assessment:       1. Rupture of peroneal tendon, left, subsequent encounter    2. Peroneal tendinitis of left lower extremity    3. Chronic pain of left ankle      Plan:   Rupture of peroneal tendon, left, subsequent encounter    Peroneal tendinitis of left lower extremity    Chronic pain of left ankle     1.  I evaluated the patient today describe my clinical findings to him.  The patient is healing normally has good stability the ankle minimal swelling increasing range of motion and mild pain.  2.  He will continue work on range of motion and strength for the next 6 weeks.  If he is doing well he will not return to see me if he is having any concerns he will return to see me.  No follow-ups on file.    Procedures - None        This note was created using Dragon voice recognition software that occasionally misinterpreted phrases or words.

## 2020-09-16 ENCOUNTER — PATIENT MESSAGE (OUTPATIENT)
Dept: FAMILY MEDICINE | Facility: CLINIC | Age: 80
End: 2020-09-16

## 2020-09-28 ENCOUNTER — OFFICE VISIT (OUTPATIENT)
Dept: FAMILY MEDICINE | Facility: CLINIC | Age: 80
End: 2020-09-28
Payer: MEDICARE

## 2020-09-28 VITALS — RESPIRATION RATE: 18 BRPM | BODY MASS INDEX: 37.13 KG/M2 | TEMPERATURE: 97 F | HEIGHT: 68 IN | WEIGHT: 245 LBS

## 2020-09-28 DIAGNOSIS — I10 ESSENTIAL HYPERTENSION: ICD-10-CM

## 2020-09-28 DIAGNOSIS — I48.91 ATRIAL FIBRILLATION, UNSPECIFIED TYPE: ICD-10-CM

## 2020-09-28 DIAGNOSIS — M54.16 LUMBAR RADICULITIS: ICD-10-CM

## 2020-09-28 DIAGNOSIS — E78.01 FAMILIAL HYPERCHOLESTEROLEMIA: ICD-10-CM

## 2020-09-28 DIAGNOSIS — E11.9 CONTROLLED TYPE 2 DIABETES MELLITUS WITHOUT COMPLICATION, WITHOUT LONG-TERM CURRENT USE OF INSULIN: ICD-10-CM

## 2020-09-28 DIAGNOSIS — Z11.59 ENCOUNTER FOR HEPATITIS C SCREENING TEST FOR LOW RISK PATIENT: ICD-10-CM

## 2020-09-28 DIAGNOSIS — Z23 NEED FOR INFLUENZA VACCINATION: Primary | ICD-10-CM

## 2020-09-28 PROCEDURE — 99214 PR OFFICE/OUTPT VISIT, EST, LEVL IV, 30-39 MIN: ICD-10-PCS | Mod: S$PBB,,, | Performed by: FAMILY MEDICINE

## 2020-09-28 PROCEDURE — 90662 IIV NO PRSV INCREASED AG IM: CPT | Mod: PBBFAC | Performed by: FAMILY MEDICINE

## 2020-09-28 PROCEDURE — 99214 OFFICE O/P EST MOD 30 MIN: CPT | Mod: S$PBB,,, | Performed by: FAMILY MEDICINE

## 2020-09-28 PROCEDURE — 99215 OFFICE O/P EST HI 40 MIN: CPT | Performed by: FAMILY MEDICINE

## 2020-09-28 RX ORDER — HYDROCODONE BITARTRATE AND ACETAMINOPHEN 7.5; 325 MG/1; MG/1
1 TABLET ORAL
Qty: 28 TABLET | Refills: 0 | Status: SHIPPED | OUTPATIENT
Start: 2020-09-28 | End: 2020-11-29 | Stop reason: SDUPTHER

## 2020-09-28 NOTE — PROGRESS NOTES
SUBJECTIVE:    Patient ID: Yeyo Hollingsworth is a 80 y.o. male.    Chief Complaint: Diabetes, Hyperlipidemia, and Hypertension  78 yo male here today to follow up on his  HTN, DM, Hyperlipidemia, he has not has labs in 5 months. I have reviewed his blood glucose logs and all of his blood sugars have been WNL, His blood pressure today is WNL. His only request is for a refill on his chronic pain medications, he is taking and refilling appropriately, he has been following up with PT and Pain management.       Pt has lost 3 lbs in 4 months  05/5/2020  244lbs  07/22/2020 247  09/28/2020 245    SPMHx:  DM: Jardiance 10mg, Januvia 100mg, not on metformin due to diarrhea Last A1C 6.4  is doing well.  HTN: Lisinopril 20mg, Metoprolol 25mg, is well controlled.  Hyperlipidemia: Atorvastatin 10 mg well controlled. LDL 59  Arthritis: On several chronic narcotics and follows up with pain management, for his neck and back pain pt is planning to have a procedure.  Anxiety: Takes Valium 5mg, PRN  A-fib: 2012 Was cardioverted, no longer in a-fib  Insomnia: Trazadone 100mg  Hx of DVT: Currently on Eliquis 5mg   BRANDI: Wears CPAP  Chronic constipation: On polyethylene Glycol 17g, manages well with this medication.     Specialists:  Cardiology: Dr Castaneda every 6 month  Pain Management: Michele Martinez  Podiatry: Dr Mandujano  Sleep: Dr Felice Bello  GS: Dr Thorne     Smoke: Quit in 1988  ETOH: None  Exercise: Never    Diabetes  He has type 2 diabetes mellitus. No MedicAlert identification noted. The initial diagnosis of diabetes was made 10 years ago. Pertinent negatives for hypoglycemia include no confusion, dizziness, headaches, hunger, mood changes, nervousness/anxiousness, pallor, seizures, sleepiness, speech difficulty, sweats or tremors. Associated symptoms include fatigue, foot paresthesias and polyuria. Pertinent negatives for diabetes include no blurred vision, no chest pain, no polydipsia, no polyphagia, no visual change, no  weakness and no weight loss. Pertinent negatives for hypoglycemia complications include no blackouts, no hospitalization, no nocturnal hypoglycemia, no required assistance and no required glucagon injection. Symptoms are stable. Diabetic complications include heart disease and PVD. Pertinent negatives for diabetic complications include no autonomic neuropathy, CVA, impotence, nephropathy, peripheral neuropathy or retinopathy. Risk factors for coronary artery disease include dyslipidemia, hypertension, obesity, diabetes mellitus and male sex. Current diabetic treatment includes oral agent (dual therapy). He is compliant with treatment most of the time. He is currently taking insulin pre-breakfast. His weight is fluctuating minimally. He is following a diabetic, generally healthy, low fat/cholesterol and low salt diet. Meal planning includes avoidance of concentrated sweets. He has not had a previous visit with a dietitian. He never participates in exercise. He monitors blood glucose at home 3-4 x per week. Blood glucose monitoring compliance is excellent. His home blood glucose trend is fluctuating minimally. He sees a podiatrist.Eye exam is current.   Hypertension  This is a chronic problem. The current episode started more than 1 year ago. The problem is unchanged. The problem is controlled. Associated symptoms include peripheral edema. Pertinent negatives include no blurred vision, chest pain, headaches, palpitations, shortness of breath or sweats. There are no associated agents to hypertension. Risk factors for coronary artery disease include obesity, male gender, sedentary lifestyle, dyslipidemia and diabetes mellitus. Past treatments include angiotensin blockers and beta blockers. Hypertensive end-organ damage includes PVD. There is no history of CVA or retinopathy.   Hyperlipidemia  This is a chronic problem. The current episode started more than 1 year ago. The problem is controlled. Recent lipid tests were  reviewed and are normal. Exacerbating diseases include diabetes and obesity. Factors aggravating his hyperlipidemia include beta blockers. Pertinent negatives include no chest pain or shortness of breath. Current antihyperlipidemic treatment includes statins. The current treatment provides moderate improvement of lipids.   Back Pain  This is a chronic problem. The current episode started more than 1 year ago. The problem occurs daily. The problem has been waxing and waning since onset. The pain is present in the thoracic spine and lumbar spine. The quality of the pain is described as stabbing. The pain does not radiate. The pain is at a severity of 6/10. The pain is moderate. Pertinent negatives include no chest pain, dysuria, fever, headaches, weakness or weight loss. Risk factors include lack of exercise, obesity and sedentary lifestyle. He has tried analgesics, NSAIDs and muscle relaxant for the symptoms.         Past Medical History:   Diagnosis Date    Allergy     Ankle pain     left    Anticoagulant long-term use     aspirin    Anxiety     Atrial fibrillation     Back pain     Bruises easily     Cataract     Clotting disorder     left leg    Diabetes mellitus     Diabetes mellitus, type 2     Hay fever     Hyperlipidemia     Hypertension     Obese     BRANDI on CPAP      Social History     Socioeconomic History    Marital status:      Spouse name: Not on file    Number of children: Not on file    Years of education: Not on file    Highest education level: Not on file   Occupational History    Not on file   Social Needs    Financial resource strain: Not hard at all    Food insecurity     Worry: Never true     Inability: Never true    Transportation needs     Medical: No     Non-medical: No   Tobacco Use    Smoking status: Former Smoker     Types: Cigarettes     Quit date: 2006     Years since quittin.5    Smokeless tobacco: Never Used   Substance and Sexual Activity     Alcohol use: Not Currently     Frequency: Monthly or less     Drinks per session: Patient refused     Binge frequency: Never     Comment: rarely    Drug use: No    Sexual activity: Yes     Partners: Female   Lifestyle    Physical activity     Days per week: 0 days     Minutes per session: 0 min    Stress: To some extent   Relationships    Social connections     Talks on phone: Three times a week     Gets together: Three times a week     Attends Mandaen service: Not on file     Active member of club or organization: No     Attends meetings of clubs or organizations: Never     Relationship status:    Other Topics Concern    Not on file   Social History Narrative    Not on file     Past Surgical History:   Procedure Laterality Date    ABDOMINAL SURGERY      origunal surg 1986-mult surgeries since    CARPAL TUNNEL RELEASE      right wrist 2009-left wrist 2010    COLON SURGERY      partial colon removal    COLOSTOMY  1986    colostomy reversal  1986    EYE SURGERY      hay fever      HERNIA REPAIR  1949    REPAIR OF TENDON OF LOWER EXTREMITY Left 6/24/2020    Procedure: REPAIR, TENDON, LOWER EXTREMITY;  Surgeon: Justin Mandujano DPM;  [place] [facility] OR;  Service: Podiatry;  Laterality: Left;  ARTHEX NOTIFIED IN CASE HE WANTS ANCHOR    TOE SURGERY Left 2017    replaced a joint     TONSILLECTOMY  1947    TRANSFORAMINAL EPIDURAL INJECTION OF STEROID      x 4    TRANSFORAMINAL EPIDURAL INJECTION OF STEROID Bilateral 11/13/2019    Procedure: Injection,steroid,epidural,transforaminal approach;  Surgeon: Grant Wright MD;  [facility];  Service: Pain Management;  Laterality: Bilateral;  L4-5, L5-S1     History reviewed. No pertinent family history.  Current Outpatient Medications   Medication Sig Dispense Refill    apixaban (ELIQUIS) 5 mg Tab Take 1 tablet (5 mg total) by mouth 2 (two) times daily. 180 tablet 3    aspirin (ECOTRIN) 81 MG EC tablet Take 1 tablet (81 mg total) by mouth once  daily. 90 tablet 3    atorvastatin (LIPITOR) 10 MG tablet nightly (Patient taking differently: Take 10 mg by mouth once daily. nightly) 90 tablet 3    azelastine (ASTELIN) 137 mcg (0.1 %) nasal spray 2 sprays (274 mcg total) by Nasal route 2 (two) times daily. 30 mL 5    blood sugar diagnostic (BLOOD GLUCOSE TEST) Strp FREESTYLE LITE BG TEST STRIPS. current use of insulin  - Primary ICD-10-CM: E11.9 200 strip 3    blood sugar diagnostic Strp 200 strips by Misc.(Non-Drug; Combo Route) route 2 (two) times daily. 100 strip 3    diazePAM (VALIUM) 5 MG tablet Take 1 tablet (5 mg total) by mouth daily as needed for Anxiety. 30 tablet 5    empagliflozin (JARDIANCE) 10 mg Tab Take 10 mg by mouth once daily. 90 tablet 2    fexofenadine (ALLEGRA) 180 MG tablet Take 1 tablet (180 mg total) by mouth once daily. 90 tablet 3    fluticasone (FLONASE) 50 mcg/actuation nasal spray 2 sprays (100 mcg total) by Each Nare route once daily. 3 Bottle 3    FREESTYLE LANCETS 28 gauge lancets Inject 1 lancet into the skin 3 (three) times daily. 100 each 3    furosemide (LASIX) 20 MG tablet Take 1 tablet (20 mg total) by mouth every Mon, Wed, Fri. 45 tablet 3    gabapentin (NEURONTIN) 300 MG capsule Take 1 capsule (300 mg total) by mouth 3 (three) times daily. 270 capsule 1    HYDROcodone-acetaminophen (NORCO) 7.5-325 mg per tablet Take 1 tablet by mouth every 24 hours as needed for Pain. 28 tablet 0    ipratropium (ATROVENT) 0.03 % nasal spray 2 sprays by Nasal route 2 (two) times daily. 30 mL 5    lisinopriL (PRINIVIL,ZESTRIL) 20 MG tablet Take 1 tablet (20 mg total) by mouth once daily. 90 tablet 3    methocarbamoL (ROBAXIN) 500 MG Tab Take 1 tablet (500 mg total) by mouth 3 (three) times daily. 270 tablet 1    metoprolol tartrate (LOPRESSOR) 25 MG tablet Take 1 tablet (25 mg total) by mouth 2 (two) times daily. 180 tablet 3    montelukast (SINGULAIR) 10 mg tablet Take 1 tablet (10 mg total) by mouth every evening. 90  tablet 3    multivit-iron-min-folic acid (MULTIVITAMIN-IRON-MINERALS-FOLIC ACID) 3,500-18-0.4 unit-mg-mg Chew Take 1 tablet by mouth once daily.       mupirocin (BACTROBAN) 2 % ointment Apply topically 2 (two) times daily. 22 g 1    naftifine (NAFTIN) 2 % Crea daily as needed.       polyethylene glycol (GLYCOLAX) 17 gram/dose powder Take 17 g by mouth once daily. 6 Bottle 3    potassium chloride (MICRO-K) 10 MEQ CpSR Every other day (Patient taking differently: Take 10 mEq by mouth 3 (three) times a week. Every other day) 45 capsule 3    SITagliptin (JANUVIA) 100 MG Tab Take 1 tablet (100 mg total) by mouth once daily. 90 tablet 3    traZODone (DESYREL) 100 MG tablet Take 1 tablet (100 mg total) by mouth every evening. 90 tablet 3    triamcinolone acetonide 0.1% (KENALOG) 0.1 % ointment Apply topically 2 (two) times daily. 80 g 3     No current facility-administered medications for this visit.      Review of patient's allergies indicates:   Allergen Reactions    Ativan [lorazepam] Other (See Comments)     Mood alternating      Dilaudid [hydromorphone (bulk)] Other (See Comments)     Mood alternating      Morphine Other (See Comments)     Mood alternating      Adhesive tape-silicones     Hydromorphone        Review of Systems   Constitutional: Positive for fatigue. Negative for activity change, appetite change, diaphoresis, fever, unexpected weight change and weight loss.   HENT: Negative for congestion, rhinorrhea, sinus pressure, sinus pain and sneezing.    Eyes: Negative for blurred vision.   Respiratory: Negative for cough, chest tightness, shortness of breath and wheezing.    Cardiovascular: Negative for chest pain and palpitations.   Endocrine: Positive for polyuria. Negative for polydipsia and polyphagia.   Genitourinary: Negative for dysuria, flank pain, frequency, impotence and urgency.   Musculoskeletal: Positive for back pain (Chronic at baseline).   Skin: Negative for pallor.   Neurological:  "Negative for dizziness, tremors, seizures, speech difficulty, weakness and headaches.   Psychiatric/Behavioral: Negative for confusion. The patient is not nervous/anxious.           Temperature 97 °F (36.1 °C), resp. rate 18, height 5' 8" (1.727 m), weight 111.1 kg (245 lb). Body mass index is 37.25 kg/m².   Objective:      Physical Exam  Constitutional:       General: He is not in acute distress.     Appearance: Normal appearance. He is obese. He is not ill-appearing or toxic-appearing.   HENT:      Head: Normocephalic and atraumatic.      Nose: Nose normal. No congestion or rhinorrhea.      Mouth/Throat:      Mouth: Mucous membranes are moist.      Pharynx: Oropharynx is clear. No oropharyngeal exudate or posterior oropharyngeal erythema.   Cardiovascular:      Rate and Rhythm: Normal rate and regular rhythm.      Heart sounds: Normal heart sounds.   Pulmonary:      Effort: Pulmonary effort is normal.      Breath sounds: Normal breath sounds.   Musculoskeletal:      Comments: Lumbar spine: ROM is limited with flexion extension and oblique extension with moderate increased pain.    Supine straight leg raise is negative bilaterally.    Internal and external rotation of the hip causes no increased pain on either side.  Myofascial exam: No tenderness to palpation across lumbar paraspinous muscles. Moderate tenderness of right thoracic Paraspinous muscles    Skin:     General: Skin is warm and dry.      Capillary Refill: Capillary refill takes less than 2 seconds.   Neurological:      Mental Status: He is alert and oriented to person, place, and time.             Assessment:       1. Need for influenza vaccination    2. Controlled type 2 diabetes mellitus without complication, without long-term current use of insulin    3. Familial hypercholesterolemia    4. Essential hypertension    5. Lumbar radiculitis    6. Atrial fibrillation, unspecified type    7. Encounter for hepatitis C screening test for low risk patient     "     Plan:           Need for influenza vaccination  -     Influenza - Quadrivalent - High Dose (65+) (PF) (IM)    Controlled type 2 diabetes mellitus without complication, without long-term current use of insulin  -     Hemoglobin A1C; Future; Expected date: 09/28/2020  -     Comprehensive metabolic panel; Future; Expected date: 09/28/2020    Familial hypercholesterolemia  -     Lipid Panel; Future; Expected date: 09/28/2020    Essential hypertension    Lumbar radiculitis  -     HYDROcodone-acetaminophen (NORCO) 7.5-325 mg per tablet; Take 1 tablet by mouth every 24 hours as needed for Pain.  Dispense: 28 tablet; Refill: 0    Atrial fibrillation, unspecified type  -     CBC auto differential; Future; Expected date: 09/28/2020  -     Magnesium; Future; Expected date: 09/28/2020    Encounter for hepatitis C screening test for low risk patient  -     Hepatitis C Antibody; Future; Expected date: 09/28/2020

## 2020-12-14 ENCOUNTER — LAB VISIT (OUTPATIENT)
Dept: LAB | Facility: HOSPITAL | Age: 80
End: 2020-12-14
Attending: FAMILY MEDICINE
Payer: MEDICARE

## 2020-12-14 DIAGNOSIS — E78.01 FAMILIAL HYPERCHOLESTEROLEMIA: ICD-10-CM

## 2020-12-14 DIAGNOSIS — E11.9 CONTROLLED TYPE 2 DIABETES MELLITUS WITHOUT COMPLICATION, WITHOUT LONG-TERM CURRENT USE OF INSULIN: ICD-10-CM

## 2020-12-14 DIAGNOSIS — Z11.59 ENCOUNTER FOR HEPATITIS C SCREENING TEST FOR LOW RISK PATIENT: ICD-10-CM

## 2020-12-14 DIAGNOSIS — I48.91 ATRIAL FIBRILLATION, UNSPECIFIED TYPE: ICD-10-CM

## 2020-12-14 LAB
ALBUMIN SERPL BCP-MCNC: 4 G/DL (ref 3.5–5.2)
ALP SERPL-CCNC: 38 U/L (ref 55–135)
ALT SERPL W/O P-5'-P-CCNC: 26 U/L (ref 10–44)
ANION GAP SERPL CALC-SCNC: 9 MMOL/L (ref 8–16)
AST SERPL-CCNC: 20 U/L (ref 10–40)
BASOPHILS # BLD AUTO: 0.03 K/UL (ref 0–0.2)
BASOPHILS NFR BLD: 0.5 % (ref 0–1.9)
BILIRUB SERPL-MCNC: 1.1 MG/DL (ref 0.1–1)
BUN SERPL-MCNC: 28 MG/DL (ref 8–23)
CALCIUM SERPL-MCNC: 8.9 MG/DL (ref 8.7–10.5)
CHLORIDE SERPL-SCNC: 105 MMOL/L (ref 95–110)
CHOLEST SERPL-MCNC: 122 MG/DL (ref 120–199)
CHOLEST/HDLC SERPL: 3.2 {RATIO} (ref 2–5)
CO2 SERPL-SCNC: 29 MMOL/L (ref 23–29)
CREAT SERPL-MCNC: 1.1 MG/DL (ref 0.5–1.4)
DIFFERENTIAL METHOD: ABNORMAL
EOSINOPHIL # BLD AUTO: 0.2 K/UL (ref 0–0.5)
EOSINOPHIL NFR BLD: 3.6 % (ref 0–8)
ERYTHROCYTE [DISTWIDTH] IN BLOOD BY AUTOMATED COUNT: 14.2 % (ref 11.5–14.5)
EST. GFR  (AFRICAN AMERICAN): >60 ML/MIN/1.73 M^2
EST. GFR  (NON AFRICAN AMERICAN): >60 ML/MIN/1.73 M^2
ESTIMATED AVG GLUCOSE: 128 MG/DL (ref 68–131)
GLUCOSE SERPL-MCNC: 110 MG/DL (ref 70–110)
HBA1C MFR BLD HPLC: 6.1 % (ref 4.5–6.2)
HCT VFR BLD AUTO: 51.4 % (ref 40–54)
HDLC SERPL-MCNC: 38 MG/DL (ref 40–75)
HDLC SERPL: 31.1 % (ref 20–50)
HGB BLD-MCNC: 16 G/DL (ref 14–18)
IMM GRANULOCYTES # BLD AUTO: 0.01 K/UL (ref 0–0.04)
IMM GRANULOCYTES NFR BLD AUTO: 0.2 % (ref 0–0.5)
LDLC SERPL CALC-MCNC: 59.6 MG/DL (ref 63–159)
LYMPHOCYTES # BLD AUTO: 1.6 K/UL (ref 1–4.8)
LYMPHOCYTES NFR BLD: 26.3 % (ref 18–48)
MAGNESIUM SERPL-MCNC: 2.2 MG/DL (ref 1.6–2.6)
MCH RBC QN AUTO: 30.1 PG (ref 27–31)
MCHC RBC AUTO-ENTMCNC: 31.1 G/DL (ref 32–36)
MCV RBC AUTO: 97 FL (ref 82–98)
MONOCYTES # BLD AUTO: 0.5 K/UL (ref 0.3–1)
MONOCYTES NFR BLD: 8.5 % (ref 4–15)
NEUTROPHILS # BLD AUTO: 3.6 K/UL (ref 1.8–7.7)
NEUTROPHILS NFR BLD: 60.9 % (ref 38–73)
NONHDLC SERPL-MCNC: 84 MG/DL
NRBC BLD-RTO: 0 /100 WBC
PLATELET # BLD AUTO: 148 K/UL (ref 150–350)
PMV BLD AUTO: 9.4 FL (ref 9.2–12.9)
POTASSIUM SERPL-SCNC: 4.3 MMOL/L (ref 3.5–5.1)
PROT SERPL-MCNC: 6.8 G/DL (ref 6–8.4)
RBC # BLD AUTO: 5.31 M/UL (ref 4.6–6.2)
SODIUM SERPL-SCNC: 143 MMOL/L (ref 136–145)
TRIGL SERPL-MCNC: 122 MG/DL (ref 30–150)
WBC # BLD AUTO: 5.89 K/UL (ref 3.9–12.7)

## 2020-12-14 PROCEDURE — 80061 LIPID PANEL: CPT

## 2020-12-14 PROCEDURE — 85025 COMPLETE CBC W/AUTO DIFF WBC: CPT

## 2020-12-14 PROCEDURE — 83036 HEMOGLOBIN GLYCOSYLATED A1C: CPT

## 2020-12-14 PROCEDURE — 86803 HEPATITIS C AB TEST: CPT

## 2020-12-14 PROCEDURE — 80053 COMPREHEN METABOLIC PANEL: CPT

## 2020-12-14 PROCEDURE — 83735 ASSAY OF MAGNESIUM: CPT

## 2020-12-14 PROCEDURE — 36415 COLL VENOUS BLD VENIPUNCTURE: CPT

## 2020-12-15 LAB — HCV AB S/CO SERPL IA: <0.1 S/CO RATIO (ref 0–0.9)

## 2021-01-06 DIAGNOSIS — E11.9 CONTROLLED TYPE 2 DIABETES MELLITUS WITHOUT COMPLICATION, WITHOUT LONG-TERM CURRENT USE OF INSULIN: ICD-10-CM

## 2021-01-06 DIAGNOSIS — F51.01 PRIMARY INSOMNIA: ICD-10-CM

## 2021-01-06 DIAGNOSIS — J30.89 CHRONIC NONSEASONAL ALLERGIC RHINITIS DUE TO FUNGAL SPORES: ICD-10-CM

## 2021-01-06 DIAGNOSIS — K59.09 CONSTIPATION, CHRONIC: ICD-10-CM

## 2021-01-06 DIAGNOSIS — M54.16 LUMBAR RADICULITIS: ICD-10-CM

## 2021-01-06 DIAGNOSIS — I10 ESSENTIAL HYPERTENSION: ICD-10-CM

## 2021-01-06 RX ORDER — TRAZODONE HYDROCHLORIDE 100 MG/1
100 TABLET ORAL NIGHTLY
Qty: 90 TABLET | Refills: 3 | Status: SHIPPED | OUTPATIENT
Start: 2021-01-06 | End: 2021-12-28 | Stop reason: SDUPTHER

## 2021-01-06 RX ORDER — LISINOPRIL 20 MG/1
20 TABLET ORAL DAILY
Qty: 90 TABLET | Refills: 3 | Status: SHIPPED | OUTPATIENT
Start: 2021-01-06 | End: 2021-04-30 | Stop reason: SDUPTHER

## 2021-01-06 RX ORDER — MINERAL OIL
180 ENEMA (ML) RECTAL DAILY
Qty: 90 TABLET | Refills: 3 | Status: SHIPPED | OUTPATIENT
Start: 2021-01-06 | End: 2021-12-28 | Stop reason: SDUPTHER

## 2021-01-06 RX ORDER — EMPAGLIFLOZIN 10 MG/1
10 TABLET, FILM COATED ORAL DAILY
Qty: 90 TABLET | Refills: 3 | Status: SHIPPED | OUTPATIENT
Start: 2021-01-06 | End: 2021-09-26 | Stop reason: SDUPTHER

## 2021-01-06 RX ORDER — POLYETHYLENE GLYCOL 3350 17 G/17G
17 POWDER, FOR SOLUTION ORAL DAILY
Qty: 6 BOTTLE | Refills: 3 | Status: SHIPPED | OUTPATIENT
Start: 2021-01-06 | End: 2022-07-26

## 2021-01-06 RX ORDER — HYDROCODONE BITARTRATE AND ACETAMINOPHEN 7.5; 325 MG/1; MG/1
1 TABLET ORAL
Qty: 28 TABLET | Refills: 0 | Status: SHIPPED | OUTPATIENT
Start: 2021-01-06 | End: 2021-02-09 | Stop reason: SDUPTHER

## 2021-01-08 ENCOUNTER — TELEPHONE (OUTPATIENT)
Dept: FAMILY MEDICINE | Facility: CLINIC | Age: 81
End: 2021-01-08

## 2021-01-08 DIAGNOSIS — E11.9 CONTROLLED TYPE 2 DIABETES MELLITUS WITHOUT COMPLICATION, WITHOUT LONG-TERM CURRENT USE OF INSULIN: ICD-10-CM

## 2021-01-08 DIAGNOSIS — I10 ESSENTIAL HYPERTENSION: ICD-10-CM

## 2021-01-08 RX ORDER — POTASSIUM CHLORIDE 750 MG/1
CAPSULE, EXTENDED RELEASE ORAL
Qty: 45 CAPSULE | Refills: 3 | Status: SHIPPED | OUTPATIENT
Start: 2021-01-08 | End: 2022-06-28 | Stop reason: SDUPTHER

## 2021-01-08 RX ORDER — FUROSEMIDE 20 MG/1
20 TABLET ORAL
Qty: 45 TABLET | Refills: 3 | Status: SHIPPED | OUTPATIENT
Start: 2021-01-08 | End: 2021-12-28 | Stop reason: SDUPTHER

## 2021-01-20 ENCOUNTER — OFFICE VISIT (OUTPATIENT)
Dept: CARDIOLOGY | Facility: CLINIC | Age: 81
End: 2021-01-20
Payer: MEDICARE

## 2021-01-20 VITALS
OXYGEN SATURATION: 95 % | HEART RATE: 66 BPM | BODY MASS INDEX: 36.37 KG/M2 | SYSTOLIC BLOOD PRESSURE: 124 MMHG | WEIGHT: 240 LBS | DIASTOLIC BLOOD PRESSURE: 70 MMHG | HEIGHT: 68 IN

## 2021-01-20 DIAGNOSIS — I10 ESSENTIAL HYPERTENSION: ICD-10-CM

## 2021-01-20 DIAGNOSIS — E11.9 CONTROLLED TYPE 2 DIABETES MELLITUS WITHOUT COMPLICATION, WITHOUT LONG-TERM CURRENT USE OF INSULIN: ICD-10-CM

## 2021-01-20 DIAGNOSIS — I48.0 PAROXYSMAL ATRIAL FIBRILLATION: ICD-10-CM

## 2021-01-20 DIAGNOSIS — M51.36 DDD (DEGENERATIVE DISC DISEASE), LUMBAR: Primary | ICD-10-CM

## 2021-01-20 DIAGNOSIS — E66.01 OBESITY, CLASS III, BMI 40-49.9 (MORBID OBESITY): ICD-10-CM

## 2021-01-20 DIAGNOSIS — I82.412 DVT OF DEEP FEMORAL VEIN, LEFT: ICD-10-CM

## 2021-01-20 DIAGNOSIS — E78.01 FAMILIAL HYPERCHOLESTEROLEMIA: ICD-10-CM

## 2021-01-20 PROCEDURE — 99204 PR OFFICE/OUTPT VISIT, NEW, LEVL IV, 45-59 MIN: ICD-10-PCS | Mod: S$GLB,,, | Performed by: SPECIALIST

## 2021-01-20 PROCEDURE — 99204 OFFICE O/P NEW MOD 45 MIN: CPT | Mod: S$GLB,,, | Performed by: SPECIALIST

## 2021-01-22 ENCOUNTER — PATIENT MESSAGE (OUTPATIENT)
Dept: ADMINISTRATIVE | Facility: OTHER | Age: 81
End: 2021-01-22

## 2021-02-09 DIAGNOSIS — M54.16 LUMBAR RADICULITIS: ICD-10-CM

## 2021-02-09 DIAGNOSIS — E78.01 FAMILIAL HYPERCHOLESTEROLEMIA: ICD-10-CM

## 2021-02-09 DIAGNOSIS — J30.89 CHRONIC NONSEASONAL ALLERGIC RHINITIS DUE TO FUNGAL SPORES: ICD-10-CM

## 2021-02-09 RX ORDER — MONTELUKAST SODIUM 10 MG/1
10 TABLET ORAL NIGHTLY
Qty: 90 TABLET | Refills: 3 | Status: SHIPPED | OUTPATIENT
Start: 2021-02-09 | End: 2022-03-02 | Stop reason: SDUPTHER

## 2021-02-09 RX ORDER — HYDROCODONE BITARTRATE AND ACETAMINOPHEN 7.5; 325 MG/1; MG/1
1 TABLET ORAL
Qty: 28 TABLET | Refills: 0 | Status: SHIPPED | OUTPATIENT
Start: 2021-02-09 | End: 2021-06-28 | Stop reason: SDUPTHER

## 2021-02-09 RX ORDER — ATORVASTATIN CALCIUM 10 MG/1
TABLET, FILM COATED ORAL
Qty: 90 TABLET | Refills: 3 | Status: SHIPPED | OUTPATIENT
Start: 2021-02-09 | End: 2022-03-02 | Stop reason: SDUPTHER

## 2021-02-16 ENCOUNTER — PATIENT MESSAGE (OUTPATIENT)
Dept: FAMILY MEDICINE | Facility: CLINIC | Age: 81
End: 2021-02-16

## 2021-02-22 ENCOUNTER — OFFICE VISIT (OUTPATIENT)
Dept: PODIATRY | Facility: CLINIC | Age: 81
End: 2021-02-22
Payer: MEDICARE

## 2021-02-22 VITALS — WEIGHT: 240 LBS | HEIGHT: 68 IN | BODY MASS INDEX: 36.37 KG/M2

## 2021-02-22 DIAGNOSIS — M21.6X2 ACQUIRED BILATERAL PES CAVUS: ICD-10-CM

## 2021-02-22 DIAGNOSIS — R60.0 BILATERAL LOWER EXTREMITY EDEMA: ICD-10-CM

## 2021-02-22 DIAGNOSIS — E11.9 ENCOUNTER FOR DIABETIC FOOT EXAM: Primary | ICD-10-CM

## 2021-02-22 DIAGNOSIS — M79.672 LEFT FOOT PAIN: ICD-10-CM

## 2021-02-22 DIAGNOSIS — E11.9 CONTROLLED TYPE 2 DIABETES MELLITUS WITHOUT COMPLICATION, WITHOUT LONG-TERM CURRENT USE OF INSULIN: ICD-10-CM

## 2021-02-22 DIAGNOSIS — M21.6X1 ACQUIRED BILATERAL PES CAVUS: ICD-10-CM

## 2021-02-22 PROCEDURE — 99214 PR OFFICE/OUTPT VISIT, EST, LEVL IV, 30-39 MIN: ICD-10-PCS | Mod: S$GLB,,, | Performed by: PODIATRIST

## 2021-02-22 PROCEDURE — 99214 OFFICE O/P EST MOD 30 MIN: CPT | Mod: S$GLB,,, | Performed by: PODIATRIST

## 2021-03-01 ENCOUNTER — OFFICE VISIT (OUTPATIENT)
Dept: PAIN MEDICINE | Facility: CLINIC | Age: 81
End: 2021-03-01
Payer: MEDICARE

## 2021-03-01 ENCOUNTER — TELEPHONE (OUTPATIENT)
Dept: PAIN MEDICINE | Facility: CLINIC | Age: 81
End: 2021-03-01

## 2021-03-01 VITALS
HEART RATE: 61 BPM | WEIGHT: 240 LBS | SYSTOLIC BLOOD PRESSURE: 123 MMHG | BODY MASS INDEX: 36.37 KG/M2 | HEIGHT: 68 IN | DIASTOLIC BLOOD PRESSURE: 70 MMHG

## 2021-03-01 DIAGNOSIS — M79.18 MYOFASCIAL PAIN: ICD-10-CM

## 2021-03-01 DIAGNOSIS — M54.16 LUMBAR RADICULITIS: Primary | ICD-10-CM

## 2021-03-01 DIAGNOSIS — M48.061 SPINAL STENOSIS OF LUMBAR REGION, UNSPECIFIED WHETHER NEUROGENIC CLAUDICATION PRESENT: ICD-10-CM

## 2021-03-01 DIAGNOSIS — M51.36 DDD (DEGENERATIVE DISC DISEASE), LUMBAR: Primary | ICD-10-CM

## 2021-03-01 PROCEDURE — 99213 OFFICE O/P EST LOW 20 MIN: CPT | Mod: S$PBB,,, | Performed by: PHYSICIAN ASSISTANT

## 2021-03-01 PROCEDURE — 99999 PR PBB SHADOW E&M-EST. PATIENT-LVL IV: ICD-10-PCS | Mod: PBBFAC,,, | Performed by: PHYSICIAN ASSISTANT

## 2021-03-01 PROCEDURE — 99214 OFFICE O/P EST MOD 30 MIN: CPT | Mod: PBBFAC,PN | Performed by: PHYSICIAN ASSISTANT

## 2021-03-01 PROCEDURE — 99999 PR PBB SHADOW E&M-EST. PATIENT-LVL IV: CPT | Mod: PBBFAC,,, | Performed by: PHYSICIAN ASSISTANT

## 2021-03-01 PROCEDURE — 99213 PR OFFICE/OUTPT VISIT, EST, LEVL III, 20-29 MIN: ICD-10-PCS | Mod: S$PBB,,, | Performed by: PHYSICIAN ASSISTANT

## 2021-03-02 ENCOUNTER — TELEPHONE (OUTPATIENT)
Dept: CARDIOLOGY | Facility: CLINIC | Age: 81
End: 2021-03-02

## 2021-03-03 ENCOUNTER — HOSPITAL ENCOUNTER (OUTPATIENT)
Dept: RADIOLOGY | Facility: HOSPITAL | Age: 81
Discharge: HOME | End: 2021-03-03
Attending: ORTHOPAEDIC SURGERY
Payer: MEDICARE

## 2021-03-03 ENCOUNTER — OFFICE VISIT (OUTPATIENT)
Dept: ORTHOPEDICS | Facility: CLINIC | Age: 81
End: 2021-03-03
Payer: MEDICARE

## 2021-03-03 VITALS — BODY MASS INDEX: 36.37 KG/M2 | HEIGHT: 68 IN | RESPIRATION RATE: 16 BRPM | WEIGHT: 240 LBS

## 2021-03-03 DIAGNOSIS — M72.2 PLANTAR FASCIITIS OF LEFT FOOT: ICD-10-CM

## 2021-03-03 DIAGNOSIS — M25.572 PAIN IN LEFT ANKLE AND JOINTS OF LEFT FOOT: Primary | ICD-10-CM

## 2021-03-03 DIAGNOSIS — G89.29 CHRONIC FOOT PAIN, LEFT: Primary | ICD-10-CM

## 2021-03-03 DIAGNOSIS — M79.672 CHRONIC FOOT PAIN, LEFT: Primary | ICD-10-CM

## 2021-03-03 DIAGNOSIS — M25.572 PAIN IN LEFT ANKLE AND JOINTS OF LEFT FOOT: ICD-10-CM

## 2021-03-03 PROCEDURE — 73630 X-RAY EXAM OF FOOT: CPT | Mod: 26,LT,, | Performed by: RADIOLOGY

## 2021-03-03 PROCEDURE — 99999 PR PBB SHADOW E&M-EST. PATIENT-LVL IV: ICD-10-PCS | Mod: PBBFAC,,, | Performed by: ORTHOPAEDIC SURGERY

## 2021-03-03 PROCEDURE — 73630 XR FOOT COMPLETE 3 VIEW LEFT: ICD-10-PCS | Mod: 26,LT,, | Performed by: RADIOLOGY

## 2021-03-03 PROCEDURE — 73610 X-RAY EXAM OF ANKLE: CPT | Mod: 26,LT,, | Performed by: RADIOLOGY

## 2021-03-03 PROCEDURE — 99203 OFFICE O/P NEW LOW 30 MIN: CPT | Mod: S$PBB,,, | Performed by: ORTHOPAEDIC SURGERY

## 2021-03-03 PROCEDURE — 99203 PR OFFICE/OUTPT VISIT, NEW, LEVL III, 30-44 MIN: ICD-10-PCS | Mod: S$PBB,,, | Performed by: ORTHOPAEDIC SURGERY

## 2021-03-03 PROCEDURE — 73610 X-RAY EXAM OF ANKLE: CPT | Mod: TC,PN,LT

## 2021-03-03 PROCEDURE — 99214 OFFICE O/P EST MOD 30 MIN: CPT | Mod: PBBFAC,25,PN | Performed by: ORTHOPAEDIC SURGERY

## 2021-03-03 PROCEDURE — 99999 PR PBB SHADOW E&M-EST. PATIENT-LVL IV: CPT | Mod: PBBFAC,,, | Performed by: ORTHOPAEDIC SURGERY

## 2021-03-03 PROCEDURE — 73630 X-RAY EXAM OF FOOT: CPT | Mod: TC,PN,LT

## 2021-03-03 PROCEDURE — 73610 XR ANKLE COMPLETE 3 VIEW LEFT: ICD-10-PCS | Mod: 26,LT,, | Performed by: RADIOLOGY

## 2021-03-06 ENCOUNTER — LAB VISIT (OUTPATIENT)
Dept: PRIMARY CARE CLINIC | Facility: CLINIC | Age: 81
End: 2021-03-06
Payer: MEDICARE

## 2021-03-06 DIAGNOSIS — Z01.818 PRE-OP TESTING: ICD-10-CM

## 2021-03-06 LAB — SARS-COV-2 RNA RESP QL NAA+PROBE: NOT DETECTED

## 2021-03-06 PROCEDURE — U0005 INFEC AGEN DETEC AMPLI PROBE: HCPCS | Performed by: ANESTHESIOLOGY

## 2021-03-06 PROCEDURE — U0003 INFECTIOUS AGENT DETECTION BY NUCLEIC ACID (DNA OR RNA); SEVERE ACUTE RESPIRATORY SYNDROME CORONAVIRUS 2 (SARS-COV-2) (CORONAVIRUS DISEASE [COVID-19]), AMPLIFIED PROBE TECHNIQUE, MAKING USE OF HIGH THROUGHPUT TECHNOLOGIES AS DESCRIBED BY CMS-2020-01-R: HCPCS | Performed by: ANESTHESIOLOGY

## 2021-03-09 ENCOUNTER — HOSPITAL ENCOUNTER (OUTPATIENT)
Facility: AMBULARY SURGERY CENTER | Age: 81
Discharge: HOME | End: 2021-03-09
Attending: ANESTHESIOLOGY | Admitting: ANESTHESIOLOGY
Payer: MEDICARE

## 2021-03-09 DIAGNOSIS — M54.16 LUMBAR RADICULITIS: ICD-10-CM

## 2021-03-09 DIAGNOSIS — M54.16 LUMBAR RADICULOPATHY: Primary | ICD-10-CM

## 2021-03-09 LAB — POCT GLUCOSE: 96 MG/DL (ref 70–110)

## 2021-03-09 PROCEDURE — 64483 PR EPIDURAL INJ, ANES/STEROID, TRANSFORAMINAL, LUMB/SACR, SNGL LEVL: ICD-10-PCS | Mod: 50,,, | Performed by: ANESTHESIOLOGY

## 2021-03-09 PROCEDURE — 64484 PRA INJECT ANES/STEROID FORAMEN LUMBAR/SACRAL W IMG GUIDE ,EA ADD LEVEL: ICD-10-PCS | Mod: 50,,, | Performed by: ANESTHESIOLOGY

## 2021-03-09 PROCEDURE — 64483 NJX AA&/STRD TFRM EPI L/S 1: CPT | Mod: RT | Performed by: ANESTHESIOLOGY

## 2021-03-09 PROCEDURE — 64483 NJX AA&/STRD TFRM EPI L/S 1: CPT | Mod: 50,,, | Performed by: ANESTHESIOLOGY

## 2021-03-09 PROCEDURE — 64484 NJX AA&/STRD TFRM EPI L/S EA: CPT | Mod: 50,,, | Performed by: ANESTHESIOLOGY

## 2021-03-09 PROCEDURE — 64484 NJX AA&/STRD TFRM EPI L/S EA: CPT | Mod: RT | Performed by: ANESTHESIOLOGY

## 2021-03-09 RX ORDER — FENTANYL CITRATE 50 UG/ML
INJECTION, SOLUTION INTRAMUSCULAR; INTRAVENOUS
Status: DISCONTINUED | OUTPATIENT
Start: 2021-03-09 | End: 2021-03-09 | Stop reason: HOSPADM

## 2021-03-09 RX ORDER — MIDAZOLAM HYDROCHLORIDE 1 MG/ML
INJECTION INTRAMUSCULAR; INTRAVENOUS
Status: DISCONTINUED | OUTPATIENT
Start: 2021-03-09 | End: 2021-03-09 | Stop reason: HOSPADM

## 2021-03-09 RX ORDER — DEXAMETHASONE SODIUM PHOSPHATE 10 MG/ML
INJECTION INTRAMUSCULAR; INTRAVENOUS
Status: DISCONTINUED | OUTPATIENT
Start: 2021-03-09 | End: 2021-03-09 | Stop reason: HOSPADM

## 2021-03-09 RX ORDER — BUPIVACAINE HYDROCHLORIDE 2.5 MG/ML
INJECTION, SOLUTION EPIDURAL; INFILTRATION; INTRACAUDAL
Status: DISCONTINUED | OUTPATIENT
Start: 2021-03-09 | End: 2021-03-09 | Stop reason: HOSPADM

## 2021-03-09 RX ORDER — SODIUM CHLORIDE, SODIUM LACTATE, POTASSIUM CHLORIDE, CALCIUM CHLORIDE 600; 310; 30; 20 MG/100ML; MG/100ML; MG/100ML; MG/100ML
INJECTION, SOLUTION INTRAVENOUS ONCE AS NEEDED
Status: COMPLETED | OUTPATIENT
Start: 2021-03-09 | End: 2021-03-09

## 2021-03-09 RX ORDER — LIDOCAINE HYDROCHLORIDE 10 MG/ML
INJECTION, SOLUTION EPIDURAL; INFILTRATION; INTRACAUDAL; PERINEURAL
Status: DISCONTINUED | OUTPATIENT
Start: 2021-03-09 | End: 2021-03-09 | Stop reason: HOSPADM

## 2021-03-09 RX ADMIN — SODIUM CHLORIDE, SODIUM LACTATE, POTASSIUM CHLORIDE, CALCIUM CHLORIDE: 600; 310; 30; 20 INJECTION, SOLUTION INTRAVENOUS at 12:03

## 2021-03-10 VITALS
OXYGEN SATURATION: 100 % | WEIGHT: 240 LBS | TEMPERATURE: 98 F | HEART RATE: 53 BPM | RESPIRATION RATE: 18 BRPM | DIASTOLIC BLOOD PRESSURE: 64 MMHG | SYSTOLIC BLOOD PRESSURE: 127 MMHG | BODY MASS INDEX: 36.37 KG/M2 | HEIGHT: 68 IN

## 2021-03-18 ENCOUNTER — PES CALL (OUTPATIENT)
Dept: ADMINISTRATIVE | Facility: CLINIC | Age: 81
End: 2021-03-18

## 2021-03-29 ENCOUNTER — OFFICE VISIT (OUTPATIENT)
Dept: FAMILY MEDICINE | Facility: CLINIC | Age: 81
End: 2021-03-29
Payer: MEDICARE

## 2021-03-29 VITALS
DIASTOLIC BLOOD PRESSURE: 64 MMHG | BODY MASS INDEX: 35.69 KG/M2 | HEART RATE: 51 BPM | RESPIRATION RATE: 17 BRPM | WEIGHT: 235.5 LBS | OXYGEN SATURATION: 96 % | SYSTOLIC BLOOD PRESSURE: 130 MMHG | HEIGHT: 68 IN | TEMPERATURE: 98 F

## 2021-03-29 DIAGNOSIS — E11.9 CONTROLLED TYPE 2 DIABETES MELLITUS WITHOUT COMPLICATION, WITHOUT LONG-TERM CURRENT USE OF INSULIN: ICD-10-CM

## 2021-03-29 DIAGNOSIS — I10 ESSENTIAL HYPERTENSION: ICD-10-CM

## 2021-03-29 DIAGNOSIS — E78.01 FAMILIAL HYPERCHOLESTEROLEMIA: Primary | ICD-10-CM

## 2021-03-29 DIAGNOSIS — M54.16 LUMBAR RADICULITIS: ICD-10-CM

## 2021-03-29 PROCEDURE — 99214 OFFICE O/P EST MOD 30 MIN: CPT | Mod: S$PBB,,, | Performed by: FAMILY MEDICINE

## 2021-03-29 PROCEDURE — 99214 PR OFFICE/OUTPT VISIT, EST, LEVL IV, 30-39 MIN: ICD-10-PCS | Mod: S$PBB,,, | Performed by: FAMILY MEDICINE

## 2021-03-29 PROCEDURE — 99215 OFFICE O/P EST HI 40 MIN: CPT | Performed by: FAMILY MEDICINE

## 2021-03-29 RX ORDER — GABAPENTIN 300 MG/1
300 CAPSULE ORAL 3 TIMES DAILY
Qty: 270 CAPSULE | Refills: 1 | Status: SHIPPED | OUTPATIENT
Start: 2021-03-29 | End: 2021-12-28 | Stop reason: SDUPTHER

## 2021-03-29 RX ORDER — METOPROLOL TARTRATE 25 MG/1
25 TABLET, FILM COATED ORAL 2 TIMES DAILY
Qty: 180 TABLET | Refills: 3 | Status: SHIPPED | OUTPATIENT
Start: 2021-03-29 | End: 2021-04-30 | Stop reason: SDUPTHER

## 2021-04-06 ENCOUNTER — OFFICE VISIT (OUTPATIENT)
Dept: PAIN MEDICINE | Facility: CLINIC | Age: 81
End: 2021-04-06
Payer: MEDICARE

## 2021-04-06 VITALS
BODY MASS INDEX: 35.61 KG/M2 | DIASTOLIC BLOOD PRESSURE: 60 MMHG | HEART RATE: 53 BPM | SYSTOLIC BLOOD PRESSURE: 125 MMHG | WEIGHT: 235 LBS | HEIGHT: 68 IN

## 2021-04-06 DIAGNOSIS — M54.2 NECK PAIN: ICD-10-CM

## 2021-04-06 DIAGNOSIS — M54.16 LUMBAR RADICULITIS: Primary | ICD-10-CM

## 2021-04-06 DIAGNOSIS — M51.36 DDD (DEGENERATIVE DISC DISEASE), LUMBAR: ICD-10-CM

## 2021-04-06 DIAGNOSIS — M48.061 SPINAL STENOSIS OF LUMBAR REGION, UNSPECIFIED WHETHER NEUROGENIC CLAUDICATION PRESENT: ICD-10-CM

## 2021-04-06 DIAGNOSIS — M70.61 GREATER TROCHANTERIC BURSITIS OF RIGHT HIP: ICD-10-CM

## 2021-04-06 PROCEDURE — 99999 PR PBB SHADOW E&M-EST. PATIENT-LVL IV: ICD-10-PCS | Mod: PBBFAC,,, | Performed by: PHYSICIAN ASSISTANT

## 2021-04-06 PROCEDURE — 99213 OFFICE O/P EST LOW 20 MIN: CPT | Mod: S$PBB,,, | Performed by: PHYSICIAN ASSISTANT

## 2021-04-06 PROCEDURE — 99214 OFFICE O/P EST MOD 30 MIN: CPT | Mod: PBBFAC,PN | Performed by: PHYSICIAN ASSISTANT

## 2021-04-06 PROCEDURE — 99999 PR PBB SHADOW E&M-EST. PATIENT-LVL IV: CPT | Mod: PBBFAC,,, | Performed by: PHYSICIAN ASSISTANT

## 2021-04-06 PROCEDURE — 99213 PR OFFICE/OUTPT VISIT, EST, LEVL III, 20-29 MIN: ICD-10-PCS | Mod: S$PBB,,, | Performed by: PHYSICIAN ASSISTANT

## 2021-04-23 ENCOUNTER — HOSPITAL ENCOUNTER (INPATIENT)
Facility: HOSPITAL | Age: 81
LOS: 2 days | Discharge: HOME | DRG: 390 | End: 2021-04-25
Attending: EMERGENCY MEDICINE | Admitting: INTERNAL MEDICINE
Payer: MEDICARE

## 2021-04-23 DIAGNOSIS — R07.9 CHEST PAIN: ICD-10-CM

## 2021-04-23 DIAGNOSIS — I48.91 A-FIB: ICD-10-CM

## 2021-04-23 DIAGNOSIS — K56.609 SMALL BOWEL OBSTRUCTION: Primary | ICD-10-CM

## 2021-04-23 DIAGNOSIS — E11.9 CONTROLLED TYPE 2 DIABETES MELLITUS WITHOUT COMPLICATION, WITHOUT LONG-TERM CURRENT USE OF INSULIN: ICD-10-CM

## 2021-04-23 DIAGNOSIS — R10.9 ABDOMINAL PAIN: ICD-10-CM

## 2021-04-23 PROBLEM — E66.812 CLASS 2 OBESITY IN ADULT: Chronic | Status: ACTIVE | Noted: 2021-04-23

## 2021-04-23 PROBLEM — G47.33 OSA (OBSTRUCTIVE SLEEP APNEA): Chronic | Status: ACTIVE | Noted: 2021-04-23

## 2021-04-23 PROBLEM — N18.30 CKD (CHRONIC KIDNEY DISEASE), STAGE III: Chronic | Status: ACTIVE | Noted: 2021-04-23

## 2021-04-23 PROBLEM — E78.5 HYPERLIPIDEMIA: Chronic | Status: ACTIVE | Noted: 2021-04-23

## 2021-04-23 PROBLEM — E66.9 CLASS 2 OBESITY IN ADULT: Chronic | Status: ACTIVE | Noted: 2021-04-23

## 2021-04-23 PROBLEM — I48.0 PAF (PAROXYSMAL ATRIAL FIBRILLATION): Chronic | Status: ACTIVE | Noted: 2021-04-23

## 2021-04-23 LAB
ALBUMIN SERPL BCP-MCNC: 4.3 G/DL (ref 3.5–5.2)
ALP SERPL-CCNC: 42 U/L (ref 55–135)
ALT SERPL W/O P-5'-P-CCNC: 23 U/L (ref 10–44)
ANION GAP SERPL CALC-SCNC: 9 MMOL/L (ref 8–16)
AST SERPL-CCNC: 21 U/L (ref 10–40)
BACTERIA #/AREA URNS HPF: NORMAL /HPF
BASOPHILS # BLD AUTO: 0.02 K/UL (ref 0–0.2)
BASOPHILS NFR BLD: 0.2 % (ref 0–1.9)
BILIRUB SERPL-MCNC: 1.3 MG/DL (ref 0.1–1)
BILIRUB UR QL STRIP: ABNORMAL
BUN SERPL-MCNC: 27 MG/DL (ref 8–23)
CALCIUM SERPL-MCNC: 8.5 MG/DL (ref 8.7–10.5)
CHLORIDE SERPL-SCNC: 104 MMOL/L (ref 95–110)
CLARITY UR: CLEAR
CO2 SERPL-SCNC: 25 MMOL/L (ref 23–29)
COLOR UR: YELLOW
CREAT SERPL-MCNC: 1.3 MG/DL (ref 0.5–1.4)
DIFFERENTIAL METHOD: ABNORMAL
EOSINOPHIL # BLD AUTO: 0.1 K/UL (ref 0–0.5)
EOSINOPHIL NFR BLD: 1.4 % (ref 0–8)
ERYTHROCYTE [DISTWIDTH] IN BLOOD BY AUTOMATED COUNT: 13.7 % (ref 11.5–14.5)
EST. GFR  (AFRICAN AMERICAN): 60 ML/MIN/1.73 M^2
EST. GFR  (NON AFRICAN AMERICAN): 51.9 ML/MIN/1.73 M^2
GLUCOSE SERPL-MCNC: 114 MG/DL (ref 70–110)
GLUCOSE SERPL-MCNC: 83 MG/DL (ref 70–110)
GLUCOSE UR QL STRIP: ABNORMAL
HCT VFR BLD AUTO: 54.9 % (ref 40–54)
HGB BLD-MCNC: 17.3 G/DL (ref 14–18)
HGB UR QL STRIP: NEGATIVE
HYALINE CASTS #/AREA URNS LPF: 1 /LPF
IMM GRANULOCYTES # BLD AUTO: 0.02 K/UL (ref 0–0.04)
IMM GRANULOCYTES NFR BLD AUTO: 0.2 % (ref 0–0.5)
KETONES UR QL STRIP: ABNORMAL
LACTATE SERPL-SCNC: 1.3 MMOL/L (ref 0.5–1.9)
LEUKOCYTE ESTERASE UR QL STRIP: NEGATIVE
LIPASE SERPL-CCNC: 19 U/L (ref 4–60)
LYMPHOCYTES # BLD AUTO: 1.4 K/UL (ref 1–4.8)
LYMPHOCYTES NFR BLD: 15.5 % (ref 18–48)
MCH RBC QN AUTO: 30.3 PG (ref 27–31)
MCHC RBC AUTO-ENTMCNC: 31.5 G/DL (ref 32–36)
MCV RBC AUTO: 96 FL (ref 82–98)
MICROSCOPIC COMMENT: NORMAL
MONOCYTES # BLD AUTO: 0.7 K/UL (ref 0.3–1)
MONOCYTES NFR BLD: 8.4 % (ref 4–15)
NEUTROPHILS # BLD AUTO: 6.5 K/UL (ref 1.8–7.7)
NEUTROPHILS NFR BLD: 74.3 % (ref 38–73)
NITRITE UR QL STRIP: NEGATIVE
NRBC BLD-RTO: 0 /100 WBC
PH UR STRIP: 6 [PH] (ref 5–8)
PLATELET # BLD AUTO: 163 K/UL (ref 150–450)
PMV BLD AUTO: 9.5 FL (ref 9.2–12.9)
POTASSIUM SERPL-SCNC: 4.1 MMOL/L (ref 3.5–5.1)
PROT SERPL-MCNC: 7.8 G/DL (ref 6–8.4)
PROT UR QL STRIP: ABNORMAL
RBC # BLD AUTO: 5.71 M/UL (ref 4.6–6.2)
RBC #/AREA URNS HPF: 1 /HPF (ref 0–4)
SARS-COV-2 RDRP RESP QL NAA+PROBE: NEGATIVE
SODIUM SERPL-SCNC: 138 MMOL/L (ref 136–145)
SP GR UR STRIP: 1.02 (ref 1–1.03)
URN SPEC COLLECT METH UR: ABNORMAL
UROBILINOGEN UR STRIP-ACNC: NEGATIVE EU/DL
WBC # BLD AUTO: 8.78 K/UL (ref 3.9–12.7)
YEAST URNS QL MICRO: NORMAL

## 2021-04-23 PROCEDURE — 63600175 PHARM REV CODE 636 W HCPCS: Performed by: INTERNAL MEDICINE

## 2021-04-23 PROCEDURE — 12000002 HC ACUTE/MED SURGE SEMI-PRIVATE ROOM

## 2021-04-23 PROCEDURE — 94760 N-INVAS EAR/PLS OXIMETRY 1: CPT

## 2021-04-23 PROCEDURE — 25000003 PHARM REV CODE 250: Performed by: EMERGENCY MEDICINE

## 2021-04-23 PROCEDURE — 93010 EKG 12-LEAD: ICD-10-PCS | Mod: ,,, | Performed by: SPECIALIST

## 2021-04-23 PROCEDURE — 83036 HEMOGLOBIN GLYCOSYLATED A1C: CPT | Performed by: INTERNAL MEDICINE

## 2021-04-23 PROCEDURE — 93010 ELECTROCARDIOGRAM REPORT: CPT | Mod: ,,, | Performed by: SPECIALIST

## 2021-04-23 PROCEDURE — U0002 COVID-19 LAB TEST NON-CDC: HCPCS | Performed by: EMERGENCY MEDICINE

## 2021-04-23 PROCEDURE — C9113 INJ PANTOPRAZOLE SODIUM, VIA: HCPCS | Performed by: INTERNAL MEDICINE

## 2021-04-23 PROCEDURE — 83605 ASSAY OF LACTIC ACID: CPT | Performed by: EMERGENCY MEDICINE

## 2021-04-23 PROCEDURE — 85025 COMPLETE CBC W/AUTO DIFF WBC: CPT | Performed by: EMERGENCY MEDICINE

## 2021-04-23 PROCEDURE — 93005 ELECTROCARDIOGRAM TRACING: CPT | Performed by: SPECIALIST

## 2021-04-23 PROCEDURE — 81001 URINALYSIS AUTO W/SCOPE: CPT | Performed by: EMERGENCY MEDICINE

## 2021-04-23 PROCEDURE — 96360 HYDRATION IV INFUSION INIT: CPT

## 2021-04-23 PROCEDURE — 25000003 PHARM REV CODE 250: Performed by: INTERNAL MEDICINE

## 2021-04-23 PROCEDURE — 99223 1ST HOSP IP/OBS HIGH 75: CPT | Mod: ,,, | Performed by: SURGERY

## 2021-04-23 PROCEDURE — 99900035 HC TECH TIME PER 15 MIN (STAT)

## 2021-04-23 PROCEDURE — 99285 EMERGENCY DEPT VISIT HI MDM: CPT | Mod: 25

## 2021-04-23 PROCEDURE — 80053 COMPREHEN METABOLIC PANEL: CPT | Performed by: EMERGENCY MEDICINE

## 2021-04-23 PROCEDURE — 99223 PR INITIAL HOSPITAL CARE,LEVL III: ICD-10-PCS | Mod: ,,, | Performed by: SURGERY

## 2021-04-23 PROCEDURE — 25500020 PHARM REV CODE 255: Performed by: EMERGENCY MEDICINE

## 2021-04-23 PROCEDURE — 83690 ASSAY OF LIPASE: CPT | Performed by: EMERGENCY MEDICINE

## 2021-04-23 PROCEDURE — 36415 COLL VENOUS BLD VENIPUNCTURE: CPT | Performed by: INTERNAL MEDICINE

## 2021-04-23 RX ORDER — PANTOPRAZOLE SODIUM 40 MG/10ML
40 INJECTION, POWDER, LYOPHILIZED, FOR SOLUTION INTRAVENOUS 2 TIMES DAILY
Status: DISCONTINUED | OUTPATIENT
Start: 2021-04-23 | End: 2021-04-25 | Stop reason: HOSPADM

## 2021-04-23 RX ORDER — TRAZODONE HYDROCHLORIDE 50 MG/1
100 TABLET ORAL NIGHTLY
Status: DISCONTINUED | OUTPATIENT
Start: 2021-04-23 | End: 2021-04-25 | Stop reason: HOSPADM

## 2021-04-23 RX ORDER — POTASSIUM CHLORIDE 7.45 MG/ML
20 INJECTION INTRAVENOUS
Status: DISCONTINUED | OUTPATIENT
Start: 2021-04-23 | End: 2021-04-25 | Stop reason: HOSPADM

## 2021-04-23 RX ORDER — GLUCAGON 1 MG
1 KIT INJECTION
Status: DISCONTINUED | OUTPATIENT
Start: 2021-04-23 | End: 2021-04-25 | Stop reason: HOSPADM

## 2021-04-23 RX ORDER — GABAPENTIN 300 MG/1
300 CAPSULE ORAL 3 TIMES DAILY
Status: DISCONTINUED | OUTPATIENT
Start: 2021-04-23 | End: 2021-04-25 | Stop reason: HOSPADM

## 2021-04-23 RX ORDER — SODIUM CHLORIDE 9 MG/ML
INJECTION, SOLUTION INTRAVENOUS CONTINUOUS
Status: DISCONTINUED | OUTPATIENT
Start: 2021-04-23 | End: 2021-04-24

## 2021-04-23 RX ORDER — POTASSIUM CHLORIDE 20 MEQ/1
20 TABLET, EXTENDED RELEASE ORAL
Status: DISCONTINUED | OUTPATIENT
Start: 2021-04-23 | End: 2021-04-25 | Stop reason: HOSPADM

## 2021-04-23 RX ORDER — ACETAMINOPHEN 325 MG/1
650 TABLET ORAL EVERY 4 HOURS PRN
Status: DISCONTINUED | OUTPATIENT
Start: 2021-04-23 | End: 2021-04-25 | Stop reason: HOSPADM

## 2021-04-23 RX ORDER — POTASSIUM CHLORIDE 7.45 MG/ML
40 INJECTION INTRAVENOUS
Status: DISCONTINUED | OUTPATIENT
Start: 2021-04-23 | End: 2021-04-25 | Stop reason: HOSPADM

## 2021-04-23 RX ORDER — IBUPROFEN 200 MG
24 TABLET ORAL
Status: DISCONTINUED | OUTPATIENT
Start: 2021-04-23 | End: 2021-04-25 | Stop reason: HOSPADM

## 2021-04-23 RX ORDER — PANTOPRAZOLE SODIUM 40 MG/10ML
40 INJECTION, POWDER, LYOPHILIZED, FOR SOLUTION INTRAVENOUS DAILY
Status: DISCONTINUED | OUTPATIENT
Start: 2021-04-24 | End: 2021-04-23

## 2021-04-23 RX ORDER — HYDROCODONE BITARTRATE AND ACETAMINOPHEN 7.5; 325 MG/1; MG/1
1 TABLET ORAL EVERY 4 HOURS PRN
Status: DISCONTINUED | OUTPATIENT
Start: 2021-04-23 | End: 2021-04-25 | Stop reason: HOSPADM

## 2021-04-23 RX ORDER — CEPHALEXIN 500 MG/1
CAPSULE ORAL
Status: ON HOLD | COMMUNITY
Start: 2020-06-24 | End: 2021-04-25 | Stop reason: HOSPADM

## 2021-04-23 RX ORDER — AZELASTINE 1 MG/ML
2 SPRAY, METERED NASAL 2 TIMES DAILY
Status: DISCONTINUED | OUTPATIENT
Start: 2021-04-23 | End: 2021-04-25 | Stop reason: HOSPADM

## 2021-04-23 RX ORDER — SODIUM CHLORIDE 0.9 % (FLUSH) 0.9 %
10 SYRINGE (ML) INJECTION EVERY 6 HOURS PRN
Status: DISCONTINUED | OUTPATIENT
Start: 2021-04-23 | End: 2021-04-25 | Stop reason: HOSPADM

## 2021-04-23 RX ORDER — ONDANSETRON 2 MG/ML
4 INJECTION INTRAMUSCULAR; INTRAVENOUS EVERY 8 HOURS PRN
Status: DISCONTINUED | OUTPATIENT
Start: 2021-04-23 | End: 2021-04-25 | Stop reason: HOSPADM

## 2021-04-23 RX ORDER — MAGNESIUM SULFATE HEPTAHYDRATE 40 MG/ML
2 INJECTION, SOLUTION INTRAVENOUS
Status: DISCONTINUED | OUTPATIENT
Start: 2021-04-23 | End: 2021-04-25 | Stop reason: HOSPADM

## 2021-04-23 RX ORDER — IBUPROFEN 200 MG
16 TABLET ORAL
Status: DISCONTINUED | OUTPATIENT
Start: 2021-04-23 | End: 2021-04-25 | Stop reason: HOSPADM

## 2021-04-23 RX ORDER — IBUPROFEN 800 MG/1
TABLET ORAL
Status: ON HOLD | COMMUNITY
Start: 2020-07-22 | End: 2021-04-25 | Stop reason: HOSPADM

## 2021-04-23 RX ORDER — METOPROLOL TARTRATE 25 MG/1
25 TABLET, FILM COATED ORAL 2 TIMES DAILY
Status: DISCONTINUED | OUTPATIENT
Start: 2021-04-23 | End: 2021-04-25 | Stop reason: HOSPADM

## 2021-04-23 RX ORDER — LANOLIN ALCOHOL/MO/W.PET/CERES
800 CREAM (GRAM) TOPICAL
Status: DISCONTINUED | OUTPATIENT
Start: 2021-04-23 | End: 2021-04-25 | Stop reason: HOSPADM

## 2021-04-23 RX ORDER — ENOXAPARIN SODIUM 100 MG/ML
40 INJECTION SUBCUTANEOUS
Status: DISCONTINUED | OUTPATIENT
Start: 2021-04-23 | End: 2021-04-25 | Stop reason: HOSPADM

## 2021-04-23 RX ORDER — MAGNESIUM SULFATE HEPTAHYDRATE 40 MG/ML
4 INJECTION, SOLUTION INTRAVENOUS
Status: DISCONTINUED | OUTPATIENT
Start: 2021-04-23 | End: 2021-04-25 | Stop reason: HOSPADM

## 2021-04-23 RX ORDER — POTASSIUM CHLORIDE 20 MEQ/1
40 TABLET, EXTENDED RELEASE ORAL
Status: DISCONTINUED | OUTPATIENT
Start: 2021-04-23 | End: 2021-04-25 | Stop reason: HOSPADM

## 2021-04-23 RX ORDER — MONTELUKAST SODIUM 10 MG/1
10 TABLET ORAL NIGHTLY
Status: DISCONTINUED | OUTPATIENT
Start: 2021-04-23 | End: 2021-04-25 | Stop reason: HOSPADM

## 2021-04-23 RX ORDER — MAGNESIUM SULFATE 1 G/100ML
1 INJECTION INTRAVENOUS
Status: DISCONTINUED | OUTPATIENT
Start: 2021-04-23 | End: 2021-04-25 | Stop reason: HOSPADM

## 2021-04-23 RX ORDER — TIZANIDINE HYDROCHLORIDE 6 MG/1
CAPSULE, GELATIN COATED ORAL
Status: ON HOLD | COMMUNITY
Start: 2020-07-22 | End: 2021-04-25 | Stop reason: HOSPADM

## 2021-04-23 RX ORDER — TALC
6 POWDER (GRAM) TOPICAL NIGHTLY PRN
Status: DISCONTINUED | OUTPATIENT
Start: 2021-04-23 | End: 2021-04-25 | Stop reason: HOSPADM

## 2021-04-23 RX ORDER — ATORVASTATIN CALCIUM 10 MG/1
10 TABLET, FILM COATED ORAL NIGHTLY
Status: DISCONTINUED | OUTPATIENT
Start: 2021-04-23 | End: 2021-04-25 | Stop reason: HOSPADM

## 2021-04-23 RX ORDER — ASPIRIN 81 MG/1
81 TABLET ORAL DAILY
Status: DISCONTINUED | OUTPATIENT
Start: 2021-04-23 | End: 2021-04-25 | Stop reason: HOSPADM

## 2021-04-23 RX ADMIN — ENOXAPARIN SODIUM 40 MG: 40 INJECTION SUBCUTANEOUS at 08:04

## 2021-04-23 RX ADMIN — SODIUM CHLORIDE 1000 ML: 0.9 INJECTION, SOLUTION INTRAVENOUS at 08:04

## 2021-04-23 RX ADMIN — SODIUM CHLORIDE: 0.9 INJECTION, SOLUTION INTRAVENOUS at 02:04

## 2021-04-23 RX ADMIN — GABAPENTIN 300 MG: 300 CAPSULE ORAL at 02:04

## 2021-04-23 RX ADMIN — ASPIRIN 81 MG: 81 TABLET, DELAYED RELEASE ORAL at 02:04

## 2021-04-23 RX ADMIN — GABAPENTIN 300 MG: 300 CAPSULE ORAL at 08:04

## 2021-04-23 RX ADMIN — HYDROCODONE BITARTRATE AND ACETAMINOPHEN 1 TABLET: 7.5; 325 TABLET ORAL at 02:04

## 2021-04-23 RX ADMIN — ATORVASTATIN CALCIUM 10 MG: 10 TABLET, FILM COATED ORAL at 08:04

## 2021-04-23 RX ADMIN — TRAZODONE HYDROCHLORIDE 100 MG: 50 TABLET ORAL at 08:04

## 2021-04-23 RX ADMIN — PANTOPRAZOLE SODIUM 40 MG: 40 INJECTION, POWDER, FOR SOLUTION INTRAVENOUS at 08:04

## 2021-04-23 RX ADMIN — IOHEXOL 100 ML: 350 INJECTION, SOLUTION INTRAVENOUS at 09:04

## 2021-04-23 RX ADMIN — MONTELUKAST 10 MG: 10 TABLET, FILM COATED ORAL at 08:04

## 2021-04-23 RX ADMIN — PANTOPRAZOLE SODIUM 40 MG: 40 INJECTION, POWDER, FOR SOLUTION INTRAVENOUS at 02:04

## 2021-04-24 PROBLEM — E16.2 HYPOGLYCEMIA: Status: ACTIVE | Noted: 2021-04-24

## 2021-04-24 PROBLEM — E83.39 HYPOPHOSPHATEMIA: Status: ACTIVE | Noted: 2021-04-24

## 2021-04-24 PROBLEM — R51.9 HEADACHE: Status: ACTIVE | Noted: 2021-04-24

## 2021-04-24 LAB
ANION GAP SERPL CALC-SCNC: 8 MMOL/L (ref 8–16)
BUN SERPL-MCNC: 20 MG/DL (ref 8–23)
CALCIUM SERPL-MCNC: 8.1 MG/DL (ref 8.7–10.5)
CHLORIDE SERPL-SCNC: 111 MMOL/L (ref 95–110)
CO2 SERPL-SCNC: 21 MMOL/L (ref 23–29)
CREAT SERPL-MCNC: 1 MG/DL (ref 0.5–1.4)
ERYTHROCYTE [DISTWIDTH] IN BLOOD BY AUTOMATED COUNT: 13.7 % (ref 11.5–14.5)
EST. GFR  (AFRICAN AMERICAN): >60 ML/MIN/1.73 M^2
EST. GFR  (NON AFRICAN AMERICAN): >60 ML/MIN/1.73 M^2
ESTIMATED AVG GLUCOSE: 123 MG/DL (ref 68–131)
GLUCOSE SERPL-MCNC: 103 MG/DL (ref 70–110)
GLUCOSE SERPL-MCNC: 111 MG/DL (ref 70–110)
GLUCOSE SERPL-MCNC: 115 MG/DL (ref 70–110)
GLUCOSE SERPL-MCNC: 116 MG/DL (ref 70–110)
GLUCOSE SERPL-MCNC: 65 MG/DL (ref 70–110)
GLUCOSE SERPL-MCNC: 80 MG/DL (ref 70–110)
GLUCOSE SERPL-MCNC: 82 MG/DL (ref 70–110)
HBA1C MFR BLD: 5.9 % (ref 4.5–6.2)
HCT VFR BLD AUTO: 45 % (ref 40–54)
HGB BLD-MCNC: 14.3 G/DL (ref 14–18)
MAGNESIUM SERPL-MCNC: 2.1 MG/DL (ref 1.6–2.6)
MCH RBC QN AUTO: 30.4 PG (ref 27–31)
MCHC RBC AUTO-ENTMCNC: 31.8 G/DL (ref 32–36)
MCV RBC AUTO: 96 FL (ref 82–98)
PHOSPHATE SERPL-MCNC: 2.3 MG/DL (ref 2.7–4.5)
PLATELET # BLD AUTO: 124 K/UL (ref 150–450)
PMV BLD AUTO: 9.5 FL (ref 9.2–12.9)
POTASSIUM SERPL-SCNC: 4 MMOL/L (ref 3.5–5.1)
RBC # BLD AUTO: 4.7 M/UL (ref 4.6–6.2)
SODIUM SERPL-SCNC: 140 MMOL/L (ref 136–145)
WBC # BLD AUTO: 4.7 K/UL (ref 3.9–12.7)

## 2021-04-24 PROCEDURE — 83735 ASSAY OF MAGNESIUM: CPT | Performed by: INTERNAL MEDICINE

## 2021-04-24 PROCEDURE — 63600175 PHARM REV CODE 636 W HCPCS: Performed by: INTERNAL MEDICINE

## 2021-04-24 PROCEDURE — 36415 COLL VENOUS BLD VENIPUNCTURE: CPT | Performed by: INTERNAL MEDICINE

## 2021-04-24 PROCEDURE — 85027 COMPLETE CBC AUTOMATED: CPT | Performed by: INTERNAL MEDICINE

## 2021-04-24 PROCEDURE — 25000003 PHARM REV CODE 250: Performed by: INTERNAL MEDICINE

## 2021-04-24 PROCEDURE — 80048 BASIC METABOLIC PNL TOTAL CA: CPT | Performed by: INTERNAL MEDICINE

## 2021-04-24 PROCEDURE — 12000002 HC ACUTE/MED SURGE SEMI-PRIVATE ROOM

## 2021-04-24 PROCEDURE — 99900031 HC PATIENT EDUCATION (STAT)

## 2021-04-24 PROCEDURE — C9113 INJ PANTOPRAZOLE SODIUM, VIA: HCPCS | Performed by: INTERNAL MEDICINE

## 2021-04-24 PROCEDURE — 99900035 HC TECH TIME PER 15 MIN (STAT)

## 2021-04-24 PROCEDURE — 84100 ASSAY OF PHOSPHORUS: CPT | Performed by: INTERNAL MEDICINE

## 2021-04-24 PROCEDURE — 94761 N-INVAS EAR/PLS OXIMETRY MLT: CPT

## 2021-04-24 RX ORDER — LISINOPRIL 20 MG/1
20 TABLET ORAL DAILY
Status: DISCONTINUED | OUTPATIENT
Start: 2021-04-24 | End: 2021-04-25 | Stop reason: HOSPADM

## 2021-04-24 RX ADMIN — AZELASTINE HYDROCHLORIDE 274 MCG: 137 SPRAY, METERED NASAL at 08:04

## 2021-04-24 RX ADMIN — GABAPENTIN 300 MG: 300 CAPSULE ORAL at 09:04

## 2021-04-24 RX ADMIN — ACETAMINOPHEN 650 MG: 325 TABLET, FILM COATED ORAL at 09:04

## 2021-04-24 RX ADMIN — PANTOPRAZOLE SODIUM 40 MG: 40 INJECTION, POWDER, FOR SOLUTION INTRAVENOUS at 09:04

## 2021-04-24 RX ADMIN — LISINOPRIL 20 MG: 20 TABLET ORAL at 11:04

## 2021-04-24 RX ADMIN — ATORVASTATIN CALCIUM 10 MG: 10 TABLET, FILM COATED ORAL at 08:04

## 2021-04-24 RX ADMIN — GABAPENTIN 300 MG: 300 CAPSULE ORAL at 08:04

## 2021-04-24 RX ADMIN — MONTELUKAST 10 MG: 10 TABLET, FILM COATED ORAL at 08:04

## 2021-04-24 RX ADMIN — GABAPENTIN 300 MG: 300 CAPSULE ORAL at 02:04

## 2021-04-24 RX ADMIN — ASPIRIN 81 MG: 81 TABLET, DELAYED RELEASE ORAL at 09:04

## 2021-04-24 RX ADMIN — TRAZODONE HYDROCHLORIDE 100 MG: 50 TABLET ORAL at 08:04

## 2021-04-24 RX ADMIN — ENOXAPARIN SODIUM 40 MG: 40 INJECTION SUBCUTANEOUS at 08:04

## 2021-04-24 RX ADMIN — ACETAMINOPHEN 650 MG: 325 TABLET, FILM COATED ORAL at 03:04

## 2021-04-24 RX ADMIN — SODIUM CHLORIDE: 0.9 INJECTION, SOLUTION INTRAVENOUS at 04:04

## 2021-04-24 RX ADMIN — METOPROLOL TARTRATE 25 MG: 25 TABLET, FILM COATED ORAL at 08:04

## 2021-04-24 RX ADMIN — PANTOPRAZOLE SODIUM 40 MG: 40 INJECTION, POWDER, FOR SOLUTION INTRAVENOUS at 08:04

## 2021-04-25 VITALS
SYSTOLIC BLOOD PRESSURE: 138 MMHG | OXYGEN SATURATION: 98 % | RESPIRATION RATE: 18 BRPM | DIASTOLIC BLOOD PRESSURE: 71 MMHG | HEART RATE: 58 BPM | WEIGHT: 230.38 LBS | TEMPERATURE: 98 F | HEIGHT: 68 IN | BODY MASS INDEX: 34.92 KG/M2

## 2021-04-25 PROBLEM — E16.2 HYPOGLYCEMIA: Status: RESOLVED | Noted: 2021-04-24 | Resolved: 2021-04-25

## 2021-04-25 PROBLEM — R51.9 HEADACHE: Status: RESOLVED | Noted: 2021-04-24 | Resolved: 2021-04-25

## 2021-04-25 LAB
GLUCOSE SERPL-MCNC: 83 MG/DL (ref 70–110)
GLUCOSE SERPL-MCNC: 87 MG/DL (ref 70–110)

## 2021-04-25 PROCEDURE — C9113 INJ PANTOPRAZOLE SODIUM, VIA: HCPCS | Performed by: INTERNAL MEDICINE

## 2021-04-25 PROCEDURE — 99900035 HC TECH TIME PER 15 MIN (STAT)

## 2021-04-25 PROCEDURE — 63600175 PHARM REV CODE 636 W HCPCS: Performed by: INTERNAL MEDICINE

## 2021-04-25 PROCEDURE — 25000003 PHARM REV CODE 250: Performed by: INTERNAL MEDICINE

## 2021-04-25 RX ORDER — POLYETHYLENE GLYCOL 3350 17 G/17G
17 POWDER, FOR SOLUTION ORAL DAILY
Status: DISCONTINUED | OUTPATIENT
Start: 2021-04-25 | End: 2021-04-25 | Stop reason: HOSPADM

## 2021-04-25 RX ADMIN — AZELASTINE HYDROCHLORIDE 274 MCG: 137 SPRAY, METERED NASAL at 09:04

## 2021-04-25 RX ADMIN — PANTOPRAZOLE SODIUM 40 MG: 40 INJECTION, POWDER, FOR SOLUTION INTRAVENOUS at 09:04

## 2021-04-25 RX ADMIN — GABAPENTIN 300 MG: 300 CAPSULE ORAL at 09:04

## 2021-04-25 RX ADMIN — ASPIRIN 81 MG: 81 TABLET, DELAYED RELEASE ORAL at 09:04

## 2021-04-25 RX ADMIN — POLYETHYLENE GLYCOL 3350 17 G: 17 POWDER, FOR SOLUTION ORAL at 10:04

## 2021-04-25 RX ADMIN — LISINOPRIL 20 MG: 20 TABLET ORAL at 09:04

## 2021-04-26 ENCOUNTER — PATIENT OUTREACH (OUTPATIENT)
Dept: ADMINISTRATIVE | Facility: CLINIC | Age: 81
End: 2021-04-26

## 2021-04-30 ENCOUNTER — OFFICE VISIT (OUTPATIENT)
Dept: FAMILY MEDICINE | Facility: CLINIC | Age: 81
End: 2021-04-30
Payer: MEDICARE

## 2021-04-30 VITALS
HEIGHT: 68 IN | BODY MASS INDEX: 35.06 KG/M2 | HEART RATE: 53 BPM | TEMPERATURE: 98 F | RESPIRATION RATE: 17 BRPM | SYSTOLIC BLOOD PRESSURE: 118 MMHG | DIASTOLIC BLOOD PRESSURE: 70 MMHG | OXYGEN SATURATION: 95 % | WEIGHT: 231.31 LBS

## 2021-04-30 DIAGNOSIS — E11.9 CONTROLLED TYPE 2 DIABETES MELLITUS WITHOUT COMPLICATION, WITHOUT LONG-TERM CURRENT USE OF INSULIN: ICD-10-CM

## 2021-04-30 DIAGNOSIS — Z09 HOSPITAL DISCHARGE FOLLOW-UP: Primary | ICD-10-CM

## 2021-04-30 DIAGNOSIS — G89.29 CHRONIC PAIN OF BOTH KNEES: ICD-10-CM

## 2021-04-30 DIAGNOSIS — I10 ESSENTIAL HYPERTENSION: ICD-10-CM

## 2021-04-30 DIAGNOSIS — E83.51 HYPOCALCEMIA: ICD-10-CM

## 2021-04-30 DIAGNOSIS — I48.20 CHRONIC ATRIAL FIBRILLATION: ICD-10-CM

## 2021-04-30 DIAGNOSIS — M25.562 CHRONIC PAIN OF BOTH KNEES: ICD-10-CM

## 2021-04-30 DIAGNOSIS — M25.561 CHRONIC PAIN OF BOTH KNEES: ICD-10-CM

## 2021-04-30 PROCEDURE — 99495 TRANSJ CARE MGMT MOD F2F 14D: CPT | Mod: S$PBB,,, | Performed by: FAMILY MEDICINE

## 2021-04-30 PROCEDURE — 99495 TCM SERVICES (MODERATE COMPLEXITY): ICD-10-PCS | Mod: S$PBB,,, | Performed by: FAMILY MEDICINE

## 2021-04-30 PROCEDURE — 99215 OFFICE O/P EST HI 40 MIN: CPT | Performed by: FAMILY MEDICINE

## 2021-04-30 RX ORDER — ISOPROPYL ALCOHOL 70 ML/100ML
2 SWAB TOPICAL DAILY
Qty: 200 EACH | Refills: 5 | Status: SHIPPED | OUTPATIENT
Start: 2021-04-30 | End: 2022-07-26

## 2021-04-30 RX ORDER — LISINOPRIL 20 MG/1
20 TABLET ORAL DAILY
Qty: 90 TABLET | Refills: 3 | Status: SHIPPED | OUTPATIENT
Start: 2021-04-30 | End: 2022-06-28 | Stop reason: SDUPTHER

## 2021-04-30 RX ORDER — METOPROLOL TARTRATE 25 MG/1
25 TABLET, FILM COATED ORAL 2 TIMES DAILY
Qty: 180 TABLET | Refills: 3 | Status: SHIPPED | OUTPATIENT
Start: 2021-04-30 | End: 2022-05-02 | Stop reason: SDUPTHER

## 2021-04-30 RX ORDER — TRAMADOL HYDROCHLORIDE 50 MG/1
50 TABLET ORAL 2 TIMES DAILY
Qty: 60 TABLET | Refills: 4 | Status: SHIPPED | OUTPATIENT
Start: 2021-04-30 | End: 2021-06-21

## 2021-05-13 ENCOUNTER — TELEPHONE (OUTPATIENT)
Dept: PAIN MEDICINE | Facility: CLINIC | Age: 81
End: 2021-05-13

## 2021-05-13 DIAGNOSIS — M54.16 LUMBAR RADICULITIS: Primary | ICD-10-CM

## 2021-05-19 ENCOUNTER — TELEPHONE (OUTPATIENT)
Dept: CARDIOLOGY | Facility: CLINIC | Age: 81
End: 2021-05-19

## 2021-05-20 ENCOUNTER — HOSPITAL ENCOUNTER (OUTPATIENT)
Dept: CARDIOLOGY | Facility: CLINIC | Age: 81
Discharge: HOME | End: 2021-05-20
Attending: SPECIALIST
Payer: MEDICARE

## 2021-05-20 VITALS — WEIGHT: 231 LBS | BODY MASS INDEX: 35.01 KG/M2 | HEIGHT: 68 IN

## 2021-05-20 DIAGNOSIS — E66.01 OBESITY, CLASS III, BMI 40-49.9 (MORBID OBESITY): ICD-10-CM

## 2021-05-20 DIAGNOSIS — E11.9 CONTROLLED TYPE 2 DIABETES MELLITUS WITHOUT COMPLICATION, WITHOUT LONG-TERM CURRENT USE OF INSULIN: ICD-10-CM

## 2021-05-20 PROCEDURE — 93306 TTE W/DOPPLER COMPLETE: CPT | Mod: S$GLB,,, | Performed by: SPECIALIST

## 2021-05-20 PROCEDURE — 93306 ECHO (CUPID ONLY): ICD-10-PCS | Mod: S$GLB,,, | Performed by: SPECIALIST

## 2021-05-22 ENCOUNTER — LAB VISIT (OUTPATIENT)
Dept: PRIMARY CARE CLINIC | Facility: CLINIC | Age: 81
End: 2021-05-22
Payer: MEDICARE

## 2021-05-22 DIAGNOSIS — Z01.818 PRE-OP TESTING: ICD-10-CM

## 2021-05-25 ENCOUNTER — HOSPITAL ENCOUNTER (OUTPATIENT)
Facility: AMBULARY SURGERY CENTER | Age: 81
Discharge: HOME | End: 2021-05-25
Attending: ANESTHESIOLOGY | Admitting: ANESTHESIOLOGY
Payer: MEDICARE

## 2021-05-25 DIAGNOSIS — M54.16 LUMBAR RADICULITIS: Primary | ICD-10-CM

## 2021-05-25 LAB
AORTIC ROOT ANNULUS: 3.5 CM
AORTIC VALVE CUSP SEPERATION: 2.3 CM
AV INDEX (PROSTH): 0.84
AV MEAN GRADIENT: 4 MMHG
AV PEAK GRADIENT: 9 MMHG
AV VALVE AREA: 3.49 CM2
AV VELOCITY RATIO: 0.86
BSA FOR ECHO PROCEDURE: 2.24 M2
CV ECHO LV RWT: 0.5 CM
DOP CALC AO PEAK VEL: 1.52 M/S
DOP CALC AO VTI: 35.6 CM
DOP CALC LVOT AREA: 4.2 CM2
DOP CALC LVOT DIAMETER: 2.3 CM
DOP CALC LVOT PEAK VEL: 1.31 M/S
DOP CALC LVOT STROKE VOLUME: 124.16 CM3
DOP CALCLVOT PEAK VEL VTI: 29.9 CM
E WAVE DECELERATION TIME: 248 MS
E/A RATIO: 0.87
E/E' RATIO: 12.43 M/S
ECHO LV POSTERIOR WALL: 1.28 CM (ref 0.6–1.1)
EJECTION FRACTION: 66 %
FRACTIONAL SHORTENING: 36 % (ref 28–44)
INTERVENTRICULAR SEPTUM: 1.41 CM (ref 0.6–1.1)
IVRT: 90 MS
LEFT ATRIUM SIZE: 4.2 CM
LEFT INTERNAL DIMENSION IN SYSTOLE: 3.24 CM (ref 2.1–4)
LEFT VENTRICLE MASS INDEX: 130 G/M2
LEFT VENTRICULAR INTERNAL DIMENSION IN DIASTOLE: 5.08 CM (ref 3.5–6)
LEFT VENTRICULAR MASS: 281.82 G
LV LATERAL E/E' RATIO: 10.88 M/S
LV SEPTAL E/E' RATIO: 14.5 M/S
MV PEAK A VEL: 1 M/S
MV PEAK E VEL: 0.87 M/S
PISA TR MAX VEL: 2.1 M/S
POCT GLUCOSE: 97 MG/DL (ref 70–110)
RA PRESSURE: 3 MMHG
RIGHT VENTRICULAR END-DIASTOLIC DIMENSION: 2.73 CM
TDI LATERAL: 0.08 M/S
TDI SEPTAL: 0.06 M/S
TDI: 0.07 M/S
TR MAX PG: 18 MMHG
TV REST PULMONARY ARTERY PRESSURE: 21 MMHG

## 2021-05-25 PROCEDURE — 64483 NJX AA&/STRD TFRM EPI L/S 1: CPT | Mod: 50,,, | Performed by: ANESTHESIOLOGY

## 2021-05-25 PROCEDURE — 64484 NJX AA&/STRD TFRM EPI L/S EA: CPT | Mod: 50,,, | Performed by: ANESTHESIOLOGY

## 2021-05-25 PROCEDURE — 64484 NJX AA&/STRD TFRM EPI L/S EA: CPT | Mod: RT | Performed by: ANESTHESIOLOGY

## 2021-05-25 PROCEDURE — 64484 PRA INJECT ANES/STEROID FORAMEN LUMBAR/SACRAL W IMG GUIDE ,EA ADD LEVEL: ICD-10-PCS | Mod: 50,,, | Performed by: ANESTHESIOLOGY

## 2021-05-25 PROCEDURE — 64483 NJX AA&/STRD TFRM EPI L/S 1: CPT | Mod: LT | Performed by: ANESTHESIOLOGY

## 2021-05-25 PROCEDURE — 64483 PR EPIDURAL INJ, ANES/STEROID, TRANSFORAMINAL, LUMB/SACR, SNGL LEVL: ICD-10-PCS | Mod: 50,,, | Performed by: ANESTHESIOLOGY

## 2021-05-25 RX ORDER — BUPIVACAINE HYDROCHLORIDE 2.5 MG/ML
INJECTION, SOLUTION EPIDURAL; INFILTRATION; INTRACAUDAL
Status: DISCONTINUED | OUTPATIENT
Start: 2021-05-25 | End: 2021-05-25 | Stop reason: HOSPADM

## 2021-05-25 RX ORDER — LIDOCAINE HYDROCHLORIDE 10 MG/ML
INJECTION, SOLUTION EPIDURAL; INFILTRATION; INTRACAUDAL; PERINEURAL
Status: DISCONTINUED | OUTPATIENT
Start: 2021-05-25 | End: 2021-05-25 | Stop reason: HOSPADM

## 2021-05-25 RX ORDER — FENTANYL CITRATE 50 UG/ML
INJECTION, SOLUTION INTRAMUSCULAR; INTRAVENOUS
Status: DISCONTINUED | OUTPATIENT
Start: 2021-05-25 | End: 2021-05-25 | Stop reason: HOSPADM

## 2021-05-25 RX ORDER — MIDAZOLAM HYDROCHLORIDE 1 MG/ML
INJECTION INTRAMUSCULAR; INTRAVENOUS
Status: DISCONTINUED | OUTPATIENT
Start: 2021-05-25 | End: 2021-05-25 | Stop reason: HOSPADM

## 2021-05-25 RX ORDER — DEXAMETHASONE SODIUM PHOSPHATE 10 MG/ML
INJECTION INTRAMUSCULAR; INTRAVENOUS
Status: DISCONTINUED | OUTPATIENT
Start: 2021-05-25 | End: 2021-05-25 | Stop reason: HOSPADM

## 2021-05-25 RX ORDER — SODIUM CHLORIDE, SODIUM LACTATE, POTASSIUM CHLORIDE, CALCIUM CHLORIDE 600; 310; 30; 20 MG/100ML; MG/100ML; MG/100ML; MG/100ML
INJECTION, SOLUTION INTRAVENOUS ONCE AS NEEDED
Status: COMPLETED | OUTPATIENT
Start: 2021-05-25 | End: 2021-05-25

## 2021-05-25 RX ADMIN — SODIUM CHLORIDE, SODIUM LACTATE, POTASSIUM CHLORIDE, CALCIUM CHLORIDE: 600; 310; 30; 20 INJECTION, SOLUTION INTRAVENOUS at 09:05

## 2021-05-26 VITALS
DIASTOLIC BLOOD PRESSURE: 75 MMHG | HEIGHT: 68 IN | RESPIRATION RATE: 18 BRPM | SYSTOLIC BLOOD PRESSURE: 169 MMHG | TEMPERATURE: 98 F | HEART RATE: 62 BPM | OXYGEN SATURATION: 98 % | BODY MASS INDEX: 35.01 KG/M2 | WEIGHT: 231 LBS

## 2021-06-18 ENCOUNTER — LAB VISIT (OUTPATIENT)
Dept: LAB | Facility: HOSPITAL | Age: 81
End: 2021-06-18
Attending: FAMILY MEDICINE
Payer: MEDICARE

## 2021-06-18 DIAGNOSIS — E83.51 HYPOCALCEMIA: ICD-10-CM

## 2021-06-18 DIAGNOSIS — E78.01 FAMILIAL HYPERCHOLESTEROLEMIA: ICD-10-CM

## 2021-06-18 DIAGNOSIS — E11.9 CONTROLLED TYPE 2 DIABETES MELLITUS WITHOUT COMPLICATION, WITHOUT LONG-TERM CURRENT USE OF INSULIN: ICD-10-CM

## 2021-06-18 LAB
25(OH)D3+25(OH)D2 SERPL-MCNC: 32 NG/ML (ref 30–96)
ALBUMIN SERPL BCP-MCNC: 3.9 G/DL (ref 3.5–5.2)
ALP SERPL-CCNC: 35 U/L (ref 55–135)
ALT SERPL W/O P-5'-P-CCNC: 21 U/L (ref 10–44)
ANION GAP SERPL CALC-SCNC: 8 MMOL/L (ref 8–16)
AST SERPL-CCNC: 19 U/L (ref 10–40)
BILIRUB SERPL-MCNC: 1 MG/DL (ref 0.1–1)
BUN SERPL-MCNC: 23 MG/DL (ref 8–23)
CALCIUM SERPL-MCNC: 8.6 MG/DL (ref 8.7–10.5)
CHLORIDE SERPL-SCNC: 104 MMOL/L (ref 95–110)
CHOLEST SERPL-MCNC: 112 MG/DL (ref 120–199)
CHOLEST/HDLC SERPL: 3.3 {RATIO} (ref 2–5)
CO2 SERPL-SCNC: 28 MMOL/L (ref 23–29)
CREAT SERPL-MCNC: 1.2 MG/DL (ref 0.5–1.4)
EST. GFR  (AFRICAN AMERICAN): >60 ML/MIN/1.73 M^2
EST. GFR  (NON AFRICAN AMERICAN): 57.2 ML/MIN/1.73 M^2
ESTIMATED AVG GLUCOSE: 117 MG/DL (ref 68–131)
GLUCOSE SERPL-MCNC: 91 MG/DL (ref 70–110)
HBA1C MFR BLD: 5.7 % (ref 4.5–6.2)
HDLC SERPL-MCNC: 34 MG/DL (ref 40–75)
HDLC SERPL: 30.4 % (ref 20–50)
LDLC SERPL CALC-MCNC: 56.4 MG/DL (ref 63–159)
NONHDLC SERPL-MCNC: 78 MG/DL
POTASSIUM SERPL-SCNC: 4.1 MMOL/L (ref 3.5–5.1)
PROT SERPL-MCNC: 6.6 G/DL (ref 6–8.4)
PTH-INTACT SERPL-MCNC: 88.9 PG/ML (ref 9–77)
SODIUM SERPL-SCNC: 140 MMOL/L (ref 136–145)
TRIGL SERPL-MCNC: 108 MG/DL (ref 30–150)

## 2021-06-18 PROCEDURE — 82306 VITAMIN D 25 HYDROXY: CPT | Performed by: FAMILY MEDICINE

## 2021-06-18 PROCEDURE — 36415 COLL VENOUS BLD VENIPUNCTURE: CPT | Performed by: FAMILY MEDICINE

## 2021-06-18 PROCEDURE — 80061 LIPID PANEL: CPT | Performed by: FAMILY MEDICINE

## 2021-06-18 PROCEDURE — 80053 COMPREHEN METABOLIC PANEL: CPT | Performed by: FAMILY MEDICINE

## 2021-06-18 PROCEDURE — 83036 HEMOGLOBIN GLYCOSYLATED A1C: CPT | Performed by: FAMILY MEDICINE

## 2021-06-18 PROCEDURE — 83970 ASSAY OF PARATHORMONE: CPT | Performed by: FAMILY MEDICINE

## 2021-06-21 ENCOUNTER — OFFICE VISIT (OUTPATIENT)
Dept: PAIN MEDICINE | Facility: CLINIC | Age: 81
End: 2021-06-21
Payer: MEDICARE

## 2021-06-21 VITALS
HEIGHT: 68 IN | HEART RATE: 48 BPM | BODY MASS INDEX: 35.01 KG/M2 | DIASTOLIC BLOOD PRESSURE: 68 MMHG | WEIGHT: 231 LBS | SYSTOLIC BLOOD PRESSURE: 114 MMHG

## 2021-06-21 DIAGNOSIS — M48.061 SPINAL STENOSIS OF LUMBAR REGION, UNSPECIFIED WHETHER NEUROGENIC CLAUDICATION PRESENT: ICD-10-CM

## 2021-06-21 DIAGNOSIS — M51.36 DDD (DEGENERATIVE DISC DISEASE), LUMBAR: ICD-10-CM

## 2021-06-21 DIAGNOSIS — M54.16 LUMBAR RADICULITIS: Primary | ICD-10-CM

## 2021-06-21 DIAGNOSIS — M70.61 GREATER TROCHANTERIC BURSITIS OF RIGHT HIP: ICD-10-CM

## 2021-06-21 PROCEDURE — 99999 PR PBB SHADOW E&M-EST. PATIENT-LVL IV: CPT | Mod: PBBFAC,,, | Performed by: PHYSICIAN ASSISTANT

## 2021-06-21 PROCEDURE — 99214 PR OFFICE/OUTPT VISIT, EST, LEVL IV, 30-39 MIN: ICD-10-PCS | Mod: S$PBB,,, | Performed by: PHYSICIAN ASSISTANT

## 2021-06-21 PROCEDURE — 99999 PR PBB SHADOW E&M-EST. PATIENT-LVL IV: ICD-10-PCS | Mod: PBBFAC,,, | Performed by: PHYSICIAN ASSISTANT

## 2021-06-21 PROCEDURE — 99214 OFFICE O/P EST MOD 30 MIN: CPT | Mod: S$PBB,,, | Performed by: PHYSICIAN ASSISTANT

## 2021-06-21 PROCEDURE — 99214 OFFICE O/P EST MOD 30 MIN: CPT | Mod: PBBFAC,PN | Performed by: PHYSICIAN ASSISTANT

## 2021-06-21 RX ORDER — TIZANIDINE HYDROCHLORIDE 6 MG/1
CAPSULE, GELATIN COATED ORAL
COMMUNITY
Start: 2020-07-22 | End: 2022-07-26

## 2021-06-21 RX ORDER — IBUPROFEN 800 MG/1
TABLET ORAL
COMMUNITY
Start: 2020-07-22 | End: 2022-06-28

## 2021-06-28 ENCOUNTER — OFFICE VISIT (OUTPATIENT)
Dept: FAMILY MEDICINE | Facility: CLINIC | Age: 81
End: 2021-06-28
Payer: MEDICARE

## 2021-06-28 VITALS
DIASTOLIC BLOOD PRESSURE: 65 MMHG | WEIGHT: 231.38 LBS | HEART RATE: 57 BPM | BODY MASS INDEX: 35.07 KG/M2 | OXYGEN SATURATION: 96 % | SYSTOLIC BLOOD PRESSURE: 126 MMHG | TEMPERATURE: 98 F | RESPIRATION RATE: 19 BRPM | HEIGHT: 68 IN

## 2021-06-28 DIAGNOSIS — I10 ESSENTIAL HYPERTENSION: ICD-10-CM

## 2021-06-28 DIAGNOSIS — M54.16 LUMBAR RADICULITIS: ICD-10-CM

## 2021-06-28 DIAGNOSIS — E11.9 TYPE 2 DIABETES MELLITUS WITHOUT COMPLICATION, WITHOUT LONG-TERM CURRENT USE OF INSULIN: Chronic | ICD-10-CM

## 2021-06-28 DIAGNOSIS — E78.5 HYPERLIPIDEMIA, UNSPECIFIED HYPERLIPIDEMIA TYPE: Primary | Chronic | ICD-10-CM

## 2021-06-28 PROCEDURE — 99214 PR OFFICE/OUTPT VISIT, EST, LEVL IV, 30-39 MIN: ICD-10-PCS | Mod: S$PBB,,, | Performed by: FAMILY MEDICINE

## 2021-06-28 PROCEDURE — 99215 OFFICE O/P EST HI 40 MIN: CPT | Performed by: FAMILY MEDICINE

## 2021-06-28 PROCEDURE — 99214 OFFICE O/P EST MOD 30 MIN: CPT | Mod: S$PBB,,, | Performed by: FAMILY MEDICINE

## 2021-06-28 RX ORDER — NAPROXEN SODIUM 220 MG/1
81 TABLET, FILM COATED ORAL DAILY
Qty: 100 TABLET | Refills: 1 | Status: SHIPPED | OUTPATIENT
Start: 2021-06-28 | End: 2021-09-21 | Stop reason: SDUPTHER

## 2021-06-28 RX ORDER — HYDROCODONE BITARTRATE AND ACETAMINOPHEN 7.5; 325 MG/1; MG/1
1 TABLET ORAL
Qty: 28 TABLET | Refills: 0 | Status: SHIPPED | OUTPATIENT
Start: 2021-06-28 | End: 2021-09-21 | Stop reason: SDUPTHER

## 2021-07-21 ENCOUNTER — OFFICE VISIT (OUTPATIENT)
Dept: CARDIOLOGY | Facility: CLINIC | Age: 81
End: 2021-07-21
Payer: MEDICARE

## 2021-07-21 VITALS
HEART RATE: 60 BPM | DIASTOLIC BLOOD PRESSURE: 80 MMHG | HEIGHT: 68 IN | SYSTOLIC BLOOD PRESSURE: 122 MMHG | BODY MASS INDEX: 35.31 KG/M2 | WEIGHT: 233 LBS | OXYGEN SATURATION: 96 %

## 2021-07-21 DIAGNOSIS — J45.909 ACUTE BRONCHITIS WITH ASTHMA: Primary | ICD-10-CM

## 2021-07-21 DIAGNOSIS — I10 ESSENTIAL HYPERTENSION: ICD-10-CM

## 2021-07-21 DIAGNOSIS — E78.5 HYPERLIPIDEMIA, UNSPECIFIED HYPERLIPIDEMIA TYPE: Chronic | ICD-10-CM

## 2021-07-21 DIAGNOSIS — E11.9 TYPE 2 DIABETES MELLITUS WITHOUT COMPLICATION, WITHOUT LONG-TERM CURRENT USE OF INSULIN: Chronic | ICD-10-CM

## 2021-07-21 DIAGNOSIS — G47.33 OSA (OBSTRUCTIVE SLEEP APNEA): ICD-10-CM

## 2021-07-21 DIAGNOSIS — J20.9 ACUTE BRONCHITIS WITH ASTHMA: Primary | ICD-10-CM

## 2021-07-21 PROCEDURE — 99214 PR OFFICE/OUTPT VISIT, EST, LEVL IV, 30-39 MIN: ICD-10-PCS | Mod: S$GLB,,, | Performed by: SPECIALIST

## 2021-07-21 PROCEDURE — 99214 OFFICE O/P EST MOD 30 MIN: CPT | Mod: S$GLB,,, | Performed by: SPECIALIST

## 2021-09-21 DIAGNOSIS — I10 ESSENTIAL HYPERTENSION: ICD-10-CM

## 2021-09-21 DIAGNOSIS — M48.02 CERVICAL STENOSIS OF SPINE: ICD-10-CM

## 2021-09-21 DIAGNOSIS — M54.16 LUMBAR RADICULITIS: ICD-10-CM

## 2021-09-21 DIAGNOSIS — E78.5 HYPERLIPIDEMIA, UNSPECIFIED HYPERLIPIDEMIA TYPE: Chronic | ICD-10-CM

## 2021-09-21 DIAGNOSIS — E11.9 TYPE 2 DIABETES MELLITUS WITHOUT COMPLICATION, WITHOUT LONG-TERM CURRENT USE OF INSULIN: Chronic | ICD-10-CM

## 2021-09-21 RX ORDER — HYDROCODONE BITARTRATE AND ACETAMINOPHEN 7.5; 325 MG/1; MG/1
1 TABLET ORAL
Qty: 28 TABLET | Refills: 0 | Status: SHIPPED | OUTPATIENT
Start: 2021-09-21 | End: 2021-12-01 | Stop reason: SDUPTHER

## 2021-09-21 RX ORDER — METHOCARBAMOL 500 MG/1
500 TABLET, FILM COATED ORAL 3 TIMES DAILY
Qty: 270 TABLET | Refills: 1 | Status: SHIPPED | OUTPATIENT
Start: 2021-09-21 | End: 2022-06-28

## 2021-09-21 RX ORDER — NAPROXEN SODIUM 220 MG/1
81 TABLET, FILM COATED ORAL DAILY
Qty: 100 TABLET | Refills: 1 | Status: SHIPPED | OUTPATIENT
Start: 2021-09-21 | End: 2022-05-02 | Stop reason: SDUPTHER

## 2021-10-01 PROBLEM — S93.402D SPRAIN OF UNSPECIFIED LIGAMENT OF LEFT ANKLE, SUBSEQUENT ENCOUNTER: Status: ACTIVE | Noted: 2020-05-22

## 2021-10-18 ENCOUNTER — OFFICE VISIT (OUTPATIENT)
Dept: GASTROENTEROLOGY | Facility: CLINIC | Age: 81
End: 2021-10-18
Payer: MEDICARE

## 2021-10-18 VITALS — BODY MASS INDEX: 35.35 KG/M2 | HEIGHT: 68 IN | WEIGHT: 233.25 LBS

## 2021-10-18 DIAGNOSIS — Z87.19 HISTORY OF DIVERTICULITIS: ICD-10-CM

## 2021-10-18 DIAGNOSIS — Z79.01 CURRENT USE OF ANTICOAGULANT THERAPY: ICD-10-CM

## 2021-10-18 DIAGNOSIS — K59.09 CHRONIC CONSTIPATION: ICD-10-CM

## 2021-10-18 DIAGNOSIS — R19.8 IRREGULAR BOWEL HABITS: ICD-10-CM

## 2021-10-18 DIAGNOSIS — Z90.49 S/P PARTIAL RESECTION OF COLON: ICD-10-CM

## 2021-10-18 DIAGNOSIS — Z87.19 HISTORY OF SMALL BOWEL OBSTRUCTION: ICD-10-CM

## 2021-10-18 DIAGNOSIS — R10.84 GENERALIZED ABDOMINAL PAIN: Primary | ICD-10-CM

## 2021-10-18 PROCEDURE — 99215 OFFICE O/P EST HI 40 MIN: CPT | Mod: PBBFAC,PN | Performed by: NURSE PRACTITIONER

## 2021-10-18 PROCEDURE — 99204 PR OFFICE/OUTPT VISIT, NEW, LEVL IV, 45-59 MIN: ICD-10-PCS | Mod: S$PBB,,, | Performed by: NURSE PRACTITIONER

## 2021-10-18 PROCEDURE — 99999 PR PBB SHADOW E&M-EST. PATIENT-LVL V: ICD-10-PCS | Mod: PBBFAC,,, | Performed by: NURSE PRACTITIONER

## 2021-10-18 PROCEDURE — 99999 PR PBB SHADOW E&M-EST. PATIENT-LVL V: CPT | Mod: PBBFAC,,, | Performed by: NURSE PRACTITIONER

## 2021-10-18 PROCEDURE — 99204 OFFICE O/P NEW MOD 45 MIN: CPT | Mod: S$PBB,,, | Performed by: NURSE PRACTITIONER

## 2021-10-20 ENCOUNTER — OFFICE VISIT (OUTPATIENT)
Dept: DERMATOLOGY | Facility: CLINIC | Age: 81
End: 2021-10-20
Payer: MEDICARE

## 2021-10-20 VITALS — HEIGHT: 68 IN | WEIGHT: 233 LBS | BODY MASS INDEX: 35.31 KG/M2

## 2021-10-20 DIAGNOSIS — D18.01 CHERRY ANGIOMA: ICD-10-CM

## 2021-10-20 DIAGNOSIS — L29.9 LOCALIZED PRURITUS: ICD-10-CM

## 2021-10-20 DIAGNOSIS — L57.8 ACTINIC SKIN DAMAGE: ICD-10-CM

## 2021-10-20 DIAGNOSIS — L82.1 SEBORRHEIC KERATOSES: ICD-10-CM

## 2021-10-20 DIAGNOSIS — L82.0 SEBORRHEIC KERATOSES, INFLAMED: ICD-10-CM

## 2021-10-20 DIAGNOSIS — L57.0 ACTINIC KERATOSIS: Primary | ICD-10-CM

## 2021-10-20 PROCEDURE — 17000 DESTRUCT PREMALG LESION: CPT | Mod: S$PBB,,, | Performed by: STUDENT IN AN ORGANIZED HEALTH CARE EDUCATION/TRAINING PROGRAM

## 2021-10-20 PROCEDURE — 17003 DESTRUCTION, PREMALIGNANT LESIONS; SECOND THROUGH 14 LESIONS: ICD-10-PCS | Mod: S$PBB,,, | Performed by: STUDENT IN AN ORGANIZED HEALTH CARE EDUCATION/TRAINING PROGRAM

## 2021-10-20 PROCEDURE — 99215 OFFICE O/P EST HI 40 MIN: CPT | Mod: PBBFAC,PO | Performed by: STUDENT IN AN ORGANIZED HEALTH CARE EDUCATION/TRAINING PROGRAM

## 2021-10-20 PROCEDURE — 17003 DESTRUCT PREMALG LES 2-14: CPT | Mod: PBBFAC,PO | Performed by: STUDENT IN AN ORGANIZED HEALTH CARE EDUCATION/TRAINING PROGRAM

## 2021-10-20 PROCEDURE — 99999 PR PBB SHADOW E&M-EST. PATIENT-LVL V: CPT | Mod: PBBFAC,,, | Performed by: STUDENT IN AN ORGANIZED HEALTH CARE EDUCATION/TRAINING PROGRAM

## 2021-10-20 PROCEDURE — 17000 DESTRUCT PREMALG LESION: CPT | Mod: PBBFAC,PO | Performed by: STUDENT IN AN ORGANIZED HEALTH CARE EDUCATION/TRAINING PROGRAM

## 2021-10-20 PROCEDURE — 17000 PR DESTRUCTION(LASER SURGERY,CRYOSURGERY,CHEMOSURGERY),PREMALIGNANT LESIONS,FIRST LESION: ICD-10-PCS | Mod: S$PBB,,, | Performed by: STUDENT IN AN ORGANIZED HEALTH CARE EDUCATION/TRAINING PROGRAM

## 2021-10-20 PROCEDURE — 99999 PR PBB SHADOW E&M-EST. PATIENT-LVL V: ICD-10-PCS | Mod: PBBFAC,,, | Performed by: STUDENT IN AN ORGANIZED HEALTH CARE EDUCATION/TRAINING PROGRAM

## 2021-10-20 PROCEDURE — 99203 PR OFFICE/OUTPT VISIT, NEW, LEVL III, 30-44 MIN: ICD-10-PCS | Mod: 25,S$PBB,, | Performed by: STUDENT IN AN ORGANIZED HEALTH CARE EDUCATION/TRAINING PROGRAM

## 2021-10-20 PROCEDURE — 17003 DESTRUCT PREMALG LES 2-14: CPT | Mod: S$PBB,,, | Performed by: STUDENT IN AN ORGANIZED HEALTH CARE EDUCATION/TRAINING PROGRAM

## 2021-10-20 PROCEDURE — 99203 OFFICE O/P NEW LOW 30 MIN: CPT | Mod: 25,S$PBB,, | Performed by: STUDENT IN AN ORGANIZED HEALTH CARE EDUCATION/TRAINING PROGRAM

## 2021-10-21 ENCOUNTER — TREATMENT PLANNING (OUTPATIENT)
Dept: GASTROENTEROLOGY | Facility: CLINIC | Age: 81
End: 2021-10-21

## 2021-11-05 ENCOUNTER — OFFICE VISIT (OUTPATIENT)
Dept: URGENT CARE | Facility: CLINIC | Age: 81
End: 2021-11-05
Payer: MEDICARE

## 2021-11-05 VITALS
TEMPERATURE: 97 F | SYSTOLIC BLOOD PRESSURE: 120 MMHG | HEART RATE: 50 BPM | HEIGHT: 68 IN | WEIGHT: 235 LBS | DIASTOLIC BLOOD PRESSURE: 62 MMHG | OXYGEN SATURATION: 96 % | BODY MASS INDEX: 35.61 KG/M2 | RESPIRATION RATE: 18 BRPM

## 2021-11-05 DIAGNOSIS — Z20.822 ENCOUNTER FOR LABORATORY TESTING FOR COVID-19 VIRUS: ICD-10-CM

## 2021-11-05 DIAGNOSIS — Z01.89 ENCOUNTER FOR LABORATORY TEST: Primary | ICD-10-CM

## 2021-11-05 LAB — SARS-COV-2 RNA RESP QL NAA+PROBE: NOT DETECTED

## 2021-11-05 PROCEDURE — 99213 PR OFFICE/OUTPT VISIT, EST, LEVL III, 20-29 MIN: ICD-10-PCS | Mod: S$GLB,,, | Performed by: NURSE PRACTITIONER

## 2021-11-05 PROCEDURE — U0003 INFECTIOUS AGENT DETECTION BY NUCLEIC ACID (DNA OR RNA); SEVERE ACUTE RESPIRATORY SYNDROME CORONAVIRUS 2 (SARS-COV-2) (CORONAVIRUS DISEASE [COVID-19]), AMPLIFIED PROBE TECHNIQUE, MAKING USE OF HIGH THROUGHPUT TECHNOLOGIES AS DESCRIBED BY CMS-2020-01-R: HCPCS | Performed by: NURSE PRACTITIONER

## 2021-11-05 PROCEDURE — 99213 OFFICE O/P EST LOW 20 MIN: CPT | Mod: S$GLB,,, | Performed by: NURSE PRACTITIONER

## 2021-11-05 PROCEDURE — U0005 INFEC AGEN DETEC AMPLI PROBE: HCPCS | Performed by: NURSE PRACTITIONER

## 2021-11-29 ENCOUNTER — PATIENT MESSAGE (OUTPATIENT)
Dept: PAIN MEDICINE | Facility: CLINIC | Age: 81
End: 2021-11-29
Payer: MEDICARE

## 2021-11-30 ENCOUNTER — TELEPHONE (OUTPATIENT)
Dept: PAIN MEDICINE | Facility: CLINIC | Age: 81
End: 2021-11-30
Payer: MEDICARE

## 2021-11-30 DIAGNOSIS — M54.16 LUMBAR RADICULITIS: Primary | ICD-10-CM

## 2021-12-01 DIAGNOSIS — M54.16 LUMBAR RADICULITIS: ICD-10-CM

## 2021-12-02 ENCOUNTER — TELEPHONE (OUTPATIENT)
Dept: PAIN MEDICINE | Facility: CLINIC | Age: 81
End: 2021-12-02
Payer: MEDICARE

## 2021-12-03 RX ORDER — HYDROCODONE BITARTRATE AND ACETAMINOPHEN 7.5; 325 MG/1; MG/1
1 TABLET ORAL
Qty: 30 TABLET | Refills: 0 | Status: SHIPPED | OUTPATIENT
Start: 2021-12-03 | End: 2022-01-30 | Stop reason: SDUPTHER

## 2021-12-14 ENCOUNTER — HOSPITAL ENCOUNTER (OUTPATIENT)
Facility: AMBULARY SURGERY CENTER | Age: 81
Discharge: HOME | End: 2021-12-14
Attending: ANESTHESIOLOGY | Admitting: ANESTHESIOLOGY
Payer: MEDICARE

## 2021-12-14 DIAGNOSIS — M54.16 LUMBAR RADICULITIS: Primary | ICD-10-CM

## 2021-12-14 DIAGNOSIS — M47.896 OTHER SPONDYLOSIS, LUMBAR REGION: ICD-10-CM

## 2021-12-14 LAB — POCT GLUCOSE: 104 MG/DL (ref 70–110)

## 2021-12-14 PROCEDURE — 64483 NJX AA&/STRD TFRM EPI L/S 1: CPT | Mod: RT | Performed by: ANESTHESIOLOGY

## 2021-12-14 PROCEDURE — 64484 PRA INJECT ANES/STEROID FORAMEN LUMBAR/SACRAL W IMG GUIDE ,EA ADD LEVEL: ICD-10-PCS | Mod: 50,,, | Performed by: ANESTHESIOLOGY

## 2021-12-14 PROCEDURE — 64484 NJX AA&/STRD TFRM EPI L/S EA: CPT | Mod: 50,,, | Performed by: ANESTHESIOLOGY

## 2021-12-14 PROCEDURE — 64483 PR EPIDURAL INJ, ANES/STEROID, TRANSFORAMINAL, LUMB/SACR, SNGL LEVL: ICD-10-PCS | Mod: 50,,, | Performed by: ANESTHESIOLOGY

## 2021-12-14 PROCEDURE — 64483 NJX AA&/STRD TFRM EPI L/S 1: CPT | Mod: 50,,, | Performed by: ANESTHESIOLOGY

## 2021-12-14 PROCEDURE — 64484 NJX AA&/STRD TFRM EPI L/S EA: CPT | Mod: RT | Performed by: ANESTHESIOLOGY

## 2021-12-14 RX ORDER — BUPIVACAINE HYDROCHLORIDE 2.5 MG/ML
INJECTION, SOLUTION EPIDURAL; INFILTRATION; INTRACAUDAL
Status: DISCONTINUED | OUTPATIENT
Start: 2021-12-14 | End: 2021-12-14 | Stop reason: HOSPADM

## 2021-12-14 RX ORDER — MIDAZOLAM HYDROCHLORIDE 1 MG/ML
INJECTION INTRAMUSCULAR; INTRAVENOUS
Status: DISCONTINUED | OUTPATIENT
Start: 2021-12-14 | End: 2021-12-14 | Stop reason: HOSPADM

## 2021-12-14 RX ORDER — LIDOCAINE HYDROCHLORIDE 10 MG/ML
INJECTION, SOLUTION EPIDURAL; INFILTRATION; INTRACAUDAL; PERINEURAL
Status: DISCONTINUED | OUTPATIENT
Start: 2021-12-14 | End: 2021-12-14 | Stop reason: HOSPADM

## 2021-12-14 RX ORDER — SODIUM CHLORIDE, SODIUM LACTATE, POTASSIUM CHLORIDE, CALCIUM CHLORIDE 600; 310; 30; 20 MG/100ML; MG/100ML; MG/100ML; MG/100ML
INJECTION, SOLUTION INTRAVENOUS ONCE AS NEEDED
Status: COMPLETED | OUTPATIENT
Start: 2021-12-14 | End: 2021-12-14

## 2021-12-14 RX ORDER — FENTANYL CITRATE 50 UG/ML
INJECTION, SOLUTION INTRAMUSCULAR; INTRAVENOUS
Status: DISCONTINUED | OUTPATIENT
Start: 2021-12-14 | End: 2021-12-14 | Stop reason: HOSPADM

## 2021-12-14 RX ORDER — DEXAMETHASONE SODIUM PHOSPHATE 10 MG/ML
INJECTION INTRAMUSCULAR; INTRAVENOUS
Status: DISCONTINUED | OUTPATIENT
Start: 2021-12-14 | End: 2021-12-14 | Stop reason: HOSPADM

## 2021-12-14 RX ADMIN — SODIUM CHLORIDE, SODIUM LACTATE, POTASSIUM CHLORIDE, CALCIUM CHLORIDE: 600; 310; 30; 20 INJECTION, SOLUTION INTRAVENOUS at 01:12

## 2021-12-15 VITALS
BODY MASS INDEX: 35.61 KG/M2 | RESPIRATION RATE: 18 BRPM | HEIGHT: 68 IN | OXYGEN SATURATION: 96 % | WEIGHT: 235 LBS | DIASTOLIC BLOOD PRESSURE: 66 MMHG | HEART RATE: 57 BPM | SYSTOLIC BLOOD PRESSURE: 114 MMHG | TEMPERATURE: 98 F

## 2021-12-28 ENCOUNTER — OFFICE VISIT (OUTPATIENT)
Dept: FAMILY MEDICINE | Facility: CLINIC | Age: 81
End: 2021-12-28
Payer: MEDICARE

## 2021-12-28 ENCOUNTER — HOSPITAL ENCOUNTER (OUTPATIENT)
Dept: RADIOLOGY | Facility: HOSPITAL | Age: 81
Discharge: HOME | End: 2021-12-28
Attending: FAMILY MEDICINE
Payer: MEDICARE

## 2021-12-28 VITALS
HEIGHT: 68 IN | SYSTOLIC BLOOD PRESSURE: 128 MMHG | DIASTOLIC BLOOD PRESSURE: 70 MMHG | BODY MASS INDEX: 34.88 KG/M2 | TEMPERATURE: 98 F | HEART RATE: 62 BPM | WEIGHT: 230.13 LBS | OXYGEN SATURATION: 98 %

## 2021-12-28 DIAGNOSIS — I10 PRIMARY HYPERTENSION: ICD-10-CM

## 2021-12-28 DIAGNOSIS — M25.559 HIP PAIN: ICD-10-CM

## 2021-12-28 DIAGNOSIS — E66.09 CLASS 2 OBESITY DUE TO EXCESS CALORIES WITHOUT SERIOUS COMORBIDITY WITH BODY MASS INDEX (BMI) OF 35.0 TO 35.9 IN ADULT: ICD-10-CM

## 2021-12-28 DIAGNOSIS — J30.89 CHRONIC NONSEASONAL ALLERGIC RHINITIS DUE TO FUNGAL SPORES: ICD-10-CM

## 2021-12-28 DIAGNOSIS — E11.9 TYPE 2 DIABETES MELLITUS WITHOUT COMPLICATION, WITHOUT LONG-TERM CURRENT USE OF INSULIN: ICD-10-CM

## 2021-12-28 DIAGNOSIS — E11.9 CONTROLLED TYPE 2 DIABETES MELLITUS WITHOUT COMPLICATION, WITHOUT LONG-TERM CURRENT USE OF INSULIN: ICD-10-CM

## 2021-12-28 DIAGNOSIS — M54.16 LUMBAR RADICULITIS: ICD-10-CM

## 2021-12-28 DIAGNOSIS — E78.5 HYPERLIPIDEMIA, UNSPECIFIED HYPERLIPIDEMIA TYPE: Primary | ICD-10-CM

## 2021-12-28 DIAGNOSIS — F51.01 PRIMARY INSOMNIA: ICD-10-CM

## 2021-12-28 DIAGNOSIS — J30.9 ALLERGIC RHINITIS, UNSPECIFIED SEASONALITY, UNSPECIFIED TRIGGER: ICD-10-CM

## 2021-12-28 PROCEDURE — 73502 X-RAY EXAM HIP UNI 2-3 VIEWS: CPT | Mod: TC,PO,LT

## 2021-12-28 PROCEDURE — 99215 OFFICE O/P EST HI 40 MIN: CPT | Performed by: FAMILY MEDICINE

## 2021-12-28 PROCEDURE — 99214 OFFICE O/P EST MOD 30 MIN: CPT | Mod: S$PBB,,, | Performed by: FAMILY MEDICINE

## 2021-12-28 PROCEDURE — 99214 PR OFFICE/OUTPT VISIT, EST, LEVL IV, 30-39 MIN: ICD-10-PCS | Mod: S$PBB,,, | Performed by: FAMILY MEDICINE

## 2021-12-28 RX ORDER — FUROSEMIDE 20 MG/1
20 TABLET ORAL
Qty: 45 TABLET | Refills: 3 | Status: SHIPPED | OUTPATIENT
Start: 2021-12-29 | End: 2022-09-28 | Stop reason: SDUPTHER

## 2021-12-28 RX ORDER — GABAPENTIN 300 MG/1
300 CAPSULE ORAL 3 TIMES DAILY
Qty: 270 CAPSULE | Refills: 1 | Status: SHIPPED | OUTPATIENT
Start: 2021-12-28 | End: 2021-12-28 | Stop reason: SDUPTHER

## 2021-12-28 RX ORDER — MINERAL OIL
180 ENEMA (ML) RECTAL DAILY
Qty: 90 TABLET | Refills: 3 | Status: SHIPPED | OUTPATIENT
Start: 2021-12-28 | End: 2022-11-13 | Stop reason: SDUPTHER

## 2021-12-28 RX ORDER — TRAZODONE HYDROCHLORIDE 100 MG/1
100 TABLET ORAL NIGHTLY
Qty: 90 TABLET | Refills: 3 | Status: SHIPPED | OUTPATIENT
Start: 2021-12-28 | End: 2021-12-28 | Stop reason: SDUPTHER

## 2021-12-28 RX ORDER — AZELASTINE 1 MG/ML
2 SPRAY, METERED NASAL 2 TIMES DAILY
Qty: 30 ML | Refills: 5 | Status: SHIPPED | OUTPATIENT
Start: 2021-12-28 | End: 2022-11-13 | Stop reason: SDUPTHER

## 2021-12-28 RX ORDER — AZELASTINE 1 MG/ML
2 SPRAY, METERED NASAL 2 TIMES DAILY
Qty: 30 ML | Refills: 5 | Status: SHIPPED | OUTPATIENT
Start: 2021-12-28 | End: 2021-12-28 | Stop reason: SDUPTHER

## 2021-12-28 RX ORDER — MINERAL OIL
180 ENEMA (ML) RECTAL DAILY
Qty: 90 TABLET | Refills: 3 | Status: SHIPPED | OUTPATIENT
Start: 2021-12-28 | End: 2021-12-28 | Stop reason: SDUPTHER

## 2021-12-28 RX ORDER — GABAPENTIN 300 MG/1
300 CAPSULE ORAL 3 TIMES DAILY
Qty: 270 CAPSULE | Refills: 1 | Status: SHIPPED | OUTPATIENT
Start: 2021-12-28 | End: 2022-05-02 | Stop reason: SDUPTHER

## 2021-12-28 RX ORDER — TRAZODONE HYDROCHLORIDE 100 MG/1
100 TABLET ORAL NIGHTLY
Qty: 90 TABLET | Refills: 3 | Status: SHIPPED | OUTPATIENT
Start: 2021-12-28 | End: 2022-11-13 | Stop reason: SDUPTHER

## 2021-12-28 RX ORDER — FUROSEMIDE 20 MG/1
20 TABLET ORAL
Qty: 45 TABLET | Refills: 3 | Status: SHIPPED | OUTPATIENT
Start: 2021-12-29 | End: 2021-12-28 | Stop reason: SDUPTHER

## 2022-01-13 ENCOUNTER — OFFICE VISIT (OUTPATIENT)
Dept: ORTHOPEDICS | Facility: CLINIC | Age: 82
End: 2022-01-13
Payer: MEDICARE

## 2022-01-13 ENCOUNTER — OFFICE VISIT (OUTPATIENT)
Dept: PAIN MEDICINE | Facility: CLINIC | Age: 82
End: 2022-01-13
Payer: MEDICARE

## 2022-01-13 VITALS — HEIGHT: 68 IN | WEIGHT: 230.13 LBS | BODY MASS INDEX: 34.88 KG/M2

## 2022-01-13 VITALS
HEIGHT: 68 IN | DIASTOLIC BLOOD PRESSURE: 68 MMHG | SYSTOLIC BLOOD PRESSURE: 119 MMHG | WEIGHT: 230 LBS | BODY MASS INDEX: 34.86 KG/M2 | HEART RATE: 61 BPM

## 2022-01-13 DIAGNOSIS — M51.36 DDD (DEGENERATIVE DISC DISEASE), LUMBAR: Primary | ICD-10-CM

## 2022-01-13 DIAGNOSIS — M48.061 SPINAL STENOSIS OF LUMBAR REGION, UNSPECIFIED WHETHER NEUROGENIC CLAUDICATION PRESENT: ICD-10-CM

## 2022-01-13 DIAGNOSIS — M70.62 GREATER TROCHANTERIC BURSITIS OF LEFT HIP: ICD-10-CM

## 2022-01-13 DIAGNOSIS — M70.61 GREATER TROCHANTERIC BURSITIS OF RIGHT HIP: ICD-10-CM

## 2022-01-13 PROCEDURE — 99999 PR PBB SHADOW E&M-EST. PATIENT-LVL V: CPT | Mod: PBBFAC,,, | Performed by: PHYSICIAN ASSISTANT

## 2022-01-13 PROCEDURE — 20610 LARGE JOINT ASPIRATION/INJECTION: L GREATER TROCHANTERIC BURSA: ICD-10-PCS | Mod: LT,S$GLB,, | Performed by: ORTHOPAEDIC SURGERY

## 2022-01-13 PROCEDURE — 99203 OFFICE O/P NEW LOW 30 MIN: CPT | Mod: 25,S$GLB,, | Performed by: ORTHOPAEDIC SURGERY

## 2022-01-13 PROCEDURE — 99999 PR PBB SHADOW E&M-EST. PATIENT-LVL V: ICD-10-PCS | Mod: PBBFAC,,, | Performed by: PHYSICIAN ASSISTANT

## 2022-01-13 PROCEDURE — 99214 OFFICE O/P EST MOD 30 MIN: CPT | Mod: S$PBB,,, | Performed by: PHYSICIAN ASSISTANT

## 2022-01-13 PROCEDURE — 99214 PR OFFICE/OUTPT VISIT, EST, LEVL IV, 30-39 MIN: ICD-10-PCS | Mod: S$PBB,,, | Performed by: PHYSICIAN ASSISTANT

## 2022-01-13 PROCEDURE — 20610 DRAIN/INJ JOINT/BURSA W/O US: CPT | Mod: LT,S$GLB,, | Performed by: ORTHOPAEDIC SURGERY

## 2022-01-13 PROCEDURE — 99203 PR OFFICE/OUTPT VISIT, NEW, LEVL III, 30-44 MIN: ICD-10-PCS | Mod: 25,S$GLB,, | Performed by: ORTHOPAEDIC SURGERY

## 2022-01-13 PROCEDURE — 99215 OFFICE O/P EST HI 40 MIN: CPT | Mod: PBBFAC,PN | Performed by: PHYSICIAN ASSISTANT

## 2022-01-13 RX ORDER — TRIAMCINOLONE ACETONIDE 40 MG/ML
40 INJECTION, SUSPENSION INTRA-ARTICULAR; INTRAMUSCULAR
Status: DISCONTINUED | OUTPATIENT
Start: 2022-01-13 | End: 2022-01-13 | Stop reason: HOSPADM

## 2022-01-13 RX ADMIN — TRIAMCINOLONE ACETONIDE 40 MG: 40 INJECTION, SUSPENSION INTRA-ARTICULAR; INTRAMUSCULAR at 08:01

## 2022-01-13 NOTE — PROGRESS NOTES
Referring Physician: No ref. provider found    PCP: Garland Ocampo MD      CC:low back and left leg pain    Interval History:  Mr. Hollingsworth is a 81 y.o. male with a low back and leg pain who presents today for f/u s/p bilateral L4-5 and L5-S1 TF JOSE. Reports >100% relief. He is very happy with results .Continues to c/o pain at the top of his left foot.  He does not do stretches regularly. Continues to c/o neck pain on right as well as right shoulder pain. He has know right shoulder AC arthritis. He has not had any interventions. Denies any radiation. Pain is pulling in nature.  He is s/p left GTB injection with Dr. Beard today. He denies any LE weakness or b/b changes. He takes Hydrocodone 7.5 mg sparingly prescribed by PCP.  In the past he was evaluated by [facility] and lumbar surgery was recommended. Pain today is rated  0/10.      HPI:   Yeyo Hollingsworth is a 81 y.o. male ho presents with lower back and left leg pain.  Pain is been present since 2010.  No recent traumatic incident.  His intermittent aching, throbbing, electric pain in his lower back.  Pain radiates down his left buttock into his left posterior thigh.  Pain worsens with prolonged standing, walking, getting up and coughing.  Pain improves with rest.  His tried physical therapy with minimal benefit.  He has undergone lumbar JOSE's with Dr. Rendon in the past with moderate benefit.  Most recent injection was performed in September 2016.  Pain has since returned.  He desires repeat injections with us.  He denies any weakness.  No bowel bladder changes.  He rates his pain 6/10 today.    ROS:  CONSTITUTIONAL: No fevers, chills, night sweats, wt. loss, appetite changes  SKIN: no rashes or itching  ENT: No headaches, head trauma, vision changes, or eye pain  LYMPH NODES: None noticed   CV: No chest pain, palpitations.   RESP: No shortness of breath, dyspnea on exertion, cough, wheezing, or hemoptysis  GI: No nausea, emesis, diarrhea, constipation,  melena, hematochezia, pain.    : No dysuria, hematuria, urgency, or frequency   HEME: No easy bruising, bleeding problems  PSYCHIATRIC: No depression, anxiety, psychosis, hallucinations.  NEURO: No seizures, memory loss, dizziness or difficulty sleeping  MSK: + history of present illness      Past Medical History:   Diagnosis Date    Allergy     Ankle pain     left    Anticoagulant long-term use     aspirin    Anxiety     Atrial fibrillation     Back pain     Bruises easily     Cataract     Clotting disorder     left leg    Colon polyp     Diabetes mellitus     Diabetes mellitus, type 2     Hay fever     Hyperlipidemia     Hypertension     Left hip pain 12/26/2021    Seeing Dr. Beard for hip pain     Obese     BRANDI on CPAP      Past Surgical History:   Procedure Laterality Date    ABDOMINAL SURGERY      origunal surg 1986-mult surgeries since    CARPAL TUNNEL RELEASE      right wrist 2009-left wrist 2010    COLON SURGERY      partial colon removal    COLONOSCOPY  11/26/2018    Dr. Salazar; normal terminal ileum; polyps removed from ascending colon and hepatic flexure; pan diverticulosis; small internal hemorrhoids; repeat in 5 years; Bx: fragments of an adenomatous polyp with mild surface glandular dysplasia and associated chronic active inflammation, no evidence of high-grade dysplasia or malignancy    COLOSTOMY  1986    colostomy reversal  1986    EYE SURGERY      hay fever      HERNIA REPAIR  1949    REPAIR OF TENDON OF LOWER EXTREMITY Left 6/24/2020    Procedure: REPAIR, TENDON, LOWER EXTREMITY;  Surgeon: Justin Mandujano DPM;  [place] [facility] OR;  Service: Podiatry;  Laterality: Left;  ARTHEX NOTIFIED IN CASE HE WANTS ANCHOR    TOE SURGERY Left 2017    replaced a joint     TONSILLECTOMY  1947    TRANSFORAMINAL EPIDURAL INJECTION OF STEROID      x 4    TRANSFORAMINAL EPIDURAL INJECTION OF STEROID Bilateral 11/13/2019    Procedure: Injection,steroid,epidural,transforaminal  approach;  Surgeon: Grant Wright MD;  [place] [facility] OR;  Service: Pain Management;  Laterality: Bilateral;  L4-5, L5-S1    TRANSFORAMINAL EPIDURAL INJECTION OF STEROID Bilateral 3/9/2021    Procedure: Injection,steroid,epidural,transforaminal approach;  Surgeon: Grant Wright MD;  [place] [facility] OR;  Service: Pain Management;  Laterality: Bilateral;  L4-5, L5-S1    TRANSFORAMINAL EPIDURAL INJECTION OF STEROID Bilateral 5/25/2021    Procedure: Injection,steroid,epidural,transforaminal approach;  Surgeon: Grant Wright MD;  [place] [facility] OR;  Service: Pain Management;  Laterality: Bilateral;  L4-L5,L5,S1    TRANSFORAMINAL EPIDURAL INJECTION OF STEROID Bilateral 12/14/2021    Procedure: Injection,steroid,epidural,transforaminal approach Bilateral L4-5, L5-S1;  Surgeon: Grant Wright MD;  [place] [facility] OR;  Service: Pain Management;  Laterality: Bilateral;     Family History   Problem Relation Age of Onset    Heart disease Mother     Melanoma Neg Hx     Psoriasis Neg Hx     Lupus Neg Hx     Eczema Neg Hx      Social History     Socioeconomic History    Marital status:    Tobacco Use    Smoking status: Former Smoker     Types: Cigarettes     Quit date: 2/27/2006     Years since quitting: 15.8    Smokeless tobacco: Never Used    Tobacco comment: ex smoker 2006   Substance and Sexual Activity    Alcohol use: Yes     Comment: rarely    Drug use: No    Sexual activity: Yes     Partners: Female     Social Determinants of Health     Financial Resource Strain: Unknown    Difficulty of Paying Living Expenses: Patient refused   Food Insecurity: Unknown    Worried About Running Out of Food in the Last Year: Patient refused    Ran Out of Food in the Last Year: Patient refused   Transportation Needs: Unknown    Lack of Transportation (Medical): Patient refused    Lack of Transportation (Non-Medical): Patient refused   Physical Activity: Unknown    Days of Exercise per Week: Patient refused    Minutes of  "Exercise per Session: 0 min   Stress: No Stress Concern Present    Feeling of Stress : Not at all   Social Connections: Unknown    Frequency of Communication with Friends and Family: Patient refused    Frequency of Social Gatherings with Friends and Family: Patient refused    Active Member of Clubs or Organizations: Patient refused    Attends Club or Organization Meetings: Patient refused    Marital Status: Patient refused   Housing Stability: Unknown    Unable to Pay for Housing in the Last Year: Patient refused    Number of Places Lived in the Last Year: 1    Unstable Housing in the Last Year: Patient refused         Medications/Allergies: See med card    Vitals:    01/13/22 1349 01/13/22 1356   BP:  119/68   Pulse:  61   Weight: 104.3 kg (230 lb) 104.3 kg (230 lb)   Height: 5' 8" (1.727 m) 5' 8" (1.727 m)   PainSc:  0-No pain         Physical exam:    GENERAL: A and O x3, the patient appears well groomed and is in no acute distress.  Skin: No rashes or obvious lesions  HEENT: normocephalic, atraumatic  CARDIOVASCULAR:  RRR  LUNGS: non labored breathing  ABDOMEN: soft, nontender   UPPER EXTREMITIES: Normal alignment, normal range of motion, no atrophy, no skin changes,  hair growth and nail growth normal and equal bilaterally. No swelling. Tenderness to right AC joint  LOWER EXTREMITIES:  Normal alignment, normal range of motion, no atrophy, no skin changes,  hair growth and nail growth normal and equal bilaterally. No swelling, no tenderness.    CERVICAL SPINE:  Full ROM with pain on flexion and extension  Negative spurlings  Tenderness to palpation right cervical paraspinous muscles   LUMBAR SPINE  Lumbar spine: ROM is limited with flexion extension and oblique extension with moderate increased pain.    Caden's test causes no increased pain on either side.    Supine straight leg raise is negative bilaterally.    Internal and external rotation of the hip causes no increased pain on either " side.  Myofascial exam: No tenderness to palpation across lumbar paraspinous muscles. Moderate tenderness of right thoracic Paraspinous muscles       MENTAL STATUS: normal orientation, speech, language, and fund of knowledge for social situation.  Emotional state appropriate.    CRANIAL NERVES:  II:  PERRL bilaterally,   III,IV,VI: EOMI.    V:  Facial sensation equal bilaterally  VII:  Facial motor function normal.  VIII:  Hearing equal to finger rub bilaterally  IX/X: Gag normal, palate symmetric  XI:  Shoulder shrug equal, head turn equal  XII:  Tongue midline without fasciculations      MOTOR: Tone and bulk: normal bilateral upper and lower Strength: normal   Delt Bi Tri WE WF     R 5 5 5 5 5 5   L 5 5 5 5 5 5     IP ADD ABD Quad TA Gas HAM  R 5 5 5 5 5 5 5  L 5 5 5 5 5 5 5    SENSATION: Light touch and pinprick intact bilaterally  REFLEXES: normal, symmetric, nonbrisk.  Toes down, no clonus. No hoffmans.  GAIT: normal rise, base, steps, and arm swing.        Imaging:  Cervical CT [facility] 4/2019  Significant spinal central canal stenosis along with foraminal stenosis most significantly at the level of C5/C6    MRI lumbar spine 9/2017 ([facility])  Severe facet arthrosis and ligamentum flavum at L4-5 results in severe spinal canal stenosis. Possible impingement of the bilateral L5 nerve as well as bilateral L4 nerves.  There is a grade 1 anterolisthesis of L4 and L5.  Moderate to severe bilateral neuroforaminal L3-4.  This note was completed with dictation software and grammatical errors may exist.    Assessment:  Mr. Hollingsworth is a 81 y.o. male with low back and left leg pain  1. DDD (degenerative disc disease), lumbar    2. Spinal stenosis of lumbar region, unspecified whether neurogenic claudication present    3. Greater trochanteric bursitis of right hip      Plan:  1.  I have stressed the importance of physical activity and exercise to improve overall health. Home exercises provided for GTB LCV  2. Monitor progress  from bilateral L4-5 and L5-S1 TF JOSE.   3. Recommend regular plantar fasciitis stretches  4. F/u prn

## 2022-01-13 NOTE — PROGRESS NOTES
[facility]    Subjective:     Chief Complaint:   Chief Complaint   Patient presents with    Left Hip - Pain     Left hip pain. Sudden onset after a cruise. Has been feeling gradually better.       Past Medical History:   Diagnosis Date    Allergy     Ankle pain     left    Anticoagulant long-term use     aspirin    Anxiety     Atrial fibrillation     Back pain     Bruises easily     Cataract     Clotting disorder     left leg    Colon polyp     Diabetes mellitus     Diabetes mellitus, type 2     Hay fever     Hyperlipidemia     Hypertension     Obese     BRANDI on CPAP        Past Surgical History:   Procedure Laterality Date    ABDOMINAL SURGERY      origunal surg 1986-mult surgeries since    CARPAL TUNNEL RELEASE      right wrist 2009-left wrist 2010    COLON SURGERY      partial colon removal    COLONOSCOPY  11/26/2018    Dr. Slaazar; normal terminal ileum; polyps removed from ascending colon and hepatic flexure; pan diverticulosis; small internal hemorrhoids; repeat in 5 years; Bx: fragments of an adenomatous polyp with mild surface glandular dysplasia and associated chronic active inflammation, no evidence of high-grade dysplasia or malignancy    COLOSTOMY  1986    colostomy reversal  1986    EYE SURGERY      hay fever      HERNIA REPAIR  1949    REPAIR OF TENDON OF LOWER EXTREMITY Left 6/24/2020    Procedure: REPAIR, TENDON, LOWER EXTREMITY;  Surgeon: Justin Mandujano DPM;  [place] [facility] OR;  Service: Podiatry;  Laterality: Left;  ARTHEX NOTIFIED IN CASE HE WANTS ANCHOR    TOE SURGERY Left 2017    replaced a joint     TONSILLECTOMY  1947    TRANSFORAMINAL EPIDURAL INJECTION OF STEROID      x 4    TRANSFORAMINAL EPIDURAL INJECTION OF STEROID Bilateral 11/13/2019    Procedure: Injection,steroid,epidural,transforaminal approach;  Surgeon: Grant Wright MD;  [place] [facility] OR;  Service: Pain Management;  Laterality: Bilateral;  L4-5, L5-S1    TRANSFORAMINAL EPIDURAL  INJECTION OF STEROID Bilateral 3/9/2021    Procedure: Injection,steroid,epidural,transforaminal approach;  Surgeon: Grant Wright MD;  [place] [facility] OR;  Service: Pain Management;  Laterality: Bilateral;  L4-5, L5-S1    TRANSFORAMINAL EPIDURAL INJECTION OF STEROID Bilateral 5/25/2021    Procedure: Injection,steroid,epidural,transforaminal approach;  Surgeon: Grant Wright MD;  [place] [facility] OR;  Service: Pain Management;  Laterality: Bilateral;  L4-L5,L5,S1    TRANSFORAMINAL EPIDURAL INJECTION OF STEROID Bilateral 12/14/2021    Procedure: Injection,steroid,epidural,transforaminal approach Bilateral L4-5, L5-S1;  Surgeon: Grant Wright MD;  [place] [facility] OR;  Service: Pain Management;  Laterality: Bilateral;       Current Outpatient Medications   Medication Sig    alcohol swabs (ALCOHOL PADS) PadM Apply 2 each topically once daily.    apixaban (ELIQUIS) 5 mg Tab Take 1 tablet (5 mg total) by mouth 2 (two) times daily.    aspirin 81 MG Chew Take 1 tablet (81 mg total) by mouth once daily.    atorvastatin (LIPITOR) 10 MG tablet nightly (Patient taking differently: Take 10 mg by mouth every evening. nightly)    azelastine (ASTELIN) 137 mcg (0.1 %) nasal spray 2 sprays (274 mcg total) by Nasal route 2 (two) times daily.    blood sugar diagnostic (BLOOD GLUCOSE TEST) Strp FREESTYLE LITE BG TEST STRIPS. current use of insulin  - Primary ICD-10-CM: E11.9    fexofenadine (ALLEGRA) 180 MG tablet Take 1 tablet (180 mg total) by mouth once daily.    fluticasone (FLONASE) 50 mcg/actuation nasal spray 2 sprays (100 mcg total) by Each Nare route once daily.    FREESTYLE LANCETS 28 gauge lancets Inject 1 lancet into the skin 3 (three) times daily.    furosemide (LASIX) 20 MG tablet Take 1 tablet (20 mg total) by mouth every Mon, Wed, Fri.    gabapentin (NEURONTIN) 300 MG capsule Take 1 capsule (300 mg total) by mouth 3 (three) times daily.    HYDROcodone-acetaminophen (NORCO) 7.5-325 mg per tablet Take 1 tablet by mouth  every 24 hours as needed for Pain.    ibuprofen (ADVIL,MOTRIN) 800 MG tablet     ipratropium (ATROVENT) 0.03 % nasal spray 2 sprays by Nasal route 2 (two) times daily.    lisinopriL (PRINIVIL,ZESTRIL) 20 MG tablet Take 1 tablet (20 mg total) by mouth once daily.    methocarbamoL (ROBAXIN) 500 MG Tab Take 1 tablet (500 mg total) by mouth 3 (three) times daily.    metoprolol tartrate (LOPRESSOR) 25 MG tablet Take 1 tablet (25 mg total) by mouth 2 (two) times daily.    montelukast (SINGULAIR) 10 mg tablet Take 1 tablet (10 mg total) by mouth every evening.    polyethylene glycol (GLYCOLAX) 17 gram/dose powder Take 17 g by mouth once daily.    potassium chloride (MICRO-K) 10 MEQ CpSR Every other day (Patient taking differently: Take 10 mEq by mouth every other day. Every other day)    SITagliptin (JANUVIA) 100 MG Tab Take 1 tablet (100 mg total) by mouth once daily.    tiZANidine (ZANAFLEX) 6 mg capsule     traZODone (DESYREL) 100 MG tablet Take 1 tablet (100 mg total) by mouth every evening.     No current facility-administered medications for this visit.       Review of patient's allergies indicates:   Allergen Reactions    Ativan [lorazepam] Other (See Comments)     Mood alternating      Dilaudid [hydromorphone (bulk)] Other (See Comments)     Mood alternating      Morphine Other (See Comments)     Mood alternating      Adhesive tape-silicones     Hydromorphone        Family History   Problem Relation Age of Onset    Heart disease Mother     Melanoma Neg Hx     Psoriasis Neg Hx     Lupus Neg Hx     Eczema Neg Hx        Social History     Socioeconomic History    Marital status:    Tobacco Use    Smoking status: Former Smoker     Types: Cigarettes     Quit date: 2/27/2006     Years since quitting: 15.8    Smokeless tobacco: Never Used    Tobacco comment: ex smoker 2006   Substance and Sexual Activity    Alcohol use: Yes     Comment: rarely    Drug use: No    Sexual activity: Yes      Partners: Female     Social Determinants of Health     Financial Resource Strain: Unknown    Difficulty of Paying Living Expenses: Patient refused   Food Insecurity: Unknown    Worried About Running Out of Food in the Last Year: Patient refused    Ran Out of Food in the Last Year: Patient refused   Transportation Needs: Unknown    Lack of Transportation (Medical): Patient refused    Lack of Transportation (Non-Medical): Patient refused   Physical Activity: Unknown    Days of Exercise per Week: Patient refused    Minutes of Exercise per Session: 0 min   Stress: No Stress Concern Present    Feeling of Stress : Not at all   Social Connections: Unknown    Frequency of Communication with Friends and Family: Patient refused    Frequency of Social Gatherings with Friends and Family: Patient refused    Active Member of Clubs or Organizations: Patient refused    Attends Club or Organization Meetings: Patient refused    Marital Status: Patient refused   Housing Stability: Unknown    Unable to Pay for Housing in the Last Year: Patient refused    Number of Places Lived in the Last Year: 1    Unstable Housing in the Last Year: Patient refused       History of present illness:  Patient comes in today for the left hip.  Had left hip pain for about a month.  It has actually gotten slightly better.  The pain is over the trochanteric region.  Denies any trauma      Review of Systems:    Constitution: Negative for chills, fever, and sweats.  Negative for unexplained weight loss.    HENT:  Negative for headaches and blurry vision.    Cardiovascular:Negative for chest pain or irregular heart beat. Negative for hypertension.    Respiratory:  Negative for cough and shortness of breath.    Gastrointestinal: Negative for abdominal pain, heartburn, melena, nausea, and vomitting.    Genitourinary:  Negative bladder incontinence and dysuria.    Musculoskeletal:  See HPI for details.     Neurological: Negative for  numbness.    Psychiatric/Behavioral: Negative for depression.  The patient is not nervous/anxious.      Endocrine: Negative for polyuria    Hematologic/Lymphatic: Negative for bleeding problem.  Does not bruise/bleed easily.    Skin: Negative for poor would healing and rash    Objective:      Physical Examination:    Vital Signs:  There were no vitals filed for this visit.    Body mass index is 34.99 kg/m².    This a well-developed, well nourished patient in no acute distress.  They are alert and oriented and cooperative to examination.        Patient has full range of motion left hip.  No groin pain with rigorous motion of the hip.  Negative straight leg raises.  EHL is intact.  Deep tendon reflexes are intact.  He can heel walk toe walk.  Neurovascularly intact to sensory testing.  Full range of motion of the right hip without difficulty  Pertinent New Results:    XRAY Report / Interpretation:   AP and lateral of the left hip demonstrates normal bony anatomy no fracture subluxations or dislocations.  No osteoarthritis    Assessment/Plan:      Trochanteric bursitis.  I injected the left greater trochanteric bursa with Kenalog and lidocaine.  He will follow-up p.r.n.  This note was created using Dragon voice recognition software that occasionally misinterpreted phrases or words.

## 2022-01-13 NOTE — PROCEDURES
Large Joint Aspiration/Injection: L greater trochanteric bursa    Date/Time: 1/13/2022 8:30 AM  Performed by: Moses Beard MD  Authorized by: Moses Beard MD     Consent Done?:  Yes (Verbal)  Indications:  Pain  Site marked: the procedure site was marked    Timeout: prior to procedure the correct patient, procedure, and site was verified    Prep: patient was prepped and draped in usual sterile fashion      Local anesthesia used?: Yes    Local anesthetic:  Lidocaine 1% without epinephrine  Ultrasonic Guidance for needle placement?: No    [place]  Hip  Site:  L greater trochanteric bursa  Medications:  40 mg triamcinolone acetonide 40 mg/mL  Patient tolerance:  Patient tolerated the procedure well with no immediate complications

## 2022-01-30 DIAGNOSIS — M54.16 LUMBAR RADICULITIS: ICD-10-CM

## 2022-02-01 RX ORDER — HYDROCODONE BITARTRATE AND ACETAMINOPHEN 7.5; 325 MG/1; MG/1
1 TABLET ORAL
Qty: 30 TABLET | Refills: 0 | Status: SHIPPED | OUTPATIENT
Start: 2022-02-01 | End: 2022-03-02 | Stop reason: SDUPTHER

## 2022-03-02 DIAGNOSIS — M54.16 LUMBAR RADICULITIS: ICD-10-CM

## 2022-03-02 DIAGNOSIS — J30.89 CHRONIC NONSEASONAL ALLERGIC RHINITIS DUE TO FUNGAL SPORES: ICD-10-CM

## 2022-03-02 DIAGNOSIS — E78.01 FAMILIAL HYPERCHOLESTEROLEMIA: ICD-10-CM

## 2022-03-08 RX ORDER — HYDROCODONE BITARTRATE AND ACETAMINOPHEN 7.5; 325 MG/1; MG/1
1 TABLET ORAL
Qty: 30 TABLET | Refills: 0 | Status: SHIPPED | OUTPATIENT
Start: 2022-03-08 | End: 2022-05-02 | Stop reason: SDUPTHER

## 2022-03-08 RX ORDER — ATORVASTATIN CALCIUM 10 MG/1
TABLET, FILM COATED ORAL
Qty: 90 TABLET | Refills: 3 | Status: SHIPPED | OUTPATIENT
Start: 2022-03-08 | End: 2022-06-28 | Stop reason: SDUPTHER

## 2022-03-08 RX ORDER — MONTELUKAST SODIUM 10 MG/1
10 TABLET ORAL NIGHTLY
Qty: 90 TABLET | Refills: 3 | Status: SHIPPED | OUTPATIENT
Start: 2022-03-08 | End: 2023-01-27 | Stop reason: SDUPTHER

## 2022-03-08 NOTE — TELEPHONE ENCOUNTER
I sent 2 of the scripts to [facility], but [facility] does not accept E-prescriptions for controlled substances.

## 2022-03-18 NOTE — PATIENT INSTRUCTIONS
Your Diabetes Foot Care Program    Every day you depend on your feet to keep you moving. But when you have diabetes, your feet need special care. Even a small foot problem can become very serious. So dont take your feet for granted. By working with your diabetes healthcare team, you can learn how to protect your feet and keep them healthy.  Evaluating your feet  An evaluation helps your healthcare provider check the condition of your feet. The evaluation includes a review of your diabetes history and overall health. It may also include a foot exam, X-rays, or other tests. These can help show problems beneath the skin that you cant see or feel.  Medical history  You will be asked about your overall health and any history of foot problems. Youll also discuss your diabetes history, such as whether your blood sugar level has changed over time. It also includes questions about sensations of pain, tingling, pins and needles, or numbness. Your healthcare provider will also want to know if you have high blood pressure and heart disease, or if you smoke. Be sure to mention any medicines (including over-the-counter), supplements, or herbal remedies you take.  Foot exam  A foot exam checks the condition of different parts of your foot. First, your skin and nails are examined for any signs of infection. Blood flow is checked by feeling for the pulses in each foot. You may also have tests to study the nerves in the foot. These include using a small filament (wire) to see how sensitive your feet are. In certain cases, you will be asked to walk a short distance to check for bone, joint, and muscle problems.  Diagnostic tests  If needed, your healthcare provider will suggest certain tests to learn more about your feet. These include:  Doppler tests to measure blood flow in the feet and lower leg.  X-rays, which can show bone or joint problems.  Other imaging tests, such as an MRI (magnetic resonance imaging), bone scan, and CT  (computed tomography) scan. These can help show bone infections.  Other tests, such as vascular tests, which study the blood flow in your feet and legs. You may also have nerve studies to learn how sensitive your feet are.  Creating a foot care program  Based on the evaluation, your healthcare provider will create a foot care program for you. Your program may be as simple as starting a daily self-care routine and changing the types of shoes your wear. It may also involve treating minor foot problems, such as a corn or blister. In some cases, surgery will be needed to treat an infection or mechanical problems, such as hammer toes.  Preventing problems  When you have diabetes, its easier to prevent problems than to treat them later on. So see your healthcare team for regular checkups and foot care. Your healthcare team can also help you learn more about caring for your feet at home. For example, you may be told to avoid walking barefoot. Or you may be told that special footwear is needed to protect your feet.  Have regular checkups  Foot problems can develop quickly. So be sure to follow your healthcare teams schedule for regular checkups. During office visits, take off your shoes and socks as soon as you get in the exam room. Ask your healthcare provider to examine your feet for problems. This will make it easier to find and treat small skin irritations before they get worse. Regular checkups can also help keep track of the blood flow and feeling in your feet. If you have neuropathy (lack of feeling in your feet), you will need to have checkups more often.  Learn about self-care  The more you know about diabetes and your feet, the easier it will be to prevent problems. Members of your healthcare team can teach you how to inspect your feet and teach you to look for warning signs. They can also give you other foot care tips. During office visits, be sure to ask any questions you have.  Date Last Reviewed: 7/1/2016  ©  "9502-4478 [employer]. [address] [place] [address], [place] [address]. All rights reserved. This information is not intended as a substitute for professional medical care. Always follow your healthcare professional's instructions.       Adult Acquired Flatfoot  A variety of foot problems can lead to adult acquired flatfoot deformity (AAFD), a condition that results in a fallen arch with the foot pointed outward.    Most people -- no matter what the cause of their flatfoot -- can be helped with orthotics, braces and physical therapy. In patients who have tried these treatments without any relief, surgery can be a very effective way to help with the pain and deformity.    This article provides a brief overview of the problems that can result in AAFD. Further details regarding the most common conditions that cause an acquired flatfoot and their treatment options are provided in separate articles. Links to those articles are provided.    One of the more common signs of flatfoot is the "too many toes" sign. Even the big toe can be seen from the back of this patient's foot. In a normal foot, only the fourth and fifth toes should be visible.    Symptoms  Depending on the cause of the flatfoot, a patient may experience one or more of the different symptoms below:  Pain along the course of the posterior tibial tendon which lies on the inside of the foot and ankle. This can be associated with swelling on the inside of the ankle.  Pain that is worse with activity. High intensity or impact activities, such as running, can be very difficult. Some patients can have difficulty walking or even standing for long periods of time.  When the foot collapses, the heel bone may shift position and put pressure on the outside ankle bone (fibula). This can cause pain on the outside of the ankle. Arthritis in the heel also causes this same type of pain.  Patients with an old injury or arthritis in the middle of the foot can have " painful, bony bumps on the top and inside of the foot. These make shoewear very difficult. Occasionally, the bony spurs are so large that they pinch the nerves which can result in numbness and tingling on the top of the foot and into the toes.  Diabetics may only notice swelling or a large bump on the bottom of the foot. Because their sensation is affected, people with diabetes may not have any pain. The large bump can cause skin problems and an ulcer (a sore that does not heal) may develop if proper diabetic shoewear is not used.    Cause  As discussed above, many health conditions can create a painful flatfoot.       Leg Swelling in Both Legs    Swelling of the feet, ankles, and legs is called edema. It is caused by excess fluid that has collected in the tissues. Extra fluid in the body settles in the lowest part because of gravity. This is why the legs and feet are most affected.  Some of the causes for edema include:  Disease of the heart like congestive heart failure  Standing or sitting for long periods of time  Infection of the feet or legs  Blood pooling in the veins of your legs (venous insufficiency)  Dilated veins in your lower leg (varicose veins)  Garters or other clothing that is tight on your legs. This will cause blood to pool in your legs because the clothing limits blood flow.  Some medicines such as hormones like birth control pills, some blood pressure medicines like calcium channel blockers (amlodipine) and steroids, some antidepressants like MAO inhibitors and tricyclics  Menstrual periods that cause you to retain fluids  Many types of renal disease  Liver failure or cirrhosis  Pregnancy, some swelling is normal, but a sudden increase in leg swelling or weight gain can be a sign of a dangerous complication of pregnancy  Poor nutrition  Thyroid disease  Medical treatment will depend on what is causing the swelling in your legs. Your healthcare provider may prescribe water pills (diuretics) to  get rid of the extra fluid.  Home care  Follow these guidelines when caring for yourself at home:  Don't wear clothing like garters that is tight on your legs.  Keep your legs up while lying or sitting.  If infection, injury, or recent surgery is causing the swelling, stay off your legs as much as possible until symptoms get better.  If your healthcare provider says that your leg swelling is caused by venous insufficiency or varicose veins, don't sit or  one place for long periods of time. Take breaks and walk about every few hours. Brisk walking is a good exercise. It helps circulate the blood that has collected in your leg. Talk with your provider about using support stockings to stop daytime leg swelling.  If your provider says that heart disease is causing your leg swelling, follow a low-salt diet to stop extra fluid from staying in your body. You may also need medicine.  Follow-up care  Follow up with your healthcare provider, or as advised.  When to seek medical advice  Call your healthcare provider right away if any of these occur:  New shortness of breath or chest pain  Shortness of breath or chest pain that gets worse  Swelling in both legs or ankles that gets worse  Swelling of the abdomen  Redness, warmth, or swelling in one leg  Fever of 100.4ºF (38ºC) or higher, or as directed by your healthcare provider  Yellow color to your skin or eyes  Rapid, unexplained weight gain  Having to sleep upright or use an increased number of pillows  Date Last Reviewed: 3/31/2016  © 3606-8835 [employer]. [address] [place] [address], [place] [address]. All rights reserved. This information is not intended as a substitute for professional medical care. Always follow your healthcare professional's instructions.

## 2022-03-21 ENCOUNTER — OFFICE VISIT (OUTPATIENT)
Dept: PODIATRY | Facility: CLINIC | Age: 82
End: 2022-03-21
Payer: MEDICARE

## 2022-03-21 VITALS
HEIGHT: 68 IN | RESPIRATION RATE: 16 BRPM | WEIGHT: 230 LBS | OXYGEN SATURATION: 97 % | BODY MASS INDEX: 34.86 KG/M2 | HEART RATE: 60 BPM

## 2022-03-21 DIAGNOSIS — M21.6X2 ACQUIRED BILATERAL PES CAVUS: Primary | ICD-10-CM

## 2022-03-21 DIAGNOSIS — M79.672 LEFT FOOT PAIN: ICD-10-CM

## 2022-03-21 DIAGNOSIS — M25.572 CHRONIC PAIN OF LEFT ANKLE: ICD-10-CM

## 2022-03-21 DIAGNOSIS — G89.29 CHRONIC PAIN OF LEFT ANKLE: ICD-10-CM

## 2022-03-21 DIAGNOSIS — M21.6X1 ACQUIRED BILATERAL PES CAVUS: Primary | ICD-10-CM

## 2022-03-21 DIAGNOSIS — R60.0 BILATERAL LOWER EXTREMITY EDEMA: ICD-10-CM

## 2022-03-21 DIAGNOSIS — E11.9 CONTROLLED TYPE 2 DIABETES MELLITUS WITHOUT COMPLICATION, WITHOUT LONG-TERM CURRENT USE OF INSULIN: ICD-10-CM

## 2022-03-21 DIAGNOSIS — E11.9 ENCOUNTER FOR DIABETIC FOOT EXAM: ICD-10-CM

## 2022-03-21 PROCEDURE — 99213 OFFICE O/P EST LOW 20 MIN: CPT | Mod: S$GLB,,, | Performed by: PODIATRIST

## 2022-03-21 PROCEDURE — 99213 PR OFFICE/OUTPT VISIT, EST, LEVL III, 20-29 MIN: ICD-10-PCS | Mod: S$GLB,,, | Performed by: PODIATRIST

## 2022-03-21 RX ORDER — EMPAGLIFLOZIN 10 MG/1
10 TABLET, FILM COATED ORAL DAILY
COMMUNITY
Start: 2022-03-06 | End: 2022-05-02 | Stop reason: SDUPTHER

## 2022-03-21 NOTE — PROGRESS NOTES
"  [address] [facility] [address]  [place] [address]  Phone: [phone]   [phone]    Patient's PCP:Garland Ocampo MD  Referring Provider: No ref. provider found    Subjective:      Chief Complaint:: Diabetic Foot Exam (Yearly exam) and Follow-up (Left foot pain)    HPI   Yeyo Hollingsworth is a 81 y.o. male who presents today with a complaint of left foot pain previously repaired Rupture of peroneal.  Current symptoms include sharp shooting pain up back of heel into lower leg.  Aggravating factors are walking weightbearing and prolonged use. Symptoms have waxed and weaned. Treatment to date have included hydrocodone 7.5. who presents to the clinic for a diabetic foot exam.   Pt has seen Garland Ocampo MD on 12/28/2021 who treats them for their diabetes.  Pt has been a diabetic for eleven years.  Taking Januvia and Janument to treat diabetes.    Blood sugar: 99  Hemoglobin A1C: 5.7 (June 2021)   Vitals:    03/21/22 0900   Pulse: 60   Resp: 16   SpO2: 97%   Weight: 104.3 kg (230 lb)   Height: 5' 8" (1.727 m)   PainSc:   2      Shoe Size:     Past Surgical History:   Procedure Laterality Date    ABDOMINAL SURGERY      origunal surg 1986-mult surgeries since    CARPAL TUNNEL RELEASE      right wrist 2009-left wrist 2010    COLON SURGERY      partial colon removal    COLONOSCOPY  11/26/2018    Dr. Salazar; normal terminal ileum; polyps removed from ascending colon and hepatic flexure; pan diverticulosis; small internal hemorrhoids; repeat in 5 years; Bx: fragments of an adenomatous polyp with mild surface glandular dysplasia and associated chronic active inflammation, no evidence of high-grade dysplasia or malignancy    COLOSTOMY  1986    colostomy reversal  1986    EYE SURGERY      hay fever      HERNIA REPAIR  1949    REPAIR OF TENDON OF LOWER EXTREMITY Left 6/24/2020    Procedure: REPAIR, TENDON, LOWER EXTREMITY;  Surgeon: Justin Mandujano DPM;  [place] [facility];  Service: Podiatry;  Laterality: " Left;  ARTHEX NOTIFIED IN CASE HE WANTS ANCHOR    TOE SURGERY Left 2017    replaced a joint     TONSILLECTOMY  1947    TRANSFORAMINAL EPIDURAL INJECTION OF STEROID      x 4    TRANSFORAMINAL EPIDURAL INJECTION OF STEROID Bilateral 11/13/2019    Procedure: Injection,steroid,epidural,transforaminal approach;  Surgeon: Grant Wright MD;  [place] [facility];  Service: Pain Management;  Laterality: Bilateral;  L4-5, L5-S1    TRANSFORAMINAL EPIDURAL INJECTION OF STEROID Bilateral 3/9/2021    Procedure: Injection,steroid,epidural,transforaminal approach;  Surgeon: Grant Wright MD;  [place] [facility] OR;  Service: Pain Management;  Laterality: Bilateral;  L4-5, L5-S1    TRANSFORAMINAL EPIDURAL INJECTION OF STEROID Bilateral 5/25/2021    Procedure: Injection,steroid,epidural,transforaminal approach;  Surgeon: Grant Wright MD;  [place] [facility];  Service: Pain Management;  Laterality: Bilateral;  L4-L5,L5,S1    TRANSFORAMINAL EPIDURAL INJECTION OF STEROID Bilateral 12/14/2021    Procedure: Injection,steroid,epidural,transforaminal approach Bilateral L4-5, L5-S1;  Surgeon: Grant Wright MD;  [place] [facility] OR;  Service: Pain Management;  Laterality: Bilateral;     Past Medical History:   Diagnosis Date    Allergy     Ankle pain     left    Anticoagulant long-term use     aspirin    Anxiety     Atrial fibrillation     Back pain     Bruises easily     Cataract     Clotting disorder     left leg    Colon polyp     Diabetes mellitus     Diabetes mellitus, type 2     Hay fever     Hyperlipidemia     Hypertension     Left hip pain 12/26/2021    Seeing Dr. Beard for hip pain     Obese     BRANDI on CPAP      Family History   Problem Relation Age of Onset    Heart disease Mother     Melanoma Neg Hx     Psoriasis Neg Hx     Lupus Neg Hx     Eczema Neg Hx         Social History:   Marital Status:   Alcohol History:  reports current alcohol use.  Tobacco History:  reports that he quit smoking about 16 years ago.  His smoking use included cigarettes. He has never used smokeless tobacco.  Drug History:  reports no history of drug use.    Review of patient's allergies indicates:   Allergen Reactions    Ativan [lorazepam] Other (See Comments)     Mood alternating      Dilaudid [hydromorphone (bulk)] Other (See Comments)     Mood alternating      Morphine Other (See Comments)     Mood alternating      Adhesive tape-silicones     Hydromorphone        Current Outpatient Medications   Medication Sig Dispense Refill    alcohol swabs (ALCOHOL PADS) PadM Apply 2 each topically once daily. 200 each 5    apixaban (ELIQUIS) 5 mg Tab Take 1 tablet (5 mg total) by mouth 2 (two) times daily. 180 tablet 3    aspirin 81 MG Chew Take 1 tablet (81 mg total) by mouth once daily. 100 tablet 1    atorvastatin (LIPITOR) 10 MG tablet nightly 90 tablet 3    azelastine (ASTELIN) 137 mcg (0.1 %) nasal spray 2 sprays (274 mcg total) by Nasal route 2 (two) times daily. 30 mL 5    blood sugar diagnostic (BLOOD GLUCOSE TEST) Strp FREESTYLE LITE BG TEST STRIPS. current use of insulin  - Primary ICD-10-CM: E11.9 200 strip 3    fexofenadine (ALLEGRA) 180 MG tablet Take 1 tablet (180 mg total) by mouth once daily. 90 tablet 3    fluticasone (FLONASE) 50 mcg/actuation nasal spray 2 sprays (100 mcg total) by Each Nare route once daily. 3 Bottle 3    FREESTYLE LANCETS 28 gauge lancets Inject 1 lancet into the skin 3 (three) times daily. 100 each 3    furosemide (LASIX) 20 MG tablet Take 1 tablet (20 mg total) by mouth every Mon, Wed, Fri. 45 tablet 3    gabapentin (NEURONTIN) 300 MG capsule Take 1 capsule (300 mg total) by mouth 3 (three) times daily. 270 capsule 1    HYDROcodone-acetaminophen (NORCO) 7.5-325 mg per tablet Take 1 tablet by mouth every 24 hours as needed for Pain. 30 tablet 0    ibuprofen (ADVIL,MOTRIN) 800 MG tablet       ipratropium (ATROVENT) 0.03 % nasal spray 2 sprays by Nasal route 2 (two) times daily. 30 mL 5    JARDIANCE  10 mg tablet Take 10 mg by mouth once daily.      lisinopriL (PRINIVIL,ZESTRIL) 20 MG tablet Take 1 tablet (20 mg total) by mouth once daily. 90 tablet 3    methocarbamoL (ROBAXIN) 500 MG Tab Take 1 tablet (500 mg total) by mouth 3 (three) times daily. (Patient not taking: Reported on 1/13/2022) 270 tablet 1    metoprolol tartrate (LOPRESSOR) 25 MG tablet Take 1 tablet (25 mg total) by mouth 2 (two) times daily. 180 tablet 3    montelukast (SINGULAIR) 10 mg tablet Take 1 tablet (10 mg total) by mouth every evening. 90 tablet 3    polyethylene glycol (GLYCOLAX) 17 gram/dose powder Take 17 g by mouth once daily. 6 Bottle 3    potassium chloride (MICRO-K) 10 MEQ CpSR Every other day (Patient taking differently: Take 10 mEq by mouth every other day. Every other day) 45 capsule 3    SITagliptin (JANUVIA) 100 MG Tab Take 1 tablet (100 mg total) by mouth once daily. 90 tablet 3    tiZANidine (ZANAFLEX) 6 mg capsule       traZODone (DESYREL) 100 MG tablet Take 1 tablet (100 mg total) by mouth every evening. 90 tablet 3     No current facility-administered medications for this visit.       Review of Systems   Constitutional: Negative for chills, fatigue, fever and unexpected weight change.   HENT: Negative for hearing loss and trouble swallowing.    Eyes: Negative for photophobia and visual disturbance.   Respiratory: Negative for cough, shortness of breath and wheezing.    Cardiovascular: Negative for chest pain, palpitations and leg swelling.   Gastrointestinal: Negative for abdominal pain and nausea.   Genitourinary: Negative for dysuria and frequency.   Musculoskeletal: Positive for gait problem and myalgias. Negative for arthralgias, back pain and joint swelling.   Skin: Negative for rash and wound.   Allergic/Immunologic: Positive for immunocompromised state.   Neurological: Negative for tremors, seizures, weakness and headaches.   Hematological: Does not bruise/bleed easily.         Objective:        Physical  Exam:   Foot Exam    General  General Appearance: appears stated age and healthy   Orientation: alert and oriented to person, place, and time   Affect: appropriate   Gait: unimpaired       Right Foot/Ankle     Inspection and Palpation  Ecchymosis: none  Tenderness: none   Swelling: (Diffuse swelling lower extremity secondary to venous incompetency)  Arch: pes planus  Hammertoes: absent  Claw Toes: absent  Hallux valgus: no  Hallux limitus: no  Skin Exam: abnormal color (Venous stasis dermatitis);   Neurovascular  Dorsalis pedis: 2+  Posterior tibial: 2+  Capillary Refill: 2+  Saphenous nerve sensation: normal  Tibial nerve sensation: normal  Superficial peroneal nerve sensation: normal  Deep peroneal nerve sensation: normal  Sural nerve sensation: normal    Muscle Strength  Ankle dorsiflexion: 5  Ankle plantar flexion: 5  Ankle inversion: 5  Ankle eversion: 5  Great toe extension: 5  Great toe flexion: 5    Range of Motion    Normal right ankle ROM    Tests  Paresthesia: positive    Left Foot/Ankle      Inspection and Palpation  Ecchymosis: none  Tenderness: (Minimal tenderness along the Achilles tendon minimal tenderness at site of anterior talofibular ligament repair and peroneal tendon repair.  No subluxating peroneals)  Swelling: (Diffuse swelling lower extremity secondary to venous incompetency)  Arch: pes planus  Skin Exam: abnormal color (Venous stasis dermatitis);   Neurovascular  Dorsalis pedis: 2+  Posterior tibial: 2+  Capillary refill: 2+  Saphenous nerve sensation: normal  Tibial nerve sensation: normal  Superficial peroneal nerve sensation: normal  Deep peroneal nerve sensation: normal  Sural nerve sensation: normal    Muscle Strength  Ankle dorsiflexion: 5  Ankle plantar flexion: 5  Ankle inversion: 5  Ankle eversion: 5  Great toe extension: 5  Great toe flexion: 5    Range of Motion    Normal left ankle ROM    Tests  Anterior drawer: negative   Varus Tilt: negative   Syndesmosis squeeze test:  negative   Calcaneal squeeze: negative   Talar tilt: negative   Duff squeeze: negative   Paresthesia: positive        Physical Exam  Cardiovascular:      Pulses:           Dorsalis pedis pulses are 2+ on the right side and 2+ on the left side.        Posterior tibial pulses are 2+ on the right side and 2+ on the left side.   Musculoskeletal:      Right foot: No bunion.         Imaging:            Assessment:       1. Acquired bilateral pes cavus    2. Bilateral lower extremity edema    3. Controlled type 2 diabetes mellitus without complication, without long-term current use of insulin    4. Chronic pain of left ankle    5. Left foot pain      Plan:   Acquired bilateral pes cavus    Bilateral lower extremity edema    Controlled type 2 diabetes mellitus without complication, without long-term current use of insulin    Chronic pain of left ankle    Left foot pain    Today evaluate the patient has consider be low risk of loss of limb secondary to good circulation normal neurological status.    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  He told me he spread 2 loads of sand without any problems but he does get some occasional pain.  Told I can find nothing clinically wrong it looks to be significant surgery appears to have healed well he does have some Achilles tendinitis but not severe.  I told this time I do not think there is any that needs to be done he does as realize he is 81 years of age in may have some occasional pain when he does manual labor.  He has me about his fungal toenails about getting them removed.  I told him that is up to him he has adequate circulation but if he size do he will have to stop his blood thinners for 2 days.  If he decides to do it will be after he takes a 2 week cruise.  He will contact the office.      Procedures           Counseling:     I provided patient education verbally regarding:   Patient diagnosis, treatment options, as well as alternatives, risks, and benefits.     This note was created using Dragon voice recognition software that occasionally misinterpreted phrases or words.

## 2022-04-24 ENCOUNTER — PATIENT MESSAGE (OUTPATIENT)
Dept: FAMILY MEDICINE | Facility: CLINIC | Age: 82
End: 2022-04-24

## 2022-04-26 ENCOUNTER — PATIENT MESSAGE (OUTPATIENT)
Dept: FAMILY MEDICINE | Facility: CLINIC | Age: 82
End: 2022-04-26

## 2022-04-26 ENCOUNTER — TELEPHONE (OUTPATIENT)
Dept: FAMILY MEDICINE | Facility: CLINIC | Age: 82
End: 2022-04-26

## 2022-04-26 NOTE — TELEPHONE ENCOUNTER
Called patient and notified him that his certification for mobility impairment was ready to be picked up. Patient was going to call his wife to have her stop and pick it up.

## 2022-05-02 ENCOUNTER — PATIENT MESSAGE (OUTPATIENT)
Dept: FAMILY MEDICINE | Facility: CLINIC | Age: 82
End: 2022-05-02

## 2022-05-02 DIAGNOSIS — E78.5 HYPERLIPIDEMIA, UNSPECIFIED HYPERLIPIDEMIA TYPE: Chronic | ICD-10-CM

## 2022-05-02 DIAGNOSIS — I10 ESSENTIAL HYPERTENSION: ICD-10-CM

## 2022-05-02 DIAGNOSIS — M54.16 LUMBAR RADICULITIS: ICD-10-CM

## 2022-05-02 DIAGNOSIS — E11.9 TYPE 2 DIABETES MELLITUS WITHOUT COMPLICATION, WITHOUT LONG-TERM CURRENT USE OF INSULIN: Chronic | ICD-10-CM

## 2022-05-04 RX ORDER — EMPAGLIFLOZIN 10 MG/1
10 TABLET, FILM COATED ORAL DAILY
Qty: 90 TABLET | Refills: 1 | Status: SHIPPED | OUTPATIENT
Start: 2022-05-04 | End: 2022-11-13 | Stop reason: SDUPTHER

## 2022-05-04 RX ORDER — GABAPENTIN 300 MG/1
300 CAPSULE ORAL 3 TIMES DAILY
Qty: 270 CAPSULE | Refills: 1 | Status: SHIPPED | OUTPATIENT
Start: 2022-05-04 | End: 2022-05-05 | Stop reason: SDUPTHER

## 2022-05-04 RX ORDER — NAPROXEN SODIUM 220 MG/1
81 TABLET, FILM COATED ORAL DAILY
Qty: 100 TABLET | Refills: 1 | Status: SHIPPED | OUTPATIENT
Start: 2022-05-04 | End: 2022-08-22 | Stop reason: SDUPTHER

## 2022-05-04 RX ORDER — HYDROCODONE BITARTRATE AND ACETAMINOPHEN 7.5; 325 MG/1; MG/1
1 TABLET ORAL
Qty: 30 TABLET | Refills: 0 | Status: SHIPPED | OUTPATIENT
Start: 2022-05-04 | End: 2022-06-12 | Stop reason: SDUPTHER

## 2022-05-04 RX ORDER — METOPROLOL TARTRATE 25 MG/1
25 TABLET, FILM COATED ORAL 2 TIMES DAILY
Qty: 180 TABLET | Refills: 3 | Status: SHIPPED | OUTPATIENT
Start: 2022-05-04 | End: 2022-05-05 | Stop reason: SDUPTHER

## 2022-05-05 ENCOUNTER — PATIENT MESSAGE (OUTPATIENT)
Dept: FAMILY MEDICINE | Facility: CLINIC | Age: 82
End: 2022-05-05

## 2022-05-05 DIAGNOSIS — I10 ESSENTIAL HYPERTENSION: ICD-10-CM

## 2022-05-05 DIAGNOSIS — M54.16 LUMBAR RADICULITIS: ICD-10-CM

## 2022-05-05 RX ORDER — METOPROLOL TARTRATE 25 MG/1
25 TABLET, FILM COATED ORAL 2 TIMES DAILY
Qty: 180 TABLET | Refills: 3 | Status: ON HOLD | OUTPATIENT
Start: 2022-05-05 | End: 2023-02-04 | Stop reason: SDUPTHER

## 2022-05-05 RX ORDER — METOPROLOL TARTRATE 25 MG/1
25 TABLET, FILM COATED ORAL 2 TIMES DAILY
Qty: 180 TABLET | Refills: 3 | Status: SHIPPED | OUTPATIENT
Start: 2022-05-05 | End: 2022-05-05 | Stop reason: SDUPTHER

## 2022-05-05 RX ORDER — GABAPENTIN 300 MG/1
300 CAPSULE ORAL 3 TIMES DAILY
Qty: 270 CAPSULE | Refills: 1 | Status: SHIPPED | OUTPATIENT
Start: 2022-05-05 | End: 2022-05-05 | Stop reason: SDUPTHER

## 2022-05-05 RX ORDER — GABAPENTIN 300 MG/1
300 CAPSULE ORAL 3 TIMES DAILY
Qty: 270 CAPSULE | Refills: 1 | Status: SHIPPED | OUTPATIENT
Start: 2022-05-05 | End: 2023-03-27 | Stop reason: SDUPTHER

## 2022-06-20 ENCOUNTER — TELEPHONE (OUTPATIENT)
Dept: FAMILY MEDICINE | Facility: CLINIC | Age: 82
End: 2022-06-20

## 2022-06-20 NOTE — TELEPHONE ENCOUNTER
Spoke to patient about getting labs done before appt next week. Patient stated he would get them done.

## 2022-06-21 ENCOUNTER — LAB VISIT (OUTPATIENT)
Dept: LAB | Facility: HOSPITAL | Age: 82
End: 2022-06-21
Attending: FAMILY MEDICINE
Payer: MEDICARE

## 2022-06-21 DIAGNOSIS — E11.9 TYPE 2 DIABETES MELLITUS WITHOUT COMPLICATION, WITHOUT LONG-TERM CURRENT USE OF INSULIN: ICD-10-CM

## 2022-06-21 DIAGNOSIS — E78.5 HYPERLIPIDEMIA, UNSPECIFIED HYPERLIPIDEMIA TYPE: ICD-10-CM

## 2022-06-21 DIAGNOSIS — I10 PRIMARY HYPERTENSION: ICD-10-CM

## 2022-06-21 LAB
ALBUMIN SERPL BCP-MCNC: 3.8 G/DL (ref 3.5–5.2)
ALBUMIN/CREAT UR: 6.6 UG/MG (ref 0–30)
ALP SERPL-CCNC: 31 U/L (ref 55–135)
ALT SERPL W/O P-5'-P-CCNC: 26 U/L (ref 10–44)
ANION GAP SERPL CALC-SCNC: 4 MMOL/L (ref 8–16)
AST SERPL-CCNC: 20 U/L (ref 10–40)
BILIRUB SERPL-MCNC: 0.8 MG/DL (ref 0.1–1)
BUN SERPL-MCNC: 25 MG/DL (ref 8–23)
CALCIUM SERPL-MCNC: 8.7 MG/DL (ref 8.7–10.5)
CHLORIDE SERPL-SCNC: 106 MMOL/L (ref 95–110)
CHOLEST SERPL-MCNC: 105 MG/DL (ref 120–199)
CHOLEST/HDLC SERPL: 2.6 {RATIO} (ref 2–5)
CO2 SERPL-SCNC: 30 MMOL/L (ref 23–29)
CREAT SERPL-MCNC: 1.3 MG/DL (ref 0.5–1.4)
CREAT UR-MCNC: 188 MG/DL (ref 23–375)
EST. GFR  (AFRICAN AMERICAN): 59.6 ML/MIN/1.73 M^2
EST. GFR  (NON AFRICAN AMERICAN): 51.5 ML/MIN/1.73 M^2
ESTIMATED AVG GLUCOSE: 134 MG/DL (ref 68–131)
GLUCOSE SERPL-MCNC: 96 MG/DL (ref 70–110)
HBA1C MFR BLD: 6.3 % (ref 4.5–6.2)
HDLC SERPL-MCNC: 40 MG/DL (ref 40–75)
HDLC SERPL: 38.1 % (ref 20–50)
LDLC SERPL CALC-MCNC: 43.2 MG/DL (ref 63–159)
MICROALBUMIN UR DL<=1MG/L-MCNC: 12.4 UG/ML
NONHDLC SERPL-MCNC: 65 MG/DL
POTASSIUM SERPL-SCNC: 4.1 MMOL/L (ref 3.5–5.1)
PROT SERPL-MCNC: 6.6 G/DL (ref 6–8.4)
SODIUM SERPL-SCNC: 140 MMOL/L (ref 136–145)
TRIGL SERPL-MCNC: 109 MG/DL (ref 30–150)

## 2022-06-21 PROCEDURE — 82043 UR ALBUMIN QUANTITATIVE: CPT | Performed by: FAMILY MEDICINE

## 2022-06-21 PROCEDURE — 80053 COMPREHEN METABOLIC PANEL: CPT | Performed by: FAMILY MEDICINE

## 2022-06-21 PROCEDURE — 80061 LIPID PANEL: CPT | Performed by: FAMILY MEDICINE

## 2022-06-21 PROCEDURE — 83036 HEMOGLOBIN GLYCOSYLATED A1C: CPT | Performed by: FAMILY MEDICINE

## 2022-06-21 PROCEDURE — 36415 COLL VENOUS BLD VENIPUNCTURE: CPT | Performed by: FAMILY MEDICINE

## 2022-06-21 PROCEDURE — 82570 ASSAY OF URINE CREATININE: CPT | Performed by: FAMILY MEDICINE

## 2022-06-28 ENCOUNTER — OFFICE VISIT (OUTPATIENT)
Dept: FAMILY MEDICINE | Facility: CLINIC | Age: 82
End: 2022-06-28
Payer: MEDICARE

## 2022-06-28 VITALS
HEART RATE: 55 BPM | SYSTOLIC BLOOD PRESSURE: 130 MMHG | HEIGHT: 68 IN | WEIGHT: 227.31 LBS | TEMPERATURE: 98 F | BODY MASS INDEX: 34.45 KG/M2 | DIASTOLIC BLOOD PRESSURE: 74 MMHG | OXYGEN SATURATION: 95 %

## 2022-06-28 DIAGNOSIS — I10 ESSENTIAL HYPERTENSION: ICD-10-CM

## 2022-06-28 DIAGNOSIS — E11.9 CONTROLLED TYPE 2 DIABETES MELLITUS WITHOUT COMPLICATION, WITHOUT LONG-TERM CURRENT USE OF INSULIN: ICD-10-CM

## 2022-06-28 DIAGNOSIS — S31.109A OPEN WOUND OF ABDOMINAL WALL, INITIAL ENCOUNTER: ICD-10-CM

## 2022-06-28 DIAGNOSIS — E78.01 FAMILIAL HYPERCHOLESTEROLEMIA: ICD-10-CM

## 2022-06-28 DIAGNOSIS — M25.559 HIP PAIN: Primary | ICD-10-CM

## 2022-06-28 DIAGNOSIS — M54.16 LUMBAR RADICULITIS: ICD-10-CM

## 2022-06-28 DIAGNOSIS — I48.20 CHRONIC ATRIAL FIBRILLATION: ICD-10-CM

## 2022-06-28 PROCEDURE — 99214 PR OFFICE/OUTPT VISIT, EST, LEVL IV, 30-39 MIN: ICD-10-PCS | Mod: S$PBB,AQ,, | Performed by: FAMILY MEDICINE

## 2022-06-28 PROCEDURE — 99214 OFFICE O/P EST MOD 30 MIN: CPT | Mod: S$PBB,AQ,, | Performed by: FAMILY MEDICINE

## 2022-06-28 PROCEDURE — 99215 OFFICE O/P EST HI 40 MIN: CPT | Performed by: FAMILY MEDICINE

## 2022-06-28 RX ORDER — LISINOPRIL 20 MG/1
20 TABLET ORAL DAILY
Qty: 90 TABLET | Refills: 3 | Status: SHIPPED | OUTPATIENT
Start: 2022-06-28 | End: 2022-07-26

## 2022-06-28 RX ORDER — POTASSIUM CHLORIDE 750 MG/1
10 CAPSULE, EXTENDED RELEASE ORAL EVERY OTHER DAY
Qty: 90 CAPSULE | Refills: 1 | Status: SHIPPED | OUTPATIENT
Start: 2022-06-28 | End: 2022-07-26

## 2022-06-28 RX ORDER — HYDROCODONE BITARTRATE AND ACETAMINOPHEN 10; 325 MG/1; MG/1
1 TABLET ORAL EVERY 6 HOURS PRN
Qty: 30 TABLET | Refills: 0 | Status: SHIPPED | OUTPATIENT
Start: 2022-06-28 | End: 2022-08-07 | Stop reason: SDUPTHER

## 2022-06-28 RX ORDER — ATORVASTATIN CALCIUM 10 MG/1
TABLET, FILM COATED ORAL
Qty: 90 TABLET | Refills: 3 | Status: SHIPPED | OUTPATIENT
Start: 2022-06-28 | End: 2023-01-12

## 2022-06-28 RX ORDER — MUPIROCIN 20 MG/G
OINTMENT TOPICAL 3 TIMES DAILY
Qty: 22 G | Refills: 1 | Status: SHIPPED | OUTPATIENT
Start: 2022-06-28 | End: 2022-07-26

## 2022-06-28 NOTE — PROGRESS NOTES
SUBJECTIVE:    Patient ID: Yeyo Hollingsworth is a 81 y.o. male.    Chief Complaint: Follow-up and Diabetes  82 yo male here today to follow up on his  chronic medical issues. pt is doing well and has  2  new complaint today.     Pt is complaining of a  Wound that developed at his medial  waist line.  Patient states his wears pants were rubbing underneath his pannus.  He denies any tenderness or drainage but is having problems with it healing.     Patient was treated by Dr. Wright December 14 with an epidural steroid injection bilateral L4-5, L5-S1.    Pt had a fall 2 months ago and landed on his left hip the pain in his left hip has resolved but now has pain in his right hip worse at night, pt rubbs it to make it feel better, pain is worse at night when he lays down.     05/5/2020  244lbs  07/22/2020 247  09/28/2020 245  03/29/2021 235  04/30/2021 231  06/28/2021 231  12/28/2021 230  06/28/2022 227        SPMHx:  DM: Jardiance 10mg, Januvia 100mg, not on metformin due to diarrhea Last A1C 6.3  is doing well.  HTN: Lisinopril 20mg, Metoprolol 25mg, is well controlled.  Hyperlipidemia: Atorvastatin 10 mg well controlled. LDL 59  Arthritis: On several chronic narcotics and follows up with pain management, for his neck and back pain pt is planning to have a procedure.  Anxiety: Takes Valium 5mg, PRN  A-fib: 2012 Was cardioverted, no longer in a-fib  Insomnia: Trazadone 100mg  Hx of DVT: Currently on Eliquis 5mg   BRANDI: Wears CPAP  Chronic constipation: On polyethylene Glycol 17g, manages well with this medication.     Specialists:  Cardiology: Dr Mcclelland  Pain Management: Michele Martinez  Ortho: Dr Dean  Podiatry: Dr Mandujano  Sleep: Dr Felice Bello  GS: Dr Thorne     Smoke: Quit in 1988  ETOH: None  Exercise: Never    Hemoglobin A1C 4.5 - 6.2 % 6.3 High      Lipids  Cholesterol:  105  Triglycerides:  109  HDL:  40  LDL:  43    Creatinine 0.5 - 1.4 mg/dL 1.3          Diabetes  He has type 2 diabetes mellitus. No MedicAlert  identification noted. The initial diagnosis of diabetes was made 10 years ago. Hypoglycemia symptoms include headaches. Pertinent negatives for hypoglycemia include no confusion, dizziness, hunger, mood changes, nervousness/anxiousness, pallor, seizures, sleepiness, speech difficulty, sweats or tremors. Associated symptoms include polyuria, weakness and weight loss. Pertinent negatives for diabetes include no blurred vision, no chest pain, no fatigue, no foot paresthesias, no foot ulcerations, no polydipsia, no polyphagia and no visual change. Pertinent negatives for hypoglycemia complications include no blackouts, no hospitalization, no nocturnal hypoglycemia, no required assistance and no required glucagon injection. Symptoms are stable. Pertinent negatives for diabetic complications include no autonomic neuropathy, CVA, heart disease, impotence, nephropathy, peripheral neuropathy, PVD or retinopathy. Risk factors for coronary artery disease include no known risk factors, hypertension, obesity, diabetes mellitus and male sex. Current diabetic treatment includes diet and oral agent (dual therapy). He is compliant with treatment most of the time. His weight is fluctuating minimally. He is following a diabetic and generally healthy diet. Meal planning includes avoidance of concentrated sweets. He has not had a previous visit with a dietitian. He participates in exercise intermittently. He monitors blood glucose at home 3-4 x per week. Blood glucose monitoring compliance is good. There is no change in his home blood glucose trend. He sees a podiatrist.Eye exam is current.   Hypertension  This is a chronic problem. The current episode started more than 1 year ago. The problem is unchanged. The problem is controlled. Associated symptoms include anxiety and headaches. Pertinent negatives include no blurred vision, chest pain, malaise/fatigue, neck pain, orthopnea, palpitations, peripheral edema, PND, shortness of breath  or sweats. There are no associated agents to hypertension. Risk factors for coronary artery disease include diabetes mellitus, dyslipidemia, male gender, obesity and sedentary lifestyle. Past treatments include angiotensin blockers and beta blockers. The current treatment provides moderate improvement. Compliance problems include exercise.  There is no history of CVA, PVD or retinopathy.   Hyperlipidemia  This is a chronic problem. The current episode started more than 1 year ago. The problem is controlled. Recent lipid tests were reviewed and are normal. Exacerbating diseases include diabetes and obesity. Factors aggravating his hyperlipidemia include beta blockers. Pertinent negatives include no chest pain or shortness of breath. Current antihyperlipidemic treatment includes statins. The current treatment provides no improvement of lipids. Compliance problems include adherence to exercise and adherence to diet.    Hip Pain   The incident occurred more than 1 week ago. The incident occurred at home. There was no injury mechanism. The pain is present in the right hip. The pain is at a severity of 4/10. The pain is moderate. The pain has been fluctuating since onset. Pertinent negatives include no inability to bear weight, loss of motion, loss of sensation, muscle weakness, numbness or tingling. He reports no foreign bodies present. The symptoms are aggravated by movement and weight bearing. He has tried nothing for the symptoms. The treatment provided no relief.         Past Medical History:   Diagnosis Date    Allergy     Ankle pain     left    Anticoagulant long-term use     aspirin    Anxiety     Atrial fibrillation     Back pain     Bruises easily     Cataract     Clotting disorder     left leg    Colon polyp     Diabetes mellitus     Diabetes mellitus, type 2     Hay fever     Hyperlipidemia     Hypertension     Left hip pain 12/26/2021    Seeing Dr. Beard for hip pain     Obese     BRANDI on  CPAP      Social History     Socioeconomic History    Marital status:    Tobacco Use    Smoking status: Former Smoker     Types: Cigarettes     Quit date: 2006     Years since quittin.3    Smokeless tobacco: Never Used    Tobacco comment: ex smoker    Substance and Sexual Activity    Alcohol use: Yes     Comment: rarely    Drug use: No    Sexual activity: Yes     Partners: Female     Social Determinants of Health     Financial Resource Strain: Low Risk     Difficulty of Paying Living Expenses: Not hard at all   Food Insecurity: No Food Insecurity    Worried About Running Out of Food in the Last Year: Never true    Ran Out of Food in the Last Year: Never true   Transportation Needs: No Transportation Needs    Lack of Transportation (Medical): No    Lack of Transportation (Non-Medical): No   Physical Activity: Inactive    Days of Exercise per Week: 0 days    Minutes of Exercise per Session: 0 min   Stress: No Stress Concern Present    Feeling of Stress : Only a little   Social Connections: Unknown    Frequency of Communication with Friends and Family: Twice a week    Frequency of Social Gatherings with Friends and Family: Once a week    Active Member of Clubs or Organizations: Yes    Attends Club or Organization Meetings: Never    Marital Status:    Housing Stability: Low Risk     Unable to Pay for Housing in the Last Year: No    Number of Places Lived in the Last Year: 1    Unstable Housing in the Last Year: No     Past Surgical History:   Procedure Laterality Date    ABDOMINAL SURGERY      origunal surg -mult surgeries since    CARPAL TUNNEL RELEASE      right wrist -left wrist     COLON SURGERY      partial colon removal    COLONOSCOPY  2018    Dr. Salazar; normal terminal ileum; polyps removed from ascending colon and hepatic flexure; pan diverticulosis; small internal hemorrhoids; repeat in 5 years; Bx: fragments of an adenomatous polyp  with mild surface glandular dysplasia and associated chronic active inflammation, no evidence of high-grade dysplasia or malignancy    COLOSTOMY  1986    colostomy reversal  1986    EYE SURGERY      hay fever      HERNIA REPAIR  1949    REPAIR OF TENDON OF LOWER EXTREMITY Left 6/24/2020    Procedure: REPAIR, TENDON, LOWER EXTREMITY;  Surgeon: Justin Mandujano DPM;  [place] [facility] OR;  Service: Podiatry;  Laterality: Left;  ARTHEX NOTIFIED IN CASE HE WANTS ANCHOR    TOE SURGERY Left 2017    replaced a joint     TONSILLECTOMY  1947    TRANSFORAMINAL EPIDURAL INJECTION OF STEROID      x 4    TRANSFORAMINAL EPIDURAL INJECTION OF STEROID Bilateral 11/13/2019    Procedure: Injection,steroid,epidural,transforaminal approach;  Surgeon: Grant Wright MD;  [place] [facility] OR;  Service: Pain Management;  Laterality: Bilateral;  L4-5, L5-S1    TRANSFORAMINAL EPIDURAL INJECTION OF STEROID Bilateral 3/9/2021    Procedure: Injection,steroid,epidural,transforaminal approach;  Surgeon: Grant Wright MD;  [place] [facility] OR;  Service: Pain Management;  Laterality: Bilateral;  L4-5, L5-S1    TRANSFORAMINAL EPIDURAL INJECTION OF STEROID Bilateral 5/25/2021    Procedure: Injection,steroid,epidural,transforaminal approach;  Surgeon: Grant Wright MD;  [place] [facility] OR;  Service: Pain Management;  Laterality: Bilateral;  L4-L5,L5,S1    TRANSFORAMINAL EPIDURAL INJECTION OF STEROID Bilateral 12/14/2021    Procedure: Injection,steroid,epidural,transforaminal approach Bilateral L4-5, L5-S1;  Surgeon: Grant Wright MD;  [place] [facility] OR;  Service: Pain Management;  Laterality: Bilateral;     Family History   Problem Relation Age of Onset    Heart disease Mother     Melanoma Neg Hx     Psoriasis Neg Hx     Lupus Neg Hx     Eczema Neg Hx      Current Outpatient Medications   Medication Sig Dispense Refill    alcohol swabs (ALCOHOL PADS) PadM Apply 2 each topically once daily. 200 each 5    aspirin 81 MG Chew Take 1 tablet (81 mg  total) by mouth once daily. 100 tablet 1    azelastine (ASTELIN) 137 mcg (0.1 %) nasal spray 2 sprays (274 mcg total) by Nasal route 2 (two) times daily. 30 mL 5    fexofenadine (ALLEGRA) 180 MG tablet Take 1 tablet (180 mg total) by mouth once daily. 90 tablet 3    FREESTYLE LANCETS 28 gauge lancets Inject 1 lancet into the skin 3 (three) times daily. 100 each 3    furosemide (LASIX) 20 MG tablet Take 1 tablet (20 mg total) by mouth every Mon, Wed, Fri. 45 tablet 3    gabapentin (NEURONTIN) 300 MG capsule Take 1 capsule (300 mg total) by mouth 3 (three) times daily. 270 capsule 1    ipratropium (ATROVENT) 0.03 % nasal spray 2 sprays by Nasal route 2 (two) times daily. 30 mL 5    JARDIANCE 10 mg tablet Take 1 tablet (10 mg total) by mouth once daily. 90 tablet 1    metoprolol tartrate (LOPRESSOR) 25 MG tablet Take 1 tablet (25 mg total) by mouth 2 (two) times daily. 180 tablet 3    montelukast (SINGULAIR) 10 mg tablet Take 1 tablet (10 mg total) by mouth every evening. 90 tablet 3    polyethylene glycol (GLYCOLAX) 17 gram/dose powder Take 17 g by mouth once daily. 6 Bottle 3    SITagliptin (JANUVIA) 100 MG Tab Take 1 tablet (100 mg total) by mouth once daily. 90 tablet 3    apixaban (ELIQUIS) 5 mg Tab Take 1 tablet (5 mg total) by mouth 2 (two) times daily. 180 tablet 3    atorvastatin (LIPITOR) 10 MG tablet nightly 90 tablet 3    blood sugar diagnostic (BLOOD GLUCOSE TEST) Strp FREESTYLE LITE BG TEST STRIPS. current use of insulin  - Primary ICD-10-CM: E11.9 200 strip 3    fluticasone (FLONASE) 50 mcg/actuation nasal spray 2 sprays (100 mcg total) by Each Nare route once daily. 3 Bottle 3    HYDROcodone-acetaminophen (NORCO)  mg per tablet Take 1 tablet by mouth every 6 (six) hours as needed for Pain. 30 tablet 0    ibuprofen (ADVIL,MOTRIN) 800 MG tablet       lisinopriL (PRINIVIL,ZESTRIL) 20 MG tablet Take 1 tablet (20 mg total) by mouth once daily. 90 tablet 3    methocarbamoL (ROBAXIN)  500 MG Tab Take 1 tablet (500 mg total) by mouth 3 (three) times daily. (Patient not taking: No sig reported) 270 tablet 1    mupirocin (BACTROBAN) 2 % ointment Apply topically 3 (three) times daily. 22 g 1    potassium chloride (MICRO-K) 10 MEQ CpSR Take 1 capsule (10 mEq total) by mouth every other day. Every other day 90 capsule 1    tiZANidine (ZANAFLEX) 6 mg capsule       traZODone (DESYREL) 100 MG tablet Take 1 tablet (100 mg total) by mouth every evening. 90 tablet 3     No current facility-administered medications for this visit.     Review of patient's allergies indicates:   Allergen Reactions    Ativan [lorazepam] Other (See Comments)     Mood alternating      Dilaudid [hydromorphone (bulk)] Other (See Comments)     Mood alternating      Morphine Other (See Comments)     Mood alternating      Adhesive tape-silicones     Hydromorphone        Review of Systems   Constitutional: Positive for weight loss. Negative for activity change, appetite change, fatigue, fever, malaise/fatigue and unexpected weight change.   HENT: Negative for congestion, rhinorrhea, sinus pressure, sinus pain and sore throat.    Eyes: Negative for blurred vision.   Respiratory: Negative for cough, chest tightness, shortness of breath and wheezing.    Cardiovascular: Negative for chest pain, palpitations, orthopnea and PND.   Gastrointestinal: Negative for abdominal distention, abdominal pain, anal bleeding and blood in stool.   Endocrine: Positive for polyuria. Negative for polydipsia and polyphagia.   Genitourinary: Negative for dysuria, flank pain, frequency, impotence and urgency.   Musculoskeletal: Negative for neck pain.   Skin: Negative for pallor.   Neurological: Positive for weakness and headaches. Negative for dizziness, tingling, tremors, seizures, speech difficulty and numbness.   Psychiatric/Behavioral: Negative for confusion. The patient is not nervous/anxious.           Blood pressure 130/74, pulse (!) 55,  "temperature 97.6 °F (36.4 °C), temperature source Oral, height 5' 8" (1.727 m), weight 103.1 kg (227 lb 4.8 oz), SpO2 95 %. Body mass index is 34.56 kg/m².   Objective:      Physical Exam  Vitals reviewed.   Constitutional:       General: He is not in acute distress.     Appearance: Normal appearance. He is obese. He is not ill-appearing or toxic-appearing.   HENT:      Head: Normocephalic and atraumatic.      Right Ear: Tympanic membrane normal.      Left Ear: Tympanic membrane normal.      Nose: Nose normal. No congestion or rhinorrhea.   Cardiovascular:      Rate and Rhythm: Normal rate and regular rhythm.      Heart sounds: Normal heart sounds. No murmur heard.  Pulmonary:      Breath sounds: Normal breath sounds. No wheezing.   Skin:     General: Skin is warm and dry.      Capillary Refill: Capillary refill takes less than 2 seconds.   Neurological:      Mental Status: He is alert and oriented to person, place, and time.             Assessment:       1. Hip pain    2. Chronic atrial fibrillation    3. Familial hypercholesterolemia    4. Essential hypertension    5. Controlled type 2 diabetes mellitus without complication, without long-term current use of insulin    6. Lumbar radiculitis    7. Open wound of abdominal wall, initial encounter         Plan:           Hip pain  -     Ambulatory referral/consult to Physical/Occupational Therapy; Future; Expected date: 07/05/2022    Chronic atrial fibrillation  -     apixaban (ELIQUIS) 5 mg Tab; Take 1 tablet (5 mg total) by mouth 2 (two) times daily.  Dispense: 180 tablet; Refill: 3    Familial hypercholesterolemia  -     atorvastatin (LIPITOR) 10 MG tablet; nightly  Dispense: 90 tablet; Refill: 3    Essential hypertension  -     potassium chloride (MICRO-K) 10 MEQ CpSR; Take 1 capsule (10 mEq total) by mouth every other day. Every other day  Dispense: 90 capsule; Refill: 1  -     lisinopriL (PRINIVIL,ZESTRIL) 20 MG tablet; Take 1 tablet (20 mg total) by mouth once " daily.  Dispense: 90 tablet; Refill: 3    Controlled type 2 diabetes mellitus without complication, without long-term current use of insulin  -     lisinopriL (PRINIVIL,ZESTRIL) 20 MG tablet; Take 1 tablet (20 mg total) by mouth once daily.  Dispense: 90 tablet; Refill: 3  -     blood sugar diagnostic (BLOOD GLUCOSE TEST) Strp; FREESTYLE LITE BG TEST STRIPS. current use of insulin  - Primary ICD-10-CM: E11.9  Dispense: 200 strip; Refill: 3    Lumbar radiculitis  -     HYDROcodone-acetaminophen (NORCO)  mg per tablet; Take 1 tablet by mouth every 6 (six) hours as needed for Pain.  Dispense: 30 tablet; Refill: 0    Open wound of abdominal wall, initial encounter  -     Ambulatory referral/consult to Wound Clinic; Future; Expected date: 07/05/2022  -     mupirocin (BACTROBAN) 2 % ointment; Apply topically 3 (three) times daily.  Dispense: 22 g; Refill: 1

## 2022-06-30 ENCOUNTER — OFFICE VISIT (OUTPATIENT)
Dept: CARDIOLOGY | Facility: CLINIC | Age: 82
End: 2022-06-30
Payer: MEDICARE

## 2022-06-30 ENCOUNTER — OFFICE VISIT (OUTPATIENT)
Dept: WOUND CARE | Facility: HOSPITAL | Age: 82
End: 2022-06-30
Payer: MEDICARE

## 2022-06-30 VITALS
WEIGHT: 227.75 LBS | BODY MASS INDEX: 35.75 KG/M2 | OXYGEN SATURATION: 96 % | DIASTOLIC BLOOD PRESSURE: 60 MMHG | HEIGHT: 67 IN | SYSTOLIC BLOOD PRESSURE: 122 MMHG | HEART RATE: 53 BPM

## 2022-06-30 VITALS — BODY MASS INDEX: 35.63 KG/M2 | HEIGHT: 67 IN | RESPIRATION RATE: 19 BRPM | TEMPERATURE: 98 F | WEIGHT: 227 LBS

## 2022-06-30 DIAGNOSIS — L98.491 SKIN ULCER OF ABDOMEN, LIMITED TO BREAKDOWN OF SKIN: Primary | ICD-10-CM

## 2022-06-30 DIAGNOSIS — E78.00 HYPERCHOLESTEREMIA: ICD-10-CM

## 2022-06-30 DIAGNOSIS — I48.0 PAROXYSMAL ATRIAL FIBRILLATION: ICD-10-CM

## 2022-06-30 DIAGNOSIS — G47.33 OSA (OBSTRUCTIVE SLEEP APNEA): Primary | ICD-10-CM

## 2022-06-30 DIAGNOSIS — E11.69 TYPE 2 DIABETES MELLITUS WITH OTHER SPECIFIED COMPLICATION, WITHOUT LONG-TERM CURRENT USE OF INSULIN: ICD-10-CM

## 2022-06-30 PROCEDURE — 99214 OFFICE O/P EST MOD 30 MIN: CPT | Mod: 25 | Performed by: OBSTETRICS & GYNECOLOGY

## 2022-06-30 PROCEDURE — 99214 OFFICE O/P EST MOD 30 MIN: CPT | Mod: S$GLB,,, | Performed by: SPECIALIST

## 2022-06-30 PROCEDURE — 99214 PR OFFICE/OUTPT VISIT, EST, LEVL IV, 30-39 MIN: ICD-10-PCS | Mod: S$GLB,,, | Performed by: SPECIALIST

## 2022-06-30 NOTE — PROGRESS NOTES
Subjective:    Patient ID:  Yeyo Hollingsworth is a 81 y.o. male who presents for   Atrial Fibrillation, Hypertension, and Hyperlipidemia    HPIworking  Hard   Manual  Labor no symptoms     No  afib   Patient is doing well with no cardiac symptoms whatsoever    Review of patient's allergies indicates:   Allergen Reactions    Ativan [lorazepam] Other (See Comments)     Mood alternating      Dilaudid [hydromorphone (bulk)] Other (See Comments)     Mood alternating      Morphine Other (See Comments)     Mood alternating      Adhesive tape-silicones     Hydromorphone        Past Medical History:   Diagnosis Date    Allergy     Ankle pain     left    Anticoagulant long-term use     aspirin    Anxiety     Atrial fibrillation     Back pain     Bruises easily     Cataract     Clotting disorder     left leg    Colon polyp     Diabetes mellitus     Diabetes mellitus, type 2     Hay fever     Hyperlipidemia     Hypertension     Left hip pain 12/26/2021    Seeing Dr. Beard for hip pain     Obese     BARNDI on CPAP      Past Surgical History:   Procedure Laterality Date    ABDOMINAL SURGERY      origunal surg 1986-mult surgeries since    CARPAL TUNNEL RELEASE      right wrist 2009-left wrist 2010    COLON SURGERY      partial colon removal    COLONOSCOPY  11/26/2018    Dr. Salazar; normal terminal ileum; polyps removed from ascending colon and hepatic flexure; pan diverticulosis; small internal hemorrhoids; repeat in 5 years; Bx: fragments of an adenomatous polyp with mild surface glandular dysplasia and associated chronic active inflammation, no evidence of high-grade dysplasia or malignancy    COLOSTOMY  1986    colostomy reversal  1986    EYE SURGERY      hay fever      HERNIA REPAIR  1949    REPAIR OF TENDON OF LOWER EXTREMITY Left 6/24/2020    Procedure: REPAIR, TENDON, LOWER EXTREMITY;  Surgeon: Justin Mandujano DPM;  [place] [facility] [place]  Service: Podiatry;  Laterality: Left;  ARTHEX  (Katie Maurer PharmD   NOTIFIED IN CASE HE WANTS ANCHOR    TOE SURGERY Left 2017    replaced a joint     TONSILLECTOMY  1947    TRANSFORAMINAL EPIDURAL INJECTION OF STEROID      x 4    TRANSFORAMINAL EPIDURAL INJECTION OF STEROID Bilateral 2019    Procedure: Injection,steroid,epidural,transforaminal approach;  Surgeon: Grant Wright MD;  [facility]  Service: Pain Management;  Laterality: Bilateral;  L4-5, L5-S1    TRANSFORAMINAL EPIDURAL INJECTION OF STEROID Bilateral 3/9/2021    Procedure: Injection,steroid,epidural,transforaminal approach;  Surgeon: Grant Wright MD;  [facility]  Service: Pain Management;  Laterality: Bilateral;  L4-5, L5-S1    TRANSFORAMINAL EPIDURAL INJECTION OF STEROID Bilateral 2021    Procedure: Injection,steroid,epidural,transforaminal approach;  Surgeon: Grant Wright MD;  [facility]  Service: Pain Management;  Laterality: Bilateral;  L4-L5,L5,S1    TRANSFORAMINAL EPIDURAL INJECTION OF STEROID Bilateral 2021    Procedure: Injection,steroid,epidural,transforaminal approach Bilateral L4-5, L5-S1;  Surgeon: Grant Wright MD;  [facility]  Service: Pain Management;  Laterality: Bilateral;     Social History     Tobacco Use    Smoking status: Former Smoker     Types: Cigarettes     Quit date: 2006     Years since quittin.3    Smokeless tobacco: Never Used    Tobacco comment: ex smoker    Substance Use Topics    Alcohol use: Yes     Comment: rarely    Drug use: No        Review of Systems     ROS        Objective:        Vitals:    22 1508   BP: 122/60   Pulse: (!) 53       Lab Results   Component Value Date    WBC 4.70 2021    HGB 14.3 2021    HCT 45.0 2021     (L) 2021    CHOL 105 (L) 2022    TRIG 109 2022    HDL 40 2022    ALT 26 2022    AST 20 2022     2022    K 4.1 2022     2022    CREATININE 1.3 2022    BUN 25 (H) 2022    CO2 30 (H)  06/21/2022    TSH 2.010 10/01/2019    INR 1.0 05/17/2013    GLUF 96 03/09/2016    HGBA1C 6.3 (H) 06/21/2022        ECHOCARDIOGRAM RESULTS  Results for orders placed during the hospital encounter of 05/20/21    Echo Color Flow Doppler? Yes    Interpretation Summary  · Mild concentric hypertrophy and normal systolic function.  · The estimated ejection fraction is 66%.  · Normal left ventricular diastolic function.  · Normal right ventricular size with normal right ventricular systolic function.  · Mild tricuspid regurgitation.  · Normal central venous pressure (3 mmHg).  · The estimated PA systolic pressure is 21 mmHg.      CURRENT/PREVIOUS VISIT EKG  Results for orders placed or performed during the hospital encounter of 04/23/21   EKG 12-lead    Collection Time: 04/23/21  8:09 AM    Narrative    Test Reason : R10.9,    Vent. Rate : 058 BPM     Atrial Rate : 058 BPM     P-R Int : 166 ms          QRS Dur : 092 ms      QT Int : 404 ms       P-R-T Axes : 037 -03 037 degrees     QTc Int : 396 ms    Sinus bradycardia with sinus arrhythmia  Otherwise normal ECG  When compared with ECG of 22-JUN-2020 08:34,  No significant change was found  Confirmed by Stas HODGE, David PERDOMO ([id number]) on 4/25/2021 9:30:58 AM    Referred By: [email or web address]   SELF           Confirmed By:David Mcclelland MD     Results for orders placed during the hospital encounter of 05/20/21    Echo Color Flow Doppler? Yes    Interpretation Summary  · Mild concentric hypertrophy and normal systolic function.  · The estimated ejection fraction is 66%.  · Normal left ventricular diastolic function.  · Normal right ventricular size with normal right ventricular systolic function.  · Mild tricuspid regurgitation.  · Normal central venous pressure (3 mmHg).  · The estimated PA systolic pressure is 21 mmHg.    Results for orders placed in visit on 06/17/20    Nuclear Stress Test    Interpretation Summary    The EKG portion of this study is negative for ischemia.    The  patient reported chest pain during the stress test.    There were no arrhythmias during stress.    ECG Stress Nuclear portion of this study will be reported separately.      PHYSICAL EXAM    Physical Exam 120/80  No bruit  Lungs are clear  Cardiac sounds normal  Extremities no edema    Medication List with Changes/Refills   Current Medications    ALCOHOL SWABS (ALCOHOL PADS) PADM    Apply 2 each topically once daily.    APIXABAN (ELIQUIS) 5 MG TAB    Take 1 tablet (5 mg total) by mouth 2 (two) times daily.    ASPIRIN 81 MG CHEW    Take 1 tablet (81 mg total) by mouth once daily.    ATORVASTATIN (LIPITOR) 10 MG TABLET    nightly    AZELASTINE (ASTELIN) 137 MCG (0.1 %) NASAL SPRAY    2 sprays (274 mcg total) by Nasal route 2 (two) times daily.    BLOOD SUGAR DIAGNOSTIC (BLOOD GLUCOSE TEST) STRP    FREESTYLE LITE BG TEST STRIPS. current use of insulin  - Primary ICD-10-CM: E11.9    FEXOFENADINE (ALLEGRA) 180 MG TABLET    Take 1 tablet (180 mg total) by mouth once daily.    FREESTYLE LANCETS 28 GAUGE LANCETS    Inject 1 lancet into the skin 3 (three) times daily.    FUROSEMIDE (LASIX) 20 MG TABLET    Take 1 tablet (20 mg total) by mouth every Mon, Wed, Fri.    GABAPENTIN (NEURONTIN) 300 MG CAPSULE    Take 1 capsule (300 mg total) by mouth 3 (three) times daily.    HYDROCODONE-ACETAMINOPHEN (NORCO)  MG PER TABLET    Take 1 tablet by mouth every 6 (six) hours as needed for Pain.    IPRATROPIUM (ATROVENT) 0.03 % NASAL SPRAY    2 sprays by Nasal route 2 (two) times daily.    JARDIANCE 10 MG TABLET    Take 1 tablet (10 mg total) by mouth once daily.    LISINOPRIL (PRINIVIL,ZESTRIL) 20 MG TABLET    Take 1 tablet (20 mg total) by mouth once daily.    METOPROLOL TARTRATE (LOPRESSOR) 25 MG TABLET    Take 1 tablet (25 mg total) by mouth 2 (two) times daily.    MONTELUKAST (SINGULAIR) 10 MG TABLET    Take 1 tablet (10 mg total) by mouth every evening.    MUPIROCIN (BACTROBAN) 2 % OINTMENT    Apply topically 3 (three) times  daily.    POLYETHYLENE GLYCOL (GLYCOLAX) 17 GRAM/DOSE POWDER    Take 17 g by mouth once daily.    POTASSIUM CHLORIDE (MICRO-K) 10 MEQ CPSR    Take 1 capsule (10 mEq total) by mouth every other day. Every other day    SITAGLIPTIN (JANUVIA) 100 MG TAB    Take 1 tablet (100 mg total) by mouth once daily.    TIZANIDINE (ZANAFLEX) 6 MG CAPSULE        TRAZODONE (DESYREL) 100 MG TABLET    Take 1 tablet (100 mg total) by mouth every evening.           Assessment:     obesity  1. BRANDI (obstructive sleep apnea)    2. Paroxysmal atrial fibrillation    3. Type 2 diabetes mellitus with other specified complication, without long-term current use of insulin         Plan:   Stay on meds 10 to clinic 1 year    Problem List Items Addressed This Visit        Cardiac/Vascular    Paroxysmal atrial fibrillation- 4/2016 - transient ER visit nayeli- on eliquis       Endocrine    Type 2 diabetes mellitus, without long-term current use of insulin (Chronic)       Other    BRANDI (obstructive sleep apnea) - Primary (Chronic)           No follow-ups on file.

## 2022-06-30 NOTE — PROGRESS NOTES
Subjective:       Patient ID: Yeyo Hollingsworth is a 81 y.o. male.    Chief Complaint: Wound Consult and Wound Care    Patient with history of surgical wound scar in abdominal fold with redness and wound formation    Review of Systems   Constitutional: Negative.    HENT: Negative.    Eyes: Negative.    Respiratory: Negative.    Cardiovascular: Negative.    Gastrointestinal:        Hx abdominal surgery, diverticulitis, ostomy, hernia   Endocrine: Negative.    Genitourinary: Negative.    Musculoskeletal: Negative.    Skin: Positive for wound.        Redness and open wound to previous surgical scar   Allergic/Immunologic: Negative.    Neurological: Negative.    Hematological: Negative.    Psychiatric/Behavioral: Negative.        Objective:      Physical Exam  Constitutional:       Appearance: He is obese.   HENT:      Head: Normocephalic and atraumatic.      Nose: Nose normal.   Pulmonary:      Effort: Pulmonary effort is normal.   Abdominal:      General: There is distension.   Skin:     General: Skin is warm and dry.      Findings: Lesion present. No erythema.      Comments: Lesion and redness to area under pannus   Neurological:      General: No focal deficit present.      Mental Status: He is alert and oriented to person, place, and time.   Psychiatric:         Mood and Affect: Mood normal.         Behavior: Behavior normal.         Assessment:     Skin breakdown and ulcer to pannus           Plan:     Aquacel dressing and Nystatin    Vitamin D3    F/U 1 week

## 2022-07-06 ENCOUNTER — OFFICE VISIT (OUTPATIENT)
Dept: WOUND CARE | Facility: HOSPITAL | Age: 82
End: 2022-07-06
Attending: FAMILY MEDICINE
Payer: MEDICARE

## 2022-07-06 VITALS
TEMPERATURE: 98 F | RESPIRATION RATE: 19 BRPM | DIASTOLIC BLOOD PRESSURE: 61 MMHG | HEART RATE: 62 BPM | SYSTOLIC BLOOD PRESSURE: 129 MMHG

## 2022-07-06 DIAGNOSIS — L98.491 SKIN ULCER OF ABDOMEN, LIMITED TO BREAKDOWN OF SKIN: Primary | ICD-10-CM

## 2022-07-06 PROCEDURE — 99212 OFFICE O/P EST SF 10 MIN: CPT | Mod: ,,, | Performed by: FAMILY MEDICINE

## 2022-07-06 PROCEDURE — 99213 OFFICE O/P EST LOW 20 MIN: CPT | Performed by: FAMILY MEDICINE

## 2022-07-06 PROCEDURE — 99212 PR OFFICE/OUTPT VISIT, EST, LEVL II, 10-19 MIN: ICD-10-PCS | Mod: ,,, | Performed by: FAMILY MEDICINE

## 2022-07-06 NOTE — PROGRESS NOTES
Wound Care and Hyperbaric Medicine                Progress Note    Subjective:       Patient ID: Yeyo Hollingsworth is a 82 y.o. male.    Chief Complaint: No chief complaint on file.    HPI  Pt seen in clinic for a FU visit for abdomen open wound. Pt wound is now healed, no new complaints today  Review of Systems   Skin: Positive for wound.        Open wound abdomen healed   All other systems reviewed and are negative.        Objective:        Physical Exam  Vitals and nursing note reviewed. Exam conducted with a chaperone present.   Constitutional:       General: He is not in acute distress.     Appearance: Normal appearance. He is not ill-appearing, toxic-appearing or diaphoretic.   Skin:     General: Skin is warm and dry.      Coloration: Skin is not jaundiced or pale.      Findings: Lesion present. No bruising, erythema or rash.      Comments: Open wound of the abdomen, see wound care assessment documentation in chart review scamnned under the media tab   Neurological:      General: No focal deficit present.      Mental Status: He is alert and oriented to person, place, and time.   Psychiatric:         Mood and Affect: Mood normal.         Behavior: Behavior normal.         Thought Content: Thought content normal.         Judgment: Judgment normal.         There were no vitals filed for this visit.    Assessment:           ICD-10-CM ICD-9-CM   1. Skin ulcer of abdomen, limited to breakdown of skin  L98.491 707.8                Plan:                  Diagnoses and all orders for this visit:    Skin ulcer of abdomen, limited to breakdown of skin      See wound care instructions provided separately - Triad to the abdomen daily

## 2022-07-25 ENCOUNTER — HOSPITAL ENCOUNTER (INPATIENT)
Facility: HOSPITAL | Age: 82
LOS: 3 days | Discharge: HOME | DRG: 389 | End: 2022-07-29
Attending: EMERGENCY MEDICINE | Admitting: INTERNAL MEDICINE
Payer: MEDICARE

## 2022-07-25 DIAGNOSIS — R11.2 NAUSEA & VOMITING: ICD-10-CM

## 2022-07-25 DIAGNOSIS — R07.9 CHEST PAIN: ICD-10-CM

## 2022-07-25 DIAGNOSIS — K56.609 SMALL BOWEL OBSTRUCTION: Primary | ICD-10-CM

## 2022-07-25 DIAGNOSIS — K56.609 SBO (SMALL BOWEL OBSTRUCTION): ICD-10-CM

## 2022-07-25 LAB — LIPASE SERPL-CCNC: 29 U/L (ref 4–60)

## 2022-07-25 PROCEDURE — 93010 EKG 12-LEAD: ICD-10-PCS | Mod: ,,, | Performed by: GENERAL PRACTICE

## 2022-07-25 PROCEDURE — 93005 ELECTROCARDIOGRAM TRACING: CPT | Performed by: GENERAL PRACTICE

## 2022-07-25 PROCEDURE — 96375 TX/PRO/DX INJ NEW DRUG ADDON: CPT

## 2022-07-25 PROCEDURE — 96361 HYDRATE IV INFUSION ADD-ON: CPT

## 2022-07-25 PROCEDURE — 99285 EMERGENCY DEPT VISIT HI MDM: CPT | Mod: 25

## 2022-07-25 PROCEDURE — 96374 THER/PROPH/DIAG INJ IV PUSH: CPT

## 2022-07-25 PROCEDURE — 80053 COMPREHEN METABOLIC PANEL: CPT | Performed by: EMERGENCY MEDICINE

## 2022-07-25 PROCEDURE — 83690 ASSAY OF LIPASE: CPT | Performed by: EMERGENCY MEDICINE

## 2022-07-25 PROCEDURE — 93010 ELECTROCARDIOGRAM REPORT: CPT | Mod: ,,, | Performed by: GENERAL PRACTICE

## 2022-07-25 PROCEDURE — 83735 ASSAY OF MAGNESIUM: CPT | Performed by: EMERGENCY MEDICINE

## 2022-07-25 PROCEDURE — 84484 ASSAY OF TROPONIN QUANT: CPT | Performed by: EMERGENCY MEDICINE

## 2022-07-25 PROCEDURE — 85025 COMPLETE CBC W/AUTO DIFF WBC: CPT | Performed by: EMERGENCY MEDICINE

## 2022-07-25 PROCEDURE — 83605 ASSAY OF LACTIC ACID: CPT | Performed by: EMERGENCY MEDICINE

## 2022-07-25 RX ORDER — FENTANYL CITRATE 50 UG/ML
50 INJECTION, SOLUTION INTRAMUSCULAR; INTRAVENOUS
Status: COMPLETED | OUTPATIENT
Start: 2022-07-25 | End: 2022-07-26

## 2022-07-25 RX ORDER — SODIUM CHLORIDE 9 MG/ML
1000 INJECTION, SOLUTION INTRAVENOUS
Status: COMPLETED | OUTPATIENT
Start: 2022-07-25 | End: 2022-07-26

## 2022-07-25 RX ORDER — ONDANSETRON 2 MG/ML
4 INJECTION INTRAMUSCULAR; INTRAVENOUS
Status: COMPLETED | OUTPATIENT
Start: 2022-07-25 | End: 2022-07-26

## 2022-07-25 RX ORDER — ASPIRIN 325 MG
50000 TABLET, DELAYED RELEASE (ENTERIC COATED) ORAL
COMMUNITY
Start: 2022-07-01 | End: 2023-05-16

## 2022-07-26 LAB
ALBUMIN SERPL BCP-MCNC: 4.3 G/DL (ref 3.5–5.2)
ALP SERPL-CCNC: 35 U/L (ref 55–135)
ALT SERPL W/O P-5'-P-CCNC: 29 U/L (ref 10–44)
ANION GAP SERPL CALC-SCNC: 13 MMOL/L (ref 8–16)
ANION GAP SERPL CALC-SCNC: 8 MMOL/L (ref 8–16)
AST SERPL-CCNC: 27 U/L (ref 10–40)
BACTERIA #/AREA URNS HPF: NEGATIVE /HPF
BASOPHILS # BLD AUTO: 0.03 K/UL (ref 0–0.2)
BASOPHILS NFR BLD: 0.3 % (ref 0–1.9)
BILIRUB SERPL-MCNC: 1 MG/DL (ref 0.1–1)
BILIRUB UR QL STRIP: NEGATIVE
BUN SERPL-MCNC: 30 MG/DL (ref 8–23)
BUN SERPL-MCNC: 31 MG/DL (ref 8–23)
CALCIUM SERPL-MCNC: 8.2 MG/DL (ref 8.7–10.5)
CALCIUM SERPL-MCNC: 9.3 MG/DL (ref 8.7–10.5)
CHLORIDE SERPL-SCNC: 102 MMOL/L (ref 95–110)
CHLORIDE SERPL-SCNC: 107 MMOL/L (ref 95–110)
CLARITY UR: CLEAR
CO2 SERPL-SCNC: 25 MMOL/L (ref 23–29)
CO2 SERPL-SCNC: 27 MMOL/L (ref 23–29)
COLOR UR: YELLOW
CREAT SERPL-MCNC: 1.2 MG/DL (ref 0.5–1.4)
CREAT SERPL-MCNC: 1.4 MG/DL (ref 0.5–1.4)
DIFFERENTIAL METHOD: ABNORMAL
EOSINOPHIL # BLD AUTO: 0.1 K/UL (ref 0–0.5)
EOSINOPHIL NFR BLD: 1.2 % (ref 0–8)
ERYTHROCYTE [DISTWIDTH] IN BLOOD BY AUTOMATED COUNT: 13.6 % (ref 11.5–14.5)
ERYTHROCYTE [DISTWIDTH] IN BLOOD BY AUTOMATED COUNT: 13.6 % (ref 11.5–14.5)
EST. GFR  (AFRICAN AMERICAN): 54.1 ML/MIN/1.73 M^2
EST. GFR  (AFRICAN AMERICAN): >60 ML/MIN/1.73 M^2
EST. GFR  (NON AFRICAN AMERICAN): 46.8 ML/MIN/1.73 M^2
EST. GFR  (NON AFRICAN AMERICAN): 56.4 ML/MIN/1.73 M^2
GLUCOSE SERPL-MCNC: 113 MG/DL (ref 70–110)
GLUCOSE SERPL-MCNC: 122 MG/DL (ref 70–110)
GLUCOSE SERPL-MCNC: 141 MG/DL (ref 70–110)
GLUCOSE SERPL-MCNC: 159 MG/DL (ref 70–110)
GLUCOSE SERPL-MCNC: 88 MG/DL (ref 70–110)
GLUCOSE UR QL STRIP: ABNORMAL
HCT VFR BLD AUTO: 48.2 % (ref 40–54)
HCT VFR BLD AUTO: 53.5 % (ref 40–54)
HGB BLD-MCNC: 15.4 G/DL (ref 14–18)
HGB BLD-MCNC: 17.4 G/DL (ref 14–18)
HGB UR QL STRIP: NEGATIVE
HYALINE CASTS #/AREA URNS LPF: 0 /LPF
IMM GRANULOCYTES # BLD AUTO: 0.04 K/UL (ref 0–0.04)
IMM GRANULOCYTES NFR BLD AUTO: 0.4 % (ref 0–0.5)
KETONES UR QL STRIP: ABNORMAL
LACTATE SERPL-SCNC: 1.8 MMOL/L (ref 0.5–1.9)
LACTATE SERPL-SCNC: 2.4 MMOL/L (ref 0.5–1.9)
LEUKOCYTE ESTERASE UR QL STRIP: NEGATIVE
LYMPHOCYTES # BLD AUTO: 1.1 K/UL (ref 1–4.8)
LYMPHOCYTES NFR BLD: 10.3 % (ref 18–48)
MAGNESIUM SERPL-MCNC: 2.3 MG/DL (ref 1.6–2.6)
MAGNESIUM SERPL-MCNC: 2.5 MG/DL (ref 1.6–2.6)
MCH RBC QN AUTO: 30 PG (ref 27–31)
MCH RBC QN AUTO: 31.3 PG (ref 27–31)
MCHC RBC AUTO-ENTMCNC: 32 G/DL (ref 32–36)
MCHC RBC AUTO-ENTMCNC: 32.5 G/DL (ref 32–36)
MCV RBC AUTO: 94 FL (ref 82–98)
MCV RBC AUTO: 96 FL (ref 82–98)
MICROSCOPIC COMMENT: NORMAL
MONOCYTES # BLD AUTO: 1 K/UL (ref 0.3–1)
MONOCYTES NFR BLD: 9.1 % (ref 4–15)
NEUTROPHILS # BLD AUTO: 8.7 K/UL (ref 1.8–7.7)
NEUTROPHILS NFR BLD: 78.7 % (ref 38–73)
NITRITE UR QL STRIP: NEGATIVE
NRBC BLD-RTO: 0 /100 WBC
PH UR STRIP: 7 [PH] (ref 5–8)
PLATELET # BLD AUTO: 152 K/UL (ref 150–450)
PLATELET # BLD AUTO: 161 K/UL (ref 150–450)
PMV BLD AUTO: 9 FL (ref 9.2–12.9)
PMV BLD AUTO: 9.6 FL (ref 9.2–12.9)
POTASSIUM SERPL-SCNC: 4.3 MMOL/L (ref 3.5–5.1)
POTASSIUM SERPL-SCNC: 5 MMOL/L (ref 3.5–5.1)
PROT SERPL-MCNC: 7.4 G/DL (ref 6–8.4)
PROT UR QL STRIP: ABNORMAL
RBC # BLD AUTO: 5.13 M/UL (ref 4.6–6.2)
RBC # BLD AUTO: 5.56 M/UL (ref 4.6–6.2)
RBC #/AREA URNS HPF: 0 /HPF (ref 0–4)
SARS-COV-2 RDRP RESP QL NAA+PROBE: NEGATIVE
SODIUM SERPL-SCNC: 140 MMOL/L (ref 136–145)
SODIUM SERPL-SCNC: 142 MMOL/L (ref 136–145)
SP GR UR STRIP: >1.03 (ref 1–1.03)
SQUAMOUS #/AREA URNS HPF: 1 /HPF
TROPONIN I SERPL DL<=0.01 NG/ML-MCNC: <0.03 NG/ML
URN SPEC COLLECT METH UR: ABNORMAL
UROBILINOGEN UR STRIP-ACNC: NEGATIVE EU/DL
WBC # BLD AUTO: 10.09 K/UL (ref 3.9–12.7)
WBC # BLD AUTO: 11.02 K/UL (ref 3.9–12.7)
WBC #/AREA URNS HPF: 0 /HPF (ref 0–5)
YEAST URNS QL MICRO: NORMAL

## 2022-07-26 PROCEDURE — 81001 URINALYSIS AUTO W/SCOPE: CPT | Performed by: EMERGENCY MEDICINE

## 2022-07-26 PROCEDURE — 25000003 PHARM REV CODE 250: Performed by: EMERGENCY MEDICINE

## 2022-07-26 PROCEDURE — 63600175 PHARM REV CODE 636 W HCPCS: Performed by: INTERNAL MEDICINE

## 2022-07-26 PROCEDURE — 25000003 PHARM REV CODE 250: Performed by: HOSPITALIST

## 2022-07-26 PROCEDURE — 25500020 PHARM REV CODE 255: Performed by: EMERGENCY MEDICINE

## 2022-07-26 PROCEDURE — 25000003 PHARM REV CODE 250: Performed by: INTERNAL MEDICINE

## 2022-07-26 PROCEDURE — 82962 GLUCOSE BLOOD TEST: CPT

## 2022-07-26 PROCEDURE — 83735 ASSAY OF MAGNESIUM: CPT | Performed by: INTERNAL MEDICINE

## 2022-07-26 PROCEDURE — U0002 COVID-19 LAB TEST NON-CDC: HCPCS | Performed by: INTERNAL MEDICINE

## 2022-07-26 PROCEDURE — 85027 COMPLETE CBC AUTOMATED: CPT | Performed by: INTERNAL MEDICINE

## 2022-07-26 PROCEDURE — 83605 ASSAY OF LACTIC ACID: CPT | Performed by: INTERNAL MEDICINE

## 2022-07-26 PROCEDURE — 80048 BASIC METABOLIC PNL TOTAL CA: CPT | Performed by: INTERNAL MEDICINE

## 2022-07-26 PROCEDURE — 12000002 HC ACUTE/MED SURGE SEMI-PRIVATE ROOM

## 2022-07-26 PROCEDURE — 63600175 PHARM REV CODE 636 W HCPCS: Performed by: EMERGENCY MEDICINE

## 2022-07-26 RX ORDER — SODIUM,POTASSIUM PHOSPHATES 280-250MG
2 POWDER IN PACKET (EA) ORAL
Status: DISCONTINUED | OUTPATIENT
Start: 2022-07-26 | End: 2022-07-29 | Stop reason: HOSPADM

## 2022-07-26 RX ORDER — LANOLIN ALCOHOL/MO/W.PET/CERES
800 CREAM (GRAM) TOPICAL
Status: DISCONTINUED | OUTPATIENT
Start: 2022-07-26 | End: 2022-07-29 | Stop reason: HOSPADM

## 2022-07-26 RX ORDER — TALC
6 POWDER (GRAM) TOPICAL NIGHTLY PRN
Status: DISCONTINUED | OUTPATIENT
Start: 2022-07-26 | End: 2022-07-29 | Stop reason: HOSPADM

## 2022-07-26 RX ORDER — IBUPROFEN 200 MG
16 TABLET ORAL
Status: DISCONTINUED | OUTPATIENT
Start: 2022-07-26 | End: 2022-07-29 | Stop reason: HOSPADM

## 2022-07-26 RX ORDER — PANTOPRAZOLE SODIUM 40 MG/1
40 TABLET, DELAYED RELEASE ORAL
Status: DISCONTINUED | OUTPATIENT
Start: 2022-07-26 | End: 2022-07-28

## 2022-07-26 RX ORDER — CALCIUM CARBONATE 200(500)MG
500 TABLET,CHEWABLE ORAL 3 TIMES DAILY PRN
Status: DISCONTINUED | OUTPATIENT
Start: 2022-07-26 | End: 2022-07-29 | Stop reason: HOSPADM

## 2022-07-26 RX ORDER — ATORVASTATIN CALCIUM 10 MG/1
10 TABLET, FILM COATED ORAL NIGHTLY
Status: DISCONTINUED | OUTPATIENT
Start: 2022-07-26 | End: 2022-07-29 | Stop reason: HOSPADM

## 2022-07-26 RX ORDER — AZELASTINE 1 MG/ML
2 SPRAY, METERED NASAL 2 TIMES DAILY
Status: DISCONTINUED | OUTPATIENT
Start: 2022-07-26 | End: 2022-07-29 | Stop reason: HOSPADM

## 2022-07-26 RX ORDER — ACETAMINOPHEN 325 MG/1
650 TABLET ORAL EVERY 8 HOURS PRN
Status: DISCONTINUED | OUTPATIENT
Start: 2022-07-26 | End: 2022-07-29 | Stop reason: HOSPADM

## 2022-07-26 RX ORDER — IBUPROFEN 200 MG
24 TABLET ORAL
Status: DISCONTINUED | OUTPATIENT
Start: 2022-07-26 | End: 2022-07-29 | Stop reason: HOSPADM

## 2022-07-26 RX ORDER — NALOXONE HCL 0.4 MG/ML
0.02 VIAL (ML) INJECTION
Status: DISCONTINUED | OUTPATIENT
Start: 2022-07-26 | End: 2022-07-29 | Stop reason: HOSPADM

## 2022-07-26 RX ORDER — GLUCAGON 1 MG
1 KIT INJECTION
Status: DISCONTINUED | OUTPATIENT
Start: 2022-07-26 | End: 2022-07-29 | Stop reason: HOSPADM

## 2022-07-26 RX ORDER — METOPROLOL TARTRATE 25 MG/1
25 TABLET, FILM COATED ORAL 2 TIMES DAILY
Status: DISCONTINUED | OUTPATIENT
Start: 2022-07-26 | End: 2022-07-28

## 2022-07-26 RX ORDER — POLYETHYLENE GLYCOL 3350 17 G/17G
17 POWDER, FOR SOLUTION ORAL DAILY
Status: DISCONTINUED | OUTPATIENT
Start: 2022-07-26 | End: 2022-07-29 | Stop reason: HOSPADM

## 2022-07-26 RX ORDER — GABAPENTIN 300 MG/1
300 CAPSULE ORAL 3 TIMES DAILY
Status: DISCONTINUED | OUTPATIENT
Start: 2022-07-26 | End: 2022-07-29 | Stop reason: HOSPADM

## 2022-07-26 RX ORDER — MONTELUKAST SODIUM 10 MG/1
10 TABLET ORAL NIGHTLY
Status: DISCONTINUED | OUTPATIENT
Start: 2022-07-26 | End: 2022-07-29 | Stop reason: HOSPADM

## 2022-07-26 RX ORDER — INSULIN ASPART 100 [IU]/ML
0-5 INJECTION, SOLUTION INTRAVENOUS; SUBCUTANEOUS
Status: DISCONTINUED | OUTPATIENT
Start: 2022-07-26 | End: 2022-07-29 | Stop reason: HOSPADM

## 2022-07-26 RX ORDER — NAPROXEN SODIUM 220 MG/1
81 TABLET, FILM COATED ORAL DAILY
Status: DISCONTINUED | OUTPATIENT
Start: 2022-07-26 | End: 2022-07-28

## 2022-07-26 RX ORDER — SODIUM CHLORIDE, SODIUM LACTATE, POTASSIUM CHLORIDE, CALCIUM CHLORIDE 600; 310; 30; 20 MG/100ML; MG/100ML; MG/100ML; MG/100ML
INJECTION, SOLUTION INTRAVENOUS CONTINUOUS
Status: DISCONTINUED | OUTPATIENT
Start: 2022-07-26 | End: 2022-07-29 | Stop reason: HOSPADM

## 2022-07-26 RX ORDER — ONDANSETRON 2 MG/ML
4 INJECTION INTRAMUSCULAR; INTRAVENOUS EVERY 8 HOURS PRN
Status: DISCONTINUED | OUTPATIENT
Start: 2022-07-26 | End: 2022-07-29 | Stop reason: HOSPADM

## 2022-07-26 RX ADMIN — ONDANSETRON 4 MG: 2 INJECTION INTRAMUSCULAR; INTRAVENOUS at 01:07

## 2022-07-26 RX ADMIN — MONTELUKAST 10 MG: 10 TABLET, FILM COATED ORAL at 09:07

## 2022-07-26 RX ADMIN — IOHEXOL 100 ML: 350 INJECTION, SOLUTION INTRAVENOUS at 12:07

## 2022-07-26 RX ADMIN — MELATONIN 6 MG: at 09:07

## 2022-07-26 RX ADMIN — FENTANYL CITRATE 50 MCG: 50 INJECTION, SOLUTION INTRAMUSCULAR; INTRAVENOUS at 01:07

## 2022-07-26 RX ADMIN — METOPROLOL TARTRATE 25 MG: 25 TABLET, FILM COATED ORAL at 09:07

## 2022-07-26 RX ADMIN — POLYETHYLENE GLYCOL 3350 17 G: 17 POWDER, FOR SOLUTION ORAL at 04:07

## 2022-07-26 RX ADMIN — SODIUM CHLORIDE 1000 ML: 0.9 INJECTION, SOLUTION INTRAVENOUS at 12:07

## 2022-07-26 RX ADMIN — ATORVASTATIN CALCIUM 10 MG: 10 TABLET, FILM COATED ORAL at 09:07

## 2022-07-26 RX ADMIN — SODIUM CHLORIDE, SODIUM LACTATE, POTASSIUM CHLORIDE, AND CALCIUM CHLORIDE: .6; .31; .03; .02 INJECTION, SOLUTION INTRAVENOUS at 03:07

## 2022-07-26 RX ADMIN — GABAPENTIN 300 MG: 300 CAPSULE ORAL at 09:07

## 2022-07-26 RX ADMIN — APIXABAN 5 MG: 5 TABLET, FILM COATED ORAL at 09:07

## 2022-07-26 RX ADMIN — ACETAMINOPHEN 650 MG: 325 TABLET ORAL at 10:07

## 2022-07-26 RX ADMIN — ASPIRIN 81 MG CHEWABLE TABLET 81 MG: 81 TABLET CHEWABLE at 09:07

## 2022-07-26 RX ADMIN — GABAPENTIN 300 MG: 300 CAPSULE ORAL at 02:07

## 2022-07-26 NOTE — PROGRESS NOTES
Patient was seen and examined bedside.  Reviewed admitting provider's note and plan.  At 2 bowel movements last night, passing gas, on exam has bowel sounds.  Clinically improving    Plan:   Advanced diet as tolerated   IV fluids   Will hold on NG tube in light of clinical improvement and absence of nausea and vomiting   Complicated abdominal surgical history

## 2022-07-26 NOTE — H&P
[facility] - Emergency Dept  Hospital Medicine  History & Physical    Patient Name: Yeyo Hollingsworth  MRN: [id number]  Patient Class: IP- Inpatient  Admission Date: 7/25/2022  Attending Physician: Christian Prince MD  Primary Care Provider: Garland Ocampo MD         Patient information was obtained from patient, past medical records and ER records.     Subjective:     Principal Problem:SBO (small bowel obstruction)    Chief Complaint:   Chief Complaint   Patient presents with    Abdominal Pain     Pt has hx of small bowel obstruction    Nausea    Vomiting        HPI: Patient is 82-year-old  male with history of multiple abdominal surgeries, status post ostomy and subsequent reversal presented to the ED with chief complaint of abdominal pain.    Symptoms started acutely at around 7:00 p.m. after eating [employer] chicken sandwich for supper.  Pain is diffuse in location, severe in intensity, nonradiating.  Associated with nausea and an episode of emesis.  Symptoms improved after receiving pain medications here in the ED and having 2 bowel movements.  Symptoms similar to previous episodes of SBO.    Rest of the 10 point review of systems is negative except as mentioned above.      Past Medical History:   Diagnosis Date    Allergy     Ankle pain     left    Anticoagulant long-term use     aspirin    Anxiety     Atrial fibrillation     Back pain     Bruises easily     Cataract     Clotting disorder     left leg    Colon polyp     Diabetes mellitus     Diabetes mellitus, type 2     Hay fever     Hyperlipidemia     Hypertension     Left hip pain 12/26/2021    Seeing Dr. Beard for hip pain     Obese     BRANDI on CPAP        Past Surgical History:   Procedure Laterality Date    ABDOMINAL SURGERY      origunal surg 1986-mult surgeries since    CARPAL TUNNEL RELEASE      right wrist 2009-left wrist 2010    COLON SURGERY      partial colon removal    COLONOSCOPY  11/26/2018      [place]; normal terminal ileum; polyps removed from ascending colon and hepatic flexure; pan diverticulosis; small internal hemorrhoids; repeat in 5 years; Bx: fragments of an adenomatous polyp with mild surface glandular dysplasia and associated chronic active inflammation, no evidence of high-grade dysplasia or malignancy    COLOSTOMY  1986    colostomy reversal  1986    EYE SURGERY      hay fever      HERNIA REPAIR  1949    REPAIR OF TENDON OF LOWER EXTREMITY Left 6/24/2020    Procedure: REPAIR, TENDON, LOWER EXTREMITY;  Surgeon: Justin Mandujano DPM;  [place] [facility] OR;  Service: Podiatry;  Laterality: Left;  ARTHEX NOTIFIED IN CASE HE WANTS ANCHOR    TOE SURGERY Left 2017    replaced a joint     TONSILLECTOMY  1947    TRANSFORAMINAL EPIDURAL INJECTION OF STEROID      x 4    TRANSFORAMINAL EPIDURAL INJECTION OF STEROID Bilateral 11/13/2019    Procedure: Injection,steroid,epidural,transforaminal approach;  Surgeon: Grant Wright MD;  [place] [facility];  Service: Pain Management;  Laterality: Bilateral;  L4-5, L5-S1    TRANSFORAMINAL EPIDURAL INJECTION OF STEROID Bilateral 3/9/2021    Procedure: Injection,steroid,epidural,transforaminal approach;  Surgeon: Grant Wright MD;  [place] [facility];  Service: Pain Management;  Laterality: Bilateral;  L4-5, L5-S1    TRANSFORAMINAL EPIDURAL INJECTION OF STEROID Bilateral 5/25/2021    Procedure: Injection,steroid,epidural,transforaminal approach;  Surgeon: Grant Wright MD;  [place] [facility];  Service: Pain Management;  Laterality: Bilateral;  L4-L5,L5,S1    TRANSFORAMINAL EPIDURAL INJECTION OF STEROID Bilateral 12/14/2021    Procedure: Injection,steroid,epidural,transforaminal approach Bilateral L4-5, L5-S1;  Surgeon: Grant Wright MD;  [place] [facility];  Service: Pain Management;  Laterality: Bilateral;       Review of patient's allergies indicates:   Allergen Reactions    Ativan [lorazepam] Other (See Comments)     Mood alternating      Dilaudid  [hydromorphone (bulk)] Other (See Comments)     Mood alternating      Morphine Other (See Comments)     Mood alternating      Adhesive tape-silicones     Hydromorphone        No current facility-administered medications on file prior to encounter.     Current Outpatient Medications on File Prior to Encounter   Medication Sig    apixaban (ELIQUIS) 5 mg Tab Take 1 tablet (5 mg total) by mouth 2 (two) times daily.    aspirin 81 MG Chew Take 1 tablet (81 mg total) by mouth once daily.    atorvastatin (LIPITOR) 10 MG tablet nightly (Patient taking differently: Take 10 mg by mouth every evening. nightly)    azelastine (ASTELIN) 137 mcg (0.1 %) nasal spray 2 sprays (274 mcg total) by Nasal route 2 (two) times daily.    blood sugar diagnostic (BLOOD GLUCOSE TEST) Strp FREESTYLE LITE BG TEST STRIPS. current use of insulin  - Primary ICD-10-CM: E11.9 (Patient taking differently: 1 strip by Misc.(Non-Drug; Combo Route) route 2 (two) times a day. FREESTYLE LITE BG TEST STRIPS. current use of insulin  - Primary ICD-10-CM: E11.9)    cholecalciferol, vitamin D3, 1,250 mcg (50,000 unit) capsule Take 50,000 Units by mouth every Sunday.    fexofenadine (ALLEGRA) 180 MG tablet Take 1 tablet (180 mg total) by mouth once daily.    FREESTYLE LANCETS 28 gauge lancets Inject 1 lancet into the skin 3 (three) times daily.    furosemide (LASIX) 20 MG tablet Take 1 tablet (20 mg total) by mouth every Mon, Wed, Fri.    gabapentin (NEURONTIN) 300 MG capsule Take 1 capsule (300 mg total) by mouth 3 (three) times daily.    JARDIANCE 10 mg tablet Take 1 tablet (10 mg total) by mouth once daily.    metoprolol tartrate (LOPRESSOR) 25 MG tablet Take 1 tablet (25 mg total) by mouth 2 (two) times daily.    montelukast (SINGULAIR) 10 mg tablet Take 1 tablet (10 mg total) by mouth every evening.    SITagliptin (JANUVIA) 100 MG Tab Take 1 tablet (100 mg total) by mouth once daily.    traZODone (DESYREL) 100 MG tablet Take 1 tablet (100 mg  total) by mouth every evening.    alcohol swabs (ALCOHOL PADS) PadM Apply 2 each topically once daily.    HYDROcodone-acetaminophen (NORCO)  mg per tablet Take 1 tablet by mouth every 6 (six) hours as needed for Pain.    ipratropium (ATROVENT) 0.03 % nasal spray 2 sprays by Nasal route 2 (two) times daily.    lisinopriL (PRINIVIL,ZESTRIL) 20 MG tablet Take 1 tablet (20 mg total) by mouth once daily.    mupirocin (BACTROBAN) 2 % ointment Apply topically 3 (three) times daily.    polyethylene glycol (GLYCOLAX) 17 gram/dose powder Take 17 g by mouth once daily.    potassium chloride (MICRO-K) 10 MEQ CpSR Take 1 capsule (10 mEq total) by mouth every other day. Every other day    tiZANidine (ZANAFLEX) 6 mg capsule      Family History       Problem Relation (Age of Onset)    Heart disease Mother          Tobacco Use    Smoking status: Former Smoker     Types: Cigarettes     Quit date: 2006     Years since quittin.4    Smokeless tobacco: Never Used    Tobacco comment: ex smoker    Substance and Sexual Activity    Alcohol use: Yes     Comment: rarely    Drug use: No    Sexual activity: Yes     Partners: Female       Objective:     Vital Signs (Most Recent):  Temp: 97.7 °F (36.5 °C) (22 2222)  Pulse: 63 (22 0202)  Resp: 18 (22 0142)  BP: (!) 105/57 (22 0202)  SpO2: (!) 94 % (22 0132)   Vital Signs (24h Range):  Temp:  [97.7 °F (36.5 °C)] 97.7 °F (36.5 °C)  Pulse:  [63-68] 63  Resp:  [18-20] 18  SpO2:  [93 %-98 %] 94 %  BP: ()/(57-66) 105/57     Weight: 103 kg (227 lb)  Body mass index is 35.55 kg/m².    Physical Exam      General: Patient resting comfortably in no acute distress. Appears stated age  Head: Normocephalic and atraumatic   Eyes:  No conjunctival pallor. No scleral icterus.  Mouth: Oral mucosa moist   Lungs: CTA. Good air entry.  Cor: Regular rate and rhythm. No murmurs. No pedal edema.  Abd: Soft. Nontender.  No guarding or rigidity.   Hyperactive bowel sounds  Musculoskeletal: No arthropathy, deformity.  Skin: No rashes, swelling, or erythema.  Neuro: A&O x 3. Moving all extremities equally. Follows commands  Ext: No clubbing. No cyanosis. Peripheral pulses +  Psych: Normal mood and affect. Memory intact     Significant Labs: All pertinent labs within the past 24 hours have been reviewed.  CBC:   Recent Labs   Lab 07/25/22  2315   WBC 11.02   HGB 17.4   HCT 53.5        CMP:   Recent Labs   Lab 07/25/22  2315      K 4.3      CO2 27   *   BUN 31*   CREATININE 1.4   CALCIUM 9.3   PROT 7.4   ALBUMIN 4.3   BILITOT 1.0   ALKPHOS 35*   AST 27   ALT 29   ANIONGAP 13   EGFRNONAA 46.8*       Significant Imaging: I have reviewed all pertinent imaging results/findings within the past 24 hours.    Imaging Results              CT Abdomen Pelvis With Contrast (Final result)  Result time 07/26/22 00:58:24      Final result by Prasanth Monsivais DO (07/26/22 00:58:24)                   Narrative:    EXAM:  CT Abdomen and Pelvis With Intravenous Contrast    FACILITY:  [facility]    REFERRING:  AAAREFERRAL SELF    CLINICAL HISTORY:  82 years, Male; Nausea/vomiting    TECHNIQUE:  Axial computed tomography images of the abdomen and pelvis with intravenous contrast.  This CT exam was performed using one or more of the following dose reduction techniques:  automated exposure control, adjustment of the mA and/or kV according to patient size, and/or use of iterative reconstruction technique.    COMPARISON:  4/23/2021    FINDINGS:  Lung bases:  Unremarkable.  No mass.  No consolidation.    ABDOMEN:  Liver:  The liver is normal in size and exhibits mild fatty infiltration. There is a small subcentimeter low-density focus in the periphery of the right lobe which is stable but difficult to accurately characterize.  Gallbladder and bile ducts:  Unremarkable.  No calcified stones.  No ductal dilation.  Pancreas:  The pancreas exhibits  atrophic change.  No ductal dilation.  Spleen:  The spleen is normal in size. There is a calcified granuloma within the spleen.  Adrenals:  Unremarkable.  No mass.  Kidneys and ureters:  Unremarkable.  No solid mass.  No hydronephrosis.  Stomach and bowel:  There are several dilated small bowel loops identified within the abdomen and pelvis ranging from mild to mild-moderately dilated in the anterior abdomen on the right there is a zone of transition within the distal small bowel within the mid ileal level where there is angulation and tethering of the bowel loop. The findings are consistent with a small bowel obstruction, likely related to an adhesion. There is a very small adjacent abdominal wall hernia within the region measuring up to 9 mm as well as a fat-containing hernia adjacent to this measuring 2.4 x 1.7 cm.  The visualized portions of the colon and small bowel demonstrate no inflammatory change. Operative clips are seen within the anterior abdominal wall.  A few diverticula are seen within the descending colon.  No mucosal thickening.    PELVIS:  Appendix:  No findings to suggest acute appendicitis.  Bladder:  Urinary bladder is decompressed.  Reproductive:  Unremarkable as visualized.    ABDOMEN and PELVIS:  Intraperitoneal space:  There is stable mild haziness within the fat of the mesentery.  No free air.  No significant fluid collection.  Bones/joints:  No acute fracture.  No dislocation.  Soft tissues:  There is a small right fat-containing inguinal hernia.  Vasculature:  Unremarkable.  No abdominal aortic aneurysm.  Lymph nodes:  There is a mildly prominent, stable elongated portacaval region lymph node.    IMPRESSION:    Findings consistent with a small bowel obstruction with zone of transition within the distal small bowel within the mid ileal level where there is angulation and tethering of the bowel loop likely related to an adhesion. Small adjacent 9 mm abdominal hernia also seen at this  location as well as an adjacent small fat-containing hernia measuring 2.4 x 1.7 cm.    Stable mild haziness within the fat of the mesentery is a nonspecific finding.    Stable mildly prominent, elongated portacaval region lymph node.    Small right fat-containing inguinal hernia.    Diverticulosis.    Fatty infiltration of the liver with stable small subcentimeter low-density focus in the periphery of the right lobe.    Old granulomatous changes.    Electronically signed by:  Prasnath Monsivais DO  7/26/2022 12:58 AM CDT Workstation: [id number]                                      Assessment/Plan:     Active Hospital Problems    Diagnosis  POA    *SBO (small bowel obstruction) [K56.609]  Yes    Paroxysmal atrial fibrillation [I48.0]  Yes    Diabetes mellitus type 2, controlled [E11.9]  Yes    BPH (benign prostatic hyperplasia) [N40.0]  Yes    Hypertension [I10]  Yes      Resolved Hospital Problems   No resolved problems to display.     Plan:  SBO with transition point in the distal small bowel likely secondary to adhesions  Has prior episodes of SBO (history of multiple abdominal surgeries)  Conservative management given improvement in symptoms spontaneously  Continue bowel rest  IV fluids  Trend lactic acid  P.r.n. antiemetics  Continue home medications for chronic medical conditions    VTE risk high.  Continue home apixaban    Christian Prince MD  Department of Hospital Medicine   [facility]

## 2022-07-26 NOTE — HPI
Patient is 82-year-old  male with history of multiple abdominal surgeries, status post ostomy and subsequent reversal presented to the ED with chief complaint of abdominal pain.    Symptoms started acutely at around 7:00 p.m. after eating [employer] chicken sandwich for supper.  Pain is diffuse in location, severe in intensity, nonradiating.  Associated with nausea and an episode of emesis.  Symptoms improved after receiving pain medications here in the ED and having 2 bowel movements.  Symptoms similar to previous episodes of SBO.    Rest of the 10 point review of systems is negative except as mentioned above.

## 2022-07-26 NOTE — ED PROVIDER NOTES
Encounter Date: 7/25/2022       History     Chief Complaint   Patient presents with    Abdominal Pain     Pt has hx of small bowel obstruction    Nausea    Vomiting     Patient here with reported abdominal pain onset this evening with associated nausea vomiting he did have a bowel movement after the pain started but it did not improve the pain he has had no further BM or flatulence patient has a history of small-bowel obstructions at been recurrent is states symptoms feel similar to previous small-bowel obstructions does report bloating and distension no fever chills no dysuria urgency or frequency no hematuria no blood in his stool        Review of patient's allergies indicates:   Allergen Reactions    Ativan [lorazepam] Other (See Comments)     Mood alternating      Dilaudid [hydromorphone (bulk)] Other (See Comments)     Mood alternating      Morphine Other (See Comments)     Mood alternating      Adhesive tape-silicones     Hydromorphone      Past Medical History:   Diagnosis Date    Allergy     Ankle pain     left    Anticoagulant long-term use     aspirin    Anxiety     Atrial fibrillation     Back pain     Bruises easily     Cataract     Clotting disorder     left leg    Colon polyp     Diabetes mellitus     Diabetes mellitus, type 2     Hay fever     Hyperlipidemia     Hypertension     Left hip pain 12/26/2021    Seeing Dr. Beard for hip pain     Obese     BRANDI on CPAP      Past Surgical History:   Procedure Laterality Date    ABDOMINAL SURGERY      origunal surg 1986-mult surgeries since    CARPAL TUNNEL RELEASE      right wrist 2009-left wrist 2010    COLON SURGERY      partial colon removal    COLONOSCOPY  11/26/2018    Dr. Salazar; normal terminal ileum; polyps removed from ascending colon and hepatic flexure; pan diverticulosis; small internal hemorrhoids; repeat in 5 years; Bx: fragments of an adenomatous polyp with mild surface glandular dysplasia and associated chronic  active inflammation, no evidence of high-grade dysplasia or malignancy    COLOSTOMY  1986    colostomy reversal      EYE SURGERY      hay fever      HERNIA REPAIR  194    REPAIR OF TENDON OF LOWER EXTREMITY Left 2020    Procedure: REPAIR, TENDON, LOWER EXTREMITY;  Surgeon: Justin Mandujano DPM;  [place] [facility] OR;  Service: Podiatry;  Laterality: Left;  ARTHEX NOTIFIED IN CASE HE WANTS ANCHOR    TOE SURGERY Left 2017    replaced a joint     TONSILLECTOMY      TRANSFORAMINAL EPIDURAL INJECTION OF STEROID      x 4    TRANSFORAMINAL EPIDURAL INJECTION OF STEROID Bilateral 2019    Procedure: Injection,steroid,epidural,transforaminal approach;  Surgeon: Grant Wright MD;  [place] [facility] OR;  Service: Pain Management;  Laterality: Bilateral;  L4-5, L5-S1    TRANSFORAMINAL EPIDURAL INJECTION OF STEROID Bilateral 3/9/2021    Procedure: Injection,steroid,epidural,transforaminal approach;  Surgeon: Grant Wright MD;  [place] [facility];  Service: Pain Management;  Laterality: Bilateral;  L4-5, L5-S1    TRANSFORAMINAL EPIDURAL INJECTION OF STEROID Bilateral 2021    Procedure: Injection,steroid,epidural,transforaminal approach;  Surgeon: Grant Wright MD;  [place] [facility] OR;  Service: Pain Management;  Laterality: Bilateral;  L4-L5,L5,S1    TRANSFORAMINAL EPIDURAL INJECTION OF STEROID Bilateral 2021    Procedure: Injection,steroid,epidural,transforaminal approach Bilateral L4-5, L5-S1;  Surgeon: Grant Wright MD;  [place] [facility];  Service: Pain Management;  Laterality: Bilateral;     Family History   Problem Relation Age of Onset    Heart disease Mother     Melanoma Neg Hx     Psoriasis Neg Hx     Lupus Neg Hx     Eczema Neg Hx      Social History     Tobacco Use    Smoking status: Former Smoker     Types: Cigarettes     Quit date: 2006     Years since quittin.4    Smokeless tobacco: Never Used    Tobacco comment: ex smoker    Substance Use Topics    Alcohol use:  Yes     Comment: rarely    Drug use: No     Review of Systems   Constitutional: Negative for chills and fever.   HENT: Negative for congestion.    Respiratory: Negative for shortness of breath.    Cardiovascular: Negative for chest pain.   Gastrointestinal: Positive for abdominal pain, nausea and vomiting. Negative for blood in stool and diarrhea.   Genitourinary: Negative for dysuria, hematuria and testicular pain.   All other systems reviewed and are negative.      Physical Exam     Initial Vitals   BP Pulse Resp Temp SpO2   07/25/22 2225 07/25/22 2222 07/25/22 2222 07/25/22 2222 07/25/22 2222   97/62 67 20 97.7 °F (36.5 °C) 98 %      MAP       --                Physical Exam    Constitutional: He appears well-developed and well-nourished. No distress.   HENT:   Head: Normocephalic and atraumatic.   Right Ear: External ear normal.   Left Ear: External ear normal.   Mouth/Throat: Oropharynx is clear and moist.   Eyes: Pupils are equal, round, and reactive to light.   Neck: Neck supple.   Normal range of motion.  Cardiovascular: Normal rate, regular rhythm, S1 normal, S2 normal, normal heart sounds and intact distal pulses.   Pulmonary/Chest: Breath sounds normal. No respiratory distress.   Abdominal: Abdomen is soft. Bowel sounds are normal. He exhibits distension. There is abdominal tenderness. There is guarding.   Musculoskeletal:         General: Normal range of motion.      Cervical back: Normal range of motion and neck supple.     Neurological: He is alert and oriented to person, place, and time. He has normal strength. GCS score is 15. GCS eye subscore is 4. GCS verbal subscore is 5. GCS motor subscore is 6.   Skin: Skin is warm and dry. Capillary refill takes less than 2 seconds. No rash noted.   Psychiatric: He has a normal mood and affect. His behavior is normal.         ED Course   Procedures  Labs Reviewed   CBC W/ AUTO DIFFERENTIAL - Abnormal; Notable for the following components:       Result Value     MCH 31.3 (*)     Gran # (ANC) 8.7 (*)     Gran % 78.7 (*)     Lymph % 10.3 (*)     All other components within normal limits   COMPREHENSIVE METABOLIC PANEL - Abnormal; Notable for the following components:    Glucose 159 (*)     BUN 31 (*)     Alkaline Phosphatase 35 (*)     eGFR if  54.1 (*)     eGFR if non  46.8 (*)     All other components within normal limits   LACTIC ACID, PLASMA - Abnormal; Notable for the following components:    Lactate (Lactic Acid) 2.4 (*)     All other components within normal limits    Narrative:        Lactid acid critical result(s) called and verbal readback obtained   from Sarah Styles RN ER  by MS1 07/26/2022 00:11   MAGNESIUM   TROPONIN I   LIPASE   URINALYSIS, REFLEX TO URINE CULTURE   URINALYSIS MICROSCOPIC          Imaging Results          CT Abdomen Pelvis With Contrast (Final result)  Result time 07/26/22 00:58:24    Final result by Prasanth Monsivais DO (07/26/22 00:58:24)                 Narrative:    EXAM:  CT Abdomen and Pelvis With Intravenous Contrast    FACILITY:  [facility]    REFERRING:  AAAREFERRAL SELF    CLINICAL HISTORY:  82 years, Male; Nausea/vomiting    TECHNIQUE:  Axial computed tomography images of the abdomen and pelvis with intravenous contrast.  This CT exam was performed using one or more of the following dose reduction techniques:  automated exposure control, adjustment of the mA and/or kV according to patient size, and/or use of iterative reconstruction technique.    COMPARISON:  4/23/2021    FINDINGS:  Lung bases:  Unremarkable.  No mass.  No consolidation.    ABDOMEN:  Liver:  The liver is normal in size and exhibits mild fatty infiltration. There is a small subcentimeter low-density focus in the periphery of the right lobe which is stable but difficult to accurately characterize.  Gallbladder and bile ducts:  Unremarkable.  No calcified stones.  No ductal dilation.  Pancreas:  The pancreas exhibits atrophic  change.  No ductal dilation.  Spleen:  The spleen is normal in size. There is a calcified granuloma within the spleen.  Adrenals:  Unremarkable.  No mass.  Kidneys and ureters:  Unremarkable.  No solid mass.  No hydronephrosis.  Stomach and bowel:  There are several dilated small bowel loops identified within the abdomen and pelvis ranging from mild to mild-moderately dilated in the anterior abdomen on the right there is a zone of transition within the distal small bowel within the mid ileal level where there is angulation and tethering of the bowel loop. The findings are consistent with a small bowel obstruction, likely related to an adhesion. There is a very small adjacent abdominal wall hernia within the region measuring up to 9 mm as well as a fat-containing hernia adjacent to this measuring 2.4 x 1.7 cm.  The visualized portions of the colon and small bowel demonstrate no inflammatory change. Operative clips are seen within the anterior abdominal wall.  A few diverticula are seen within the descending colon.  No mucosal thickening.    PELVIS:  Appendix:  No findings to suggest acute appendicitis.  Bladder:  Urinary bladder is decompressed.  Reproductive:  Unremarkable as visualized.    ABDOMEN and PELVIS:  Intraperitoneal space:  There is stable mild haziness within the fat of the mesentery.  No free air.  No significant fluid collection.  Bones/joints:  No acute fracture.  No dislocation.  Soft tissues:  There is a small right fat-containing inguinal hernia.  Vasculature:  Unremarkable.  No abdominal aortic aneurysm.  Lymph nodes:  There is a mildly prominent, stable elongated portacaval region lymph node.    IMPRESSION:    Findings consistent with a small bowel obstruction with zone of transition within the distal small bowel within the mid ileal level where there is angulation and tethering of the bowel loop likely related to an adhesion. Small adjacent 9 mm abdominal hernia also seen at this location as  well as an adjacent small fat-containing hernia measuring 2.4 x 1.7 cm.    Stable mild haziness within the fat of the mesentery is a nonspecific finding.    Stable mildly prominent, elongated portacaval region lymph node.    Small right fat-containing inguinal hernia.    Diverticulosis.    Fatty infiltration of the liver with stable small subcentimeter low-density focus in the periphery of the right lobe.    Old granulomatous changes.    Electronically signed by:  Prasanth Monsivais DO  7/26/2022 12:58 AM CDT Workstation: [id number]                               Medications   0.9%  NaCl infusion (1,000 mLs Intravenous New Bag 7/26/22 0035)   ondansetron injection 4 mg (4 mg Intravenous Given 7/26/22 0143)   fentaNYL 50 mcg/mL injection 50 mcg (50 mcg Intravenous Given 7/26/22 0142)   iohexoL (OMNIPAQUE 350) injection 100 mL (100 mLs Intravenous Given 7/26/22 0035)     Medical Decision Making:   ED Management:  Patient's symptoms are improving in the emergency department however CT scan shows evidence of small-bowel obstruction is also noted hernia given patient's significant improvement I suspect the hernia may be unrelated with will admit for continued IV fluid resuscitation acute patient NPO discussed patient's findings with Dr. Prince who will evaluate patient                      Clinical Impression:   Final diagnoses:  [R11.2] Nausea & vomiting  [K56.609] Small bowel obstruction (Primary)          ED Disposition Condition    Admit               Danilo Villeda MD  07/26/22 0159

## 2022-07-26 NOTE — PLAN OF CARE
[facility]  Initial Discharge Assessment       Primary Care Provider: Garland Ocampo MD    Admission Diagnosis: Small bowel obstruction [K56.609]    Admission Date: 7/25/2022  Expected Discharge Date:     Discharge Barriers Identified: None     Initial assessment completed at bedside with patient and spouse, Jody. Spouse assist patient managing medical care and transportation to appointments. Patient has a glucometer, cane and CPAP at home. Patient admits he does not use CPAP but it functions properly.Patient and spouse anticipate discharge home when medically clear and deny discharge needs.    Payor: MEDICARE / Plan: MEDICARE PART A & B / Product Type: Government /     Extended Emergency Contact Information  Primary Emergency Contact: Jody Hollingsworth  Address: [address] [place] [address] [id number] [place] [address]  Mobile Phone: [phone]  Relation: Spouse  Preferred language: English   needed? No    Discharge Plan A: Home  Discharge Plan B: Home      [id number] - [place], [place] - [address] AT [facility] &   [address]  [facility] [address]  Phone: [phone] Fax: [phone]    [facility]  [address] [place] [address]  Phone: [phone]     [facility] [place] - [address]  [address]  [address]  [place] [facility] [place] [address]  Phone: [phone] Fax: [phone]    [facility] - [place] - [address] [address]  [address] [address]  [place] [address]  Phone: [phone] Fax: [phone]      Initial Assessment (most recent)     Adult Discharge Assessment - 07/26/22 1609        Discharge Assessment    Assessment Type Discharge Planning Assessment     Confirmed/corrected address, phone number and insurance Yes     Confirmed Demographics Correct on Facesheet     Source of Information patient     When was your last doctors appointment? --   July     Reason For Admission SBO     Lives With spouse     Facility Arrived From: Home     Do you expect to return to your current living situation? Yes     Do you have help at home or someone to help you manage your care at home? Yes     Who are your caregiver(s) and their phone number(s)? Jody (spouse) [phone]     Prior to hospitilization cognitive status: Alert/Oriented     Current cognitive status: Alert/Oriented     Walking or Climbing Stairs Difficulty none     Dressing/Bathing Difficulty none     Home Layout Able to live on 1st floor     Equipment Currently Used at Home CPAP;glucometer     Readmission within 30 days? No     Patient currently being followed by outpatient case management? No     Do you currently have service(s) that help you manage your care at home? No     Do you take prescription medications? Yes     Do you have prescription coverage? Yes     Coverage      Who is going to help you get home at discharge?      How do you get to doctors appointments? family or friend will provide     Are you on dialysis? No     Do you take coumadin? No     Discharge Plan A Home     Discharge Plan B Home     DME Needed Upon Discharge  none     Discharge Plan discussed with: Patient;Spouse/sig other     Name(s) and Number(s) Jody (spouse) [phone]     Discharge Barriers Identified None        Relationship/Environment    Name(s) of Who Lives With Patient Jody (spouse) [phone]

## 2022-07-26 NOTE — SUBJECTIVE & OBJECTIVE
Past Medical History:   Diagnosis Date    Allergy     Ankle pain     left    Anticoagulant long-term use     aspirin    Anxiety     Atrial fibrillation     Back pain     Bruises easily     Cataract     Clotting disorder     left leg    Colon polyp     Diabetes mellitus     Diabetes mellitus, type 2     Hay fever     Hyperlipidemia     Hypertension     Left hip pain 12/26/2021    Seeing Dr. Beard for hip pain     Obese     BRANDI on CPAP        Past Surgical History:   Procedure Laterality Date    ABDOMINAL SURGERY      origunal surg 1986-mult surgeries since    CARPAL TUNNEL RELEASE      right wrist 2009-left wrist 2010    COLON SURGERY      partial colon removal    COLONOSCOPY  11/26/2018    Dr. Salazar; normal terminal ileum; polyps removed from ascending colon and hepatic flexure; pan diverticulosis; small internal hemorrhoids; repeat in 5 years; Bx: fragments of an adenomatous polyp with mild surface glandular dysplasia and associated chronic active inflammation, no evidence of high-grade dysplasia or malignancy    COLOSTOMY  1986    colostomy reversal  1986    EYE SURGERY      hay fever      HERNIA REPAIR  1949    REPAIR OF TENDON OF LOWER EXTREMITY Left 6/24/2020    Procedure: REPAIR, TENDON, LOWER EXTREMITY;  Surgeon: Justin Mandujano DPM;  [place] [facility] OR;  Service: Podiatry;  Laterality: Left;  ARTHEX NOTIFIED IN CASE HE WANTS ANCHOR    TOE SURGERY Left 2017    replaced a joint     TONSILLECTOMY  1947    TRANSFORAMINAL EPIDURAL INJECTION OF STEROID      x 4    TRANSFORAMINAL EPIDURAL INJECTION OF STEROID Bilateral 11/13/2019    Procedure: Injection,steroid,epidural,transforaminal approach;  Surgeon: Grant Wright MD;  [place] [facility] OR;  Service: Pain Management;  Laterality: Bilateral;  L4-5, L5-S1    TRANSFORAMINAL EPIDURAL INJECTION OF STEROID Bilateral 3/9/2021    Procedure: Injection,steroid,epidural,transforaminal approach;  Surgeon: Grant Wright MD;  [place] [facility] OR;  Service: Pain Management;   Laterality: Bilateral;  L4-5, L5-S1    TRANSFORAMINAL EPIDURAL INJECTION OF STEROID Bilateral 5/25/2021    Procedure: Injection,steroid,epidural,transforaminal approach;  Surgeon: Grant Wright MD;  [place] [facility] OR;  Service: Pain Management;  Laterality: Bilateral;  L4-L5,L5,S1    TRANSFORAMINAL EPIDURAL INJECTION OF STEROID Bilateral 12/14/2021    Procedure: Injection,steroid,epidural,transforaminal approach Bilateral L4-5, L5-S1;  Surgeon: Grant Wright MD;  [place] [facility] OR;  Service: Pain Management;  Laterality: Bilateral;       Review of patient's allergies indicates:   Allergen Reactions    Ativan [lorazepam] Other (See Comments)     Mood alternating      Dilaudid [hydromorphone (bulk)] Other (See Comments)     Mood alternating      Morphine Other (See Comments)     Mood alternating      Adhesive tape-silicones     Hydromorphone        No current facility-administered medications on file prior to encounter.     Current Outpatient Medications on File Prior to Encounter   Medication Sig    apixaban (ELIQUIS) 5 mg Tab Take 1 tablet (5 mg total) by mouth 2 (two) times daily.    aspirin 81 MG Chew Take 1 tablet (81 mg total) by mouth once daily.    atorvastatin (LIPITOR) 10 MG tablet nightly (Patient taking differently: Take 10 mg by mouth every evening. nightly)    azelastine (ASTELIN) 137 mcg (0.1 %) nasal spray 2 sprays (274 mcg total) by Nasal route 2 (two) times daily.    blood sugar diagnostic (BLOOD GLUCOSE TEST) Strp FREESTYLE LITE BG TEST STRIPS. current use of insulin  - Primary ICD-10-CM: E11.9 (Patient taking differently: 1 strip by Misc.(Non-Drug; Combo Route) route 2 (two) times a day. FREESTYLE LITE BG TEST STRIPS. current use of insulin  - Primary ICD-10-CM: E11.9)    cholecalciferol, vitamin D3, 1,250 mcg (50,000 unit) capsule Take 50,000 Units by mouth every Sunday.    fexofenadine (ALLEGRA) 180 MG tablet Take 1 tablet (180 mg total) by mouth once daily.    FREESTYLE LANCETS 28 gauge lancets  Inject 1 lancet into the skin 3 (three) times daily.    furosemide (LASIX) 20 MG tablet Take 1 tablet (20 mg total) by mouth every Mon, Wed, Fri.    gabapentin (NEURONTIN) 300 MG capsule Take 1 capsule (300 mg total) by mouth 3 (three) times daily.    JARDIANCE 10 mg tablet Take 1 tablet (10 mg total) by mouth once daily.    metoprolol tartrate (LOPRESSOR) 25 MG tablet Take 1 tablet (25 mg total) by mouth 2 (two) times daily.    montelukast (SINGULAIR) 10 mg tablet Take 1 tablet (10 mg total) by mouth every evening.    SITagliptin (JANUVIA) 100 MG Tab Take 1 tablet (100 mg total) by mouth once daily.    traZODone (DESYREL) 100 MG tablet Take 1 tablet (100 mg total) by mouth every evening.    alcohol swabs (ALCOHOL PADS) PadM Apply 2 each topically once daily.    HYDROcodone-acetaminophen (NORCO)  mg per tablet Take 1 tablet by mouth every 6 (six) hours as needed for Pain.    ipratropium (ATROVENT) 0.03 % nasal spray 2 sprays by Nasal route 2 (two) times daily.    lisinopriL (PRINIVIL,ZESTRIL) 20 MG tablet Take 1 tablet (20 mg total) by mouth once daily.    mupirocin (BACTROBAN) 2 % ointment Apply topically 3 (three) times daily.    polyethylene glycol (GLYCOLAX) 17 gram/dose powder Take 17 g by mouth once daily.    potassium chloride (MICRO-K) 10 MEQ CpSR Take 1 capsule (10 mEq total) by mouth every other day. Every other day    tiZANidine (ZANAFLEX) 6 mg capsule      Family History       Problem Relation (Age of Onset)    Heart disease Mother          Tobacco Use    Smoking status: Former Smoker     Types: Cigarettes     Quit date: 2006     Years since quittin.4    Smokeless tobacco: Never Used    Tobacco comment: ex smoker    Substance and Sexual Activity    Alcohol use: Yes     Comment: rarely    Drug use: No    Sexual activity: Yes     Partners: Female       Objective:     Vital Signs (Most Recent):  Temp: 97.7 °F (36.5 °C) (22 2222)  Pulse: 63 (22 0202)  Resp: 18 (22  0142)  BP: (!) 105/57 (07/26/22 0202)  SpO2: (!) 94 % (07/26/22 0132)   Vital Signs (24h Range):  Temp:  [97.7 °F (36.5 °C)] 97.7 °F (36.5 °C)  Pulse:  [63-68] 63  Resp:  [18-20] 18  SpO2:  [93 %-98 %] 94 %  BP: ()/(57-66) 105/57     Weight: 103 kg (227 lb)  Body mass index is 35.55 kg/m².    Physical Exam      General: Patient resting comfortably in no acute distress. Appears stated age  Head: Normocephalic and atraumatic   Eyes:  No conjunctival pallor. No scleral icterus.  Mouth: Oral mucosa moist   Lungs: CTA. Good air entry.  Cor: Regular rate and rhythm. No murmurs. No pedal edema.  Abd: Soft. Nontender.  No guarding or rigidity.  Hyperactive bowel sounds  Musculoskeletal: No arthropathy, deformity.  Skin: No rashes, swelling, or erythema.  Neuro: A&O x 3. Moving all extremities equally. Follows commands  Ext: No clubbing. No cyanosis. Peripheral pulses +  Psych: Normal mood and affect. Memory intact     Significant Labs: All pertinent labs within the past 24 hours have been reviewed.  CBC:   Recent Labs   Lab 07/25/22  2315   WBC 11.02   HGB 17.4   HCT 53.5        CMP:   Recent Labs   Lab 07/25/22  2315      K 4.3      CO2 27   *   BUN 31*   CREATININE 1.4   CALCIUM 9.3   PROT 7.4   ALBUMIN 4.3   BILITOT 1.0   ALKPHOS 35*   AST 27   ALT 29   ANIONGAP 13   EGFRNONAA 46.8*       Significant Imaging: I have reviewed all pertinent imaging results/findings within the past 24 hours.    Imaging Results              CT Abdomen Pelvis With Contrast (Final result)  Result time 07/26/22 00:58:24      Final result by Prasanth Monsivais DO (07/26/22 00:58:24)                   Narrative:    EXAM:  CT Abdomen and Pelvis With Intravenous Contrast    FACILITY:  [facility]    REFERRING:  AAAREFERRAL SELF    CLINICAL HISTORY:  82 years, Male; Nausea/vomiting    TECHNIQUE:  Axial computed tomography images of the abdomen and pelvis with intravenous contrast.  This CT exam was  performed using one or more of the following dose reduction techniques:  automated exposure control, adjustment of the mA and/or kV according to patient size, and/or use of iterative reconstruction technique.    COMPARISON:  4/23/2021    FINDINGS:  Lung bases:  Unremarkable.  No mass.  No consolidation.    ABDOMEN:  Liver:  The liver is normal in size and exhibits mild fatty infiltration. There is a small subcentimeter low-density focus in the periphery of the right lobe which is stable but difficult to accurately characterize.  Gallbladder and bile ducts:  Unremarkable.  No calcified stones.  No ductal dilation.  Pancreas:  The pancreas exhibits atrophic change.  No ductal dilation.  Spleen:  The spleen is normal in size. There is a calcified granuloma within the spleen.  Adrenals:  Unremarkable.  No mass.  Kidneys and ureters:  Unremarkable.  No solid mass.  No hydronephrosis.  Stomach and bowel:  There are several dilated small bowel loops identified within the abdomen and pelvis ranging from mild to mild-moderately dilated in the anterior abdomen on the right there is a zone of transition within the distal small bowel within the mid ileal level where there is angulation and tethering of the bowel loop. The findings are consistent with a small bowel obstruction, likely related to an adhesion. There is a very small adjacent abdominal wall hernia within the region measuring up to 9 mm as well as a fat-containing hernia adjacent to this measuring 2.4 x 1.7 cm.  The visualized portions of the colon and small bowel demonstrate no inflammatory change. Operative clips are seen within the anterior abdominal wall.  A few diverticula are seen within the descending colon.  No mucosal thickening.    PELVIS:  Appendix:  No findings to suggest acute appendicitis.  Bladder:  Urinary bladder is decompressed.  Reproductive:  Unremarkable as visualized.    ABDOMEN and PELVIS:  Intraperitoneal space:  There is stable mild haziness  within the fat of the mesentery.  No free air.  No significant fluid collection.  Bones/joints:  No acute fracture.  No dislocation.  Soft tissues:  There is a small right fat-containing inguinal hernia.  Vasculature:  Unremarkable.  No abdominal aortic aneurysm.  Lymph nodes:  There is a mildly prominent, stable elongated portacaval region lymph node.    IMPRESSION:    Findings consistent with a small bowel obstruction with zone of transition within the distal small bowel within the mid ileal level where there is angulation and tethering of the bowel loop likely related to an adhesion. Small adjacent 9 mm abdominal hernia also seen at this location as well as an adjacent small fat-containing hernia measuring 2.4 x 1.7 cm.    Stable mild haziness within the fat of the mesentery is a nonspecific finding.    Stable mildly prominent, elongated portacaval region lymph node.    Small right fat-containing inguinal hernia.    Diverticulosis.    Fatty infiltration of the liver with stable small subcentimeter low-density focus in the periphery of the right lobe.    Old granulomatous changes.    Electronically signed by:  Prasanth Monsivais DO  7/26/2022 12:58 AM CDT Workstation: [id number]

## 2022-07-27 LAB
ANION GAP SERPL CALC-SCNC: 8 MMOL/L (ref 8–16)
BUN SERPL-MCNC: 28 MG/DL (ref 8–23)
CALCIUM SERPL-MCNC: 7.8 MG/DL (ref 8.7–10.5)
CHLORIDE SERPL-SCNC: 107 MMOL/L (ref 95–110)
CO2 SERPL-SCNC: 24 MMOL/L (ref 23–29)
CREAT SERPL-MCNC: 1.1 MG/DL (ref 0.5–1.4)
ERYTHROCYTE [DISTWIDTH] IN BLOOD BY AUTOMATED COUNT: 14 % (ref 11.5–14.5)
EST. GFR  (AFRICAN AMERICAN): >60 ML/MIN/1.73 M^2
EST. GFR  (NON AFRICAN AMERICAN): >60 ML/MIN/1.73 M^2
GLUCOSE SERPL-MCNC: 100 MG/DL (ref 70–110)
GLUCOSE SERPL-MCNC: 111 MG/DL (ref 70–110)
GLUCOSE SERPL-MCNC: 93 MG/DL (ref 70–110)
GLUCOSE SERPL-MCNC: 94 MG/DL (ref 70–110)
GLUCOSE SERPL-MCNC: 98 MG/DL (ref 70–110)
HCT VFR BLD AUTO: 49 % (ref 40–54)
HGB BLD-MCNC: 15 G/DL (ref 14–18)
MAGNESIUM SERPL-MCNC: 2.3 MG/DL (ref 1.6–2.6)
MCH RBC QN AUTO: 30 PG (ref 27–31)
MCHC RBC AUTO-ENTMCNC: 30.6 G/DL (ref 32–36)
MCV RBC AUTO: 98 FL (ref 82–98)
PLATELET # BLD AUTO: 138 K/UL (ref 150–450)
PMV BLD AUTO: 10 FL (ref 9.2–12.9)
POTASSIUM SERPL-SCNC: 4.4 MMOL/L (ref 3.5–5.1)
RBC # BLD AUTO: 5 M/UL (ref 4.6–6.2)
SODIUM SERPL-SCNC: 139 MMOL/L (ref 136–145)
WBC # BLD AUTO: 5.34 K/UL (ref 3.9–12.7)

## 2022-07-27 PROCEDURE — 25000003 PHARM REV CODE 250: Performed by: INTERNAL MEDICINE

## 2022-07-27 PROCEDURE — 12000002 HC ACUTE/MED SURGE SEMI-PRIVATE ROOM

## 2022-07-27 PROCEDURE — 85027 COMPLETE CBC AUTOMATED: CPT | Performed by: INTERNAL MEDICINE

## 2022-07-27 PROCEDURE — 99222 PR INITIAL HOSPITAL CARE,LEVL II: ICD-10-PCS | Mod: ,,, | Performed by: SURGERY

## 2022-07-27 PROCEDURE — 25000003 PHARM REV CODE 250: Performed by: HOSPITALIST

## 2022-07-27 PROCEDURE — 80048 BASIC METABOLIC PNL TOTAL CA: CPT | Performed by: INTERNAL MEDICINE

## 2022-07-27 PROCEDURE — 99222 1ST HOSP IP/OBS MODERATE 55: CPT | Mod: ,,, | Performed by: SURGERY

## 2022-07-27 PROCEDURE — 36415 COLL VENOUS BLD VENIPUNCTURE: CPT | Performed by: INTERNAL MEDICINE

## 2022-07-27 PROCEDURE — 83735 ASSAY OF MAGNESIUM: CPT | Performed by: INTERNAL MEDICINE

## 2022-07-27 RX ADMIN — METOPROLOL TARTRATE 25 MG: 25 TABLET, FILM COATED ORAL at 08:07

## 2022-07-27 RX ADMIN — ASPIRIN 81 MG CHEWABLE TABLET 81 MG: 81 TABLET CHEWABLE at 08:07

## 2022-07-27 RX ADMIN — GABAPENTIN 300 MG: 300 CAPSULE ORAL at 08:07

## 2022-07-27 NOTE — CONSULTS
GENERAL SURGERY  INPATIENT CONSULT    REASON FOR CONSULT:  Small bowel obstruction    HPI: Yeyo Hollingsworth is a 82 y.o. male admitted for small-bowel obstruction.  Patient has a complicated past surgical history dating back to mid 80s.  Apparently had exploratory laparotomy with end ostomy possibly for diverticulitis.  Subsequently had additional laparotomy for reversal.  Has had hernia repair with inadvertent bowel injury.  Has previously been told by one of his prior surgeons that his belly was a land mine and that he should only undergo abdominal surgery in emergencies.  He has a history of 5 previous small-bowel obstructions that occurr every few years.  All previous small-bowel obstructions have resolved without surgical intervention.  His initial symptoms during this presentation were diffuse abdominal pain, nausea vomiting.  When I examine the patient and NG tube had just been placed in the symptoms had significantly improved.  He does report he has been passing flatus.    I have reviewed the patient's chart including prior progress notes, procedures and testing.     ROS:   Review of Systems   Constitutional: Positive for activity change and appetite change. Negative for fever.   Respiratory: Negative for apnea, chest tightness and shortness of breath.    Gastrointestinal: Positive for abdominal distention, abdominal pain, nausea and vomiting.   Neurological: Negative for dizziness and light-headedness.       PROBLEM LIST:  Patient Active Problem List   Diagnosis    SBO (small bowel obstruction)    History of DVT (deep vein thrombosis)    Hypertension    Controlled type 2 diabetes mellitus without complication, without long-term current use of insulin    Obesity, Class III, BMI 40-49.9 (morbid obesity)    Urethritis    Hematuria    Acute bronchitis with asthma    Encounter for comprehensive diabetic foot examination, type 2 diabetes mellitus    HTN (hypertension)    Slowing of urinary stream      Hyperlipemia    Diabetes mellitus type 2, controlled    BPH (benign prostatic hyperplasia)    Constipation, chronic    Back pain    BMI 38.0-38.9,adult    Screening for prostate cancer    Obesity    Essential hypertension    Equinus deformity of foot    Paroxysmal atrial fibrillation    Chronic sinus complaints    Spinal stenosis of lumbar region-dr redding    DDD (degenerative disc disease), lumbar    Chronic nonseasonal allergic rhinitis due to fungal spores    Anticoagulant long-term use    Lumbar radiculitis    Rib pain on right side    Anterolisthesis-lumbar L4/l5    Acute pain of left shoulder greater than right     Pain in both knees    History of surgery- left great toe    Positive colorectal cancer screening using Cologuard test    Lumbar radiculopathy    Primary insomnia    Accident due to mechanical fall without injury    Scalp laceration    Old lacunar stroke without late effect    Cervical stenosis of spine    New daily persistent headache    Chronic pain of left ankle    Sclerosing bone dysplasia    Chronic atrial fibrillation    Prerenal azotemia    Fatigue    Tendonitis of ankle, left    Peroneal tendon tear, left, subsequent encounter    Peroneal tendinitis of left lower extremity    Grade 2 ankle sprain, left, subsequent encounter    Rupture of peroneal tendon, left, subsequent encounter    Mid back pain on right side    CKD (chronic kidney disease), stage III    PAF (paroxysmal atrial fibrillation)    BRANDI (obstructive sleep apnea)    Class 2 obesity in adult    Type 2 diabetes mellitus, without long-term current use of insulin    Hyperlipidemia    Hypophosphatemia    Skin ulcer of abdomen, limited to breakdown of skin         HISTORY  Past Medical History:   Diagnosis Date    Allergy     Ankle pain     left    Anticoagulant long-term use     aspirin    Anxiety     Atrial fibrillation     Back pain     Bruises easily     Cataract     Clotting  disorder     left leg    Colon polyp     Diabetes mellitus     Diabetes mellitus, type 2     Hay fever     Hyperlipidemia     Hypertension     Left hip pain 12/26/2021    Seeing Dr. Beard for hip pain     Obese     BRANDI on CPAP        Past Surgical History:   Procedure Laterality Date    ABDOMINAL SURGERY      origunal surg 1986-mult surgeries since    CARPAL TUNNEL RELEASE      right wrist 2009-left wrist 2010    COLON SURGERY      partial colon removal    COLONOSCOPY  11/26/2018    Dr. Salazar; normal terminal ileum; polyps removed from ascending colon and hepatic flexure; pan diverticulosis; small internal hemorrhoids; repeat in 5 years; Bx: fragments of an adenomatous polyp with mild surface glandular dysplasia and associated chronic active inflammation, no evidence of high-grade dysplasia or malignancy    COLOSTOMY  1986    colostomy reversal  1986    EYE SURGERY      hay fever      HERNIA REPAIR  1949    REPAIR OF TENDON OF LOWER EXTREMITY Left 6/24/2020    Procedure: REPAIR, TENDON, LOWER EXTREMITY;  Surgeon: Justin Mandujano DPM;  [place] [facility] OR;  Service: Podiatry;  Laterality: Left;  ARTHEX NOTIFIED IN CASE HE WANTS ANCHOR    TOE SURGERY Left 2017    replaced a joint     TONSILLECTOMY  1947    TRANSFORAMINAL EPIDURAL INJECTION OF STEROID      x 4    TRANSFORAMINAL EPIDURAL INJECTION OF STEROID Bilateral 11/13/2019    Procedure: Injection,steroid,epidural,transforaminal approach;  Surgeon: Grant Wright MD;  [place] [facility] OR;  Service: Pain Management;  Laterality: Bilateral;  L4-5, L5-S1    TRANSFORAMINAL EPIDURAL INJECTION OF STEROID Bilateral 3/9/2021    Procedure: Injection,steroid,epidural,transforaminal approach;  Surgeon: Grant Wright MD;  [place] [facility] OR;  Service: Pain Management;  Laterality: Bilateral;  L4-5, L5-S1    TRANSFORAMINAL EPIDURAL INJECTION OF STEROID Bilateral 5/25/2021    Procedure: Injection,steroid,epidural,transforaminal approach;  Surgeon: Grant FINK  MD Kyle;  [place] [facility] OR;  Service: Pain Management;  Laterality: Bilateral;  L4-L5,L5,S1    TRANSFORAMINAL EPIDURAL INJECTION OF STEROID Bilateral 2021    Procedure: Injection,steroid,epidural,transforaminal approach Bilateral L4-5, L5-S1;  Surgeon: Grant Wright MD;  [place] [facility] OR;  Service: Pain Management;  Laterality: Bilateral;       Social History     Tobacco Use    Smoking status: Former Smoker     Types: Cigarettes     Quit date: 2006     Years since quittin.4    Smokeless tobacco: Never Used    Tobacco comment: ex smoker    Substance Use Topics    Alcohol use: Yes     Comment: rarely    Drug use: No       Family History   Problem Relation Age of Onset    Heart disease Mother     Melanoma Neg Hx     Psoriasis Neg Hx     Lupus Neg Hx     Eczema Neg Hx          MEDS:  No current facility-administered medications on file prior to encounter.     Current Outpatient Medications on File Prior to Encounter   Medication Sig Dispense Refill    apixaban (ELIQUIS) 5 mg Tab Take 1 tablet (5 mg total) by mouth 2 (two) times daily. 180 tablet 3    aspirin 81 MG Chew Take 1 tablet (81 mg total) by mouth once daily. 100 tablet 1    atorvastatin (LIPITOR) 10 MG tablet nightly (Patient taking differently: Take 10 mg by mouth every evening. nightly) 90 tablet 3    azelastine (ASTELIN) 137 mcg (0.1 %) nasal spray 2 sprays (274 mcg total) by Nasal route 2 (two) times daily. 30 mL 5    blood sugar diagnostic (BLOOD GLUCOSE TEST) Strp FREESTYLE LITE BG TEST STRIPS. current use of insulin  - Primary ICD-10-CM: E11.9 (Patient taking differently: 1 strip by Misc.(Non-Drug; Combo Route) route 2 (two) times a day. FREESTYLE LITE BG TEST STRIPS. current use of insulin  - Primary ICD-10-CM: E11.9) 200 strip 3    cholecalciferol, vitamin D3, 1,250 mcg (50,000 unit) capsule Take 50,000 Units by mouth every .      fexofenadine (ALLEGRA) 180 MG tablet Take 1 tablet (180 mg total) by mouth once  daily. 90 tablet 3    FREESTYLE LANCETS 28 gauge lancets Inject 1 lancet into the skin 3 (three) times daily. 100 each 3    furosemide (LASIX) 20 MG tablet Take 1 tablet (20 mg total) by mouth every Mon, Wed, Fri. 45 tablet 3    gabapentin (NEURONTIN) 300 MG capsule Take 1 capsule (300 mg total) by mouth 3 (three) times daily. 270 capsule 1    JARDIANCE 10 mg tablet Take 1 tablet (10 mg total) by mouth once daily. 90 tablet 1    metoprolol tartrate (LOPRESSOR) 25 MG tablet Take 1 tablet (25 mg total) by mouth 2 (two) times daily. 180 tablet 3    montelukast (SINGULAIR) 10 mg tablet Take 1 tablet (10 mg total) by mouth every evening. 90 tablet 3    SITagliptin (JANUVIA) 100 MG Tab Take 1 tablet (100 mg total) by mouth once daily. 90 tablet 3    traZODone (DESYREL) 100 MG tablet Take 1 tablet (100 mg total) by mouth every evening. 90 tablet 3    HYDROcodone-acetaminophen (NORCO)  mg per tablet Take 1 tablet by mouth every 6 (six) hours as needed for Pain. 30 tablet 0    [DISCONTINUED] lisinopriL (PRINIVIL,ZESTRIL) 20 MG tablet Take 1 tablet (20 mg total) by mouth once daily. 90 tablet 3       ALLERGIES:  Review of patient's allergies indicates:   Allergen Reactions    Ativan [lorazepam] Other (See Comments)     Mood alternating      Dilaudid [hydromorphone (bulk)] Other (See Comments)     Mood alternating      Morphine Other (See Comments)     Mood alternating      Adhesive tape-silicones     Hydromorphone          VITALS:  Temp:  [97.4 °F (36.3 °C)-98.3 °F (36.8 °C)] 98 °F (36.7 °C)  Pulse:  [58-71] 62  Resp:  [18-20] 18  SpO2:  [93 %-95 %] 94 %  BP: (120-147)/(63-79) 128/75    I/O last 3 completed shifts:  In: 840 [P.O.:840]  Out: -       PHYSICAL EXAM:  Physical Exam  Vitals reviewed.   Constitutional:       General: He is not in acute distress.     Appearance: Normal appearance. He is well-developed.   HENT:      Head: Normocephalic and atraumatic.      Nose:      Comments: NG tube in place  with brown fluid  Eyes:      General: No scleral icterus.  Neck:      Trachea: No tracheal deviation.   Cardiovascular:      Rate and Rhythm: Normal rate and regular rhythm.      Pulses: Normal pulses.   Pulmonary:      Effort: Pulmonary effort is normal. No respiratory distress.      Breath sounds: Normal breath sounds.   Abdominal:      General: There is distension.      Palpations: Abdomen is soft.      Tenderness: There is abdominal tenderness (Mild). There is no guarding or rebound.   Musculoskeletal:         General: No swelling or tenderness. Normal range of motion.      Cervical back: Normal range of motion and neck supple. No rigidity.   Skin:     General: Skin is warm and dry.      Coloration: Skin is not jaundiced.      Findings: No erythema.   Neurological:      General: No focal deficit present.      Mental Status: He is alert and oriented to person, place, and time. He is not disoriented.      Motor: No weakness or abnormal muscle tone.   Psychiatric:         Mood and Affect: Mood normal.         Behavior: Behavior normal.         Thought Content: Thought content normal.         Judgment: Judgment normal.           LABS:  Lab Results   Component Value Date    WBC 5.34 07/27/2022    RBC 5.00 07/27/2022    HGB 15.0 07/27/2022    HGB 13.9 (L) 07/29/2012    HCT 49.0 07/27/2022     (L) 07/27/2022     Lab Results   Component Value Date     (H) 07/27/2022     (H) 04/09/2019     07/27/2022     04/09/2019    K 4.4 07/27/2022     07/27/2022     04/09/2019    CO2 24 07/27/2022    BUN 28 (H) 07/27/2022    CREATININE 1.1 07/27/2022    CREATININE 1.02 04/09/2019    CREATININE 1.0 07/29/2012    CALCIUM 7.8 (L) 07/27/2022    CALCIUM 9.0 07/29/2012     Lab Results   Component Value Date    ALT 29 07/25/2022    AST 27 07/25/2022    ALKPHOS 35 (L) 07/25/2022    BILITOT 1.0 07/25/2022     Lab Results   Component Value Date    MG 2.3 07/27/2022    PHOS 2.3 (L) 04/24/2021        STUDIES:  Images and reports were personally reviewed.    CT A/P  FINDINGS:  Lung bases:  Unremarkable.  No mass.  No consolidation.     ABDOMEN:  Liver:  The liver is normal in size and exhibits mild fatty infiltration. There is a small subcentimeter low-density focus in the periphery of the right lobe which is stable but difficult to accurately characterize.  Gallbladder and bile ducts:  Unremarkable.  No calcified stones.  No ductal dilation.  Pancreas:  The pancreas exhibits atrophic change.  No ductal dilation.  Spleen:  The spleen is normal in size. There is a calcified granuloma within the spleen.  Adrenals:  Unremarkable.  No mass.  Kidneys and ureters:  Unremarkable.  No solid mass.  No hydronephrosis.  Stomach and bowel:  There are several dilated small bowel loops identified within the abdomen and pelvis ranging from mild to mild-moderately dilated in the anterior abdomen on the right there is a zone of transition within the distal small bowel within the mid ileal level where there is angulation and tethering of the bowel loop. The findings are consistent with a small bowel obstruction, likely related to an adhesion. There is a very small adjacent abdominal wall hernia within the region measuring up to 9 mm as well as a fat-containing hernia adjacent to this measuring 2.4 x 1.7 cm.  The visualized portions of the colon and small bowel demonstrate no inflammatory change. Operative clips are seen within the anterior abdominal wall.  A few diverticula are seen within the descending colon.  No mucosal thickening.     PELVIS:  Appendix:  No findings to suggest acute appendicitis.  Bladder:  Urinary bladder is decompressed.  Reproductive:  Unremarkable as visualized.     ABDOMEN and PELVIS:  Intraperitoneal space:  There is stable mild haziness within the fat of the mesentery.  No free air.  No significant fluid collection.  Bones/joints:  No acute fracture.  No dislocation.  Soft tissues:  There is a  small right fat-containing inguinal hernia.  Vasculature:  Unremarkable.  No abdominal aortic aneurysm.  Lymph nodes:  There is a mildly prominent, stable elongated portacaval region lymph node.     IMPRESSION:    Findings consistent with a small bowel obstruction with zone of transition within the distal small bowel within the mid ileal level where there is angulation and tethering of the bowel loop likely related to an adhesion. Small adjacent 9 mm abdominal hernia also seen at this location as well as an adjacent small fat-containing hernia measuring 2.4 x 1.7 cm.    Stable mild haziness within the fat of the mesentery is a nonspecific finding.    Stable mildly prominent, elongated portacaval region lymph node.    Small right fat-containing inguinal hernia.    Diverticulosis.    Fatty infiltration of the liver with stable small subcentimeter low-density focus in the periphery of the right lobe.    Old granulomatous changes.      ASSESSMENT & PLAN:  82 y.o. male with small-bowel obstruction  - imaging and history consistent with recurrent small-bowel obstruction  - patient with a likely hostile abdomen given extensive prior surgical interventions and lack of fat plane between bowel and abdominal wall and CT imaging  - recommend conservative management with NG tube for decompression, optimization of electrolytes and rehydration  - will plan for Gastrografin study tomorrow after time for decompression if the patient has failed to have more significant bowel function  - will continue to monitor clinically

## 2022-07-27 NOTE — NURSING
"Pt. short of breath, stomach appears to more distended than last pm. Bowel sounds active x 3.  Unable to hear the LUQ. Nurse reported this am at shift change that pt. had 3 episodes of bowel incontinence during the night.  Pt. refused Miralax this am. He had repeat KUB. At 10:45,  Placed 14 fr. NG tube to Rt. nare x 1 attempt without difficulty.  Pt. maria teresa. well using ice chips. Approx.800 CC of Med. Brown thin liquid noted.  He reports relief, shortness of breath is better and C/o "less discomfort".     "

## 2022-07-27 NOTE — PROGRESS NOTES
[facility] Medicine  Progress Note    Patient name: Yeyo Hollingsworth  MRN: [id number]  Admit Date: 7/25/2022   LOS: 1 day     SUBJECTIVE:     Principal problem: SBO (small bowel obstruction)    Interval History:  Seen and examined bedside.  His abdomen is more distended, he is tolerating bland diet, had 3 very loose bowel movements today however KUB still looks concerning and today he is having more pain with diet.  No other acute events overnight as per nursing staff.  Remains on IV fluids.     Scheduled Meds:   aspirin  81 mg Oral Daily    atorvastatin  10 mg Oral QHS    azelastine  2 spray Nasal BID    gabapentin  300 mg Oral TID    metoprolol tartrate  25 mg Oral BID    montelukast  10 mg Oral QHS    pantoprazole  40 mg Oral Before breakfast    polyethylene glycol  17 g Oral Daily     Continuous Infusions:   lactated ringers 100 mL/hr at 07/26/22 0328     PRN Meds:acetaminophen, calcium carbonate, dextrose 50%, dextrose 50%, glucagon (human recombinant), glucose, glucose, insulin aspart U-100, magnesium oxide, magnesium oxide, melatonin, naloxone, ondansetron, potassium bicarbonate, potassium bicarbonate, potassium bicarbonate, potassium, sodium phosphates, potassium, sodium phosphates, potassium, sodium phosphates    Review of patient's allergies indicates:   Allergen Reactions    Ativan [lorazepam] Other (See Comments)     Mood alternating      Dilaudid [hydromorphone (bulk)] Other (See Comments)     Mood alternating      Morphine Other (See Comments)     Mood alternating      Adhesive tape-silicones     Hydromorphone        Review of Systems: As per interval history    OBJECTIVE:     Vital Signs (Most Recent)  Temp: 98 °F (36.7 °C) (07/27/22 1145)  Pulse: 62 (07/27/22 1145)  Resp: 18 (07/27/22 1145)  BP: 128/75 (07/27/22 1145)  SpO2: (!) 94 % (07/27/22 1145)    Vital Signs Range (Last 24H):  Temp:  [97.4 °F (36.3 °C)-98.3 °F (36.8 °C)]   Pulse:  [58-71]   Resp:  [18-20]    BP: (120-147)/(63-79)   SpO2:  [93 %-95 %]     I & O (Last 24H):    Intake/Output Summary (Last 24 hours) at 7/27/2022 1328  Last data filed at 7/26/2022 1700  Gross per 24 hour   Intake 480 ml   Output --   Net 480 ml       Physical Exam:  General: Patient resting comfortably in no acute distress. Appears as stated age. Calm  Eyes: No conjunctival injection. No scleral icterus.  ENT: Hearing grossly intact. No discharge from ears. No nasal discharge.   Neck: Supple, trachea midline. No JVD  CVS: RRR. No LE edema BL  Lungs:  No tachypnea or accessory muscle use.  Clear to auscultation bilaterally  Abdomen:  Globally tender, hyperactive bowel sounds in right quadrants  Neuro: AOx3. Moves all extremities. Follows commands. Responds appropriately   Skin:  No rash or erythema noted    Laboratory:  I have reviewed all pertinent lab results within the past 24 hours.    Diagnostic Results:  Reviewed all imaging    ASSESSMENT/PLAN:     82-year-old gentleman with very complicated abdominal surgical history admitted for SBO, improved yesterday however today has worsening abdominal pain, distension and not tolerating diet    Active Hospital Problems    Diagnosis  POA    *SBO (small bowel obstruction) [K56.609]  Yes    Paroxysmal atrial fibrillation [I48.0]  Yes    Diabetes mellitus type 2, controlled [E11.9]  Yes    BPH (benign prostatic hyperplasia) [N40.0]  Yes    Hypertension [I10]  Yes      Resolved Hospital Problems   No resolved problems to display.         Plan:   Clinical exam has worsened, KUB is worrisome  Insert NG tube and connect with low intermittent suction  NPO  General surgery consultation  IV hydration  Aggressive electrolyte replacement  Patient has very complicated abdominal surgical history, will try to manage conservatively  Gastrografin challenge once bowels are decompressed  Hold Eliquis in case he needs surgical intervention  Further management as per clinical course      VTE Risk Mitigation  (From admission, onward)         Ordered     IP VTE HIGH RISK PATIENT  Once         07/26/22 0318     Place sequential compression device  Until discontinued         07/26/22 0318     Reason for No Pharmacological VTE Prophylaxis  Once        Question:  Reasons:  Answer:  Already adequately anticoagulated on oral Anticoagulants    07/26/22 0318                    Department Hospital Medicine  [facility]  Cherelle Garcia MD  Date of service: 07/27/2022

## 2022-07-28 LAB
ALBUMIN SERPL BCP-MCNC: 3.7 G/DL (ref 3.5–5.2)
ANION GAP SERPL CALC-SCNC: 11 MMOL/L (ref 8–16)
BUN SERPL-MCNC: 24 MG/DL (ref 8–23)
CALCIUM SERPL-MCNC: 8.4 MG/DL (ref 8.7–10.5)
CHLORIDE SERPL-SCNC: 102 MMOL/L (ref 95–110)
CO2 SERPL-SCNC: 30 MMOL/L (ref 23–29)
CREAT SERPL-MCNC: 1.1 MG/DL (ref 0.5–1.4)
ERYTHROCYTE [DISTWIDTH] IN BLOOD BY AUTOMATED COUNT: 13.6 % (ref 11.5–14.5)
EST. GFR  (AFRICAN AMERICAN): >60 ML/MIN/1.73 M^2
EST. GFR  (NON AFRICAN AMERICAN): >60 ML/MIN/1.73 M^2
GLUCOSE SERPL-MCNC: 102 MG/DL (ref 70–110)
GLUCOSE SERPL-MCNC: 104 MG/DL (ref 70–110)
GLUCOSE SERPL-MCNC: 108 MG/DL (ref 70–110)
HCT VFR BLD AUTO: 51.4 % (ref 40–54)
HGB BLD-MCNC: 15.9 G/DL (ref 14–18)
MAGNESIUM SERPL-MCNC: 2.2 MG/DL (ref 1.6–2.6)
MCH RBC QN AUTO: 30.3 PG (ref 27–31)
MCHC RBC AUTO-ENTMCNC: 30.9 G/DL (ref 32–36)
MCV RBC AUTO: 98 FL (ref 82–98)
PHOSPHATE SERPL-MCNC: 2 MG/DL (ref 2.7–4.5)
PLATELET # BLD AUTO: 102 K/UL (ref 150–450)
PMV BLD AUTO: 9.4 FL (ref 9.2–12.9)
POTASSIUM SERPL-SCNC: 3.6 MMOL/L (ref 3.5–5.1)
RBC # BLD AUTO: 5.24 M/UL (ref 4.6–6.2)
SODIUM SERPL-SCNC: 143 MMOL/L (ref 136–145)
WBC # BLD AUTO: 7.57 K/UL (ref 3.9–12.7)

## 2022-07-28 PROCEDURE — C9113 INJ PANTOPRAZOLE SODIUM, VIA: HCPCS | Performed by: SURGERY

## 2022-07-28 PROCEDURE — 63600175 PHARM REV CODE 636 W HCPCS: Performed by: SURGERY

## 2022-07-28 PROCEDURE — 12000002 HC ACUTE/MED SURGE SEMI-PRIVATE ROOM

## 2022-07-28 PROCEDURE — 99231 PR SUBSEQUENT HOSPITAL CARE,LEVL I: ICD-10-PCS | Mod: ,,, | Performed by: SURGERY

## 2022-07-28 PROCEDURE — 85027 COMPLETE CBC AUTOMATED: CPT | Performed by: INTERNAL MEDICINE

## 2022-07-28 PROCEDURE — 25500020 PHARM REV CODE 255: Performed by: SURGERY

## 2022-07-28 PROCEDURE — 25000003 PHARM REV CODE 250: Performed by: INTERNAL MEDICINE

## 2022-07-28 PROCEDURE — 36415 COLL VENOUS BLD VENIPUNCTURE: CPT | Performed by: INTERNAL MEDICINE

## 2022-07-28 PROCEDURE — 83735 ASSAY OF MAGNESIUM: CPT | Performed by: INTERNAL MEDICINE

## 2022-07-28 PROCEDURE — 25000003 PHARM REV CODE 250: Performed by: HOSPITALIST

## 2022-07-28 PROCEDURE — 80069 RENAL FUNCTION PANEL: CPT | Performed by: HOSPITALIST

## 2022-07-28 PROCEDURE — 99231 SBSQ HOSP IP/OBS SF/LOW 25: CPT | Mod: ,,, | Performed by: SURGERY

## 2022-07-28 RX ORDER — PANTOPRAZOLE SODIUM 40 MG/10ML
40 INJECTION, POWDER, LYOPHILIZED, FOR SOLUTION INTRAVENOUS DAILY
Status: DISCONTINUED | OUTPATIENT
Start: 2022-07-28 | End: 2022-07-29

## 2022-07-28 RX ORDER — METOPROLOL TARTRATE 1 MG/ML
2.5 INJECTION, SOLUTION INTRAVENOUS EVERY 8 HOURS
Status: DISCONTINUED | OUTPATIENT
Start: 2022-07-28 | End: 2022-07-29 | Stop reason: HOSPADM

## 2022-07-28 RX ADMIN — MONTELUKAST 10 MG: 10 TABLET, FILM COATED ORAL at 10:07

## 2022-07-28 RX ADMIN — DIATRIZOATE MEGLUMINE AND DIATRIZOATE SODIUM 60 ML: 600; 100 SOLUTION ORAL; RECTAL at 01:07

## 2022-07-28 RX ADMIN — ATORVASTATIN CALCIUM 10 MG: 10 TABLET, FILM COATED ORAL at 10:07

## 2022-07-28 RX ADMIN — POTASSIUM PHOSPHATE, MONOBASIC POTASSIUM PHOSPHATE, DIBASIC 15 MMOL: 224; 236 INJECTION, SOLUTION, CONCENTRATE INTRAVENOUS at 02:07

## 2022-07-28 RX ADMIN — GABAPENTIN 300 MG: 300 CAPSULE ORAL at 01:07

## 2022-07-28 RX ADMIN — METOPROLOL TARTRATE 2.5 MG: 5 INJECTION, SOLUTION INTRAVENOUS at 02:07

## 2022-07-28 RX ADMIN — AZELASTINE HYDROCHLORIDE 274 MCG: 137 SPRAY, METERED NASAL at 09:07

## 2022-07-28 RX ADMIN — PANTOPRAZOLE SODIUM 40 MG: 40 INJECTION, POWDER, FOR SOLUTION INTRAVENOUS at 09:07

## 2022-07-28 RX ADMIN — GABAPENTIN 300 MG: 300 CAPSULE ORAL at 10:07

## 2022-07-28 NOTE — NURSING
Pt. sitting up on bedside, NG tube clamped for Gastrografin administration.  He maria teresa. well, no N/V at this time.  Call light in reach.

## 2022-07-28 NOTE — PROGRESS NOTES
[facility] Medicine  Progress Note    Patient name: Yeyo Hollingsworth  MRN: [id number]  Admit Date: 7/25/2022   LOS: 2 days     SUBJECTIVE:     Principal problem: SBO (small bowel obstruction)    Interval History:  Seen and examined bedside.  Significant output from NG.  Little frustrated due to NG discomfort.  Abdomen is less distended.  Passing gas. CBC is stable. Remains on IV fluids.     Scheduled Meds:   aspirin  81 mg Oral Daily    atorvastatin  10 mg Oral QHS    azelastine  2 spray Nasal BID    diatrizoate meglumineand-diatrizoate sodium  60 mL Per NG tube Once    gabapentin  300 mg Oral TID    metoprolol tartrate  25 mg Oral BID    montelukast  10 mg Oral QHS    pantoprazole  40 mg Intravenous Daily    polyethylene glycol  17 g Oral Daily     Continuous Infusions:   lactated ringers 100 mL/hr at 07/26/22 0328     PRN Meds:acetaminophen, calcium carbonate, dextrose 50%, dextrose 50%, glucagon (human recombinant), glucose, glucose, insulin aspart U-100, magnesium oxide, magnesium oxide, melatonin, naloxone, ondansetron, potassium bicarbonate, potassium bicarbonate, potassium bicarbonate, potassium, sodium phosphates, potassium, sodium phosphates, potassium, sodium phosphates    Review of patient's allergies indicates:   Allergen Reactions    Ativan [lorazepam] Other (See Comments)     Mood alternating      Dilaudid [hydromorphone (bulk)] Other (See Comments)     Mood alternating      Morphine Other (See Comments)     Mood alternating      Adhesive tape-silicones     Hydromorphone        Review of Systems: As per interval history    OBJECTIVE:     Vital Signs (Most Recent)  Temp: 98 °F (36.7 °C) (07/28/22 1100)  Pulse: 67 (07/28/22 1100)  Resp: 17 (07/28/22 1100)  BP: (!) 158/84 (07/28/22 1100)  SpO2: 98 % (07/28/22 1100)    Vital Signs Range (Last 24H):  Temp:  [97.8 °F (36.6 °C)-99.4 °F (37.4 °C)]   Pulse:  [65-70]   Resp:  [17-18]   BP: (130-158)/(58-84)   SpO2:  [92 %-98  %]     I & O (Last 24H):    Intake/Output Summary (Last 24 hours) at 7/28/2022 1258  Last data filed at 7/28/2022 0600  Gross per 24 hour   Intake --   Output 2725 ml   Net -2725 ml       Physical Exam:  General: Patient resting comfortably in no acute distress. Appears as stated age. Calm  Eyes: No conjunctival injection. No scleral icterus.  ENT: Hearing grossly intact. No discharge from ears. No nasal discharge.   Neck: Supple, trachea midline. No JVD  CVS: RRR. No LE edema BL  Lungs:  No tachypnea or accessory muscle use.  Clear to auscultation bilaterally  Abdomen:  NG tube in place with significant output in canister, abdomen is less distended, better bowel sounds   Neuro: AOx3. Moves all extremities. Follows commands. Responds appropriately   Skin:  No rash or erythema noted    Laboratory:  I have reviewed all pertinent lab results within the past 24 hours.    Diagnostic Results:  Reviewed all imaging    ASSESSMENT/PLAN:     82-year-old gentleman with very complicated abdominal surgical history admitted for SBO, improved yesterday however today has worsening abdominal pain, distension and not tolerating diet    Active Hospital Problems    Diagnosis  POA    *SBO (small bowel obstruction) [K56.609]  Yes    Paroxysmal atrial fibrillation [I48.0]  Yes    Diabetes mellitus type 2, controlled [E11.9]  Yes    BPH (benign prostatic hyperplasia) [N40.0]  Yes    Hypertension [I10]  Yes      Resolved Hospital Problems   No resolved problems to display.         Plan:   Clinically stable, significant output from NG, passing gas, better bowel sounds  Noted plan for Gastrografin series by General surgery  Change p.o. metoprolol to IV pushes  NPO  IV hydration  Aggressive electrolyte replacement  Patient has very complicated abdominal surgical history, will try to manage conservatively  Hold Eliquis in case he needs surgical intervention  Further management as per clinical course      VTE Risk Mitigation (From  admission, onward)         Ordered     IP VTE HIGH RISK PATIENT  Once         07/26/22 0318     Place sequential compression device  Until discontinued         07/26/22 0318     Reason for No Pharmacological VTE Prophylaxis  Once        Question:  Reasons:  Answer:  Already adequately anticoagulated on oral Anticoagulants    07/26/22 0318                    Department Hospital Medicine  [facility]  Cherelle Garcia MD  Date of service: 07/28/2022

## 2022-07-28 NOTE — PROGRESS NOTES
General Surgery Progress Note    Admit Date: 7/25/2022    Hospital Day: 4    SUBJECTIVE:   High NG tube output that appeared somewhat lack per nursing.  Pain improved.  Still passing flatus but no bowel function.    OBJECTIVE:     Vital Signs (Most Recent)  Temp:  [97.8 °F (36.6 °C)-99.4 °F (37.4 °C)] 98 °F (36.7 °C)  Pulse:  [65-70] 67  Resp:  [17-18] 17  SpO2:  [92 %-98 %] 98 %  BP: (130-158)/(58-84) 158/84    I&Os:  I/O last 3 completed shifts:  In: 1573.3 [I.V.:1573.3]  Out: 2725 [Urine:275; Drains:2050; Other:400]    Physical Exam:  Gen: NAD, AAOx3  HEENT: Anicteric sclera  Pulm: unlabored, symmetrical   Abd:  Soft but distended, no significant tenderness, no rebound, no guarding, multiple well-healed previous abdominal incisions    Laboratory:  CBC:   Recent Labs   Lab 07/28/22  0536   WBC 7.57   RBC 5.24   HGB 15.9   HCT 51.4   *   MCV 98   MCH 30.3   MCHC 30.9*     CMP:   Recent Labs   Lab 07/25/22  2315 07/26/22  0329 07/28/22  0536   *   < > 108   CALCIUM 9.3   < > 8.4*   ALBUMIN 4.3  --  3.7   PROT 7.4  --   --       < > 143   K 4.3   < > 3.6   CO2 27   < > 30*      < > 102   BUN 31*   < > 24*   CREATININE 1.4   < > 1.1   ALKPHOS 35*  --   --    ALT 29  --   --    AST 27  --   --    BILITOT 1.0  --   --     < > = values in this interval not displayed.     Labs within the past 24 hours have been reviewed.    ASSESSMENT/PLAN:     Patient Active Problem List    Diagnosis Date Noted    Skin ulcer of abdomen, limited to breakdown of skin 07/06/2022    Hypophosphatemia 04/24/2021    CKD (chronic kidney disease), stage III 04/23/2021    PAF (paroxysmal atrial fibrillation) 04/23/2021    BRANDI (obstructive sleep apnea) 04/23/2021    Class 2 obesity in adult 04/23/2021    Type 2 diabetes mellitus, without long-term current use of insulin 04/23/2021    Hyperlipidemia 04/23/2021    Mid back pain on right side 07/28/2020    Grade 2 ankle sprain, left, subsequent encounter 05/22/2020     Rupture of peroneal tendon, left, subsequent encounter 05/22/2020    Peroneal tendon tear, left, subsequent encounter 05/14/2020    Peroneal tendinitis of left lower extremity 05/14/2020    Tendonitis of ankle, left 02/11/2020    Prerenal azotemia 07/08/2019    Fatigue 07/08/2019    Chronic atrial fibrillation 04/24/2019    Accident due to mechanical fall without injury 04/03/2019    Scalp laceration 04/03/2019    Old lacunar stroke without late effect 04/03/2019    Cervical stenosis of spine 04/03/2019    New daily persistent headache 04/03/2019    Chronic pain of left ankle 04/03/2019    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    Primary insomnia 02/05/2019    Lumbar radiculopathy 01/28/2019    Anterolisthesis-lumbar L4/l5 10/11/2018    Acute pain of left shoulder greater than right  10/11/2018    Pain in both knees 10/11/2018    History of surgery- left great toe 10/11/2018    Positive colorectal cancer screening using Cologuard test 10/11/2018    Rib pain on right side 07/10/2018    Lumbar radiculitis 04/30/2018    Chronic nonseasonal allergic rhinitis due to fungal spores 12/12/2017    Anticoagulant long-term use 12/12/2017    DDD (degenerative disc disease), lumbar 09/28/2017    Paroxysmal atrial fibrillation 08/08/2017    Chronic sinus complaints 08/08/2017    Spinal stenosis of lumbar region-dr redding 08/08/2017    Equinus deformity of foot 12/15/2015    Essential hypertension 11/03/2015    Obesity 11/02/2015    Constipation, chronic 03/03/2015    Back pain 03/03/2015    BMI 38.0-38.9,adult 03/03/2015    Screening for prostate cancer 03/03/2015    Slowing of urinary stream  11/03/2014    Hyperlipemia 11/03/2014    Diabetes mellitus type 2, controlled 11/03/2014    BPH (benign prostatic hyperplasia) 11/03/2014    HTN (hypertension) 07/28/2014    Encounter for comprehensive diabetic foot examination, type 2 diabetes mellitus 04/15/2014    Acute bronchitis with asthma  02/18/2014    Urethritis 02/04/2014    Hematuria 02/04/2014    Obesity, Class III, BMI 40-49.9 (morbid obesity) 01/03/2014    SBO (small bowel obstruction) 05/22/2013    History of DVT (deep vein thrombosis) 05/22/2013    Hypertension 05/22/2013    Controlled type 2 diabetes mellitus without complication, without long-term current use of insulin 05/22/2013         82 y.o. male with complicated past surgical history with multiple intra-abdominal surgeries now with small-bowel obstruction  - symptoms improved with NG tube  - is passing gas  - will give Gastrografin challenge today  - serial abdominal x-rays ordered  - Protonix changed IV because he is unlikely to be absorbing p.o. medications

## 2022-07-29 VITALS
OXYGEN SATURATION: 96 % | BODY MASS INDEX: 36.13 KG/M2 | HEIGHT: 67 IN | WEIGHT: 230.19 LBS | SYSTOLIC BLOOD PRESSURE: 158 MMHG | DIASTOLIC BLOOD PRESSURE: 78 MMHG | RESPIRATION RATE: 16 BRPM | HEART RATE: 66 BPM | TEMPERATURE: 98 F

## 2022-07-29 LAB
ALBUMIN SERPL BCP-MCNC: 3.3 G/DL (ref 3.5–5.2)
ANION GAP SERPL CALC-SCNC: 9 MMOL/L (ref 8–16)
BUN SERPL-MCNC: 23 MG/DL (ref 8–23)
CALCIUM SERPL-MCNC: 8.3 MG/DL (ref 8.7–10.5)
CHLORIDE SERPL-SCNC: 103 MMOL/L (ref 95–110)
CO2 SERPL-SCNC: 31 MMOL/L (ref 23–29)
CREAT SERPL-MCNC: 1 MG/DL (ref 0.5–1.4)
EST. GFR  (AFRICAN AMERICAN): >60 ML/MIN/1.73 M^2
EST. GFR  (NON AFRICAN AMERICAN): >60 ML/MIN/1.73 M^2
GLUCOSE SERPL-MCNC: 107 MG/DL (ref 70–110)
GLUCOSE SERPL-MCNC: 98 MG/DL (ref 70–110)
PHOSPHATE SERPL-MCNC: 2.3 MG/DL (ref 2.7–4.5)
POTASSIUM SERPL-SCNC: 3.3 MMOL/L (ref 3.5–5.1)
SODIUM SERPL-SCNC: 143 MMOL/L (ref 136–145)

## 2022-07-29 PROCEDURE — C9113 INJ PANTOPRAZOLE SODIUM, VIA: HCPCS | Performed by: SURGERY

## 2022-07-29 PROCEDURE — 63600175 PHARM REV CODE 636 W HCPCS: Performed by: SURGERY

## 2022-07-29 PROCEDURE — 99231 SBSQ HOSP IP/OBS SF/LOW 25: CPT | Mod: ,,, | Performed by: SURGERY

## 2022-07-29 PROCEDURE — 36415 COLL VENOUS BLD VENIPUNCTURE: CPT | Performed by: HOSPITALIST

## 2022-07-29 PROCEDURE — 25000003 PHARM REV CODE 250: Performed by: INTERNAL MEDICINE

## 2022-07-29 PROCEDURE — 80069 RENAL FUNCTION PANEL: CPT | Performed by: HOSPITALIST

## 2022-07-29 PROCEDURE — 99231 PR SUBSEQUENT HOSPITAL CARE,LEVL I: ICD-10-PCS | Mod: ,,, | Performed by: SURGERY

## 2022-07-29 RX ORDER — PANTOPRAZOLE SODIUM 40 MG/1
40 TABLET, DELAYED RELEASE ORAL DAILY
Status: DISCONTINUED | OUTPATIENT
Start: 2022-07-30 | End: 2022-07-29 | Stop reason: HOSPADM

## 2022-07-29 RX ORDER — SODIUM,POTASSIUM PHOSPHATES 280-250MG
2 POWDER IN PACKET (EA) ORAL ONCE
Status: DISCONTINUED | OUTPATIENT
Start: 2022-07-29 | End: 2022-07-29 | Stop reason: HOSPADM

## 2022-07-29 RX ADMIN — POTASSIUM BICARBONATE 35 MEQ: 391 TABLET, EFFERVESCENT ORAL at 08:07

## 2022-07-29 RX ADMIN — GABAPENTIN 300 MG: 300 CAPSULE ORAL at 08:07

## 2022-07-29 RX ADMIN — PANTOPRAZOLE SODIUM 40 MG: 40 INJECTION, POWDER, FOR SOLUTION INTRAVENOUS at 08:07

## 2022-07-29 NOTE — PROGRESS NOTES
Pharmacist Intervention IV to PO Note    Yeyo Hollingsworth is a 82 y.o. male being treated with IV medication pantoprazole    Patient Data:    Vital Signs (Most Recent):  Temp: 98.2 °F (36.8 °C) (07/29/22 0740)  Pulse: 66 (07/29/22 0740)  Resp: 16 (07/29/22 0740)  BP: (!) 158/78 (07/29/22 0740)  SpO2: 96 % (07/29/22 0740)   Vital Signs (72h Range):  Temp:  [97.4 °F (36.3 °C)-99.4 °F (37.4 °C)]   Pulse:  [58-78]   Resp:  [16-20]   BP: (120-192)/(58-84)   SpO2:  [92 %-98 %]      CBC:  Recent Labs   Lab 07/26/22 0329 07/27/22  0506 07/28/22  0536   WBC 10.09 5.34 7.57   RBC 5.13 5.00 5.24   HGB 15.4 15.0 15.9   HCT 48.2 49.0 51.4    138* 102*   MCV 94 98 98   MCH 30.0 30.0 30.3   MCHC 32.0 30.6* 30.9*     CMP:     Recent Labs   Lab 07/25/22  2315 07/26/22 0329 07/27/22  0506 07/28/22  0536 07/29/22  0537   *   < > 111* 108 107   CALCIUM 9.3   < > 7.8* 8.4* 8.3*   ALBUMIN 4.3  --   --  3.7 3.3*   PROT 7.4  --   --   --   --       < > 139 143 143   K 4.3   < > 4.4 3.6 3.3*   CO2 27   < > 24 30* 31*      < > 107 102 103   BUN 31*   < > 28* 24* 23   CREATININE 1.4   < > 1.1 1.1 1.0   ALKPHOS 35*  --   --   --   --    ALT 29  --   --   --   --    AST 27  --   --   --   --    BILITOT 1.0  --   --   --   --     < > = values in this interval not displayed.       Dietary Orders:  Diet Orders  Report           Diet diabetic [facility]; 2000 Calorie: Diabetic starting at 07/29 0735            Based on the following criteria, this patient qualifies for intravenous to oral conversion:  [x] The patients gastrointestinal tract is functioning (tolerating medications via oral or enteral route for 24 hours and tolerating food or enteral feeds for 24 hours).    IV pantoprazole 40 mg daily will be changed to oral pantoprazole 40 mg daily     Pharmacist's Name: Robert Arboleda  Pharmacist's Extension: [phone]

## 2022-07-29 NOTE — HOSPITAL COURSE
82-year-old gentleman with very complicated abdominal surgical history admitted for small-bowel obstruction that improved in the beginning without NG tube however 2nd day his clinical status worsened and required NG insertion.  Patient responded very well with 24 hour of NG suction.  General surgery performed Gastrografin challenged and contrast went through rectum confirmed by KUB.  NG accidentally came out last night however patient did well, tolerated diet and he also had multiple bowel movements since last night.  He was cleared for discharge by General surgery.  Patient was advised to advance his diet slowly over next couple days, he was advised to take MiraLax b.i.d. for next 5 days.     I have seen the patient on the day of discharge and reviewed the discharge instructions as outlined below. Patient verbalized understanding and is aware to contact primary care physician or return to ED if new or worsening symptoms.

## 2022-07-29 NOTE — NURSING
Upon rounding I found pt to have no NG tube, pt told me NG tube was falling out so he pulled it the rest of the way out. MD notified and okay with it staying out for the night as long as no nausea or vomiting.

## 2022-07-29 NOTE — PROGRESS NOTES
General Surgery Progress Note    Admit Date: 7/25/2022  Hospital Day: 5    SUBJECTIVE:   Underwent Gastrografin study yesterday.  Contrast quickly made to the colon and the patient had multiple bowel movements.  NG tube fell out but he tolerated clear liquids without any issue.  Was advanced to regular diet this morning and also tolerated without nausea or vomiting.  No abdominal pain.    OBJECTIVE:     Vital Signs (Most Recent)  Temp:  [97.5 °F (36.4 °C)-98.3 °F (36.8 °C)] 98.3 °F (36.8 °C)  Pulse:  [67-78] 67  Resp:  [17-18] 18  SpO2:  [92 %-98 %] 96 %  BP: (137-192)/(70-84) 153/76    I&Os:  I/O last 3 completed shifts:  In: -   Out: 1925 [Urine:275; Drains:1650]    Physical Exam:  Gen: NAD, AAOx3  HEENT: Anicteric sclera  Pulm: unlabored, symmetrical   Abd:  Soft, nontender, nondistended, well-healed surgical incisions    Laboratory:  CBC:   Recent Labs   Lab 07/28/22  0536   WBC 7.57   RBC 5.24   HGB 15.9   HCT 51.4   *   MCV 98   MCH 30.3   MCHC 30.9*     BMP:   Recent Labs   Lab 07/29/22  0537         K 3.3*      CO2 31*   BUN 23   CREATININE 1.0   CALCIUM 8.3*     Labs within the past 24 hours have been reviewed.    Abd Xray  FINDINGS: Previously seen enteric tube has been removed. The gas pattern similar to prior. There is contrast material within the colon. The ascending colon and cecum was not completely included in the field-of-view. No free intraperitoneal air or pneumatosis. No acute osseous abnormality.     IMPRESSION:     Interval removal of enteric tube.     Otherwise no significant interval change.    ASSESSMENT/PLAN:     Patient Active Problem List    Diagnosis Date Noted    Skin ulcer of abdomen, limited to breakdown of skin 07/06/2022    Hypophosphatemia 04/24/2021    CKD (chronic kidney disease), stage III 04/23/2021    PAF (paroxysmal atrial fibrillation) 04/23/2021    BRANDI (obstructive sleep apnea) 04/23/2021    Class 2 obesity in adult 04/23/2021    Type 2  diabetes mellitus, without long-term current use of insulin 04/23/2021    Hyperlipidemia 04/23/2021    Mid back pain on right side 07/28/2020    Grade 2 ankle sprain, left, subsequent encounter 05/22/2020    Rupture of peroneal tendon, left, subsequent encounter 05/22/2020    Peroneal tendon tear, left, subsequent encounter 05/14/2020    Peroneal tendinitis of left lower extremity 05/14/2020    Tendonitis of ankle, left 02/11/2020    Prerenal azotemia 07/08/2019    Fatigue 07/08/2019    Chronic atrial fibrillation 04/24/2019    Accident due to mechanical fall without injury 04/03/2019    Scalp laceration 04/03/2019    Old lacunar stroke without late effect 04/03/2019    Cervical stenosis of spine 04/03/2019    New daily persistent headache 04/03/2019    Chronic pain of left ankle 04/03/2019    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    Primary insomnia 02/05/2019    Lumbar radiculopathy 01/28/2019    Anterolisthesis-lumbar L4/l5 10/11/2018    Acute pain of left shoulder greater than right  10/11/2018    Pain in both knees 10/11/2018    History of surgery- left great toe 10/11/2018    Positive colorectal cancer screening using Cologuard test 10/11/2018    Rib pain on right side 07/10/2018    Lumbar radiculitis 04/30/2018    Chronic nonseasonal allergic rhinitis due to fungal spores 12/12/2017    Anticoagulant long-term use 12/12/2017    DDD (degenerative disc disease), lumbar 09/28/2017    Paroxysmal atrial fibrillation 08/08/2017    Chronic sinus complaints 08/08/2017    Spinal stenosis of lumbar region-dr redding 08/08/2017    Equinus deformity of foot 12/15/2015    Essential hypertension 11/03/2015    Obesity 11/02/2015    Constipation, chronic 03/03/2015    Back pain 03/03/2015    BMI 38.0-38.9,adult 03/03/2015    Screening for prostate cancer 03/03/2015    Slowing of urinary stream  11/03/2014    Hyperlipemia 11/03/2014    Diabetes mellitus type 2, controlled 11/03/2014    BPH  (benign prostatic hyperplasia) 11/03/2014    HTN (hypertension) 07/28/2014    Encounter for comprehensive diabetic foot examination, type 2 diabetes mellitus 04/15/2014    Acute bronchitis with asthma 02/18/2014    Urethritis 02/04/2014    Hematuria 02/04/2014    Obesity, Class III, BMI 40-49.9 (morbid obesity) 01/03/2014    SBO (small bowel obstruction) 05/22/2013    History of DVT (deep vein thrombosis) 05/22/2013    Hypertension 05/22/2013    Controlled type 2 diabetes mellitus without complication, without long-term current use of insulin 05/22/2013         82 y.o. male with small-bowel obstruction  - obstruction has resolved  - tolerated diet  - okay for discharge home he

## 2022-07-29 NOTE — DISCHARGE SUMMARY
[facility] Medicine  Discharge Summary      Patient Name: Yeyo Hollingsworth  MRN: [id number]  Patient Class: IP- Inpatient  Admission Date: 7/25/2022  Hospital Length of Stay: 3 days  Discharge Date and Time:  07/29/2022 5:11 PM  Attending Physician: Monika att. providers found   Discharging Provider: Cherelle Garcia MD  Primary Care Provider: Garland Ocampo MD      HPI:   Patient is 82-year-old  male with history of multiple abdominal surgeries, status post ostomy and subsequent reversal presented to the ED with chief complaint of abdominal pain.    Symptoms started acutely at around 7:00 p.m. after eating [employer] chicken sandwich for supper.  Pain is diffuse in location, severe in intensity, nonradiating.  Associated with nausea and an episode of emesis.  Symptoms improved after receiving pain medications here in the ED and having 2 bowel movements.  Symptoms similar to previous episodes of SBO.    Rest of the 10 point review of systems is negative except as mentioned above.      * No surgery found *      Hospital Course:   82-year-old gentleman with very complicated abdominal surgical history admitted for small-bowel obstruction that improved in the beginning without NG tube however 2nd day his clinical status worsened and required NG insertion.  Patient responded very well with 24 hour of NG suction.  General surgery performed Gastrografin challenged and contrast went through rectum confirmed by KUB.  NG accidentally came out last night however patient did well, tolerated diet and he also had multiple bowel movements since last night.  He was cleared for discharge by General surgery.  Patient was advised to advance his diet slowly over next couple days, he was advised to take MiraLax b.i.d. for next 5 days.     I have seen the patient on the day of discharge and reviewed the discharge instructions as outlined below. Patient verbalized understanding and is aware to contact primary care  physician or return to ED if new or worsening symptoms.        Goals of Care Treatment Preferences:  Code Status: Full Code      Consults:   Consults (From admission, onward)        Status Ordering Provider     Inpatient consult to General Surgery  Once        Provider:  Get Stoner Jr., MD    Completed STAN ALEXANDER          No new Assessment & Plan notes have been filed under this hospital service since the last note was generated.  Service: Hospital Medicine    Final Active Diagnoses:    Diagnosis Date Noted POA    Paroxysmal atrial fibrillation [I48.0] 08/08/2017 Yes    Diabetes mellitus type 2, controlled [E11.9] 11/03/2014 Yes    BPH (benign prostatic hyperplasia) [N40.0] 11/03/2014 Yes    Hypertension [I10] 05/22/2013 Yes      Problems Resolved During this Admission:    Diagnosis Date Noted Date Resolved POA    PRINCIPAL PROBLEM:  SBO (small bowel obstruction) [K56.609] 05/22/2013 07/29/2022 Yes       Discharged Condition: good    Disposition: Home or Self Care    Follow Up:   Follow-up Information     Garland Ocampo MD Follow up in 1 week(s).    Specialty: Family Medicine  Contact information:  [address]  [address]  [place] [address]  [phone]             Get Stoner Jr, MD Follow up in 2 week(s).    Specialty: General Surgery  Contact information:  [address]  [address]  [place] [address]  [phone]                       Patient Instructions:      Diet Cardiac     Notify your health care provider if you experience any of the following:  temperature >100.4     Notify your health care provider if you experience any of the following:  severe uncontrolled pain     Notify your health care provider if you experience any of the following:  persistent nausea and vomiting or diarrhea     Activity as tolerated       Significant Diagnostic Studies: Labs: All labs within the past 24 hours have been reviewed    Pending Diagnostic Studies:     None         Medications:  Reconciled  Home Medications:      Medication List      CHANGE how you take these medications    atorvastatin 10 MG tablet  Commonly known as: LIPITOR  nightly  What changed:   · how much to take  · how to take this  · when to take this     blood sugar diagnostic Strp  Commonly known as: BLOOD GLUCOSE TEST  FREESTYLE LITE BG TEST STRIPS. current use of insulin  - Primary ICD-10-CM: E11.9  What changed:   · how much to take  · how to take this  · when to take this        CONTINUE taking these medications    apixaban 5 mg Tab  Commonly known as: ELIQUIS  Take 1 tablet (5 mg total) by mouth 2 (two) times daily.     aspirin 81 MG Chew  Take 1 tablet (81 mg total) by mouth once daily.     azelastine 137 mcg (0.1 %) nasal spray  Commonly known as: ASTELIN  2 sprays (274 mcg total) by Nasal route 2 (two) times daily.     cholecalciferol (vitamin D3) 1,250 mcg (50,000 unit) capsule  Take 50,000 Units by mouth every Sunday.     fexofenadine 180 MG tablet  Commonly known as: ALLEGRA  Take 1 tablet (180 mg total) by mouth once daily.     FREESTYLE LANCETS 28 gauge Misc  Generic drug: lancets  Inject 1 lancet into the skin 3 (three) times daily.     furosemide 20 MG tablet  Commonly known as: LASIX  Take 1 tablet (20 mg total) by mouth every Mon, Wed, Fri.     gabapentin 300 MG capsule  Commonly known as: NEURONTIN  Take 1 capsule (300 mg total) by mouth 3 (three) times daily.     HYDROcodone-acetaminophen  mg per tablet  Commonly known as: NORCO  Take 1 tablet by mouth every 6 (six) hours as needed for Pain.     JARDIANCE 10 mg tablet  Generic drug: empagliflozin  Take 1 tablet (10 mg total) by mouth once daily.     metoprolol tartrate 25 MG tablet  Commonly known as: LOPRESSOR  Take 1 tablet (25 mg total) by mouth 2 (two) times daily.     montelukast 10 mg tablet  Commonly known as: SINGULAIR  Take 1 tablet (10 mg total) by mouth every evening.     SITagliptin 100 MG Tab  Commonly known as: JANUVIA  Take 1 tablet (100 mg total) by  mouth once daily.     traZODone 100 MG tablet  Commonly known as: DESYREL  Take 1 tablet (100 mg total) by mouth every evening.        STOP taking these medications    alcohol swabs Padm  Commonly known as: ALCOHOL PADS     ipratropium 21 mcg (0.03 %) nasal spray  Commonly known as: ATROVENT     lisinopriL 20 MG tablet  Commonly known as: PRINIVIL,ZESTRIL     mupirocin 2 % ointment  Commonly known as: BACTROBAN     polyethylene glycol 17 gram/dose powder  Commonly known as: GLYCOLAX     potassium chloride 10 MEQ Cpsr  Commonly known as: MICRO-K     tiZANidine 6 mg capsule  Commonly known as: ZANAFLEX            Indwelling Lines/Drains at time of discharge:   Lines/Drains/Airways     None                 Time spent on the discharge of patient: 35 minutes         Cherelle Garcia MD  Department of Hospital Medicine  [facility]

## 2022-07-29 NOTE — PLAN OF CARE
Chart and discharge orders reviewed.  Patient discharged home with no further case management needs. Patient preferrs to self schedule appointments. Dr Garcia informed patient he did not need to wait for case management to make appointments.       07/29/22 1200   Final Note   Assessment Type Final Discharge Note   Anticipated Discharge Disposition Home   What phone number can be called within the next 1-3 days to see how you are doing after discharge? [phone]   Hospital Resources/Appts/Education Provided Provided patient/caregiver with written discharge plan information   Post-Acute Status   Discharge Delays None known at this time

## 2022-07-29 NOTE — NURSING
The pt has an order for IV Lopressor he usually takes orally, I attempted to hook him up to a tele monitor so I could do the IV Lopressor. Pt refused tele monitor and IV Lopressor stating he will take it in the morning by mouth when he leaves. Pt educated, still refused.

## 2022-08-10 ENCOUNTER — OFFICE VISIT (OUTPATIENT)
Dept: SURGERY | Facility: CLINIC | Age: 82
End: 2022-08-10
Payer: MEDICARE

## 2022-08-10 VITALS — TEMPERATURE: 97 F

## 2022-08-10 DIAGNOSIS — Z87.19 HISTORY OF SMALL BOWEL OBSTRUCTION: Primary | ICD-10-CM

## 2022-08-10 PROCEDURE — 99213 OFFICE O/P EST LOW 20 MIN: CPT | Mod: S$GLB,,, | Performed by: SURGERY

## 2022-08-10 PROCEDURE — 99213 PR OFFICE/OUTPT VISIT, EST, LEVL III, 20-29 MIN: ICD-10-PCS | Mod: S$GLB,,, | Performed by: SURGERY

## 2022-08-10 NOTE — PROGRESS NOTES
GENERAL SURGERY PROGRESS NOTE    HPI: Yeyo Hollingsworth is a 82 y.o. male here for follow-up after admission for small-bowel obstruction.  Patient had extensive past surgical history dating back to the 80s. Apparently had exploratory laparotomy with end ostomy possibly for diverticulitis.  Subsequently had additional laparotomy for reversal.  Has had hernia repair with inadvertent bowel injury.  Has previously been told by one of his prior surgeons that his belly was a land mine and that he should only undergo abdominal surgery in emergencies.  He has a history of 5 previous small-bowel obstructions that occurr every few years.  All previous small-bowel obstructions have resolved without surgical intervention.    Doing well since discharge.  No nausea, vomiting, abdominal pain.  Is passing flatus and having bowel movements.  Taking MiraLax daily.  Is eating smaller meals more frequently.      VITALS:  Vitals:    08/10/22 0925   Temp: 97.1 °F (36.2 °C)       Physical Exam  Vitals reviewed.   Constitutional:       General: He is not in acute distress.     Appearance: Normal appearance. He is well-developed. He is obese.   HENT:      Head: Normocephalic and atraumatic.   Eyes:      General: No scleral icterus.  Neck:      Trachea: No tracheal deviation.   Cardiovascular:      Rate and Rhythm: Normal rate and regular rhythm.      Pulses: Normal pulses.   Pulmonary:      Effort: Pulmonary effort is normal. No respiratory distress.      Breath sounds: Normal breath sounds.   Abdominal:      General: There is no distension.      Palpations: Abdomen is soft.      Tenderness: There is no abdominal tenderness.          Comments: Multiple well-healed surgical incisions   Musculoskeletal:         General: No swelling or tenderness. Normal range of motion.      Cervical back: Normal range of motion and neck supple. No rigidity.   Skin:     General: Skin is warm and dry.      Coloration: Skin is not jaundiced.      Findings: No  erythema.   Neurological:      General: No focal deficit present.      Mental Status: He is alert and oriented to person, place, and time. He is not disoriented.      Motor: No weakness or abnormal muscle tone.   Psychiatric:         Mood and Affect: Mood normal.         Behavior: Behavior normal.         Thought Content: Thought content normal.         Judgment: Judgment normal.            ASSESSMENT & PLAN:  82 y.o. male s/p admission for small-bowel obstruction  - currently the patient is doing well without any evidence of ongoing partial obstruction  - has history of previous obstructions though only fiber 6 episodes over the past 30 years so years  - his abdomen will likely be very hostile therefore surgical intervention would be reserved for emergent situations or obstructions that do not resolve spontaneously  - I discussed this with the patient who expressed understanding and agrees  - told him to be mindful of his bowel habits and abdominal symptoms, if he feels an early obstruction developing he may cut back on solids but must maintain hydration, if he has worsening abdominal pain, nausea, vomiting or any other concerns he needs to go to the emergency room without hesitation  - return to clinic p.r.n.

## 2022-08-20 ENCOUNTER — PATIENT MESSAGE (OUTPATIENT)
Dept: FAMILY MEDICINE | Facility: CLINIC | Age: 82
End: 2022-08-20

## 2022-08-20 DIAGNOSIS — E78.5 HYPERLIPIDEMIA, UNSPECIFIED HYPERLIPIDEMIA TYPE: Chronic | ICD-10-CM

## 2022-08-20 DIAGNOSIS — I10 ESSENTIAL HYPERTENSION: Primary | ICD-10-CM

## 2022-08-20 DIAGNOSIS — E11.9 TYPE 2 DIABETES MELLITUS WITHOUT COMPLICATION, WITHOUT LONG-TERM CURRENT USE OF INSULIN: Chronic | ICD-10-CM

## 2022-08-20 DIAGNOSIS — I10 ESSENTIAL HYPERTENSION: ICD-10-CM

## 2022-08-20 RX ORDER — NAPROXEN SODIUM 220 MG/1
81 TABLET, FILM COATED ORAL DAILY
Qty: 100 TABLET | Refills: 1 | Status: CANCELLED | OUTPATIENT
Start: 2022-08-20 | End: 2023-08-20

## 2022-08-22 NOTE — TELEPHONE ENCOUNTER
Patient called for refill of Lisinopril. Lisinopril was discontinued on 07/26/22 in chart.  Visit notes from 06/28/22 states to continue the Lisinopril.  Please review and sign the pended order for  Lisinopril 20 mg once a day #90 with 3 refills to [facility]

## 2022-08-23 RX ORDER — NAPROXEN SODIUM 220 MG/1
81 TABLET, FILM COATED ORAL DAILY
Qty: 100 TABLET | Refills: 1 | Status: SHIPPED | OUTPATIENT
Start: 2022-08-23 | End: 2023-03-27 | Stop reason: SDUPTHER

## 2022-08-23 RX ORDER — LISINOPRIL 20 MG/1
20 TABLET ORAL DAILY
Qty: 90 TABLET | Refills: 3 | Status: SHIPPED | OUTPATIENT
Start: 2022-08-23 | End: 2023-06-18 | Stop reason: SDUPTHER

## 2022-08-23 RX ORDER — LISINOPRIL 20 MG/1
20 TABLET ORAL DAILY
Qty: 90 TABLET | Refills: 3 | Status: SHIPPED | OUTPATIENT
Start: 2022-08-23 | End: 2022-08-23 | Stop reason: SDUPTHER

## 2022-08-23 RX ORDER — NAPROXEN SODIUM 220 MG/1
81 TABLET, FILM COATED ORAL DAILY
Qty: 100 TABLET | Refills: 1 | Status: SHIPPED | OUTPATIENT
Start: 2022-08-23 | End: 2022-08-23 | Stop reason: SDUPTHER

## 2022-09-28 ENCOUNTER — OFFICE VISIT (OUTPATIENT)
Dept: FAMILY MEDICINE | Facility: CLINIC | Age: 82
End: 2022-09-28
Payer: MEDICARE

## 2022-09-28 VITALS
WEIGHT: 231.5 LBS | OXYGEN SATURATION: 98 % | SYSTOLIC BLOOD PRESSURE: 134 MMHG | BODY MASS INDEX: 36.34 KG/M2 | HEIGHT: 67 IN | DIASTOLIC BLOOD PRESSURE: 76 MMHG | HEART RATE: 53 BPM

## 2022-09-28 DIAGNOSIS — E11.9 CONTROLLED TYPE 2 DIABETES MELLITUS WITHOUT COMPLICATION, WITHOUT LONG-TERM CURRENT USE OF INSULIN: ICD-10-CM

## 2022-09-28 DIAGNOSIS — Z23 NEEDS FLU SHOT: ICD-10-CM

## 2022-09-28 DIAGNOSIS — E78.01 FAMILIAL HYPERCHOLESTEROLEMIA: Primary | ICD-10-CM

## 2022-09-28 DIAGNOSIS — I10 ESSENTIAL HYPERTENSION: ICD-10-CM

## 2022-09-28 DIAGNOSIS — K59.04 CHRONIC IDIOPATHIC CONSTIPATION: ICD-10-CM

## 2022-09-28 PROBLEM — E78.5 HYPERLIPIDEMIA: Chronic | Status: RESOLVED | Noted: 2021-04-23 | Resolved: 2022-09-28

## 2022-09-28 PROBLEM — E66.812 CLASS 2 OBESITY IN ADULT: Chronic | Status: RESOLVED | Noted: 2021-04-23 | Resolved: 2022-09-28

## 2022-09-28 PROBLEM — E66.9 CLASS 2 OBESITY IN ADULT: Chronic | Status: RESOLVED | Noted: 2021-04-23 | Resolved: 2022-09-28

## 2022-09-28 PROCEDURE — 99214 PR OFFICE/OUTPT VISIT, EST, LEVL IV, 30-39 MIN: ICD-10-PCS | Mod: S$PBB,AQ,, | Performed by: FAMILY MEDICINE

## 2022-09-28 PROCEDURE — 90662 IIV NO PRSV INCREASED AG IM: CPT | Mod: PBBFAC | Performed by: FAMILY MEDICINE

## 2022-09-28 PROCEDURE — 99214 OFFICE O/P EST MOD 30 MIN: CPT | Mod: S$PBB,AQ,, | Performed by: FAMILY MEDICINE

## 2022-09-28 PROCEDURE — G0008 ADMIN INFLUENZA VIRUS VAC: HCPCS | Mod: PBBFAC | Performed by: FAMILY MEDICINE

## 2022-09-28 PROCEDURE — 99214 OFFICE O/P EST MOD 30 MIN: CPT | Mod: 25 | Performed by: FAMILY MEDICINE

## 2022-09-28 RX ORDER — LANCETS 28 GAUGE
1 EACH MISCELLANEOUS 3 TIMES DAILY
Qty: 100 EACH | Refills: 3 | Status: SHIPPED | OUTPATIENT
Start: 2022-09-28 | End: 2022-10-03 | Stop reason: SDUPTHER

## 2022-09-28 RX ORDER — POTASSIUM CHLORIDE 750 MG/1
10 CAPSULE, EXTENDED RELEASE ORAL EVERY OTHER DAY
Qty: 90 CAPSULE | Refills: 1 | Status: SHIPPED | OUTPATIENT
Start: 2022-09-28 | End: 2022-09-28 | Stop reason: SDUPTHER

## 2022-09-28 RX ORDER — LANCETS 28 GAUGE
1 EACH MISCELLANEOUS 3 TIMES DAILY
Qty: 100 EACH | Refills: 3 | Status: SHIPPED | OUTPATIENT
Start: 2022-09-28 | End: 2022-09-28 | Stop reason: SDUPTHER

## 2022-09-28 RX ORDER — POLYETHYLENE GLYCOL 3350 17 G/17G
17 POWDER, FOR SOLUTION ORAL DAILY
Qty: 507 G | Refills: 3 | Status: SHIPPED | OUTPATIENT
Start: 2022-09-28 | End: 2022-09-28 | Stop reason: SDUPTHER

## 2022-09-28 RX ORDER — POLYETHYLENE GLYCOL 3350 17 G/17G
17 POWDER, FOR SOLUTION ORAL DAILY
Qty: 507 G | Refills: 3 | Status: SHIPPED | OUTPATIENT
Start: 2022-09-28 | End: 2022-10-03 | Stop reason: SDUPTHER

## 2022-09-28 RX ORDER — FUROSEMIDE 20 MG/1
20 TABLET ORAL
Qty: 45 TABLET | Refills: 3 | Status: SHIPPED | OUTPATIENT
Start: 2022-09-28 | End: 2022-09-28 | Stop reason: SDUPTHER

## 2022-09-28 RX ORDER — FUROSEMIDE 20 MG/1
20 TABLET ORAL
Qty: 45 TABLET | Refills: 3 | Status: SHIPPED | OUTPATIENT
Start: 2022-09-28 | End: 2022-10-03 | Stop reason: SDUPTHER

## 2022-09-28 RX ORDER — POTASSIUM CHLORIDE 750 MG/1
10 CAPSULE, EXTENDED RELEASE ORAL EVERY OTHER DAY
Qty: 90 CAPSULE | Refills: 1 | Status: SHIPPED | OUTPATIENT
Start: 2022-09-28 | End: 2022-10-03 | Stop reason: SDUPTHER

## 2022-09-28 NOTE — PROGRESS NOTES
SUBJECTIVE:    Patient ID: Yeyo Hollingsworth is a 82 y.o. male.    Chief Complaint: Follow-up and Neck Pain  80 yo male here today to follow up on his  chronic medical issues. pt is doing well and has  no  new complaint today.     I reviewed patient's blood sugars they were all within normal limits, his blood pressure today is well controlled he is not complaining any chest pain shortness breast or dyspnea on exertion, he does have some mild lower extremity edema he is not wearing his compression stockings except on long drives.      05/5/2020  244lbs  07/22/2020 247  09/28/2020 245  03/29/2021 235  04/30/2021 231  06/28/2021 231  12/28/2021 230  06/28/2022 227  09/28/2022 231        SPMHx:  DM: Jardiance 10mg, Januvia 100mg, not on metformin due to diarrhea Last A1C 6.3  is doing well.  HTN: Lisinopril 20mg, Metoprolol 25mg, is well controlled.  Hyperlipidemia: Atorvastatin 10 mg well controlled. LDL 59  Arthritis: On several chronic narcotics and follows up with pain management, for his neck and back pain pt is planning to have a procedure.  Anxiety: Takes Valium 5mg, PRN  A-fib: 2012 Was cardioverted, no longer in a-fib  Insomnia: Trazadone 100mg  Hx of DVT: Currently on Eliquis 5mg   BRANDI: Wears CPAP  Chronic constipation: On polyethylene Glycol 17g, manages well with this medication.     Specialists:  Cardiology: Dr Mcclelland  Pain Management: Michele Martinez  Ortho: Dr Dean  Podiatry: Dr Mandujano  Sleep: Dr Felice Bello  GS: Dr Thorne     Smoke: Quit in 1988  ETOH: None  Exercise: Never      Diabetes  He presents for his follow-up diabetic visit. He has type 2 diabetes mellitus. No MedicAlert identification noted. The initial diagnosis of diabetes was made 12 Years ago. His disease course has been stable. Pertinent negatives for hypoglycemia include no confusion, dizziness, headaches, hunger, mood changes, nervousness/anxiousness, pallor, seizures, sleepiness, speech difficulty, sweats or tremors. Associated  symptoms include fatigue and polyuria. Pertinent negatives for diabetes include no blurred vision, no chest pain, no foot paresthesias, no foot ulcerations, no polydipsia, no polyphagia, no visual change, no weakness and no weight loss. Hypoglycemia complications include hospitalization. Pertinent negatives for hypoglycemia complications include no blackouts, no nocturnal hypoglycemia, no required assistance and no required glucagon injection. Symptoms are stable. Diabetic complications include peripheral neuropathy. Pertinent negatives for diabetic complications include no autonomic neuropathy, CVA, heart disease, impotence, nephropathy, PVD or retinopathy. Risk factors for coronary artery disease include dyslipidemia, hypertension, obesity, tobacco exposure, diabetes mellitus and male sex. Current diabetic treatment includes oral agent (dual therapy). He is compliant with treatment most of the time. His weight is stable. He is following a diabetic, generally healthy and low salt diet. Meal planning includes avoidance of concentrated sweets and carbohydrate counting. He has not had a previous visit with a dietitian. He never participates in exercise. He monitors blood glucose at home 3-4 x per week. Blood glucose monitoring compliance is good. There is no change in his home blood glucose trend. He sees a podiatrist.Eye exam is current.   Constipation  This is a chronic problem. The current episode started more than 1 year ago. The problem is unchanged. His stool frequency is 1 time per day. The patient is on a high fiber diet. He Does not exercise regularly. There has Been adequate water intake. Pertinent negatives include no abdominal pain or weight loss.   Hyperlipidemia  This is a chronic problem. The current episode started more than 1 year ago. The problem is controlled. Exacerbating diseases include diabetes and obesity. Pertinent negatives include no chest pain or shortness of breath. Current  antihyperlipidemic treatment includes statins. Risk factors for coronary artery disease include a sedentary lifestyle, male sex, obesity, hypertension, dyslipidemia and diabetes mellitus.       Past Medical History:   Diagnosis Date    Allergy     Ankle pain     left    Anticoagulant long-term use     aspirin    Anxiety     Atrial fibrillation     Back pain     Bruises easily     Cataract     Clotting disorder     left leg    Colon polyp     Diabetes mellitus     Diabetes mellitus, type 2     Hay fever     Hyperlipidemia     Hypertension     Left hip pain 2021    Seeing Dr. Beard for hip pain     Obese     BRANDI on CPAP      Social History     Socioeconomic History    Marital status:    Tobacco Use    Smoking status: Former     Types: Cigarettes     Quit date: 2006     Years since quittin.5    Smokeless tobacco: Never    Tobacco comments:     ex smoker    Substance and Sexual Activity    Alcohol use: Yes     Comment: rarely    Drug use: No    Sexual activity: Yes     Partners: Female     Social Determinants of Health     Financial Resource Strain: Low Risk     Difficulty of Paying Living Expenses: Not hard at all   Food Insecurity: No Food Insecurity    Worried About Running Out of Food in the Last Year: Never true    Ran Out of Food in the Last Year: Never true   Transportation Needs: No Transportation Needs    Lack of Transportation (Medical): No    Lack of Transportation (Non-Medical): No   Physical Activity: Unknown    Days of Exercise per Week: Patient refused    Minutes of Exercise per Session: 0 min   Stress: No Stress Concern Present    Feeling of Stress : Not at all   Social Connections: Unknown    Frequency of Communication with Friends and Family: Once a week    Frequency of Social Gatherings with Friends and Family: Once a week    Active Member of Clubs or Organizations: Patient refused    Attends Club or Organization Meetings: Patient refused    Marital Status:     Housing Stability: Low Risk     Unable to Pay for Housing in the Last Year: No    Number of Places Lived in the Last Year: 1    Unstable Housing in the Last Year: No     Past Surgical History:   Procedure Laterality Date    ABDOMINAL SURGERY      origunal surg 1986-mult surgeries since    CARPAL TUNNEL RELEASE      right wrist 2009-left wrist 2010    COLON SURGERY      partial colon removal    COLONOSCOPY  11/26/2018    Dr. Salazar; normal terminal ileum; polyps removed from ascending colon and hepatic flexure; pan diverticulosis; small internal hemorrhoids; repeat in 5 years; Bx: fragments of an adenomatous polyp with mild surface glandular dysplasia and associated chronic active inflammation, no evidence of high-grade dysplasia or malignancy    COLOSTOMY  1986    colostomy reversal  1986    EYE SURGERY      hay fever      HERNIA REPAIR  1949    REPAIR OF TENDON OF LOWER EXTREMITY Left 6/24/2020    Procedure: REPAIR, TENDON, LOWER EXTREMITY;  Surgeon: Justin Mandujano DPM;  [place] [facility] [place] [facility];  Service: Podiatry;  Laterality: Left;  ARTHEX NOTIFIED IN CASE HE WANTS ANCHOR    TOE SURGERY Left 2017    replaced a joint     TONSILLECTOMY  1947    TRANSFORAMINAL EPIDURAL INJECTION OF STEROID      x 4    TRANSFORAMINAL EPIDURAL INJECTION OF STEROID Bilateral 11/13/2019    Procedure: Injection,steroid,epidural,transforaminal approach;  Surgeon: Grant Wright MD;  [place] [facility] OR;  Service: Pain Management;  Laterality: Bilateral;  L4-5, L5-S1    TRANSFORAMINAL EPIDURAL INJECTION OF STEROID Bilateral 3/9/2021    Procedure: Injection,steroid,epidural,transforaminal approach;  Surgeon: Grant Wright MD;  [place] [facility] OR;  Service: Pain Management;  Laterality: Bilateral;  L4-5, L5-S1    TRANSFORAMINAL EPIDURAL INJECTION OF STEROID Bilateral 5/25/2021    Procedure: Injection,steroid,epidural,transforaminal approach;  Surgeon: Grant Wright MD;  [place] [facility] OR;  Service: Pain Management;  Laterality: Bilateral;   L4-L5,L5,S1    TRANSFORAMINAL EPIDURAL INJECTION OF STEROID Bilateral 12/14/2021    Procedure: Injection,steroid,epidural,transforaminal approach Bilateral L4-5, L5-S1;  Surgeon: Grant Wright MD;  [place] [facility];  Service: Pain Management;  Laterality: Bilateral;     Family History   Problem Relation Age of Onset    Heart disease Mother     Melanoma Neg Hx     Psoriasis Neg Hx     Lupus Neg Hx     Eczema Neg Hx      Current Outpatient Medications   Medication Sig Dispense Refill    apixaban (ELIQUIS) 5 mg Tab Take 1 tablet (5 mg total) by mouth 2 (two) times daily. 180 tablet 3    aspirin 81 MG Chew Take 1 tablet (81 mg total) by mouth once daily. 100 tablet 1    atorvastatin (LIPITOR) 10 MG tablet nightly (Patient taking differently: Take 10 mg by mouth every evening. nightly) 90 tablet 3    azelastine (ASTELIN) 137 mcg (0.1 %) nasal spray 2 sprays (274 mcg total) by Nasal route 2 (two) times daily. 30 mL 5    fexofenadine (ALLEGRA) 180 MG tablet Take 1 tablet (180 mg total) by mouth once daily. 90 tablet 3    gabapentin (NEURONTIN) 300 MG capsule Take 1 capsule (300 mg total) by mouth 3 (three) times daily. 270 capsule 1    HYDROcodone-acetaminophen (NORCO)  mg per tablet Take 1 tablet by mouth every 6 (six) hours as needed for Pain. 30 tablet 0    JARDIANCE 10 mg tablet Take 1 tablet (10 mg total) by mouth once daily. 90 tablet 1    lisinopriL (PRINIVIL,ZESTRIL) 20 MG tablet Take 1 tablet (20 mg total) by mouth once daily. 90 tablet 3    metoprolol tartrate (LOPRESSOR) 25 MG tablet Take 1 tablet (25 mg total) by mouth 2 (two) times daily. 180 tablet 3    montelukast (SINGULAIR) 10 mg tablet Take 1 tablet (10 mg total) by mouth every evening. 90 tablet 3    SITagliptin (JANUVIA) 100 MG Tab Take 1 tablet (100 mg total) by mouth once daily. 90 tablet 3    traZODone (DESYREL) 100 MG tablet Take 1 tablet (100 mg total) by mouth every evening. 90 tablet 3    blood sugar diagnostic (BLOOD GLUCOSE TEST) Strp 1  strip by Misc.(Non-Drug; Combo Route) route 2 (two) times a day. FREESTYLE LITE BG TEST STRIPS. current use of insulin  - Primary ICD-10-CM: E11.9 200 each 2    cholecalciferol, vitamin D3, 1,250 mcg (50,000 unit) capsule Take 50,000 Units by mouth every Sunday.      FREESTYLE LANCETS 28 gauge lancets Inject 1 lancet into the skin 3 (three) times daily. 100 each 3    furosemide (LASIX) 20 MG tablet Take 1 tablet (20 mg total) by mouth every Mon, Wed, Fri. 45 tablet 3    polyethylene glycol (GLYCOLAX) 17 gram/dose powder Take 17 g by mouth once daily. 507 g 3    potassium chloride (MICRO-K) 10 MEQ CpSR Take 1 capsule (10 mEq total) by mouth every other day. 90 capsule 1     No current facility-administered medications for this visit.     Review of patient's allergies indicates:   Allergen Reactions    Ativan [lorazepam] Other (See Comments)     Mood alternating      Dilaudid [hydromorphone (bulk)] Other (See Comments)     Mood alternating      Morphine Other (See Comments)     Mood alternating      Adhesive tape-silicones     Hydromorphone        Review of Systems   Constitutional:  Positive for fatigue. Negative for activity change, appetite change, diaphoresis, unexpected weight change and weight loss.   HENT:  Negative for congestion, postnasal drip, rhinorrhea, sinus pressure and sinus pain.    Eyes:  Negative for blurred vision.   Respiratory:  Negative for cough, chest tightness, shortness of breath and wheezing.    Cardiovascular:  Positive for leg swelling. Negative for chest pain and palpitations.   Gastrointestinal:  Positive for constipation. Negative for abdominal distention, abdominal pain, anal bleeding and blood in stool.   Endocrine: Positive for polyuria. Negative for polydipsia and polyphagia.   Genitourinary:  Negative for dysuria, flank pain, frequency, hematuria, impotence and urgency.   Skin:  Negative for pallor.   Neurological:  Negative for dizziness, tremors, seizures, speech difficulty,  "weakness and headaches.   Psychiatric/Behavioral:  Negative for confusion. The patient is not nervous/anxious.         Blood pressure 134/76, pulse (!) 53, height 5' 7" (1.702 m), weight 105 kg (231 lb 8 oz), SpO2 98 %. Body mass index is 36.26 kg/m².   Objective:      Physical Exam  Vitals reviewed.   Constitutional:       General: He is not in acute distress.     Appearance: Normal appearance. He is obese. He is not ill-appearing or toxic-appearing.   HENT:      Head: Normocephalic and atraumatic.      Right Ear: Tympanic membrane normal.      Left Ear: Tympanic membrane normal.      Nose: Nose normal. No congestion or rhinorrhea.      Mouth/Throat:      Mouth: Mucous membranes are moist.      Pharynx: Oropharynx is clear. No oropharyngeal exudate or posterior oropharyngeal erythema.   Cardiovascular:      Rate and Rhythm: Regular rhythm. Bradycardia present.      Heart sounds: Normal heart sounds. No murmur heard.  Pulmonary:      Effort: Pulmonary effort is normal.      Breath sounds: Normal breath sounds.   Skin:     General: Skin is warm and dry.      Capillary Refill: Capillary refill takes less than 2 seconds.   Neurological:      Mental Status: He is alert and oriented to person, place, and time.           Assessment:       1. Familial hypercholesterolemia    2. Controlled type 2 diabetes mellitus without complication, without long-term current use of insulin    3. Needs flu shot    4. Chronic idiopathic constipation    5. Essential hypertension         Plan:           Familial hypercholesterolemia  -     Lipid Panel; Future; Expected date: 09/28/2022    Controlled type 2 diabetes mellitus without complication, without long-term current use of insulin  -     Discontinue: blood sugar diagnostic (BLOOD GLUCOSE TEST) Strp; 1 strip by Misc.(Non-Drug; Combo Route) route 2 (two) times a day. FREESTYLE LITE BG TEST STRIPS. current use of insulin  - Primary ICD-10-CM: E11.9  Dispense: 200 each; Refill: 2  -     " Hemoglobin A1C; Future; Expected date: 09/28/2022  -     Discontinue: blood sugar diagnostic (BLOOD GLUCOSE TEST) Strp; 1 strip by Misc.(Non-Drug; Combo Route) route 2 (two) times a day. FREESTYLE LITE BG TEST STRIPS. current use of insulin  - Primary ICD-10-CM: E11.9  Dispense: 200 each; Refill: 2  -     FREESTYLE LANCETS 28 gauge lancets; Inject 1 lancet into the skin 3 (three) times daily.  Dispense: 100 each; Refill: 3  -     blood sugar diagnostic (BLOOD GLUCOSE TEST) Strp; 1 strip by Misc.(Non-Drug; Combo Route) route 2 (two) times a day. FREESTYLE LITE BG TEST STRIPS. current use of insulin  - Primary ICD-10-CM: E11.9  Dispense: 200 each; Refill: 2    Needs flu shot  -     Influenza - Quadrivalent - High Dose (65+) (PF) (IM)    Chronic idiopathic constipation  -     polyethylene glycol (GLYCOLAX) 17 gram/dose powder; Take 17 g by mouth once daily.  Dispense: 507 g; Refill: 3    Essential hypertension  -     potassium chloride (MICRO-K) 10 MEQ CpSR; Take 1 capsule (10 mEq total) by mouth every other day.  Dispense: 90 capsule; Refill: 1  -     furosemide (LASIX) 20 MG tablet; Take 1 tablet (20 mg total) by mouth every Mon, Wed, Fri.  Dispense: 45 tablet; Refill: 3    Other orders  -     Discontinue: furosemide (LASIX) 20 MG tablet; Take 1 tablet (20 mg total) by mouth every Mon, Wed, Fri.  Dispense: 45 tablet; Refill: 3  -     Discontinue: FREESTYLE LANCETS 28 gauge lancets; Inject 1 lancet into the skin 3 (three) times daily.  Dispense: 100 each; Refill: 3  -     Discontinue: potassium chloride (MICRO-K) 10 MEQ CpSR; Take 1 capsule (10 mEq total) by mouth every other day.  Dispense: 90 capsule; Refill: 1  -     Discontinue: polyethylene glycol (GLYCOLAX) 17 gram/dose powder; Take 17 g by mouth once daily.  Dispense: 507 g; Refill: 3  -     Discontinue: FREESTYLE LANCETS 28 gauge lancets; Inject 1 lancet into the skin 3 (three) times daily.  Dispense: 100 each; Refill: 3  -     Discontinue: furosemide (LASIX)  20 MG tablet; Take 1 tablet (20 mg total) by mouth every Mon, Wed, Fri.  Dispense: 45 tablet; Refill: 3  -     Discontinue: polyethylene glycol (GLYCOLAX) 17 gram/dose powder; Take 17 g by mouth once daily.  Dispense: 507 g; Refill: 3  -     Discontinue: potassium chloride (MICRO-K) 10 MEQ CpSR; Take 1 capsule (10 mEq total) by mouth every other day.  Dispense: 90 capsule; Refill: 1

## 2022-09-30 ENCOUNTER — PATIENT MESSAGE (OUTPATIENT)
Dept: FAMILY MEDICINE | Facility: CLINIC | Age: 82
End: 2022-09-30

## 2022-09-30 DIAGNOSIS — I10 ESSENTIAL HYPERTENSION: ICD-10-CM

## 2022-09-30 DIAGNOSIS — K59.04 CHRONIC IDIOPATHIC CONSTIPATION: ICD-10-CM

## 2022-09-30 DIAGNOSIS — E11.9 CONTROLLED TYPE 2 DIABETES MELLITUS WITHOUT COMPLICATION, WITHOUT LONG-TERM CURRENT USE OF INSULIN: ICD-10-CM

## 2022-09-30 DIAGNOSIS — M54.16 LUMBAR RADICULITIS: ICD-10-CM

## 2022-10-03 DIAGNOSIS — M54.16 LUMBAR RADICULITIS: ICD-10-CM

## 2022-10-03 RX ORDER — POLYETHYLENE GLYCOL 3350 17 G/17G
17 POWDER, FOR SOLUTION ORAL DAILY
Qty: 507 G | Refills: 3 | Status: SHIPPED | OUTPATIENT
Start: 2022-10-03 | End: 2023-02-24 | Stop reason: SDUPTHER

## 2022-10-03 RX ORDER — LANCETS 28 GAUGE
1 EACH MISCELLANEOUS 3 TIMES DAILY
Qty: 100 EACH | Refills: 3 | Status: SHIPPED | OUTPATIENT
Start: 2022-10-03

## 2022-10-03 RX ORDER — POTASSIUM CHLORIDE 750 MG/1
10 CAPSULE, EXTENDED RELEASE ORAL EVERY OTHER DAY
Qty: 90 CAPSULE | Refills: 1 | Status: SHIPPED | OUTPATIENT
Start: 2022-10-03 | End: 2023-09-18 | Stop reason: SDUPTHER

## 2022-10-03 RX ORDER — HYDROCODONE BITARTRATE AND ACETAMINOPHEN 10; 325 MG/1; MG/1
1 TABLET ORAL EVERY 6 HOURS PRN
Qty: 30 TABLET | Refills: 0 | Status: SHIPPED | OUTPATIENT
Start: 2022-10-03 | End: 2022-10-17 | Stop reason: SDUPTHER

## 2022-10-03 RX ORDER — FUROSEMIDE 20 MG/1
20 TABLET ORAL
Qty: 45 TABLET | Refills: 3 | Status: SHIPPED | OUTPATIENT
Start: 2022-10-03 | End: 2022-11-13 | Stop reason: SDUPTHER

## 2022-10-04 NOTE — TELEPHONE ENCOUNTER
I have sent again, please call minerva and find out which pharmacy E-prescriptions should be sent to.     [facility] or [facility] pharmacy.

## 2022-10-17 ENCOUNTER — OFFICE VISIT (OUTPATIENT)
Dept: PAIN MEDICINE | Facility: CLINIC | Age: 82
End: 2022-10-17
Payer: MEDICARE

## 2022-10-17 ENCOUNTER — TELEPHONE (OUTPATIENT)
Dept: PAIN MEDICINE | Facility: CLINIC | Age: 82
End: 2022-10-17

## 2022-10-17 VITALS
WEIGHT: 231 LBS | SYSTOLIC BLOOD PRESSURE: 114 MMHG | BODY MASS INDEX: 36.26 KG/M2 | HEIGHT: 67 IN | HEART RATE: 58 BPM | DIASTOLIC BLOOD PRESSURE: 65 MMHG

## 2022-10-17 DIAGNOSIS — M48.061 SPINAL STENOSIS OF LUMBAR REGION, UNSPECIFIED WHETHER NEUROGENIC CLAUDICATION PRESENT: ICD-10-CM

## 2022-10-17 DIAGNOSIS — M54.16 LUMBAR RADICULITIS: Primary | ICD-10-CM

## 2022-10-17 DIAGNOSIS — M54.16 LUMBAR RADICULITIS: ICD-10-CM

## 2022-10-17 DIAGNOSIS — M51.36 DDD (DEGENERATIVE DISC DISEASE), LUMBAR: Primary | ICD-10-CM

## 2022-10-17 PROCEDURE — 99213 OFFICE O/P EST LOW 20 MIN: CPT | Mod: S$PBB,,, | Performed by: PHYSICIAN ASSISTANT

## 2022-10-17 PROCEDURE — 99999 PR PBB SHADOW E&M-EST. PATIENT-LVL II: ICD-10-PCS | Mod: PBBFAC,,, | Performed by: PHYSICIAN ASSISTANT

## 2022-10-17 PROCEDURE — 99999 PR PBB SHADOW E&M-EST. PATIENT-LVL II: CPT | Mod: PBBFAC,,, | Performed by: PHYSICIAN ASSISTANT

## 2022-10-17 PROCEDURE — 99212 OFFICE O/P EST SF 10 MIN: CPT | Mod: PBBFAC,PN | Performed by: PHYSICIAN ASSISTANT

## 2022-10-17 PROCEDURE — 99213 PR OFFICE/OUTPT VISIT, EST, LEVL III, 20-29 MIN: ICD-10-PCS | Mod: S$PBB,,, | Performed by: PHYSICIAN ASSISTANT

## 2022-10-17 NOTE — TELEPHONE ENCOUNTER
Transforaminal Injection (Specify level and laterality) Cmt:              bilateral L4-5 and L5-S1             Left message for a return call

## 2022-10-17 NOTE — PROGRESS NOTES
Referring Physician: No ref. provider found    PCP: Garland Ocampo MD      CC:low back and left leg pain    Interval History:  Mr. Hollingsworth is a 82 y.o. male with a low back and leg pain who presents today for f/u and evaluation. Low back and bilateral LE pain has returned. Pain radiates down posterior thigh  the knee on left and right, also includes anterior calf on the left. Bilateral L4-5 and L5-S1 TF JOSE in January provided great relief, > 80% until 1 week ago.Continues to c/o unrelated pain at the top of his left foot.  He does not do stretches regularly. He denies any LE weakness or b/b changes. He takes Hydrocodone 7.5 mg sparingly prescribed by PCP.  In the past he was evaluated by [facility] and lumbar surgery was recommended. Pain today is rated  8/10.      HPI:   Yeyo Hollingsworth is a 82 y.o. male ho presents with lower back and left leg pain.  Pain is been present since 2010.  No recent traumatic incident.  His intermittent aching, throbbing, electric pain in his lower back.  Pain radiates down his left buttock into his left posterior thigh.  Pain worsens with prolonged standing, walking, getting up and coughing.  Pain improves with rest.  His tried physical therapy with minimal benefit.  He has undergone lumbar JOSE's with Dr. Rendon in the past with moderate benefit.  Most recent injection was performed in September 2016.  Pain has since returned.  He desires repeat injections with us.  He denies any weakness.  No bowel bladder changes.  He rates his pain 6/10 today.    ROS:  CONSTITUTIONAL: No fevers, chills, night sweats, wt. loss, appetite changes  SKIN: no rashes or itching  ENT: No headaches, head trauma, vision changes, or eye pain  LYMPH NODES: None noticed   CV: No chest pain, palpitations.   RESP: No shortness of breath, dyspnea on exertion, cough, wheezing, or hemoptysis  GI: No nausea, emesis, diarrhea, constipation, melena, hematochezia, pain.    : No dysuria, hematuria, urgency, or  frequency   HEME: No easy bruising, bleeding problems  PSYCHIATRIC: No depression, anxiety, psychosis, hallucinations.  NEURO: No seizures, memory loss, dizziness or difficulty sleeping  MSK: + history of present illness      Past Medical History:   Diagnosis Date    Allergy     Ankle pain     left    Anticoagulant long-term use     aspirin    Anxiety     Atrial fibrillation     Back pain     Bruises easily     Cataract     Clotting disorder     left leg    Colon polyp     Diabetes mellitus     Diabetes mellitus, type 2     Hay fever     Hyperlipidemia     Hypertension     Left hip pain 12/26/2021    Seeing Dr. Beard for hip pain     Obese     BRANDI on CPAP      Past Surgical History:   Procedure Laterality Date    ABDOMINAL SURGERY      origunal surg 1986-mult surgeries since    CARPAL TUNNEL RELEASE      right wrist 2009-left wrist 2010    COLON SURGERY      partial colon removal    COLONOSCOPY  11/26/2018    Dr. Salazar; normal terminal ileum; polyps removed from ascending colon and hepatic flexure; pan diverticulosis; small internal hemorrhoids; repeat in 5 years; Bx: fragments of an adenomatous polyp with mild surface glandular dysplasia and associated chronic active inflammation, no evidence of high-grade dysplasia or malignancy    COLOSTOMY  1986    colostomy reversal  1986    EYE SURGERY      hay fever      HERNIA REPAIR  1949    REPAIR OF TENDON OF LOWER EXTREMITY Left 6/24/2020    Procedure: REPAIR, TENDON, LOWER EXTREMITY;  Surgeon: Justin Mandujano DPM;  [place] [facility] OR;  Service: Podiatry;  Laterality: Left;  ARTHEX NOTIFIED IN CASE HE WANTS ANCHOR    TOE SURGERY Left 2017    replaced a joint     TONSILLECTOMY  1947    TRANSFORAMINAL EPIDURAL INJECTION OF STEROID      x 4    TRANSFORAMINAL EPIDURAL INJECTION OF STEROID Bilateral 11/13/2019    Procedure: Injection,steroid,epidural,transforaminal approach;  Surgeon: Grant Wright MD;  [place] [facility] OR;  Service: Pain Management;  Laterality:  Bilateral;  L4-5, L5-S1    TRANSFORAMINAL EPIDURAL INJECTION OF STEROID Bilateral 3/9/2021    Procedure: Injection,steroid,epidural,transforaminal approach;  Surgeon: Grant Wright MD;  [place] [facility] OR;  Service: Pain Management;  Laterality: Bilateral;  L4-5, L5-S1    TRANSFORAMINAL EPIDURAL INJECTION OF STEROID Bilateral 2021    Procedure: Injection,steroid,epidural,transforaminal approach;  Surgeon: Grant Wright MD;  [place] [facility] OR;  Service: Pain Management;  Laterality: Bilateral;  L4-L5,L5,S1    TRANSFORAMINAL EPIDURAL INJECTION OF STEROID Bilateral 2021    Procedure: Injection,steroid,epidural,transforaminal approach Bilateral L4-5, L5-S1;  Surgeon: Grant Wright MD;  [place] [facility] OR;  Service: Pain Management;  Laterality: Bilateral;     Family History   Problem Relation Age of Onset    Heart disease Mother     Melanoma Neg Hx     Psoriasis Neg Hx     Lupus Neg Hx     Eczema Neg Hx      Social History     Socioeconomic History    Marital status:    Tobacco Use    Smoking status: Former     Types: Cigarettes     Quit date: 2006     Years since quittin.6    Smokeless tobacco: Never    Tobacco comments:     ex smoker    Substance and Sexual Activity    Alcohol use: Yes     Comment: rarely    Drug use: No    Sexual activity: Yes     Partners: Female     Social Determinants of Health     Financial Resource Strain: Low Risk     Difficulty of Paying Living Expenses: Not hard at all   Food Insecurity: No Food Insecurity    Worried About Running Out of Food in the Last Year: Never true    Ran Out of Food in the Last Year: Never true   Transportation Needs: No Transportation Needs    Lack of Transportation (Medical): No    Lack of Transportation (Non-Medical): No   Physical Activity: Unknown    Days of Exercise per Week: Patient refused    Minutes of Exercise per Session: 0 min   Stress: No Stress Concern Present    Feeling of Stress : Not at all   Social Connections: Unknown     "Frequency of Communication with Friends and Family: Once a week    Frequency of Social Gatherings with Friends and Family: Once a week    Active Member of Clubs or Organizations: Patient refused    Attends Club or Organization Meetings: Patient refused    Marital Status:    Housing Stability: Low Risk     Unable to Pay for Housing in the Last Year: No    Number of Places Lived in the Last Year: 1    Unstable Housing in the Last Year: No         Medications/Allergies: See med card    Vitals:    10/17/22 1344   BP: 114/65   Pulse: (!) 58   Weight: 104.8 kg (231 lb)   Height: 5' 7" (1.702 m)   PainSc:   8   PainLoc: Back         Physical exam:    GENERAL: A and O x3, the patient appears well groomed and is in no acute distress.  Skin: No rashes or obvious lesions  HEENT: normocephalic, atraumatic  CARDIOVASCULAR:  RRR  LUNGS: non labored breathing  ABDOMEN: soft, nontender   UPPER EXTREMITIES: Normal alignment, normal range of motion, no atrophy, no skin changes,  hair growth and nail growth normal and equal bilaterally. No swelling. Tenderness to right AC joint  LOWER EXTREMITIES:  Normal alignment, normal range of motion, no atrophy, no skin changes,  hair growth and nail growth normal and equal bilaterally. No swelling, no tenderness.    CERVICAL SPINE:  Full ROM with pain on flexion and extension  Negative spurlings  Tenderness to palpation right cervical paraspinous muscles   LUMBAR SPINE  Lumbar spine: ROM is limited with flexion extension and oblique extension with moderate increased pain.    Caden's test causes no increased pain on either side.    Supine straight leg raise positive bilaterally   Internal and external rotation of the hip causes no increased pain on either side.  Myofascial exam: No tenderness to palpation across lumbar paraspinous muscles. Moderate tenderness of right thoracic Paraspinous muscles       MENTAL STATUS: normal orientation, speech, language, and fund of knowledge for social " situation.  Emotional state appropriate.    CRANIAL NERVES:  II:  PERRL bilaterally,   III,IV,VI: EOMI.    V:  Facial sensation equal bilaterally  VII:  Facial motor function normal.  VIII:  Hearing equal to finger rub bilaterally  IX/X: Gag normal, palate symmetric  XI:  Shoulder shrug equal, head turn equal  XII:  Tongue midline without fasciculations      MOTOR: Tone and bulk: normal bilateral upper and lower Strength: normal   Delt Bi Tri WE WF     R 5 5 5 5 5 5   L 5 5 5 5 5 5     IP ADD ABD Quad TA Gas HAM  R 5 5 5 5 5 5 5  L 5 5 5 5 5 5 5    SENSATION: Light touch and pinprick intact bilaterally  REFLEXES: normal, symmetric, nonbrisk.  Toes down, no clonus. No hoffmans.  GAIT: normal rise, base, steps, and arm swing.        Imaging:  Cervical CT [facility] 4/2019  Significant spinal central canal stenosis along with foraminal stenosis most significantly at the level of C5/C6    MRI lumbar spine 9/2017 ([facility])  Severe facet arthrosis and ligamentum flavum at L4-5 results in severe spinal canal stenosis. Possible impingement of the bilateral L5 nerve as well as bilateral L4 nerves.  There is a grade 1 anterolisthesis of L4 and L5.  Moderate to severe bilateral neuroforaminal L3-4.  This note was completed with dictation software and grammatical errors may exist.    Assessment:  Mr. Hollingsworth is a 82 y.o. male with low back and left leg pain  1. DDD (degenerative disc disease), lumbar    2. Spinal stenosis of lumbar region, unspecified whether neurogenic claudication present    3. Lumbar radiculitis        Plan:  1.  I have stressed the importance of physical activity and exercise to improve overall health. Home exercises provided for GTB previously  2. Schedule  bilateral L4-5 and L5-S1 TF JOSE. I have explained the risks, benefits, and alternatives of the procedure in detail. The patient voices understanding and all questions have been answered. The patient agrees to proceed as planned. Written Consent obtained.   3.  F/u s/p procedure

## 2022-10-17 NOTE — H&P (VIEW-ONLY)
Referring Physician: No ref. provider found    PCP: Garland Ocampo MD      CC:low back and left leg pain    Interval History:  Mr. Hollingsworth is a 82 y.o. male with a low back and leg pain who presents today for f/u and evaluation. Low back and bilateral LE pain has returned. Pain radiates down posterior thigh  the knee on left and right, also includes anterior calf on the left. Bilateral L4-5 and L5-S1 TF JOSE in January provided great relief, > 80% until 1 week ago.Continues to c/o unrelated pain at the top of his left foot.  He does not do stretches regularly. He denies any LE weakness or b/b changes. He takes Hydrocodone 7.5 mg sparingly prescribed by PCP.  In the past he was evaluated by [facility] and lumbar surgery was recommended. Pain today is rated  8/10.      HPI:   Yeyo Hollingsworth is a 82 y.o. male ho presents with lower back and left leg pain.  Pain is been present since 2010.  No recent traumatic incident.  His intermittent aching, throbbing, electric pain in his lower back.  Pain radiates down his left buttock into his left posterior thigh.  Pain worsens with prolonged standing, walking, getting up and coughing.  Pain improves with rest.  His tried physical therapy with minimal benefit.  He has undergone lumbar JOSE's with Dr. Rendon in the past with moderate benefit.  Most recent injection was performed in September 2016.  Pain has since returned.  He desires repeat injections with us.  He denies any weakness.  No bowel bladder changes.  He rates his pain 6/10 today.    ROS:  CONSTITUTIONAL: No fevers, chills, night sweats, wt. loss, appetite changes  SKIN: no rashes or itching  ENT: No headaches, head trauma, vision changes, or eye pain  LYMPH NODES: None noticed   CV: No chest pain, palpitations.   RESP: No shortness of breath, dyspnea on exertion, cough, wheezing, or hemoptysis  GI: No nausea, emesis, diarrhea, constipation, melena, hematochezia, pain.    : No dysuria, hematuria, urgency, or  frequency   HEME: No easy bruising, bleeding problems  PSYCHIATRIC: No depression, anxiety, psychosis, hallucinations.  NEURO: No seizures, memory loss, dizziness or difficulty sleeping  MSK: + history of present illness      Past Medical History:   Diagnosis Date    Allergy     Ankle pain     left    Anticoagulant long-term use     aspirin    Anxiety     Atrial fibrillation     Back pain     Bruises easily     Cataract     Clotting disorder     left leg    Colon polyp     Diabetes mellitus     Diabetes mellitus, type 2     Hay fever     Hyperlipidemia     Hypertension     Left hip pain 12/26/2021    Seeing Dr. Beard for hip pain     Obese     BRANDI on CPAP      Past Surgical History:   Procedure Laterality Date    ABDOMINAL SURGERY      origunal surg 1986-mult surgeries since    CARPAL TUNNEL RELEASE      right wrist 2009-left wrist 2010    COLON SURGERY      partial colon removal    COLONOSCOPY  11/26/2018    Dr. Salazar; normal terminal ileum; polyps removed from ascending colon and hepatic flexure; pan diverticulosis; small internal hemorrhoids; repeat in 5 years; Bx: fragments of an adenomatous polyp with mild surface glandular dysplasia and associated chronic active inflammation, no evidence of high-grade dysplasia or malignancy    COLOSTOMY  1986    colostomy reversal  1986    EYE SURGERY      hay fever      HERNIA REPAIR  1949    REPAIR OF TENDON OF LOWER EXTREMITY Left 6/24/2020    Procedure: REPAIR, TENDON, LOWER EXTREMITY;  Surgeon: Justin Mandujano DPM;  [place] [facility] OR;  Service: Podiatry;  Laterality: Left;  ARTHEX NOTIFIED IN CASE HE WANTS ANCHOR    TOE SURGERY Left 2017    replaced a joint     TONSILLECTOMY  1947    TRANSFORAMINAL EPIDURAL INJECTION OF STEROID      x 4    TRANSFORAMINAL EPIDURAL INJECTION OF STEROID Bilateral 11/13/2019    Procedure: Injection,steroid,epidural,transforaminal approach;  Surgeon: Grant Wright MD;  [place] [facility] OR;  Service: Pain Management;  Laterality:  Bilateral;  L4-5, L5-S1    TRANSFORAMINAL EPIDURAL INJECTION OF STEROID Bilateral 3/9/2021    Procedure: Injection,steroid,epidural,transforaminal approach;  Surgeon: Grant Wright MD;  [place] [facility] OR;  Service: Pain Management;  Laterality: Bilateral;  L4-5, L5-S1    TRANSFORAMINAL EPIDURAL INJECTION OF STEROID Bilateral 2021    Procedure: Injection,steroid,epidural,transforaminal approach;  Surgeon: Grant Wright MD;  [place] [facility] OR;  Service: Pain Management;  Laterality: Bilateral;  L4-L5,L5,S1    TRANSFORAMINAL EPIDURAL INJECTION OF STEROID Bilateral 2021    Procedure: Injection,steroid,epidural,transforaminal approach Bilateral L4-5, L5-S1;  Surgeon: Grant Wright MD;  [place] [facility] OR;  Service: Pain Management;  Laterality: Bilateral;     Family History   Problem Relation Age of Onset    Heart disease Mother     Melanoma Neg Hx     Psoriasis Neg Hx     Lupus Neg Hx     Eczema Neg Hx      Social History     Socioeconomic History    Marital status:    Tobacco Use    Smoking status: Former     Types: Cigarettes     Quit date: 2006     Years since quittin.6    Smokeless tobacco: Never    Tobacco comments:     ex smoker    Substance and Sexual Activity    Alcohol use: Yes     Comment: rarely    Drug use: No    Sexual activity: Yes     Partners: Female     Social Determinants of Health     Financial Resource Strain: Low Risk     Difficulty of Paying Living Expenses: Not hard at all   Food Insecurity: No Food Insecurity    Worried About Running Out of Food in the Last Year: Never true    Ran Out of Food in the Last Year: Never true   Transportation Needs: No Transportation Needs    Lack of Transportation (Medical): No    Lack of Transportation (Non-Medical): No   Physical Activity: Unknown    Days of Exercise per Week: Patient refused    Minutes of Exercise per Session: 0 min   Stress: No Stress Concern Present    Feeling of Stress : Not at all   Social Connections: Unknown     "Frequency of Communication with Friends and Family: Once a week    Frequency of Social Gatherings with Friends and Family: Once a week    Active Member of Clubs or Organizations: Patient refused    Attends Club or Organization Meetings: Patient refused    Marital Status:    Housing Stability: Low Risk     Unable to Pay for Housing in the Last Year: No    Number of Places Lived in the Last Year: 1    Unstable Housing in the Last Year: No         Medications/Allergies: See med card    Vitals:    10/17/22 1344   BP: 114/65   Pulse: (!) 58   Weight: 104.8 kg (231 lb)   Height: 5' 7" (1.702 m)   PainSc:   8   PainLoc: Back         Physical exam:    GENERAL: A and O x3, the patient appears well groomed and is in no acute distress.  Skin: No rashes or obvious lesions  HEENT: normocephalic, atraumatic  CARDIOVASCULAR:  RRR  LUNGS: non labored breathing  ABDOMEN: soft, nontender   UPPER EXTREMITIES: Normal alignment, normal range of motion, no atrophy, no skin changes,  hair growth and nail growth normal and equal bilaterally. No swelling. Tenderness to right AC joint  LOWER EXTREMITIES:  Normal alignment, normal range of motion, no atrophy, no skin changes,  hair growth and nail growth normal and equal bilaterally. No swelling, no tenderness.    CERVICAL SPINE:  Full ROM with pain on flexion and extension  Negative spurlings  Tenderness to palpation right cervical paraspinous muscles   LUMBAR SPINE  Lumbar spine: ROM is limited with flexion extension and oblique extension with moderate increased pain.    Caden's test causes no increased pain on either side.    Supine straight leg raise positive bilaterally   Internal and external rotation of the hip causes no increased pain on either side.  Myofascial exam: No tenderness to palpation across lumbar paraspinous muscles. Moderate tenderness of right thoracic Paraspinous muscles       MENTAL STATUS: normal orientation, speech, language, and fund of knowledge for social " situation.  Emotional state appropriate.    CRANIAL NERVES:  II:  PERRL bilaterally,   III,IV,VI: EOMI.    V:  Facial sensation equal bilaterally  VII:  Facial motor function normal.  VIII:  Hearing equal to finger rub bilaterally  IX/X: Gag normal, palate symmetric  XI:  Shoulder shrug equal, head turn equal  XII:  Tongue midline without fasciculations      MOTOR: Tone and bulk: normal bilateral upper and lower Strength: normal   Delt Bi Tri WE WF     R 5 5 5 5 5 5   L 5 5 5 5 5 5     IP ADD ABD Quad TA Gas HAM  R 5 5 5 5 5 5 5  L 5 5 5 5 5 5 5    SENSATION: Light touch and pinprick intact bilaterally  REFLEXES: normal, symmetric, nonbrisk.  Toes down, no clonus. No hoffmans.  GAIT: normal rise, base, steps, and arm swing.        Imaging:  Cervical CT [facility] 4/2019  Significant spinal central canal stenosis along with foraminal stenosis most significantly at the level of C5/C6    MRI lumbar spine 9/2017 ([facility])  Severe facet arthrosis and ligamentum flavum at L4-5 results in severe spinal canal stenosis. Possible impingement of the bilateral L5 nerve as well as bilateral L4 nerves.  There is a grade 1 anterolisthesis of L4 and L5.  Moderate to severe bilateral neuroforaminal L3-4.  This note was completed with dictation software and grammatical errors may exist.    Assessment:  Mr. Hollingsworth is a 82 y.o. male with low back and left leg pain  1. DDD (degenerative disc disease), lumbar    2. Spinal stenosis of lumbar region, unspecified whether neurogenic claudication present    3. Lumbar radiculitis        Plan:  1.  I have stressed the importance of physical activity and exercise to improve overall health. Home exercises provided for GTB previously  2. Schedule  bilateral L4-5 and L5-S1 TF JOSE. I have explained the risks, benefits, and alternatives of the procedure in detail. The patient voices understanding and all questions have been answered. The patient agrees to proceed as planned. Written Consent obtained.   3.  F/u s/p procedure

## 2022-10-20 NOTE — TELEPHONE ENCOUNTER
Scheduled for 11/04      Dr Ocampo,   Pt is on Eliquis can he hold x 3 days prior to JOSE procedure?

## 2022-10-21 NOTE — TELEPHONE ENCOUNTER
Pt has been out of A-fib for his last several visits He can stop his anticoagulation 3 days prior to a procedure.

## 2022-10-29 DIAGNOSIS — M54.16 LUMBAR RADICULITIS: ICD-10-CM

## 2022-11-02 RX ORDER — HYDROCODONE BITARTRATE AND ACETAMINOPHEN 10; 325 MG/1; MG/1
1 TABLET ORAL EVERY 6 HOURS PRN
Qty: 30 TABLET | Refills: 0 | Status: SHIPPED | OUTPATIENT
Start: 2022-11-02 | End: 2022-11-19 | Stop reason: SDUPTHER

## 2022-11-04 ENCOUNTER — HOSPITAL ENCOUNTER (OUTPATIENT)
Facility: AMBULARY SURGERY CENTER | Age: 82
Discharge: HOME | End: 2022-11-04
Attending: ANESTHESIOLOGY | Admitting: ANESTHESIOLOGY
Payer: MEDICARE

## 2022-11-04 VITALS
BODY MASS INDEX: 36.19 KG/M2 | HEART RATE: 50 BPM | WEIGHT: 231.06 LBS | OXYGEN SATURATION: 97 % | RESPIRATION RATE: 18 BRPM | TEMPERATURE: 98 F | SYSTOLIC BLOOD PRESSURE: 114 MMHG | DIASTOLIC BLOOD PRESSURE: 56 MMHG

## 2022-11-04 DIAGNOSIS — M54.16 LUMBAR RADICULITIS: Primary | ICD-10-CM

## 2022-11-04 LAB — POCT GLUCOSE: 102 MG/DL (ref 70–110)

## 2022-11-04 PROCEDURE — 64484 NJX AA&/STRD TFRM EPI L/S EA: CPT | Mod: LT | Performed by: ANESTHESIOLOGY

## 2022-11-04 PROCEDURE — 64484 NJX AA&/STRD TFRM EPI L/S EA: CPT | Mod: 50,,, | Performed by: ANESTHESIOLOGY

## 2022-11-04 PROCEDURE — 64484 PRA INJECT ANES/STEROID FORAMEN LUMBAR/SACRAL W IMG GUIDE ,EA ADD LEVEL: ICD-10-PCS | Mod: 50,,, | Performed by: ANESTHESIOLOGY

## 2022-11-04 PROCEDURE — 64483 NJX AA&/STRD TFRM EPI L/S 1: CPT | Mod: LT | Performed by: ANESTHESIOLOGY

## 2022-11-04 PROCEDURE — 64483 PR EPIDURAL INJ, ANES/STEROID, TRANSFORAMINAL, LUMB/SACR, SNGL LEVL: ICD-10-PCS | Mod: 50,,, | Performed by: ANESTHESIOLOGY

## 2022-11-04 PROCEDURE — 64483 NJX AA&/STRD TFRM EPI L/S 1: CPT | Mod: 50,,, | Performed by: ANESTHESIOLOGY

## 2022-11-04 RX ORDER — FENTANYL CITRATE 50 UG/ML
INJECTION, SOLUTION INTRAMUSCULAR; INTRAVENOUS
Status: DISCONTINUED | OUTPATIENT
Start: 2022-11-04 | End: 2022-11-04 | Stop reason: HOSPADM

## 2022-11-04 RX ORDER — MIDAZOLAM HYDROCHLORIDE 1 MG/ML
INJECTION INTRAMUSCULAR; INTRAVENOUS
Status: DISCONTINUED | OUTPATIENT
Start: 2022-11-04 | End: 2022-11-04 | Stop reason: HOSPADM

## 2022-11-04 RX ORDER — LIDOCAINE HYDROCHLORIDE 10 MG/ML
INJECTION, SOLUTION EPIDURAL; INFILTRATION; INTRACAUDAL; PERINEURAL
Status: DISCONTINUED | OUTPATIENT
Start: 2022-11-04 | End: 2022-11-04 | Stop reason: HOSPADM

## 2022-11-04 RX ORDER — SODIUM CHLORIDE, SODIUM LACTATE, POTASSIUM CHLORIDE, CALCIUM CHLORIDE 600; 310; 30; 20 MG/100ML; MG/100ML; MG/100ML; MG/100ML
INJECTION, SOLUTION INTRAVENOUS ONCE AS NEEDED
Status: ACTIVE | OUTPATIENT
Start: 2022-11-04 | End: 2034-04-02

## 2022-11-04 RX ORDER — BUPIVACAINE HYDROCHLORIDE 2.5 MG/ML
INJECTION, SOLUTION EPIDURAL; INFILTRATION; INTRACAUDAL
Status: DISCONTINUED | OUTPATIENT
Start: 2022-11-04 | End: 2022-11-04 | Stop reason: HOSPADM

## 2022-11-04 RX ORDER — DEXAMETHASONE SODIUM PHOSPHATE 10 MG/ML
INJECTION INTRAMUSCULAR; INTRAVENOUS
Status: DISCONTINUED | OUTPATIENT
Start: 2022-11-04 | End: 2022-11-04 | Stop reason: HOSPADM

## 2022-11-04 NOTE — OP NOTE
PROCEDURE DATE: 11/4/2022    PROCEDURE: Bilateral L4-5 and L5-S1 transforaminal epidural steroid injection under fluoroscopy    DIAGNOSIS: Lumbar radiculitis    Post op diagnosis: Same    PHYSICIAN: Grant Wright MD    MEDICATIONS INJECTED:  Dexamethasone 2.5mg  and 0.5ml 0.25% bupivicaine at each nerve root.     LOCAL ANESTHETIC INJECTED:  Lidocaine 1%. 1 ml per site.    SEDATION MEDICATIONS: RN IV sedation    ESTIMATED BLOOD LOSS:  None    COMPLICATIONS:  None    TECHNIQUE:   A time-out was taken to identify patient and procedure side prior to starting the procedure. The patient was placed in a prone position, prepped and draped in the usual sterile fashion using ChloraPrep and sterile towels.  The area to be injected was determined under fluoroscopic guidance in AP and oblique view.  Local anesthetic was given by raising a wheal and going down to the hub of a 25-gauge 1.5 inch needle.  In oblique view, a 3.5 inch 22-gauge bent-tip spinal needle was introduced towards 6 oclock position of the pedicle of each above named nerve root level.  The needle was walked medially then hinged into the neural foramen and position was confirmed in AP and lateral views.  1ml contrast dye was injected to confirm appropriate placement and that there was no vascular uptake.  After negative aspiration for blood or CSF, the medication was then injected. This was performed at the bilateral L4-5 and L5-S1 level(s). The patient tolerated the procedure well.    The patient was monitored after the procedure.  Patient was given post procedure and discharge instructions to follow at home. The patient was discharged in a stable condition.

## 2022-11-04 NOTE — PLAN OF CARE
Pt received from OR via stretcher. Pt sleeping, but opens eyes to speech. BP down from pre op and procedure. IVF rate open wide. BP continues to drop, lowest seen 80's/50's. Pt talking when spoken to, but appears drowsy. Color good. Placed on oxygen at 2l/min via NC. Pt placed in trendelenburg and IVF placed on taller poll to increase gravity drip rate. Pt given repeated verbal stimulation and within 15 minutes of IVF and trendelenburg placement, pt's BP returned to 110's/60's and higher. Pt more awake, talking in conversation. Oxygen removed, pt offered and accepted juice to drink, sat up to semi chavez position. Maintained BP well. Once VS stabilized, spouse Nayeli brought to bedside. Reviewed discharge instructions with pt and spouse. When IV finished, removed and pt dressed while sitting on side of bed. Pt escorted to car via w/c without complaints of pain, nausea, dizziness.

## 2022-11-04 NOTE — DISCHARGE SUMMARY
[facility]  Discharge Note  Short Stay    Procedure(s) (LRB):  Injection,steroid,epidural,transforaminal approach (Bilateral)      OUTCOME: Patient tolerated treatment/procedure well without complication and is now ready for discharge.    DISPOSITION: Home or Self Care    FINAL DIAGNOSIS:  <principal problem not specified>    FOLLOWUP: In clinic    DISCHARGE INSTRUCTIONS:    Discharge Procedure Orders   Notify your health care provider if you experience any of the following:  temperature >100.4     Notify your health care provider if you experience any of the following:  severe uncontrolled pain     Notify your health care provider if you experience any of the following:  redness, tenderness, or signs of infection (pain, swelling, redness, odor or green/yellow discharge around incision site)     Activity as tolerated        TIME SPENT ON DISCHARGE: 30 minutes

## 2022-11-23 ENCOUNTER — HOSPITAL ENCOUNTER (EMERGENCY)
Facility: HOSPITAL | Age: 82
Discharge: HOME | End: 2022-11-23
Attending: EMERGENCY MEDICINE
Payer: MEDICARE

## 2022-11-23 VITALS
SYSTOLIC BLOOD PRESSURE: 137 MMHG | HEIGHT: 67 IN | RESPIRATION RATE: 20 BRPM | TEMPERATURE: 98 F | HEART RATE: 57 BPM | DIASTOLIC BLOOD PRESSURE: 87 MMHG | WEIGHT: 227 LBS | BODY MASS INDEX: 35.63 KG/M2 | OXYGEN SATURATION: 97 %

## 2022-11-23 DIAGNOSIS — R10.9 RIGHT FLANK PAIN: Primary | ICD-10-CM

## 2022-11-23 LAB
ALBUMIN SERPL BCP-MCNC: 3.7 G/DL (ref 3.5–5.2)
ALP SERPL-CCNC: 39 U/L (ref 55–135)
ALT SERPL W/O P-5'-P-CCNC: 22 U/L (ref 10–44)
ANION GAP SERPL CALC-SCNC: 7 MMOL/L (ref 8–16)
AST SERPL-CCNC: 16 U/L (ref 10–40)
BACTERIA #/AREA URNS HPF: NORMAL /HPF
BASOPHILS # BLD AUTO: 0.02 K/UL (ref 0–0.2)
BASOPHILS NFR BLD: 0.4 % (ref 0–1.9)
BILIRUB SERPL-MCNC: 0.6 MG/DL (ref 0.1–1)
BILIRUB UR QL STRIP: NEGATIVE
BUN SERPL-MCNC: 25 MG/DL (ref 8–23)
CALCIUM SERPL-MCNC: 9.2 MG/DL (ref 8.7–10.5)
CHLORIDE SERPL-SCNC: 106 MMOL/L (ref 95–110)
CLARITY UR: CLEAR
CO2 SERPL-SCNC: 31 MMOL/L (ref 23–29)
COLOR UR: YELLOW
CREAT SERPL-MCNC: 1.2 MG/DL (ref 0.5–1.4)
DIFFERENTIAL METHOD: ABNORMAL
EOSINOPHIL # BLD AUTO: 0.2 K/UL (ref 0–0.5)
EOSINOPHIL NFR BLD: 4.4 % (ref 0–8)
ERYTHROCYTE [DISTWIDTH] IN BLOOD BY AUTOMATED COUNT: 13.7 % (ref 11.5–14.5)
EST. GFR  (NO RACE VARIABLE): >60 ML/MIN/1.73 M^2
GLUCOSE SERPL-MCNC: 159 MG/DL (ref 70–110)
GLUCOSE UR QL STRIP: ABNORMAL
HCT VFR BLD AUTO: 45.6 % (ref 40–54)
HCV AB SERPL QL IA: NORMAL
HGB BLD-MCNC: 14.5 G/DL (ref 14–18)
HGB UR QL STRIP: NEGATIVE
HIV 1+2 AB+HIV1 P24 AG SERPL QL IA: NORMAL
IMM GRANULOCYTES # BLD AUTO: 0.01 K/UL (ref 0–0.04)
IMM GRANULOCYTES NFR BLD AUTO: 0.2 % (ref 0–0.5)
KETONES UR QL STRIP: NEGATIVE
LEUKOCYTE ESTERASE UR QL STRIP: NEGATIVE
LIPASE SERPL-CCNC: 16 U/L (ref 4–60)
LYMPHOCYTES # BLD AUTO: 1.3 K/UL (ref 1–4.8)
LYMPHOCYTES NFR BLD: 26.4 % (ref 18–48)
MCH RBC QN AUTO: 30.6 PG (ref 27–31)
MCHC RBC AUTO-ENTMCNC: 31.8 G/DL (ref 32–36)
MCV RBC AUTO: 96 FL (ref 82–98)
MICROSCOPIC COMMENT: NORMAL
MONOCYTES # BLD AUTO: 0.3 K/UL (ref 0.3–1)
MONOCYTES NFR BLD: 6.8 % (ref 4–15)
NEUTROPHILS # BLD AUTO: 3.1 K/UL (ref 1.8–7.7)
NEUTROPHILS NFR BLD: 61.8 % (ref 38–73)
NITRITE UR QL STRIP: NEGATIVE
NRBC BLD-RTO: 0 /100 WBC
PH UR STRIP: 7 [PH] (ref 5–8)
PLATELET # BLD AUTO: 173 K/UL (ref 150–450)
PMV BLD AUTO: 9.6 FL (ref 9.2–12.9)
POTASSIUM SERPL-SCNC: 4.4 MMOL/L (ref 3.5–5.1)
PROT SERPL-MCNC: 6.5 G/DL (ref 6–8.4)
PROT UR QL STRIP: NEGATIVE
RBC # BLD AUTO: 4.74 M/UL (ref 4.6–6.2)
RBC #/AREA URNS HPF: 1 /HPF (ref 0–4)
SODIUM SERPL-SCNC: 144 MMOL/L (ref 136–145)
SP GR UR STRIP: 1.01 (ref 1–1.03)
SQUAMOUS #/AREA URNS HPF: 0 /HPF
URN SPEC COLLECT METH UR: ABNORMAL
UROBILINOGEN UR STRIP-ACNC: 1 EU/DL
WBC # BLD AUTO: 5.03 K/UL (ref 3.9–12.7)
WBC #/AREA URNS HPF: 2 /HPF (ref 0–5)
YEAST URNS QL MICRO: NORMAL

## 2022-11-23 PROCEDURE — 85025 COMPLETE CBC W/AUTO DIFF WBC: CPT | Performed by: EMERGENCY MEDICINE

## 2022-11-23 PROCEDURE — 63600175 PHARM REV CODE 636 W HCPCS: Performed by: EMERGENCY MEDICINE

## 2022-11-23 PROCEDURE — 96372 THER/PROPH/DIAG INJ SC/IM: CPT | Performed by: EMERGENCY MEDICINE

## 2022-11-23 PROCEDURE — 36000 PLACE NEEDLE IN VEIN: CPT

## 2022-11-23 PROCEDURE — 99285 EMERGENCY DEPT VISIT HI MDM: CPT | Mod: 25

## 2022-11-23 PROCEDURE — 80053 COMPREHEN METABOLIC PANEL: CPT | Performed by: EMERGENCY MEDICINE

## 2022-11-23 PROCEDURE — 83690 ASSAY OF LIPASE: CPT | Performed by: EMERGENCY MEDICINE

## 2022-11-23 PROCEDURE — 87389 HIV-1 AG W/HIV-1&-2 AB AG IA: CPT | Performed by: EMERGENCY MEDICINE

## 2022-11-23 PROCEDURE — 81000 URINALYSIS NONAUTO W/SCOPE: CPT | Performed by: EMERGENCY MEDICINE

## 2022-11-23 PROCEDURE — 36415 COLL VENOUS BLD VENIPUNCTURE: CPT | Performed by: EMERGENCY MEDICINE

## 2022-11-23 PROCEDURE — 86803 HEPATITIS C AB TEST: CPT | Performed by: EMERGENCY MEDICINE

## 2022-11-23 RX ORDER — ORPHENADRINE CITRATE 100 MG/1
100 TABLET, EXTENDED RELEASE ORAL 2 TIMES DAILY
Qty: 20 TABLET | Refills: 0 | Status: SHIPPED | OUTPATIENT
Start: 2022-11-23 | End: 2022-12-02 | Stop reason: ALTCHOICE

## 2022-11-23 RX ORDER — ORPHENADRINE CITRATE 30 MG/ML
60 INJECTION INTRAMUSCULAR; INTRAVENOUS
Status: COMPLETED | OUTPATIENT
Start: 2022-11-23 | End: 2022-11-23

## 2022-11-23 RX ADMIN — ORPHENADRINE CITRATE 60 MG: 30 INJECTION INTRAMUSCULAR; INTRAVENOUS at 02:11

## 2022-11-23 NOTE — ED NOTES
Pt identifiers checked and accurate with Yeyo Hollingsworth     Pt presents to ED with complaints of left flank pain after fall on cruise ship 1 week ago. Pt reports right flank pain x 1 month, relieved with OTC remedies, worsened after most recent fall. Pt denies head trauma, N/V, and all other complaints.

## 2022-11-23 NOTE — ED PROVIDER NOTES
Encounter Date: 11/23/2022    SCRIBE #1 NOTE: I, Manuel Guerrero, am scribing for, and in the presence of,  Ney Crawford MD.     History     Chief Complaint   Patient presents with    right side pain     Fall one week ago, worse with movement     Time seen by provider: 2:09 PM on 11/23/2022    Yeyo Hollingsworth is a 82 y.o. male who presents to the ED with an onset of intermittent right flank pain that began a month ago. The patient states that when he is laying down or sitting the pain subsides, but when he ambulates or moves his torso the pain worsens. The patient denies fever, cough, congestion, nausea, vomiting, stomach or chest pain, dysuria, hematuria, or any other symptoms at this time. The patient takes tylenol and hydrocodone for chronic pain. PMHx of DM II, HTN, HLD, long term anticoagulant use, clotting disorder, atrial fibrillation (takes eliquis), obese, and back pain. PSHx of hernia repair and abdominal surgery.    The history is provided by the patient and the spouse.   Review of patient's allergies indicates:   Allergen Reactions    Ativan [lorazepam] Other (See Comments)     Mood alternating      Dilaudid [hydromorphone (bulk)] Other (See Comments)     Mood alternating      Morphine Other (See Comments)     Mood alternating      Adhesive tape-silicones     Hydromorphone      Past Medical History:   Diagnosis Date    Allergy     Ankle pain     left    Anticoagulant long-term use     aspirin    Anxiety     Atrial fibrillation     Back pain     Bruises easily     Cataract     Clotting disorder     left leg    Colon polyp     Diabetes mellitus     Diabetes mellitus, type 2     Hay fever     Hyperlipidemia     Hypertension     Left hip pain 12/26/2021    Seeing Dr. Beard for hip pain     Obese     BRANDI on CPAP      Past Surgical History:   Procedure Laterality Date    ABDOMINAL SURGERY      origunal surg 1986-mult surgeries since    CARPAL TUNNEL RELEASE      right wrist 2009-left wrist 2010    COLON  SURGERY      partial colon removal    COLONOSCOPY  11/26/2018    Dr. Salazar; normal terminal ileum; polyps removed from ascending colon and hepatic flexure; pan diverticulosis; small internal hemorrhoids; repeat in 5 years; Bx: fragments of an adenomatous polyp with mild surface glandular dysplasia and associated chronic active inflammation, no evidence of high-grade dysplasia or malignancy    COLOSTOMY  1986    colostomy reversal  1986    EYE SURGERY      hay fever      HERNIA REPAIR  1949    REPAIR OF TENDON OF LOWER EXTREMITY Left 6/24/2020    Procedure: REPAIR, TENDON, LOWER EXTREMITY;  Surgeon: Justin Mandujano DPM;  [place] [facility] OR;  Service: Podiatry;  Laterality: Left;  ARTHEX NOTIFIED IN CASE HE WANTS ANCHOR    TOE SURGERY Left 2017    replaced a joint     TONSILLECTOMY  1947    TRANSFORAMINAL EPIDURAL INJECTION OF STEROID      x 4    TRANSFORAMINAL EPIDURAL INJECTION OF STEROID Bilateral 11/13/2019    Procedure: Injection,steroid,epidural,transforaminal approach;  Surgeon: Grant Wright MD;  [place] [facility] OR;  Service: Pain Management;  Laterality: Bilateral;  L4-5, L5-S1    TRANSFORAMINAL EPIDURAL INJECTION OF STEROID Bilateral 3/9/2021    Procedure: Injection,steroid,epidural,transforaminal approach;  Surgeon: Grant Wright MD;  [place] [facility] OR;  Service: Pain Management;  Laterality: Bilateral;  L4-5, L5-S1    TRANSFORAMINAL EPIDURAL INJECTION OF STEROID Bilateral 5/25/2021    Procedure: Injection,steroid,epidural,transforaminal approach;  Surgeon: Grant Wright MD;  [place] [facility] OR;  Service: Pain Management;  Laterality: Bilateral;  L4-L5,L5,S1    TRANSFORAMINAL EPIDURAL INJECTION OF STEROID Bilateral 12/14/2021    Procedure: Injection,steroid,epidural,transforaminal approach Bilateral L4-5, L5-S1;  Surgeon: Grant Wright MD;  [place] [facility] OR;  Service: Pain Management;  Laterality: Bilateral;    TRANSFORAMINAL EPIDURAL INJECTION OF STEROID Bilateral 11/4/2022    Procedure:  Injection,steroid,epidural,transforaminal approach;  Surgeon: Grant Wright MD;  [place] [facility] OR;  Service: Pain Management;  Laterality: Bilateral;  L4-5 and L5-s1     Family History   Problem Relation Age of Onset    Heart disease Mother     Melanoma Neg Hx     Psoriasis Neg Hx     Lupus Neg Hx     Eczema Neg Hx      Social History     Tobacco Use    Smoking status: Former     Types: Cigarettes     Quit date: 2006     Years since quittin.7    Smokeless tobacco: Never    Tobacco comments:     ex smoker    Substance Use Topics    Alcohol use: Yes     Comment: rarely    Drug use: No     Review of Systems   Constitutional:  Negative for chills, diaphoresis, fatigue and fever.   HENT:  Negative for congestion and rhinorrhea.    Respiratory:  Negative for cough and shortness of breath.    Cardiovascular:  Negative for chest pain.   Gastrointestinal:  Negative for abdominal pain, diarrhea, nausea and vomiting.   Genitourinary:  Positive for flank pain (Right flank). Negative for dysuria, frequency, hematuria and testicular pain.   Musculoskeletal:  Negative for gait problem.   Skin:  Negative for color change.   Neurological:  Negative for dizziness and numbness.   Psychiatric/Behavioral:  Negative for agitation and confusion.      Physical Exam     Initial Vitals   BP Pulse Resp Temp SpO2   22 1357 22 1354 22 1354 22 1354 22 1354   (!) 142/60 61 18 97.8 °F (36.6 °C) 100 %      MAP       --                Physical Exam    Nursing note and vitals reviewed.  Constitutional: He appears well-developed and well-nourished. He is not diaphoretic. No distress.   HENT:   Head: Normocephalic and atraumatic.   Eyes: EOM are normal. Pupils are equal, round, and reactive to light.   Neck: Neck supple.   Normal range of motion.  Cardiovascular:  Normal rate, regular rhythm, normal heart sounds and intact distal pulses.     Exam reveals no gallop and no friction rub.       No murmur  heard.  Pulmonary/Chest: Breath sounds normal. No respiratory distress. He has no wheezes. He has no rhonchi. He has no rales.   Abdominal: Abdomen is soft. Bowel sounds are normal. There is no abdominal tenderness. There is no rebound and no guarding.   Musculoskeletal:         General: Normal range of motion.      Cervical back: Normal range of motion and neck supple.      Comments: Right flank tenderness.  Large area of ecchymosis and hematoma noted to the left hip extending down onto the left lateral thigh.  No tenderness noted to the region.     Neurological: He is alert and oriented to person, place, and time.   Skin: Skin is warm.   Psychiatric: He has a normal mood and affect. His behavior is normal. Judgment and thought content normal.       ED Course   Procedures  Labs Reviewed   COMPREHENSIVE METABOLIC PANEL - Abnormal; Notable for the following components:       Result Value    CO2 31 (*)     Glucose 159 (*)     BUN 25 (*)     Alkaline Phosphatase 39 (*)     Anion Gap 7 (*)     All other components within normal limits   CBC W/ AUTO DIFFERENTIAL - Abnormal; Notable for the following components:    MCHC 31.8 (*)     All other components within normal limits   URINALYSIS, REFLEX TO URINE CULTURE - Abnormal; Notable for the following components:    Glucose, UA 4+ (*)     All other components within normal limits    Narrative:     Specimen Source->Urine   LIPASE   URINALYSIS MICROSCOPIC    Narrative:     Specimen Source->Urine   HIV 1 / 2 ANTIBODY   HEPATITIS C ANTIBODY          Imaging Results              CT Renal Stone Study ABD Pelvis WO (Final result)  Result time 11/23/22 15:31:27      Final result by Yeyo Moody MD (11/23/22 15:31:27)                   Impression:      No acute abdominopelvic process.    Status post ventral hernia repair.  Small fat containing abdominal wall hernias without complication as above.    Moderate diverticulosis.      Electronically signed by: Yeyo Moody  MD  Date:    11/23/2022  Time:    15:31               Narrative:    EXAMINATION:  CT RENAL STONE STUDY ABD PELVIS WO    CLINICAL HISTORY:  Flank pain, kidney stone suspected;    TECHNIQUE:  Low dose axial images, sagittal and coronal reformations were obtained from the lung bases to the pubic symphysis.  Contrast was not administered.    COMPARISON:  07/26/2022    FINDINGS:  The liver, spleen, pancreas, and adrenal glands are unremarkable.    There is no urolithiasis or hydroureteronephrosis.    There is been prior mesh ventral hernia repair.  There is a small fat containing hernia over the left hemiabdomen likely at a prior port site, 3.4 cm with neck diameter 1.5 cm.  There is a similar a fat containing hernia of the right hemiabdomen measuring 4.2 cm diameter with neck size 1.5 cm.  No accompanying complication.    The bowel is nondilated.  There is moderate fecalization of distal small bowel contents indicating delayed transit time.  Normal appendix is present.  There is moderate diverticulosis coli without evidence for diverticulitis.    There is moderate calcification of the aorta without aneurysm.  Degenerative change of the spine typical for age is present.                                       Medications   orphenadrine injection 60 mg (60 mg Intramuscular Given 11/23/22 1446)     Medical Decision Making:   History:   Old Medical Records: I decided to obtain old medical records.  Initial Assessment:   82-year-old male presented with flank pain.  Differential Diagnosis:   Initial differential diagnosis included but not limited to pyelonephritis, nephrolithiasis, and musculoskeletal pain.  Clinical Tests:   Lab Tests: Ordered and Reviewed  Radiological Study: Ordered and Reviewed  ED Management:  The patient was emergently evaluated in the emergency department, his evaluation was significant for an elderly male with right flank tenderness.  The patient's labs showed no acute abnormalities.  The patient's CT  scan showed acute processes per Radiology.  The patient's symptoms were treated with parental Norflex, with improvement in them.  The etiology of his symptoms is likely musculoskeletal flank pain.  He is stable for discharge to home.  He will be discharged home with p.o. Norflex and he is referred to primary care for follow-up.        Scribe Attestation:   Scribe #1: I performed the above scribed service and the documentation accurately describes the services I performed. I attest to the accuracy of the note.               I, Dr. Ney Crawford, personally performed the services described in this documentation. All medical record entries made by the scribe were at my direction and in my presence.  I have reviewed the chart and agree that the record reflects my personal performance and is accurate and complete. Ney Crawford MD.  5:04 PM 11/23/2022      Clinical Impression:   Final diagnoses:  [R10.9] Right flank pain (Primary)        ED Disposition Condition    Discharge Stable          ED Prescriptions       Medication Sig Dispense Start Date End Date Auth. Provider    orphenadrine (NORFLEX) 100 mg tablet Take 1 tablet (100 mg total) by mouth 2 (two) times daily. for 10 days 20 tablet 11/23/2022 12/3/2022 Ney Crawford MD          Follow-up Information       Follow up With Specialties Details Why Contact Info    Garland Ocampo MD Family Medicine Schedule an appointment as soon as possible for a visit   [facility]  [address] [place] [id number]  [phone]               Ney Crawford MD  11/23/22 [id number]

## 2022-12-02 ENCOUNTER — OFFICE VISIT (OUTPATIENT)
Dept: PAIN MEDICINE | Facility: CLINIC | Age: 82
End: 2022-12-02
Payer: MEDICARE

## 2022-12-02 DIAGNOSIS — M54.16 LUMBAR RADICULITIS: ICD-10-CM

## 2022-12-02 DIAGNOSIS — M79.18 MYOFASCIAL PAIN: ICD-10-CM

## 2022-12-02 DIAGNOSIS — M62.830 MUSCLE SPASM OF BACK: ICD-10-CM

## 2022-12-02 DIAGNOSIS — M51.36 DDD (DEGENERATIVE DISC DISEASE), LUMBAR: Primary | ICD-10-CM

## 2022-12-02 DIAGNOSIS — M48.061 SPINAL STENOSIS OF LUMBAR REGION, UNSPECIFIED WHETHER NEUROGENIC CLAUDICATION PRESENT: ICD-10-CM

## 2022-12-02 PROCEDURE — 99999 PR PBB SHADOW E&M-EST. PATIENT-LVL III: CPT | Mod: PBBFAC,,,

## 2022-12-02 PROCEDURE — 99999 PR PBB SHADOW E&M-EST. PATIENT-LVL III: ICD-10-PCS | Mod: PBBFAC,,,

## 2022-12-02 PROCEDURE — 99213 OFFICE O/P EST LOW 20 MIN: CPT | Mod: PBBFAC,PN

## 2022-12-02 PROCEDURE — 99214 PR OFFICE/OUTPT VISIT, EST, LEVL IV, 30-39 MIN: ICD-10-PCS | Mod: S$PBB,,,

## 2022-12-02 PROCEDURE — 99214 OFFICE O/P EST MOD 30 MIN: CPT | Mod: S$PBB,,,

## 2022-12-02 RX ORDER — METHOCARBAMOL 500 MG/1
500 TABLET, FILM COATED ORAL 4 TIMES DAILY PRN
Qty: 120 TABLET | Refills: 0 | Status: SHIPPED | OUTPATIENT
Start: 2022-12-02 | End: 2022-12-30 | Stop reason: SDUPTHER

## 2022-12-02 NOTE — PROGRESS NOTES
Referring Physician: No ref. provider found    PCP: Garland Ocampo MD      CC:low back and left leg pain    Interval History:  Mr. Hollingsworth is a 82 y.o. male with a low back and leg pain who presents today as an established patient ,new to me, for the management of low back pain.  The patient is s/p Bilateral TFESI at L4-L5 and L5-S1 with Dr. Wright on 11/4/2022.  The patient reports he had excellent relief of his lower back pain s/p procedure of 100%. The patient reports on 11/14/2022 he was on a cruise ship and fell. The patient reports pain to his left side s/p fall. The patient was evaluated in the ED on 11/23/2022 and had a CT scan completed. Per chart review, no acute findings noted.  The patient reports his pain is slowly improving s/p fall.  The patient also reports of a fall onto his right side approximately 3 months ago and has had pain to the right side of lower back ever since. The patient reports improvement of pain with muscle rub and muscle relaxer.  The patient reports constipation and dry mouth since he started taking Norflex.  The patient reports slow improvement of pain.  Continues to c/o unrelated pain at the top of his left foot. The patient denies benefit with PT in the past.  He denies any LE weakness or b/b changes. He takes Hydrocodone 7.5 mg sparingly prescribed by PCP.  In the past he was evaluated by [facility] and lumbar surgery was recommended. The patient denies of any significant changes in his health since his last appointment. The patient also denies of any changes in the character of his pain since his last appointment.  Pain today is rated  4/10. Patient denies of any urinary/fecal incontinence, saddle anesthesia, or weakness.        HPI:   Yeyo Hollingsworth is a 82 y.o. male ho presents with lower back and left leg pain.  Pain is been present since 2010.  No recent traumatic incident.  His intermittent aching, throbbing, electric pain in his lower back.  Pain radiates down his left  buttock into his left posterior thigh.  Pain worsens with prolonged standing, walking, getting up and coughing.  Pain improves with rest.  His tried physical therapy with minimal benefit.  He has undergone lumbar JOSE's with Dr. Rendon in the past with moderate benefit.  Most recent injection was performed in September 2016.  Pain has since returned.  He desires repeat injections with us.  He denies any weakness.  No bowel bladder changes.  He rates his pain 6/10 today.    INTERVETIONAL THERAPIES:   11/4/2022: Bilateral L4-5 and L5-S1 transforaminal epidural steroid injection under fluoroscopy- Dr. Wright- excellent relief.       :          ROS:  CONSTITUTIONAL: No fevers, chills, night sweats, wt. loss, appetite changes  SKIN: no rashes or itching  ENT: No headaches, head trauma, vision changes, or eye pain  LYMPH NODES: None noticed   CV: No chest pain, palpitations.   RESP: No shortness of breath, dyspnea on exertion, cough, wheezing, or hemoptysis  GI: No nausea, emesis, diarrhea, constipation, melena, hematochezia, pain.    : No dysuria, hematuria, urgency, or frequency   HEME: No easy bruising, bleeding problems  PSYCHIATRIC: No depression, anxiety, psychosis, hallucinations.  NEURO: No seizures, memory loss, dizziness or difficulty sleeping  MSK: + history of present illness      Past Medical History:   Diagnosis Date    Allergy     Ankle pain     left    Anticoagulant long-term use     aspirin    Anxiety     Atrial fibrillation     Back pain     Bruises easily     Cataract     Clotting disorder     left leg    Colon polyp     Diabetes mellitus     Diabetes mellitus, type 2     Hay fever     Hyperlipidemia     Hypertension     Left hip pain 12/26/2021    Seeing Dr. Beard for hip pain     Obese     BRANDI on CPAP      Past Surgical History:   Procedure Laterality Date    ABDOMINAL SURGERY      origunal surg 1986-mult surgeries since    CARPAL TUNNEL RELEASE      right wrist 2009-left wrist 2010    COLON SURGERY       partial colon removal    COLONOSCOPY  11/26/2018    Dr. Salazar; normal terminal ileum; polyps removed from ascending colon and hepatic flexure; pan diverticulosis; small internal hemorrhoids; repeat in 5 years; Bx: fragments of an adenomatous polyp with mild surface glandular dysplasia and associated chronic active inflammation, no evidence of high-grade dysplasia or malignancy    COLOSTOMY  1986    colostomy reversal  1986    EYE SURGERY      hay fever      HERNIA REPAIR  1949    REPAIR OF TENDON OF LOWER EXTREMITY Left 6/24/2020    Procedure: REPAIR, TENDON, LOWER EXTREMITY;  Surgeon: Justin Mandujano DPM;  [place] [facility] OR;  Service: Podiatry;  Laterality: Left;  ARTHEX NOTIFIED IN CASE HE WANTS ANCHOR    TOE SURGERY Left 2017    replaced a joint     TONSILLECTOMY  1947    TRANSFORAMINAL EPIDURAL INJECTION OF STEROID      x 4    TRANSFORAMINAL EPIDURAL INJECTION OF STEROID Bilateral 11/13/2019    Procedure: Injection,steroid,epidural,transforaminal approach;  Surgeon: Grant Wright MD;  [place] [facility] OR;  Service: Pain Management;  Laterality: Bilateral;  L4-5, L5-S1    TRANSFORAMINAL EPIDURAL INJECTION OF STEROID Bilateral 3/9/2021    Procedure: Injection,steroid,epidural,transforaminal approach;  Surgeon: Grant Wright MD;  [place] [facility] OR;  Service: Pain Management;  Laterality: Bilateral;  L4-5, L5-S1    TRANSFORAMINAL EPIDURAL INJECTION OF STEROID Bilateral 5/25/2021    Procedure: Injection,steroid,epidural,transforaminal approach;  Surgeon: Grant Wright MD;  [place] [facility] OR;  Service: Pain Management;  Laterality: Bilateral;  L4-L5,L5,S1    TRANSFORAMINAL EPIDURAL INJECTION OF STEROID Bilateral 12/14/2021    Procedure: Injection,steroid,epidural,transforaminal approach Bilateral L4-5, L5-S1;  Surgeon: Grant Wright MD;  [place] [facility] OR;  Service: Pain Management;  Laterality: Bilateral;    TRANSFORAMINAL EPIDURAL INJECTION OF STEROID Bilateral 11/4/2022    Procedure:  Injection,steroid,epidural,transforaminal approach;  Surgeon: Grant Wright MD;  [place] [facility] OR;  Service: Pain Management;  Laterality: Bilateral;  L4-5 and L5-s1     Family History   Problem Relation Age of Onset    Heart disease Mother     Melanoma Neg Hx     Psoriasis Neg Hx     Lupus Neg Hx     Eczema Neg Hx      Social History     Socioeconomic History    Marital status:    Tobacco Use    Smoking status: Former     Types: Cigarettes     Quit date: 2006     Years since quittin.7    Smokeless tobacco: Never    Tobacco comments:     ex smoker    Substance and Sexual Activity    Alcohol use: Yes     Comment: rarely    Drug use: No    Sexual activity: Yes     Partners: Female     Social Determinants of Health     Financial Resource Strain: Low Risk     Difficulty of Paying Living Expenses: Not hard at all   Food Insecurity: No Food Insecurity    Worried About Running Out of Food in the Last Year: Never true    Ran Out of Food in the Last Year: Never true   Transportation Needs: No Transportation Needs    Lack of Transportation (Medical): No    Lack of Transportation (Non-Medical): No   Physical Activity: Unknown    Days of Exercise per Week: Patient refused    Minutes of Exercise per Session: 0 min   Stress: No Stress Concern Present    Feeling of Stress : Not at all   Social Connections: Unknown    Frequency of Communication with Friends and Family: Once a week    Frequency of Social Gatherings with Friends and Family: Once a week    Active Member of Clubs or Organizations: Patient refused    Attends Club or Organization Meetings: Patient refused    Marital Status:    Housing Stability: Low Risk     Unable to Pay for Housing in the Last Year: No    Number of Places Lived in the Last Year: 1    Unstable Housing in the Last Year: No         Medications/Allergies: See med card    There were no vitals filed for this visit.        Physical exam:    GENERAL: A and O x3, the patient appears  well groomed and is in no acute distress.  Skin: No rashes or obvious lesions  HEENT: normocephalic, atraumatic  CARDIOVASCULAR:  RRR  LUNGS: non labored breathing  ABDOMEN: soft, nontender   UPPER EXTREMITIES: Normal alignment, normal range of motion, no atrophy, no skin changes,  hair growth and nail growth normal and equal bilaterally. No swelling. Tenderness to right AC joint  LOWER EXTREMITIES:  Normal alignment, normal range of motion, no atrophy, no skin changes,  hair growth and nail growth normal and equal bilaterally. No swelling, no tenderness.    CERVICAL SPINE:  Full ROM with pain on flexion and extension  Negative spurlings  Tenderness to palpation right cervical paraspinous muscles   LUMBAR SPINE  Lumbar spine: ROM is limited with flexion extension and oblique extension with moderate increased pain.    Caden's test causes no increased pain on either side.    Supine straight leg raise positive bilaterally   Internal and external rotation of the hip causes no increased pain on either side.  Myofascial exam: No tenderness to palpation across lumbar paraspinous muscles.       MENTAL STATUS: normal orientation, speech, language, and fund of knowledge for social situation.  Emotional state appropriate.    CRANIAL NERVES:  II:  PERRL bilaterally,   III,IV,VI: EOMI.    V:  Facial sensation equal bilaterally  VII:  Facial motor function normal.  VIII:  Hearing equal to finger rub bilaterally  IX/X: Gag normal, palate symmetric  XI:  Shoulder shrug equal, head turn equal  XII:  Tongue midline without fasciculations      MOTOR: Tone and bulk: normal bilateral upper and lower Strength: normal   Delt Bi Tri WE WF     R 5 5 5 5 5 5   L 5 5 5 5 5 5     IP ADD ABD Quad TA Gas HAM  R 5 5 5 5 5 5 5  L 5 5 5 5 5 5 5    SENSATION: Light touch and pinprick intact bilaterally  REFLEXES: normal, symmetric, nonbrisk.  Toes down, no clonus. No hoffmans.  GAIT: normal rise, base, steps, and arm swing.        Imaging:  reviewed CT of ABD PELV renal stone study.        Assessment:  Mr. Hollingsworth is a 82 y.o. male with low back and left leg pain s/p bilateral TFESI at L4-L5 and L5-S1 on 11/4/2022 with excellent relief.  The patient reports fall on 11/14/2022 and reports left sided hip and lower back pain since fall.  Currently taking Norflex with benefit but not tolerating SE.  Currently using muscle rub with benefit. I believe his current pain is myofascial in presentation and discussed healing times s/p falls.  Treatment plan outlined below:   1. DDD (degenerative disc disease), lumbar    2. Myofascial pain    3. Muscle spasm of back    4. Lumbar radiculitis    5. Spinal stenosis of lumbar region, unspecified whether neurogenic claudication present          Plan:  1.  I have stressed the importance of physical activity and exercise to improve overall health. Home exercises provided for GTB previously  2. Recommended PT as treatment modality, pt deferred and does not wish to pursue PT.    3. Advised the patient to d/c Norflex due to increase in constipation since starting.    4. Prescribed Robaxin 500 mg po Q4H prn muscle spasms.    5. F/U 4 weeks or sooner if needed.   Janet Croft, JUAN MANUEL

## 2022-12-19 DIAGNOSIS — M54.16 LUMBAR RADICULITIS: ICD-10-CM

## 2022-12-20 RX ORDER — HYDROCODONE BITARTRATE AND ACETAMINOPHEN 10; 325 MG/1; MG/1
1 TABLET ORAL EVERY 6 HOURS PRN
Qty: 30 TABLET | Refills: 0 | Status: SHIPPED | OUTPATIENT
Start: 2022-12-20 | End: 2023-01-12 | Stop reason: SDUPTHER

## 2022-12-30 ENCOUNTER — OFFICE VISIT (OUTPATIENT)
Dept: PAIN MEDICINE | Facility: CLINIC | Age: 82
End: 2022-12-30
Payer: MEDICARE

## 2022-12-30 DIAGNOSIS — M79.18 MYOFASCIAL PAIN: ICD-10-CM

## 2022-12-30 DIAGNOSIS — M54.16 LUMBAR RADICULITIS: Primary | ICD-10-CM

## 2022-12-30 DIAGNOSIS — M51.36 DDD (DEGENERATIVE DISC DISEASE), LUMBAR: ICD-10-CM

## 2022-12-30 DIAGNOSIS — M62.830 MUSCLE SPASM OF BACK: ICD-10-CM

## 2022-12-30 PROCEDURE — 99213 PR OFFICE/OUTPT VISIT, EST, LEVL III, 20-29 MIN: ICD-10-PCS | Mod: S$PBB,,,

## 2022-12-30 PROCEDURE — 99214 OFFICE O/P EST MOD 30 MIN: CPT | Mod: PBBFAC,PN

## 2022-12-30 PROCEDURE — 99999 PR PBB SHADOW E&M-EST. PATIENT-LVL IV: ICD-10-PCS | Mod: PBBFAC,,,

## 2022-12-30 PROCEDURE — 99213 OFFICE O/P EST LOW 20 MIN: CPT | Mod: S$PBB,,,

## 2022-12-30 PROCEDURE — 99999 PR PBB SHADOW E&M-EST. PATIENT-LVL IV: CPT | Mod: PBBFAC,,,

## 2022-12-30 RX ORDER — METHOCARBAMOL 500 MG/1
500 TABLET, FILM COATED ORAL 4 TIMES DAILY PRN
Qty: 120 TABLET | Refills: 0 | Status: SHIPPED | OUTPATIENT
Start: 2022-12-30 | End: 2023-01-29

## 2022-12-30 NOTE — PROGRESS NOTES
"    Referring Physician: No ref. provider found    PCP: Garland Ocampo MD      CC:low back and left leg pain    Interval History:  Mr. Hollingsworth is a 82 y.o. male with a low back and leg pain who presents today as an established patient for the management of low back pain.  The patient presents as a follow up s/p fall. The patient reports improvement of his lower back pain that was present s/p fall. Today, the patient localizes the worse of his pain to his lower back with radiation down the posterior aspect of BLE to his feet. L>R. The patient reports that this pain has been relieved with TFESI in the past.  The patient denies pain with sitting and sleeping. The patient describes his pain as "burning, tingling, numbness, sharp and shooting" in character. Continues to c/o unrelated pain at the top of his left foot. The patient denies benefit with PT in the past.  He denies any LE weakness or b/b changes. He takes Hydrocodone 7.5 mg sparingly prescribed by PCP.  In the past he was evaluated by [facility] and lumbar surgery was recommended. The patient denies of any significant changes in his health since his last appointment. The patient also denies of any changes in the character of his pain since his last appointment.  Pain today is rated 4/10. Patient denies of any urinary/fecal incontinence, saddle anesthesia, or weakness.        HPI:   Yeyo Hollingsworth is a 82 y.o. male ho presents with lower back and left leg pain.  Pain is been present since 2010.  No recent traumatic incident.  His intermittent aching, throbbing, electric pain in his lower back.  Pain radiates down his left buttock into his left posterior thigh.  Pain worsens with prolonged standing, walking, getting up and coughing.  Pain improves with rest.  His tried physical therapy with minimal benefit.  He has undergone lumbar JOSE's with Dr. Rendon in the past with moderate benefit.  Most recent injection was performed in September 2016.  Pain has since " returned.  He desires repeat injections with us.  He denies any weakness.  No bowel bladder changes.  He rates his pain 6/10 today.    INTERVETIONAL THERAPIES:   11/4/2022: Bilateral L4-5 and L5-S1 transforaminal epidural steroid injection under fluoroscopy- Dr. Wright- excellent relief.       :          ROS:  CONSTITUTIONAL: No fevers, chills, night sweats, wt. loss, appetite changes  SKIN: no rashes or itching  ENT: No headaches, head trauma, vision changes, or eye pain  LYMPH NODES: None noticed   CV: No chest pain, palpitations.   RESP: No shortness of breath, dyspnea on exertion, cough, wheezing, or hemoptysis  GI: No nausea, emesis, diarrhea, constipation, melena, hematochezia, pain.    : No dysuria, hematuria, urgency, or frequency   HEME: No easy bruising, bleeding problems  PSYCHIATRIC: No depression, anxiety, psychosis, hallucinations.  NEURO: No seizures, memory loss, dizziness or difficulty sleeping  MSK: + history of present illness    MEDICAL, SURGICAL, FAMILY, SOCIAL HX: reviewed    MEDICATIONS/ALLERGIES: reviewed         Medications/Allergies: See med card    There were no vitals filed for this visit.        Physical exam:    GENERAL: A and O x3, the patient appears well groomed and is in no acute distress.  Skin: No rashes or obvious lesions  HEENT: normocephalic, atraumatic  CARDIOVASCULAR:  RRR  LUNGS: non labored breathing  ABDOMEN: soft, nontender   UPPER EXTREMITIES: Normal alignment, normal range of motion, no atrophy, no skin changes,  hair growth and nail growth normal and equal bilaterally. No swelling. Tenderness to right AC joint  LOWER EXTREMITIES:  Normal alignment, normal range of motion, no atrophy, no skin changes,  hair growth and nail growth normal and equal bilaterally. No swelling, no tenderness.    CERVICAL SPINE:  Full ROM with pain on flexion and extension  Negative spurlings  Tenderness to palpation right cervical paraspinous muscles   LUMBAR SPINE  Lumbar spine: ROM is  limited with flexion extension and oblique extension with moderate increased pain.    Caden's test causes no increased pain on either side.    Supine straight leg raise positive bilaterally   Internal and external rotation of the hip causes no increased pain on either side.  Myofascial exam: No tenderness to palpation across lumbar paraspinous muscles.       MENTAL STATUS: normal orientation, speech, language, and fund of knowledge for social situation.  Emotional state appropriate.    CRANIAL NERVES:  II:  PERRL bilaterally,   III,IV,VI: EOMI.    V:  Facial sensation equal bilaterally  VII:  Facial motor function normal.  VIII:  Hearing equal to finger rub bilaterally  IX/X: Gag normal, palate symmetric  XI:  Shoulder shrug equal, head turn equal  XII:  Tongue midline without fasciculations      MOTOR: Tone and bulk: normal bilateral upper and lower Strength: normal   Delt Bi Tri WE WF     R 5 5 5 5 5 5   L 5 5 5 5 5 5     IP ADD ABD Quad TA Gas HAM  R 5 5 5 5 5 5 5  L 5 5 5 5 5 5 5    SENSATION: Light touch and pinprick intact bilaterally  REFLEXES: normal, symmetric, nonbrisk.  Toes down, no clonus. No hoffmans.  GAIT: normal rise, base, steps, and arm swing.        Imaging: reviewed CT of ABD PELV renal stone study.        Assessment:  Mr. Hollingsworth is a 82 y.o. male with low back and BLE pain.  The patient presents today as follow up s/p fall. The patient reports pain to his lower back with radiation down his BLE has returned. The patient has had excellent relief of similar pain with a TFESI.   Treatment plan outlined below:   1. Lumbar radiculitis    2. DDD (degenerative disc disease), lumbar    3. Myofascial pain    4. Muscle spasm of back            Plan:  1. Schedule for repeat Bilateral TFESI at L4-L5, L5-S1. Patient is on Eliquis will require cardiac clearance.   2. I have stressed the importance of physical activity and a home exercise plan to help with chronic pain and improve health.   3. Refilled  Robaxin 500 mg po Q4H prn muscle spasms.    5. RTC for the procedure as outlined above    Janet Croft NP

## 2023-01-05 ENCOUNTER — LAB VISIT (OUTPATIENT)
Dept: LAB | Facility: HOSPITAL | Age: 83
End: 2023-01-05
Attending: FAMILY MEDICINE
Payer: MEDICARE

## 2023-01-05 DIAGNOSIS — E78.01 FAMILIAL HYPERCHOLESTEROLEMIA: ICD-10-CM

## 2023-01-05 DIAGNOSIS — E11.9 CONTROLLED TYPE 2 DIABETES MELLITUS WITHOUT COMPLICATION, WITHOUT LONG-TERM CURRENT USE OF INSULIN: ICD-10-CM

## 2023-01-05 LAB
CHOLEST SERPL-MCNC: 123 MG/DL (ref 120–199)
CHOLEST/HDLC SERPL: 3.1 {RATIO} (ref 2–5)
ESTIMATED AVG GLUCOSE: 126 MG/DL (ref 68–131)
HBA1C MFR BLD: 6 % (ref 4.5–6.2)
HDLC SERPL-MCNC: 40 MG/DL (ref 40–75)
HDLC SERPL: 32.5 % (ref 20–50)
LDLC SERPL CALC-MCNC: 56.4 MG/DL (ref 63–159)
NONHDLC SERPL-MCNC: 83 MG/DL
TRIGL SERPL-MCNC: 133 MG/DL (ref 30–150)

## 2023-01-05 PROCEDURE — 80061 LIPID PANEL: CPT | Performed by: FAMILY MEDICINE

## 2023-01-05 PROCEDURE — 36415 COLL VENOUS BLD VENIPUNCTURE: CPT | Performed by: FAMILY MEDICINE

## 2023-01-05 PROCEDURE — 83036 HEMOGLOBIN GLYCOSYLATED A1C: CPT | Performed by: FAMILY MEDICINE

## 2023-01-06 ENCOUNTER — TELEPHONE (OUTPATIENT)
Dept: PAIN MEDICINE | Facility: CLINIC | Age: 83
End: 2023-01-06
Payer: MEDICARE

## 2023-01-06 NOTE — TELEPHONE ENCOUNTER
May we please have hold orders for pt to hold eliquis prior to procedure 1/20/23? Please advise and thank you.

## 2023-01-06 NOTE — TELEPHONE ENCOUNTER
MD has given orders that pt may hold Eliquis for 24 hours before his procedure on 01/20/2023 for JOSE.  Please advise

## 2023-01-10 ENCOUNTER — OFFICE VISIT (OUTPATIENT)
Dept: DERMATOLOGY | Facility: CLINIC | Age: 83
End: 2023-01-10
Payer: MEDICARE

## 2023-01-10 DIAGNOSIS — L82.0 SEBORRHEIC KERATOSES, INFLAMED: ICD-10-CM

## 2023-01-10 DIAGNOSIS — L57.0 ACTINIC KERATOSIS: ICD-10-CM

## 2023-01-10 DIAGNOSIS — L29.9 LOCALIZED PRURITUS: ICD-10-CM

## 2023-01-10 DIAGNOSIS — L30.9 DERMATITIS: Primary | ICD-10-CM

## 2023-01-10 DIAGNOSIS — D22.9 BENIGN NEVUS: ICD-10-CM

## 2023-01-10 DIAGNOSIS — D18.01 CHERRY ANGIOMA: ICD-10-CM

## 2023-01-10 DIAGNOSIS — L82.1 SEBORRHEIC KERATOSES: ICD-10-CM

## 2023-01-10 PROCEDURE — 17003 DESTRUCT PREMALG LES 2-14: CPT | Mod: S$PBB,,, | Performed by: STUDENT IN AN ORGANIZED HEALTH CARE EDUCATION/TRAINING PROGRAM

## 2023-01-10 PROCEDURE — 17003 DESTRUCTION, PREMALIGNANT LESIONS; SECOND THROUGH 14 LESIONS: ICD-10-PCS | Mod: S$PBB,,, | Performed by: STUDENT IN AN ORGANIZED HEALTH CARE EDUCATION/TRAINING PROGRAM

## 2023-01-10 PROCEDURE — 99999 PR PBB SHADOW E&M-EST. PATIENT-LVL III: ICD-10-PCS | Mod: PBBFAC,,, | Performed by: STUDENT IN AN ORGANIZED HEALTH CARE EDUCATION/TRAINING PROGRAM

## 2023-01-10 PROCEDURE — 99213 OFFICE O/P EST LOW 20 MIN: CPT | Mod: PBBFAC,PO | Performed by: STUDENT IN AN ORGANIZED HEALTH CARE EDUCATION/TRAINING PROGRAM

## 2023-01-10 PROCEDURE — 99213 OFFICE O/P EST LOW 20 MIN: CPT | Mod: 25,S$PBB,, | Performed by: STUDENT IN AN ORGANIZED HEALTH CARE EDUCATION/TRAINING PROGRAM

## 2023-01-10 PROCEDURE — 17000 PR DESTRUCTION(LASER SURGERY,CRYOSURGERY,CHEMOSURGERY),PREMALIGNANT LESIONS,FIRST LESION: ICD-10-PCS | Mod: S$PBB,,, | Performed by: STUDENT IN AN ORGANIZED HEALTH CARE EDUCATION/TRAINING PROGRAM

## 2023-01-10 PROCEDURE — 17000 DESTRUCT PREMALG LESION: CPT | Mod: S$PBB,,, | Performed by: STUDENT IN AN ORGANIZED HEALTH CARE EDUCATION/TRAINING PROGRAM

## 2023-01-10 PROCEDURE — 17000 DESTRUCT PREMALG LESION: CPT | Mod: PBBFAC,PO | Performed by: STUDENT IN AN ORGANIZED HEALTH CARE EDUCATION/TRAINING PROGRAM

## 2023-01-10 PROCEDURE — 99999 PR PBB SHADOW E&M-EST. PATIENT-LVL III: CPT | Mod: PBBFAC,,, | Performed by: STUDENT IN AN ORGANIZED HEALTH CARE EDUCATION/TRAINING PROGRAM

## 2023-01-10 PROCEDURE — 99213 PR OFFICE/OUTPT VISIT, EST, LEVL III, 20-29 MIN: ICD-10-PCS | Mod: 25,S$PBB,, | Performed by: STUDENT IN AN ORGANIZED HEALTH CARE EDUCATION/TRAINING PROGRAM

## 2023-01-10 PROCEDURE — 17003 DESTRUCT PREMALG LES 2-14: CPT | Mod: PBBFAC,PO | Performed by: STUDENT IN AN ORGANIZED HEALTH CARE EDUCATION/TRAINING PROGRAM

## 2023-01-10 RX ORDER — TRIAMCINOLONE ACETONIDE 1 MG/G
CREAM TOPICAL 2 TIMES DAILY
Qty: 80 G | Refills: 0 | Status: SHIPPED | OUTPATIENT
Start: 2023-01-10 | End: 2023-03-27 | Stop reason: SDUPTHER

## 2023-01-10 RX ORDER — KETOCONAZOLE 20 MG/G
CREAM TOPICAL
Qty: 60 G | Refills: 2 | Status: SHIPPED | OUTPATIENT
Start: 2023-01-10 | End: 2023-03-27 | Stop reason: SDUPTHER

## 2023-01-10 NOTE — PROGRESS NOTES
Subjective:       Patient ID:  Yeyo Hollingsworth is a 82 y.o. male who presents for   Chief Complaint   Patient presents with    Skin Check     UBSE    Rash     face     LOV: 10/20/21    Patient here today for annual skin check,UBSE.  C/o rash on face x several month. Red, dry, and scabby. Reports appears after shaving. No treatment.          Derm Hx  Previously Derm Dr Evans 2017  Denies H/o NMSC, Phx of MM  Inflamed seborrheic keratosis  Other melanin hyperpigmentation  Allergic purpura  follicular cysts of the skin      Review of Systems   Constitutional:  Negative for fever, chills and fatigue.   Skin:  Positive for activity-related sunscreen use and wears hat. Negative for daily sunscreen use.   Hematologic/Lymphatic: Bruises/bleeds easily (asa,eliquis).      Objective:    Physical Exam   Constitutional: He appears well-developed and well-nourished. No distress.   Neurological: He is alert and oriented to person, place, and time. He is not disoriented.   Psychiatric: He has a normal mood and affect.   Skin:   Areas Examined (abnormalities noted in diagram):   Scalp / Hair Palpated and Inspected  Head / Face Inspection Performed  Neck Inspection Performed  Chest / Axilla Inspection Performed  Abdomen Inspection Performed  Back Inspection Performed  RUE Inspected  LUE Inspection Performed  Nails and Digits Inspection Performed                 Diagram Legend     Erythematous scaling macule/papule c/w actinic keratosis       Vascular papule c/w angioma      Pigmented verrucoid papule/plaque c/w seborrheic keratosis      Yellow umbilicated papule c/w sebaceous hyperplasia      Irregularly shaped tan macule c/w lentigo     1-2 mm smooth white papules consistent with Milia      Movable subcutaneous cyst with punctum c/w epidermal inclusion cyst      Subcutaneous movable cyst c/w pilar cyst      Firm pink to brown papule c/w dermatofibroma      Pedunculated fleshy papule(s) c/w skin tag(s)      Evenly pigmented  macule c/w junctional nevus     Mildly variegated pigmented, slightly irregular-bordered macule c/w mildly atypical nevus      Flesh colored to evenly pigmented papule c/w intradermal nevus       Pink pearly papule/plaque c/w basal cell carcinoma      Erythematous hyperkeratotic cursted plaque c/w SCC      Surgical scar with no sign of skin cancer recurrence      Open and closed comedones      Inflammatory papules and pustules      Verrucoid papule consistent consistent with wart     Erythematous eczematous patches and plaques     Dystrophic onycholytic nail with subungual debris c/w onychomycosis     Umbilicated papule    Erythematous-base heme-crusted tan verrucoid plaque consistent with inflamed seborrheic keratosis     Erythematous Silvery Scaling Plaque c/w Psoriasis     See annotation      Assessment / Plan:      Dermatitis   -     ketoconazole (NIZORAL) 2 % cream; Apply to face after shaving  Dispense: 60 g; Refill: 2  - many SKs that he is likely traumatizing while shaving  - apply keto cream after shaving daily    Localized pruritus- back  -     triamcinolone acetonide 0.1% (KENALOG) 0.1 % cream; Apply topically 2 (two) times daily.  Dispense: 80 g; Refill: 0  - has many SKs on back which may become inflamed   - can use tac cream PRN itching     Seborrheic keratoses  Seborrheic keratoses, inflamed  These are benign inherited growths without a malignant potential. Reassurance given to patient. No treatment is necessary.   Discussed risks and benefits of LN2 on cheeks    Actinic keratosis  Cryosurgery Procedure Note    Verbal consent from the patient is obtained and the patient is aware of the precancerous quality and need for treatment of these lesions. Liquid nitrogen cryosurgery is applied to the 5 actinic keratoses, as detailed in the physical exam, to produce a freeze injury. The patient is aware that blisters may form and is instructed on wound care with gentle cleansing and use of vaseline ointment to  keep moist until healed. The patient is supplied a handout on cryosurgery and is instructed to call if lesions do not completely resolve.    Cherry angioma  This is a benign vascular lesion. Reassurance given. No treatment required.     Benign nevus  Careful dermoscopy evaluation of nevi performed with none identified as needing biopsy today  Monitor for new mole or moles that are becoming bigger, darker, irritated, or developing irregular borders.     Actinic skin damage  Upper body skin examination performed today including at least 6 points as noted in physical examination. No lesions suspicious for malignancy noted.  Patient instructed in importance in daily broad spectrum sun protection of at least spf 30. Mineral sunscreen ingredients preferred (Zinc +/- Titanium) and can be found OTC.   Patient encouraged to wear hat for all outdoor exposure.   Also discussed sun avoidance and use of protective clothing.           1 year  No follow-ups on file.   room air

## 2023-01-10 NOTE — PATIENT INSTRUCTIONS

## 2023-01-12 ENCOUNTER — OFFICE VISIT (OUTPATIENT)
Dept: FAMILY MEDICINE | Facility: CLINIC | Age: 83
End: 2023-01-12
Payer: MEDICARE

## 2023-01-12 VITALS
BODY MASS INDEX: 36.62 KG/M2 | WEIGHT: 233.31 LBS | HEIGHT: 67 IN | DIASTOLIC BLOOD PRESSURE: 78 MMHG | SYSTOLIC BLOOD PRESSURE: 126 MMHG | HEART RATE: 64 BPM | OXYGEN SATURATION: 96 %

## 2023-01-12 DIAGNOSIS — E11.9 TYPE 2 DIABETES MELLITUS WITHOUT COMPLICATION, WITHOUT LONG-TERM CURRENT USE OF INSULIN: Chronic | ICD-10-CM

## 2023-01-12 DIAGNOSIS — M54.16 LUMBAR RADICULITIS: ICD-10-CM

## 2023-01-12 DIAGNOSIS — I10 PRIMARY HYPERTENSION: Primary | ICD-10-CM

## 2023-01-12 PROCEDURE — 99214 PR OFFICE/OUTPT VISIT, EST, LEVL IV, 30-39 MIN: ICD-10-PCS | Mod: S$PBB,AQ,, | Performed by: FAMILY MEDICINE

## 2023-01-12 PROCEDURE — 99215 OFFICE O/P EST HI 40 MIN: CPT | Performed by: FAMILY MEDICINE

## 2023-01-12 PROCEDURE — 99214 OFFICE O/P EST MOD 30 MIN: CPT | Mod: S$PBB,AQ,, | Performed by: FAMILY MEDICINE

## 2023-01-12 RX ORDER — HYDROCODONE BITARTRATE AND ACETAMINOPHEN 10; 325 MG/1; MG/1
1 TABLET ORAL EVERY 6 HOURS PRN
Qty: 30 TABLET | Refills: 0 | Status: SHIPPED | OUTPATIENT
Start: 2023-02-12 | End: 2023-02-02

## 2023-01-12 RX ORDER — HYDROCODONE BITARTRATE AND ACETAMINOPHEN 10; 325 MG/1; MG/1
1 TABLET ORAL EVERY 6 HOURS PRN
Qty: 30 TABLET | Refills: 0 | Status: SHIPPED | OUTPATIENT
Start: 2023-03-12 | End: 2023-02-02

## 2023-01-12 RX ORDER — HYDROCODONE BITARTRATE AND ACETAMINOPHEN 10; 325 MG/1; MG/1
1 TABLET ORAL EVERY 6 HOURS PRN
Qty: 30 TABLET | Refills: 0 | Status: SHIPPED | OUTPATIENT
Start: 2023-01-12 | End: 2023-02-14 | Stop reason: SDUPTHER

## 2023-01-12 NOTE — H&P (VIEW-ONLY)
SUBJECTIVE:    Patient ID: Yeyo Hollingsworth is a 82 y.o. male.    Chief Complaint: No chief complaint on file.  83 yo male here today to follow up on his  chronic medical issues. pt is doing well and has  no  new complaint today.      I reviewed patient's blood sugars they were all within normal limits, his blood pressure today is well controlled he is not complaining any chest pain shortness breast or dyspnea on exertion, he does have some mild lower extremity edema he is not wearing his compression stockings except on long drives.      05/5/2020  244lbs  07/22/2020 247  09/28/2020 245  03/29/2021 235  04/30/2021 231  06/28/2021 231  12/28/2021 230  06/28/2022 227  09/28/2022 231  01/12/2023 233        SPMHx:  DM: Jardiance 10mg, Januvia 100mg, not on metformin due to diarrhea Last A1C 6.0  is doing well.  HTN: Lisinopril 20mg, Metoprolol 25mg, is well controlled.  Hyperlipidemia: Atorvastatin 10 mg well controlled. LDL 59  Arthritis: On several chronic narcotics and follows up with pain management, for his neck and back pain pt is planning to have a procedure.  Anxiety: Takes Valium 5mg, PRN  A-fib: 2012 Was cardioverted, no longer in a-fib  Insomnia: Trazadone 100mg  Hx of DVT: Currently on Eliquis 5mg   BRANDI: Wears CPAP  Chronic constipation: On polyethylene Glycol 17g, manages well with this medication.     Specialists:  Cardiology: Dr Mcclelland  Pain Management: Michele Martinez  Ortho: Dr Dean  Podiatry: Dr Mandujano  Sleep: Dr Felice Bello  GS: Dr Thorne     Smoke: Quit in 1988  ETOH: None  Exercise: Never    Hemoglobin A1C 4.5 - 6.2 % 6.0      Lipids  Cholesterol: 123   Triglycerides:  133   HDL: 40   LDL: 56    Diabetes  He presents for his follow-up diabetic visit. He has type 2 diabetes mellitus. His disease course has been stable. There are no hypoglycemic associated symptoms. Pertinent negatives for hypoglycemia include no confusion or headaches. Associated symptoms include weakness. Pertinent negatives for  diabetes include no chest pain, no fatigue, no polydipsia and no polyuria. There are no hypoglycemic complications. Risk factors for coronary artery disease include diabetes mellitus, dyslipidemia, hypertension, male sex, obesity and sedentary lifestyle.   Hypertension  This is a chronic problem. The current episode started more than 1 year ago. The problem is unchanged. The problem is controlled. Associated symptoms include neck pain. Pertinent negatives include no anxiety, chest pain, headaches or palpitations. There are no associated agents to hypertension. There are no known risk factors for coronary artery disease. Past treatments include beta blockers and ACE inhibitors. The current treatment provides moderate improvement. Compliance problems include exercise and diet.    Arthritis  Presents for follow-up visit. He complains of pain and stiffness. He reports no joint swelling. The symptoms have been stable. Affected locations include the right knee, left knee, right hip and left hip. His pain is at a severity of 4/10. Pertinent negatives include no diarrhea or fatigue.           Past Medical History:   Diagnosis Date    Allergy     Ankle pain     left    Anticoagulant long-term use     aspirin    Anxiety     Atrial fibrillation     Back pain     Bruises easily     Cataract     Clotting disorder     left leg    Colon polyp     Diabetes mellitus     Diabetes mellitus, type 2     Hay fever     Hyperlipidemia     Hypertension     Left hip pain 2021    Seeing Dr. Beard for hip pain     Obese     BRANDI on CPAP      Social History     Socioeconomic History    Marital status:    Tobacco Use    Smoking status: Former     Types: Cigarettes     Quit date: 2006     Years since quittin.8    Smokeless tobacco: Never    Tobacco comments:     ex smoker    Substance and Sexual Activity    Alcohol use: Yes     Comment: rarely    Drug use: No    Sexual activity: Yes     Partners: Female     Social  Determinants of Health     Financial Resource Strain: Low Risk     Difficulty of Paying Living Expenses: Not hard at all   Food Insecurity: No Food Insecurity    Worried About Running Out of Food in the Last Year: Never true    Ran Out of Food in the Last Year: Never true   Transportation Needs: No Transportation Needs    Lack of Transportation (Medical): No    Lack of Transportation (Non-Medical): No   Physical Activity: Inactive    Days of Exercise per Week: 0 days    Minutes of Exercise per Session: 0 min   Stress: No Stress Concern Present    Feeling of Stress : Not at all   Social Connections: Unknown    Frequency of Communication with Friends and Family: Once a week    Frequency of Social Gatherings with Friends and Family: Patient refused    Active Member of Clubs or Organizations: No    Attends Club or Organization Meetings: Never    Marital Status:    Housing Stability: Low Risk     Unable to Pay for Housing in the Last Year: No    Number of Places Lived in the Last Year: 1    Unstable Housing in the Last Year: No     Past Surgical History:   Procedure Laterality Date    ABDOMINAL SURGERY      origunal surg 1986-mult surgeries since    CARPAL TUNNEL RELEASE      right wrist 2009-left wrist 2010    COLON SURGERY      partial colon removal    COLONOSCOPY  11/26/2018    Dr. Salazar; normal terminal ileum; polyps removed from ascending colon and hepatic flexure; pan diverticulosis; small internal hemorrhoids; repeat in 5 years; Bx: fragments of an adenomatous polyp with mild surface glandular dysplasia and associated chronic active inflammation, no evidence of high-grade dysplasia or malignancy    COLOSTOMY  1986    colostomy reversal  1986    EYE SURGERY      hay fever      HERNIA REPAIR  1949    REPAIR OF TENDON OF LOWER EXTREMITY Left 6/24/2020    Procedure: REPAIR, TENDON, LOWER EXTREMITY;  Surgeon: Justin Mandujano DPM;  [place] [facility];  Service: Podiatry;  Laterality: Left;  ARTHEX NOTIFIED  IN CASE HE WANTS ANCHOR    TOE SURGERY Left 2017    replaced a joint     TONSILLECTOMY  1947    TRANSFORAMINAL EPIDURAL INJECTION OF STEROID      x 4    TRANSFORAMINAL EPIDURAL INJECTION OF STEROID Bilateral 11/13/2019    Procedure: Injection,steroid,epidural,transforaminal approach;  Surgeon: Grant Wright MD;  [facility]  Service: Pain Management;  Laterality: Bilateral;  L4-5, L5-S1    TRANSFORAMINAL EPIDURAL INJECTION OF STEROID Bilateral 3/9/2021    Procedure: Injection,steroid,epidural,transforaminal approach;  Surgeon: Grant Wright MD;  [facility]  Service: Pain Management;  Laterality: Bilateral;  L4-5, L5-S1    TRANSFORAMINAL EPIDURAL INJECTION OF STEROID Bilateral 5/25/2021    Procedure: Injection,steroid,epidural,transforaminal approach;  Surgeon: Grant Wright MD;  [facility]  Service: Pain Management;  Laterality: Bilateral;  L4-L5,L5,S1    TRANSFORAMINAL EPIDURAL INJECTION OF STEROID Bilateral 12/14/2021    Procedure: Injection,steroid,epidural,transforaminal approach Bilateral L4-5, L5-S1;  Surgeon: Grant Wright MD;  [facility]  Service: Pain Management;  Laterality: Bilateral;    TRANSFORAMINAL EPIDURAL INJECTION OF STEROID Bilateral 11/4/2022    Procedure: Injection,steroid,epidural,transforaminal approach;  Surgeon: Grant Wright MD;  [facility]  Service: Pain Management;  Laterality: Bilateral;  L4-5 and L5-s1     Family History   Problem Relation Age of Onset    Heart disease Mother     Melanoma Neg Hx     Psoriasis Neg Hx     Lupus Neg Hx     Eczema Neg Hx      Current Outpatient Medications   Medication Sig Dispense Refill    apixaban (ELIQUIS) 5 mg Tab Take 1 tablet (5 mg total) by mouth 2 (two) times daily. 180 tablet 3    apixaban (ELIQUIS) 5 mg Tab   See dose instructions in comments, # 60 EA, 2 total refill(s), Acute, MITRA [Federal Rx: #180 last filled 01/04/23]      aspirin 81 MG Chew Take 1 tablet (81 mg total) by mouth once daily. 100 tablet 1     atorvastatin (LIPITOR) 10 MG tablet nightly (Patient taking differently: Take 10 mg by mouth every evening. nightly) 90 tablet 3    azelastine (ASTELIN) 137 mcg (0.1 %) nasal spray 2 sprays (274 mcg total) by Nasal route 2 (two) times daily. 30 mL 5    blood sugar diagnostic (BLOOD GLUCOSE TEST) Strp 1 strip by Misc.(Non-Drug; Combo Route) route 2 (two) times a day. FREESTYLE LITE BG TEST STRIPS. current use of insulin  - Primary ICD-10-CM: E11.9 200 each 2    cholecalciferol, vitamin D3, 1,250 mcg (50,000 unit) capsule Take 50,000 Units by mouth every Sunday.      fexofenadine (ALLEGRA) 180 MG tablet Take 1 tablet (180 mg total) by mouth once daily. 90 tablet 3    FREESTYLE LANCETS 28 gauge lancets Inject 1 lancet into the skin 3 (three) times daily. 100 each 3    furosemide (LASIX) 20 MG tablet Take 1 tablet (20 mg total) by mouth every Mon, Wed, Fri. 45 tablet 3    gabapentin (NEURONTIN) 300 MG capsule Take 1 capsule (300 mg total) by mouth 3 (three) times daily. 270 capsule 1    JARDIANCE 10 mg tablet Take 1 tablet (10 mg total) by mouth once daily. 90 tablet 1    ketoconazole (NIZORAL) 2 % cream Apply to face after shaving 60 g 2    lisinopriL (PRINIVIL,ZESTRIL) 20 MG tablet Take 1 tablet (20 mg total) by mouth once daily. 90 tablet 3    methocarbamoL (ROBAXIN) 500 MG Tab Take 1 tablet (500 mg total) by mouth 4 (four) times daily as needed (muscle spasms of lower back). 120 tablet 0    metoprolol tartrate (LOPRESSOR) 25 MG tablet Take 1 tablet (25 mg total) by mouth 2 (two) times daily. 180 tablet 3    montelukast (SINGULAIR) 10 mg tablet Take 1 tablet (10 mg total) by mouth every evening. 90 tablet 3    polyethylene glycol (GLYCOLAX) 17 gram/dose powder Take 17 g by mouth once daily. 507 g 3    potassium chloride (MICRO-K) 10 MEQ CpSR Take 1 capsule (10 mEq total) by mouth every other day. 90 capsule 1    SITagliptin phosphate (JANUVIA) 100 MG Tab Take 1 tablet (100 mg total) by mouth once daily. 90 tablet 3     traZODone (DESYREL) 100 MG tablet Take 1 tablet (100 mg total) by mouth every evening. 90 tablet 3    triamcinolone acetonide 0.1% (KENALOG) 0.1 % cream Apply topically 2 (two) times daily. 80 g 0    HYDROcodone-acetaminophen (NORCO)  mg per tablet Take 1 tablet by mouth every 6 (six) hours as needed for Pain. 30 tablet 0    [START ON 2/12/2023] HYDROcodone-acetaminophen (NORCO)  mg per tablet Take 1 tablet by mouth every 6 (six) hours as needed for Pain. 30 tablet 0    [START ON 3/12/2023] HYDROcodone-acetaminophen (NORCO)  mg per tablet Take 1 tablet by mouth every 6 (six) hours as needed for Pain. 30 tablet 0     No current facility-administered medications for this visit.     Facility-Administered Medications Ordered in Other Visits   Medication Dose Route Frequency Provider Last Rate Last Admin    lactated ringers infusion   Intravenous Once PRN Grant Wright MD   Stopped at 11/04/22 1230     Review of patient's allergies indicates:   Allergen Reactions    Ativan [lorazepam] Other (See Comments)     Mood alternating      Dilaudid [hydromorphone (bulk)] Other (See Comments)     Mood alternating      Morphine Other (See Comments)     Mood alternating      Adhesive tape-silicones     Hydromorphone        Review of Systems   Constitutional:  Positive for activity change. Negative for diaphoresis, fatigue and unexpected weight change.   HENT:  Negative for congestion, hearing loss, rhinorrhea and trouble swallowing.    Eyes:  Negative for discharge and visual disturbance.   Respiratory:  Negative for chest tightness and wheezing.    Cardiovascular:  Negative for chest pain and palpitations.   Gastrointestinal:  Negative for blood in stool, constipation, diarrhea and vomiting.   Endocrine: Negative for polydipsia and polyuria.   Genitourinary:  Negative for difficulty urinating, flank pain, frequency, hematuria and urgency.   Musculoskeletal:  Positive for arthralgias, arthritis, neck pain and  "stiffness. Negative for joint swelling.   Neurological:  Positive for weakness. Negative for headaches.   Psychiatric/Behavioral:  Negative for confusion and dysphoric mood.         Blood pressure 126/78, pulse 64, height 5' 7" (1.702 m), weight 105.8 kg (233 lb 4.8 oz), SpO2 96 %. Body mass index is 36.54 kg/m².   Objective:      Physical Exam  Vitals reviewed.   Constitutional:       General: He is not in acute distress.     Appearance: Normal appearance. He is obese. He is not ill-appearing or toxic-appearing.   HENT:      Head: Normocephalic and atraumatic.      Right Ear: Tympanic membrane normal.      Left Ear: Tympanic membrane normal.      Nose: Nose normal. No congestion or rhinorrhea.      Mouth/Throat:      Mouth: Mucous membranes are moist.      Pharynx: Oropharynx is clear. No oropharyngeal exudate or posterior oropharyngeal erythema.   Cardiovascular:      Rate and Rhythm: Normal rate and regular rhythm.      Heart sounds: Normal heart sounds. No murmur heard.  Pulmonary:      Effort: Pulmonary effort is normal.      Breath sounds: Normal breath sounds.   Skin:     General: Skin is warm and dry.      Capillary Refill: Capillary refill takes less than 2 seconds.   Neurological:      Mental Status: He is alert and oriented to person, place, and time.           Assessment:       1. Primary hypertension    2. Lumbar radiculitis    3. Type 2 diabetes mellitus without complication, without long-term current use of insulin         Plan:           Primary hypertension  Reduce the amount of salt in your diet; Lose weight; Avoid drinking too much alcohol; Exercise at least 30 minutes per day most days of the week.  Continue current medications and home BP monitoring.  Lumbar radiculitis  -     HYDROcodone-acetaminophen (NORCO)  mg per tablet; Take 1 tablet by mouth every 6 (six) hours as needed for Pain.  Dispense: 30 tablet; Refill: 0  -     HYDROcodone-acetaminophen (NORCO)  mg per tablet; Take 1 " tablet by mouth every 6 (six) hours as needed for Pain.  Dispense: 30 tablet; Refill: 0  -     HYDROcodone-acetaminophen (NORCO)  mg per tablet; Take 1 tablet by mouth every 6 (six) hours as needed for Pain.  Dispense: 30 tablet; Refill: 0    Type 2 diabetes mellitus without complication, without long-term current use of insulin  Explained excessive time spent in sedentary behavior, such as sitting, is associated with deleterious health outcomes including abnormal glucose metabolism and increased mortality. Reducing sedentary time, independent of physical activity, has known cardiovascular, metabolic, and functional benefits in diabetic adults. Any amount of exercise is better than being sedentary.

## 2023-01-12 NOTE — PROGRESS NOTES
SUBJECTIVE:    Patient ID: Yeyo Hollingsworth is a 82 y.o. male.    Chief Complaint: No chief complaint on file.  81 yo male here today to follow up on his  chronic medical issues. pt is doing well and has  no  new complaint today.      I reviewed patient's blood sugars they were all within normal limits, his blood pressure today is well controlled he is not complaining any chest pain shortness breast or dyspnea on exertion, he does have some mild lower extremity edema he is not wearing his compression stockings except on long drives.      05/5/2020  244lbs  07/22/2020 247  09/28/2020 245  03/29/2021 235  04/30/2021 231  06/28/2021 231  12/28/2021 230  06/28/2022 227  09/28/2022 231  01/12/2023 233        SPMHx:  DM: Jardiance 10mg, Januvia 100mg, not on metformin due to diarrhea Last A1C 6.0  is doing well.  HTN: Lisinopril 20mg, Metoprolol 25mg, is well controlled.  Hyperlipidemia: Atorvastatin 10 mg well controlled. LDL 59  Arthritis: On several chronic narcotics and follows up with pain management, for his neck and back pain pt is planning to have a procedure.  Anxiety: Takes Valium 5mg, PRN  A-fib: 2012 Was cardioverted, no longer in a-fib  Insomnia: Trazadone 100mg  Hx of DVT: Currently on Eliquis 5mg   BRANDI: Wears CPAP  Chronic constipation: On polyethylene Glycol 17g, manages well with this medication.     Specialists:  Cardiology: Dr Mcclelland  Pain Management: Michele Martinez  Ortho: Dr Dean  Podiatry: Dr Mandujano  Sleep: Dr Felice Bello  GS: Dr Thorne     Smoke: Quit in 1988  ETOH: None  Exercise: Never    Hemoglobin A1C 4.5 - 6.2 % 6.0      Lipids  Cholesterol: 123   Triglycerides:  133   HDL: 40   LDL: 56    Diabetes  He presents for his follow-up diabetic visit. He has type 2 diabetes mellitus. His disease course has been stable. There are no hypoglycemic associated symptoms. Pertinent negatives for hypoglycemia include no confusion or headaches. Associated symptoms include weakness. Pertinent negatives for  diabetes include no chest pain, no fatigue, no polydipsia and no polyuria. There are no hypoglycemic complications. Risk factors for coronary artery disease include diabetes mellitus, dyslipidemia, hypertension, male sex, obesity and sedentary lifestyle.   Hypertension  This is a chronic problem. The current episode started more than 1 year ago. The problem is unchanged. The problem is controlled. Associated symptoms include neck pain. Pertinent negatives include no anxiety, chest pain, headaches or palpitations. There are no associated agents to hypertension. There are no known risk factors for coronary artery disease. Past treatments include beta blockers and ACE inhibitors. The current treatment provides moderate improvement. Compliance problems include exercise and diet.    Arthritis  Presents for follow-up visit. He complains of pain and stiffness. He reports no joint swelling. The symptoms have been stable. Affected locations include the right knee, left knee, right hip and left hip. His pain is at a severity of 4/10. Pertinent negatives include no diarrhea or fatigue.           Past Medical History:   Diagnosis Date    Allergy     Ankle pain     left    Anticoagulant long-term use     aspirin    Anxiety     Atrial fibrillation     Back pain     Bruises easily     Cataract     Clotting disorder     left leg    Colon polyp     Diabetes mellitus     Diabetes mellitus, type 2     Hay fever     Hyperlipidemia     Hypertension     Left hip pain 2021    Seeing Dr. Beard for hip pain     Obese     BRANDI on CPAP      Social History     Socioeconomic History    Marital status:    Tobacco Use    Smoking status: Former     Types: Cigarettes     Quit date: 2006     Years since quittin.8    Smokeless tobacco: Never    Tobacco comments:     ex smoker    Substance and Sexual Activity    Alcohol use: Yes     Comment: rarely    Drug use: No    Sexual activity: Yes     Partners: Female     Social  Determinants of Health     Financial Resource Strain: Low Risk     Difficulty of Paying Living Expenses: Not hard at all   Food Insecurity: No Food Insecurity    Worried About Running Out of Food in the Last Year: Never true    Ran Out of Food in the Last Year: Never true   Transportation Needs: No Transportation Needs    Lack of Transportation (Medical): No    Lack of Transportation (Non-Medical): No   Physical Activity: Inactive    Days of Exercise per Week: 0 days    Minutes of Exercise per Session: 0 min   Stress: No Stress Concern Present    Feeling of Stress : Not at all   Social Connections: Unknown    Frequency of Communication with Friends and Family: Once a week    Frequency of Social Gatherings with Friends and Family: Patient refused    Active Member of Clubs or Organizations: No    Attends Club or Organization Meetings: Never    Marital Status:    Housing Stability: Low Risk     Unable to Pay for Housing in the Last Year: No    Number of Places Lived in the Last Year: 1    Unstable Housing in the Last Year: No     Past Surgical History:   Procedure Laterality Date    ABDOMINAL SURGERY      origunal surg 1986-mult surgeries since    CARPAL TUNNEL RELEASE      right wrist 2009-left wrist 2010    COLON SURGERY      partial colon removal    COLONOSCOPY  11/26/2018    Dr. Salazar; normal terminal ileum; polyps removed from ascending colon and hepatic flexure; pan diverticulosis; small internal hemorrhoids; repeat in 5 years; Bx: fragments of an adenomatous polyp with mild surface glandular dysplasia and associated chronic active inflammation, no evidence of high-grade dysplasia or malignancy    COLOSTOMY  1986    colostomy reversal  1986    EYE SURGERY      hay fever      HERNIA REPAIR  1949    REPAIR OF TENDON OF LOWER EXTREMITY Left 6/24/2020    Procedure: REPAIR, TENDON, LOWER EXTREMITY;  Surgeon: Justin Mandujano DPM;  [place] [facility];  Service: Podiatry;  Laterality: Left;  ARTHEX NOTIFIED  IN CASE HE WANTS ANCHOR    TOE SURGERY Left 2017    replaced a joint     TONSILLECTOMY  1947    TRANSFORAMINAL EPIDURAL INJECTION OF STEROID      x 4    TRANSFORAMINAL EPIDURAL INJECTION OF STEROID Bilateral 11/13/2019    Procedure: Injection,steroid,epidural,transforaminal approach;  Surgeon: Grant Wright MD;  [place] [facility] OR;  Service: Pain Management;  Laterality: Bilateral;  L4-5, L5-S1    TRANSFORAMINAL EPIDURAL INJECTION OF STEROID Bilateral 3/9/2021    Procedure: Injection,steroid,epidural,transforaminal approach;  Surgeon: Grant Wright MD;  [place] [facility] OR;  Service: Pain Management;  Laterality: Bilateral;  L4-5, L5-S1    TRANSFORAMINAL EPIDURAL INJECTION OF STEROID Bilateral 5/25/2021    Procedure: Injection,steroid,epidural,transforaminal approach;  Surgeon: Grant Wright MD;  [place] [facility] OR;  Service: Pain Management;  Laterality: Bilateral;  L4-L5,L5,S1    TRANSFORAMINAL EPIDURAL INJECTION OF STEROID Bilateral 12/14/2021    Procedure: Injection,steroid,epidural,transforaminal approach Bilateral L4-5, L5-S1;  Surgeon: Grant Wright MD;  [place] [facility] OR;  Service: Pain Management;  Laterality: Bilateral;    TRANSFORAMINAL EPIDURAL INJECTION OF STEROID Bilateral 11/4/2022    Procedure: Injection,steroid,epidural,transforaminal approach;  Surgeon: Grant Wright MD;  [place] [facility] OR;  Service: Pain Management;  Laterality: Bilateral;  L4-5 and L5-s1     Family History   Problem Relation Age of Onset    Heart disease Mother     Melanoma Neg Hx     Psoriasis Neg Hx     Lupus Neg Hx     Eczema Neg Hx      Current Outpatient Medications   Medication Sig Dispense Refill    apixaban (ELIQUIS) 5 mg Tab Take 1 tablet (5 mg total) by mouth 2 (two) times daily. 180 tablet 3    apixaban (ELIQUIS) 5 mg Tab   See dose instructions in comments, # 60 EA, 2 total refill(s), Acute, MITRA [Federal Rx: #180 last filled 01/04/23]      aspirin 81 MG Chew Take 1 tablet (81 mg total) by mouth once daily. 100 tablet 1     atorvastatin (LIPITOR) 10 MG tablet nightly (Patient taking differently: Take 10 mg by mouth every evening. nightly) 90 tablet 3    azelastine (ASTELIN) 137 mcg (0.1 %) nasal spray 2 sprays (274 mcg total) by Nasal route 2 (two) times daily. 30 mL 5    blood sugar diagnostic (BLOOD GLUCOSE TEST) Strp 1 strip by Misc.(Non-Drug; Combo Route) route 2 (two) times a day. FREESTYLE LITE BG TEST STRIPS. current use of insulin  - Primary ICD-10-CM: E11.9 200 each 2    cholecalciferol, vitamin D3, 1,250 mcg (50,000 unit) capsule Take 50,000 Units by mouth every Sunday.      fexofenadine (ALLEGRA) 180 MG tablet Take 1 tablet (180 mg total) by mouth once daily. 90 tablet 3    FREESTYLE LANCETS 28 gauge lancets Inject 1 lancet into the skin 3 (three) times daily. 100 each 3    furosemide (LASIX) 20 MG tablet Take 1 tablet (20 mg total) by mouth every Mon, Wed, Fri. 45 tablet 3    gabapentin (NEURONTIN) 300 MG capsule Take 1 capsule (300 mg total) by mouth 3 (three) times daily. 270 capsule 1    JARDIANCE 10 mg tablet Take 1 tablet (10 mg total) by mouth once daily. 90 tablet 1    ketoconazole (NIZORAL) 2 % cream Apply to face after shaving 60 g 2    lisinopriL (PRINIVIL,ZESTRIL) 20 MG tablet Take 1 tablet (20 mg total) by mouth once daily. 90 tablet 3    methocarbamoL (ROBAXIN) 500 MG Tab Take 1 tablet (500 mg total) by mouth 4 (four) times daily as needed (muscle spasms of lower back). 120 tablet 0    metoprolol tartrate (LOPRESSOR) 25 MG tablet Take 1 tablet (25 mg total) by mouth 2 (two) times daily. 180 tablet 3    montelukast (SINGULAIR) 10 mg tablet Take 1 tablet (10 mg total) by mouth every evening. 90 tablet 3    polyethylene glycol (GLYCOLAX) 17 gram/dose powder Take 17 g by mouth once daily. 507 g 3    potassium chloride (MICRO-K) 10 MEQ CpSR Take 1 capsule (10 mEq total) by mouth every other day. 90 capsule 1    SITagliptin phosphate (JANUVIA) 100 MG Tab Take 1 tablet (100 mg total) by mouth once daily. 90 tablet 3     traZODone (DESYREL) 100 MG tablet Take 1 tablet (100 mg total) by mouth every evening. 90 tablet 3    triamcinolone acetonide 0.1% (KENALOG) 0.1 % cream Apply topically 2 (two) times daily. 80 g 0    HYDROcodone-acetaminophen (NORCO)  mg per tablet Take 1 tablet by mouth every 6 (six) hours as needed for Pain. 30 tablet 0    [START ON 2/12/2023] HYDROcodone-acetaminophen (NORCO)  mg per tablet Take 1 tablet by mouth every 6 (six) hours as needed for Pain. 30 tablet 0    [START ON 3/12/2023] HYDROcodone-acetaminophen (NORCO)  mg per tablet Take 1 tablet by mouth every 6 (six) hours as needed for Pain. 30 tablet 0     No current facility-administered medications for this visit.     Facility-Administered Medications Ordered in Other Visits   Medication Dose Route Frequency Provider Last Rate Last Admin    lactated ringers infusion   Intravenous Once PRN Grant Wright MD   Stopped at 11/04/22 1230     Review of patient's allergies indicates:   Allergen Reactions    Ativan [lorazepam] Other (See Comments)     Mood alternating      Dilaudid [hydromorphone (bulk)] Other (See Comments)     Mood alternating      Morphine Other (See Comments)     Mood alternating      Adhesive tape-silicones     Hydromorphone        Review of Systems   Constitutional:  Positive for activity change. Negative for diaphoresis, fatigue and unexpected weight change.   HENT:  Negative for congestion, hearing loss, rhinorrhea and trouble swallowing.    Eyes:  Negative for discharge and visual disturbance.   Respiratory:  Negative for chest tightness and wheezing.    Cardiovascular:  Negative for chest pain and palpitations.   Gastrointestinal:  Negative for blood in stool, constipation, diarrhea and vomiting.   Endocrine: Negative for polydipsia and polyuria.   Genitourinary:  Negative for difficulty urinating, flank pain, frequency, hematuria and urgency.   Musculoskeletal:  Positive for arthralgias, arthritis, neck pain and  "stiffness. Negative for joint swelling.   Neurological:  Positive for weakness. Negative for headaches.   Psychiatric/Behavioral:  Negative for confusion and dysphoric mood.         Blood pressure 126/78, pulse 64, height 5' 7" (1.702 m), weight 105.8 kg (233 lb 4.8 oz), SpO2 96 %. Body mass index is 36.54 kg/m².   Objective:      Physical Exam  Vitals reviewed.   Constitutional:       General: He is not in acute distress.     Appearance: Normal appearance. He is obese. He is not ill-appearing or toxic-appearing.   HENT:      Head: Normocephalic and atraumatic.      Right Ear: Tympanic membrane normal.      Left Ear: Tympanic membrane normal.      Nose: Nose normal. No congestion or rhinorrhea.      Mouth/Throat:      Mouth: Mucous membranes are moist.      Pharynx: Oropharynx is clear. No oropharyngeal exudate or posterior oropharyngeal erythema.   Cardiovascular:      Rate and Rhythm: Normal rate and regular rhythm.      Heart sounds: Normal heart sounds. No murmur heard.  Pulmonary:      Effort: Pulmonary effort is normal.      Breath sounds: Normal breath sounds.   Skin:     General: Skin is warm and dry.      Capillary Refill: Capillary refill takes less than 2 seconds.   Neurological:      Mental Status: He is alert and oriented to person, place, and time.           Assessment:       1. Primary hypertension    2. Lumbar radiculitis    3. Type 2 diabetes mellitus without complication, without long-term current use of insulin         Plan:           Primary hypertension  Reduce the amount of salt in your diet; Lose weight; Avoid drinking too much alcohol; Exercise at least 30 minutes per day most days of the week.  Continue current medications and home BP monitoring.  Lumbar radiculitis  -     HYDROcodone-acetaminophen (NORCO)  mg per tablet; Take 1 tablet by mouth every 6 (six) hours as needed for Pain.  Dispense: 30 tablet; Refill: 0  -     HYDROcodone-acetaminophen (NORCO)  mg per tablet; Take 1 " tablet by mouth every 6 (six) hours as needed for Pain.  Dispense: 30 tablet; Refill: 0  -     HYDROcodone-acetaminophen (NORCO)  mg per tablet; Take 1 tablet by mouth every 6 (six) hours as needed for Pain.  Dispense: 30 tablet; Refill: 0    Type 2 diabetes mellitus without complication, without long-term current use of insulin  Explained excessive time spent in sedentary behavior, such as sitting, is associated with deleterious health outcomes including abnormal glucose metabolism and increased mortality. Reducing sedentary time, independent of physical activity, has known cardiovascular, metabolic, and functional benefits in diabetic adults. Any amount of exercise is better than being sedentary.

## 2023-01-20 ENCOUNTER — HOSPITAL ENCOUNTER (OUTPATIENT)
Facility: AMBULARY SURGERY CENTER | Age: 83
Discharge: HOME | End: 2023-01-20
Attending: ANESTHESIOLOGY | Admitting: ANESTHESIOLOGY
Payer: MEDICARE

## 2023-01-20 DIAGNOSIS — M54.16 LUMBAR RADICULITIS: ICD-10-CM

## 2023-01-20 PROCEDURE — 64483 NJX AA&/STRD TFRM EPI L/S 1: CPT | Mod: RT | Performed by: ANESTHESIOLOGY

## 2023-01-20 PROCEDURE — 64484 NJX AA&/STRD TFRM EPI L/S EA: CPT | Mod: 50,,, | Performed by: ANESTHESIOLOGY

## 2023-01-20 PROCEDURE — 64483 PR EPIDURAL INJ, ANES/STEROID, TRANSFORAMINAL, LUMB/SACR, SNGL LEVL: ICD-10-PCS | Mod: 50,,, | Performed by: ANESTHESIOLOGY

## 2023-01-20 PROCEDURE — 64484 PRA INJECT ANES/STEROID FORAMEN LUMBAR/SACRAL W IMG GUIDE ,EA ADD LEVEL: ICD-10-PCS | Mod: 50,,, | Performed by: ANESTHESIOLOGY

## 2023-01-20 PROCEDURE — 64484 NJX AA&/STRD TFRM EPI L/S EA: CPT | Mod: RT | Performed by: ANESTHESIOLOGY

## 2023-01-20 PROCEDURE — 64483 NJX AA&/STRD TFRM EPI L/S 1: CPT | Mod: 50,,, | Performed by: ANESTHESIOLOGY

## 2023-01-20 RX ORDER — LIDOCAINE HYDROCHLORIDE 10 MG/ML
INJECTION, SOLUTION EPIDURAL; INFILTRATION; INTRACAUDAL; PERINEURAL
Status: DISCONTINUED | OUTPATIENT
Start: 2023-01-20 | End: 2023-01-20 | Stop reason: HOSPADM

## 2023-01-20 RX ORDER — MIDAZOLAM HYDROCHLORIDE 1 MG/ML
INJECTION INTRAMUSCULAR; INTRAVENOUS
Status: DISCONTINUED | OUTPATIENT
Start: 2023-01-20 | End: 2023-01-20 | Stop reason: HOSPADM

## 2023-01-20 RX ORDER — SODIUM CHLORIDE, SODIUM LACTATE, POTASSIUM CHLORIDE, CALCIUM CHLORIDE 600; 310; 30; 20 MG/100ML; MG/100ML; MG/100ML; MG/100ML
INJECTION, SOLUTION INTRAVENOUS ONCE AS NEEDED
Status: COMPLETED | OUTPATIENT
Start: 2023-01-20 | End: 2023-01-20

## 2023-01-20 RX ORDER — BUPIVACAINE HYDROCHLORIDE 2.5 MG/ML
INJECTION, SOLUTION EPIDURAL; INFILTRATION; INTRACAUDAL
Status: DISCONTINUED | OUTPATIENT
Start: 2023-01-20 | End: 2023-01-20 | Stop reason: HOSPADM

## 2023-01-20 RX ORDER — DEXAMETHASONE SODIUM PHOSPHATE 10 MG/ML
INJECTION INTRAMUSCULAR; INTRAVENOUS
Status: DISCONTINUED | OUTPATIENT
Start: 2023-01-20 | End: 2023-01-20 | Stop reason: HOSPADM

## 2023-01-20 RX ORDER — FENTANYL CITRATE 50 UG/ML
INJECTION, SOLUTION INTRAMUSCULAR; INTRAVENOUS
Status: DISCONTINUED | OUTPATIENT
Start: 2023-01-20 | End: 2023-01-20 | Stop reason: HOSPADM

## 2023-01-20 RX ADMIN — SODIUM CHLORIDE, SODIUM LACTATE, POTASSIUM CHLORIDE, CALCIUM CHLORIDE: 600; 310; 30; 20 INJECTION, SOLUTION INTRAVENOUS at 09:01

## 2023-01-20 NOTE — OP NOTE
PROCEDURE DATE: 1/20/2023    PROCEDURE: Bilateral L4-5 and L5-s1 transforaminal epidural steroid injection under fluoroscopy    DIAGNOSIS: Lumbar radiculitis    Post op diagnosis: Same    PHYSICIAN: Grant Wright MD    MEDICATIONS INJECTED:  Dexamethasone 2.5mg  and 0.5ml 0.25% bupivicaine at each nerve root.     LOCAL ANESTHETIC INJECTED:  Lidocaine 1%. 2 ml per site.    SEDATION MEDICATIONS: RN IV sedation    ESTIMATED BLOOD LOSS:  None    COMPLICATIONS:  None    TECHNIQUE:   A time-out was taken to identify patient and procedure side prior to starting the procedure. The patient was placed in a prone position, prepped and draped in the usual sterile fashion using ChloraPrep and sterile towels.  The area to be injected was determined under fluoroscopic guidance in AP and oblique view.  Local anesthetic was given by raising a wheal and going down to the hub of a 25-gauge 1.5 inch needle.  In oblique view, a 5 inch 22-gauge bent-tip spinal needle was introduced towards 6 oclock position of the pedicle of each above named nerve root level.  The needle was walked medially then hinged into the neural foramen and position was confirmed in AP and lateral views.  1ml contrast dye was injected to confirm appropriate placement and that there was no vascular uptake.  After negative aspiration for blood or CSF, the medication was then injected. This was performed at the bilateral L4-5 and L5-S1 level(s). The patient tolerated the procedure well.    The patient was monitored after the procedure.  Patient was given post procedure and discharge instructions to follow at home. The patient was discharged in a stable condition.

## 2023-01-20 NOTE — OR NURSING
Pt checked blood sugar at home just before arrival with result of 108 mg/dl. Bedside glucose check therefore deferred

## 2023-01-20 NOTE — DISCHARGE SUMMARY
[facility]  Discharge Note  Short Stay    Procedure(s) (LRB):  Injection,steroid,epidural,transforaminal approach L4-L5-S1 (Bilateral)      OUTCOME: Patient tolerated treatment/procedure well without complication and is now ready for discharge.    DISPOSITION: Home or Self Care    FINAL DIAGNOSIS:  <principal problem not specified>    FOLLOWUP: In clinic    DISCHARGE INSTRUCTIONS:    Discharge Procedure Orders   Notify your health care provider if you experience any of the following:  temperature >100.4     Notify your health care provider if you experience any of the following:  severe uncontrolled pain     Notify your health care provider if you experience any of the following:  redness, tenderness, or signs of infection (pain, swelling, redness, odor or green/yellow discharge around incision site)     Activity as tolerated        TIME SPENT ON DISCHARGE: 30 minutes

## 2023-01-24 ENCOUNTER — TELEPHONE (OUTPATIENT)
Dept: CARDIOLOGY | Facility: CLINIC | Age: 83
End: 2023-01-24
Payer: MEDICARE

## 2023-01-25 VITALS
WEIGHT: 233.25 LBS | TEMPERATURE: 97 F | DIASTOLIC BLOOD PRESSURE: 54 MMHG | HEART RATE: 53 BPM | SYSTOLIC BLOOD PRESSURE: 118 MMHG | HEIGHT: 67 IN | OXYGEN SATURATION: 91 % | RESPIRATION RATE: 16 BRPM | BODY MASS INDEX: 36.61 KG/M2

## 2023-01-26 ENCOUNTER — OFFICE VISIT (OUTPATIENT)
Dept: ORTHOPEDICS | Facility: CLINIC | Age: 83
End: 2023-01-26
Payer: MEDICARE

## 2023-01-26 VITALS
BODY MASS INDEX: 35.46 KG/M2 | DIASTOLIC BLOOD PRESSURE: 68 MMHG | SYSTOLIC BLOOD PRESSURE: 130 MMHG | WEIGHT: 234 LBS | HEIGHT: 68 IN

## 2023-01-26 DIAGNOSIS — M70.62 GREATER TROCHANTERIC BURSITIS OF LEFT HIP: Primary | ICD-10-CM

## 2023-01-26 DIAGNOSIS — M54.16 LUMBAR RADICULITIS: ICD-10-CM

## 2023-01-26 PROCEDURE — 99213 OFFICE O/P EST LOW 20 MIN: CPT | Mod: 25,S$GLB,, | Performed by: ORTHOPAEDIC SURGERY

## 2023-01-26 PROCEDURE — 20610 DRAIN/INJ JOINT/BURSA W/O US: CPT | Mod: LT,S$GLB,, | Performed by: ORTHOPAEDIC SURGERY

## 2023-01-26 PROCEDURE — 99213 PR OFFICE/OUTPT VISIT, EST, LEVL III, 20-29 MIN: ICD-10-PCS | Mod: 25,S$GLB,, | Performed by: ORTHOPAEDIC SURGERY

## 2023-01-26 PROCEDURE — 20610 LARGE JOINT ASPIRATION/INJECTION: L GREATER TROCHANTERIC BURSA: ICD-10-PCS | Mod: LT,S$GLB,, | Performed by: ORTHOPAEDIC SURGERY

## 2023-01-26 RX ORDER — METHYLPREDNISOLONE ACETATE 40 MG/ML
40 INJECTION, SUSPENSION INTRA-ARTICULAR; INTRALESIONAL; INTRAMUSCULAR; SOFT TISSUE
Status: DISCONTINUED | OUTPATIENT
Start: 2023-01-26 | End: 2023-01-26 | Stop reason: HOSPADM

## 2023-01-26 RX ADMIN — METHYLPREDNISOLONE ACETATE 40 MG: 40 INJECTION, SUSPENSION INTRA-ARTICULAR; INTRALESIONAL; INTRAMUSCULAR; SOFT TISSUE at 01:01

## 2023-01-26 NOTE — PROGRESS NOTES
[facility]    Subjective:     Chief Complaint:   Chief Complaint   Patient presents with    Left Hip - Pain     Let hip pain is side and towards the back, typically has back and leg pain, had JOSE which helped with the leg pain,        Past Medical History:   Diagnosis Date    Allergy     Ankle pain     left    Anticoagulant long-term use     aspirin    Anxiety     Atrial fibrillation     Back pain     Bruises easily     Cataract     Clotting disorder     left leg    Colon polyp     Diabetes mellitus     Diabetes mellitus, type 2     Hay fever     Hyperlipidemia     Hypertension     Left hip pain 12/26/2021    Seeing Dr. Beard for hip pain     Obese     BRANDI on CPAP        Past Surgical History:   Procedure Laterality Date    ABDOMINAL SURGERY      origunal surg 1986-mult surgeries since    CARPAL TUNNEL RELEASE      right wrist 2009-left wrist 2010    COLON SURGERY      partial colon removal    COLONOSCOPY  11/26/2018    Dr. Salazar; normal terminal ileum; polyps removed from ascending colon and hepatic flexure; pan diverticulosis; small internal hemorrhoids; repeat in 5 years; Bx: fragments of an adenomatous polyp with mild surface glandular dysplasia and associated chronic active inflammation, no evidence of high-grade dysplasia or malignancy    COLOSTOMY  1986    colostomy reversal  1986    EYE SURGERY      hay fever      HERNIA REPAIR  1949    REPAIR OF TENDON OF LOWER EXTREMITY Left 6/24/2020    Procedure: REPAIR, TENDON, LOWER EXTREMITY;  Surgeon: Justin Mandujano DPM;  [place] [facility] OR;  Service: Podiatry;  Laterality: Left;  ARTHEX NOTIFIED IN CASE HE WANTS ANCHOR    TOE SURGERY Left 2017    replaced a joint     TONSILLECTOMY  1947    TRANSFORAMINAL EPIDURAL INJECTION OF STEROID      x 4    TRANSFORAMINAL EPIDURAL INJECTION OF STEROID Bilateral 11/13/2019    Procedure: Injection,steroid,epidural,transforaminal approach;  Surgeon: Grant Wright MD;  [place] [facility] OR;  Service: Pain  Management;  Laterality: Bilateral;  L4-5, L5-S1    TRANSFORAMINAL EPIDURAL INJECTION OF STEROID Bilateral 3/9/2021    Procedure: Injection,steroid,epidural,transforaminal approach;  Surgeon: Grant Wright MD;  [place] [facility] OR;  Service: Pain Management;  Laterality: Bilateral;  L4-5, L5-S1    TRANSFORAMINAL EPIDURAL INJECTION OF STEROID Bilateral 5/25/2021    Procedure: Injection,steroid,epidural,transforaminal approach;  Surgeon: Grant Wright MD;  [place] [facility] OR;  Service: Pain Management;  Laterality: Bilateral;  L4-L5,L5,S1    TRANSFORAMINAL EPIDURAL INJECTION OF STEROID Bilateral 12/14/2021    Procedure: Injection,steroid,epidural,transforaminal approach Bilateral L4-5, L5-S1;  Surgeon: Grant Wright MD;  [place] [facility] OR;  Service: Pain Management;  Laterality: Bilateral;    TRANSFORAMINAL EPIDURAL INJECTION OF STEROID Bilateral 11/4/2022    Procedure: Injection,steroid,epidural,transforaminal approach;  Surgeon: Grant Wright MD;  [place] [facility] OR;  Service: Pain Management;  Laterality: Bilateral;  L4-5 and L5-s1    TRANSFORAMINAL EPIDURAL INJECTION OF STEROID Bilateral 1/20/2023    Procedure: Injection,steroid,epidural,transforaminal approach L4-L5-S1;  Surgeon: Grant Wright MD;  [place] [facility] OR;  Service: Pain Management;  Laterality: Bilateral;       Current Outpatient Medications   Medication Sig    apixaban (ELIQUIS) 5 mg Tab Take 1 tablet (5 mg total) by mouth 2 (two) times daily.    aspirin 81 MG Chew Take 1 tablet (81 mg total) by mouth once daily.    azelastine (ASTELIN) 137 mcg (0.1 %) nasal spray 2 sprays (274 mcg total) by Nasal route 2 (two) times daily.    blood sugar diagnostic (BLOOD GLUCOSE TEST) Strp 1 strip by Misc.(Non-Drug; Combo Route) route 2 (two) times a day. FREESTYLE LITE BG TEST STRIPS. current use of insulin  - Primary ICD-10-CM: E11.9    cholecalciferol, vitamin D3, 1,250 mcg (50,000 unit) capsule Take 50,000 Units by mouth every Sunday.    fexofenadine (ALLEGRA) 180 MG tablet  Take 1 tablet (180 mg total) by mouth once daily.    FREESTYLE LANCETS 28 gauge lancets Inject 1 lancet into the skin 3 (three) times daily.    furosemide (LASIX) 20 MG tablet Take 1 tablet (20 mg total) by mouth every Mon, Wed, Fri.    gabapentin (NEURONTIN) 300 MG capsule Take 1 capsule (300 mg total) by mouth 3 (three) times daily.    HYDROcodone-acetaminophen (NORCO)  mg per tablet Take 1 tablet by mouth every 6 (six) hours as needed for Pain.    JARDIANCE 10 mg tablet Take 1 tablet (10 mg total) by mouth once daily.    ketoconazole (NIZORAL) 2 % cream Apply to face after shaving    lisinopriL (PRINIVIL,ZESTRIL) 20 MG tablet Take 1 tablet (20 mg total) by mouth once daily.    methocarbamoL (ROBAXIN) 500 MG Tab Take 1 tablet (500 mg total) by mouth 4 (four) times daily as needed (muscle spasms of lower back).    metoprolol tartrate (LOPRESSOR) 25 MG tablet Take 1 tablet (25 mg total) by mouth 2 (two) times daily.    montelukast (SINGULAIR) 10 mg tablet Take 1 tablet (10 mg total) by mouth every evening.    polyethylene glycol (GLYCOLAX) 17 gram/dose powder Take 17 g by mouth once daily.    potassium chloride (MICRO-K) 10 MEQ CpSR Take 1 capsule (10 mEq total) by mouth every other day.    SITagliptin phosphate (JANUVIA) 100 MG Tab Take 1 tablet (100 mg total) by mouth once daily.    traZODone (DESYREL) 100 MG tablet Take 1 tablet (100 mg total) by mouth every evening.    triamcinolone acetonide 0.1% (KENALOG) 0.1 % cream Apply topically 2 (two) times daily.    [START ON 2/12/2023] HYDROcodone-acetaminophen (NORCO)  mg per tablet Take 1 tablet by mouth every 6 (six) hours as needed for Pain.    [START ON 3/12/2023] HYDROcodone-acetaminophen (NORCO)  mg per tablet Take 1 tablet by mouth every 6 (six) hours as needed for Pain.     No current facility-administered medications for this visit.     Facility-Administered Medications Ordered in Other Visits   Medication    lactated ringers infusion        Review of patient's allergies indicates:   Allergen Reactions    Ativan [lorazepam] Other (See Comments)     Mood alternating      Dilaudid [hydromorphone (bulk)] Other (See Comments)     Mood alternating      Morphine Other (See Comments)     Mood alternating      Adhesive tape-silicones     Hydromorphone        Family History   Problem Relation Age of Onset    Heart disease Mother     Melanoma Neg Hx     Psoriasis Neg Hx     Lupus Neg Hx     Eczema Neg Hx        Social History     Socioeconomic History    Marital status:    Tobacco Use    Smoking status: Former     Types: Cigarettes     Quit date: 2006     Years since quittin.9    Smokeless tobacco: Never    Tobacco comments:     ex smoker    Substance and Sexual Activity    Alcohol use: Yes     Comment: rarely    Drug use: No    Sexual activity: Yes     Partners: Female     Social Determinants of Health     Financial Resource Strain: Low Risk     Difficulty of Paying Living Expenses: Not hard at all   Food Insecurity: No Food Insecurity    Worried About Running Out of Food in the Last Year: Never true    Ran Out of Food in the Last Year: Never true   Transportation Needs: No Transportation Needs    Lack of Transportation (Medical): No    Lack of Transportation (Non-Medical): No   Physical Activity: Inactive    Days of Exercise per Week: 0 days    Minutes of Exercise per Session: 0 min   Stress: No Stress Concern Present    Feeling of Stress : Not at all   Social Connections: Unknown    Frequency of Communication with Friends and Family: Once a week    Frequency of Social Gatherings with Friends and Family: Patient refused    Active Member of Clubs or Organizations: No    Attends Club or Organization Meetings: Never    Marital Status:    Housing Stability: Low Risk     Unable to Pay for Housing in the Last Year: No    Number of Places Lived in the Last Year: 1    Unstable Housing in the Last Year: No       History of present  illness:  Patient returns today for the left hip.  He is complaining of pain right over the trochanteric bursa.  He did have a fall several months ago and had a big hematoma there the.  That has resolved.      Review of Systems:    Constitution: Negative for chills, fever, and sweats.  Negative for unexplained weight loss.    HENT:  Negative for headaches and blurry vision.    Cardiovascular:Negative for chest pain or irregular heart beat. Negative for hypertension.    Respiratory:  Negative for cough and shortness of breath.    Gastrointestinal: Negative for abdominal pain, heartburn, melena, nausea, and vomitting.    Genitourinary:  Negative bladder incontinence and dysuria.    Musculoskeletal:  See HPI for details.     Neurological: Negative for numbness.    Psychiatric/Behavioral: Negative for depression.  The patient is not nervous/anxious.      Endocrine: Negative for polyuria    Hematologic/Lymphatic: Negative for bleeding problem.  Does not bruise/bleed easily.    Skin: Negative for poor would healing and rash    Objective:      Physical Examination:    Vital Signs:    Vitals:    01/26/23 1247   BP: 130/68       Body mass index is 35.58 kg/m².    This a well-developed, well nourished patient in no acute distress.  They are alert and oriented and cooperative to examination.        Patient is tender over the greater trochanter.  No groin pain with rigorous motion of the hip negative straight leg raises EHL is intact deep tendon reflexes are intact  Pertinent New Results:    XRAY Report / Interpretation:       Assessment/Plan:      Trochanteric bursitis I injected the left trochanteric bursa with Depo-Medrol and lidocaine.  He will follow-up p.r.n.      This note was created using Dragon voice recognition software that occasionally misinterpreted phrases or words.

## 2023-01-26 NOTE — PROCEDURES
Large Joint Aspiration/Injection: L greater trochanteric bursa    Date/Time: 1/26/2023 1:00 PM  Performed by: Moses Beard MD  Authorized by: Moses Beard MD     Consent Done?:  Yes (Verbal)  Indications:  Arthritis and pain  Site marked: the procedure site was marked    Timeout: prior to procedure the correct patient, procedure, and site was verified    Prep: patient was prepped and draped in usual sterile fashion      Local anesthesia used?: Yes    Local anesthetic:  Lidocaine 1% without epinephrine    Details:  Needle Size:  25 G  Ultrasonic Guidance for needle placement?: No    [place]  Hip  Site:  L greater trochanteric bursa  Medications:  40 mg methylPREDNISolone acetate 40 mg/mL  Patient tolerance:  Patient tolerated the procedure well with no immediate complications

## 2023-02-01 ENCOUNTER — HOSPITAL ENCOUNTER (INPATIENT)
Facility: HOSPITAL | Age: 83
LOS: 2 days | Discharge: HOME | DRG: 390 | End: 2023-02-04
Attending: STUDENT IN AN ORGANIZED HEALTH CARE EDUCATION/TRAINING PROGRAM | Admitting: INTERNAL MEDICINE
Payer: MEDICARE

## 2023-02-01 DIAGNOSIS — I10 ESSENTIAL HYPERTENSION: ICD-10-CM

## 2023-02-01 DIAGNOSIS — K56.609 SMALL BOWEL OBSTRUCTION: Primary | ICD-10-CM

## 2023-02-01 DIAGNOSIS — R09.02 HYPOXIA: ICD-10-CM

## 2023-02-01 PROCEDURE — 99285 EMERGENCY DEPT VISIT HI MDM: CPT

## 2023-02-01 PROCEDURE — 93005 ELECTROCARDIOGRAM TRACING: CPT | Performed by: INTERNAL MEDICINE

## 2023-02-01 PROCEDURE — 93010 ELECTROCARDIOGRAM REPORT: CPT | Mod: ,,, | Performed by: INTERNAL MEDICINE

## 2023-02-01 PROCEDURE — 93010 EKG 12-LEAD: ICD-10-PCS | Mod: ,,, | Performed by: INTERNAL MEDICINE

## 2023-02-02 LAB
ALBUMIN SERPL BCP-MCNC: 4.3 G/DL (ref 3.5–5.2)
ALLENS TEST: ABNORMAL
ALP SERPL-CCNC: 38 U/L (ref 55–135)
ALT SERPL W/O P-5'-P-CCNC: 38 U/L (ref 10–44)
ANION GAP SERPL CALC-SCNC: 11 MMOL/L (ref 8–16)
AST SERPL-CCNC: 26 U/L (ref 10–40)
BACTERIA #/AREA URNS HPF: NEGATIVE /HPF
BASOPHILS # BLD AUTO: 0.03 K/UL (ref 0–0.2)
BASOPHILS NFR BLD: 0.2 % (ref 0–1.9)
BILIRUB SERPL-MCNC: 1.1 MG/DL (ref 0.1–1)
BILIRUB UR QL STRIP: NEGATIVE
BNP SERPL-MCNC: 36 PG/ML (ref 0–99)
BUN SERPL-MCNC: 29 MG/DL (ref 8–23)
CALCIUM SERPL-MCNC: 8.9 MG/DL (ref 8.7–10.5)
CHLORIDE SERPL-SCNC: 103 MMOL/L (ref 95–110)
CLARITY UR: CLEAR
CO2 SERPL-SCNC: 26 MMOL/L (ref 23–29)
COLOR UR: YELLOW
CREAT SERPL-MCNC: 1.3 MG/DL (ref 0.5–1.4)
CREAT SERPL-MCNC: 1.3 MG/DL (ref 0.5–1.4)
DELSYS: ABNORMAL
DIFFERENTIAL METHOD: ABNORMAL
EOSINOPHIL # BLD AUTO: 0.1 K/UL (ref 0–0.5)
EOSINOPHIL NFR BLD: 0.7 % (ref 0–8)
ERYTHROCYTE [DISTWIDTH] IN BLOOD BY AUTOMATED COUNT: 14.1 % (ref 11.5–14.5)
EST. GFR  (NO RACE VARIABLE): 55.2 ML/MIN/1.73 M^2
GLUCOSE SERPL-MCNC: 177 MG/DL (ref 70–110)
GLUCOSE UR QL STRIP: ABNORMAL
HCO3 UR-SCNC: 27.7 MMOL/L (ref 24–28)
HCT VFR BLD AUTO: 51.9 % (ref 40–54)
HGB BLD-MCNC: 16.5 G/DL (ref 14–18)
HGB UR QL STRIP: NEGATIVE
HYALINE CASTS #/AREA URNS LPF: 2 /LPF
IMM GRANULOCYTES # BLD AUTO: 0.07 K/UL (ref 0–0.04)
IMM GRANULOCYTES NFR BLD AUTO: 0.5 % (ref 0–0.5)
KETONES UR QL STRIP: NEGATIVE
LACTATE SERPL-SCNC: 1.9 MMOL/L (ref 0.5–1.9)
LACTATE SERPL-SCNC: 2.2 MMOL/L (ref 0.5–1.9)
LEUKOCYTE ESTERASE UR QL STRIP: NEGATIVE
LIPASE SERPL-CCNC: 31 U/L (ref 4–60)
LYMPHOCYTES # BLD AUTO: 0.7 K/UL (ref 1–4.8)
LYMPHOCYTES NFR BLD: 4.6 % (ref 18–48)
MCH RBC QN AUTO: 30.5 PG (ref 27–31)
MCHC RBC AUTO-ENTMCNC: 31.8 G/DL (ref 32–36)
MCV RBC AUTO: 96 FL (ref 82–98)
MICROSCOPIC COMMENT: ABNORMAL
MONOCYTES # BLD AUTO: 1.3 K/UL (ref 0.3–1)
MONOCYTES NFR BLD: 8.7 % (ref 4–15)
NEUTROPHILS # BLD AUTO: 12.9 K/UL (ref 1.8–7.7)
NEUTROPHILS NFR BLD: 85.3 % (ref 38–73)
NITRITE UR QL STRIP: NEGATIVE
NRBC BLD-RTO: 0 /100 WBC
PCO2 BLDA: 52.6 MMHG (ref 35–45)
PH SMN: 7.33 [PH] (ref 7.35–7.45)
PH UR STRIP: 6 [PH] (ref 5–8)
PLATELET # BLD AUTO: 151 K/UL (ref 150–450)
PMV BLD AUTO: 9.2 FL (ref 9.2–12.9)
PO2 BLDA: 27 MMHG (ref 40–60)
POC BE: 2 MMOL/L
POC SATURATED O2: 45 % (ref 95–100)
POC TCO2: 29 MMOL/L (ref 24–29)
POTASSIUM SERPL-SCNC: 4.3 MMOL/L (ref 3.5–5.1)
PROT SERPL-MCNC: 7.2 G/DL (ref 6–8.4)
PROT UR QL STRIP: ABNORMAL
RBC # BLD AUTO: 5.41 M/UL (ref 4.6–6.2)
RBC #/AREA URNS HPF: 1 /HPF (ref 0–4)
SAMPLE: ABNORMAL
SAMPLE: NORMAL
SITE: ABNORMAL
SODIUM SERPL-SCNC: 140 MMOL/L (ref 136–145)
SP GR UR STRIP: >1.03 (ref 1–1.03)
SQUAMOUS #/AREA URNS HPF: 1 /HPF
TROPONIN I SERPL HS-MCNC: 6.7 PG/ML (ref 0–14.9)
URN SPEC COLLECT METH UR: ABNORMAL
UROBILINOGEN UR STRIP-ACNC: NEGATIVE EU/DL
WBC # BLD AUTO: 15.13 K/UL (ref 3.9–12.7)
WBC #/AREA URNS HPF: 1 /HPF (ref 0–5)
YEAST URNS QL MICRO: ABNORMAL

## 2023-02-02 PROCEDURE — 94761 N-INVAS EAR/PLS OXIMETRY MLT: CPT

## 2023-02-02 PROCEDURE — 99900035 HC TECH TIME PER 15 MIN (STAT)

## 2023-02-02 PROCEDURE — 12000002 HC ACUTE/MED SURGE SEMI-PRIVATE ROOM

## 2023-02-02 PROCEDURE — 63600175 PHARM REV CODE 636 W HCPCS: Performed by: INTERNAL MEDICINE

## 2023-02-02 PROCEDURE — 83690 ASSAY OF LIPASE: CPT | Performed by: STUDENT IN AN ORGANIZED HEALTH CARE EDUCATION/TRAINING PROGRAM

## 2023-02-02 PROCEDURE — 83605 ASSAY OF LACTIC ACID: CPT | Performed by: STUDENT IN AN ORGANIZED HEALTH CARE EDUCATION/TRAINING PROGRAM

## 2023-02-02 PROCEDURE — 85025 COMPLETE CBC W/AUTO DIFF WBC: CPT | Performed by: STUDENT IN AN ORGANIZED HEALTH CARE EDUCATION/TRAINING PROGRAM

## 2023-02-02 PROCEDURE — 96361 HYDRATE IV INFUSION ADD-ON: CPT

## 2023-02-02 PROCEDURE — 96365 THER/PROPH/DIAG IV INF INIT: CPT

## 2023-02-02 PROCEDURE — 25500020 PHARM REV CODE 255: Performed by: STUDENT IN AN ORGANIZED HEALTH CARE EDUCATION/TRAINING PROGRAM

## 2023-02-02 PROCEDURE — 83605 ASSAY OF LACTIC ACID: CPT | Mod: 91 | Performed by: INTERNAL MEDICINE

## 2023-02-02 PROCEDURE — 94799 UNLISTED PULMONARY SVC/PX: CPT

## 2023-02-02 PROCEDURE — 80053 COMPREHEN METABOLIC PANEL: CPT | Performed by: STUDENT IN AN ORGANIZED HEALTH CARE EDUCATION/TRAINING PROGRAM

## 2023-02-02 PROCEDURE — 63600175 PHARM REV CODE 636 W HCPCS: Performed by: STUDENT IN AN ORGANIZED HEALTH CARE EDUCATION/TRAINING PROGRAM

## 2023-02-02 PROCEDURE — 25000003 PHARM REV CODE 250: Performed by: STUDENT IN AN ORGANIZED HEALTH CARE EDUCATION/TRAINING PROGRAM

## 2023-02-02 PROCEDURE — 83880 ASSAY OF NATRIURETIC PEPTIDE: CPT | Performed by: STUDENT IN AN ORGANIZED HEALTH CARE EDUCATION/TRAINING PROGRAM

## 2023-02-02 PROCEDURE — 84484 ASSAY OF TROPONIN QUANT: CPT | Performed by: STUDENT IN AN ORGANIZED HEALTH CARE EDUCATION/TRAINING PROGRAM

## 2023-02-02 PROCEDURE — 96375 TX/PRO/DX INJ NEW DRUG ADDON: CPT

## 2023-02-02 PROCEDURE — 99900031 HC PATIENT EDUCATION (STAT)

## 2023-02-02 PROCEDURE — 82803 BLOOD GASES ANY COMBINATION: CPT

## 2023-02-02 PROCEDURE — 25000003 PHARM REV CODE 250: Performed by: INTERNAL MEDICINE

## 2023-02-02 PROCEDURE — 81001 URINALYSIS AUTO W/SCOPE: CPT | Performed by: STUDENT IN AN ORGANIZED HEALTH CARE EDUCATION/TRAINING PROGRAM

## 2023-02-02 RX ORDER — MONTELUKAST SODIUM 10 MG/1
10 TABLET ORAL NIGHTLY
Status: DISCONTINUED | OUTPATIENT
Start: 2023-02-02 | End: 2023-02-04 | Stop reason: HOSPADM

## 2023-02-02 RX ORDER — POLYETHYLENE GLYCOL 3350 17 G/17G
17 POWDER, FOR SOLUTION ORAL 2 TIMES DAILY
Status: DISCONTINUED | OUTPATIENT
Start: 2023-02-02 | End: 2023-02-03

## 2023-02-02 RX ORDER — TRAZODONE HYDROCHLORIDE 50 MG/1
100 TABLET ORAL NIGHTLY
Status: DISCONTINUED | OUTPATIENT
Start: 2023-02-02 | End: 2023-02-04 | Stop reason: HOSPADM

## 2023-02-02 RX ORDER — ACETAMINOPHEN 500 MG
1000 TABLET ORAL
Status: DISCONTINUED | OUTPATIENT
Start: 2023-02-02 | End: 2023-02-02

## 2023-02-02 RX ORDER — AZELASTINE 1 MG/ML
2 SPRAY, METERED NASAL 2 TIMES DAILY
Status: DISCONTINUED | OUTPATIENT
Start: 2023-02-02 | End: 2023-02-04 | Stop reason: HOSPADM

## 2023-02-02 RX ORDER — SODIUM CHLORIDE, SODIUM LACTATE, POTASSIUM CHLORIDE, CALCIUM CHLORIDE 600; 310; 30; 20 MG/100ML; MG/100ML; MG/100ML; MG/100ML
INJECTION, SOLUTION INTRAVENOUS CONTINUOUS
Status: DISCONTINUED | OUTPATIENT
Start: 2023-02-02 | End: 2023-02-02

## 2023-02-02 RX ORDER — ONDANSETRON 2 MG/ML
4 INJECTION INTRAMUSCULAR; INTRAVENOUS EVERY 8 HOURS PRN
Status: DISCONTINUED | OUTPATIENT
Start: 2023-02-02 | End: 2023-02-04 | Stop reason: HOSPADM

## 2023-02-02 RX ORDER — ACETAMINOPHEN 10 MG/ML
1000 INJECTION, SOLUTION INTRAVENOUS ONCE
Status: COMPLETED | OUTPATIENT
Start: 2023-02-02 | End: 2023-02-02

## 2023-02-02 RX ORDER — ONDANSETRON 2 MG/ML
4 INJECTION INTRAMUSCULAR; INTRAVENOUS
Status: COMPLETED | OUTPATIENT
Start: 2023-02-02 | End: 2023-02-02

## 2023-02-02 RX ORDER — KETOROLAC TROMETHAMINE 30 MG/ML
15 INJECTION, SOLUTION INTRAMUSCULAR; INTRAVENOUS
Status: COMPLETED | OUTPATIENT
Start: 2023-02-02 | End: 2023-02-02

## 2023-02-02 RX ORDER — GABAPENTIN 300 MG/1
300 CAPSULE ORAL 3 TIMES DAILY
Status: DISCONTINUED | OUTPATIENT
Start: 2023-02-02 | End: 2023-02-04 | Stop reason: HOSPADM

## 2023-02-02 RX ORDER — ACETAMINOPHEN 325 MG/1
650 TABLET ORAL EVERY 8 HOURS PRN
Status: DISCONTINUED | OUTPATIENT
Start: 2023-02-02 | End: 2023-02-04 | Stop reason: HOSPADM

## 2023-02-02 RX ORDER — DICLOFENAC SODIUM 10 MG/G
2 GEL TOPICAL DAILY
COMMUNITY
End: 2023-02-24 | Stop reason: SDUPTHER

## 2023-02-02 RX ORDER — TALC
6 POWDER (GRAM) TOPICAL NIGHTLY PRN
Status: DISCONTINUED | OUTPATIENT
Start: 2023-02-02 | End: 2023-02-04 | Stop reason: HOSPADM

## 2023-02-02 RX ORDER — METOPROLOL TARTRATE 25 MG/1
25 TABLET, FILM COATED ORAL 2 TIMES DAILY
Status: DISCONTINUED | OUTPATIENT
Start: 2023-02-02 | End: 2023-02-03

## 2023-02-02 RX ORDER — LISINOPRIL 20 MG/1
20 TABLET ORAL DAILY
Status: DISCONTINUED | OUTPATIENT
Start: 2023-02-02 | End: 2023-02-04 | Stop reason: HOSPADM

## 2023-02-02 RX ORDER — HYDROCODONE BITARTRATE AND ACETAMINOPHEN 10; 325 MG/1; MG/1
1 TABLET ORAL EVERY 6 HOURS PRN
Status: DISCONTINUED | OUTPATIENT
Start: 2023-02-02 | End: 2023-02-04 | Stop reason: HOSPADM

## 2023-02-02 RX ORDER — SODIUM CHLORIDE, SODIUM LACTATE, POTASSIUM CHLORIDE, CALCIUM CHLORIDE 600; 310; 30; 20 MG/100ML; MG/100ML; MG/100ML; MG/100ML
INJECTION, SOLUTION INTRAVENOUS CONTINUOUS
Status: DISCONTINUED | OUTPATIENT
Start: 2023-02-02 | End: 2023-02-03

## 2023-02-02 RX ADMIN — ACETAMINOPHEN 1000 MG: 10 INJECTION, SOLUTION INTRAVENOUS at 02:02

## 2023-02-02 RX ADMIN — ONDANSETRON 4 MG: 2 INJECTION INTRAMUSCULAR; INTRAVENOUS at 01:02

## 2023-02-02 RX ADMIN — LISINOPRIL 20 MG: 20 TABLET ORAL at 02:02

## 2023-02-02 RX ADMIN — MONTELUKAST 10 MG: 10 TABLET, FILM COATED ORAL at 09:02

## 2023-02-02 RX ADMIN — AZELASTINE HYDROCHLORIDE 274 MCG: 137 SPRAY, METERED NASAL at 02:02

## 2023-02-02 RX ADMIN — SODIUM CHLORIDE 1000 ML: 0.9 INJECTION, SOLUTION INTRAVENOUS at 01:02

## 2023-02-02 RX ADMIN — METOPROLOL TARTRATE 25 MG: 25 TABLET, FILM COATED ORAL at 09:02

## 2023-02-02 RX ADMIN — GABAPENTIN 300 MG: 300 CAPSULE ORAL at 09:02

## 2023-02-02 RX ADMIN — KETOROLAC TROMETHAMINE 15 MG: 30 INJECTION, SOLUTION INTRAMUSCULAR; INTRAVENOUS at 02:02

## 2023-02-02 RX ADMIN — GABAPENTIN 300 MG: 300 CAPSULE ORAL at 02:02

## 2023-02-02 RX ADMIN — METOPROLOL TARTRATE 25 MG: 25 TABLET, FILM COATED ORAL at 02:02

## 2023-02-02 RX ADMIN — AZELASTINE HYDROCHLORIDE 274 MCG: 137 SPRAY, METERED NASAL at 09:02

## 2023-02-02 RX ADMIN — SODIUM CHLORIDE, SODIUM LACTATE, POTASSIUM CHLORIDE, AND CALCIUM CHLORIDE: .6; .31; .03; .02 INJECTION, SOLUTION INTRAVENOUS at 06:02

## 2023-02-02 RX ADMIN — TRAZODONE HYDROCHLORIDE 100 MG: 50 TABLET ORAL at 09:02

## 2023-02-02 RX ADMIN — IOHEXOL 100 ML: 350 INJECTION, SOLUTION INTRAVENOUS at 12:02

## 2023-02-02 RX ADMIN — ACETAMINOPHEN 650 MG: 325 TABLET ORAL at 05:02

## 2023-02-02 RX ADMIN — APIXABAN 5 MG: 5 TABLET, FILM COATED ORAL at 09:02

## 2023-02-02 RX ADMIN — SODIUM CHLORIDE, SODIUM LACTATE, POTASSIUM CHLORIDE, AND CALCIUM CHLORIDE: .6; .31; .03; .02 INJECTION, SOLUTION INTRAVENOUS at 02:02

## 2023-02-02 RX ADMIN — SODIUM CHLORIDE, SODIUM LACTATE, POTASSIUM CHLORIDE, AND CALCIUM CHLORIDE: .6; .31; .03; .02 INJECTION, SOLUTION INTRAVENOUS at 05:02

## 2023-02-02 RX ADMIN — POLYETHYLENE GLYCOL 3350 17 G: 17 POWDER, FOR SOLUTION ORAL at 02:02

## 2023-02-02 RX ADMIN — APIXABAN 5 MG: 5 TABLET, FILM COATED ORAL at 01:02

## 2023-02-02 NOTE — ASSESSMENT & PLAN NOTE
Patient with Long standing persistent (>12 months) atrial fibrillation which is controlled currently with Beta Blocker. Patient is currently in sinus rhythm.[id number] Score: 3. HASBLED Score: . Anticoagulation indicated. Anticoagulation done with apixaban     Inpatient admission for small bowel obstruction; NPO; hold NGT currently--placing if clinically needed; insulin and electrolyte sliding scales; continuing home regimen for chronic illnesses--holding po hyperglycemics; hydration; repeat lactic acid.

## 2023-02-02 NOTE — ASSESSMENT & PLAN NOTE
Inpatient admission for small bowel obstruction; NPO; hold NGT currently--placing if clinically needed; insulin and electrolyte sliding scales; continuing home regimen for chronic illnesses--holding po hyperglycemics; hydration; repeat lactic acid; CPAP to sleep

## 2023-02-02 NOTE — ASSESSMENT & PLAN NOTE
Inpatient admission for small bowel obstruction; NPO; hold NGT currently--placing if clinically needed; insulin and electrolyte sliding scales; continuing home regimen for chronic illnesses--holding po hyperglycemics; hydration; repeat lactic acid

## 2023-02-02 NOTE — ASSESSMENT & PLAN NOTE
Body mass index is 34.97 kg/m². Morbid obesity complicates all aspects of disease management from diagnostic modalities to treatment. Weight loss encouraged and health benefits explained to patient.     Inpatient admission for small bowel obstruction; NPO; hold NGT currently--placing if clinically needed; insulin and electrolyte sliding scales; continuing home regimen for chronic illnesses--holding po hyperglycemics; hydration; repeat lactic acid

## 2023-02-02 NOTE — CARE UPDATE
02/02/23 1305   Patient Assessment/Suction   Level of Consciousness (AVPU) alert   Respiratory Effort Unlabored   Expansion/Accessory Muscles/Retractions no use of accessory muscles   All Lung Fields Breath Sounds clear   Rhythm/Pattern, Respiratory unlabored   Cough Frequency no cough   PRE-TX-O2   Device (Oxygen Therapy) room air   SpO2 (!) 94 %   Pulse Oximetry Type Intermittent   $ Pulse Oximetry - Multiple Charge Pulse Oximetry - Multiple   Pulse 82   Resp 18   Preset CPAP/BiPAP Settings   Mode Of Delivery   (FAMILY STATED WILL BRING HOME CPAP MACHINE)   Education   $ Education Other (see comment);15 min  (SATS)   Respiratory Evaluation   $ Care Plan Tech Time 15 min   $ Eval/Re-eval Charges Re-evaluation

## 2023-02-02 NOTE — PLAN OF CARE
[facility] - Emergency Dept  Initial Discharge Assessment       Primary Care Provider: Garland Ocampo MD    Admission Diagnosis: Small bowel obstruction [K56.609]    Admission Date: 2/1/2023  Expected Discharge Date: 2/6/2023    Discharge Barriers Identified: None    Payor: MEDICARE / Plan: MEDICARE PART A & B / Product Type: Government /     Extended Emergency Contact Information  Primary Emergency Contact: Jody Hollingsworth Alex  Address: [address] [place] [address]  Home Phone: [phone]  Mobile Phone: [phone]  Relation: Spouse  Preferred language: English   needed? No    Discharge Plan A: Home, Home with family  Discharge Plan B: Home, Home with family      [id number] - [place], [place] - [address] AT [facility] &   [address]  [facility] [address]  Phone: [phone] Fax: [phone]    [facility]  [address] [place] [address] [address] [place] [address]  Phone: [phone]     [facility] - [facility], [place] - [address]  [address]  [facility] [place] [address]  Phone: [phone] Fax: [phone]    [facility] - [place], [place] - [address]  [address]  [place] [address]  Phone: [phone] Fax: [phone]      Initial Assessment (most recent)       Adult Discharge Assessment - 02/02/23 1042          Discharge Assessment    Assessment Type Discharge Planning Assessment     Confirmed/corrected address, phone number and insurance Yes     Source of Information health record     Does patient/caregiver understand observation status Yes     Communicated YVON with patient/caregiver Date not available/Unable to determine     Reason For Admission chest pain     People in Home spouse     Facility Arrived From: home     Do you expect to return to your current living situation? Yes     Do you have help at home or someone to help you  manage your care at home? Yes     Who are your caregiver(s) and their phone number(s)? Jody Hollingsworth (Spouse)   [phone]     Prior to hospitilization cognitive status: Alert/Oriented     Current cognitive status: Alert/Oriented     Home Accessibility wheelchair accessible     Home Layout Able to live on 1st floor     Equipment Currently Used at Home CPAP;glucometer     Readmission within 30 days? No     Patient currently being followed by outpatient case management? No     Do you currently have service(s) that help you manage your care at home? No     Do you take prescription medications? Yes     Do you have prescription coverage? Yes     Coverage Medicare A&B     Do you have any problems affording any of your prescribed medications? No     Is the patient taking medications as prescribed? yes     Who is going to help you get home at discharge? Jody Hollingsworth (Spouse)   [phone]     How do you get to doctors appointments? family or friend will provide     Are you on dialysis? No     Do you take coumadin? No     Discharge Plan A Home;Home with family     Discharge Plan B Home;Home with family     DME Needed Upon Discharge  none     Discharge Barriers Identified None

## 2023-02-02 NOTE — HPI
"82 year old male with history of SBO (recurrent), PAF (apixaban), DM 2, HTN, Morbid Obesity with BRANDI (CPAP) presented to ED complaining of 5 hour history of worsening generalized abdominal pain, worse with movement or eating. No N/V. No change in BM. "Everytime I don't eat correctly (multiple small meals per day) I get an obstruction. This feels like that". No CP/SOB. Requests no NGT if possible.     In ED Labs reviewed and noted below: mild leukocytosis with normal H/H; normal electrolytes with stage 3a renal dysfunction; cardiac markers are normal; lactate is minimally elevated. CT Abdo/Pelvis reviewed: partial SBO. EKG reviewed: sinus without any acute segments.    Discussed with ED MD; Inpatient admission for small bowel obstruction; NPO; hold NGT currently--placing if clinically needed; insulin and electrolyte sliding scales; continuing home regimen for chronic illnesses--holding po hyperglycemics; hydration; repeat lactic acid  "

## 2023-02-02 NOTE — ED PROVIDER NOTES
Encounter Date: 2/1/2023       History     Chief Complaint   Patient presents with    Abdominal Pain     Pain since 7pm.       82-year-old male with history of recurrent bowel obstructions presents for right midepigastric abdominal pain nausea and vomiting concerning him for recurrent bowel obstruction.  He denies fevers or chills.  He reports normal bowel movement 2 days ago.  He denies dysuria hematuria urgency or frequency.    Review of patient's allergies indicates:   Allergen Reactions    Ativan [lorazepam] Other (See Comments)     Mood alternating      Dilaudid [hydromorphone (bulk)] Other (See Comments)     Mood alternating      Morphine Other (See Comments)     Mood alternating      Adhesive tape-silicones     Hydromorphone      Past Medical History:   Diagnosis Date    Allergy     Ankle pain     left    Anticoagulant long-term use     aspirin    Anxiety     Atrial fibrillation     Back pain     Bruises easily     Cataract     Clotting disorder     left leg    Colon polyp     Diabetes mellitus     Diabetes mellitus, type 2     Hay fever     Hyperlipidemia     Hypertension     Left hip pain 12/26/2021    Seeing Dr. Beard for hip pain     Obese     BRANDI on CPAP      Past Surgical History:   Procedure Laterality Date    ABDOMINAL SURGERY      origunal surg 1986-mult surgeries since    CARPAL TUNNEL RELEASE      right wrist 2009-left wrist 2010    COLON SURGERY      partial colon removal    COLONOSCOPY  11/26/2018    Dr. Salazar; normal terminal ileum; polyps removed from ascending colon and hepatic flexure; pan diverticulosis; small internal hemorrhoids; repeat in 5 years; Bx: fragments of an adenomatous polyp with mild surface glandular dysplasia and associated chronic active inflammation, no evidence of high-grade dysplasia or malignancy    COLOSTOMY  1986    colostomy reversal  1986    EYE SURGERY      hay fever      HERNIA REPAIR  1949    REPAIR OF TENDON OF LOWER EXTREMITY Left 6/24/2020    Procedure:  REPAIR, TENDON, LOWER EXTREMITY;  Surgeon: Justin Mandujano DPM;  [place] [facility] OR;  Service: Podiatry;  Laterality: Left;  ARTHEX NOTIFIED IN CASE HE WANTS ANCHOR    TOE SURGERY Left 2017    replaced a joint     TONSILLECTOMY  1947    TRANSFORAMINAL EPIDURAL INJECTION OF STEROID      x 4    TRANSFORAMINAL EPIDURAL INJECTION OF STEROID Bilateral 11/13/2019    Procedure: Injection,steroid,epidural,transforaminal approach;  Surgeon: Grant Wright MD;  [place] [facility] OR;  Service: Pain Management;  Laterality: Bilateral;  L4-5, L5-S1    TRANSFORAMINAL EPIDURAL INJECTION OF STEROID Bilateral 3/9/2021    Procedure: Injection,steroid,epidural,transforaminal approach;  Surgeon: Grant Wright MD;  [place] [facility];  Service: Pain Management;  Laterality: Bilateral;  L4-5, L5-S1    TRANSFORAMINAL EPIDURAL INJECTION OF STEROID Bilateral 5/25/2021    Procedure: Injection,steroid,epidural,transforaminal approach;  Surgeon: Grant Wright MD;  [place] [facility];  Service: Pain Management;  Laterality: Bilateral;  L4-L5,L5,S1    TRANSFORAMINAL EPIDURAL INJECTION OF STEROID Bilateral 12/14/2021    Procedure: Injection,steroid,epidural,transforaminal approach Bilateral L4-5, L5-S1;  Surgeon: Grant Wright MD;  [place] [facility];  Service: Pain Management;  Laterality: Bilateral;    TRANSFORAMINAL EPIDURAL INJECTION OF STEROID Bilateral 11/4/2022    Procedure: Injection,steroid,epidural,transforaminal approach;  Surgeon: Grant Wright MD;  [place] [facility];  Service: Pain Management;  Laterality: Bilateral;  L4-5 and L5-s1    TRANSFORAMINAL EPIDURAL INJECTION OF STEROID Bilateral 1/20/2023    Procedure: Injection,steroid,epidural,transforaminal approach L4-L5-S1;  Surgeon: Grant Wright MD;  [place] [facility];  Service: Pain Management;  Laterality: Bilateral;     Family History   Problem Relation Age of Onset    Heart disease Mother     Melanoma Neg Hx     Psoriasis Neg Hx     Lupus Neg Hx     Eczema Neg Hx      Social History     Tobacco  Use    Smoking status: Former     Types: Cigarettes     Quit date: 2006     Years since quittin.9    Smokeless tobacco: Never    Tobacco comments:     ex smoker 2006   Substance Use Topics    Alcohol use: Yes     Comment: rarely    Drug use: No     Review of Systems   Constitutional:  Negative for activity change and appetite change.   HENT:  Negative for congestion and drooling.    Eyes:  Negative for discharge and itching.   Respiratory:  Negative for cough and chest tightness.    Cardiovascular:  Negative for chest pain and leg swelling.   Gastrointestinal:  Positive for abdominal pain, nausea and vomiting. Negative for abdominal distention.   Genitourinary:  Negative for difficulty urinating and dysuria.   Musculoskeletal:  Negative for arthralgias.   Skin:  Negative for color change and pallor.   Neurological:  Negative for dizziness and facial asymmetry.   Psychiatric/Behavioral:  Negative for agitation and behavioral problems.      Physical Exam     Initial Vitals [23 2320]   BP Pulse Resp Temp SpO2   (!) 125/59 72 (!) 21 97.5 °F (36.4 °C) (!) 93 %      MAP       --         Physical Exam    Nursing note and vitals reviewed.  Constitutional: He appears well-developed and well-nourished.   HENT:   Head: Normocephalic and atraumatic.   Mouth/Throat: Oropharynx is clear and moist.   Eyes: Conjunctivae and EOM are normal. Pupils are equal, round, and reactive to light.   Neck: No thyromegaly present.   Normal range of motion.  Cardiovascular:  Normal rate, regular rhythm and intact distal pulses.           Pulmonary/Chest: Breath sounds normal. No respiratory distress. He has no wheezes.   Abdominal: Abdomen is soft. Bowel sounds are normal. He exhibits distension. There is no abdominal tenderness.   Right sided abdominal tenderness without rebound or guarding   Musculoskeletal:         General: No tenderness or edema. Normal range of motion.      Cervical back: Normal range of motion.      Neurological: He is alert and oriented to person, place, and time. He has normal strength. No cranial nerve deficit.   Skin: Skin is warm and dry. No rash noted.   Psychiatric: He has a normal mood and affect. His behavior is normal. Thought content normal.       ED Course   Procedures  Labs Reviewed   CBC W/ AUTO DIFFERENTIAL - Abnormal; Notable for the following components:       Result Value    WBC 15.13 (*)     MCHC 31.8 (*)     Gran # (ANC) 12.9 (*)     Immature Grans (Abs) 0.07 (*)     Lymph # 0.7 (*)     Mono # 1.3 (*)     Gran % 85.3 (*)     Lymph % 4.6 (*)     All other components within normal limits   COMPREHENSIVE METABOLIC PANEL - Abnormal; Notable for the following components:    Glucose 177 (*)     BUN 29 (*)     Total Bilirubin 1.1 (*)     Alkaline Phosphatase 38 (*)     eGFR 55.2 (*)     All other components within normal limits   LACTIC ACID, PLASMA - Abnormal; Notable for the following components:    Lactate (Lactic Acid) 2.2 (*)     All other components within normal limits    Narrative:        Lactid acid critical result(s) called and verbal readback obtained   from Matt Roe RN ER  by MS1 02/02/2023 01:05   ISTAT PROCEDURE - Abnormal; Notable for the following components:    POC PH 7.329 (*)     POC PCO2 52.6 (*)     POC PO2 27 (*)     POC SATURATED O2 45 (*)     All other components within normal limits   LIPASE   TROPONIN I HIGH SENSITIVITY   B-TYPE NATRIURETIC PEPTIDE   URINALYSIS, REFLEX TO URINE CULTURE   LACTIC ACID, PLASMA   ISTAT CREATININE   POCT CREATININE          Imaging Results              CT Abdomen Pelvis With Contrast (Final result)  Result time 02/02/23 01:01:06      Final result by Brain Eden DO (02/02/23 01:01:06)                   Narrative:    EXAM DESCRIPTION:  CT ABDOMEN PELVIS WITH CONTRAST  RadLex: CT ABDOMEN PELVIS WITH IV CONTRAST    CLINICAL HISTORY:  Abdominal abscess/infection suspected, abdominal pain, history of partial colon removal,  colostomy.    TECHNIQUE:  CT of the abdomen and pelvis using intravenous Omni 350, 100 mL.  All CT scans at this facility use dose modulation, iterative reconstruction, and/or weight based dosing when appropriate to reduce radiation dose to as low as reasonably achievable.    COMPARISON: CT abdomen pelvis 7/25/2022.    FINDINGS:    The visualized lower thoracic structures appear unremarkable.    There is hepatic steatosis. The gallbladder, spleen, pancreas, adrenal glands and kidneys appear unremarkable. No pathologically enlarged retroperitoneal or mesenteric lymph nodes are identified. No abdominal aortic aneurysm is seen. There is minimal to mild dilatation of proximal to mid small bowel loops, transition to nondilated small bowel loops in the right mid abdomen anteriorly (for example, series 3, image 118), suggesting partial small bowel obstruction. There are several small anterior abdominal wall ventral hernias mostly containing fat aside from a tiny mid ventral hernia right of midline partially containing a portion of the small bowel (series 3, image 111). This was also present on the previous exam. There is colonic diverticulosis without definite CT evidence for acute diverticulitis. There is no free intraperitoneal air. The appendix appears unremarkable. No free fluid is identified. Urinary bladder is incompletely distended. There are small bilateral inguinal hernias containing fat. Prostate gland appears stable. No acute fracture is seen.    IMPRESSION:  1.  No bowel dilatation of proximal to mid small bowel loops with transition to nondilated small bowel in the right midabdomen anteriorly suggesting mild partial small bowel obstruction. No free intraperitoneal air. Colonic diverticulosis.    Electronically signed by:  Brain Eden DO  2/2/2023 1:01 AM CST Workstation: [id number]                                  X-Rays:   Independently Interpreted Readings:   Chest X-Ray: Normal heart size.  No infiltrates.   No acute abnormalities.   Medications   lactated ringers infusion ( Intravenous New Bag 2/2/23 0548)   iohexoL (OMNIPAQUE 350) injection 100 mL (100 mLs Intravenous Given 2/2/23 0042)   sodium chloride 0.9% bolus 1,000 mL 1,000 mL (1,000 mLs Intravenous New Bag 2/2/23 0137)   ondansetron injection 4 mg (4 mg Intravenous Given 2/2/23 0137)   ketorolac injection 15 mg (15 mg Intravenous Given 2/2/23 0209)   acetaminophen 1,000 mg/100 mL (10 mg/mL) injection 1,000 mg (1,000 mg Intravenous New Bag 2/2/23 0210)                 ED Course as of 02/02/23 0640   Thu Feb 02, 2023 0249 EKG with regular rate, regular rhythm, normal axis, no acute ST elevations or depressions, normal OH, QRS and QT interval. Interpreted by me.   [BS]      ED Course User Index  [BS] Nestor Joseph MD               Patient incidentally had hypoxia on his initial vital signs.  EKG without acute ischemic changes, chest x-ray without evidence of consolidation or infiltrate.  BNP within normal limits.  Patient does have obesity and obstructive sleep apnea likely the cause of his mild hypoxia.  Abdominal pain workup notable for partial bowel obstruction.  Patient's pain treated with Dilaudid, given IV fluid and Zofran for symptom control.  CBC without leukocytosis, no evidence of perforation or peritonitis to require antibiotics at this time.  CMP within normal limits.  Will admit to Dr. Hines for further management.    Clinical Impression:   Final diagnoses:  [R09.02] Hypoxia  [K56.609] Small bowel obstruction (Primary)        ED Disposition Condition    Admit                 Nestor Joseph MD  02/02/23 0246

## 2023-02-02 NOTE — CARE UPDATE
Patient states that his doing better and has had no emesis since admission.  Consult to Gen surg  H&P per previous physician, labs and images reviewed.  Continue current care management.

## 2023-02-02 NOTE — ASSESSMENT & PLAN NOTE
Patient's FSGs are controlled on current medication regimen.  Last A1c reviewed-   Lab Results   Component Value Date    HGBA1C 6.0 01/05/2023     Most recent fingerstick glucose reviewed- No results for input(s): POCTGLUCOSE in the last 24 hours.  Current correctional scale  Low  Maintain anti-hyperglycemic dose as follows-   Antihyperglycemics (From admission, onward)    None        Hold Oral hypoglycemics while patient is in the hospital.    Inpatient admission for small bowel obstruction; NPO; hold NGT currently--placing if clinically needed; insulin and electrolyte sliding scales; continuing home regimen for chronic illnesses--holding po hyperglycemics; hydration; repeat lactic acid

## 2023-02-02 NOTE — SUBJECTIVE & OBJECTIVE
Past Medical History:   Diagnosis Date    Allergy     Ankle pain     left    Anticoagulant long-term use     aspirin    Anxiety     Atrial fibrillation     Back pain     Bruises easily     Cataract     Clotting disorder     left leg    Colon polyp     Diabetes mellitus     Diabetes mellitus, type 2     Hay fever     Hyperlipidemia     Hypertension     Left hip pain 12/26/2021    Seeing Dr. Beard for hip pain     Obese     BRANDI on CPAP        Past Surgical History:   Procedure Laterality Date    ABDOMINAL SURGERY      origunal surg 1986-mult surgeries since    CARPAL TUNNEL RELEASE      right wrist 2009-left wrist 2010    COLON SURGERY      partial colon removal    COLONOSCOPY  11/26/2018    Dr. Salazar; normal terminal ileum; polyps removed from ascending colon and hepatic flexure; pan diverticulosis; small internal hemorrhoids; repeat in 5 years; Bx: fragments of an adenomatous polyp with mild surface glandular dysplasia and associated chronic active inflammation, no evidence of high-grade dysplasia or malignancy    COLOSTOMY  1986    colostomy reversal  1986    EYE SURGERY      hay fever      HERNIA REPAIR  1949    REPAIR OF TENDON OF LOWER EXTREMITY Left 6/24/2020    Procedure: REPAIR, TENDON, LOWER EXTREMITY;  Surgeon: Justin Mandujano DPM;  [place] [facility] OR;  Service: Podiatry;  Laterality: Left;  ARTHEX NOTIFIED IN CASE HE WANTS ANCHOR    TOE SURGERY Left 2017    replaced a joint     TONSILLECTOMY  1947    TRANSFORAMINAL EPIDURAL INJECTION OF STEROID      x 4    TRANSFORAMINAL EPIDURAL INJECTION OF STEROID Bilateral 11/13/2019    Procedure: Injection,steroid,epidural,transforaminal approach;  Surgeon: Grant Wright MD;  [place] [facility] OR;  Service: Pain Management;  Laterality: Bilateral;  L4-5, L5-S1    TRANSFORAMINAL EPIDURAL INJECTION OF STEROID Bilateral 3/9/2021    Procedure: Injection,steroid,epidural,transforaminal approach;  Surgeon: Grant Wright MD;  [place] [facility] OR;  Service: Pain Management;   Laterality: Bilateral;  L4-5, L5-S1    TRANSFORAMINAL EPIDURAL INJECTION OF STEROID Bilateral 5/25/2021    Procedure: Injection,steroid,epidural,transforaminal approach;  Surgeon: Grant Wright MD;  [place] [facility] OR;  Service: Pain Management;  Laterality: Bilateral;  L4-L5,L5,S1    TRANSFORAMINAL EPIDURAL INJECTION OF STEROID Bilateral 12/14/2021    Procedure: Injection,steroid,epidural,transforaminal approach Bilateral L4-5, L5-S1;  Surgeon: Grant Wright MD;  [place] [facility] OR;  Service: Pain Management;  Laterality: Bilateral;    TRANSFORAMINAL EPIDURAL INJECTION OF STEROID Bilateral 11/4/2022    Procedure: Injection,steroid,epidural,transforaminal approach;  Surgeon: Grant Wright MD;  [place] [facility] OR;  Service: Pain Management;  Laterality: Bilateral;  L4-5 and L5-s1    TRANSFORAMINAL EPIDURAL INJECTION OF STEROID Bilateral 1/20/2023    Procedure: Injection,steroid,epidural,transforaminal approach L4-L5-S1;  Surgeon: Grant Wright MD;  [place] [facility] OR;  Service: Pain Management;  Laterality: Bilateral;       Review of patient's allergies indicates:   Allergen Reactions    Ativan [lorazepam] Other (See Comments)     Mood alternating      Dilaudid [hydromorphone (bulk)] Other (See Comments)     Mood alternating      Morphine Other (See Comments)     Mood alternating      Adhesive tape-silicones     Hydromorphone        Current Facility-Administered Medications on File Prior to Encounter   Medication    lactated ringers infusion     Current Outpatient Medications on File Prior to Encounter   Medication Sig    apixaban (ELIQUIS) 5 mg Tab Take 1 tablet (5 mg total) by mouth 2 (two) times daily.    aspirin 81 MG Chew Take 1 tablet (81 mg total) by mouth once daily.    azelastine (ASTELIN) 137 mcg (0.1 %) nasal spray 2 sprays (274 mcg total) by Nasal route 2 (two) times daily.    cholecalciferol, vitamin D3, 1,250 mcg (50,000 unit) capsule Take 50,000 Units by mouth every Sunday.    diclofenac sodium (VOLTAREN) 1 % Gel  Apply 2 g topically once daily.    fexofenadine (ALLEGRA) 180 MG tablet Take 1 tablet (180 mg total) by mouth once daily.    furosemide (LASIX) 20 MG tablet Take 1 tablet (20 mg total) by mouth every Mon, Wed, Fri.    gabapentin (NEURONTIN) 300 MG capsule Take 1 capsule (300 mg total) by mouth 3 (three) times daily.    HYDROcodone-acetaminophen (NORCO)  mg per tablet Take 1 tablet by mouth every 6 (six) hours as needed for Pain.    JARDIANCE 10 mg tablet Take 1 tablet (10 mg total) by mouth once daily.    ketoconazole (NIZORAL) 2 % cream Apply to face after shaving (Patient taking differently: Apply 1 application topically once daily. Apply to face after shaving)    lisinopriL (PRINIVIL,ZESTRIL) 20 MG tablet Take 1 tablet (20 mg total) by mouth once daily.    metoprolol tartrate (LOPRESSOR) 25 MG tablet Take 1 tablet (25 mg total) by mouth 2 (two) times daily.    montelukast (SINGULAIR) 10 mg tablet Take 1 tablet (10 mg total) by mouth every evening.    polyethylene glycol (GLYCOLAX) 17 gram/dose powder Take 17 g by mouth once daily.    potassium chloride (MICRO-K) 10 MEQ CpSR Take 1 capsule (10 mEq total) by mouth every other day.    SITagliptin phosphate (JANUVIA) 100 MG Tab Take 1 tablet (100 mg total) by mouth once daily.    traZODone (DESYREL) 100 MG tablet Take 1 tablet (100 mg total) by mouth every evening.    triamcinolone acetonide 0.1% (KENALOG) 0.1 % cream Apply topically 2 (two) times daily. (Patient taking differently: Apply 1 g topically 2 (two) times daily.)    blood sugar diagnostic (BLOOD GLUCOSE TEST) Strp 1 strip by Misc.(Non-Drug; Combo Route) route 2 (two) times a day. FREESTYLE LITE BG TEST STRIPS. current use of insulin  - Primary ICD-10-CM: E11.9    FREESTYLE LANCETS 28 gauge lancets Inject 1 lancet into the skin 3 (three) times daily.    [DISCONTINUED] HYDROcodone-acetaminophen (NORCO)  mg per tablet Take 1 tablet by mouth every 6 (six) hours as needed for Pain.    [DISCONTINUED]  HYDROcodone-acetaminophen (NORCO)  mg per tablet Take 1 tablet by mouth every 6 (six) hours as needed for Pain.     Family History       Problem Relation (Age of Onset)    Heart disease Mother          Tobacco Use    Smoking status: Former     Types: Cigarettes     Quit date: 2006     Years since quittin.9    Smokeless tobacco: Never    Tobacco comments:     ex smoker    Substance and Sexual Activity    Alcohol use: Yes     Comment: rarely    Drug use: No    Sexual activity: Yes     Partners: Female     Review of Systems   Constitutional: Negative.    HENT: Negative.     Eyes: Negative.    Respiratory: Negative.     Cardiovascular: Negative.    Gastrointestinal:  Positive for abdominal distention and abdominal pain.   Endocrine: Negative.    Genitourinary: Negative.    Musculoskeletal: Negative.    Skin: Negative.    Allergic/Immunologic: Negative.    Neurological: Negative.    Hematological: Negative.    All other systems reviewed and are negative.  Objective:     Vital Signs (Most Recent):  Temp: 97.5 °F (36.4 °C) (23 2320)  Pulse: 75 (23 0020)  Resp: (!) 21 (23 0020)  BP: (!) 125/59 (230)  SpO2: 95 % (23 0020)   Vital Signs (24h Range):  Temp:  [97.5 °F (36.4 °C)] 97.5 °F (36.4 °C)  Pulse:  [72-75] 75  Resp:  [21] 21  SpO2:  [93 %-95 %] 95 %  BP: (125)/(59) 125/59     Weight: 104.3 kg (230 lb)  Body mass index is 34.97 kg/m².    Physical Exam  Vitals and nursing note reviewed.   Constitutional:       Appearance: He is well-developed. He is obese.   HENT:      Head: Normocephalic and atraumatic.      Right Ear: External ear normal.      Left Ear: External ear normal.      Nose: Nose normal.   Eyes:      Conjunctiva/sclera: Conjunctivae normal.      Pupils: Pupils are equal, round, and reactive to light.   Cardiovascular:      Rate and Rhythm: Normal rate and regular rhythm.      Heart sounds: Normal heart sounds.   Pulmonary:      Effort: Pulmonary effort is  normal.      Breath sounds: Normal breath sounds.   Abdominal:      General: Bowel sounds are normal.      Palpations: Abdomen is soft.      Comments: Soft, tender to moderate palpation with voluntary guarding, but no peritoneal sign   Musculoskeletal:         General: Normal range of motion.      Cervical back: Normal range of motion and neck supple.   Skin:     General: Skin is warm and dry.      Capillary Refill: Capillary refill takes less than 2 seconds.   Neurological:      Mental Status: He is alert and oriented to person, place, and time.   Psychiatric:         Behavior: Behavior normal.         Thought Content: Thought content normal.         Judgment: Judgment normal.         CRANIAL NERVES     CN III, IV, VI   Pupils are equal, round, and reactive to light.     Significant Labs: All pertinent labs within the past 24 hours have been reviewed.  CBC:   Recent Labs   Lab 02/02/23 0013   WBC 15.13*   HGB 16.5   HCT 51.9        CMP:   Recent Labs   Lab 02/02/23 0013      K 4.3      CO2 26   *   BUN 29*   CREATININE 1.3   CALCIUM 8.9   PROT 7.2   ALBUMIN 4.3   BILITOT 1.1*   ALKPHOS 38*   AST 26   ALT 38   ANIONGAP 11     Cardiac Markers:   Recent Labs   Lab 02/02/23 0013   BNP 36     Troponin:   Recent Labs   Lab 02/02/23 0013   TROPONINIHS 6.7       Significant Imaging: I have reviewed all pertinent imaging results/findings within the past 24 hours.

## 2023-02-02 NOTE — H&P
"[facility] - Emergency Dept  Hospital Medicine  History & Physical    Patient Name: Yeyo Hollingsworth  MRN: [id number]  Patient Class: IP- Inpatient  Admission Date: 2/1/2023  Attending Physician: Joseph Hines MD  Primary Care Provider: Garland Ocampo MD         Patient information was obtained from patient, spouse/SO, past medical records, ER records and ED MD.     Subjective:     Principal Problem:<principal problem not specified>    Chief Complaint:   Chief Complaint   Patient presents with    Abdominal Pain     Pain since 7pm.          HPI: 82 year old male with history of SBO (recurrent), PAF (apixaban), DM 2, HTN, Morbid Obesity with BRANDI (CPAP) presented to ED complaining of 5 hour history of worsening generalized abdominal pain, worse with movement or eating. No N/V. No change in BM. "Everytime I don't eat correctly (multiple small meals per day) I get an obstruction. This feels like that". No CP/SOB. Requests no NGT if possible.     In ED Labs reviewed and noted below: mild leukocytosis with normal H/H; normal electrolytes with stage 3a renal dysfunction; cardiac markers are normal; lactate is minimally elevated. CT Abdo/Pelvis reviewed: partial SBO. EKG reviewed: sinus without any acute segments.    Discussed with ED MD; Inpatient admission for small bowel obstruction; NPO; hold NGT currently--placing if clinically needed; insulin and electrolyte sliding scales; continuing home regimen for chronic illnesses--holding po hyperglycemics; hydration; repeat lactic acid      Past Medical History:   Diagnosis Date    Allergy     Ankle pain     left    Anticoagulant long-term use     aspirin    Anxiety     Atrial fibrillation     Back pain     Bruises easily     Cataract     Clotting disorder     left leg    Colon polyp     Diabetes mellitus     Diabetes mellitus, type 2     Hay fever     Hyperlipidemia     Hypertension     Left hip pain 12/26/2021    Seeing Dr. Beard for hip pain  "    Obese     BRANDI on CPAP        Past Surgical History:   Procedure Laterality Date    ABDOMINAL SURGERY      origunal surg 1986-mult surgeries since    CARPAL TUNNEL RELEASE      right wrist 2009-left wrist 2010    COLON SURGERY      partial colon removal    COLONOSCOPY  11/26/2018    Dr. Salazar; normal terminal ileum; polyps removed from ascending colon and hepatic flexure; pan diverticulosis; small internal hemorrhoids; repeat in 5 years; Bx: fragments of an adenomatous polyp with mild surface glandular dysplasia and associated chronic active inflammation, no evidence of high-grade dysplasia or malignancy    COLOSTOMY  1986    colostomy reversal  1986    EYE SURGERY      hay fever      HERNIA REPAIR  1949    REPAIR OF TENDON OF LOWER EXTREMITY Left 6/24/2020    Procedure: REPAIR, TENDON, LOWER EXTREMITY;  Surgeon: Justin Mandujano DPM;  [place] [facility] OR;  Service: Podiatry;  Laterality: Left;  ARTHEX NOTIFIED IN CASE HE WANTS ANCHOR    TOE SURGERY Left 2017    replaced a joint     TONSILLECTOMY  1947    TRANSFORAMINAL EPIDURAL INJECTION OF STEROID      x 4    TRANSFORAMINAL EPIDURAL INJECTION OF STEROID Bilateral 11/13/2019    Procedure: Injection,steroid,epidural,transforaminal approach;  Surgeon: Grant Wright MD;  [place] [facility] OR;  Service: Pain Management;  Laterality: Bilateral;  L4-5, L5-S1    TRANSFORAMINAL EPIDURAL INJECTION OF STEROID Bilateral 3/9/2021    Procedure: Injection,steroid,epidural,transforaminal approach;  Surgeon: Grant Wright MD;  [place] [facility] OR;  Service: Pain Management;  Laterality: Bilateral;  L4-5, L5-S1    TRANSFORAMINAL EPIDURAL INJECTION OF STEROID Bilateral 5/25/2021    Procedure: Injection,steroid,epidural,transforaminal approach;  Surgeon: Grant Wright MD;  [place] [facility] OR;  Service: Pain Management;  Laterality: Bilateral;  L4-L5,L5,S1    TRANSFORAMINAL EPIDURAL INJECTION OF STEROID Bilateral 12/14/2021    Procedure:  Injection,steroid,epidural,transforaminal approach Bilateral L4-5, L5-S1;  Surgeon: Grant Wright MD;  [facility]  Service: Pain Management;  Laterality: Bilateral;    TRANSFORAMINAL EPIDURAL INJECTION OF STEROID Bilateral 11/4/2022    Procedure: Injection,steroid,epidural,transforaminal approach;  Surgeon: Grant Wright MD;  [facility]  Service: Pain Management;  Laterality: Bilateral;  L4-5 and L5-s1    TRANSFORAMINAL EPIDURAL INJECTION OF STEROID Bilateral 1/20/2023    Procedure: Injection,steroid,epidural,transforaminal approach L4-L5-S1;  Surgeon: Grant Wright MD;  [facility]  Service: Pain Management;  Laterality: Bilateral;       Review of patient's allergies indicates:   Allergen Reactions    Ativan [lorazepam] Other (See Comments)     Mood alternating      Dilaudid [hydromorphone (bulk)] Other (See Comments)     Mood alternating      Morphine Other (See Comments)     Mood alternating      Adhesive tape-silicones     Hydromorphone        Current Facility-Administered Medications on File Prior to Encounter   Medication    lactated ringers infusion     Current Outpatient Medications on File Prior to Encounter   Medication Sig    apixaban (ELIQUIS) 5 mg Tab Take 1 tablet (5 mg total) by mouth 2 (two) times daily.    aspirin 81 MG Chew Take 1 tablet (81 mg total) by mouth once daily.    azelastine (ASTELIN) 137 mcg (0.1 %) nasal spray 2 sprays (274 mcg total) by Nasal route 2 (two) times daily.    cholecalciferol, vitamin D3, 1,250 mcg (50,000 unit) capsule Take 50,000 Units by mouth every Sunday.    diclofenac sodium (VOLTAREN) 1 % Gel Apply 2 g topically once daily.    fexofenadine (ALLEGRA) 180 MG tablet Take 1 tablet (180 mg total) by mouth once daily.    furosemide (LASIX) 20 MG tablet Take 1 tablet (20 mg total) by mouth every Mon, Wed, Fri.    gabapentin (NEURONTIN) 300 MG capsule Take 1 capsule (300 mg total) by mouth 3 (three) times daily.    HYDROcodone-acetaminophen  (NORCO)  mg per tablet Take 1 tablet by mouth every 6 (six) hours as needed for Pain.    JARDIANCE 10 mg tablet Take 1 tablet (10 mg total) by mouth once daily.    ketoconazole (NIZORAL) 2 % cream Apply to face after shaving (Patient taking differently: Apply 1 application topically once daily. Apply to face after shaving)    lisinopriL (PRINIVIL,ZESTRIL) 20 MG tablet Take 1 tablet (20 mg total) by mouth once daily.    metoprolol tartrate (LOPRESSOR) 25 MG tablet Take 1 tablet (25 mg total) by mouth 2 (two) times daily.    montelukast (SINGULAIR) 10 mg tablet Take 1 tablet (10 mg total) by mouth every evening.    polyethylene glycol (GLYCOLAX) 17 gram/dose powder Take 17 g by mouth once daily.    potassium chloride (MICRO-K) 10 MEQ CpSR Take 1 capsule (10 mEq total) by mouth every other day.    SITagliptin phosphate (JANUVIA) 100 MG Tab Take 1 tablet (100 mg total) by mouth once daily.    traZODone (DESYREL) 100 MG tablet Take 1 tablet (100 mg total) by mouth every evening.    triamcinolone acetonide 0.1% (KENALOG) 0.1 % cream Apply topically 2 (two) times daily. (Patient taking differently: Apply 1 g topically 2 (two) times daily.)    blood sugar diagnostic (BLOOD GLUCOSE TEST) Strp 1 strip by Misc.(Non-Drug; Combo Route) route 2 (two) times a day. FREESTYLE LITE BG TEST STRIPS. current use of insulin  - Primary ICD-10-CM: E11.9    FREESTYLE LANCETS 28 gauge lancets Inject 1 lancet into the skin 3 (three) times daily.    [DISCONTINUED] HYDROcodone-acetaminophen (NORCO)  mg per tablet Take 1 tablet by mouth every 6 (six) hours as needed for Pain.    [DISCONTINUED] HYDROcodone-acetaminophen (NORCO)  mg per tablet Take 1 tablet by mouth every 6 (six) hours as needed for Pain.     Family History       Problem Relation (Age of Onset)    Heart disease Mother          Tobacco Use    Smoking status: Former     Types: Cigarettes     Quit date: 2006     Years since quittin.9     Smokeless tobacco: Never    Tobacco comments:     ex smoker 2006   Substance and Sexual Activity    Alcohol use: Yes     Comment: rarely    Drug use: No    Sexual activity: Yes     Partners: Female     Review of Systems   Constitutional: Negative.    HENT: Negative.     Eyes: Negative.    Respiratory: Negative.     Cardiovascular: Negative.    Gastrointestinal:  Positive for abdominal distention and abdominal pain.   Endocrine: Negative.    Genitourinary: Negative.    Musculoskeletal: Negative.    Skin: Negative.    Allergic/Immunologic: Negative.    Neurological: Negative.    Hematological: Negative.    All other systems reviewed and are negative.  Objective:     Vital Signs (Most Recent):  Temp: 97.5 °F (36.4 °C) (02/01/23 2320)  Pulse: 75 (02/02/23 0020)  Resp: (!) 21 (02/02/23 0020)  BP: (!) 125/59 (02/01/23 2320)  SpO2: 95 % (02/02/23 0020)   Vital Signs (24h Range):  Temp:  [97.5 °F (36.4 °C)] 97.5 °F (36.4 °C)  Pulse:  [72-75] 75  Resp:  [21] 21  SpO2:  [93 %-95 %] 95 %  BP: (125)/(59) 125/59     Weight: 104.3 kg (230 lb)  Body mass index is 34.97 kg/m².    Physical Exam  Vitals and nursing note reviewed.   Constitutional:       Appearance: He is well-developed. He is obese.   HENT:      Head: Normocephalic and atraumatic.      Right Ear: External ear normal.      Left Ear: External ear normal.      Nose: Nose normal.   Eyes:      Conjunctiva/sclera: Conjunctivae normal.      Pupils: Pupils are equal, round, and reactive to light.   Cardiovascular:      Rate and Rhythm: Normal rate and regular rhythm.      Heart sounds: Normal heart sounds.   Pulmonary:      Effort: Pulmonary effort is normal.      Breath sounds: Normal breath sounds.   Abdominal:      General: Bowel sounds are normal.      Palpations: Abdomen is soft.      Comments: Soft, tender to moderate palpation with voluntary guarding, but no peritoneal sign   Musculoskeletal:         General: Normal range of motion.      Cervical back: Normal  range of motion and neck supple.   Skin:     General: Skin is warm and dry.      Capillary Refill: Capillary refill takes less than 2 seconds.   Neurological:      Mental Status: He is alert and oriented to person, place, and time.   Psychiatric:         Behavior: Behavior normal.         Thought Content: Thought content normal.         Judgment: Judgment normal.         CRANIAL NERVES     CN III, IV, VI   Pupils are equal, round, and reactive to light.     Significant Labs: All pertinent labs within the past 24 hours have been reviewed.  CBC:   Recent Labs   Lab 02/02/23 0013   WBC 15.13*   HGB 16.5   HCT 51.9        CMP:   Recent Labs   Lab 02/02/23 0013      K 4.3      CO2 26   *   BUN 29*   CREATININE 1.3   CALCIUM 8.9   PROT 7.2   ALBUMIN 4.3   BILITOT 1.1*   ALKPHOS 38*   AST 26   ALT 38   ANIONGAP 11     Cardiac Markers:   Recent Labs   Lab 02/02/23 0013   BNP 36     Troponin:   Recent Labs   Lab 02/02/23 0013   TROPONINIHS 6.7       Significant Imaging: I have reviewed all pertinent imaging results/findings within the past 24 hours.    Assessment/Plan:     BRANDI (obstructive sleep apnea)  Inpatient admission for small bowel obstruction; NPO; hold NGT currently--placing if clinically needed; insulin and electrolyte sliding scales; continuing home regimen for chronic illnesses--holding po hyperglycemics; hydration; repeat lactic acid; CPAP to sleep      CKD (chronic kidney disease), stage III    Inpatient admission for small bowel obstruction; NPO; hold NGT currently--placing if clinically needed; insulin and electrolyte sliding scales; continuing home regimen for chronic illnesses--holding po hyperglycemics; hydration; repeat lactic acid    Paroxysmal atrial fibrillation  Patient with Long standing persistent (>12 months) atrial fibrillation which is controlled currently with Beta Blocker. Patient is currently in sinus rhythm.[id number] Score: 3. HASBLED Score: . Anticoagulation  indicated. Anticoagulation done with apixaban     Inpatient admission for small bowel obstruction; NPO; hold NGT currently--placing if clinically needed; insulin and electrolyte sliding scales; continuing home regimen for chronic illnesses--holding po hyperglycemics; hydration; repeat lactic acid.        Obesity, Class III, BMI 40-49.9 (morbid obesity)  Body mass index is 34.97 kg/m². Morbid obesity complicates all aspects of disease management from diagnostic modalities to treatment. Weight loss encouraged and health benefits explained to patient.     Inpatient admission for small bowel obstruction; NPO; hold NGT currently--placing if clinically needed; insulin and electrolyte sliding scales; continuing home regimen for chronic illnesses--holding po hyperglycemics; hydration; repeat lactic acid      Controlled type 2 diabetes mellitus without complication, without long-term current use of insulin  Patient's FSGs are controlled on current medication regimen.  Last A1c reviewed-   Lab Results   Component Value Date    HGBA1C 6.0 01/05/2023     Most recent fingerstick glucose reviewed- No results for input(s): POCTGLUCOSE in the last 24 hours.  Current correctional scale  Low  Maintain anti-hyperglycemic dose as follows-   Antihyperglycemics (From admission, onward)    None        Hold Oral hypoglycemics while patient is in the hospital.    Inpatient admission for small bowel obstruction; NPO; hold NGT currently--placing if clinically needed; insulin and electrolyte sliding scales; continuing home regimen for chronic illnesses--holding po hyperglycemics; hydration; repeat lactic acid    SBO (small bowel obstruction)  Inpatient admission for small bowel obstruction; NPO; hold NGT currently--placing if clinically needed; insulin and electrolyte sliding scales; continuing home regimen for chronic illnesses--holding po hyperglycemics; hydration; repeat lactic acid        VTE Risk Mitigation (From admission, onward)     None             Joseph Hines MD  Department of Hospital Medicine   [facility] - Emergency Dept

## 2023-02-02 NOTE — NURSING
1300: arrived to floor, wife at bedside  1830: had several bowel movements, no distress, medicated once for headache.

## 2023-02-03 LAB
ANION GAP SERPL CALC-SCNC: 5 MMOL/L (ref 8–16)
BASOPHILS # BLD AUTO: 0.01 K/UL (ref 0–0.2)
BASOPHILS NFR BLD: 0.1 % (ref 0–1.9)
BUN SERPL-MCNC: 28 MG/DL (ref 8–23)
CALCIUM SERPL-MCNC: 7.9 MG/DL (ref 8.7–10.5)
CHLORIDE SERPL-SCNC: 108 MMOL/L (ref 95–110)
CO2 SERPL-SCNC: 27 MMOL/L (ref 23–29)
CREAT SERPL-MCNC: 1 MG/DL (ref 0.5–1.4)
DIFFERENTIAL METHOD: ABNORMAL
EOSINOPHIL # BLD AUTO: 0.2 K/UL (ref 0–0.5)
EOSINOPHIL NFR BLD: 3.2 % (ref 0–8)
ERYTHROCYTE [DISTWIDTH] IN BLOOD BY AUTOMATED COUNT: 14.2 % (ref 11.5–14.5)
EST. GFR  (NO RACE VARIABLE): >60 ML/MIN/1.73 M^2
GLUCOSE SERPL-MCNC: 100 MG/DL (ref 70–110)
GLUCOSE SERPL-MCNC: 110 MG/DL (ref 70–110)
GLUCOSE SERPL-MCNC: 92 MG/DL (ref 70–110)
HCT VFR BLD AUTO: 44.3 % (ref 40–54)
HGB BLD-MCNC: 13.8 G/DL (ref 14–18)
IMM GRANULOCYTES # BLD AUTO: 0.02 K/UL (ref 0–0.04)
IMM GRANULOCYTES NFR BLD AUTO: 0.3 % (ref 0–0.5)
LYMPHOCYTES # BLD AUTO: 1.4 K/UL (ref 1–4.8)
LYMPHOCYTES NFR BLD: 18.9 % (ref 18–48)
MCH RBC QN AUTO: 30.9 PG (ref 27–31)
MCHC RBC AUTO-ENTMCNC: 31.2 G/DL (ref 32–36)
MCV RBC AUTO: 99 FL (ref 82–98)
MONOCYTES # BLD AUTO: 0.6 K/UL (ref 0.3–1)
MONOCYTES NFR BLD: 8.5 % (ref 4–15)
NEUTROPHILS # BLD AUTO: 5.1 K/UL (ref 1.8–7.7)
NEUTROPHILS NFR BLD: 69 % (ref 38–73)
NRBC BLD-RTO: 0 /100 WBC
PLATELET # BLD AUTO: 128 K/UL (ref 150–450)
PMV BLD AUTO: 9.2 FL (ref 9.2–12.9)
POTASSIUM SERPL-SCNC: 4.1 MMOL/L (ref 3.5–5.1)
RBC # BLD AUTO: 4.47 M/UL (ref 4.6–6.2)
SODIUM SERPL-SCNC: 140 MMOL/L (ref 136–145)
WBC # BLD AUTO: 7.4 K/UL (ref 3.9–12.7)

## 2023-02-03 PROCEDURE — 80048 BASIC METABOLIC PNL TOTAL CA: CPT | Performed by: INTERNAL MEDICINE

## 2023-02-03 PROCEDURE — 36415 COLL VENOUS BLD VENIPUNCTURE: CPT | Performed by: INTERNAL MEDICINE

## 2023-02-03 PROCEDURE — 99222 1ST HOSP IP/OBS MODERATE 55: CPT | Mod: ,,, | Performed by: SURGERY

## 2023-02-03 PROCEDURE — 12000002 HC ACUTE/MED SURGE SEMI-PRIVATE ROOM

## 2023-02-03 PROCEDURE — 99222 PR INITIAL HOSPITAL CARE,LEVL II: ICD-10-PCS | Mod: ,,, | Performed by: SURGERY

## 2023-02-03 PROCEDURE — 25000003 PHARM REV CODE 250: Performed by: INTERNAL MEDICINE

## 2023-02-03 PROCEDURE — 85025 COMPLETE CBC W/AUTO DIFF WBC: CPT | Performed by: INTERNAL MEDICINE

## 2023-02-03 RX ORDER — INSULIN ASPART 100 [IU]/ML
0-5 INJECTION, SOLUTION INTRAVENOUS; SUBCUTANEOUS
Status: DISCONTINUED | OUTPATIENT
Start: 2023-02-03 | End: 2023-02-04 | Stop reason: HOSPADM

## 2023-02-03 RX ORDER — LACTULOSE 10 G/15ML
20 SOLUTION ORAL DAILY
Status: DISCONTINUED | OUTPATIENT
Start: 2023-02-03 | End: 2023-02-04 | Stop reason: HOSPADM

## 2023-02-03 RX ORDER — METOPROLOL TARTRATE 25 MG/1
12.5 TABLET ORAL 2 TIMES DAILY
Status: DISCONTINUED | OUTPATIENT
Start: 2023-02-04 | End: 2023-02-04 | Stop reason: HOSPADM

## 2023-02-03 RX ORDER — IBUPROFEN 200 MG
24 TABLET ORAL
Status: DISCONTINUED | OUTPATIENT
Start: 2023-02-03 | End: 2023-02-04 | Stop reason: HOSPADM

## 2023-02-03 RX ORDER — POLYETHYLENE GLYCOL 3350 17 G/17G
17 POWDER, FOR SOLUTION ORAL DAILY
Status: DISCONTINUED | OUTPATIENT
Start: 2023-02-04 | End: 2023-02-04 | Stop reason: HOSPADM

## 2023-02-03 RX ORDER — GLUCAGON 1 MG
1 KIT INJECTION
Status: DISCONTINUED | OUTPATIENT
Start: 2023-02-03 | End: 2023-02-04 | Stop reason: HOSPADM

## 2023-02-03 RX ORDER — IBUPROFEN 200 MG
16 TABLET ORAL
Status: DISCONTINUED | OUTPATIENT
Start: 2023-02-03 | End: 2023-02-04 | Stop reason: HOSPADM

## 2023-02-03 RX ADMIN — AZELASTINE HYDROCHLORIDE 274 MCG: 137 SPRAY, METERED NASAL at 08:02

## 2023-02-03 RX ADMIN — MONTELUKAST 10 MG: 10 TABLET, FILM COATED ORAL at 08:02

## 2023-02-03 RX ADMIN — GABAPENTIN 300 MG: 300 CAPSULE ORAL at 09:02

## 2023-02-03 RX ADMIN — TRAZODONE HYDROCHLORIDE 100 MG: 50 TABLET ORAL at 08:02

## 2023-02-03 RX ADMIN — AZELASTINE HYDROCHLORIDE 274 MCG: 137 SPRAY, METERED NASAL at 09:02

## 2023-02-03 RX ADMIN — APIXABAN 5 MG: 5 TABLET, FILM COATED ORAL at 08:02

## 2023-02-03 RX ADMIN — APIXABAN 5 MG: 5 TABLET, FILM COATED ORAL at 09:02

## 2023-02-03 RX ADMIN — GABAPENTIN 300 MG: 300 CAPSULE ORAL at 03:02

## 2023-02-03 RX ADMIN — ACETAMINOPHEN 650 MG: 325 TABLET ORAL at 08:02

## 2023-02-03 RX ADMIN — GABAPENTIN 300 MG: 300 CAPSULE ORAL at 08:02

## 2023-02-03 RX ADMIN — METOPROLOL TARTRATE 25 MG: 25 TABLET, FILM COATED ORAL at 09:02

## 2023-02-03 RX ADMIN — LISINOPRIL 20 MG: 20 TABLET ORAL at 09:02

## 2023-02-03 NOTE — CONSULTS
GENERAL SURGERY  INPATIENT CONSULT    REASON FOR CONSULT: Small bowel obstruction    HPI: Yeyo Hollingsworth is a 82 y.o. male with past medical history atrial fibrillation on blood thinners, type 2 diabetes, hypertension, obesity, BRANDI and multiple previous abdominal surgeries with recurrent small-bowel obstructions who presented to the emergency room with generalized abdominal pain consistent with previous episodes of small-bowel obstructions. Patient reports that he ate outside of his normal regimen which he feels led to this episode. No significant lab abnormalities above baseline. Did undergo CT scan which showed concerns for partial small bowel obstruction. Patient was admitted. Has since had multiple watery stools.  He reports 1 episode of emesis yesterday. Denies any nausea or vomiting at this time.  No belching or abdominal distention. Did not require NG tube.    I have reviewed the patient's chart including prior progress notes, procedures and testing. Patient has a complicated abdominal surgical history dating back to the 80s which include laparotomy with end ostomy and subsequent reversal, hernia repair with bowel injury. Patient reports that his previous surgeon who last operated on his abdomen reported it was a land mine.    ROS:   Review of Systems   All other systems reviewed and are negative.    PROBLEM LIST:  Patient Active Problem List   Diagnosis    SBO (small bowel obstruction)    History of DVT (deep vein thrombosis)    Controlled type 2 diabetes mellitus without complication, without long-term current use of insulin    Obesity, Class III, BMI 40-49.9 (morbid obesity)    Urethritis    Hematuria    Acute bronchitis with asthma    Encounter for comprehensive diabetic foot examination, type 2 diabetes mellitus    HTN (hypertension)    Slowing of urinary stream     Hyperlipemia    BPH (benign prostatic hyperplasia)    Constipation, chronic    Back pain    Essential hypertension    Equinus deformity  of foot    Paroxysmal atrial fibrillation    Chronic sinus complaints    Spinal stenosis of lumbar region-dr redding    DDD (degenerative disc disease), lumbar    Chronic nonseasonal allergic rhinitis due to fungal spores    Anticoagulant long-term use    Lumbar radiculitis    Rib pain on right side    Anterolisthesis-lumbar L4/l5    Acute pain of left shoulder greater than right     Pain in both knees    History of surgery- left great toe    Positive colorectal cancer screening using Cologuard test    Lumbar radiculopathy    Primary insomnia    Accident due to mechanical fall without injury    Scalp laceration    Old lacunar stroke without late effect    Cervical stenosis of spine    New daily persistent headache    Chronic pain of left ankle    Sclerosing bone dysplasia    Chronic atrial fibrillation    Prerenal azotemia    Fatigue    Tendonitis of ankle, left    Peroneal tendon tear, left, subsequent encounter    Peroneal tendinitis of left lower extremity    Grade 2 ankle sprain, left, subsequent encounter    Rupture of peroneal tendon, left, subsequent encounter    Mid back pain on right side    CKD (chronic kidney disease), stage III    PAF (paroxysmal atrial fibrillation)    BRANDI (obstructive sleep apnea)    Type 2 diabetes mellitus, without long-term current use of insulin    Hypophosphatemia    Skin ulcer of abdomen, limited to breakdown of skin         HISTORY  Past Medical History:   Diagnosis Date    Allergy     Ankle pain     left    Anticoagulant long-term use     aspirin    Anxiety     Atrial fibrillation     Back pain     Bruises easily     Cataract     Clotting disorder     left leg    Colon polyp     Diabetes mellitus     Diabetes mellitus, type 2     Hay fever     Hyperlipidemia     Hypertension     Left hip pain 12/26/2021    Seeing Dr. Beard for hip pain     Obese     BRANDI on CPAP        Past Surgical History:   Procedure Laterality Date    ABDOMINAL SURGERY      origunal surg 1986-mult surgeries  since    CARPAL TUNNEL RELEASE      right wrist 2009-left wrist 2010    COLON SURGERY      partial colon removal    COLONOSCOPY  11/26/2018    Dr. Salazar; normal terminal ileum; polyps removed from ascending colon and hepatic flexure; pan diverticulosis; small internal hemorrhoids; repeat in 5 years; Bx: fragments of an adenomatous polyp with mild surface glandular dysplasia and associated chronic active inflammation, no evidence of high-grade dysplasia or malignancy    COLOSTOMY  1986    colostomy reversal  1986    EYE SURGERY      hay fever      HERNIA REPAIR  1949    REPAIR OF TENDON OF LOWER EXTREMITY Left 6/24/2020    Procedure: REPAIR, TENDON, LOWER EXTREMITY;  Surgeon: Justin Mandujano DPM;  [place] [facility] OR;  Service: Podiatry;  Laterality: Left;  ARTHEX NOTIFIED IN CASE HE WANTS ANCHOR    TOE SURGERY Left 2017    replaced a joint     TONSILLECTOMY  1947    TRANSFORAMINAL EPIDURAL INJECTION OF STEROID      x 4    TRANSFORAMINAL EPIDURAL INJECTION OF STEROID Bilateral 11/13/2019    Procedure: Injection,steroid,epidural,transforaminal approach;  Surgeon: Grant Wright MD;  [place] [facility] OR;  Service: Pain Management;  Laterality: Bilateral;  L4-5, L5-S1    TRANSFORAMINAL EPIDURAL INJECTION OF STEROID Bilateral 3/9/2021    Procedure: Injection,steroid,epidural,transforaminal approach;  Surgeon: Grant Wright MD;  [place] [facility] OR;  Service: Pain Management;  Laterality: Bilateral;  L4-5, L5-S1    TRANSFORAMINAL EPIDURAL INJECTION OF STEROID Bilateral 5/25/2021    Procedure: Injection,steroid,epidural,transforaminal approach;  Surgeon: Grant Wright MD;  [place] [facility] OR;  Service: Pain Management;  Laterality: Bilateral;  L4-L5,L5,S1    TRANSFORAMINAL EPIDURAL INJECTION OF STEROID Bilateral 12/14/2021    Procedure: Injection,steroid,epidural,transforaminal approach Bilateral L4-5, L5-S1;  Surgeon: Grant Wright MD;  [place] [facility] OR;  Service: Pain Management;  Laterality: Bilateral;    TRANSFORAMINAL  EPIDURAL INJECTION OF STEROID Bilateral 2022    Procedure: Injection,steroid,epidural,transforaminal approach;  Surgeon: Grant Wright MD;  [place] [facility] OR;  Service: Pain Management;  Laterality: Bilateral;  L4-5 and L5-s1    TRANSFORAMINAL EPIDURAL INJECTION OF STEROID Bilateral 2023    Procedure: Injection,steroid,epidural,transforaminal approach L4-L5-S1;  Surgeon: Grant Wright MD;  [place] [facility] OR;  Service: Pain Management;  Laterality: Bilateral;       Social History     Tobacco Use    Smoking status: Former     Types: Cigarettes     Quit date: 2006     Years since quittin.9    Smokeless tobacco: Never    Tobacco comments:     ex smoker    Substance Use Topics    Alcohol use: Yes     Comment: rarely    Drug use: No       Family History   Problem Relation Age of Onset    Heart disease Mother     Melanoma Neg Hx     Psoriasis Neg Hx     Lupus Neg Hx     Eczema Neg Hx          MEDS:  Current Facility-Administered Medications on File Prior to Encounter   Medication Dose Route Frequency Provider Last Rate Last Admin    lactated ringers infusion   Intravenous Once PRN Grant Wright MD   Stopped at 22 1230     Current Outpatient Medications on File Prior to Encounter   Medication Sig Dispense Refill    apixaban (ELIQUIS) 5 mg Tab Take 1 tablet (5 mg total) by mouth 2 (two) times daily. 180 tablet 3    aspirin 81 MG Chew Take 1 tablet (81 mg total) by mouth once daily. 100 tablet 1    azelastine (ASTELIN) 137 mcg (0.1 %) nasal spray 2 sprays (274 mcg total) by Nasal route 2 (two) times daily. 30 mL 5    cholecalciferol, vitamin D3, 1,250 mcg (50,000 unit) capsule Take 50,000 Units by mouth every .      diclofenac sodium (VOLTAREN) 1 % Gel Apply 2 g topically once daily.      fexofenadine (ALLEGRA) 180 MG tablet Take 1 tablet (180 mg total) by mouth once daily. 90 tablet 3    furosemide (LASIX) 20 MG tablet Take 1 tablet (20 mg total) by mouth every Mon, Wed, Fri. 45 tablet 3     gabapentin (NEURONTIN) 300 MG capsule Take 1 capsule (300 mg total) by mouth 3 (three) times daily. 270 capsule 1    HYDROcodone-acetaminophen (NORCO)  mg per tablet Take 1 tablet by mouth every 6 (six) hours as needed for Pain. 30 tablet 0    JARDIANCE 10 mg tablet Take 1 tablet (10 mg total) by mouth once daily. 90 tablet 1    ketoconazole (NIZORAL) 2 % cream Apply to face after shaving (Patient taking differently: Apply 1 application topically once daily. Apply to face after shaving) 60 g 2    lisinopriL (PRINIVIL,ZESTRIL) 20 MG tablet Take 1 tablet (20 mg total) by mouth once daily. 90 tablet 3    metoprolol tartrate (LOPRESSOR) 25 MG tablet Take 1 tablet (25 mg total) by mouth 2 (two) times daily. 180 tablet 3    montelukast (SINGULAIR) 10 mg tablet Take 1 tablet (10 mg total) by mouth every evening. 90 tablet 3    polyethylene glycol (GLYCOLAX) 17 gram/dose powder Take 17 g by mouth once daily. 507 g 3    potassium chloride (MICRO-K) 10 MEQ CpSR Take 1 capsule (10 mEq total) by mouth every other day. 90 capsule 1    SITagliptin phosphate (JANUVIA) 100 MG Tab Take 1 tablet (100 mg total) by mouth once daily. 90 tablet 3    traZODone (DESYREL) 100 MG tablet Take 1 tablet (100 mg total) by mouth every evening. 90 tablet 3    triamcinolone acetonide 0.1% (KENALOG) 0.1 % cream Apply topically 2 (two) times daily. (Patient taking differently: Apply 1 g topically 2 (two) times daily.) 80 g 0    blood sugar diagnostic (BLOOD GLUCOSE TEST) Strp 1 strip by Misc.(Non-Drug; Combo Route) route 2 (two) times a day. FREESTYLE LITE BG TEST STRIPS. current use of insulin  - Primary ICD-10-CM: E11.9 200 each 2    FREESTYLE LANCETS 28 gauge lancets Inject 1 lancet into the skin 3 (three) times daily. 100 each 3       ALLERGIES:  Review of patient's allergies indicates:   Allergen Reactions    Ativan [lorazepam] Other (See Comments)     Mood alternating      Dilaudid [hydromorphone (bulk)] Other (See Comments)     Mood  alternating      Morphine Other (See Comments)     Mood alternating      Adhesive tape-silicones     Hydromorphone          VITALS:  Temp:  [97.8 °F (36.6 °C)-98.9 °F (37.2 °C)] 98.9 °F (37.2 °C)  Pulse:  [53-82] 65  Resp:  [14-20] 16  SpO2:  [93 %-98 %] 97 %  BP: (101-123)/(51-84) 120/84    I/O last 3 completed shifts:  In: 1099 [IV Piggyback:1099]  Out: -       PHYSICAL EXAM:  Physical Exam  Vitals reviewed.   Constitutional:       General: He is not in acute distress.     Appearance: Normal appearance. He is well-developed. He is obese.   HENT:      Head: Normocephalic and atraumatic.   Eyes:      General: No scleral icterus.  Neck:      Trachea: No tracheal deviation.   Cardiovascular:      Rate and Rhythm: Normal rate and regular rhythm.      Pulses: Normal pulses.   Pulmonary:      Effort: Pulmonary effort is normal. No respiratory distress.      Breath sounds: Normal breath sounds.   Abdominal:      General: There is no distension.      Palpations: Abdomen is soft.      Tenderness: There is no abdominal tenderness. There is no guarding or rebound.      Comments: Rotund but not distended   Musculoskeletal:         General: No swelling or tenderness. Normal range of motion.      Cervical back: Normal range of motion and neck supple. No rigidity.   Skin:     General: Skin is warm and dry.      Coloration: Skin is not jaundiced.      Findings: No erythema.   Neurological:      General: No focal deficit present.      Mental Status: He is alert and oriented to person, place, and time. He is not disoriented.      Motor: No weakness or abnormal muscle tone.   Psychiatric:         Mood and Affect: Mood normal.         Behavior: Behavior normal.         Thought Content: Thought content normal.         Judgment: Judgment normal.         LABS:  Lab Results   Component Value Date    WBC 7.40 02/03/2023    RBC 4.47 (L) 02/03/2023    HGB 13.8 (L) 02/03/2023    HGB 13.9 (L) 07/29/2012    HCT 44.3 02/03/2023     (L)  02/03/2023     Lab Results   Component Value Date    GLU 92 02/03/2023     (H) 04/09/2019     02/03/2023     04/09/2019    K 4.1 02/03/2023     02/03/2023     04/09/2019    CO2 27 02/03/2023    BUN 28 (H) 02/03/2023    CREATININE 1.0 02/03/2023    CREATININE 1.02 04/09/2019    CREATININE 1.0 07/29/2012    CALCIUM 7.9 (L) 02/03/2023    CALCIUM 9.0 07/29/2012     Lab Results   Component Value Date    ALT 38 02/02/2023    AST 26 02/02/2023    ALKPHOS 38 (L) 02/02/2023    BILITOT 1.1 (H) 02/02/2023     Lab Results   Component Value Date    MG 2.2 07/28/2022    PHOS 2.3 (L) 07/29/2022       STUDIES:  Images and reports were personally reviewed.    FINDINGS:     The visualized lower thoracic structures appear unremarkable.     There is hepatic steatosis. The gallbladder, spleen, pancreas, adrenal glands and kidneys appear unremarkable. No pathologically enlarged retroperitoneal or mesenteric lymph nodes are identified. No abdominal aortic aneurysm is seen. There is minimal to mild dilatation of proximal to mid small bowel loops, transition to nondilated small bowel loops in the right mid abdomen anteriorly (for example, series 3, image 118), suggesting partial small bowel obstruction. There are several small anterior abdominal wall ventral hernias mostly containing fat aside from a tiny mid ventral hernia right of midline partially containing a portion of the small bowel (series 3, image 111). This was also present on the previous exam. There is colonic diverticulosis without definite CT evidence for acute diverticulitis. There is no free intraperitoneal air. The appendix appears unremarkable. No free fluid is identified. Urinary bladder is incompletely distended. There are small bilateral inguinal hernias containing fat. Prostate gland appears stable. No acute fracture is seen.     IMPRESSION:  1.  No bowel dilatation of proximal to mid small bowel loops with transition to nondilated small  bowel in the right midabdomen anteriorly suggesting mild partial small bowel obstruction. No free intraperitoneal air. Colonic diverticulosis.      ASSESSMENT & PLAN:  82 y.o. male with multiple medical comorbidities, complicated abdominal surgical history, partial small-bowel obstruction  -since admit patient has passed gas and had watery stools  -tolerated liquids, will advance to soft diet  -if tolerated can not stop IV fluids and consider possible discharge home  -continue to optimize electrolytes  -I have discussed with the patient his options with recurrent small-bowel obstructions, per the report of previous surgeon he is very high risk with a very hostile abdomen therefore elective surgery risks may outweigh benefit, I did discuss however if he had persistent obstruction or signs of ischemia we would need to proceed with surgical intervention, the patient expressed understanding, he believes if he sticks with his diet of small frequent meals he can avoid similar episodes    Please call with any questions or concerns.    Get Stoner MD  General Surgery  [phone]

## 2023-02-03 NOTE — PLAN OF CARE
Problem: Adult Inpatient Plan of Care  Goal: Optimal Comfort and Wellbeing  Outcome: Ongoing, Progressing     Problem: Adult Inpatient Plan of Care  Goal: Readiness for Transition of Care  Outcome: Ongoing, Progressing

## 2023-02-03 NOTE — PROGRESS NOTES
"[facility]    Progress Note    Patient Name: Yeyo Hollingsworth  MRN: [id number]  Patient Class: IP- Inpatient   Admission Date: 2/1/2023 11:25 PM  Length of Stay: 1  Attending Physician: Nneka Valdez MD  Primary Care Provider: Garland Ocampo MD  Face-to-Face encounter date: 02/03/2023  Code status:  Chief Complaint: Abdominal Pain (Pain since 7pm.  )        Subjective:    HPI:82 year old male with history of SBO (recurrent), PAF (apixaban), DM 2, HTN, Morbid Obesity with BRANDI (CPAP) presented to ED complaining of 5 hour history of worsening generalized abdominal pain, worse with movement or eating. No N/V. No change in BM. "Everytime I don't eat correctly (multiple small meals per day) I get an obstruction. This feels like that". No CP/SOB. Requests no NGT if possible.   In ED Labs reviewed and noted below: mild leukocytosis with normal H/H; normal electrolytes with stage 3a renal dysfunction; cardiac markers are normal; lactate is minimally elevated. CT Abdo/Pelvis reviewed: partial SBO. EKG reviewed: sinus without any acute segments.  Discussed with ED MD; Inpatient admission for small bowel obstruction; NPO; hold NGT currently--placing if clinically needed; insulin and electrolyte sliding scales; continuing home regimen for chronic illnesses--holding po hyperglycemics; hydration; repeat lactic acid     Interval History:   2/3:  Patient's diet has been advanced, he states that his doing better however his abdomen feels firm.  Will start lactulose.  Surgery saw patient and recommended no surgical intervention.  Heart rate running low, would decrease metoprolol to 12.5 mg b.i.d. Family present at bedside.No concerns/issues overnight reported by the patient or the nursing staff.    Review of Systems All other Review of Systems were found to be negative expect for that mentioned already in HPI.     Objective:     Vitals:    02/03/23 0439 02/03/23 0700 02/03/23 0927 02/03/23 1315   BP: " 120/84 120/64 120/64 126/70   Pulse: 65 (!) 54  (!) 59   Resp: 16 14  14   Temp: 98.9 °F (37.2 °C) 97.5 °F (36.4 °C)  98.6 °F (37 °C)   TempSrc: Oral      SpO2: 97% 96%  96%   Weight:       Height:            Vitals reviewed.  Constitutional: No distress.   HENT: NC  Head: Atraumatic.   Cardiovascular: Normal rate, regular rhythm and normal heart sounds.   Pulmonary/Chest: Effort normal. No wheezes.   Abdominal: Soft. Bowel sounds are normal. distension and no mass. No tenderness  Neurological: Alert.   Skin: Skin is warm and dry.   Psych: Appropriate mood and affect    Following labs were Reviewed   CBC:  Recent Labs   Lab 02/03/23  0512   WBC 7.40   HGB 13.8*   HCT 44.3   *     CMP:  Recent Labs   Lab 02/03/23 0512   CALCIUM 7.9*      K 4.1   CO2 27      BUN 28*   CREATININE 1.0       Micro Results  Microbiology Results (last 7 days)       ** No results found for the last 168 hours. **             Radiology Reports  X-Ray Lumbar Spine Ap And Lateral    Result Date: 1/26/2023  See office visit note for XRAY Report/Interpretation dictation. This procedure was auto-finalized.    X-Ray Hip 2 or 3 views Left (with Pelvis when performed)    Result Date: 1/26/2023  See office visit note for XRAY Report/Interpretation dictation. This procedure was auto-finalized.    X-Ray Abdomen AP 1 View    Result Date: 2/3/2023  HISTORY: Follow-up. COMPARISON:January 28, 2022 FINDINGS:Normal distribution of gas and stool throughout the abdominal cavity without areas of disproportionate distention. The patient is a large body habitus limiting diagnostic inspection. No evidence of mechanical obstruction. There are multiple clips in the left lower quadrant. There are postoperative changes about the left paravertebral margin in the deep pelvis. Thoracolumbar spine exhibits chronic spondylosis changes. IMPRESSION: 1. Nonspecific intestinal bowel gas pattern. 2. No evidence of definable change upon comparison.  Electronically signed by:  Justin Lynn MD  2/3/2023 9:06 AM CST Workstation: [id number]    CT Abdomen Pelvis With Contrast    Result Date: 2/2/2023  EXAM DESCRIPTION: CT ABDOMEN PELVIS WITH CONTRAST RadLex: CT ABDOMEN PELVIS WITH IV CONTRAST CLINICAL HISTORY: Abdominal abscess/infection suspected, abdominal pain, history of partial colon removal, colostomy. TECHNIQUE: CT of the abdomen and pelvis using intravenous Omni 350, 100 mL. All CT scans at this facility use dose modulation, iterative reconstruction, and/or weight based dosing when appropriate to reduce radiation dose to as low as reasonably achievable. COMPARISON: CT abdomen pelvis 7/25/2022. FINDINGS: The visualized lower thoracic structures appear unremarkable. There is hepatic steatosis. The gallbladder, spleen, pancreas, adrenal glands and kidneys appear unremarkable. No pathologically enlarged retroperitoneal or mesenteric lymph nodes are identified. No abdominal aortic aneurysm is seen. There is minimal to mild dilatation of proximal to mid small bowel loops, transition to nondilated small bowel loops in the right mid abdomen anteriorly (for example, series 3, image 118), suggesting partial small bowel obstruction. There are several small anterior abdominal wall ventral hernias mostly containing fat aside from a tiny mid ventral hernia right of midline partially containing a portion of the small bowel (series 3, image 111). This was also present on the previous exam. There is colonic diverticulosis without definite CT evidence for acute diverticulitis. There is no free intraperitoneal air. The appendix appears unremarkable. No free fluid is identified. Urinary bladder is incompletely distended. There are small bilateral inguinal hernias containing fat. Prostate gland appears stable. No acute fracture is seen. IMPRESSION: 1.  No bowel dilatation of proximal to mid small bowel loops with transition to nondilated small bowel in the right midabdomen  anteriorly suggesting mild partial small bowel obstruction. No free intraperitoneal air. Colonic diverticulosis. Electronically signed by:  Brain Eden DO  2/2/2023 1:01 AM CST Workstation: [id number]       Meds  Scheduled Meds:   apixaban  5 mg Oral BID    azelastine  2 spray Nasal BID    gabapentin  300 mg Oral TID    lactulose  20 g Oral Daily    lisinopriL  20 mg Oral Daily    metoprolol tartrate  25 mg Oral BID    montelukast  10 mg Oral QHS    [START ON 2/4/2023] polyethylene glycol  17 g Oral Daily    traZODone  100 mg Oral QHS     Continuous Infusions:  PRN Meds:.acetaminophen, HYDROcodone-acetaminophen, melatonin, ondansetron.    Active PT: No  Active OT: No  Active SLP: No  Assessment & Plan:   SBO (small bowel obstruction)  Continue oral lactulose and MiraLax  advanced diet as tolerated    BRANDI (obstructive sleep apnea)  CPAP to sleep        CKD (chronic kidney disease), stage III  Continue management     Paroxysmal atrial fibrillation  Rate controlled           Obesity, Class III, BMI 40-49.9 (morbid obesity)  Body mass index is 34.97 kg/m². Morbid obesity complicates all aspects of disease management from diagnostic modalities to treatment. Weight loss encouraged and health benefits explained to patient.                   Controlled type 2 diabetes mellitus without complication, without long-term current use of insulin  Patient's FSGs are controlled on current medication regimen.  Last A1c reviewed-         Lab Results   Component Value Date     HGBA1C 6.0 01/05/2023      Most recent fingerstick glucose reviewed- No results for input(s): POCTGLUCOSE in the last 24 hours.  Current correctional scale  Low  Maintain anti-hyperglycemic dose as follows-       Antihyperglycemics (From admission, onward)     None          Hold Oral hypoglycemics while patient is in the hospital.     Inpatient admission for small bowel obstruction; NPO; hold NGT currently--placing if clinically needed; insulin and electrolyte  sliding scales; continuing home regimen for chronic illnesses--holding po hyperglycemics; hydration; repeat lactic acid      Discharge Planning:   Is the patient medically ready for discharge?: no    Reason for patient still in hospital (select all that apply): Patient trending condition and Treatment    Above encounter included review of the medical records, interviewing and examining the patient face-to-face, discussion with family and other health care providers, ordering and interpreting lab/test results and formulating a plan of care.     Medical Decision Making:      [_] Low Complexity  [_] Moderate Complexity  [x] High Complexity      Nneka Valdez MD  Department of Hospital Medicine   [facility]

## 2023-02-04 VITALS
HEART RATE: 60 BPM | BODY MASS INDEX: 34.86 KG/M2 | RESPIRATION RATE: 18 BRPM | DIASTOLIC BLOOD PRESSURE: 76 MMHG | HEIGHT: 68 IN | TEMPERATURE: 98 F | OXYGEN SATURATION: 97 % | WEIGHT: 230 LBS | SYSTOLIC BLOOD PRESSURE: 140 MMHG

## 2023-02-04 LAB — GLUCOSE SERPL-MCNC: 93 MG/DL (ref 70–110)

## 2023-02-04 PROCEDURE — 25000003 PHARM REV CODE 250: Performed by: STUDENT IN AN ORGANIZED HEALTH CARE EDUCATION/TRAINING PROGRAM

## 2023-02-04 PROCEDURE — 25000003 PHARM REV CODE 250: Performed by: INTERNAL MEDICINE

## 2023-02-04 RX ORDER — METOPROLOL TARTRATE 25 MG/1
12.5 TABLET, FILM COATED ORAL 2 TIMES DAILY
Qty: 180 TABLET | Refills: 3
Start: 2023-02-04 | End: 2023-03-27 | Stop reason: SDUPTHER

## 2023-02-04 RX ADMIN — GABAPENTIN 300 MG: 300 CAPSULE ORAL at 08:02

## 2023-02-04 RX ADMIN — LISINOPRIL 20 MG: 20 TABLET ORAL at 08:02

## 2023-02-04 RX ADMIN — APIXABAN 5 MG: 5 TABLET, FILM COATED ORAL at 08:02

## 2023-02-04 RX ADMIN — AZELASTINE HYDROCHLORIDE 274 MCG: 137 SPRAY, METERED NASAL at 08:02

## 2023-02-04 RX ADMIN — Medication 6 MG: at 12:02

## 2023-02-04 RX ADMIN — METOPROLOL TARTRATE 12.5 MG: 25 TABLET, FILM COATED ORAL at 08:02

## 2023-02-04 NOTE — PLAN OF CARE
Patient cleared for discharge from case management standpoint.       02/04/23 1033   Final Note   Assessment Type Final Discharge Note   Anticipated Discharge Disposition Home   What phone number can be called within the next 1-3 days to see how you are doing after discharge? [phone]   Post-Acute Status   Discharge Delays None known at this time     Chart and discharge orders reviewed.  Patient discharged home with no further case management needs.

## 2023-02-04 NOTE — DISCHARGE INSTRUCTIONS
Your metoprolol was halved to due to low normal heart rate.  Please follow-up with your primary care doctor for further fine tuning

## 2023-02-04 NOTE — DISCHARGE SUMMARY
"[facility]  Discharge Summary      Patient Name: Yeyo Hollingsworth  MRN: [id number]  JOHNY: [id number]  Patient Class: IP- Inpatient  Admission Date: 2/1/2023  Hospital Length of Stay: 2 days  Discharge Date and Time:  02/04/2023 10:15 AM  Attending Physician: Nneka Valdez MD   Discharging Provider: Nneka Valdez MD  Primary Care Provider: Garland Ocampo MD    Primary Care Team: Networked reference to record PCT     HPI:   82 year old male with history of SBO (recurrent), PAF (apixaban), DM 2, HTN, Morbid Obesity with BRANDI (CPAP) presented to ED complaining of 5 hour history of worsening generalized abdominal pain, worse with movement or eating. No N/V. No change in BM. "Everytime I don't eat correctly (multiple small meals per day) I get an obstruction. This feels like that". No CP/SOB. Requests no NGT if possible.     In ED Labs reviewed and noted below: mild leukocytosis with normal H/H; normal electrolytes with stage 3a renal dysfunction; cardiac markers are normal; lactate is minimally elevated. CT Abdo/Pelvis reviewed: partial SBO. EKG reviewed: sinus without any acute segments.    Discussed with ED MD; Inpatient admission for small bowel obstruction; NPO; hold NGT currently--placing if clinically needed; insulin and electrolyte sliding scales; continuing home regimen for chronic illnesses--holding po hyperglycemics; hydration; repeat lactic acid      * No surgery found *      Hospital Course:    2/3:  Patient's diet has been advanced, he states that his doing better however his abdomen feels firm.  Will start lactulose.  Surgery saw patient and recommended no surgical intervention.  Heart rate running low, would decrease metoprolol to 12.5 mg b.i.d. Family present at bedside.No concerns/issues overnight reported by the patient or the nursing staff.    2/4:  Patient tolerated diet well, and is moving his bowel and passing gas.  Patient counseled on types of diet and " subsequently discharged to follow-up with PCP outpatient.    Physical examination on discharge:  Constitutional: No distress.   HENT: NC  Head: Atraumatic.   Cardiovascular: Normal rate, regular rhythm and normal heart sounds.   Pulmonary/Chest: Effort normal. No wheezes.   Abdominal: Soft. Bowel sounds are normal. No distension and no mass. No tenderness  Neurological: Alert.   Skin: Skin is warm and dry.   Psych: Appropriate mood and affect    I have seen the patient on the day of discharge and reviewed the discharge instructions as outlined.      Goals of Care Treatment Preferences:  Code Status: Full Code      Consults:   Consults (From admission, onward)        Status Ordering Provider     Inpatient consult to General Surgery  Once        Provider:  Get Stoner Jr., MD    Completed BEATRIZ SHUKLA     Inpatient consult to Internal Medicine  Once        Provider:  Kay Hines MD    Acknowledged KAY HINES          No new Assessment & Plan notes have been filed under this hospital service since the last note was generated.  Service: Hospital Medicine    Final Active Diagnoses:    Diagnosis Date Noted POA    PRINCIPAL PROBLEM:  Small bowel obstruction [K56.609] 05/22/2013 Yes    CKD (chronic kidney disease), stage III [N18.30] 04/23/2021 Yes     Chronic    BRANDI (obstructive sleep apnea) [G47.33] 04/23/2021 Yes     Chronic    Paroxysmal atrial fibrillation [I48.0] 08/08/2017 Yes    Obesity, Class III, BMI 40-49.9 (morbid obesity) [E66.01] 01/03/2014 Yes     Chronic    Controlled type 2 diabetes mellitus without complication, without long-term current use of insulin [E11.9] 05/22/2013 Yes      Problems Resolved During this Admission:       Discharged Condition: good    Disposition: Home or Self Care    Follow Up:   Follow-up Information     Garland Ocampo MD Follow up in 1 week(s).    Specialty: Family Medicine  Contact information:  [address] [facility]  [address]  [place]  [address]  [phone]                       Patient Instructions:      Ambulatory referral/consult to Outpatient Case Management   Referral Priority: Routine Referral Type: Consultation   Referral Reason: Specialty Services Required   Number of Visits Requested: 1     Activity as tolerated       Significant Diagnostic Studies: Labs:   BMP:   Recent Labs   Lab 02/03/23  0512   GLU 92      K 4.1      CO2 27   BUN 28*   CREATININE 1.0   CALCIUM 7.9*   , CMP   Recent Labs   Lab 02/03/23  0512      K 4.1      CO2 27   GLU 92   BUN 28*   CREATININE 1.0   CALCIUM 7.9*   ANIONGAP 5*   , CBC   Recent Labs   Lab 02/03/23 0512   WBC 7.40   HGB 13.8*   HCT 44.3   *    and All labs within the past 24 hours have been reviewed  Radiology: X-Ray: CXR: X-Ray Chest 1 View (CXR): No results found for this visit on 02/01/23. and X-Ray Chest PA and Lateral (CXR): No results found for this visit on 02/01/23. and KUB: X-Ray Abdomen AP 1 View (KUB):   Results for orders placed or performed during the hospital encounter of 02/01/23   X-Ray Abdomen AP 1 View    Narrative    HISTORY: Follow-up.    COMPARISON:January 28, 2022    FINDINGS:Normal distribution of gas and stool throughout the abdominal cavity without areas of disproportionate distention. The patient is a large body habitus limiting diagnostic inspection. No evidence of mechanical obstruction. There are multiple clips in the left lower quadrant. There are postoperative changes about the left paravertebral margin in the deep pelvis. Thoracolumbar spine exhibits chronic spondylosis changes.    IMPRESSION:  1. Nonspecific intestinal bowel gas pattern.  2. No evidence of definable change upon comparison.    Electronically signed by:  Justin Lynn MD  2/3/2023 9:06 AM [facility] Workstation: [id number]     CT scan: CT ABDOMEN PELVIS WITH CONTRAST:   Results for orders placed or performed during the hospital encounter of 02/01/23   CT Abdomen Pelvis With  Contrast    Narrative    EXAM DESCRIPTION:  CT ABDOMEN PELVIS WITH CONTRAST  RadLex: CT ABDOMEN PELVIS WITH IV CONTRAST    CLINICAL HISTORY:  Abdominal abscess/infection suspected, abdominal pain, history of partial colon removal, colostomy.    TECHNIQUE:  CT of the abdomen and pelvis using intravenous Omni 350, 100 mL.  All CT scans at this facility use dose modulation, iterative reconstruction, and/or weight based dosing when appropriate to reduce radiation dose to as low as reasonably achievable.    COMPARISON: CT abdomen pelvis 7/25/2022.    FINDINGS:    The visualized lower thoracic structures appear unremarkable.    There is hepatic steatosis. The gallbladder, spleen, pancreas, adrenal glands and kidneys appear unremarkable. No pathologically enlarged retroperitoneal or mesenteric lymph nodes are identified. No abdominal aortic aneurysm is seen. There is minimal to mild dilatation of proximal to mid small bowel loops, transition to nondilated small bowel loops in the right mid abdomen anteriorly (for example, series 3, image 118), suggesting partial small bowel obstruction. There are several small anterior abdominal wall ventral hernias mostly containing fat aside from a tiny mid ventral hernia right of midline partially containing a portion of the small bowel (series 3, image 111). This was also present on the previous exam. There is colonic diverticulosis without definite CT evidence for acute diverticulitis. There is no free intraperitoneal air. The appendix appears unremarkable. No free fluid is identified. Urinary bladder is incompletely distended. There are small bilateral inguinal hernias containing fat. Prostate gland appears stable. No acute fracture is seen.    IMPRESSION:  1.  No bowel dilatation of proximal to mid small bowel loops with transition to nondilated small bowel in the right midabdomen anteriorly suggesting mild partial small bowel obstruction. No free intraperitoneal air. Colonic  diverticulosis.    Electronically signed by:  Brain Eden DO  2/2/2023 1:01 AM CST Workstation: [id number]    and CT ABDOMEN PELVIS WITHOUT CONTRAST: No results found for this visit on 02/01/23.    Pending Diagnostic Studies:     None         Medications:  Reconciled Home Medications:      Medication List      CHANGE how you take these medications    ketoconazole 2 % cream  Commonly known as: NIZORAL  Apply to face after shaving  What changed:   · how much to take  · how to take this  · when to take this     metoprolol tartrate 25 MG tablet  Commonly known as: LOPRESSOR  Take 0.5 tablets (12.5 mg total) by mouth 2 (two) times daily.  What changed: how much to take        CONTINUE taking these medications    apixaban 5 mg Tab  Commonly known as: ELIQUIS  Take 1 tablet (5 mg total) by mouth 2 (two) times daily.     aspirin 81 MG Chew  Take 1 tablet (81 mg total) by mouth once daily.     azelastine 137 mcg (0.1 %) nasal spray  Commonly known as: ASTELIN  2 sprays (274 mcg total) by Nasal route 2 (two) times daily.     blood sugar diagnostic Strp  Commonly known as: BLOOD GLUCOSE TEST  1 strip by Misc.(Non-Drug; Combo Route) route 2 (two) times a day. FREESTYLE LITE BG TEST STRIPS. current use of insulin  - Primary ICD-10-CM: E11.9     cholecalciferol (vitamin D3) 1,250 mcg (50,000 unit) capsule  Take 50,000 Units by mouth every Sunday.     fexofenadine 180 MG tablet  Commonly known as: ALLEGRA  Take 1 tablet (180 mg total) by mouth once daily.     FREESTYLE LANCETS 28 gauge Misc  Generic drug: lancets  Inject 1 lancet into the skin 3 (three) times daily.     furosemide 20 MG tablet  Commonly known as: LASIX  Take 1 tablet (20 mg total) by mouth every Mon, Wed, Fri.     gabapentin 300 MG capsule  Commonly known as: NEURONTIN  Take 1 capsule (300 mg total) by mouth 3 (three) times daily.     HYDROcodone-acetaminophen  mg per tablet  Commonly known as: NORCO  Take 1 tablet by mouth every 6 (six) hours as needed for  Pain.     JARDIANCE 10 mg tablet  Generic drug: empagliflozin  Take 1 tablet (10 mg total) by mouth once daily.     lisinopriL 20 MG tablet  Commonly known as: PRINIVIL,ZESTRIL  Take 1 tablet (20 mg total) by mouth once daily.     montelukast 10 mg tablet  Commonly known as: SINGULAIR  Take 1 tablet (10 mg total) by mouth every evening.     polyethylene glycol 17 gram/dose powder  Commonly known as: GLYCOLAX  Take 17 g by mouth once daily.     potassium chloride 10 MEQ Cpsr  Commonly known as: MICRO-K  Take 1 capsule (10 mEq total) by mouth every other day.     SITagliptin phosphate 100 MG Tab  Commonly known as: JANUVIA  Take 1 tablet (100 mg total) by mouth once daily.     traZODone 100 MG tablet  Commonly known as: DESYREL  Take 1 tablet (100 mg total) by mouth every evening.     VOLTAREN 1 % Gel  Generic drug: diclofenac sodium  Apply 2 g topically once daily.        ASK your doctor about these medications    triamcinolone acetonide 0.1% 0.1 % cream  Commonly known as: KENALOG  Apply topically 2 (two) times daily.            Indwelling Lines/Drains at time of discharge:   Lines/Drains/Airways     None                 Time spent on the discharge of patient: 35 minutes         Nneka Valdez MD  Department of Hospital Medicine  [facility]

## 2023-02-08 ENCOUNTER — TELEPHONE (OUTPATIENT)
Dept: FAMILY MEDICINE | Facility: CLINIC | Age: 83
End: 2023-02-08

## 2023-02-08 NOTE — TELEPHONE ENCOUNTER
----- Message from Tamia Gross sent at 2/6/2023  8:58 AM CST -----  Regarding: Hosp f/u dc'd 2/4/23  Please call patient - needs post-hospital phone call within 2 business days of discharge and hospital follow up visit scheduled within 7-14 days if not already scheduled.

## 2023-02-14 ENCOUNTER — OFFICE VISIT (OUTPATIENT)
Dept: FAMILY MEDICINE | Facility: CLINIC | Age: 83
End: 2023-02-14
Payer: MEDICARE

## 2023-02-14 VITALS
HEIGHT: 68 IN | OXYGEN SATURATION: 95 % | WEIGHT: 229.19 LBS | SYSTOLIC BLOOD PRESSURE: 127 MMHG | BODY MASS INDEX: 34.74 KG/M2 | DIASTOLIC BLOOD PRESSURE: 64 MMHG | HEART RATE: 60 BPM

## 2023-02-14 DIAGNOSIS — M54.16 LUMBAR RADICULITIS: ICD-10-CM

## 2023-02-14 DIAGNOSIS — Z09 HOSPITAL DISCHARGE FOLLOW-UP: Primary | ICD-10-CM

## 2023-02-14 PROCEDURE — 99215 OFFICE O/P EST HI 40 MIN: CPT | Performed by: FAMILY MEDICINE

## 2023-02-14 PROCEDURE — 99214 OFFICE O/P EST MOD 30 MIN: CPT | Mod: S$PBB,AQ,, | Performed by: FAMILY MEDICINE

## 2023-02-14 PROCEDURE — 99214 PR OFFICE/OUTPT VISIT, EST, LEVL IV, 30-39 MIN: ICD-10-PCS | Mod: S$PBB,AQ,, | Performed by: FAMILY MEDICINE

## 2023-02-14 RX ORDER — HYDROCODONE BITARTRATE AND ACETAMINOPHEN 10; 325 MG/1; MG/1
1 TABLET ORAL EVERY 6 HOURS PRN
Qty: 45 TABLET | Refills: 0 | Status: SHIPPED | OUTPATIENT
Start: 2023-02-14 | End: 2023-05-02 | Stop reason: SDUPTHER

## 2023-02-24 ENCOUNTER — PATIENT MESSAGE (OUTPATIENT)
Dept: FAMILY MEDICINE | Facility: CLINIC | Age: 83
End: 2023-02-24

## 2023-02-27 RX ORDER — DICLOFENAC SODIUM 10 MG/G
2 GEL TOPICAL DAILY
Qty: 100 G | Refills: 2 | Status: SHIPPED | OUTPATIENT
Start: 2023-02-27 | End: 2023-04-10 | Stop reason: SDUPTHER

## 2023-03-02 ENCOUNTER — OUTPATIENT CASE MANAGEMENT (OUTPATIENT)
Dept: ADMINISTRATIVE | Facility: OTHER | Age: 83
End: 2023-03-02
Payer: MEDICARE

## 2023-03-02 NOTE — LETTER
March 13, 2023    Yeyo Hollingsworth  [address]  [place] [address]             [facility]  [address]  [place] [address] Dear Welcome to [facility]s Complex Care Management Program.  It was a pleasure talking with you today.  My name is MARIBELL Piña, RN, [facility]. I look forward to working with you as your Care Manager.  My goal is to help you function at the healthiest and highest level possible.  You can contact me directly at [phone].    As an [facility] patient, some of the services we may be able to provide include:      Development of an individualized care plan with a Registered Nurse    Connection with a    Connection with available resources and services     Coordinate communication among your care team members    Provide coaching and education    Help you understand your doctors treatment plan   Help you obtain information about your insurance coverage.     All services provided by [facility]s Complex Care Managers and other care team members are coordinated with and communicated to your primary care team.    As part of your enrollment, you will be receiving education materials and more information about these services in your [facility] account, by phone or through the mail.  If you do not wish to participate or receive information, please contact our office at [phone].      Sincerely,  MARIBELL Piña, RN, CCM [facility]  [phone]  [facility]

## 2023-03-02 NOTE — LETTER
March 3, 2023    Yeyo JUAN Edel  [address]  [place] [address]             [facility] [address] [facility] [place] [address] .Dear Samara Hollingsworth,    I work with [facility] Outpatient Case Management Department. We received a referral to call you to discuss your medical history.These services are free of charge and are offered to [facility] patients who have recently been discharged from any of our facilities or who have complex medical conditions that may require the skill of a nurse to assist with management.         .      I attempted to reach you by telephone, but I was unsuccessful. Please call our department so that we can go over some questions with you regarding your health.    The Outpatient Case Management Department can be reached at [phone] from 8:00AM to 4:30 PM on Monday thru Friday. [facility] also has a program where a nurse is available 24/7 to answer questions or provide medical advice, their number is [phone].    Thanks,  Saskia Jackson, RUTHIEN, RN, CCM [facility]  [phone]    Outpatient Care Management

## 2023-03-02 NOTE — PROGRESS NOTES
Outpatient Care Management  Initial Patient Assessment    Patient: Yeyo Hollingsworth  MRN: [id number]  Date of Service: 03/02/2023  Completed by: Saskia Jackson RN  Referral Date: 02/03/2023  Program: High Risk  Status: Ongoing  Effective Dates: 3/13/2023 - present  Responsible Staff: Saskia Jackson RN        Reason for Visit   Patient presents with    OPCM Chart Review     03/02/23 03/13/23    OPCM Enrollment Call     03/13/23    OPCM RN First Follow-Up Attempt     03/03/23    OPCM RN Second Follow-Up Attempt     3/6/23 Attempt assessment with patient for outpatient case management. No answer. Left message requesting call back.      OPCM RN Third Follow-Up Attempt     03/13/23    Initial Assessment     03/13/23    Nursing Assessment     03/13/23    Plan Of Care     03/13/23       Brief Summary:  Yeyo Hollingsworth was referred by Dr. Valdez for SBO. Patient qualifies for program based on high risk score of 62.7%.   Active problem list, medical, surgical and social history reviewed. Active comorbidities include PAF, DM2, HTN, Morbid obesity. Areas of need identified by patient include weight loss; manage SBO.   Complex care plan created with patient/caregiver input. By next encounter, patient agrees to OPCM RN's follow up call in 2 weeks, change out his current pill box to [facility] pill box when received, take his medications as prescribed, and review the SBO education sent via mariaelena..   Next steps:   Mr. Hollingsworth agrees for OPCM RN to follow up on or around 3/27/23.  Mail Mr. Hollingsworth [facility] Medi tray (2 requested, one for his spouse).  Reviewed education sent via portal for SBO triggers.  Message PCP that Mr. Hollingsworth takes a MVI daily and Vitamin C daily which was not found on medication reconciliation.  Reassess his level of readiness to change.  Reinforce to remove known triggers to diet.    3/2/23 Attempt assessment with patient for outpatient case management. No answer. Left message requesting call back.   Attempt to reach letter sent via portal.

## 2023-03-02 NOTE — LETTER
March 6, 2023    Yeyo Hollingsworth  [address]  [place] [address]             [facility] [address] [facility] [place] [address] Dear  Edel,    I work with [facility] Outpatient Case Management Department. We received a referral to call you to discuss your medical history.These services are free of charge and are offered to [facility] patients who have recently been discharged from any of our facilities or who have complex medical conditions that may require the skill of a nurse to assist with management.         .      I attempted to reach you by telephone, but I was unsuccessful. Please call our department so that we can go over some questions with you regarding your health.    The Outpatient Case Management Department can be reached at [phone] from 8:00AM to 4:30 PM on Monday thru Friday. [facility] also has a program where a nurse is available 24/7 to answer questions or provide medical advice, their number is [phone].    Thanks,  Saskia Jackson, RUTHIEN, RN, CCM [facility]  [phone]  Outpatient Care Management

## 2023-03-03 ENCOUNTER — PATIENT MESSAGE (OUTPATIENT)
Dept: ADMINISTRATIVE | Facility: OTHER | Age: 83
End: 2023-03-03
Payer: MEDICARE

## 2023-03-09 ENCOUNTER — OFFICE VISIT (OUTPATIENT)
Dept: PODIATRY | Facility: CLINIC | Age: 83
End: 2023-03-09
Payer: MEDICARE

## 2023-03-09 VITALS — HEART RATE: 64 BPM | RESPIRATION RATE: 16 BRPM | BODY MASS INDEX: 34.25 KG/M2 | HEIGHT: 68 IN | WEIGHT: 226 LBS

## 2023-03-09 DIAGNOSIS — E11.9 CONTROLLED TYPE 2 DIABETES MELLITUS WITHOUT COMPLICATION, WITHOUT LONG-TERM CURRENT USE OF INSULIN: ICD-10-CM

## 2023-03-09 DIAGNOSIS — Z86.718 HISTORY OF DVT (DEEP VEIN THROMBOSIS): ICD-10-CM

## 2023-03-09 DIAGNOSIS — G89.29 CHRONIC PAIN OF LEFT ANKLE: ICD-10-CM

## 2023-03-09 DIAGNOSIS — E11.9 TYPE 2 DIABETES MELLITUS WITHOUT COMPLICATION, WITHOUT LONG-TERM CURRENT USE OF INSULIN: Chronic | ICD-10-CM

## 2023-03-09 DIAGNOSIS — M25.572 CHRONIC PAIN OF LEFT ANKLE: ICD-10-CM

## 2023-03-09 DIAGNOSIS — M21.6X1 ACQUIRED BILATERAL PES CAVUS: ICD-10-CM

## 2023-03-09 DIAGNOSIS — M21.6X2 ACQUIRED BILATERAL PES CAVUS: ICD-10-CM

## 2023-03-09 DIAGNOSIS — E11.9 ENCOUNTER FOR DIABETIC FOOT EXAM: Primary | ICD-10-CM

## 2023-03-09 DIAGNOSIS — M54.16 LUMBAR RADICULOPATHY: ICD-10-CM

## 2023-03-09 DIAGNOSIS — R60.0 BILATERAL LOWER EXTREMITY EDEMA: ICD-10-CM

## 2023-03-09 PROCEDURE — 99213 OFFICE O/P EST LOW 20 MIN: CPT | Mod: S$GLB,,, | Performed by: PODIATRIST

## 2023-03-09 PROCEDURE — 99213 PR OFFICE/OUTPT VISIT, EST, LEVL III, 20-29 MIN: ICD-10-PCS | Mod: S$GLB,,, | Performed by: PODIATRIST

## 2023-03-09 RX ORDER — CLOTRIMAZOLE 1 %
CREAM (GRAM) TOPICAL 2 TIMES DAILY
Qty: 60 G | Refills: 10 | Status: SHIPPED | OUTPATIENT
Start: 2023-03-09 | End: 2023-10-11

## 2023-03-09 NOTE — PATIENT INSTRUCTIONS
Your Diabetes Foot Care Program    Every day you depend on your feet to keep you moving. But when you have diabetes, your feet need special care. Even a small foot problem can become very serious. So dont take your feet for granted. By working with your diabetes healthcare team, you can learn how to protect your feet and keep them healthy.  Evaluating your feet  An evaluation helps your healthcare provider check the condition of your feet. The evaluation includes a review of your diabetes history and overall health. It may also include a foot exam, X-rays, or other tests. These can help show problems beneath the skin that you cant see or feel.  Medical history  You will be asked about your overall health and any history of foot problems. Youll also discuss your diabetes history, such as whether your blood sugar level has changed over time. It also includes questions about sensations of pain, tingling, pins and needles, or numbness. Your healthcare provider will also want to know if you have high blood pressure and heart disease, or if you smoke. Be sure to mention any medicines (including over-the-counter), supplements, or herbal remedies you take.  Foot exam  A foot exam checks the condition of different parts of your foot. First, your skin and nails are examined for any signs of infection. Blood flow is checked by feeling for the pulses in each foot. You may also have tests to study the nerves in the foot. These include using a small filament (wire) to see how sensitive your feet are. In certain cases, you will be asked to walk a short distance to check for bone, joint, and muscle problems.  Diagnostic tests  If needed, your healthcare provider will suggest certain tests to learn more about your feet. These include:  Doppler tests to measure blood flow in the feet and lower leg.  X-rays, which can show bone or joint problems.  Other imaging tests, such as an MRI (magnetic resonance imaging), bone scan, and CT  (computed tomography) scan. These can help show bone infections.  Other tests, such as vascular tests, which study the blood flow in your feet and legs. You may also have nerve studies to learn how sensitive your feet are.  Creating a foot care program  Based on the evaluation, your healthcare provider will create a foot care program for you. Your program may be as simple as starting a daily self-care routine and changing the types of shoes your wear. It may also involve treating minor foot problems, such as a corn or blister. In some cases, surgery will be needed to treat an infection or mechanical problems, such as hammer toes.  Preventing problems  When you have diabetes, its easier to prevent problems than to treat them later on. So see your healthcare team for regular checkups and foot care. Your healthcare team can also help you learn more about caring for your feet at home. For example, you may be told to avoid walking barefoot. Or you may be told that special footwear is needed to protect your feet.  Have regular checkups  Foot problems can develop quickly. So be sure to follow your healthcare teams schedule for regular checkups. During office visits, take off your shoes and socks as soon as you get in the exam room. Ask your healthcare provider to examine your feet for problems. This will make it easier to find and treat small skin irritations before they get worse. Regular checkups can also help keep track of the blood flow and feeling in your feet. If you have neuropathy (lack of feeling in your feet), you will need to have checkups more often.  Learn about self-care  The more you know about diabetes and your feet, the easier it will be to prevent problems. Members of your healthcare team can teach you how to inspect your feet and teach you to look for warning signs. They can also give you other foot care tips. During office visits, be sure to ask any questions you have.  Date Last Reviewed: 7/1/2016  ©  3127-1406 [employer]. [address] [place] [address], [place] [address]. All rights reserved. This information is not intended as a substitute for professional medical care. Always follow your healthcare professional's instructions.

## 2023-03-09 NOTE — PROGRESS NOTES
"  [address] [facility] [address]  [place] [address]  Phone: [phone]   [phone]    Patient's PCP:Garland Ocampo MD  Referring Provider: No ref. provider found    Subjective:      Chief Complaint:: Diabetic Foot Exam (Yearly foot exam)    POOJA Hollingsworth is a 82 y.o. male who presents today for a diabetic foot exam.  Pt has seen Garland Ocampo MD on 02/14/2023 who treats them for their diabetes.  Pt has been a diabetic for twelve years.  Taking Jardiance to treat diabetes.  Patient also notes left great toe pain, patches of red discoloration and small spots  Blood sugar: has not checked today  Hemoglobin A1C: 6.0        Vitals:    03/09/23 1000   Pulse: 64   Resp: 16   Weight: 102.5 kg (226 lb)   Height: 5' 8" (1.727 m)   PainSc: 0-No pain      Shoe Size:     Past Surgical History:   Procedure Laterality Date    ABDOMINAL SURGERY      origunal surg 1986-mult surgeries since    CARPAL TUNNEL RELEASE      right wrist 2009-left wrist 2010    COLON SURGERY      partial colon removal    COLONOSCOPY  11/26/2018    Dr. Salazar; normal terminal ileum; polyps removed from ascending colon and hepatic flexure; pan diverticulosis; small internal hemorrhoids; repeat in 5 years; Bx: fragments of an adenomatous polyp with mild surface glandular dysplasia and associated chronic active inflammation, no evidence of high-grade dysplasia or malignancy    COLOSTOMY  1986    colostomy reversal  1986    EYE SURGERY      hay fever      HERNIA REPAIR  1949    REPAIR OF TENDON OF LOWER EXTREMITY Left 6/24/2020    Procedure: REPAIR, TENDON, LOWER EXTREMITY;  Surgeon: Justin Mandujano DPM;  [place] [facility];  Service: Podiatry;  Laterality: Left;  ARTHEX NOTIFIED IN CASE HE WANTS ANCHOR    TOE SURGERY Left 2017    replaced a joint     TONSILLECTOMY  1947    TRANSFORAMINAL EPIDURAL INJECTION OF STEROID      x 4    TRANSFORAMINAL EPIDURAL INJECTION OF STEROID Bilateral 11/13/2019    Procedure: " Injection,steroid,epidural,transforaminal approach;  Surgeon: Grant Wright MD;  [place] [facility];  Service: Pain Management;  Laterality: Bilateral;  L4-5, L5-S1    TRANSFORAMINAL EPIDURAL INJECTION OF STEROID Bilateral 3/9/2021    Procedure: Injection,steroid,epidural,transforaminal approach;  Surgeon: Grant Wright MD;  [place] [facility] OR;  Service: Pain Management;  Laterality: Bilateral;  L4-5, L5-S1    TRANSFORAMINAL EPIDURAL INJECTION OF STEROID Bilateral 5/25/2021    Procedure: Injection,steroid,epidural,transforaminal approach;  Surgeon: Grant Wright MD;  [place] [facility] OR;  Service: Pain Management;  Laterality: Bilateral;  L4-L5,L5,S1    TRANSFORAMINAL EPIDURAL INJECTION OF STEROID Bilateral 12/14/2021    Procedure: Injection,steroid,epidural,transforaminal approach Bilateral L4-5, L5-S1;  Surgeon: Grant Wright MD;  [place] [facility] OR;  Service: Pain Management;  Laterality: Bilateral;    TRANSFORAMINAL EPIDURAL INJECTION OF STEROID Bilateral 11/4/2022    Procedure: Injection,steroid,epidural,transforaminal approach;  Surgeon: Grant Wright MD;  [place] [facility];  Service: Pain Management;  Laterality: Bilateral;  L4-5 and L5-s1    TRANSFORAMINAL EPIDURAL INJECTION OF STEROID Bilateral 1/20/2023    Procedure: Injection,steroid,epidural,transforaminal approach L4-L5-S1;  Surgeon: Grant Wright MD;  [facility];  Service: Pain Management;  Laterality: Bilateral;     Past Medical History:   Diagnosis Date    Allergy     Ankle pain     left    Anticoagulant long-term use     aspirin    Anxiety     Atrial fibrillation     Back pain     Bruises easily     Cataract     Clotting disorder     left leg    Colon polyp     Diabetes mellitus     Diabetes mellitus, type 2     Hay fever     Hyperlipidemia     Hypertension     Left hip pain 12/26/2021    Seeing Dr. Beard for hip pain     Obese     BRANDI on CPAP      Family History   Problem Relation Age of Onset    Heart disease Mother     Melanoma Neg Hx     Psoriasis Neg Hx      Lupus Neg Hx     Eczema Neg Hx         Social History:   Marital Status:   Alcohol History:  reports current alcohol use.  Tobacco History:  reports that he quit smoking about 17 years ago. His smoking use included cigarettes. He has never used smokeless tobacco.  Drug History:  reports no history of drug use.    Review of patient's allergies indicates:   Allergen Reactions    Ativan [lorazepam] Other (See Comments)     Mood alternating      Dilaudid [hydromorphone (bulk)] Other (See Comments)     Mood alternating      Morphine Other (See Comments)     Mood alternating      Adhesive tape-silicones     Hydromorphone        Current Outpatient Medications   Medication Sig Dispense Refill    apixaban (ELIQUIS) 5 mg Tab Take 1 tablet (5 mg total) by mouth 2 (two) times daily. 180 tablet 3    aspirin 81 MG Chew Take 1 tablet (81 mg total) by mouth once daily. 100 tablet 1    azelastine (ASTELIN) 137 mcg (0.1 %) nasal spray 2 sprays (274 mcg total) by Nasal route 2 (two) times daily. 30 mL 5    blood sugar diagnostic (BLOOD GLUCOSE TEST) Strp 1 strip by Misc.(Non-Drug; Combo Route) route 2 (two) times a day. FREESTYLE LITE BG TEST STRIPS. current use of insulin  - Primary ICD-10-CM: E11.9 200 each 2    cholecalciferol, vitamin D3, 1,250 mcg (50,000 unit) capsule Take 50,000 Units by mouth every Sunday.      clotrimazole (LOTRIMIN) 1 % cream Apply topically 2 (two) times daily. 60 g 10    diclofenac sodium (VOLTAREN) 1 % Gel Apply 2 g topically once daily. 100 g 2    fexofenadine (ALLEGRA) 180 MG tablet Take 1 tablet (180 mg total) by mouth once daily. 90 tablet 3    FREESTYLE LANCETS 28 gauge lancets Inject 1 lancet into the skin 3 (three) times daily. 100 each 3    furosemide (LASIX) 20 MG tablet Take 1 tablet (20 mg total) by mouth every Mon, Wed, Fri. 45 tablet 3    gabapentin (NEURONTIN) 300 MG capsule Take 1 capsule (300 mg total) by mouth 3 (three) times daily. 270 capsule 1    HYDROcodone-acetaminophen  (NORCO)  mg per tablet Take 1 tablet by mouth every 6 (six) hours as needed for Pain. 45 tablet 0    JARDIANCE 10 mg tablet Take 1 tablet (10 mg total) by mouth once daily. 90 tablet 1    ketoconazole (NIZORAL) 2 % cream Apply to face after shaving (Patient taking differently: Apply 1 application topically once daily. Apply to face after shaving) 60 g 2    lisinopriL (PRINIVIL,ZESTRIL) 20 MG tablet Take 1 tablet (20 mg total) by mouth once daily. 90 tablet 3    metoprolol tartrate (LOPRESSOR) 25 MG tablet Take 0.5 tablets (12.5 mg total) by mouth 2 (two) times daily. 180 tablet 3    montelukast (SINGULAIR) 10 mg tablet Take 1 tablet (10 mg total) by mouth every evening. 90 tablet 3    polyethylene glycol (GLYCOLAX) 17 gram/dose powder Take 17 g by mouth once daily. 507 g 3    potassium chloride (MICRO-K) 10 MEQ CpSR Take 1 capsule (10 mEq total) by mouth every other day. 90 capsule 1    SITagliptin phosphate (JANUVIA) 100 MG Tab Take 1 tablet (100 mg total) by mouth once daily. 90 tablet 3    traZODone (DESYREL) 100 MG tablet Take 1 tablet (100 mg total) by mouth every evening. 90 tablet 3    triamcinolone acetonide 0.1% (KENALOG) 0.1 % cream Apply topically 2 (two) times daily. (Patient taking differently: Apply 1 g topically 2 (two) times daily.) 80 g 0     No current facility-administered medications for this visit.     Facility-Administered Medications Ordered in Other Visits   Medication Dose Route Frequency Provider Last Rate Last Admin    lactated ringers infusion   Intravenous Once PRN Grant Wright MD   Stopped at 11/04/22 1230       Review of Systems      Objective:        Physical Exam:   Foot Exam    General  General Appearance: appears stated age and healthy   Orientation: alert and oriented to person, place, and time   Affect: appropriate   Gait: antalgic       Right Foot/Ankle     Inspection and Palpation  Ecchymosis: none  Tenderness: none   Swelling: (Venous incompetency with peripheral  edema)  Arch: pes planus  Hammertoes: absent  Claw Toes: absent  Hallux valgus: no  Hallux limitus: yes  Skin Exam: dry skin, tinea and abnormal color (Venous stasis dermatitis);   Neurovascular  Dorsalis pedis: 1+  Posterior tibial: 2+  Varicose veins: present  Saphenous nerve sensation: diminished  Tibial nerve sensation: diminished  Superficial peroneal nerve sensation: diminished  Deep peroneal nerve sensation: diminished  Sural nerve sensation: diminished    Edema  Type of edema: pitting  Edema grade: 2+     Muscle Strength  Ankle dorsiflexion: 5  Ankle plantar flexion: 5  Ankle inversion: 5  Ankle eversion: 5  Great toe extension: 5  Great toe flexion: 5      Left Foot/Ankle      Inspection and Palpation  Ecchymosis: none  Tenderness: none   Swelling: (Venous incompetency with peripheral edema)  Arch: pes planus  Hammertoes: absent  Claw toes: absent  Hallux valgus: no  Hallux limitus: yes  Skin Exam: dry skin, tinea and abnormal color (Venous stasis dermatitis);   Neurovascular  Dorsalis pedis: 1+  Posterior tibial: 2+  Varicose veins: present  Saphenous nerve sensation: diminished  Tibial nerve sensation: diminished  Superficial peroneal nerve sensation: diminished  Deep peroneal nerve sensation: diminished  Sural nerve sensation: diminished    Edema  Type of edema: pitting  Edema grade: 2+    Muscle Strength  Ankle dorsiflexion: 5  Ankle plantar flexion: 5  Ankle inversion: 5  Ankle eversion: 5  Great toe extension: 5  Great toe flexion: 5      Physical Exam  Cardiovascular:      Pulses:           Dorsalis pedis pulses are 1+ on the right side and 1+ on the left side.        Posterior tibial pulses are 2+ on the right side and 2+ on the left side.   Musculoskeletal:      Right foot: No bunion.      Left foot: No bunion.   Feet:      Right foot:      Skin integrity: Dry skin present.      Left foot:      Skin integrity: Dry skin present.       Imaging:            Assessment:       1. Encounter for diabetic  foot exam    2. Chronic pain of left ankle    3. Bilateral lower extremity edema    4. Acquired bilateral pes cavus    5. Controlled type 2 diabetes mellitus without complication, without long-term current use of insulin    6. Type 2 diabetes mellitus without complication, without long-term current use of insulin    7. Lumbar radiculopathy    8. History of DVT (deep vein thrombosis)      Plan:   Encounter for diabetic foot exam  -      DIABETES FOOT EXAM    Chronic pain of left ankle    Bilateral lower extremity edema    Acquired bilateral pes cavus    Controlled type 2 diabetes mellitus without complication, without long-term current use of insulin    Type 2 diabetes mellitus without complication, without long-term current use of insulin    Lumbar radiculopathy    History of DVT (deep vein thrombosis)    Other orders  -     clotrimazole (LOTRIMIN) 1 % cream; Apply topically 2 (two) times daily.  Dispense: 60 g; Refill: 10    Evaluated patient diabetic standpoint he is mild risk of loss of limb he does have adequate circulation is able to feel me touching with monofilament wire but does have some mild neuropathy.  He has pes planus foot structure dry swollen feet from his venous incompetency.  I am recommending diabetic accommodative inserts shoes which I am giving him a prescription for.    Giving prescription for Lotrimin cream to use on the skin to see if it helps some dryness and scaling that he has.  If this does not work left consider cortisone cream.  Also needs compression socks to help with the swelling which also help with the dryness.    Return in 1 year or as needed      Procedures          Counseling:     I provided patient education verbally regarding:   Patient diagnosis, treatment options, as well as alternatives, risks, and benefits.     Counseling/Education:  I provided patient education verbally regarding:   The aspects of diabetes and how it pertains to the feet. I explained the importance of  proper diabetic foot care and how it is essential for the health of their feet.    I discussed the importance of knowing their Hemoglobin A1c and that the level needs to be as close to 6 as possible. I discussed the increase complications of high blood sugar including stroke, blindness, heart attack, kidney failure and loss of limb secondary to neuropathy and PVD.     With neuropathy, beware of any breaks in the skin or redness. These areas are not recognized early due to the numbness.    I discussed Diabetes, lower back issues, metabolic disorders, systemic causes, chemotherapy, vitamin deficiency, heavy metal exposure, as some of the causes. I also explained that as much as 40% of the time we can not find a cause. I discussed different treatments available to control the symptoms but which may not cure the problem.       This note was created using Dragon voice recognition software that occasionally misinterpreted phrases or words.

## 2023-03-13 ENCOUNTER — PATIENT MESSAGE (OUTPATIENT)
Dept: ADMINISTRATIVE | Facility: OTHER | Age: 83
End: 2023-03-13
Payer: MEDICARE

## 2023-03-13 ENCOUNTER — TELEPHONE (OUTPATIENT)
Dept: FAMILY MEDICINE | Facility: CLINIC | Age: 83
End: 2023-03-13

## 2023-03-13 RX ORDER — ASCORBIC ACID 500 MG
500 TABLET ORAL DAILY
COMMUNITY
End: 2023-04-10

## 2023-03-13 NOTE — TELEPHONE ENCOUNTER
----- Message from Saskia Jackson RN sent at 3/13/2023  3:44 PM CDT -----  Good afternoon,    I recently spoke with Mr. Hollingsworth this afternoon and during medication reconciliation he noted he was taking an MVI by mouth daily and Vitamin C by mouth daily which were not on the list.  Please add both if this is correct to his medication list for continuity of care.    Thank you for your assistance.    Kind regards,    Saskia Jackson, MARIBELL, RN, CCM [facility]  [phone]

## 2023-03-27 ENCOUNTER — OUTPATIENT CASE MANAGEMENT (OUTPATIENT)
Dept: ADMINISTRATIVE | Facility: OTHER | Age: 83
End: 2023-03-27
Payer: MEDICARE

## 2023-03-27 ENCOUNTER — PATIENT MESSAGE (OUTPATIENT)
Dept: FAMILY MEDICINE | Facility: CLINIC | Age: 83
End: 2023-03-27

## 2023-03-27 DIAGNOSIS — I10 ESSENTIAL HYPERTENSION: ICD-10-CM

## 2023-03-27 NOTE — TELEPHONE ENCOUNTER
Last Office Visit 2/14/23  Next Office Visit 4/10/23    Patient  is also requesting Flonase and atorvastatin

## 2023-03-27 NOTE — PROGRESS NOTES
"Outpatient Care Management  Plan of Care Follow Up Visit    Patient: Yeyo Hollingsworth  MRN: [id number]  Date of Service: 03/27/2023  Completed by: Saskia Jackson RN  Referral Date: 02/03/2023    Reason for Visit   Patient presents with    OPCM Chart Review     03/27/23    Update Plan Of Care     03/27/23       Brief Summary: Mr. Hollingsworth reported he is feeling good, however, "hurt's a lot, all over my body."  He notes he built a platform and inched it with his spouses help to where it needs to go. He denied any small bowel issues/flares.  He continues to use Miralax.  Next Steps:   Mr. Hollingsworth agrees for OPCM RN to follow up on or around 4/11/23.  Reinforced medication compliance; offered suggestions as to using an alarm on his phone.  He notes his spouse uses Cristina and that was also encouraged.    "

## 2023-03-28 RX ORDER — METOPROLOL TARTRATE 25 MG/1
12.5 TABLET, FILM COATED ORAL 2 TIMES DAILY
Qty: 180 TABLET | Refills: 3
Start: 2023-03-28 | End: 2023-04-10 | Stop reason: SDUPTHER

## 2023-03-29 DIAGNOSIS — M54.16 LUMBAR RADICULITIS: ICD-10-CM

## 2023-03-29 RX ORDER — GABAPENTIN 300 MG/1
300 CAPSULE ORAL 3 TIMES DAILY
Qty: 270 CAPSULE | Refills: 1 | Status: SHIPPED | OUTPATIENT
Start: 2023-03-29 | End: 2023-07-11 | Stop reason: SDUPTHER

## 2023-04-10 ENCOUNTER — OFFICE VISIT (OUTPATIENT)
Dept: FAMILY MEDICINE | Facility: CLINIC | Age: 83
End: 2023-04-10
Payer: MEDICARE

## 2023-04-10 VITALS
OXYGEN SATURATION: 97 % | WEIGHT: 232.63 LBS | DIASTOLIC BLOOD PRESSURE: 70 MMHG | BODY MASS INDEX: 35.26 KG/M2 | HEIGHT: 68 IN | SYSTOLIC BLOOD PRESSURE: 119 MMHG | HEART RATE: 57 BPM

## 2023-04-10 DIAGNOSIS — E78.01 FAMILIAL HYPERCHOLESTEROLEMIA: ICD-10-CM

## 2023-04-10 DIAGNOSIS — G89.4 CHRONIC PAIN SYNDROME: Primary | ICD-10-CM

## 2023-04-10 DIAGNOSIS — L29.9 LOCALIZED PRURITUS: ICD-10-CM

## 2023-04-10 DIAGNOSIS — I10 ESSENTIAL HYPERTENSION: ICD-10-CM

## 2023-04-10 DIAGNOSIS — F32.A DEPRESSION, UNSPECIFIED DEPRESSION TYPE: ICD-10-CM

## 2023-04-10 DIAGNOSIS — M19.90 ARTHRITIS: ICD-10-CM

## 2023-04-10 PROCEDURE — 99214 PR OFFICE/OUTPT VISIT, EST, LEVL IV, 30-39 MIN: ICD-10-PCS | Mod: S$PBB,AQ,, | Performed by: FAMILY MEDICINE

## 2023-04-10 PROCEDURE — 99215 OFFICE O/P EST HI 40 MIN: CPT | Performed by: FAMILY MEDICINE

## 2023-04-10 PROCEDURE — 99214 OFFICE O/P EST MOD 30 MIN: CPT | Mod: S$PBB,AQ,, | Performed by: FAMILY MEDICINE

## 2023-04-10 RX ORDER — HYDROCODONE BITARTRATE AND ACETAMINOPHEN 5; 325 MG/1; MG/1
1 TABLET ORAL EVERY 6 HOURS PRN
Qty: 90 TABLET | Refills: 0 | Status: SHIPPED | OUTPATIENT
Start: 2023-04-10 | End: 2023-05-05 | Stop reason: SDUPTHER

## 2023-04-10 RX ORDER — KETOCONAZOLE 20 MG/G
CREAM TOPICAL DAILY
Qty: 60 G | Refills: 1 | Status: SHIPPED | OUTPATIENT
Start: 2023-04-10 | End: 2023-07-11 | Stop reason: SDUPTHER

## 2023-04-10 RX ORDER — DICLOFENAC SODIUM 10 MG/G
2 GEL TOPICAL DAILY
Qty: 100 G | Refills: 2 | Status: SHIPPED | OUTPATIENT
Start: 2023-04-10 | End: 2023-07-11 | Stop reason: SDUPTHER

## 2023-04-10 RX ORDER — DULOXETIN HYDROCHLORIDE 30 MG/1
30 CAPSULE, DELAYED RELEASE ORAL DAILY
Qty: 30 CAPSULE | Refills: 11 | Status: SHIPPED | OUTPATIENT
Start: 2023-04-10 | End: 2023-05-16

## 2023-04-10 RX ORDER — ATORVASTATIN CALCIUM 10 MG/1
10 TABLET, FILM COATED ORAL
COMMUNITY
Start: 2023-03-29 | End: 2023-09-19 | Stop reason: SDUPTHER

## 2023-04-10 RX ORDER — TRIAMCINOLONE ACETONIDE 1 MG/G
CREAM TOPICAL 2 TIMES DAILY
Qty: 80 G | Refills: 0 | Status: SHIPPED | OUTPATIENT
Start: 2023-04-10 | End: 2023-05-05 | Stop reason: SDUPTHER

## 2023-04-10 RX ORDER — METOPROLOL TARTRATE 25 MG/1
12.5 TABLET, FILM COATED ORAL 2 TIMES DAILY
Qty: 180 TABLET | Refills: 3 | Status: SHIPPED | OUTPATIENT
Start: 2023-04-10 | End: 2024-03-10 | Stop reason: SDUPTHER

## 2023-04-10 NOTE — PROGRESS NOTES
SUBJECTIVE:    Patient ID: Yeyo Hollingsworth is a 82 y.o. male.    Chief Complaint: Pain And Symptom Management  82-year-old male with a history of diabetes hypertension hyperlipidemia chronic pain secondary to arthritis, insomnia presents to clinic today for follow-up on his diabetes hypertension and chronic pain patient is also having symptoms consistent with depression not currently treated for depression.  Patient's depression stems likely from his chronic pain in his inability to do things he used to be able to do he is currently on narcotics for his chronic pain and he is being followed by pain management.      SPMHx:  DM: Jardiance 10mg, Januvia 100mg, not on metformin due to diarrhea Last A1C 6.0  is doing well.  HTN: Lisinopril 20mg, Metoprolol 25mg, is well controlled.  Hyperlipidemia: Atorvastatin 10 mg well controlled. LDL 59  Arthritis: On several chronic narcotics and follows up with pain management, for his neck and back pain pt is planning to have a procedure.  Anxiety: Takes Valium 5mg, PRN  A-fib: 2012 Was cardioverted, no longer in a-fib  Insomnia: Trazadone 100mg  Hx of DVT: Currently on Eliquis 5mg   BRANDI: Wears CPAP  Chronic constipation: On polyethylene Glycol 17g, manages well with this medication.     Specialists:  Cardiology: Dr Mcclelland  Pain Management: Michele Martinez  Ortho: Dr Dean  Podiatry: Dr Mandujano  Sleep: Dr Felice Bello  GS: Dr Thorne     Smoke: Quit in 1988  ETOH: None  Exercise: Never    Depression  Visit Type: initial  Onset of symptoms: 1 to 6 months ago  Progression since onset: gradually worsening  Patient presents with the following symptoms: anhedonia, depressed mood and insomnia.  Patient is not experiencing: confusion, palpitations, shortness of breath, suicidal ideas, suicidal planning, thoughts of death, weight gain and weight loss.   Severity: moderate   Exacerbated by: Chronic pain.  Sleep quality: fair  Patient has a history of: depression  No history of: suicide  attempt, mental illness and substance abuse        Diabetes  He presents for his follow-up diabetic visit. He has type 2 diabetes mellitus. His disease course has been stable. There are no hypoglycemic associated symptoms. Pertinent negatives for hypoglycemia include no confusion or headaches. Associated symptoms include weakness. Pertinent negatives for diabetes include no chest pain, no fatigue, no polydipsia and no polyuria. There are no hypoglycemic complications. Risk factors for coronary artery disease include diabetes mellitus, dyslipidemia, hypertension, male sex, obesity and sedentary lifestyle.   Hypertension  This is a chronic problem. The current episode started more than 1 year ago. The problem is unchanged. The problem is controlled. Associated symptoms include neck pain. Pertinent negatives include no anxiety, chest pain, headaches or palpitations. There are no associated agents to hypertension. There are no known risk factors for coronary artery disease. Past treatments include beta blockers and ACE inhibitors. The current treatment provides moderate improvement. Compliance problems include exercise and diet.      Arthritis  Presents for follow-up visit. He complains of pain and stiffness. He reports no joint swelling. The symptoms have been stable. Affected locations include the right knee, left knee, right hip and left hip. His pain is at a severity of 4/10. Pertinent negatives include no diarrhea or fatigue.     Past Medical History:   Diagnosis Date    Allergy     Ankle pain     left    Anticoagulant long-term use     aspirin    Anxiety     Atrial fibrillation     Back pain     Bruises easily     Cataract     Clotting disorder     left leg    Colon polyp     Diabetes mellitus     Diabetes mellitus, type 2     Hay fever     Hyperlipidemia     Hypertension     Left hip pain 12/26/2021    Seeing Dr. Beard for hip pain     Obese     BRANDI on CPAP      Social History     Socioeconomic History     Marital status:    Tobacco Use    Smoking status: Former     Types: Cigarettes     Quit date: 2006     Years since quittin.1    Smokeless tobacco: Never    Tobacco comments:     ex smoker 2006   Substance and Sexual Activity    Alcohol use: Yes     Comment: rarely    Drug use: No    Sexual activity: Yes     Partners: Female     Social Determinants of Health     Financial Resource Strain: Unknown    Difficulty of Paying Living Expenses: Patient refused   Food Insecurity: Unknown    Worried About Running Out of Food in the Last Year: Patient refused    Ran Out of Food in the Last Year: Patient refused   Transportation Needs: Unknown    Lack of Transportation (Medical): Patient refused    Lack of Transportation (Non-Medical): Patient refused   Physical Activity: Unknown    Days of Exercise per Week: Patient refused    Minutes of Exercise per Session: 0 min   Stress: No Stress Concern Present    Feeling of Stress : Not at all   Social Connections: Unknown    Frequency of Communication with Friends and Family: Once a week    Frequency of Social Gatherings with Friends and Family: Once a week    Active Member of Clubs or Organizations: Yes    Attends Club or Organization Meetings: Never    Marital Status:    Housing Stability: Unknown    Unable to Pay for Housing in the Last Year: Patient refused    Number of Places Lived in the Last Year: 1    Unstable Housing in the Last Year: Patient refused     Past Surgical History:   Procedure Laterality Date    ABDOMINAL SURGERY      origunal surg -mult surgeries since    CARPAL TUNNEL RELEASE      right wrist -left wrist     COLON SURGERY      partial colon removal    COLONOSCOPY  2018    Dr. Salazar; normal terminal ileum; polyps removed from ascending colon and hepatic flexure; pan diverticulosis; small internal hemorrhoids; repeat in 5 years; Bx: fragments of an adenomatous polyp with mild surface glandular dysplasia and associated  chronic active inflammation, no evidence of high-grade dysplasia or malignancy    COLOSTOMY  1986    colostomy reversal  1986    EYE SURGERY      hay fever      HERNIA REPAIR  1949    REPAIR OF TENDON OF LOWER EXTREMITY Left 6/24/2020    Procedure: REPAIR, TENDON, LOWER EXTREMITY;  Surgeon: Justin Mandujano DPM;  [place] [facility] OR;  Service: Podiatry;  Laterality: Left;  ARTHEX NOTIFIED IN CASE HE WANTS ANCHOR    TOE SURGERY Left 2017    replaced a joint     TONSILLECTOMY  1947    TRANSFORAMINAL EPIDURAL INJECTION OF STEROID      x 4    TRANSFORAMINAL EPIDURAL INJECTION OF STEROID Bilateral 11/13/2019    Procedure: Injection,steroid,epidural,transforaminal approach;  Surgeon: Grant Wright MD;  [place] [facility] OR;  Service: Pain Management;  Laterality: Bilateral;  L4-5, L5-S1    TRANSFORAMINAL EPIDURAL INJECTION OF STEROID Bilateral 3/9/2021    Procedure: Injection,steroid,epidural,transforaminal approach;  Surgeon: Grant Wright MD;  [place] [facility] OR;  Service: Pain Management;  Laterality: Bilateral;  L4-5, L5-S1    TRANSFORAMINAL EPIDURAL INJECTION OF STEROID Bilateral 5/25/2021    Procedure: Injection,steroid,epidural,transforaminal approach;  Surgeon: Grant Wright MD;  [place] [facility] OR;  Service: Pain Management;  Laterality: Bilateral;  L4-L5,L5,S1    TRANSFORAMINAL EPIDURAL INJECTION OF STEROID Bilateral 12/14/2021    Procedure: Injection,steroid,epidural,transforaminal approach Bilateral L4-5, L5-S1;  Surgeon: Grant Wright MD;  [place] [facility] OR;  Service: Pain Management;  Laterality: Bilateral;    TRANSFORAMINAL EPIDURAL INJECTION OF STEROID Bilateral 11/4/2022    Procedure: Injection,steroid,epidural,transforaminal approach;  Surgeon: Grant Wright MD;  [place] [facility] OR;  Service: Pain Management;  Laterality: Bilateral;  L4-5 and L5-s1    TRANSFORAMINAL EPIDURAL INJECTION OF STEROID Bilateral 1/20/2023    Procedure: Injection,steroid,epidural,transforaminal approach L4-L5-S1;  Surgeon: Grant Wright MD;   [place] [facility] OR;  Service: Pain Management;  Laterality: Bilateral;     Family History   Problem Relation Age of Onset    Heart disease Mother     Melanoma Neg Hx     Psoriasis Neg Hx     Lupus Neg Hx     Eczema Neg Hx      Current Outpatient Medications   Medication Sig Dispense Refill    apixaban (ELIQUIS) 5 mg Tab Take 1 tablet (5 mg total) by mouth 2 (two) times daily. 180 tablet 3    aspirin 81 MG Chew Take 1 tablet (81 mg total) by mouth once daily. 100 tablet 1    atorvastatin (LIPITOR) 10 MG tablet Take 10 mg by mouth.      azelastine (ASTELIN) 137 mcg (0.1 %) nasal spray 2 sprays (274 mcg total) by Nasal route 2 (two) times daily. 30 mL 5    blood sugar diagnostic (BLOOD GLUCOSE TEST) Strp 1 strip by Misc.(Non-Drug; Combo Route) route 2 (two) times a day. FREESTYLE LITE BG TEST STRIPS. current use of insulin  - Primary ICD-10-CM: E11.9 200 each 2    clotrimazole (LOTRIMIN) 1 % cream Apply topically 2 (two) times daily. 60 g 10    fexofenadine (ALLEGRA) 180 MG tablet Take 1 tablet (180 mg total) by mouth once daily. 90 tablet 3    FREESTYLE LANCETS 28 gauge lancets Inject 1 lancet into the skin 3 (three) times daily. 100 each 3    furosemide (LASIX) 20 MG tablet Take 1 tablet (20 mg total) by mouth every Mon, Wed, Fri. 45 tablet 3    gabapentin (NEURONTIN) 300 MG capsule Take 1 capsule (300 mg total) by mouth 3 (three) times daily. 270 capsule 1    HYDROcodone-acetaminophen (NORCO)  mg per tablet Take 1 tablet by mouth every 6 (six) hours as needed for Pain. 45 tablet 0    JARDIANCE 10 mg tablet Take 1 tablet (10 mg total) by mouth once daily. 90 tablet 1    ketoconazole (NIZORAL) 2 % cream Apply to face after shaving 60 g 2    lisinopriL (PRINIVIL,ZESTRIL) 20 MG tablet Take 1 tablet (20 mg total) by mouth once daily. 90 tablet 3    montelukast (SINGULAIR) 10 mg tablet Take 1 tablet (10 mg total) by mouth every evening. 90 tablet 3    multivitamin with minerals tablet Take 1 tablet by mouth once  daily.      polyethylene glycol (GLYCOLAX) 17 gram/dose powder Take 17 g by mouth once daily. 507 g 3    potassium chloride (MICRO-K) 10 MEQ CpSR Take 1 capsule (10 mEq total) by mouth every other day. 90 capsule 1    SITagliptin phosphate (JANUVIA) 100 MG Tab Take 1 tablet (100 mg total) by mouth once daily. 90 tablet 3    traZODone (DESYREL) 100 MG tablet Take 1 tablet (100 mg total) by mouth every evening. 90 tablet 3    triamcinolone acetonide 0.1% (KENALOG) 0.1 % cream Apply topically 2 (two) times daily. 80 g 0    ascorbic acid, vitamin C, (VITAMIN C) 500 MG tablet Take 500 mg by mouth once daily.      cholecalciferol, vitamin D3, 1,250 mcg (50,000 unit) capsule Take 50,000 Units by mouth every Sunday.      diclofenac sodium (VOLTAREN) 1 % Gel Apply 2 g topically once daily. 100 g 2    DULoxetine (CYMBALTA) 30 MG capsule Take 1 capsule (30 mg total) by mouth once daily. 30 capsule 11    HYDROcodone-acetaminophen (NORCO) 5-325 mg per tablet Take 1 tablet by mouth every 6 (six) hours as needed for Pain. 90 tablet 0    ketoconazole (NIZORAL) 2 % cream Apply topically once daily. 60 g 1    metoprolol tartrate (LOPRESSOR) 25 MG tablet Take 0.5 tablets (12.5 mg total) by mouth 2 (two) times daily. 180 tablet 3     No current facility-administered medications for this visit.     Facility-Administered Medications Ordered in Other Visits   Medication Dose Route Frequency Provider Last Rate Last Admin    lactated ringers infusion   Intravenous Once PRN Grant Wright MD   Stopped at 11/04/22 1230     Review of patient's allergies indicates:   Allergen Reactions    Ativan [lorazepam] Other (See Comments)     Mood alternating      Dilaudid [hydromorphone (bulk)] Other (See Comments)     Mood alternating      Morphine Other (See Comments)     Mood alternating      Adhesive tape-silicones     Hydromorphone        Review of Systems   Constitutional:  Positive for activity change. Negative for diaphoresis, fatigue, unexpected  "weight change, weight gain and weight loss.   HENT:  Positive for rhinorrhea. Negative for congestion, hearing loss and trouble swallowing.    Eyes:  Negative for discharge and visual disturbance.   Respiratory:  Negative for cough, chest tightness, shortness of breath and wheezing.    Cardiovascular:  Negative for chest pain and palpitations.   Gastrointestinal:  Negative for blood in stool, constipation, diarrhea and vomiting.   Endocrine: Negative for polydipsia and polyuria.   Genitourinary:  Negative for difficulty urinating, dysuria, hematuria and urgency.   Musculoskeletal:  Positive for arthralgias and neck pain. Negative for joint swelling.   Neurological:  Positive for weakness. Negative for headaches.   Psychiatric/Behavioral:  Positive for depression and dysphoric mood. Negative for confusion, substance abuse and suicidal ideas. The patient has insomnia.         Blood pressure 119/70, pulse (!) 57, height 5' 8" (1.727 m), weight 105.5 kg (232 lb 9.6 oz), SpO2 97 %. Body mass index is 35.37 kg/m².   Objective:      Physical Exam  Vitals reviewed.   Constitutional:       General: He is not in acute distress.     Appearance: Normal appearance. He is obese. He is not ill-appearing or toxic-appearing.   HENT:      Head: Normocephalic and atraumatic.   Cardiovascular:      Rate and Rhythm: Normal rate and regular rhythm.      Heart sounds: Normal heart sounds.   Pulmonary:      Effort: Pulmonary effort is normal.      Breath sounds: Normal breath sounds.   Abdominal:      Palpations: Abdomen is soft.      Tenderness: There is no abdominal tenderness. There is no guarding or rebound.   Skin:     General: Skin is warm and dry.      Capillary Refill: Capillary refill takes less than 2 seconds.   Neurological:      Mental Status: He is alert and oriented to person, place, and time.           Assessment:       1. Chronic pain syndrome    2. Localized pruritus    3. Essential hypertension    4. Depression, " unspecified depression type    5. Arthritis    6. Familial hypercholesterolemia         Plan:           Chronic pain syndrome  -     HYDROcodone-acetaminophen (NORCO) 5-325 mg per tablet; Take 1 tablet by mouth every 6 (six) hours as needed for Pain.  Dispense: 90 tablet; Refill: 0  -     diclofenac sodium (VOLTAREN) 1 % Gel; Apply 2 g topically once daily.  Dispense: 100 g; Refill: 2  Patient will continue follow-up with pain management will change his dose of Norco from 10-5 and give him enough that he can take multiple a day.Opiates prescribed:    Estimated length of time: Indefinite  Non-narcotic alternatives discussed and  reviewed.    Discussed with patient risks of long term pain medication along with avoidance of concurrent use of alcohol, benzos, and other CNS depressants. Pt understands to use one provider and one phamacy for management of his pain. Pt also agrees that to be subject to random drug tests and monitoring per the prescription monitoring program. Any deviation in care may warrant termination from our clinic.     Localized pruritus  -     triamcinolone acetonide 0.1% (KENALOG) 0.1 % cream; Apply topically 2 (two) times daily.  Dispense: 80 g; Refill: 0    Essential hypertension  -     metoprolol tartrate (LOPRESSOR) 25 MG tablet; Take 0.5 tablets (12.5 mg total) by mouth 2 (two) times daily.  Dispense: 180 tablet; Refill: 3  Reduce the amount of salt in your diet; Lose weight; Avoid drinking too much alcohol; Exercise at least 30 minutes per day most days of the week.  Continue current medications and home BP monitoring.    Depression, unspecified depression type  -     DULoxetine (CYMBALTA) 30 MG capsule; Take 1 capsule (30 mg total) by mouth once daily.  Dispense: 30 capsule; Refill: 11  Discussed adverse reaction and contraindications of medications prescribed. Will observe closely. Recommendations: Avoid alcohol. Cut down on or stop drinking coffee and other sources of caffeine. Exercise  for 30-45 min daily. Encouraged psychotherapy to talk about feelings, relationships, and worries.  -To avoid relapse, continue pharmacotherapy for 12 months after symptoms improve before tapering.  -Exercise - physical activity reduces stress and is effective for REGAN or Panic disorder.  -Eat healthy foods.  -Get Enough Sleep. Get enough sleep, about 7-8hrs per day.  -Dont drink alcohol or use other sedatives. They worsen anxiety.  -Stop smoking and cut back or quit drinking coffee. Both nicotine and caffeine can worsen anxiety.  -Mindfulness meditation may reduce anxiety severity more than stress management education in pts with REGAN.  -Relaxation therapy and cognitive therapy are equally effective for REGAN  -Acceptance-based therapy is associated with a reduction in REGAN symptoms.  -Use relaxation techniques. Visualization techniques, meditation, and yoga are examples of relaxation techniques that can ease anxiety.    Arthritis  -     HYDROcodone-acetaminophen (NORCO) 5-325 mg per tablet; Take 1 tablet by mouth every 6 (six) hours as needed for Pain.  Dispense: 90 tablet; Refill: 0  -     diclofenac sodium (VOLTAREN) 1 % Gel; Apply 2 g topically once daily.  Dispense: 100 g; Refill: 2    Familial hypercholesterolemia  Pt to continue on current dose of statins. Limit red meat, butter, fried foods, cheese, and other foods that have a lot of saturated fat. Consume more: lean meats, fish, fruits, vegetables, whole grains, beans, lentils, and nuts.  Weight loss, and 30-45 min of cardiovascular exercise daily.    Other orders  -     ketoconazole (NIZORAL) 2 % cream; Apply topically once daily.  Dispense: 60 g; Refill: 1

## 2023-04-11 ENCOUNTER — PATIENT MESSAGE (OUTPATIENT)
Dept: ADMINISTRATIVE | Facility: OTHER | Age: 83
End: 2023-04-11
Payer: MEDICARE

## 2023-04-11 ENCOUNTER — OUTPATIENT CASE MANAGEMENT (OUTPATIENT)
Dept: ADMINISTRATIVE | Facility: OTHER | Age: 83
End: 2023-04-11
Payer: MEDICARE

## 2023-04-11 NOTE — PROGRESS NOTES
Chief Complaint: Pain And Symptom Management  82-year-old male with a history of diabetes hypertension hyperlipidemia chronic pain secondary to arthritis, insomnia presents to clinic today for follow-up on his diabetes hypertension and chronic pain patient is also having symptoms consistent with depression not currently treated for depression.  Patient's depression stems likely from his chronic pain in his inability to do things he used to be able to do he is currently on narcotics for his chronic pain and he is being followed by pain management.      SPMHx:  DM: Jardiance 10mg, Januvia 100mg, not on metformin due to diarrhea Last A1C 6.0  is doing well.  HTN: Lisinopril 20mg, Metoprolol 25mg, is well controlled.  Hyperlipidemia: Atorvastatin 10 mg well controlled. LDL 59  Arthritis: On several chronic narcotics and follows up with pain management, for his neck and back pain pt is planning to have a procedure.  Anxiety: Takes Valium 5mg, PRN  A-fib: 2012 Was cardioverted, no longer in a-fib  Insomnia: Trazadone 100mg  Hx of DVT: Currently on Eliquis 5mg   BRANDI: Wears CPAP  Chronic constipation: On polyethylene Glycol 17g, manages well with this medication.     Specialists:  Cardiology: Dr Mcclelland  Pain Management: Michele Martinez  Ortho: Dr Dean  Podiatry: Dr Mandujano  Sleep: Dr Felice Bello  GS: Dr Thorne     Smoke: Quit in 1988  ETOH: None  Exercise: Never    Depression  Visit Type: initial  Onset of symptoms: 1 to 6 months ago  Progression since onset: gradually worsening  Patient presents with the following symptoms: anhedonia, depressed mood and insomnia.  Patient is not experiencing: confusion, palpitations, shortness of breath, suicidal ideas, suicidal planning, thoughts of death, weight gain and weight loss.   Severity: moderate   Exacerbated by: Chronic pain.  Sleep quality: fair  Patient has a history of: depression  No history of: suicide attempt, mental illness and substance abuse        Diabetes  He  presents for his follow-up diabetic visit. He has type 2 diabetes mellitus. His disease course has been stable. There are no hypoglycemic associated symptoms. Pertinent negatives for hypoglycemia include no confusion or headaches. Associated symptoms include weakness. Pertinent negatives for diabetes include no chest pain, no fatigue, no polydipsia and no polyuria. There are no hypoglycemic complications. Risk factors for coronary artery disease include diabetes mellitus, dyslipidemia, hypertension, male sex, obesity and sedentary lifestyle.   Hypertension  This is a chronic problem. The current episode started more than 1 year ago. The problem is unchanged. The problem is controlled. Associated symptoms include neck pain. Pertinent negatives include no anxiety, chest pain, headaches or palpitations. There are no associated agents to hypertension. There are no known risk factors for coronary artery disease. Past treatments include beta blockers and ACE inhibitors. The current treatment provides moderate improvement. Compliance problems include exercise and diet.      Arthritis  Presents for follow-up visit. He complains of pain and stiffness. He reports no joint swelling. The symptoms have been stable. Affected locations include the right knee, left knee, right hip and left hip. His pain is at a severity of 4/10. Pertinent negatives include no diarrhea or fatigue.     Past Medical History:   Diagnosis Date    Allergy     Ankle pain     left    Anticoagulant long-term use     aspirin    Anxiety     Atrial fibrillation     Back pain     Bruises easily     Cataract     Clotting disorder     left leg    Colon polyp     Diabetes mellitus     Diabetes mellitus, type 2     Hay fever     Hyperlipidemia     Hypertension     Left hip pain 12/26/2021    Seeing Dr. Beard for hip pain     Obese     BRANDI on CPAP      Social History     Socioeconomic History    Marital status:    Tobacco Use    Smoking status: Former      Types: Cigarettes     Quit date: 2006     Years since quittin.1    Smokeless tobacco: Never    Tobacco comments:     ex smoker 2006   Substance and Sexual Activity    Alcohol use: Yes     Comment: rarely    Drug use: No    Sexual activity: Yes     Partners: Female     Social Determinants of Health     Financial Resource Strain: Unknown    Difficulty of Paying Living Expenses: Patient refused   Food Insecurity: Unknown    Worried About Running Out of Food in the Last Year: Patient refused    Ran Out of Food in the Last Year: Patient refused   Transportation Needs: Unknown    Lack of Transportation (Medical): Patient refused    Lack of Transportation (Non-Medical): Patient refused   Physical Activity: Unknown    Days of Exercise per Week: Patient refused    Minutes of Exercise per Session: 0 min   Stress: No Stress Concern Present    Feeling of Stress : Not at all   Social Connections: Unknown    Frequency of Communication with Friends and Family: Once a week    Frequency of Social Gatherings with Friends and Family: Once a week    Active Member of Clubs or Organizations: Yes    Attends Club or Organization Meetings: Never    Marital Status:    Housing Stability: Unknown    Unable to Pay for Housing in the Last Year: Patient refused    Number of Places Lived in the Last Year: 1    Unstable Housing in the Last Year: Patient refused     Past Surgical History:   Procedure Laterality Date    ABDOMINAL SURGERY      origunal surg -mult surgeries since    CARPAL TUNNEL RELEASE      right wrist -left wrist     COLON SURGERY      partial colon removal    COLONOSCOPY  2018    Dr. Salazar; normal terminal ileum; polyps removed from ascending colon and hepatic flexure; pan diverticulosis; small internal hemorrhoids; repeat in 5 years; Bx: fragments of an adenomatous polyp with mild surface glandular dysplasia and associated chronic active inflammation, no evidence of high-grade dysplasia or  malignancy    COLOSTOMY  1986    colostomy reversal  1986    EYE SURGERY      hay fever      HERNIA REPAIR  1949    REPAIR OF TENDON OF LOWER EXTREMITY Left 6/24/2020    Procedure: REPAIR, TENDON, LOWER EXTREMITY;  Surgeon: Justin Mandujano DPM;  [place] [facility] OR;  Service: Podiatry;  Laterality: Left;  ARTHEX NOTIFIED IN CASE HE WANTS ANCHOR    TOE SURGERY Left 2017    replaced a joint     TONSILLECTOMY  1947    TRANSFORAMINAL EPIDURAL INJECTION OF STEROID      x 4    TRANSFORAMINAL EPIDURAL INJECTION OF STEROID Bilateral 11/13/2019    Procedure: Injection,steroid,epidural,transforaminal approach;  Surgeon: Grant Wright MD;  [place] [facility] OR;  Service: Pain Management;  Laterality: Bilateral;  L4-5, L5-S1    TRANSFORAMINAL EPIDURAL INJECTION OF STEROID Bilateral 3/9/2021    Procedure: Injection,steroid,epidural,transforaminal approach;  Surgeon: Grant Wright MD;  [place] [facility];  Service: Pain Management;  Laterality: Bilateral;  L4-5, L5-S1    TRANSFORAMINAL EPIDURAL INJECTION OF STEROID Bilateral 5/25/2021    Procedure: Injection,steroid,epidural,transforaminal approach;  Surgeon: Grant Wright MD;  [place] [facility];  Service: Pain Management;  Laterality: Bilateral;  L4-L5,L5,S1    TRANSFORAMINAL EPIDURAL INJECTION OF STEROID Bilateral 12/14/2021    Procedure: Injection,steroid,epidural,transforaminal approach Bilateral L4-5, L5-S1;  Surgeon: Grant Wright MD;  [place] [facility] OR;  Service: Pain Management;  Laterality: Bilateral;    TRANSFORAMINAL EPIDURAL INJECTION OF STEROID Bilateral 11/4/2022    Procedure: Injection,steroid,epidural,transforaminal approach;  Surgeon: Grant Wright MD;  [place] [facility] OR;  Service: Pain Management;  Laterality: Bilateral;  L4-5 and L5-s1    TRANSFORAMINAL EPIDURAL INJECTION OF STEROID Bilateral 1/20/2023    Procedure: Injection,steroid,epidural,transforaminal approach L4-L5-S1;  Surgeon: Grant Wright MD;  [place] [facility] OR;  Service: Pain Management;  Laterality: Bilateral;      Family History   Problem Relation Age of Onset    Heart disease Mother     Melanoma Neg Hx     Psoriasis Neg Hx     Lupus Neg Hx     Eczema Neg Hx      Current Outpatient Medications   Medication Sig Dispense Refill    apixaban (ELIQUIS) 5 mg Tab Take 1 tablet (5 mg total) by mouth 2 (two) times daily. 180 tablet 3    aspirin 81 MG Chew Take 1 tablet (81 mg total) by mouth once daily. 100 tablet 1    atorvastatin (LIPITOR) 10 MG tablet Take 10 mg by mouth.      azelastine (ASTELIN) 137 mcg (0.1 %) nasal spray 2 sprays (274 mcg total) by Nasal route 2 (two) times daily. 30 mL 5    blood sugar diagnostic (BLOOD GLUCOSE TEST) Strp 1 strip by Misc.(Non-Drug; Combo Route) route 2 (two) times a day. FREESTYLE LITE BG TEST STRIPS. current use of insulin  - Primary ICD-10-CM: E11.9 200 each 2    clotrimazole (LOTRIMIN) 1 % cream Apply topically 2 (two) times daily. 60 g 10    fexofenadine (ALLEGRA) 180 MG tablet Take 1 tablet (180 mg total) by mouth once daily. 90 tablet 3    FREESTYLE LANCETS 28 gauge lancets Inject 1 lancet into the skin 3 (three) times daily. 100 each 3    furosemide (LASIX) 20 MG tablet Take 1 tablet (20 mg total) by mouth every Mon, Wed, Fri. 45 tablet 3    gabapentin (NEURONTIN) 300 MG capsule Take 1 capsule (300 mg total) by mouth 3 (three) times daily. 270 capsule 1    HYDROcodone-acetaminophen (NORCO)  mg per tablet Take 1 tablet by mouth every 6 (six) hours as needed for Pain. 45 tablet 0    JARDIANCE 10 mg tablet Take 1 tablet (10 mg total) by mouth once daily. 90 tablet 1    ketoconazole (NIZORAL) 2 % cream Apply to face after shaving 60 g 2    lisinopriL (PRINIVIL,ZESTRIL) 20 MG tablet Take 1 tablet (20 mg total) by mouth once daily. 90 tablet 3    montelukast (SINGULAIR) 10 mg tablet Take 1 tablet (10 mg total) by mouth every evening. 90 tablet 3    multivitamin with minerals tablet Take 1 tablet by mouth once daily.      polyethylene glycol (GLYCOLAX) 17 gram/dose powder Take 17 g  by mouth once daily. 507 g 3    potassium chloride (MICRO-K) 10 MEQ CpSR Take 1 capsule (10 mEq total) by mouth every other day. 90 capsule 1    SITagliptin phosphate (JANUVIA) 100 MG Tab Take 1 tablet (100 mg total) by mouth once daily. 90 tablet 3    traZODone (DESYREL) 100 MG tablet Take 1 tablet (100 mg total) by mouth every evening. 90 tablet 3    triamcinolone acetonide 0.1% (KENALOG) 0.1 % cream Apply topically 2 (two) times daily. 80 g 0    ascorbic acid, vitamin C, (VITAMIN C) 500 MG tablet Take 500 mg by mouth once daily.      cholecalciferol, vitamin D3, 1,250 mcg (50,000 unit) capsule Take 50,000 Units by mouth every Sunday.      diclofenac sodium (VOLTAREN) 1 % Gel Apply 2 g topically once daily. 100 g 2    DULoxetine (CYMBALTA) 30 MG capsule Take 1 capsule (30 mg total) by mouth once daily. 30 capsule 11    HYDROcodone-acetaminophen (NORCO) 5-325 mg per tablet Take 1 tablet by mouth every 6 (six) hours as needed for Pain. 90 tablet 0    ketoconazole (NIZORAL) 2 % cream Apply topically once daily. 60 g 1    metoprolol tartrate (LOPRESSOR) 25 MG tablet Take 0.5 tablets (12.5 mg total) by mouth 2 (two) times daily. 180 tablet 3     No current facility-administered medications for this visit.     Facility-Administered Medications Ordered in Other Visits   Medication Dose Route Frequency Provider Last Rate Last Admin    lactated ringers infusion   Intravenous Once PRN Grant Wright MD   Stopped at 11/04/22 1230     Review of patient's allergies indicates:   Allergen Reactions    Ativan [lorazepam] Other (See Comments)     Mood alternating      Dilaudid [hydromorphone (bulk)] Other (See Comments)     Mood alternating      Morphine Other (See Comments)     Mood alternating      Adhesive tape-silicones     Hydromorphone        Review of Systems   Constitutional:  Positive for activity change. Negative for diaphoresis, fatigue, unexpected weight change, weight gain and weight loss.   HENT:  Positive for  "rhinorrhea. Negative for congestion, hearing loss and trouble swallowing.    Eyes:  Negative for discharge and visual disturbance.   Respiratory:  Negative for cough, chest tightness, shortness of breath and wheezing.    Cardiovascular:  Negative for chest pain and palpitations.   Gastrointestinal:  Negative for blood in stool, constipation, diarrhea and vomiting.   Endocrine: Negative for polydipsia and polyuria.   Genitourinary:  Negative for difficulty urinating, dysuria, hematuria and urgency.   Musculoskeletal:  Positive for arthralgias and neck pain. Negative for joint swelling.   Neurological:  Positive for weakness. Negative for headaches.   Psychiatric/Behavioral:  Positive for depression and dysphoric mood. Negative for confusion, substance abuse and suicidal ideas. The patient has insomnia.         Blood pressure 119/70, pulse (!) 57, height 5' 8" (1.727 m), weight 105.5 kg (232 lb 9.6 oz), SpO2 97 %. Body mass index is 35.37 kg/m².   Objective:      Physical Exam  Vitals reviewed.   Constitutional:       General: He is not in acute distress.     Appearance: Normal appearance. He is obese. He is not ill-appearing or toxic-appearing.   HENT:      Head: Normocephalic and atraumatic.   Cardiovascular:      Rate and Rhythm: Normal rate and regular rhythm.      Heart sounds: Normal heart sounds.   Pulmonary:      Effort: Pulmonary effort is normal.      Breath sounds: Normal breath sounds.   Abdominal:      Palpations: Abdomen is soft.      Tenderness: There is no abdominal tenderness. There is no guarding or rebound.   Skin:     General: Skin is warm and dry.      Capillary Refill: Capillary refill takes less than 2 seconds.   Neurological:      Mental Status: He is alert and oriented to person, place, and time.           Assessment:       1. Chronic pain syndrome    2. Localized pruritus    3. Essential hypertension    4. Depression, unspecified depression type    5. Arthritis    6. Familial " hypercholesterolemia         Plan:           Chronic pain syndrome  -     HYDROcodone-acetaminophen (NORCO) 5-325 mg per tablet; Take 1 tablet by mouth every 6 (six) hours as needed for Pain.  Dispense: 90 tablet; Refill: 0  -     diclofenac sodium (VOLTAREN) 1 % Gel; Apply 2 g topically once daily.  Dispense: 100 g; Refill: 2  Patient will continue follow-up with pain management will change his dose of Norco from 10-5 and give him enough that he can take multiple a day.Opiates prescribed:    Estimated length of time: Indefinite  Non-narcotic alternatives discussed and  reviewed.    Discussed with patient risks of long term pain medication along with avoidance of concurrent use of alcohol, benzos, and other CNS depressants. Pt understands to use one provider and one phamacy for management of his pain. Pt also agrees that to be subject to random drug tests and monitoring per the prescription monitoring program. Any deviation in care may warrant termination from our clinic.     Localized pruritus  -     triamcinolone acetonide 0.1% (KENALOG) 0.1 % cream; Apply topically 2 (two) times daily.  Dispense: 80 g; Refill: 0    Essential hypertension  -     metoprolol tartrate (LOPRESSOR) 25 MG tablet; Take 0.5 tablets (12.5 mg total) by mouth 2 (two) times daily.  Dispense: 180 tablet; Refill: 3  Reduce the amount of salt in your diet; Lose weight; Avoid drinking too much alcohol; Exercise at least 30 minutes per day most days of the week.  Continue current medications and home BP monitoring.    Depression, unspecified depression type  -     DULoxetine (CYMBALTA) 30 MG capsule; Take 1 capsule (30 mg total) by mouth once daily.  Dispense: 30 capsule; Refill: 11  Discussed adverse reaction and contraindications of medications prescribed. Will observe closely. Recommendations: Avoid alcohol. Cut down on or stop drinking coffee and other sources of caffeine. Exercise for 30-45 min daily. Encouraged psychotherapy to talk about  feelings, relationships, and worries.  -To avoid relapse, continue pharmacotherapy for 12 months after symptoms improve before tapering.  -Exercise - physical activity reduces stress and is effective for REGAN or Panic disorder.  -Eat healthy foods.  -Get Enough Sleep. Get enough sleep, about 7-8hrs per day.  -Dont drink alcohol or use other sedatives. They worsen anxiety.  -Stop smoking and cut back or quit drinking coffee. Both nicotine and caffeine can worsen anxiety.  -Mindfulness meditation may reduce anxiety severity more than stress management education in pts with REGAN.  -Relaxation therapy and cognitive therapy are equally effective for REGAN  -Acceptance-based therapy is associated with a reduction in REGAN symptoms.  -Use relaxation techniques. Visualization techniques, meditation, and yoga are examples of relaxation techniques that can ease anxiety.    Arthritis  -     HYDROcodone-acetaminophen (NORCO) 5-325 mg per tablet; Take 1 tablet by mouth every 6 (six) hours as needed for Pain.  Dispense: 90 tablet; Refill: 0  -     diclofenac sodium (VOLTAREN) 1 % Gel; Apply 2 g topically once daily.  Dispense: 100 g; Refill: 2    Familial hypercholesterolemia  Pt to continue on current dose of statins. Limit red meat, butter, fried foods, cheese, and other foods that have a lot of saturated fat. Consume more: lean meats, fish, fruits, vegetables, whole grains, beans, lentils, and nuts.  Weight loss, and 30-45 min of cardiovascular exercise daily.    Other orders  -     ketoconazole (NIZORAL) 2 % cream; Apply topically once daily.  Dispense: 60 g; Refill: 1

## 2023-05-02 ENCOUNTER — OFFICE VISIT (OUTPATIENT)
Dept: PAIN MEDICINE | Facility: CLINIC | Age: 83
End: 2023-05-02
Payer: MEDICARE

## 2023-05-02 VITALS
BODY MASS INDEX: 35.16 KG/M2 | SYSTOLIC BLOOD PRESSURE: 116 MMHG | HEART RATE: 55 BPM | WEIGHT: 232 LBS | DIASTOLIC BLOOD PRESSURE: 64 MMHG | HEIGHT: 68 IN

## 2023-05-02 DIAGNOSIS — M48.061 SPINAL STENOSIS OF LUMBAR REGION, UNSPECIFIED WHETHER NEUROGENIC CLAUDICATION PRESENT: ICD-10-CM

## 2023-05-02 DIAGNOSIS — M51.36 DDD (DEGENERATIVE DISC DISEASE), LUMBAR: ICD-10-CM

## 2023-05-02 DIAGNOSIS — M54.16 LUMBAR RADICULITIS: Primary | ICD-10-CM

## 2023-05-02 PROCEDURE — 99999 PR PBB SHADOW E&M-EST. PATIENT-LVL V: CPT | Mod: PBBFAC,,,

## 2023-05-02 PROCEDURE — 99215 OFFICE O/P EST HI 40 MIN: CPT | Mod: PBBFAC,PN

## 2023-05-02 PROCEDURE — 99213 PR OFFICE/OUTPT VISIT, EST, LEVL III, 20-29 MIN: ICD-10-PCS | Mod: S$PBB,,,

## 2023-05-02 PROCEDURE — 99999 PR PBB SHADOW E&M-EST. PATIENT-LVL V: ICD-10-PCS | Mod: PBBFAC,,,

## 2023-05-02 PROCEDURE — 99213 OFFICE O/P EST LOW 20 MIN: CPT | Mod: S$PBB,,,

## 2023-05-02 NOTE — H&P (VIEW-ONLY)
"    Referring Physician: No ref. provider found    PCP: Garland Ocampo MD      CC:low back and left leg pain    Interval History:  Mr. Hollingsworth is a 82 y.o. male with a low back and leg pain who presents today as an established patient for the management of low back pain.  The patient presents as a follow up to request repeat JOSE. The patient is s/p TFESI at L4-l5, L5-s1 with excellent relief. The patient reports pain is now returning to his lower back and would like to repeat procedure. Today, the patient localizes the worse of his pain to his lower back with radiation down the posterior aspect of BLE to his feet. L>R. The patient reports that this pain has been relieved with TFESI in the past.  The patient denies pain with sitting and sleeping. The patient describes his pain as "burning, tingling, numbness, sharp and shooting" in character. Continues to c/o unrelated pain at the top of his left foot. The patient denies benefit with PT in the past.  He denies any LE weakness or b/b changes. He takes Hydrocodone 7.5 mg sparingly prescribed by PCP.  In the past he was evaluated by [facility] and lumbar surgery was recommended. The patient denies of any significant changes in his health since his last appointment. The patient also denies of any changes in the character of his pain since his last appointment.  Pain today is rated 5/10. Patient denies of any urinary/fecal incontinence, saddle anesthesia, or weakness.        HPI:   Yeyo Hollingsworth is a 82 y.o. male ho presents with lower back and left leg pain.  Pain is been present since 2010.  No recent traumatic incident.  His intermittent aching, throbbing, electric pain in his lower back.  Pain radiates down his left buttock into his left posterior thigh.  Pain worsens with prolonged standing, walking, getting up and coughing.  Pain improves with rest.  His tried physical therapy with minimal benefit.  He has undergone lumbar JOSE's with Dr. Rendon in the past with " "moderate benefit.  Most recent injection was performed in September 2016.  Pain has since returned.  He desires repeat injections with us.  He denies any weakness.  No bowel bladder changes.  He rates his pain 6/10 today.    INTERVETIONAL THERAPIES:   1/20/23: Injection,steroid,epidural,transforaminal approach L4-L5-S1- excellent relief.   11/4/2022: Bilateral L4-5 and L5-S1 transforaminal epidural steroid injection under fluoroscopy- Dr. Wright- excellent relief.       :          ROS:  CONSTITUTIONAL: No fevers, chills, night sweats, wt. loss, appetite changes  SKIN: no rashes or itching  ENT: No headaches, head trauma, vision changes, or eye pain  LYMPH NODES: None noticed   CV: No chest pain, palpitations.   RESP: No shortness of breath, dyspnea on exertion, cough, wheezing, or hemoptysis  GI: No nausea, emesis, diarrhea, constipation, melena, hematochezia, pain.    : No dysuria, hematuria, urgency, or frequency   HEME: No easy bruising, bleeding problems  PSYCHIATRIC: No depression, anxiety, psychosis, hallucinations.  NEURO: No seizures, memory loss, dizziness or difficulty sleeping  MSK: + history of present illness    MEDICAL, SURGICAL, FAMILY, SOCIAL HX: reviewed    MEDICATIONS/ALLERGIES: reviewed         Medications/Allergies: See med card    Vitals:    05/02/23 1003   BP: 116/64   Pulse: (!) 55   Weight: 105.2 kg (232 lb)   Height: 5' 8" (1.727 m)   PainSc:   5           Physical exam:    GENERAL: A and O x3, the patient appears well groomed and is in no acute distress.  Skin: No rashes or obvious lesions  HEENT: normocephalic, atraumatic  CARDIOVASCULAR:  RRR  LUNGS: non labored breathing  ABDOMEN: soft, nontender   UPPER EXTREMITIES: Normal alignment, normal range of motion, no atrophy, no skin changes,  hair growth and nail growth normal and equal bilaterally. No swelling. Tenderness to right AC joint  LOWER EXTREMITIES:  Normal alignment, normal range of motion, no atrophy, no skin changes,  hair " growth and nail growth normal and equal bilaterally. No swelling, no tenderness.    CERVICAL SPINE:  Full ROM with pain on flexion and extension  Negative spurlings  Tenderness to palpation right cervical paraspinous muscles   LUMBAR SPINE  Lumbar spine: ROM is limited with flexion extension and oblique extension with moderate increased pain.    Caden's test causes no increased pain on either side.    Supine straight leg raise positive bilaterally   Internal and external rotation of the hip causes no increased pain on either side.  Myofascial exam: No tenderness to palpation across lumbar paraspinous muscles.       MENTAL STATUS: normal orientation, speech, language, and fund of knowledge for social situation.  Emotional state appropriate.    CRANIAL NERVES:  II:  PERRL bilaterally,   III,IV,VI: EOMI.    V:  Facial sensation equal bilaterally  VII:  Facial motor function normal.  VIII:  Hearing equal to finger rub bilaterally  IX/X: Gag normal, palate symmetric  XI:  Shoulder shrug equal, head turn equal  XII:  Tongue midline without fasciculations      MOTOR: Tone and bulk: normal bilateral upper and lower Strength: normal   Delt Bi Tri WE WF     R 5 5 5 5 5 5   L 5 5 5 5 5 5     IP ADD ABD Quad TA Gas HAM  R 5 5 5 5 5 5 5  L 5 5 5 5 5 5 5    SENSATION: Light touch and pinprick intact bilaterally  REFLEXES: normal, symmetric, nonbrisk.  Toes down, no clonus. No hoffmans.  GAIT: normal rise, base, steps, and arm swing.        Imaging: reviewed CT of ABD PELV renal stone study.        Assessment:  Mr. Hollingsworth is a 82 y.o. male with low back and BLE pain.  The patient presents today as follow up s/p TFESI. The patient has had excellent relief of similar pain with a TFESI.   Treatment plan outlined below:   1. Lumbar radiculitis    2. DDD (degenerative disc disease), lumbar    3. Spinal stenosis of lumbar region, unspecified whether neurogenic claudication present            Plan:  1. Schedule for repeat Bilateral  TFESI at L4-L5, L5-S1. Patient is on Eliquis will require cardiac clearance.   2. I have stressed the importance of physical activity and a home exercise plan to help with chronic pain and improve health.   3. continue Robaxin 500 mg po Q4H prn muscle spasms.    5. RTC for the procedure as outlined above    Janet Croft, NP

## 2023-05-03 ENCOUNTER — OUTPATIENT CASE MANAGEMENT (OUTPATIENT)
Dept: ADMINISTRATIVE | Facility: OTHER | Age: 83
End: 2023-05-03
Payer: MEDICARE

## 2023-05-03 NOTE — PROGRESS NOTES
Outpatient Care Management  Plan of Care Follow Up Visit    Patient: Yeyo Hollingsworth  MRN: [id number]  Date of Service: 05/03/2023  Completed by: Saskia Jackson RN  Referral Date: 02/03/2023    Reason for Visit   Patient presents with    OPCM RN Follow Up Call       Brief Summary: OPCM follow up with Mr. Hollingsworth today.  Next Steps:   OPCM RN follow up on or around 5/17/23.  Address any development of abdominal pain.

## 2023-05-05 DIAGNOSIS — L29.9 LOCALIZED PRURITUS: ICD-10-CM

## 2023-05-05 DIAGNOSIS — G89.4 CHRONIC PAIN SYNDROME: ICD-10-CM

## 2023-05-05 DIAGNOSIS — I10 ESSENTIAL HYPERTENSION: ICD-10-CM

## 2023-05-05 DIAGNOSIS — E78.5 HYPERLIPIDEMIA, UNSPECIFIED HYPERLIPIDEMIA TYPE: Chronic | ICD-10-CM

## 2023-05-05 DIAGNOSIS — E11.9 TYPE 2 DIABETES MELLITUS WITHOUT COMPLICATION, WITHOUT LONG-TERM CURRENT USE OF INSULIN: Chronic | ICD-10-CM

## 2023-05-05 DIAGNOSIS — M19.90 ARTHRITIS: ICD-10-CM

## 2023-05-07 RX ORDER — HYDROCODONE BITARTRATE AND ACETAMINOPHEN 5; 325 MG/1; MG/1
1 TABLET ORAL EVERY 6 HOURS PRN
Qty: 90 TABLET | Refills: 0 | Status: SHIPPED | OUTPATIENT
Start: 2023-05-07 | End: 2023-06-18 | Stop reason: SDUPTHER

## 2023-05-07 RX ORDER — NAPROXEN SODIUM 220 MG/1
81 TABLET, FILM COATED ORAL DAILY
Qty: 100 TABLET | Refills: 2 | Status: SHIPPED | OUTPATIENT
Start: 2023-05-07 | End: 2024-01-02 | Stop reason: SDUPTHER

## 2023-05-07 RX ORDER — TRIAMCINOLONE ACETONIDE 1 MG/G
CREAM TOPICAL 2 TIMES DAILY
Qty: 80 G | Refills: 0 | Status: SHIPPED | OUTPATIENT
Start: 2023-05-07 | End: 2023-07-11 | Stop reason: SDUPTHER

## 2023-05-16 ENCOUNTER — TELEPHONE (OUTPATIENT)
Dept: CARDIOLOGY | Facility: CLINIC | Age: 83
End: 2023-05-16

## 2023-05-16 ENCOUNTER — OFFICE VISIT (OUTPATIENT)
Dept: CARDIOLOGY | Facility: CLINIC | Age: 83
End: 2023-05-16
Payer: MEDICARE

## 2023-05-16 VITALS
HEART RATE: 49 BPM | BODY MASS INDEX: 35.45 KG/M2 | DIASTOLIC BLOOD PRESSURE: 80 MMHG | SYSTOLIC BLOOD PRESSURE: 120 MMHG | HEIGHT: 68 IN | WEIGHT: 233.94 LBS

## 2023-05-16 DIAGNOSIS — K59.00 CONSTIPATION, UNSPECIFIED CONSTIPATION TYPE: ICD-10-CM

## 2023-05-16 DIAGNOSIS — M54.50 CHRONIC MIDLINE LOW BACK PAIN, UNSPECIFIED WHETHER SCIATICA PRESENT: ICD-10-CM

## 2023-05-16 DIAGNOSIS — K58.9 IRRITABLE BOWEL SYNDROME, UNSPECIFIED TYPE: Primary | ICD-10-CM

## 2023-05-16 DIAGNOSIS — R93.0 ABNORMAL CT OF THE HEAD: ICD-10-CM

## 2023-05-16 DIAGNOSIS — G89.29 CHRONIC MIDLINE LOW BACK PAIN, UNSPECIFIED WHETHER SCIATICA PRESENT: ICD-10-CM

## 2023-05-16 DIAGNOSIS — I10 ESSENTIAL HYPERTENSION: ICD-10-CM

## 2023-05-16 DIAGNOSIS — I48.0 PAF (PAROXYSMAL ATRIAL FIBRILLATION): Chronic | ICD-10-CM

## 2023-05-16 DIAGNOSIS — I48.20 CHRONIC ATRIAL FIBRILLATION: ICD-10-CM

## 2023-05-16 DIAGNOSIS — G47.33 OSA (OBSTRUCTIVE SLEEP APNEA): ICD-10-CM

## 2023-05-16 DIAGNOSIS — I10 HYPERTENSION, UNSPECIFIED TYPE: ICD-10-CM

## 2023-05-16 PROCEDURE — 99215 PR OFFICE/OUTPT VISIT, EST, LEVL V, 40-54 MIN: ICD-10-PCS | Mod: S$PBB,,, | Performed by: SPECIALIST

## 2023-05-16 PROCEDURE — 99999 PR PBB SHADOW E&M-EST. PATIENT-LVL V: CPT | Mod: PBBFAC,,, | Performed by: SPECIALIST

## 2023-05-16 PROCEDURE — 99215 OFFICE O/P EST HI 40 MIN: CPT | Mod: PBBFAC,PN | Performed by: SPECIALIST

## 2023-05-16 PROCEDURE — 99999 PR PBB SHADOW E&M-EST. PATIENT-LVL V: ICD-10-PCS | Mod: PBBFAC,,, | Performed by: SPECIALIST

## 2023-05-16 PROCEDURE — 99215 OFFICE O/P EST HI 40 MIN: CPT | Mod: S$PBB,,, | Performed by: SPECIALIST

## 2023-05-16 NOTE — PROGRESS NOTES
Subjective:    Patient ID:  Yeyo Hollingsworth is a 82 y.o. male who presents for   Atrial Fibrillation, Hypertension, and Hyperlipidemia    HPI1) chronic  isaiah and hip apin -  on  oxycodone and  back injections      2)  dm  well controlled   3)   hi bp  ok   4)dm   under control   5)  afib  one time- 10 years ago   eluiquis since       Review of patient's allergies indicates:   Allergen Reactions    Ativan [lorazepam] Other (See Comments)     Mood alternating      Dilaudid [hydromorphone (bulk)] Other (See Comments)     Mood alternating      Morphine Other (See Comments)     Mood alternating      Adhesive tape-silicones     Hydromorphone        Past Medical History:   Diagnosis Date    Allergy     Ankle pain     left    Anticoagulant long-term use     aspirin    Anxiety     Atrial fibrillation     Back pain     Bruises easily     Cataract     Clotting disorder     left leg    Colon polyp     Diabetes mellitus     Diabetes mellitus, type 2     Hay fever     Hyperlipidemia     Hypertension     Left hip pain 12/26/2021    Seeing Dr. Beard for hip pain     Obese     BRANDI on CPAP      Past Surgical History:   Procedure Laterality Date    ABDOMINAL SURGERY      origunal surg 1986-mult surgeries since    CARPAL TUNNEL RELEASE      right wrist 2009-left wrist 2010    COLON SURGERY      partial colon removal    COLONOSCOPY  11/26/2018    Dr. Salazar; normal terminal ileum; polyps removed from ascending colon and hepatic flexure; pan diverticulosis; small internal hemorrhoids; repeat in 5 years; Bx: fragments of an adenomatous polyp with mild surface glandular dysplasia and associated chronic active inflammation, no evidence of high-grade dysplasia or malignancy    COLOSTOMY  1986    colostomy reversal  1986    EYE SURGERY      hay fever      HERNIA REPAIR  1949    REPAIR OF TENDON OF LOWER EXTREMITY Left 6/24/2020    Procedure: REPAIR, TENDON, LOWER EXTREMITY;  Surgeon: Justin Mandujano DPM;  [place] [facility] OR;  Service:  Podiatry;  Laterality: Left;  ARTHEX NOTIFIED IN CASE HE WANTS ANCHOR    TOE SURGERY Left 2017    replaced a joint     TONSILLECTOMY  1947    TRANSFORAMINAL EPIDURAL INJECTION OF STEROID      x 4    TRANSFORAMINAL EPIDURAL INJECTION OF STEROID Bilateral 2019    Procedure: Injection,steroid,epidural,transforaminal approach;  Surgeon: Grant Wright MD;  [facility];  Service: Pain Management;  Laterality: Bilateral;  L4-5, L5-S1    TRANSFORAMINAL EPIDURAL INJECTION OF STEROID Bilateral 3/9/2021    Procedure: Injection,steroid,epidural,transforaminal approach;  Surgeon: Grant Wright MD;  [facility];  Service: Pain Management;  Laterality: Bilateral;  L4-5, L5-S1    TRANSFORAMINAL EPIDURAL INJECTION OF STEROID Bilateral 2021    Procedure: Injection,steroid,epidural,transforaminal approach;  Surgeon: Grant Wright MD;  [facility];  Service: Pain Management;  Laterality: Bilateral;  L4-L5,L5,S1    TRANSFORAMINAL EPIDURAL INJECTION OF STEROID Bilateral 2021    Procedure: Injection,steroid,epidural,transforaminal approach Bilateral L4-5, L5-S1;  Surgeon: Grant Wright MD;  [facility];  Service: Pain Management;  Laterality: Bilateral;    TRANSFORAMINAL EPIDURAL INJECTION OF STEROID Bilateral 2022    Procedure: Injection,steroid,epidural,transforaminal approach;  Surgeon: Grant Wright MD;  [facility];  Service: Pain Management;  Laterality: Bilateral;  L4-5 and L5-s1    TRANSFORAMINAL EPIDURAL INJECTION OF STEROID Bilateral 2023    Procedure: Injection,steroid,epidural,transforaminal approach L4-L5-S1;  Surgeon: Grant Wright MD;  [place] [facility];  Service: Pain Management;  Laterality: Bilateral;     Social History     Tobacco Use    Smoking status: Former     Types: Cigarettes     Quit date: 2006     Years since quittin.2    Smokeless tobacco: Never    Tobacco comments:     ex smoker    Substance Use Topics    Alcohol use: Yes     Comment: rarely    Drug  use: No        Review of Systems     Review of Systems   Constitutional: Positive for weight gain. Negative for decreased appetite and malaise/fatigue.   HENT: Negative.  Negative for congestion, hearing loss and tinnitus.    Eyes:  Positive for blurred vision. Negative for vision loss in left eye, vision loss in right eye, visual disturbance and visual halos.   Cardiovascular:  Negative for chest pain, claudication, dyspnea on exertion, irregular heartbeat, leg swelling, near-syncope, orthopnea, palpitations, paroxysmal nocturnal dyspnea and syncope.   Respiratory:  Positive for shortness of breath and sleep disturbances due to breathing. Negative for cough, hemoptysis, snoring, sputum production and wheezing.    Endocrine: Negative for polydipsia, polyphagia and polyuria.   Hematologic/Lymphatic: Negative for adenopathy. Does not bruise/bleed easily.   Skin:  Negative for dry skin, itching, poor wound healing, rash, skin cancer and suspicious lesions.   Musculoskeletal:  Positive for arthritis and joint swelling. Negative for back pain, falls, gout, joint pain, muscle cramps, muscle weakness, neck pain and stiffness.   Gastrointestinal:  Positive for heartburn. Negative for abdominal pain, change in bowel habit, constipation, diarrhea, hematemesis, hemorrhoids, melena, nausea and vomiting.        Multiple gi surgeries - exploratory     Diverticulitis   Abd    hernias           Genitourinary: Negative.  Negative for bladder incontinence, dysuria, flank pain, frequency, hematuria, nocturia and non-menstrual bleeding.   Neurological:  Positive for dizziness and loss of balance. Negative for brief paralysis, difficulty with concentration, disturbances in coordination, focal weakness, headaches, numbness, paresthesias and tremors.   Psychiatric/Behavioral:  Negative for altered mental status, depression, hallucinations, memory loss, substance abuse, suicidal ideas and thoughts of violence. The patient does not have  insomnia and is not nervous/anxious.    Allergic/Immunologic: Negative for environmental allergies and hives.         Objective:        Vitals:    05/16/23 0822   BP: 120/80   Pulse: (!) 49       Lab Results   Component Value Date    WBC 7.40 02/03/2023    HGB 13.8 (L) 02/03/2023    HCT 44.3 02/03/2023     (L) 02/03/2023    CHOL 123 01/05/2023    TRIG 133 01/05/2023    HDL 40 01/05/2023    ALT 38 02/02/2023    AST 26 02/02/2023     02/03/2023    K 4.1 02/03/2023     02/03/2023    CREATININE 1.0 02/03/2023    BUN 28 (H) 02/03/2023    CO2 27 02/03/2023    TSH 2.010 10/01/2019    INR 1.0 05/17/2013    GLUF 96 03/09/2016    HGBA1C 6.0 01/05/2023        ECHOCARDIOGRAM RESULTS  Results for orders placed during the hospital encounter of 05/20/21    Echo Color Flow Doppler? Yes    Interpretation Summary  · Mild concentric hypertrophy and normal systolic function.  · The estimated ejection fraction is 66%.  · Normal left ventricular diastolic function.  · Normal right ventricular size with normal right ventricular systolic function.  · Mild tricuspid regurgitation.  · Normal central venous pressure (3 mmHg).  · The estimated PA systolic pressure is 21 mmHg.      CURRENT/PREVIOUS VISIT EKG  Results for orders placed or performed during the hospital encounter of 02/01/23   EKG 12-lead    Collection Time: 02/01/23 11:46 PM    Narrative    Test Reason : R09.02,    Vent. Rate : 077 BPM     Atrial Rate : 077 BPM     P-R Int : 166 ms          QRS Dur : 086 ms      QT Int : 370 ms       P-R-T Axes : 022 006 038 degrees     QTc Int : 418 ms    Normal sinus rhythm  Normal ECG  No previous ECGs available  Confirmed by Eulogio Gutiérrez MD ([id number]) on 2/8/2023 11:56:27 PM    Referred By: LEXI   SELF           Confirmed By:Eulogio Gutiérrez MD     Results for orders placed during the hospital encounter of 05/20/21    Echo Color Flow Doppler? Yes    Interpretation Summary  · Mild concentric hypertrophy and normal  systolic function.  · The estimated ejection fraction is 66%.  · Normal left ventricular diastolic function.  · Normal right ventricular size with normal right ventricular systolic function.  · Mild tricuspid regurgitation.  · Normal central venous pressure (3 mmHg).  · The estimated PA systolic pressure is 21 mmHg.    Results for orders placed in visit on 06/17/20    Nuclear Stress Test    Interpretation Summary    The EKG portion of this study is negative for ischemia.    The patient reported chest pain during the stress test.    There were no arrhythmias during stress.    ECG Stress Nuclear portion of this study will be reported separately.      PHYSICAL EXAM    Physical Exam blood pressure is 120/80 pulse is 70  No carotid  Bruit  Mouth is clear  Lungs clear  Cardiac regular no S3 no murmurs or gallop  Abdomen multiple small surgical incisional hernia  Extremities no phlebitis  edema  Reasonably good pulses        Assessment:           Overwt, jazmin, dm,  back pain  arthritis     Ldl at goal     Neg est in past    Old abnormal ct head LDL is at goal LDL is at goal LDL at goal testing test L testing test     Plan:     Continue as is  Return to clinic 1  Stay on same meds  Problem List Items Addressed This Visit    None       No follow-ups on file.

## 2023-05-17 ENCOUNTER — OUTPATIENT CASE MANAGEMENT (OUTPATIENT)
Dept: ADMINISTRATIVE | Facility: OTHER | Age: 83
End: 2023-05-17
Payer: MEDICARE

## 2023-05-17 NOTE — PROGRESS NOTES
Outpatient Care Management  Plan of Care Follow Up Visit    Patient: Yeyo Hollingsworth  MRN: [id number]  Date of Service: 05/17/2023  Completed by: Saskia Jackson RN  Referral Date: 02/03/2023    Reason for Visit   Patient presents with    OPCM RN Follow Up Call       Brief Summary: OPCM RN followed up with Mr. Orona today as planned.  Next Steps:   Mr. Orona agreed to OPCM RN follow up on or around 6/5/23 (after his epidural injection).  Further explore obi where behavior change my lead to improved health.  Provide frequent follow-up providing motivation, encouragement and support when medication nonadherence is identified.

## 2023-06-01 ENCOUNTER — HOSPITAL ENCOUNTER (OUTPATIENT)
Facility: AMBULARY SURGERY CENTER | Age: 83
Discharge: HOME | End: 2023-06-01
Attending: ANESTHESIOLOGY | Admitting: ANESTHESIOLOGY
Payer: MEDICARE

## 2023-06-01 DIAGNOSIS — M54.16 LUMBAR RADICULITIS: ICD-10-CM

## 2023-06-01 LAB — POCT GLUCOSE: 82 MG/DL (ref 70–110)

## 2023-06-01 PROCEDURE — 64483 NJX AA&/STRD TFRM EPI L/S 1: CPT | Mod: RT | Performed by: ANESTHESIOLOGY

## 2023-06-01 PROCEDURE — 64483 NJX AA&/STRD TFRM EPI L/S 1: CPT | Mod: 50,,, | Performed by: ANESTHESIOLOGY

## 2023-06-01 PROCEDURE — 64484 PRA INJECT ANES/STEROID FORAMEN LUMBAR/SACRAL W IMG GUIDE ,EA ADD LEVEL: ICD-10-PCS | Mod: 50,,, | Performed by: ANESTHESIOLOGY

## 2023-06-01 PROCEDURE — 64484 NJX AA&/STRD TFRM EPI L/S EA: CPT | Mod: LT | Performed by: ANESTHESIOLOGY

## 2023-06-01 PROCEDURE — 64483 PR EPIDURAL INJ, ANES/STEROID, TRANSFORAMINAL, LUMB/SACR, SNGL LEVL: ICD-10-PCS | Mod: 50,,, | Performed by: ANESTHESIOLOGY

## 2023-06-01 PROCEDURE — 64484 NJX AA&/STRD TFRM EPI L/S EA: CPT | Mod: 50,,, | Performed by: ANESTHESIOLOGY

## 2023-06-01 RX ORDER — MIDAZOLAM HYDROCHLORIDE 1 MG/ML
INJECTION INTRAMUSCULAR; INTRAVENOUS
Status: DISCONTINUED | OUTPATIENT
Start: 2023-06-01 | End: 2023-06-01 | Stop reason: HOSPADM

## 2023-06-01 RX ORDER — LIDOCAINE HYDROCHLORIDE 10 MG/ML
INJECTION, SOLUTION EPIDURAL; INFILTRATION; INTRACAUDAL; PERINEURAL
Status: DISCONTINUED | OUTPATIENT
Start: 2023-06-01 | End: 2023-06-01 | Stop reason: HOSPADM

## 2023-06-01 RX ORDER — FENTANYL CITRATE 50 UG/ML
INJECTION, SOLUTION INTRAMUSCULAR; INTRAVENOUS
Status: DISCONTINUED | OUTPATIENT
Start: 2023-06-01 | End: 2023-06-01 | Stop reason: HOSPADM

## 2023-06-01 RX ORDER — DEXAMETHASONE SODIUM PHOSPHATE 10 MG/ML
INJECTION INTRAMUSCULAR; INTRAVENOUS
Status: DISCONTINUED | OUTPATIENT
Start: 2023-06-01 | End: 2023-06-01 | Stop reason: HOSPADM

## 2023-06-01 RX ORDER — SODIUM CHLORIDE, SODIUM LACTATE, POTASSIUM CHLORIDE, CALCIUM CHLORIDE 600; 310; 30; 20 MG/100ML; MG/100ML; MG/100ML; MG/100ML
INJECTION, SOLUTION INTRAVENOUS ONCE AS NEEDED
Status: COMPLETED | OUTPATIENT
Start: 2023-06-01 | End: 2023-06-01

## 2023-06-01 RX ORDER — BUPIVACAINE HYDROCHLORIDE 2.5 MG/ML
INJECTION, SOLUTION EPIDURAL; INFILTRATION; INTRACAUDAL
Status: DISCONTINUED | OUTPATIENT
Start: 2023-06-01 | End: 2023-06-01 | Stop reason: HOSPADM

## 2023-06-01 RX ADMIN — SODIUM CHLORIDE, SODIUM LACTATE, POTASSIUM CHLORIDE, CALCIUM CHLORIDE: 600; 310; 30; 20 INJECTION, SOLUTION INTRAVENOUS at 01:06

## 2023-06-01 NOTE — DISCHARGE SUMMARY
[facility]  Discharge Note  Short Stay    Procedure(s) (LRB):  Injection,steroid,epidural,transforaminal approach L4-L5, L5-S1 (Bilateral)      OUTCOME: Patient tolerated treatment/procedure well without complication and is now ready for discharge.    DISPOSITION: Home or Self Care    FINAL DIAGNOSIS:  <principal problem not specified>    FOLLOWUP: In clinic    DISCHARGE INSTRUCTIONS:    Discharge Procedure Orders   Notify your health care provider if you experience any of the following:  temperature >100.4     Notify your health care provider if you experience any of the following:  severe uncontrolled pain     Notify your health care provider if you experience any of the following:  redness, tenderness, or signs of infection (pain, swelling, redness, odor or green/yellow discharge around incision site)     Activity as tolerated        TIME SPENT ON DISCHARGE:   30 minutes

## 2023-06-01 NOTE — OP NOTE
PROCEDURE DATE: 6/1/2023    PROCEDURE: Bilateral L4-5, L5-S1 transforaminal epidural steroid injection under fluoroscopy    DIAGNOSIS: Lumbar radiculitis    Post op diagnosis: Same    PHYSICIAN: Grant Wright MD    MEDICATIONS INJECTED:  Dexamethasone 2.5mg and 0.5ml 0.25% bupivicaine at each nerve root.     LOCAL ANESTHETIC INJECTED:  Lidocaine 1%. 2 ml per site.    SEDATION MEDICATIONS: RN IV sedation    ESTIMATED BLOOD LOSS:  None    COMPLICATIONS:  NOne    TECHNIQUE:   A time-out was taken to identify patient and procedure side prior to starting the procedure. The patient was placed in a prone position, prepped and draped in the usual sterile fashion using ChloraPrep and sterile towels.  The area to be injected was determined under fluoroscopic guidance in AP and oblique view.  Local anesthetic was given by raising a wheal and going down to the hub of a 25-gauge 1.5 inch needle.  In oblique view, a 5 inch 22-gauge bent-tip spinal needle was introduced towards 6 oclock position of the pedicle of each above named nerve root level.  The needle was walked medially then hinged into the neural foramen and position was confirmed in AP and lateral views.  1ml contrast dye was injected to confirm appropriate placement and that there was no vascular uptake.  After negative aspiration for blood or CSF, the medication was then injected. This was performed at the bilateral L4/5 and L5/S1 level(s). The patient tolerated the procedure well.    The patient was monitored after the procedure.  Patient was given post procedure and discharge instructions to follow at home. The patient was discharged in a stable condition.

## 2023-06-06 ENCOUNTER — OUTPATIENT CASE MANAGEMENT (OUTPATIENT)
Dept: ADMINISTRATIVE | Facility: OTHER | Age: 83
End: 2023-06-06
Payer: MEDICARE

## 2023-06-06 VITALS
TEMPERATURE: 98 F | SYSTOLIC BLOOD PRESSURE: 128 MMHG | HEART RATE: 55 BPM | OXYGEN SATURATION: 97 % | HEIGHT: 68 IN | DIASTOLIC BLOOD PRESSURE: 67 MMHG | WEIGHT: 233.94 LBS | BODY MASS INDEX: 35.45 KG/M2 | RESPIRATION RATE: 18 BRPM

## 2023-06-09 ENCOUNTER — PATIENT MESSAGE (OUTPATIENT)
Dept: ADMINISTRATIVE | Facility: OTHER | Age: 83
End: 2023-06-09
Payer: MEDICARE

## 2023-06-09 NOTE — PROGRESS NOTES
6/6/2023 1st attempt to complete Follow-Up  for Outpatient Care Management, left message.  Will send unable to assess letter via portal.

## 2023-06-18 DIAGNOSIS — G89.4 CHRONIC PAIN SYNDROME: ICD-10-CM

## 2023-06-18 DIAGNOSIS — M19.90 ARTHRITIS: ICD-10-CM

## 2023-06-18 DIAGNOSIS — I10 ESSENTIAL HYPERTENSION: ICD-10-CM

## 2023-06-19 RX ORDER — HYDROCODONE BITARTRATE AND ACETAMINOPHEN 5; 325 MG/1; MG/1
1 TABLET ORAL EVERY 6 HOURS PRN
Qty: 90 TABLET | Refills: 0 | Status: SHIPPED | OUTPATIENT
Start: 2023-06-19 | End: 2023-07-11 | Stop reason: SDUPTHER

## 2023-06-19 RX ORDER — LISINOPRIL 20 MG/1
20 TABLET ORAL DAILY
Qty: 90 TABLET | Refills: 3 | Status: SHIPPED | OUTPATIENT
Start: 2023-06-19 | End: 2023-07-11 | Stop reason: SDUPTHER

## 2023-06-22 ENCOUNTER — OUTPATIENT CASE MANAGEMENT (OUTPATIENT)
Dept: ADMINISTRATIVE | Facility: OTHER | Age: 83
End: 2023-06-22
Payer: MEDICARE

## 2023-06-22 NOTE — PROGRESS NOTES
Outpatient Care Management  Plan of Care Follow Up Visit    Patient: Yeyo Hollingsworth  MRN: [id number]  Date of Service: 06/22/2023  Completed by: Saskia Jackson RN  Referral Date: 02/03/2023    Reason for Visit   Patient presents with    OPCM RN Follow Up Call       Brief Summary: OPCM RN followed up with Mr. Orona today for review of care plans.  Falls x 3 were noted and additional Falls Risk care plan added.  Next Steps:   Mr. Orona agreed to OPCM RN follow up on or around 7/6/23.  Follow up post podiatry appt.

## 2023-06-30 ENCOUNTER — OFFICE VISIT (OUTPATIENT)
Dept: PAIN MEDICINE | Facility: CLINIC | Age: 83
End: 2023-06-30
Payer: MEDICARE

## 2023-06-30 VITALS
WEIGHT: 233 LBS | HEIGHT: 68 IN | BODY MASS INDEX: 35.31 KG/M2 | DIASTOLIC BLOOD PRESSURE: 76 MMHG | SYSTOLIC BLOOD PRESSURE: 142 MMHG | HEART RATE: 61 BPM

## 2023-06-30 DIAGNOSIS — R29.6 FREQUENT FALLS: ICD-10-CM

## 2023-06-30 DIAGNOSIS — M48.061 SPINAL STENOSIS OF LUMBAR REGION, UNSPECIFIED WHETHER NEUROGENIC CLAUDICATION PRESENT: Primary | ICD-10-CM

## 2023-06-30 DIAGNOSIS — M54.16 LUMBAR RADICULITIS: ICD-10-CM

## 2023-06-30 DIAGNOSIS — R26.9 GAIT ABNORMALITY: ICD-10-CM

## 2023-06-30 DIAGNOSIS — M51.36 DDD (DEGENERATIVE DISC DISEASE), LUMBAR: ICD-10-CM

## 2023-06-30 PROCEDURE — 99999 PR PBB SHADOW E&M-EST. PATIENT-LVL IV: ICD-10-PCS | Mod: PBBFAC,,,

## 2023-06-30 PROCEDURE — 99213 OFFICE O/P EST LOW 20 MIN: CPT | Mod: S$PBB,,,

## 2023-06-30 PROCEDURE — 99214 OFFICE O/P EST MOD 30 MIN: CPT | Mod: PBBFAC,PN

## 2023-06-30 PROCEDURE — 99213 PR OFFICE/OUTPT VISIT, EST, LEVL III, 20-29 MIN: ICD-10-PCS | Mod: S$PBB,,,

## 2023-06-30 PROCEDURE — 99999 PR PBB SHADOW E&M-EST. PATIENT-LVL IV: CPT | Mod: PBBFAC,,,

## 2023-06-30 NOTE — PROGRESS NOTES
"    Referring Physician: No ref. provider found    PCP: Garland Ocampo MD      CC:low back and left leg pain    Interval History:  Mr. Hollingsworth is a 82 y.o. male with a low back and leg pain who presents today as an established patient for the management of low back pain.  The patient presents as f/u s/p Bilateral L4-5, L5-S1 transforaminal epidural steroid injection on 6/1/23 and reports of 75% improvement of pain or more.     The patient reports he had a fall day after procedure secondary to his left foot pain.  Today, the patient localizes the worse of his pain to his lower back with radiation down the posterior aspect of BLE to his feet. L>R. The patient reports that this pain is tolerable at this time.   The patient denies pain with sitting and sleeping. The patient describes his pain as "burning, tingling, numbness, sharp and shooting" in character. Continues to c/o unrelated pain at the top of his left foot. The patient denies benefit with PT in the past.  He denies any LE weakness or b/b changes. He takes Hydrocodone 7.5 mg sparingly prescribed by PCP.  In the past he was evaluated by [facility] and lumbar surgery was recommended. The patient denies of any significant changes in his health since his last appointment. The patient also denies of any changes in the character of his pain since his last appointment.  Pain today is rated 4/10. Patient denies of any urinary/fecal incontinence, saddle anesthesia, or weakness.        HPI:   Yeyo Hollingsworth is a 82 y.o. male ho presents with lower back and left leg pain.  Pain is been present since 2010.  No recent traumatic incident.  His intermittent aching, throbbing, electric pain in his lower back.  Pain radiates down his left buttock into his left posterior thigh.  Pain worsens with prolonged standing, walking, getting up and coughing.  Pain improves with rest.  His tried physical therapy with minimal benefit.  He has undergone lumbar JOSE's with Dr. Rendon in the " "past with moderate benefit.  Most recent injection was performed in September 2016.  Pain has since returned.  He desires repeat injections with us.  He denies any weakness.  No bowel bladder changes.  He rates his pain 6/10 today.    INTERVETIONAL THERAPIES:   6/1/2023: Bilateral L4-5, L5-S1 transforaminal epidural steroid injection  1/20/23: Injection,steroid,epidural,transforaminal approach L4-L5-S1- excellent relief.   11/4/2022: Bilateral L4-5 and L5-S1 transforaminal epidural steroid injection under fluoroscopy- Dr. Wright- excellent relief.       :          ROS:  CONSTITUTIONAL: No fevers, chills, night sweats, wt. loss, appetite changes  SKIN: no rashes or itching  ENT: No headaches, head trauma, vision changes, or eye pain  LYMPH NODES: None noticed   CV: No chest pain, palpitations.   RESP: No shortness of breath, dyspnea on exertion, cough, wheezing, or hemoptysis  GI: No nausea, emesis, diarrhea, constipation, melena, hematochezia, pain.    : No dysuria, hematuria, urgency, or frequency   HEME: No easy bruising, bleeding problems  PSYCHIATRIC: No depression, anxiety, psychosis, hallucinations.  NEURO: No seizures, memory loss, dizziness or difficulty sleeping  MSK: + history of present illness    MEDICAL, SURGICAL, FAMILY, SOCIAL HX: reviewed    MEDICATIONS/ALLERGIES: reviewed         Medications/Allergies: See med card    Vitals:    06/30/23 1021   BP: (!) 142/76   Pulse: 61   Weight: 105.7 kg (233 lb)   Height: 5' 8" (1.727 m)   PainSc:   4             Physical exam:    GENERAL: A and O x3, the patient appears well groomed and is in no acute distress.  Skin: No rashes or obvious lesions  HEENT: normocephalic, atraumatic  CARDIOVASCULAR:  RRR  LUNGS: non labored breathing  ABDOMEN: soft, nontender   UPPER EXTREMITIES: Normal alignment, normal range of motion, no atrophy, no skin changes,  hair growth and nail growth normal and equal bilaterally. No swelling. Tenderness to right AC joint  LOWER " EXTREMITIES:  Normal alignment, normal range of motion, no atrophy, no skin changes,  hair growth and nail growth normal and equal bilaterally. No swelling, no tenderness.    CERVICAL SPINE:  Full ROM with pain on flexion and extension  Negative spurlings  Tenderness to palpation right cervical paraspinous muscles   LUMBAR SPINE  Lumbar spine: ROM is limited with flexion extension and oblique extension with moderate increased pain.    Caden's test causes no increased pain on either side.    Supine straight leg raise positive bilaterally   Internal and external rotation of the hip causes no increased pain on either side.  Myofascial exam: No tenderness to palpation across lumbar paraspinous muscles.       MENTAL STATUS: normal orientation, speech, language, and fund of knowledge for social situation.  Emotional state appropriate.    CRANIAL NERVES:  II:  PERRL bilaterally,   III,IV,VI: EOMI.    V:  Facial sensation equal bilaterally  VII:  Facial motor function normal.  VIII:  Hearing equal to finger rub bilaterally  IX/X: Gag normal, palate symmetric  XI:  Shoulder shrug equal, head turn equal  XII:  Tongue midline without fasciculations      MOTOR: Tone and bulk: normal bilateral upper and lower Strength: normal   Delt Bi Tri WE WF     R 5 5 5 5 5 5   L 5 5 5 5 5 5     IP ADD ABD Quad TA Gas HAM  R 5 5 5 5 5 5 5  L 5 5 5 5 5 5 5    SENSATION: Light touch and pinprick intact bilaterally  REFLEXES: normal, symmetric, nonbrisk.  Toes down, no clonus. No hoffmans.  GAIT: normal rise, base, steps, and arm swing.        Imaging: reviewed CT of ABD PELV renal stone study.        Assessment:  Mr. Hollingsworth is a 82 y.o. male with low back and BLE pain.  The patient presents today as follow up s/p TFESI.   Treatment plan outlined below:   1. Spinal stenosis of lumbar region, unspecified whether neurogenic claudication present    2. DDD (degenerative disc disease), lumbar    3. Gait abnormality    4. Frequent falls    5.  Lumbar radiculitis          Plan:  1. Continue to monitor progress of repeat Bilateral TFESI at L4-L5, L5-S1.   2. I have stressed the importance of physical activity and a home exercise plan to help with chronic pain and improve health.   3. Patient has had 2 falls within past 6 months I recommended PT for gait stability and balance, pt elected to proceed.   4. External referral to PT for gait stability and balance to [facility] as requested by patient.    5. continue Robaxin 500 mg po Q4H prn muscle spasms.    6. F/U 8 weeks or sooner if needed.     Janet Croft, NP

## 2023-07-10 ENCOUNTER — OUTPATIENT CASE MANAGEMENT (OUTPATIENT)
Dept: ADMINISTRATIVE | Facility: OTHER | Age: 83
End: 2023-07-10
Payer: MEDICARE

## 2023-07-10 NOTE — PROGRESS NOTES
Outpatient Care Management  Plan of Care Follow Up Visit    Patient: Yeyo Hollingsworth  MRN: [id number]  Date of Service: 07/10/2023  Completed by: Saskia Jackson RN  Referral Date: 02/03/2023    Reason for Visit   Patient presents with    OPCM RN Follow Up Call       Brief Summary: OPCM RN followed up with Mr. Orona today for care plan review.  Next Steps:   Mr. Orona agreed to OPCM RN follow up on or around 7/24/23.  Follow up how outpatient PT is helping his gait and stability.

## 2023-07-11 ENCOUNTER — OFFICE VISIT (OUTPATIENT)
Dept: FAMILY MEDICINE | Facility: CLINIC | Age: 83
End: 2023-07-11
Payer: MEDICARE

## 2023-07-11 VITALS
OXYGEN SATURATION: 97 % | SYSTOLIC BLOOD PRESSURE: 133 MMHG | BODY MASS INDEX: 34.56 KG/M2 | HEIGHT: 68 IN | TEMPERATURE: 99 F | WEIGHT: 228 LBS | HEART RATE: 56 BPM | DIASTOLIC BLOOD PRESSURE: 82 MMHG

## 2023-07-11 DIAGNOSIS — I48.20 CHRONIC ATRIAL FIBRILLATION: ICD-10-CM

## 2023-07-11 DIAGNOSIS — I10 PRIMARY HYPERTENSION: ICD-10-CM

## 2023-07-11 DIAGNOSIS — E78.01 FAMILIAL HYPERCHOLESTEROLEMIA: ICD-10-CM

## 2023-07-11 DIAGNOSIS — I10 ESSENTIAL HYPERTENSION: ICD-10-CM

## 2023-07-11 DIAGNOSIS — M19.90 ARTHRITIS: ICD-10-CM

## 2023-07-11 DIAGNOSIS — K59.04 CHRONIC IDIOPATHIC CONSTIPATION: ICD-10-CM

## 2023-07-11 DIAGNOSIS — G89.4 CHRONIC PAIN SYNDROME: ICD-10-CM

## 2023-07-11 DIAGNOSIS — M54.16 LUMBAR RADICULITIS: ICD-10-CM

## 2023-07-11 DIAGNOSIS — E11.9 CONTROLLED TYPE 2 DIABETES MELLITUS WITHOUT COMPLICATION, WITHOUT LONG-TERM CURRENT USE OF INSULIN: Primary | ICD-10-CM

## 2023-07-11 DIAGNOSIS — L29.9 LOCALIZED PRURITUS: ICD-10-CM

## 2023-07-11 PROCEDURE — 99214 PR OFFICE/OUTPT VISIT, EST, LEVL IV, 30-39 MIN: ICD-10-PCS | Mod: S$PBB,AQ,, | Performed by: FAMILY MEDICINE

## 2023-07-11 PROCEDURE — 99215 OFFICE O/P EST HI 40 MIN: CPT | Performed by: FAMILY MEDICINE

## 2023-07-11 PROCEDURE — 99214 OFFICE O/P EST MOD 30 MIN: CPT | Mod: S$PBB,AQ,, | Performed by: FAMILY MEDICINE

## 2023-07-11 RX ORDER — HYDROCODONE BITARTRATE AND ACETAMINOPHEN 5; 325 MG/1; MG/1
1 TABLET ORAL EVERY 6 HOURS PRN
Qty: 90 TABLET | Refills: 0 | Status: SHIPPED | OUTPATIENT
Start: 2023-07-11 | End: 2023-08-02 | Stop reason: SDUPTHER

## 2023-07-11 RX ORDER — GABAPENTIN 300 MG/1
300 CAPSULE ORAL 3 TIMES DAILY
Qty: 270 CAPSULE | Refills: 1 | Status: SHIPPED | OUTPATIENT
Start: 2023-07-11 | End: 2024-03-10 | Stop reason: SDUPTHER

## 2023-07-11 RX ORDER — KETOCONAZOLE 20 MG/G
CREAM TOPICAL DAILY
Qty: 60 G | Refills: 1 | Status: SHIPPED | OUTPATIENT
Start: 2023-07-11 | End: 2023-09-05 | Stop reason: SDUPTHER

## 2023-07-11 RX ORDER — POLYETHYLENE GLYCOL 3350 17 G/17G
17 POWDER, FOR SOLUTION ORAL DAILY
Qty: 507 G | Refills: 3 | Status: SHIPPED | OUTPATIENT
Start: 2023-07-11 | End: 2024-01-02 | Stop reason: SDUPTHER

## 2023-07-11 RX ORDER — LISINOPRIL 20 MG/1
20 TABLET ORAL DAILY
Qty: 90 TABLET | Refills: 3 | Status: SHIPPED | OUTPATIENT
Start: 2023-07-11 | End: 2024-07-10

## 2023-07-11 RX ORDER — CAMPHOR 0.45 %
GEL (GRAM) TOPICAL 3 TIMES DAILY PRN
Qty: 28 G | Refills: 0 | Status: SHIPPED | OUTPATIENT
Start: 2023-07-11

## 2023-07-11 RX ORDER — DICLOFENAC SODIUM 10 MG/G
2 GEL TOPICAL DAILY
Qty: 100 G | Refills: 2 | Status: SHIPPED | OUTPATIENT
Start: 2023-07-11 | End: 2023-08-02 | Stop reason: SDUPTHER

## 2023-07-11 RX ORDER — TRIAMCINOLONE ACETONIDE 1 MG/G
CREAM TOPICAL 2 TIMES DAILY
Qty: 80 G | Refills: 0 | Status: SHIPPED | OUTPATIENT
Start: 2023-07-11 | End: 2023-09-05 | Stop reason: SDUPTHER

## 2023-07-11 NOTE — PROGRESS NOTES
SUBJECTIVE:    Patient ID: Yeyo Hollingsworth is a 83 y.o. male.    Chief Complaint: Chronic Pain, Follow-up, and Medication Refill  83-year-old male with a history of diabetes hypertension hyperlipidemia chronic pain secondary to arthritis, insomnia presents to clinic today for follow-up on his diabetes hypertension and chronic pain patient is also having symptoms consistent with depression not currently treated for depression.  Patient's depression stems likely from his chronic pain in his inability to do things he used to be able to do he is currently on narcotics for his chronic pain and he is being followed by pain management.Pt was started on antidepressants at last visit and is doing well.          SPMHx:  DM: Jardiance 10mg, Januvia 100mg, not on metformin due to diarrhea Last A1C 6.0  is doing well.  HTN: Lisinopril 20mg, Metoprolol 25mg, is well controlled.  Hyperlipidemia: Atorvastatin 10 mg well controlled. LDL 59  Arthritis: On several chronic narcotics and follows up with pain management, for his neck and back pain pt is planning to have a procedure.  Anxiety: Takes Valium 5mg, PRN  A-fib: 2012 Was cardioverted, no longer in a-fib  Insomnia: Trazadone 100mg  Hx of DVT: Currently on Eliquis 5mg   BRANDI: Wears CPAP  Chronic constipation: On polyethylene Glycol 17g, manages well with this medication.     Specialists:  Cardiology: Dr Mcclelland  Pain Management: Michele Martinez  Ortho: Dr Dean  Podiatry: Dr Mandujano  Sleep: Dr Felice Bello  GS: Dr Thorne     Smoke: Quit in 1988  ETOH: None  Exercise: Never    Diabetes  He has type 2 diabetes mellitus. No MedicAlert identification noted. The initial diagnosis of diabetes was made 13 years ago. Pertinent negatives for hypoglycemia include no confusion, dizziness, headaches, hunger, mood changes, nervousness/anxiousness, pallor, seizures, sleepiness, speech difficulty, sweats or tremors. Associated symptoms include foot paresthesias. Pertinent negatives for diabetes  include no blurred vision, no chest pain, no fatigue, no foot ulcerations, no polydipsia, no polyphagia, no polyuria, no visual change, no weakness and no weight loss. Pertinent negatives for hypoglycemia complications include no blackouts, no hospitalization, no nocturnal hypoglycemia, no required assistance and no required glucagon injection. Symptoms are stable. Diabetic complications include heart disease and peripheral neuropathy. Pertinent negatives for diabetic complications include no autonomic neuropathy, CVA, impotence, nephropathy, PVD or retinopathy. Risk factors for coronary artery disease include family history, hypertension, obesity, diabetes mellitus and male sex. Current diabetic treatment includes oral agent (dual therapy). He is compliant with treatment most of the time. His weight is fluctuating minimally. He is following a diabetic, generally healthy, high fiber and low fat/cholesterol diet. Meal planning includes avoidance of concentrated sweets. He has not had a previous visit with a dietitian. He rarely participates in exercise. He monitors blood glucose at home 3-4 x per week. Blood glucose monitoring compliance is good. There is no change in his home blood glucose trend. He sees a podiatrist.Eye exam is current.     Diabetes  He presents for his follow-up diabetic visit. He has type 2 diabetes mellitus. His disease course has been stable. There are no hypoglycemic associated symptoms. Pertinent negatives for hypoglycemia include no confusion or headaches. Associated symptoms include weakness. Pertinent negatives for diabetes include no chest pain, no fatigue, no polydipsia and no polyuria. There are no hypoglycemic complications. Risk factors for coronary artery disease include diabetes mellitus, dyslipidemia, hypertension, male sex, obesity and sedentary lifestyle.   Hypertension  This is a chronic problem. The current episode started more than 1 year ago. The problem is unchanged. The  problem is controlled. Associated symptoms include neck pain. Pertinent negatives include no anxiety, chest pain, headaches or palpitations. There are no associated agents to hypertension. There are no known risk factors for coronary artery disease. Past treatments include beta blockers and ACE inhibitors. The current treatment provides moderate improvement. Compliance problems include exercise and diet.       Arthritis  Presents for follow-up visit. He complains of pain and stiffness. He reports no joint swelling. The symptoms have been stable. Affected locations include the right knee, left knee, right hip and left hip. His pain is at a severity of 4/10. Pertinent negatives include no diarrhea or fatigue.       Past Medical History:   Diagnosis Date    Allergy     Ankle pain     left    Anticoagulant long-term use     aspirin    Anxiety     Atrial fibrillation     Back pain     Bruises easily     Cataract     Clotting disorder     left leg    Colon polyp     Diabetes mellitus     Diabetes mellitus, type 2     Hay fever     Hyperlipidemia     Hypertension     Left hip pain 2021    Seeing Dr. Beard for hip pain     Obese     BRANDI on CPAP      Social History     Socioeconomic History    Marital status:    Tobacco Use    Smoking status: Former     Types: Cigarettes     Quit date: 2006     Years since quittin.3    Smokeless tobacco: Never    Tobacco comments:     ex smoker    Substance and Sexual Activity    Alcohol use: Yes     Comment: rarely    Drug use: No    Sexual activity: Yes     Partners: Female     Social Determinants of Health     Financial Resource Strain: Low Risk     Difficulty of Paying Living Expenses: Not hard at all   Food Insecurity: No Food Insecurity    Worried About Running Out of Food in the Last Year: Never true    Ran Out of Food in the Last Year: Never true   Transportation Needs: No Transportation Needs    Lack of Transportation (Medical): No    Lack of  Transportation (Non-Medical): No   Physical Activity: Unknown    Days of Exercise per Week: Patient refused    Minutes of Exercise per Session: 0 min   Stress: No Stress Concern Present    Feeling of Stress : Not at all   Social Connections: Unknown    Frequency of Communication with Friends and Family: Once a week    Frequency of Social Gatherings with Friends and Family: More than three times a week    Active Member of Clubs or Organizations: Yes    Attends Club or Organization Meetings: Never    Marital Status:    Housing Stability: Low Risk     Unable to Pay for Housing in the Last Year: No    Number of Places Lived in the Last Year: 1    Unstable Housing in the Last Year: No     Past Surgical History:   Procedure Laterality Date    ABDOMINAL SURGERY      origunal surg 1986-mult surgeries since    CARPAL TUNNEL RELEASE      right wrist 2009-left wrist 2010    COLON SURGERY      partial colon removal    COLONOSCOPY  11/26/2018    Dr. Salazar; normal terminal ileum; polyps removed from ascending colon and hepatic flexure; pan diverticulosis; small internal hemorrhoids; repeat in 5 years; Bx: fragments of an adenomatous polyp with mild surface glandular dysplasia and associated chronic active inflammation, no evidence of high-grade dysplasia or malignancy    COLOSTOMY  1986    colostomy reversal  1986    EYE SURGERY      hay fever      HERNIA REPAIR  1949    REPAIR OF TENDON OF LOWER EXTREMITY Left 6/24/2020    Procedure: REPAIR, TENDON, LOWER EXTREMITY;  Surgeon: Justin Mandujano DPM;  [place] [facility] OR;  Service: Podiatry;  Laterality: Left;  ARTHEX NOTIFIED IN CASE HE WANTS ANCHOR    TOE SURGERY Left 2017    replaced a joint     TONSILLECTOMY  1947    TRANSFORAMINAL EPIDURAL INJECTION OF STEROID      x 4    TRANSFORAMINAL EPIDURAL INJECTION OF STEROID Bilateral 11/13/2019    Procedure: Injection,steroid,epidural,transforaminal approach;  Surgeon: Grant Wright MD;  [place] [facility] OR;  Service: Pain  Management;  Laterality: Bilateral;  L4-5, L5-S1    TRANSFORAMINAL EPIDURAL INJECTION OF STEROID Bilateral 3/9/2021    Procedure: Injection,steroid,epidural,transforaminal approach;  Surgeon: Grant Wright MD;  [place] [facility] OR;  Service: Pain Management;  Laterality: Bilateral;  L4-5, L5-S1    TRANSFORAMINAL EPIDURAL INJECTION OF STEROID Bilateral 5/25/2021    Procedure: Injection,steroid,epidural,transforaminal approach;  Surgeon: Grant Wright MD;  [place] [facility] OR;  Service: Pain Management;  Laterality: Bilateral;  L4-L5,L5,S1    TRANSFORAMINAL EPIDURAL INJECTION OF STEROID Bilateral 12/14/2021    Procedure: Injection,steroid,epidural,transforaminal approach Bilateral L4-5, L5-S1;  Surgeon: Grant Wright MD;  [place] [facility] OR;  Service: Pain Management;  Laterality: Bilateral;    TRANSFORAMINAL EPIDURAL INJECTION OF STEROID Bilateral 11/4/2022    Procedure: Injection,steroid,epidural,transforaminal approach;  Surgeon: Grant Wright MD;  [place] [facility] OR;  Service: Pain Management;  Laterality: Bilateral;  L4-5 and L5-s1    TRANSFORAMINAL EPIDURAL INJECTION OF STEROID Bilateral 1/20/2023    Procedure: Injection,steroid,epidural,transforaminal approach L4-L5-S1;  Surgeon: Grant Wright MD;  [place] [facility] OR;  Service: Pain Management;  Laterality: Bilateral;    TRANSFORAMINAL EPIDURAL INJECTION OF STEROID Bilateral 6/1/2023    Procedure: Injection,steroid,epidural,transforaminal approach L4-L5, L5-S1;  Surgeon: Grant Wright MD;  [place] [facility] OR;  Service: Pain Management;  Laterality: Bilateral;     Family History   Problem Relation Age of Onset    Heart disease Mother     Melanoma Neg Hx     Psoriasis Neg Hx     Lupus Neg Hx     Eczema Neg Hx      Current Outpatient Medications   Medication Sig Dispense Refill    aspirin 81 MG Chew Take 1 tablet (81 mg total) by mouth once daily. 100 tablet 2    atorvastatin (LIPITOR) 10 MG tablet Take 10 mg by mouth.      azelastine (ASTELIN) 137 mcg (0.1 %) nasal spray 2 sprays  (274 mcg total) by Nasal route 2 (two) times daily. 30 mL 5    blood sugar diagnostic (BLOOD GLUCOSE TEST) Strp 1 strip by Misc.(Non-Drug; Combo Route) route 2 (two) times a day. FREESTYLE LITE BG TEST STRIPS. current use of insulin  - Primary ICD-10-CM: E11.9 200 each 2    fexofenadine (ALLEGRA) 180 MG tablet Take 1 tablet (180 mg total) by mouth once daily. 90 tablet 3    FREESTYLE LANCETS 28 gauge lancets Inject 1 lancet into the skin 3 (three) times daily. 100 each 3    furosemide (LASIX) 20 MG tablet Take 1 tablet (20 mg total) by mouth every Mon, Wed, Fri. 45 tablet 3    JARDIANCE 10 mg tablet Take 1 tablet (10 mg total) by mouth once daily. 90 tablet 1    metoprolol tartrate (LOPRESSOR) 25 MG tablet Take 0.5 tablets (12.5 mg total) by mouth 2 (two) times daily. 180 tablet 3    montelukast (SINGULAIR) 10 mg tablet Take 1 tablet (10 mg total) by mouth every evening. 90 tablet 3    multivitamin with minerals tablet Take 1 tablet by mouth once daily.      potassium chloride (MICRO-K) 10 MEQ CpSR Take 1 capsule (10 mEq total) by mouth every other day. 90 capsule 1    SITagliptin phosphate (JANUVIA) 100 MG Tab Take 1 tablet (100 mg total) by mouth once daily. 90 tablet 3    traZODone (DESYREL) 100 MG tablet Take 1 tablet (100 mg total) by mouth every evening. 90 tablet 3    apixaban (ELIQUIS) 5 mg Tab Take 1 tablet (5 mg total) by mouth 2 (two) times daily. 180 tablet 3    clotrimazole (LOTRIMIN) 1 % cream Apply topically 2 (two) times daily. 60 g 10    diclofenac sodium (VOLTAREN) 1 % Gel Apply 2 g topically once daily. 100 g 2    diphenhydrAMINE-zinc acetate 1-0.1% (BENADRYL ITCH STOPPING) cream Apply topically 3 (three) times daily as needed for Itching. 28 g 0    gabapentin (NEURONTIN) 300 MG capsule Take 1 capsule (300 mg total) by mouth 3 (three) times daily. 270 capsule 1    HYDROcodone-acetaminophen (NORCO) 5-325 mg per tablet Take 1 tablet by mouth every 6 (six) hours as needed for Pain. 90 tablet 0     ketoconazole (NIZORAL) 2 % cream Apply topically once daily. 60 g 1    lisinopriL (PRINIVIL,ZESTRIL) 20 MG tablet Take 1 tablet (20 mg total) by mouth once daily. 90 tablet 3    polyethylene glycol (GLYCOLAX) 17 gram/dose powder Take 17 g by mouth once daily. 507 g 3    triamcinolone acetonide 0.1% (KENALOG) 0.1 % cream Apply topically 2 (two) times daily. 80 g 0     No current facility-administered medications for this visit.     Facility-Administered Medications Ordered in Other Visits   Medication Dose Route Frequency Provider Last Rate Last Admin    lactated ringers infusion   Intravenous Once PRN Grant Wright MD   Stopped at 11/04/22 1230     Review of patient's allergies indicates:   Allergen Reactions    Ativan [lorazepam] Other (See Comments)     Mood alternating      Dilaudid [hydromorphone (bulk)] Other (See Comments)     Mood alternating      Morphine Other (See Comments)     Mood alternating      Adhesive tape-silicones     Hydromorphone        Review of Systems   Constitutional:  Positive for activity change. Negative for diaphoresis, fatigue, unexpected weight change and weight loss.   HENT:  Negative for congestion, hearing loss, rhinorrhea and trouble swallowing.    Eyes:  Negative for blurred vision, discharge and visual disturbance.   Respiratory:  Negative for chest tightness and wheezing.    Cardiovascular:  Negative for chest pain and palpitations.   Gastrointestinal:  Negative for blood in stool, constipation, diarrhea and vomiting.   Endocrine: Negative for polydipsia, polyphagia and polyuria.   Genitourinary:  Negative for difficulty urinating, flank pain, frequency, hematuria, impotence and urgency.   Musculoskeletal:  Positive for arthralgias and neck pain. Negative for joint swelling.   Skin:  Negative for pallor.   Neurological:  Negative for dizziness, tremors, seizures, speech difficulty, weakness and headaches.   Psychiatric/Behavioral:  Negative for confusion and dysphoric mood. The  "patient is not nervous/anxious.         Blood pressure 133/82, pulse (!) 56, temperature 98.5 °F (36.9 °C), temperature source Oral, height 5' 8" (1.727 m), weight 103.4 kg (228 lb), SpO2 97 %. Body mass index is 34.67 kg/m².   Objective:      Physical Exam  Vitals reviewed.   Constitutional:       General: He is not in acute distress.     Appearance: Normal appearance. He is obese. He is not ill-appearing or toxic-appearing.   HENT:      Head: Normocephalic and atraumatic.      Right Ear: Tympanic membrane normal.      Left Ear: Tympanic membrane normal.      Nose: Nose normal. No congestion or rhinorrhea.      Mouth/Throat:      Mouth: Mucous membranes are moist.      Pharynx: Oropharynx is clear. No oropharyngeal exudate or posterior oropharyngeal erythema.   Cardiovascular:      Rate and Rhythm: Normal rate and regular rhythm.      Heart sounds: Normal heart sounds. No murmur heard.  Pulmonary:      Effort: Pulmonary effort is normal.      Breath sounds: Normal breath sounds.   Skin:     General: Skin is warm and dry.      Capillary Refill: Capillary refill takes less than 2 seconds.   Neurological:      Mental Status: He is alert and oriented to person, place, and time.           Assessment:       1. Controlled type 2 diabetes mellitus without complication, without long-term current use of insulin    2. Essential hypertension    3. Lumbar radiculitis    4. Chronic pain syndrome    5. Arthritis    6. Localized pruritus    7. Chronic idiopathic constipation    8. Chronic atrial fibrillation    9. Primary hypertension    10. Familial hypercholesterolemia         Plan:           Controlled type 2 diabetes mellitus without complication, without long-term current use of insulin  -     Hemoglobin A1C; Future; Expected date: 07/11/2023    Essential hypertension  -     Lipid Panel; Future; Expected date: 07/11/2023  -     lisinopriL (PRINIVIL,ZESTRIL) 20 MG tablet; Take 1 tablet (20 mg total) by mouth once daily.  " Dispense: 90 tablet; Refill: 3    Lumbar radiculitis  -     gabapentin (NEURONTIN) 300 MG capsule; Take 1 capsule (300 mg total) by mouth 3 (three) times daily.  Dispense: 270 capsule; Refill: 1    Chronic pain syndrome  -     HYDROcodone-acetaminophen (NORCO) 5-325 mg per tablet; Take 1 tablet by mouth every 6 (six) hours as needed for Pain.  Dispense: 90 tablet; Refill: 0  -     diclofenac sodium (VOLTAREN) 1 % Gel; Apply 2 g topically once daily.  Dispense: 100 g; Refill: 2    Arthritis  -     HYDROcodone-acetaminophen (NORCO) 5-325 mg per tablet; Take 1 tablet by mouth every 6 (six) hours as needed for Pain.  Dispense: 90 tablet; Refill: 0  -     diclofenac sodium (VOLTAREN) 1 % Gel; Apply 2 g topically once daily.  Dispense: 100 g; Refill: 2    Localized pruritus  -     triamcinolone acetonide 0.1% (KENALOG) 0.1 % cream; Apply topically 2 (two) times daily.  Dispense: 80 g; Refill: 0    Chronic idiopathic constipation  -     polyethylene glycol (GLYCOLAX) 17 gram/dose powder; Take 17 g by mouth once daily.  Dispense: 507 g; Refill: 3    Chronic atrial fibrillation  -     Discontinue: apixaban (ELIQUIS) 5 mg Tab; Take 1 tablet (5 mg total) by mouth 2 (two) times daily.  Dispense: 180 tablet; Refill: 3  -     apixaban (ELIQUIS) 5 mg Tab; Take 1 tablet (5 mg total) by mouth 2 (two) times daily.  Dispense: 180 tablet; Refill: 3    Primary hypertension    Familial hypercholesterolemia  -     Lipid Panel; Future; Expected date: 07/11/2023    Other orders  -     ketoconazole (NIZORAL) 2 % cream; Apply topically once daily.  Dispense: 60 g; Refill: 1  -     diphenhydrAMINE-zinc acetate 1-0.1% (BENADRYL ITCH STOPPING) cream; Apply topically 3 (three) times daily as needed for Itching.  Dispense: 28 g; Refill: 0

## 2023-07-17 ENCOUNTER — LAB VISIT (OUTPATIENT)
Dept: LAB | Facility: HOSPITAL | Age: 83
End: 2023-07-17
Attending: FAMILY MEDICINE
Payer: MEDICARE

## 2023-07-17 DIAGNOSIS — E78.01 FAMILIAL HYPERCHOLESTEROLEMIA: ICD-10-CM

## 2023-07-17 DIAGNOSIS — I10 ESSENTIAL HYPERTENSION: ICD-10-CM

## 2023-07-17 DIAGNOSIS — E11.9 CONTROLLED TYPE 2 DIABETES MELLITUS WITHOUT COMPLICATION, WITHOUT LONG-TERM CURRENT USE OF INSULIN: ICD-10-CM

## 2023-07-17 LAB
CHOLEST SERPL-MCNC: 107 MG/DL (ref 120–199)
CHOLEST/HDLC SERPL: 3.1 {RATIO} (ref 2–5)
ESTIMATED AVG GLUCOSE: 126 MG/DL (ref 68–131)
HBA1C MFR BLD: 6 % (ref 4.5–6.2)
HDLC SERPL-MCNC: 35 MG/DL (ref 40–75)
HDLC SERPL: 32.7 % (ref 20–50)
LDLC SERPL CALC-MCNC: 46.4 MG/DL (ref 63–159)
NONHDLC SERPL-MCNC: 72 MG/DL
TRIGL SERPL-MCNC: 128 MG/DL (ref 30–150)

## 2023-07-17 PROCEDURE — 36415 COLL VENOUS BLD VENIPUNCTURE: CPT | Performed by: FAMILY MEDICINE

## 2023-07-17 PROCEDURE — 83036 HEMOGLOBIN GLYCOSYLATED A1C: CPT | Performed by: FAMILY MEDICINE

## 2023-07-17 PROCEDURE — 80061 LIPID PANEL: CPT | Performed by: FAMILY MEDICINE

## 2023-07-26 ENCOUNTER — OUTPATIENT CASE MANAGEMENT (OUTPATIENT)
Dept: ADMINISTRATIVE | Facility: OTHER | Age: 83
End: 2023-07-26
Payer: MEDICARE

## 2023-07-26 NOTE — LETTER
Yeyo Hollingsworth  [address] [facility] [address]      Dear Yeyo Hollingsworth,     I am your nurse with [facility]s Outpatient Care Management Department. I have been unsuccessful at reaching you since we spoke on 07/10/23.  At your earliest convenience, I would like to discuss your healthcare progress.      Please contact me at [phone] from 8:00AM to 4:30 PM on Monday thru Friday.     As you know, [facility] is a program offered to you through [facility] where a nurse is available 24/7 to answer questions or provide medical advice, their number is [phone].    Thanks,      Saskia Jackson RN  Outpatient Care Management

## 2023-07-26 NOTE — PROGRESS NOTES
7/26/2023 1st attempt to complete Follow-Up  for Outpatient Care Management, left message.  Will send via portal - unable to assess letter.

## 2023-07-31 ENCOUNTER — OUTPATIENT CASE MANAGEMENT (OUTPATIENT)
Dept: ADMINISTRATIVE | Facility: OTHER | Age: 83
End: 2023-07-31
Payer: MEDICARE

## 2023-07-31 NOTE — PROGRESS NOTES
Outpatient Care Management  Plan of Care Follow Up Visit    Patient: Yeyo Hollingsworth  MRN: [id number]  Date of Service: 07/31/2023  Completed by: Saskia Jackson RN  Referral Date: 02/03/2023    No chief complaint on file.      Brief Summary: OPCM RN followed up with Mr. Orona today for care plan review; he is doing okay with no further falls.  His recent A1C was 6.0 and was provided accolades in bringing it down.  He agrees to OPCM graduation; he was encouraged to reach out to OPCM RN for any questions/guidance/needs.  Case closed.

## 2023-08-02 DIAGNOSIS — G89.4 CHRONIC PAIN SYNDROME: ICD-10-CM

## 2023-08-02 DIAGNOSIS — M19.90 ARTHRITIS: ICD-10-CM

## 2023-08-03 RX ORDER — HYDROCODONE BITARTRATE AND ACETAMINOPHEN 5; 325 MG/1; MG/1
1 TABLET ORAL EVERY 6 HOURS PRN
Qty: 90 TABLET | Refills: 0 | Status: SHIPPED | OUTPATIENT
Start: 2023-08-03 | End: 2023-09-05 | Stop reason: SDUPTHER

## 2023-08-03 RX ORDER — DICLOFENAC SODIUM 10 MG/G
2 GEL TOPICAL DAILY
Qty: 100 G | Refills: 2 | Status: SHIPPED | OUTPATIENT
Start: 2023-08-03 | End: 2023-10-11 | Stop reason: SDUPTHER

## 2023-08-03 NOTE — TELEPHONE ENCOUNTER
Last Office Visit 7-11-23  Next Office Visit 10-11-23  Voltern gel was filled 7-11-23 with 2 refills

## 2023-08-21 ENCOUNTER — OFFICE VISIT (OUTPATIENT)
Dept: PODIATRY | Facility: CLINIC | Age: 83
End: 2023-08-21
Payer: MEDICARE

## 2023-08-21 VITALS — WEIGHT: 229.25 LBS | HEART RATE: 58 BPM | OXYGEN SATURATION: 95 % | BODY MASS INDEX: 34.86 KG/M2

## 2023-08-21 DIAGNOSIS — S99.922A INJURY OF LEFT FOOT, INITIAL ENCOUNTER: ICD-10-CM

## 2023-08-21 DIAGNOSIS — M72.2 PLANTAR FASCIITIS: Primary | ICD-10-CM

## 2023-08-21 DIAGNOSIS — M79.672 LEFT FOOT PAIN: ICD-10-CM

## 2023-08-21 PROCEDURE — 20550 PR INJECT TENDON SHEATH/LIGAMENT: ICD-10-PCS | Mod: S$PBB,LT,, | Performed by: PODIATRIST

## 2023-08-21 PROCEDURE — 99215 OFFICE O/P EST HI 40 MIN: CPT | Mod: PBBFAC,PN,25 | Performed by: PODIATRIST

## 2023-08-21 PROCEDURE — 99999 PR PBB SHADOW E&M-EST. PATIENT-LVL V: ICD-10-PCS | Mod: PBBFAC,,, | Performed by: PODIATRIST

## 2023-08-21 PROCEDURE — 20550 NJX 1 TENDON SHEATH/LIGAMENT: CPT | Mod: S$PBB,LT,, | Performed by: PODIATRIST

## 2023-08-21 PROCEDURE — 99999PBSHW PR PBB SHADOW TECHNICAL ONLY FILED TO HB: ICD-10-PCS | Mod: PBBFAC,,,

## 2023-08-21 PROCEDURE — 99999 PR PBB SHADOW E&M-EST. PATIENT-LVL V: CPT | Mod: PBBFAC,,, | Performed by: PODIATRIST

## 2023-08-21 PROCEDURE — 99213 PR OFFICE/OUTPT VISIT, EST, LEVL III, 20-29 MIN: ICD-10-PCS | Mod: 25,S$PBB,, | Performed by: PODIATRIST

## 2023-08-21 PROCEDURE — 20550 NJX 1 TENDON SHEATH/LIGAMENT: CPT | Mod: PBBFAC,PN | Performed by: PODIATRIST

## 2023-08-21 PROCEDURE — 99999PBSHW PR PBB SHADOW TECHNICAL ONLY FILED TO HB: Mod: PBBFAC,,,

## 2023-08-21 PROCEDURE — 99213 OFFICE O/P EST LOW 20 MIN: CPT | Mod: 25,S$PBB,, | Performed by: PODIATRIST

## 2023-08-21 RX ORDER — DEXAMETHASONE SODIUM PHOSPHATE 4 MG/ML
4 INJECTION, SOLUTION INTRA-ARTICULAR; INTRALESIONAL; INTRAMUSCULAR; INTRAVENOUS; SOFT TISSUE
Status: COMPLETED | OUTPATIENT
Start: 2023-08-21 | End: 2023-08-21

## 2023-08-21 RX ORDER — BUPIVACAINE HYDROCHLORIDE 5 MG/ML
1.5 INJECTION, SOLUTION PERINEURAL
Status: COMPLETED | OUTPATIENT
Start: 2023-08-21 | End: 2023-08-21

## 2023-08-21 RX ORDER — METHYLPREDNISOLONE ACETATE 40 MG/ML
20 INJECTION, SUSPENSION INTRA-ARTICULAR; INTRALESIONAL; INTRAMUSCULAR; SOFT TISSUE
Status: COMPLETED | OUTPATIENT
Start: 2023-08-21 | End: 2023-08-21

## 2023-08-21 RX ADMIN — DEXAMETHASONE SODIUM PHOSPHATE 4 MG: 4 INJECTION INTRA-ARTICULAR; INTRALESIONAL; INTRAMUSCULAR; INTRAVENOUS; SOFT TISSUE at 01:08

## 2023-08-21 RX ADMIN — METHYLPREDNISOLONE ACETATE 20 MG: 40 INJECTION, SUSPENSION INTRA-ARTICULAR; INTRALESIONAL; INTRAMUSCULAR; INTRASYNOVIAL; SOFT TISSUE at 01:08

## 2023-08-21 RX ADMIN — BUPIVACAINE HYDROCHLORIDE 7.5 MG: 5 INJECTION, SOLUTION PERINEURAL at 01:08

## 2023-08-21 NOTE — PROGRESS NOTES
[address] [facility] [address]  [place] [address]  Phone: [phone]   [phone]    Patient's PCP:Garland Ocampo MD  Referring Provider: No ref. provider found    Subjective:      Chief Complaint:: Foot Pain (Pain on top of left foot from fall yesterday ) and Heel Pain (Pain in heel of left foot)    Foot Pain  Associated symptoms include arthralgias and myalgias. Pertinent negatives include no abdominal pain, chest pain, chills, coughing, fatigue, fever, headaches, joint swelling, nausea, neck pain, numbness, rash or weakness.     Yeyo Hollingsworth is a 83 y.o. male who presents today with a complaint of Pain on top of foot from fall yesterday and Pain in heel. The current episode started pain in heel has lasted for years.  The symptoms include sharp pain In heel and top of foot is and achy pain. Probable cause of complaint fall for top of foot and heel is bone spurs.  The symptoms are aggravated by prolonged standing and walking. The problem has stayed the same. Treatment to date have included elevate feet and Voltaren which provided some relief.     Systemic Doctor:   Date Last Seen: 4/10/23  Blood Sugar: 108  Hemoglobin A1c: 6.0    Vitals:    08/21/23 1002   Pulse: (!) 58   SpO2: 95%   Weight: 104 kg (229 lb 4.5 oz)   PainSc:   4      Shoe Size: 9-10    Past Surgical History:   Procedure Laterality Date    ABDOMINAL SURGERY      origunal surg 1986-mult surgeries since    CARPAL TUNNEL RELEASE      right wrist 2009-left wrist 2010    COLON SURGERY      partial colon removal    COLONOSCOPY  11/26/2018    Dr. Salazar; normal terminal ileum; polyps removed from ascending colon and hepatic flexure; pan diverticulosis; small internal hemorrhoids; repeat in 5 years; Bx: fragments of an adenomatous polyp with mild surface glandular dysplasia and associated chronic active inflammation, no evidence of high-grade dysplasia or malignancy    COLOSTOMY  1986    colostomy reversal  1986    EYE SURGERY       hay fever      HERNIA REPAIR  1949    REPAIR OF TENDON OF LOWER EXTREMITY Left 6/24/2020    Procedure: REPAIR, TENDON, LOWER EXTREMITY;  Surgeon: Justin Mandujano DPM;  [place] [facility] OR;  Service: Podiatry;  Laterality: Left;  ARTHEX NOTIFIED IN CASE HE WANTS ANCHOR    TOE SURGERY Left 2017    replaced a joint     TONSILLECTOMY  1947    TRANSFORAMINAL EPIDURAL INJECTION OF STEROID      x 4    TRANSFORAMINAL EPIDURAL INJECTION OF STEROID Bilateral 11/13/2019    Procedure: Injection,steroid,epidural,transforaminal approach;  Surgeon: Grant Wright MD;  [place] [facility] OR;  Service: Pain Management;  Laterality: Bilateral;  L4-5, L5-S1    TRANSFORAMINAL EPIDURAL INJECTION OF STEROID Bilateral 3/9/2021    Procedure: Injection,steroid,epidural,transforaminal approach;  Surgeon: Grant Wright MD;  [place] [facility] OR;  Service: Pain Management;  Laterality: Bilateral;  L4-5, L5-S1    TRANSFORAMINAL EPIDURAL INJECTION OF STEROID Bilateral 5/25/2021    Procedure: Injection,steroid,epidural,transforaminal approach;  Surgeon: Grant Wright MD;  [place] [facility] OR;  Service: Pain Management;  Laterality: Bilateral;  L4-L5,L5,S1    TRANSFORAMINAL EPIDURAL INJECTION OF STEROID Bilateral 12/14/2021    Procedure: Injection,steroid,epidural,transforaminal approach Bilateral L4-5, L5-S1;  Surgeon: Grant Wright MD;  [place] [facility];  Service: Pain Management;  Laterality: Bilateral;    TRANSFORAMINAL EPIDURAL INJECTION OF STEROID Bilateral 11/4/2022    Procedure: Injection,steroid,epidural,transforaminal approach;  Surgeon: Grant Wright MD;  [place] [facility] OR;  Service: Pain Management;  Laterality: Bilateral;  L4-5 and L5-s1    TRANSFORAMINAL EPIDURAL INJECTION OF STEROID Bilateral 1/20/2023    Procedure: Injection,steroid,epidural,transforaminal approach L4-L5-S1;  Surgeon: Grant Wright MD;  [place] [facility];  Service: Pain Management;  Laterality: Bilateral;    TRANSFORAMINAL EPIDURAL INJECTION OF STEROID Bilateral 6/1/2023     Procedure: Injection,steroid,epidural,transforaminal approach L4-L5, L5-S1;  Surgeon: Grant Wright MD;  [place] [facility] OR;  Service: Pain Management;  Laterality: Bilateral;     Past Medical History:   Diagnosis Date    Allergy     Ankle pain     left    Anticoagulant long-term use     aspirin    Anxiety     Atrial fibrillation     Back pain     Bruises easily     Cataract     Clotting disorder     left leg    Colon polyp     Diabetes mellitus     Diabetes mellitus, type 2     Hay fever     Hyperlipidemia     Hypertension     Left hip pain 12/26/2021    Seeing Dr. Beard for hip pain     Obese     BRANDI on CPAP      Family History   Problem Relation Age of Onset    Heart disease Mother     Melanoma Neg Hx     Psoriasis Neg Hx     Lupus Neg Hx     Eczema Neg Hx         Social History:   Marital Status:   Alcohol History:  reports current alcohol use.  Tobacco History:  reports that he quit smoking about 17 years ago. His smoking use included cigarettes. He has never used smokeless tobacco.  Drug History:  reports no history of drug use.    Review of patient's allergies indicates:   Allergen Reactions    Ativan [lorazepam] Other (See Comments)     Mood alternating      Dilaudid [hydromorphone (bulk)] Other (See Comments)     Mood alternating      Morphine Other (See Comments)     Mood alternating      Adhesive tape-silicones     Hydromorphone        Current Outpatient Medications   Medication Sig Dispense Refill    apixaban (ELIQUIS) 5 mg Tab Take 1 tablet (5 mg total) by mouth 2 (two) times daily. 180 tablet 3    aspirin 81 MG Chew Take 1 tablet (81 mg total) by mouth once daily. 100 tablet 2    atorvastatin (LIPITOR) 10 MG tablet Take 10 mg by mouth.      azelastine (ASTELIN) 137 mcg (0.1 %) nasal spray 2 sprays (274 mcg total) by Nasal route 2 (two) times daily. 30 mL 5    blood sugar diagnostic (BLOOD GLUCOSE TEST) Strp 1 strip by Misc.(Non-Drug; Combo Route) route 2 (two) times a day. FREESTYLE LITE BG TEST  STRIPS. current use of insulin  - Primary ICD-10-CM: E11.9 200 each 2    clotrimazole (LOTRIMIN) 1 % cream Apply topically 2 (two) times daily. 60 g 10    diclofenac sodium (VOLTAREN) 1 % Gel Apply 2 g topically once daily. 100 g 2    diphenhydrAMINE-zinc acetate 1-0.1% (BENADRYL ITCH STOPPING) cream Apply topically 3 (three) times daily as needed for Itching. 28 g 0    fexofenadine (ALLEGRA) 180 MG tablet Take 1 tablet (180 mg total) by mouth once daily. 90 tablet 3    FREESTYLE LANCETS 28 gauge lancets Inject 1 lancet into the skin 3 (three) times daily. 100 each 3    furosemide (LASIX) 20 MG tablet Take 1 tablet (20 mg total) by mouth every Mon, Wed, Fri. 45 tablet 3    gabapentin (NEURONTIN) 300 MG capsule Take 1 capsule (300 mg total) by mouth 3 (three) times daily. 270 capsule 1    HYDROcodone-acetaminophen (NORCO) 5-325 mg per tablet Take 1 tablet by mouth every 6 (six) hours as needed for Pain. 90 tablet 0    JARDIANCE 10 mg tablet Take 1 tablet (10 mg total) by mouth once daily. 90 tablet 1    ketoconazole (NIZORAL) 2 % cream Apply topically once daily. 60 g 1    lisinopriL (PRINIVIL,ZESTRIL) 20 MG tablet Take 1 tablet (20 mg total) by mouth once daily. 90 tablet 3    metoprolol tartrate (LOPRESSOR) 25 MG tablet Take 0.5 tablets (12.5 mg total) by mouth 2 (two) times daily. 180 tablet 3    montelukast (SINGULAIR) 10 mg tablet Take 1 tablet (10 mg total) by mouth every evening. 90 tablet 3    multivitamin with minerals tablet Take 1 tablet by mouth once daily.      polyethylene glycol (GLYCOLAX) 17 gram/dose powder Take 17 g by mouth once daily. 507 g 3    potassium chloride (MICRO-K) 10 MEQ CpSR Take 1 capsule (10 mEq total) by mouth every other day. 90 capsule 1    SITagliptin phosphate (JANUVIA) 100 MG Tab Take 1 tablet (100 mg total) by mouth once daily. 90 tablet 3    traZODone (DESYREL) 100 MG tablet Take 1 tablet (100 mg total) by mouth every evening. 90 tablet 3    triamcinolone acetonide 0.1%  (KENALOG) 0.1 % cream Apply topically 2 (two) times daily. 80 g 0     No current facility-administered medications for this visit.     Facility-Administered Medications Ordered in Other Visits   Medication Dose Route Frequency Provider Last Rate Last Admin    lactated ringers infusion   Intravenous Once PRN Grant Wright MD   Stopped at 11/04/22 1230       Review of Systems   Constitutional:  Negative for chills, fatigue, fever and unexpected weight change.   HENT:  Negative for hearing loss and trouble swallowing.    Eyes:  Negative for photophobia and visual disturbance.   Respiratory:  Negative for cough, shortness of breath and wheezing.    Cardiovascular:  Negative for chest pain, palpitations and leg swelling.   Gastrointestinal:  Negative for abdominal pain and nausea.   Genitourinary:  Negative for dysuria and frequency.   Musculoskeletal:  Positive for arthralgias, gait problem and myalgias. Negative for back pain, joint swelling and neck pain.   Skin:  Negative for rash and wound.   Neurological:  Negative for tremors, seizures, weakness, numbness and headaches.   Hematological:  Does not bruise/bleed easily.         Objective:        Physical Exam:   Foot Exam    General  General Appearance: appears stated age and healthy   Orientation: alert and oriented to person, place, and time   Affect: appropriate   Gait: antalgic       Left Foot/Ankle      Inspection and Palpation  Ecchymosis: none  Tenderness: plantar fascia, ankle, lisfranc joint and midtarsal joint   Swelling: (Mild lower extremity)  Arch: normal  Hammertoes: absent  Claw toes: absent  Hallux valgus: no  Hallux limitus: no  Skin Exam: skin intact; no drainage, no ulcer and no erythema   Neurovascular  Dorsalis pedis: 1+  Posterior tibial: 1+  Capillary refill: 3+  Varicose veins: not present  Saphenous nerve sensation: diminished  Tibial nerve sensation: diminished  Superficial peroneal nerve sensation: diminished  Deep peroneal nerve sensation:  diminished  Sural nerve sensation: diminished    Muscle Strength  Ankle dorsiflexion: 4+  Ankle plantar flexion: 5  Ankle inversion: 5  Ankle eversion: 5  Great toe extension: 4  Great toe flexion: 5    Range of Motion    Normal left ankle ROM    Tests  Anterior drawer: negative   Talar tilt: negative   PT Tinel's sign: negative  Paresthesia: negative  Comments  Pain on palpation of the infeior medial heel and central plantar heel. No pain present  with side to side compression of the calcaneus. Negative tinnel's sign  at the tarsal tunnel. Negative Ham's nerve pain. Negative Calcaneal nerve pain. No soft tissue masses. Pain absent  with dorsiflexion of the ankle. No edema, erythema, or ecchymosis noted.       Physical Exam  Cardiovascular:      Pulses:           Dorsalis pedis pulses are 1+ on the left side.        Posterior tibial pulses are 1+ on the left side.   Musculoskeletal:      Left ankle: Tenderness present.      Left foot: No bunion.   Feet:      Left foot:      Skin integrity: No ulcer or erythema.               Left Ankle/Foot Exam     Range of Motion   The patient has normal left ankle ROM.     Comments:  Pain on palpation of the infeior medial heel and central plantar heel. No pain present  with side to side compression of the calcaneus. Negative tinnel's sign  at the tarsal tunnel. Negative Ham's nerve pain. Negative Calcaneal nerve pain. No soft tissue masses. Pain absent  with dorsiflexion of the ankle. No edema, erythema, or ecchymosis noted.         Muscle Strength   Left Lower Extremity   Ankle Dorsiflexion:  4+   Plantar flexion:  5/5     Vascular Exam       Left Pulses  Dorsalis Pedis:      1+  Posterior Tibial:      1+           Imaging: none            Assessment:       1. Plantar fasciitis    2. Injury of left foot, initial encounter    3. Left foot pain      Plan:   Plantar fasciitis  -     methylPREDNISolone acetate injection 20 mg  -     BUPivacaine injection 7.5 mg  -      dexAMETHasone injection 4 mg    Injury of left foot, initial encounter    Left foot pain  -     methylPREDNISolone acetate injection 20 mg  -     BUPivacaine injection 7.5 mg  -     dexAMETHasone injection 4 mg      Follow up if symptoms worsen or fail to improve.    I discussed with the patient findings of chronic plantar fasciitis left.  We discussed different treatment options for this.  I discussed the importance of proper shoe gear.  Recommend ice and stretching.  We are also going to do a steroid injection today.    We also discussed the patient's recent left foot injury.  Explained physical exam there are no signs or symptoms of any fractures or tender ligament tears.  Patient has been working with physical therapy due to chronic peroneal issues on the left foot.  He is okay to continue with this unless it is causing excess pain for his plantar fasciitis or acute left foot pain.  Did also discuss possibility of further immobilization in Cam Walker boot if his symptoms are not improving    Procedures Informed consent was obtained.  Time-out was called.  The area was prepped with alcohol, and a steroid injection was performed at left plantar fascia using 1.5 cc of 0.5% Marcaine w/out epi, 1 cc Dexamethasone, 0.5 cc Methylprednisolone. Patient tolerated the procedure well.             Counseling:     I provided patient education verbally regarding:   Patient diagnosis, treatment options, as well as alternatives, risks, and benefits.     This note was created using Dragon voice recognition software that occasionally misinterpreted phrases or words.

## 2023-08-21 NOTE — PATIENT INSTRUCTIONS
Your Diabetes Foot Care Program    Every day you depend on your feet to keep you moving. But when you have diabetes, your feet need special care. Even a small foot problem can become very serious. So dont take your feet for granted. By working with your diabetes healthcare team, you can learn how to protect your feet and keep them healthy.  Evaluating your feet  An evaluation helps your healthcare provider check the condition of your feet. The evaluation includes a review of your diabetes history and overall health. It may also include a foot exam, X-rays, or other tests. These can help show problems beneath the skin that you cant see or feel.  Medical history  You will be asked about your overall health and any history of foot problems. Youll also discuss your diabetes history, such as whether your blood sugar level has changed over time. It also includes questions about sensations of pain, tingling, pins and needles, or numbness. Your healthcare provider will also want to know if you have high blood pressure and heart disease, or if you smoke. Be sure to mention any medicines (including over-the-counter), supplements, or herbal remedies you take.  Foot exam  A foot exam checks the condition of different parts of your foot. First, your skin and nails are examined for any signs of infection. Blood flow is checked by feeling for the pulses in each foot. You may also have tests to study the nerves in the foot. These include using a small filament (wire) to see how sensitive your feet are. In certain cases, you will be asked to walk a short distance to check for bone, joint, and muscle problems.  Diagnostic tests  If needed, your healthcare provider will suggest certain tests to learn more about your feet. These include:  Doppler tests to measure blood flow in the feet and lower leg.  X-rays, which can show bone or joint problems.  Other imaging tests, such as an MRI (magnetic resonance imaging), bone scan, and CT  (computed tomography) scan. These can help show bone infections.  Other tests, such as vascular tests, which study the blood flow in your feet and legs. You may also have nerve studies to learn how sensitive your feet are.  Creating a foot care program  Based on the evaluation, your healthcare provider will create a foot care program for you. Your program may be as simple as starting a daily self-care routine and changing the types of shoes your wear. It may also involve treating minor foot problems, such as a corn or blister. In some cases, surgery will be needed to treat an infection or mechanical problems, such as hammer toes.  Preventing problems  When you have diabetes, its easier to prevent problems than to treat them later on. So see your healthcare team for regular checkups and foot care. Your healthcare team can also help you learn more about caring for your feet at home. For example, you may be told to avoid walking barefoot. Or you may be told that special footwear is needed to protect your feet.  Have regular checkups  Foot problems can develop quickly. So be sure to follow your healthcare teams schedule for regular checkups. During office visits, take off your shoes and socks as soon as you get in the exam room. Ask your healthcare provider to examine your feet for problems. This will make it easier to find and treat small skin irritations before they get worse. Regular checkups can also help keep track of the blood flow and feeling in your feet. If you have neuropathy (lack of feeling in your feet), you will need to have checkups more often.  Learn about self-care  The more you know about diabetes and your feet, the easier it will be to prevent problems. Members of your healthcare team can teach you how to inspect your feet and teach you to look for warning signs. They can also give you other foot care tips. During office visits, be sure to ask any questions you have.  Date Last Reviewed: 7/1/2016  ©  8981-4482 [employer]. [address] [place] [address], [place] [address]. All rights reserved. This information is not intended as a substitute for professional medical care. Always follow your healthcare professional's instructions.       Understanding Plantar Fasciitis    Plantar fasciitis is a condition that causes foot and heel pain. The plantar fascia is a tough band of tissue that runs across the bottom of the foot from the heel to the toes. This tissue pulls on the heel bone. It supports the arch of the foot as it pushes off the ground. If the tissue becomes irritated or red and swollen (inflamed), it is called plantar fasciitis.  How to say it  PLAN-tuhr [id number]-IY-[id number]     What causes plantar fasciitis?  Plantar fasciitis most often occurs from overusing the plantar fascia. The tissue may become damaged from activities that put repeated stress on the heel and foot. Or it may wear down over time with age and ankle stiffness. You are more likely to have plantar fasciitis if you:  Do activities that require a lot of running, jumping, or dancing  Have a job that requires being on your feet for long periods  Are overweight or obese  Have certain foot problems, such as a tight Achilles tendon, flat feet, or high arches  Often wear poorly fitting shoes    Symptoms of plantar fasciitis  The condition most often causes pain in the heel and the bottom of the foot. The pain may occur when you take your first steps in the morning. It may get better as you walk throughout the day. But as you continue to put weight on the foot, the pain often returns. Pain may also occur after standing or sitting for long periods.    Treating plantar fasciitis  Treatments for plantar fasciitis include:  Resting the foot. This involves limiting movements that make your foot hurt. You may also need to avoid certain sports and types of work for a time.  Using cold packs. Put an ice pack on the heel and foot to help reduce pain and  swelling.  Taking pain medicines. Prescription and over-the-counter pain medicines can help relieve pain and swelling.  Using heel cups or foot inserts (orthotics). These are placed in the shoes to help support the heel or arch and cushion the heel. You may also be told to buy proper-fitting shoes with good arch support and cushioned soles.  Taping the foot. This supports the arch and limits the movement of the plantar fascia to help relieve symptoms.  Wearing a night splint. This stretches the plantar fascia and leg muscles while you sleep. This may help relieve pain.  Doing exercises and physical therapy. These stretch and strengthen the plantar fascia and the muscles in the leg that support the heel and foot.  Getting shots of medicine into the foot. These may help relieve symptoms for a time.  Having surgery. This may be needed if other treatments fail to relieve symptoms. During surgery, the surgeon may partially cut the plantar fascia to release tension.    Possible complications of plantar fasciitis  Without proper care and treatment, healing may take longer than normal. Also, symptoms may continue or get worse. Over time, the plantar fascia may be damaged. This can make it hard to walk or even stand without pain.    When to call your healthcare provider  Call your healthcare provider right away if you have any of these:  Fever of 100.4°F (38°C) or higher, or as directed  Symptoms that dont get better with treatment, or get worse  New symptoms, such as numbness, tingling, or weakness in the foot     Date Last Reviewed: 3/10/2016  © 0296-2843 [employer]. [address] [place] [address], [place] [address]. All rights reserved. This information is not intended as a substitute for professional medical care. Always follow your healthcare professional's instructions.

## 2023-08-25 ENCOUNTER — OFFICE VISIT (OUTPATIENT)
Dept: PAIN MEDICINE | Facility: CLINIC | Age: 83
End: 2023-08-25
Payer: MEDICARE

## 2023-08-25 VITALS
DIASTOLIC BLOOD PRESSURE: 62 MMHG | SYSTOLIC BLOOD PRESSURE: 120 MMHG | HEIGHT: 68 IN | BODY MASS INDEX: 34.71 KG/M2 | WEIGHT: 229 LBS | HEART RATE: 56 BPM

## 2023-08-25 DIAGNOSIS — M51.36 DDD (DEGENERATIVE DISC DISEASE), LUMBAR: Primary | ICD-10-CM

## 2023-08-25 DIAGNOSIS — M79.672 LEFT FOOT PAIN: ICD-10-CM

## 2023-08-25 DIAGNOSIS — M48.061 SPINAL STENOSIS OF LUMBAR REGION, UNSPECIFIED WHETHER NEUROGENIC CLAUDICATION PRESENT: ICD-10-CM

## 2023-08-25 DIAGNOSIS — M62.830 MUSCLE SPASM OF BACK: ICD-10-CM

## 2023-08-25 PROCEDURE — 99999 PR PBB SHADOW E&M-EST. PATIENT-LVL IV: CPT | Mod: PBBFAC,,,

## 2023-08-25 PROCEDURE — 99214 PR OFFICE/OUTPT VISIT, EST, LEVL IV, 30-39 MIN: ICD-10-PCS | Mod: S$PBB,,,

## 2023-08-25 PROCEDURE — 99214 OFFICE O/P EST MOD 30 MIN: CPT | Mod: PBBFAC,PN

## 2023-08-25 PROCEDURE — 99999 PR PBB SHADOW E&M-EST. PATIENT-LVL IV: ICD-10-PCS | Mod: PBBFAC,,,

## 2023-08-25 PROCEDURE — 99214 OFFICE O/P EST MOD 30 MIN: CPT | Mod: S$PBB,,,

## 2023-08-25 NOTE — PROGRESS NOTES
Referring Physician: No ref. provider found    PCP: Garland Ocampo MD      CC:low back and left leg pain    Interval History:  Mr. Hollingsworth is a 83 y.o. male with a low back and leg pain who presents today as an established patient for the management of low back pain.  The patient presents as f/u from PT. The patient reports of excellent benefit with PT and plans to continue with therapy.  The patient denies back pain at this time.   The patient reports of a recent fall secondary to building a shed but denies worsening of back pain s/p fall. Today, the patient localizes the worse of his pain to his left foot. The patient was recently seen by Dr. Elliott, podiatry, for steroid injection for plantar fasciatis. The patient reports that this pain is tolerable at this time.   The patient denies pain with sitting and sleeping. Continues to c/o unrelated pain at the top of his left foot.   He denies any LE weakness or b/b changes. He takes Hydrocodone 7.5 mg sparingly prescribed by PCP.  In the past he was evaluated by [facility] and lumbar surgery was recommended. The patient denies of any significant changes in his health since his last appointment. The patient also denies of any changes in the character of his pain since his last appointment.  Pain today is rated 2/10. Patient denies of any urinary/fecal incontinence, saddle anesthesia, or weakness.        HPI:   Yeyo Hollingsworth is a 83 y.o. male ho presents with lower back and left leg pain.  Pain is been present since 2010.  No recent traumatic incident.  His intermittent aching, throbbing, electric pain in his lower back.  Pain radiates down his left buttock into his left posterior thigh.  Pain worsens with prolonged standing, walking, getting up and coughing.  Pain improves with rest.  His tried physical therapy with minimal benefit.  He has undergone lumbar JOSE's with Dr. Rendon in the past with moderate benefit.  Most recent injection was performed in  "September 2016.  Pain has since returned.  He desires repeat injections with us.  He denies any weakness.  No bowel bladder changes.  He rates his pain 6/10 today.    INTERVETIONAL THERAPIES:   8/21/2023: plantar fasciatis steroid injection - Dr. Elliott-   6/1/2023: Bilateral L4-5, L5-S1 transforaminal epidural steroid injection  1/20/23: Injection,steroid,epidural,transforaminal approach L4-L5-S1- excellent relief.   11/4/2022: Bilateral L4-5 and L5-S1 transforaminal epidural steroid injection under fluoroscopy- Dr. Wright- excellent relief.       :   Reviewed.        ROS:  CONSTITUTIONAL: No fevers, chills, night sweats, wt. loss, appetite changes  SKIN: no rashes or itching  ENT: No headaches, head trauma, vision changes, or eye pain  LYMPH NODES: None noticed   CV: No chest pain, palpitations.   RESP: No shortness of breath, dyspnea on exertion, cough, wheezing, or hemoptysis  GI: No nausea, emesis, diarrhea, constipation, melena, hematochezia, pain.    : No dysuria, hematuria, urgency, or frequency   HEME: No easy bruising, bleeding problems  PSYCHIATRIC: No depression, anxiety, psychosis, hallucinations.  NEURO: No seizures, memory loss, dizziness or difficulty sleeping  MSK: + history of present illness    MEDICAL, SURGICAL, FAMILY, SOCIAL HX: reviewed    MEDICATIONS/ALLERGIES: reviewed         Medications/Allergies: See med card    Vitals:    08/25/23 0822   BP: 120/62   Pulse: (!) 56   Weight: 103.9 kg (229 lb)   Height: 5' 8" (1.727 m)   PainSc:   2   PainLoc: Back               Physical exam:    GENERAL: A and O x3, the patient appears well groomed and is in no acute distress.  Skin: No rashes or obvious lesions  HEENT: normocephalic, atraumatic  CARDIOVASCULAR:  RRR  LUNGS: non labored breathing  ABDOMEN: soft, nontender   UPPER EXTREMITIES: Normal alignment, normal range of motion, no atrophy, no skin changes,  hair growth and nail growth normal and equal bilaterally. No swelling. Tenderness to right " AC joint  LOWER EXTREMITIES:  Normal alignment, normal range of motion, no atrophy, no skin changes,  hair growth and nail growth normal and equal bilaterally. No swelling, no tenderness.    CERVICAL SPINE:  Full ROM with pain on flexion and extension  Negative spurlings  Tenderness to palpation right cervical paraspinous muscles   LUMBAR SPINE  Lumbar spine: ROM is limited with flexion extension and oblique extension with moderate increased pain.    Caden's test causes no increased pain on either side.    Supine straight leg raise positive bilaterally   Internal and external rotation of the hip causes no increased pain on either side.  Myofascial exam: No tenderness to palpation across lumbar paraspinous muscles.       MENTAL STATUS: normal orientation, speech, language, and fund of knowledge for social situation.  Emotional state appropriate.    CRANIAL NERVES:  II:  PERRL bilaterally,   III,IV,VI: EOMI.    V:  Facial sensation equal bilaterally  VII:  Facial motor function normal.  VIII:  Hearing equal to finger rub bilaterally  IX/X: Gag normal, palate symmetric  XI:  Shoulder shrug equal, head turn equal  XII:  Tongue midline without fasciculations      MOTOR: Tone and bulk: normal bilateral upper and lower Strength: normal   Delt Bi Tri WE WF     R 5 5 5 5 5 5   L 5 5 5 5 5 5     IP ADD ABD Quad TA Gas HAM  R 5 5 5 5 5 5 5  L 5 5 5 5 5 5 5    SENSATION: Light touch and pinprick intact bilaterally  REFLEXES: normal, symmetric, nonbrisk.  Toes down, no clonus. No hoffmans.  GAIT: normal rise, base, steps, and arm swing.        Imaging: reviewed CT of ABD PELV renal stone study.        Assessment:  Mr. Hollingsworth is a 83 y.o. male with low back and BLE pain.  The patient presents today as follow up for PT. Denies back pain at this time.  Treatment plan outlined below:   1. DDD (degenerative disc disease), lumbar    2. Muscle spasm of back    3. Spinal stenosis of lumbar region, unspecified whether neurogenic  claudication present    4. Left foot pain            Plan:  1. Continue to monitor progress of repeat Bilateral TFESI at L4-L5, L5-S1. Can call to schedule repeat procedure if pain returns.   2. I have stressed the importance of physical activity and a home exercise plan to help with chronic pain and improve health.   3. Continue PT as ordered. Patient can call for extended order if needed.   4.  continue Robaxin 500 mg po Q4H prn muscle spasms.    5. F/U PRN     Janet Croft, NP

## 2023-09-05 DIAGNOSIS — G89.4 CHRONIC PAIN SYNDROME: ICD-10-CM

## 2023-09-05 DIAGNOSIS — M19.90 ARTHRITIS: ICD-10-CM

## 2023-09-05 DIAGNOSIS — L29.9 LOCALIZED PRURITUS: ICD-10-CM

## 2023-09-06 RX ORDER — HYDROCODONE BITARTRATE AND ACETAMINOPHEN 5; 325 MG/1; MG/1
1 TABLET ORAL EVERY 6 HOURS PRN
Qty: 90 TABLET | Refills: 0 | Status: SHIPPED | OUTPATIENT
Start: 2023-09-06 | End: 2023-10-11 | Stop reason: SDUPTHER

## 2023-09-06 RX ORDER — KETOCONAZOLE 20 MG/G
CREAM TOPICAL DAILY
Qty: 60 G | Refills: 1 | Status: SHIPPED | OUTPATIENT
Start: 2023-09-06

## 2023-09-06 RX ORDER — TRIAMCINOLONE ACETONIDE 1 MG/G
CREAM TOPICAL 2 TIMES DAILY
Qty: 80 G | Refills: 0 | Status: SHIPPED | OUTPATIENT
Start: 2023-09-06

## 2023-09-18 ENCOUNTER — PATIENT MESSAGE (OUTPATIENT)
Dept: FAMILY MEDICINE | Facility: CLINIC | Age: 83
End: 2023-09-18

## 2023-09-18 DIAGNOSIS — E78.01 FAMILIAL HYPERCHOLESTEROLEMIA: ICD-10-CM

## 2023-09-18 DIAGNOSIS — J30.89 CHRONIC NONSEASONAL ALLERGIC RHINITIS DUE TO FUNGAL SPORES: ICD-10-CM

## 2023-09-18 DIAGNOSIS — I48.20 CHRONIC ATRIAL FIBRILLATION: ICD-10-CM

## 2023-09-18 DIAGNOSIS — E11.9 CONTROLLED TYPE 2 DIABETES MELLITUS WITHOUT COMPLICATION, WITHOUT LONG-TERM CURRENT USE OF INSULIN: ICD-10-CM

## 2023-09-18 DIAGNOSIS — I10 ESSENTIAL HYPERTENSION: ICD-10-CM

## 2023-09-18 RX ORDER — MINERAL OIL
180 ENEMA (ML) RECTAL DAILY
Qty: 90 TABLET | Refills: 3 | Status: SHIPPED | OUTPATIENT
Start: 2023-09-18 | End: 2024-09-17

## 2023-09-18 RX ORDER — EMPAGLIFLOZIN 10 MG/1
10 TABLET, FILM COATED ORAL DAILY
Qty: 90 TABLET | Refills: 1 | Status: SHIPPED | OUTPATIENT
Start: 2023-09-18 | End: 2024-01-02 | Stop reason: SDUPTHER

## 2023-09-18 RX ORDER — POTASSIUM CHLORIDE 750 MG/1
10 CAPSULE, EXTENDED RELEASE ORAL EVERY OTHER DAY
Qty: 90 CAPSULE | Refills: 1 | Status: SHIPPED | OUTPATIENT
Start: 2023-09-18

## 2023-09-19 ENCOUNTER — PATIENT MESSAGE (OUTPATIENT)
Dept: FAMILY MEDICINE | Facility: CLINIC | Age: 83
End: 2023-09-19

## 2023-09-19 DIAGNOSIS — I48.20 CHRONIC ATRIAL FIBRILLATION: ICD-10-CM

## 2023-09-19 RX ORDER — ATORVASTATIN CALCIUM 10 MG/1
10 TABLET, FILM COATED ORAL DAILY
Qty: 90 TABLET | Refills: 3 | Status: SHIPPED | OUTPATIENT
Start: 2023-09-19

## 2023-10-11 ENCOUNTER — OFFICE VISIT (OUTPATIENT)
Dept: FAMILY MEDICINE | Facility: CLINIC | Age: 83
End: 2023-10-11
Payer: MEDICARE

## 2023-10-11 VITALS
HEART RATE: 52 BPM | SYSTOLIC BLOOD PRESSURE: 134 MMHG | OXYGEN SATURATION: 96 % | WEIGHT: 229.38 LBS | DIASTOLIC BLOOD PRESSURE: 59 MMHG | HEIGHT: 68 IN | BODY MASS INDEX: 34.76 KG/M2

## 2023-10-11 DIAGNOSIS — E78.01 FAMILIAL HYPERCHOLESTEROLEMIA: ICD-10-CM

## 2023-10-11 DIAGNOSIS — E11.9 TYPE 2 DIABETES MELLITUS WITHOUT COMPLICATION, WITHOUT LONG-TERM CURRENT USE OF INSULIN: Primary | Chronic | ICD-10-CM

## 2023-10-11 DIAGNOSIS — F51.01 PRIMARY INSOMNIA: ICD-10-CM

## 2023-10-11 DIAGNOSIS — Z23 NEEDS FLU SHOT: ICD-10-CM

## 2023-10-11 DIAGNOSIS — G89.4 CHRONIC PAIN SYNDROME: ICD-10-CM

## 2023-10-11 DIAGNOSIS — M19.90 ARTHRITIS: ICD-10-CM

## 2023-10-11 PROCEDURE — 90662 IIV NO PRSV INCREASED AG IM: CPT | Mod: PBBFAC | Performed by: FAMILY MEDICINE

## 2023-10-11 PROCEDURE — G0008 ADMIN INFLUENZA VIRUS VAC: HCPCS | Mod: PBBFAC | Performed by: FAMILY MEDICINE

## 2023-10-11 PROCEDURE — 99215 OFFICE O/P EST HI 40 MIN: CPT | Performed by: FAMILY MEDICINE

## 2023-10-11 PROCEDURE — 99999PBSHW FLU VACCINE - QUADRIVALENT - HIGH DOSE (65+) PRESERVATIVE FREE IM: ICD-10-PCS | Mod: PBBFAC,,,

## 2023-10-11 PROCEDURE — 99214 PR OFFICE/OUTPT VISIT, EST, LEVL IV, 30-39 MIN: ICD-10-PCS | Mod: S$PBB,AQ,, | Performed by: FAMILY MEDICINE

## 2023-10-11 PROCEDURE — 99214 OFFICE O/P EST MOD 30 MIN: CPT | Mod: S$PBB,AQ,, | Performed by: FAMILY MEDICINE

## 2023-10-11 PROCEDURE — 99999PBSHW FLU VACCINE - QUADRIVALENT - HIGH DOSE (65+) PRESERVATIVE FREE IM: Mod: PBBFAC,,,

## 2023-10-11 RX ORDER — HYDROCODONE BITARTRATE AND ACETAMINOPHEN 5; 325 MG/1; MG/1
1 TABLET ORAL EVERY 6 HOURS PRN
Qty: 90 TABLET | Refills: 0 | Status: SHIPPED | OUTPATIENT
Start: 2023-10-11 | End: 2023-12-18 | Stop reason: SDUPTHER

## 2023-10-11 RX ORDER — DICLOFENAC SODIUM 10 MG/G
2 GEL TOPICAL DAILY
Qty: 100 G | Refills: 2 | Status: SHIPPED | OUTPATIENT
Start: 2023-10-11

## 2023-10-11 RX ORDER — TRAZODONE HYDROCHLORIDE 100 MG/1
100 TABLET ORAL NIGHTLY
Qty: 90 TABLET | Refills: 3 | Status: SHIPPED | OUTPATIENT
Start: 2023-10-11 | End: 2024-10-10

## 2023-10-11 NOTE — PROGRESS NOTES
SUBJECTIVE:    Patient ID: Yeyo Hollingsworth is a 83 y.o. male.    Chief Complaint: Back Pain  83-year-old male with a history of diabetes hypertension hyperlipidemia chronic pain secondary to arthritis, insomnia presents to clinic today for follow-up on his diabetes hypertension and chronic pain.    Depression appears controlled.     Pt needs medications refilled today.     SPMHx:  DM: Jardiance 10mg, Januvia 100mg,   HTN: Lisinopril 20mg, Metoprolol 25mg, is well controlled.  Hyperlipidemia: Atorvastatin 10 mg well controlled. LDL 59  Arthritis: On several chronic narcotics and follows up with pain management, for his neck and back pain pt is planning to have a procedure.  Anxiety: Takes Valium 5mg, PRN  A-fib: 2012 Was cardioverted, no longer in a-fib  Insomnia: Trazadone 100mg  Hx of DVT: Currently on Eliquis 5mg   BRANDI: Wears CPAP  Chronic constipation: On polyethylene Glycol 17g, manages well with this medication.     Specialists:  Cardiology: Dr Mcclelland  Pain Management: Michele Martinez  Ortho: Dr Dean  Podiatry: Dr Mandujano  Sleep: Dr Felice Bello  GS: Dr Thorne     Smoke: Quit in 1988  ETOH: None  Exercise: Never  Diabetes  He has type 2 diabetes mellitus. No MedicAlert identification noted. The initial diagnosis of diabetes was made 13 years ago. Hypoglycemia symptoms include headaches. Pertinent negatives for hypoglycemia include no confusion, dizziness, hunger, mood changes, nervousness/anxiousness, pallor, seizures, sleepiness, speech difficulty, sweats or tremors. Pertinent negatives for diabetes include no blurred vision, no chest pain, no fatigue, no foot paresthesias, no foot ulcerations, no polydipsia, no polyphagia, no polyuria, no visual change, no weakness and no weight loss. Pertinent negatives for hypoglycemia complications include no blackouts, no hospitalization, no nocturnal hypoglycemia, no required assistance and no required glucagon injection. Symptoms are stable. Diabetic complications  include heart disease. Pertinent negatives for diabetic complications include no autonomic neuropathy, CVA, impotence, nephropathy, peripheral neuropathy, PVD or retinopathy. Risk factors for coronary artery disease include family history, hypertension, obesity, diabetes mellitus and male sex. Current diabetic treatment includes diet and oral agent (dual therapy). He is compliant with treatment most of the time. His weight is fluctuating minimally. He is following a diabetic diet. Meal planning includes avoidance of concentrated sweets. He has not had a previous visit with a dietitian. He participates in exercise three times a week. He monitors blood glucose at home 3-4 x per week. Blood glucose monitoring compliance is good. There is no change in his home blood glucose trend. He sees a podiatrist.Eye exam is current.     Hypertension  This is a chronic problem. The current episode started more than 1 year ago. The problem is unchanged. The problem is controlled. Associated symptoms include neck pain. Pertinent negatives include no anxiety, chest pain, headaches or palpitations. There are no associated agents to hypertension. There are no known risk factors for coronary artery disease. Past treatments include beta blockers and ACE inhibitors. The current treatment provides moderate improvement. Compliance problems include exercise and diet.       Arthritis  Presents for follow-up visit. He complains of pain and stiffness. He reports no joint swelling. The symptoms have been stable. Affected locations include the right knee, left knee, right hip and left hip. His pain is at a severity of 4/10. Pertinent negatives include no diarrhea or fatigue.      Past Medical History:   Diagnosis Date    Allergy     Ankle pain     left    Anticoagulant long-term use     aspirin    Anxiety     Atrial fibrillation     Back pain     Bruises easily     Cataract     Clotting disorder     left leg    Colon polyp     Diabetes mellitus      Diabetes mellitus, type 2     Hay fever     Hyperlipidemia     Hypertension     Left hip pain 2021    Seeing Dr. Beard for hip pain     Obese     BRANDI on CPAP      Social History     Socioeconomic History    Marital status:    Tobacco Use    Smoking status: Former     Current packs/day: 0.00     Types: Cigarettes     Quit date: 2006     Years since quittin.6    Smokeless tobacco: Never    Tobacco comments:     ex smoker    Substance and Sexual Activity    Alcohol use: Yes     Comment: rarely    Drug use: No    Sexual activity: Yes     Partners: Female     Social Determinants of Health     Financial Resource Strain: Low Risk  (10/6/2023)    Overall Financial Resource Strain (CARDIA)     Difficulty of Paying Living Expenses: Not hard at all   Food Insecurity: No Food Insecurity (10/6/2023)    Hunger Vital Sign     Worried About Running Out of Food in the Last Year: Never true     Ran Out of Food in the Last Year: Never true   Transportation Needs: No Transportation Needs (10/6/2023)    PRAPARE - Transportation     Lack of Transportation (Medical): No     Lack of Transportation (Non-Medical): No   Physical Activity: Unknown (10/6/2023)    Exercise Vital Sign     Days of Exercise per Week: Patient refused     Minutes of Exercise per Session: 0 min   Stress: No Stress Concern Present (10/6/2023)    [employer] [employer] - Occupational Stress Questionnaire     Feeling of Stress : Not at all   Social Connections: Unknown (10/6/2023)    Social Connection and Isolation Panel [NHANES]     Frequency of Communication with Friends and Family: Once a week     Frequency of Social Gatherings with Friends and Family: Once a week     Active Member of Clubs or Organizations: Yes     Attends Club or Organization Meetings: Never     Marital Status:    Housing Stability: Low Risk  (10/6/2023)    Housing Stability Vital Sign     Unable to Pay for Housing in the Last Year: No      Number of Places Lived in the Last Year: 1     Unstable Housing in the Last Year: No     Past Surgical History:   Procedure Laterality Date    ABDOMINAL SURGERY      origunal surg 1986-mult surgeries since    CARPAL TUNNEL RELEASE      right wrist 2009-left wrist 2010    COLON SURGERY      partial colon removal    COLONOSCOPY  11/26/2018    Dr. Salazar; normal terminal ileum; polyps removed from ascending colon and hepatic flexure; pan diverticulosis; small internal hemorrhoids; repeat in 5 years; Bx: fragments of an adenomatous polyp with mild surface glandular dysplasia and associated chronic active inflammation, no evidence of high-grade dysplasia or malignancy    COLOSTOMY  1986    colostomy reversal  1986    EYE SURGERY      hay fever      HERNIA REPAIR  1949    REPAIR OF TENDON OF LOWER EXTREMITY Left 6/24/2020    Procedure: REPAIR, TENDON, LOWER EXTREMITY;  Surgeon: Justin Mandujano DPM;  [place] [facility] OR;  Service: Podiatry;  Laterality: Left;  ARTHEX NOTIFIED IN CASE HE WANTS ANCHOR    TOE SURGERY Left 2017    replaced a joint     TONSILLECTOMY  1947    TRANSFORAMINAL EPIDURAL INJECTION OF STEROID      x 4    TRANSFORAMINAL EPIDURAL INJECTION OF STEROID Bilateral 11/13/2019    Procedure: Injection,steroid,epidural,transforaminal approach;  Surgeon: Grant Wright MD;  [place] [facility] OR;  Service: Pain Management;  Laterality: Bilateral;  L4-5, L5-S1    TRANSFORAMINAL EPIDURAL INJECTION OF STEROID Bilateral 3/9/2021    Procedure: Injection,steroid,epidural,transforaminal approach;  Surgeon: Grant Wright MD;  [place] [facility] OR;  Service: Pain Management;  Laterality: Bilateral;  L4-5, L5-S1    TRANSFORAMINAL EPIDURAL INJECTION OF STEROID Bilateral 5/25/2021    Procedure: Injection,steroid,epidural,transforaminal approach;  Surgeon: Grant Wright MD;  [place] [facility] OR;  Service: Pain Management;  Laterality: Bilateral;  L4-L5,L5,S1    TRANSFORAMINAL EPIDURAL INJECTION OF STEROID Bilateral 12/14/2021     Procedure: Injection,steroid,epidural,transforaminal approach Bilateral L4-5, L5-S1;  Surgeon: Grant Wright MD;  [place] [facility] OR;  Service: Pain Management;  Laterality: Bilateral;    TRANSFORAMINAL EPIDURAL INJECTION OF STEROID Bilateral 11/4/2022    Procedure: Injection,steroid,epidural,transforaminal approach;  Surgeon: Grant Wright MD;  [place] [facility] OR;  Service: Pain Management;  Laterality: Bilateral;  L4-5 and L5-s1    TRANSFORAMINAL EPIDURAL INJECTION OF STEROID Bilateral 1/20/2023    Procedure: Injection,steroid,epidural,transforaminal approach L4-L5-S1;  Surgeon: Grant Wright MD;  [place] [facility] OR;  Service: Pain Management;  Laterality: Bilateral;    TRANSFORAMINAL EPIDURAL INJECTION OF STEROID Bilateral 6/1/2023    Procedure: Injection,steroid,epidural,transforaminal approach L4-L5, L5-S1;  Surgeon: Grant Wright MD;  [place] [facility] OR;  Service: Pain Management;  Laterality: Bilateral;     Family History   Problem Relation Age of Onset    Heart disease Mother     Melanoma Neg Hx     Psoriasis Neg Hx     Lupus Neg Hx     Eczema Neg Hx      Current Outpatient Medications   Medication Sig Dispense Refill    apixaban (ELIQUIS) 5 mg Tab Take 1 tablet (5 mg total) by mouth 2 (two) times daily. 180 tablet 3    aspirin 81 MG Chew Take 1 tablet (81 mg total) by mouth once daily. 100 tablet 2    atorvastatin (LIPITOR) 10 MG tablet Take 1 tablet (10 mg total) by mouth once daily. 90 tablet 3    azelastine (ASTELIN) 137 mcg (0.1 %) nasal spray 2 sprays (274 mcg total) by Nasal route 2 (two) times daily. 30 mL 5    blood sugar diagnostic (BLOOD GLUCOSE TEST) Strp 1 strip by Misc.(Non-Drug; Combo Route) route 2 (two) times a day. FREESTYLE LITE BG TEST STRIPS. current use of insulin  - Primary ICD-10-CM: E11.9 200 each 2    clotrimazole (LOTRIMIN) 1 % cream Apply topically 2 (two) times daily. 60 g 10    diphenhydrAMINE-zinc acetate 1-0.1% (BENADRYL ITCH STOPPING) cream Apply topically 3 (three) times daily as needed  for Itching. 28 g 0    fexofenadine (ALLEGRA) 180 MG tablet Take 1 tablet (180 mg total) by mouth once daily. 90 tablet 3    FREESTYLE LANCETS 28 gauge lancets Inject 1 lancet into the skin 3 (three) times daily. 100 each 3    furosemide (LASIX) 20 MG tablet Take 1 tablet (20 mg total) by mouth every Mon, Wed, Fri. 45 tablet 3    gabapentin (NEURONTIN) 300 MG capsule Take 1 capsule (300 mg total) by mouth 3 (three) times daily. 270 capsule 1    JARDIANCE 10 mg tablet Take 1 tablet (10 mg total) by mouth once daily. 90 tablet 1    ketoconazole (NIZORAL) 2 % cream Apply topically once daily. 60 g 1    lisinopriL (PRINIVIL,ZESTRIL) 20 MG tablet Take 1 tablet (20 mg total) by mouth once daily. 90 tablet 3    metoprolol tartrate (LOPRESSOR) 25 MG tablet Take 0.5 tablets (12.5 mg total) by mouth 2 (two) times daily. 180 tablet 3    montelukast (SINGULAIR) 10 mg tablet Take 1 tablet (10 mg total) by mouth every evening. 90 tablet 3    multivitamin with minerals tablet Take 1 tablet by mouth once daily.      polyethylene glycol (GLYCOLAX) 17 gram/dose powder Take 17 g by mouth once daily. 507 g 3    potassium chloride (MICRO-K) 10 MEQ CpSR Take 1 capsule (10 mEq total) by mouth every other day. 90 capsule 1    SITagliptin phosphate (JANUVIA) 100 MG Tab Take 1 tablet (100 mg total) by mouth once daily. 90 tablet 3    triamcinolone acetonide 0.1% (KENALOG) 0.1 % cream Apply topically 2 (two) times daily. 80 g 0    diclofenac sodium (VOLTAREN) 1 % Gel Apply 2 g topically once daily. 100 g 2    HYDROcodone-acetaminophen (NORCO) 5-325 mg per tablet Take 1 tablet by mouth every 6 (six) hours as needed for Pain. 90 tablet 0    traZODone (DESYREL) 100 MG tablet Take 1 tablet (100 mg total) by mouth every evening. 90 tablet 3     No current facility-administered medications for this visit.     Facility-Administered Medications Ordered in Other Visits   Medication Dose Route Frequency Provider Last Rate Last Admin    lactated ringers  "infusion   Intravenous Once PRN Grant Wright MD   Stopped at 11/04/22 1230     Review of patient's allergies indicates:   Allergen Reactions    Ativan [lorazepam] Other (See Comments)     Mood alternating      Dilaudid [hydromorphone (bulk)] Other (See Comments)     Mood alternating      Morphine Other (See Comments)     Mood alternating      Adhesive tape-silicones     Hydromorphone        Review of Systems   Constitutional:  Negative for activity change, appetite change, diaphoresis, fatigue and weight loss.   HENT:  Negative for congestion, rhinorrhea, sinus pressure and sinus pain.    Eyes:  Negative for blurred vision.   Respiratory:  Negative for cough, chest tightness, shortness of breath and wheezing.    Cardiovascular:  Negative for chest pain and palpitations.   Endocrine: Negative for polydipsia, polyphagia and polyuria.   Genitourinary:  Negative for dysuria, flank pain, frequency, impotence and urgency.   Musculoskeletal:  Positive for arthralgias and back pain.   Skin:  Negative for pallor.   Neurological:  Positive for headaches. Negative for dizziness, tremors, seizures, speech difficulty and weakness.   Psychiatric/Behavioral:  Negative for confusion. The patient is not nervous/anxious.           Blood pressure (!) 134/59, pulse (!) 52, height 5' 8" (1.727 m), weight 104.1 kg (229 lb 6.4 oz), SpO2 96 %. Body mass index is 34.88 kg/m².   Objective:      Physical Exam  Vitals reviewed.   Constitutional:       General: He is not in acute distress.     Appearance: Normal appearance. He is obese. He is not ill-appearing or toxic-appearing.   HENT:      Head: Normocephalic and atraumatic.      Right Ear: Tympanic membrane normal.      Left Ear: Tympanic membrane normal.      Nose: Nose normal. No congestion or rhinorrhea.      Mouth/Throat:      Mouth: Mucous membranes are moist.      Pharynx: Oropharynx is clear. No oropharyngeal exudate or posterior oropharyngeal erythema.   Cardiovascular:      Rate " and Rhythm: Normal rate and regular rhythm.      Heart sounds: Normal heart sounds. No murmur heard.  Pulmonary:      Effort: Pulmonary effort is normal.      Breath sounds: Normal breath sounds.   Skin:     General: Skin is warm and dry.      Capillary Refill: Capillary refill takes less than 2 seconds.   Neurological:      Mental Status: He is alert and oriented to person, place, and time.             Assessment:       1. Type 2 diabetes mellitus without complication, without long-term current use of insulin    2. Primary insomnia    3. Chronic pain syndrome    4. Arthritis    5. Familial hypercholesterolemia    6. Needs flu shot         Plan:           Type 2 diabetes mellitus without complication, without long-term current use of insulin  -     Microalbumin/creatinine urine ratio; Future; Expected date: 10/11/2023  -     Hemoglobin A1C; Future; Expected date: 10/11/2023    Primary insomnia  -     traZODone (DESYREL) 100 MG tablet; Take 1 tablet (100 mg total) by mouth every evening.  Dispense: 90 tablet; Refill: 3    Chronic pain syndrome  -     HYDROcodone-acetaminophen (NORCO) 5-325 mg per tablet; Take 1 tablet by mouth every 6 (six) hours as needed for Pain.  Dispense: 90 tablet; Refill: 0  -     diclofenac sodium (VOLTAREN) 1 % Gel; Apply 2 g topically once daily.  Dispense: 100 g; Refill: 2    Arthritis  -     HYDROcodone-acetaminophen (NORCO) 5-325 mg per tablet; Take 1 tablet by mouth every 6 (six) hours as needed for Pain.  Dispense: 90 tablet; Refill: 0  -     diclofenac sodium (VOLTAREN) 1 % Gel; Apply 2 g topically once daily.  Dispense: 100 g; Refill: 2    Familial hypercholesterolemia  -     Lipid Panel; Future; Expected date: 10/11/2023    Needs flu shot  -     Influenza - Quadrivalent - High Dose (65+) (PF) (IM)

## 2023-10-31 ENCOUNTER — LAB VISIT (OUTPATIENT)
Dept: LAB | Facility: HOSPITAL | Age: 83
End: 2023-10-31
Attending: INTERNAL MEDICINE
Payer: MEDICARE

## 2023-10-31 DIAGNOSIS — Z86.010 PERSONAL HISTORY OF COLONIC POLYPS: ICD-10-CM

## 2023-10-31 DIAGNOSIS — Z79.1 ENCOUNTER FOR LONG-TERM (CURRENT) USE OF NON-STEROIDAL ANTI-INFLAMMATORIES: Primary | ICD-10-CM

## 2023-10-31 DIAGNOSIS — K92.1 BLOOD IN STOOL: ICD-10-CM

## 2023-10-31 LAB
BASOPHILS # BLD AUTO: 0.03 K/UL (ref 0–0.2)
BASOPHILS NFR BLD: 0.6 % (ref 0–1.9)
DIFFERENTIAL METHOD: ABNORMAL
EOSINOPHIL # BLD AUTO: 0.2 K/UL (ref 0–0.5)
EOSINOPHIL NFR BLD: 3.4 % (ref 0–8)
ERYTHROCYTE [DISTWIDTH] IN BLOOD BY AUTOMATED COUNT: 14.6 % (ref 11.5–14.5)
FERRITIN SERPL-MCNC: 13.7 NG/ML (ref 20–300)
HCT VFR BLD AUTO: 41.5 % (ref 40–54)
HGB BLD-MCNC: 12.8 G/DL (ref 14–18)
IMM GRANULOCYTES # BLD AUTO: 0 K/UL (ref 0–0.04)
IMM GRANULOCYTES NFR BLD AUTO: 0 % (ref 0–0.5)
IRON SERPL-MCNC: 114 UG/DL (ref 45–160)
LYMPHOCYTES # BLD AUTO: 1.2 K/UL (ref 1–4.8)
LYMPHOCYTES NFR BLD: 23.1 % (ref 18–48)
MCH RBC QN AUTO: 30.7 PG (ref 27–31)
MCHC RBC AUTO-ENTMCNC: 30.8 G/DL (ref 32–36)
MCV RBC AUTO: 100 FL (ref 82–98)
MONOCYTES # BLD AUTO: 0.4 K/UL (ref 0.3–1)
MONOCYTES NFR BLD: 8.3 % (ref 4–15)
NEUTROPHILS # BLD AUTO: 3.3 K/UL (ref 1.8–7.7)
NEUTROPHILS NFR BLD: 64.6 % (ref 38–73)
NRBC BLD-RTO: 0 /100 WBC
PLATELET # BLD AUTO: 190 K/UL (ref 150–450)
PMV BLD AUTO: 9.8 FL (ref 9.2–12.9)
RBC # BLD AUTO: 4.17 M/UL (ref 4.6–6.2)
SATURATED IRON: 32 % (ref 20–50)
TOTAL IRON BINDING CAPACITY: 354 UG/DL (ref 250–450)
TRANSFERRIN SERPL-MCNC: 253 MG/DL (ref 200–375)
WBC # BLD AUTO: 5.07 K/UL (ref 3.9–12.7)

## 2023-10-31 PROCEDURE — 85025 COMPLETE CBC W/AUTO DIFF WBC: CPT | Performed by: INTERNAL MEDICINE

## 2023-10-31 PROCEDURE — 84466 ASSAY OF TRANSFERRIN: CPT | Performed by: INTERNAL MEDICINE

## 2023-10-31 PROCEDURE — 36415 COLL VENOUS BLD VENIPUNCTURE: CPT | Performed by: INTERNAL MEDICINE

## 2023-10-31 PROCEDURE — 82728 ASSAY OF FERRITIN: CPT | Performed by: INTERNAL MEDICINE

## 2023-11-08 ENCOUNTER — HOSPITAL ENCOUNTER (OUTPATIENT)
Dept: PREADMISSION TESTING | Facility: HOSPITAL | Age: 83
Discharge: HOME | End: 2023-11-08
Attending: INTERNAL MEDICINE
Payer: MEDICARE

## 2023-11-08 DIAGNOSIS — Z01.818 PREOP TESTING: Primary | ICD-10-CM

## 2023-11-08 PROCEDURE — 93010 ELECTROCARDIOGRAM REPORT: CPT | Mod: ,,, | Performed by: INTERNAL MEDICINE

## 2023-11-08 PROCEDURE — 93005 ELECTROCARDIOGRAM TRACING: CPT | Performed by: INTERNAL MEDICINE

## 2023-11-08 PROCEDURE — 93010 EKG 12-LEAD: ICD-10-PCS | Mod: ,,, | Performed by: INTERNAL MEDICINE

## 2023-11-08 NOTE — DISCHARGE INSTRUCTIONS
INSTRUCTIONS  To confirm your doctor has scheduled your surgery for: 11/09    Morning of surgery please check in with registration near [address] Entrance then proceed to Registration then to endoscopy department    ENDOSCOPY NURSES will call the afternoon prior to procedure with your final arrival time.    TAKE ONLY THESE MEDICATIONS WITH A SMALL SIP OF WATER THE MORNING OF SURGERY: SEE LIST    DO NOT TAKE ANY INSULIN OR ORAL DIABETIC MEDICATIONS THE MORNING OF SURGERY UNLESS DIRECTED BY YOUR PHYSICIAN OR PRE ADMIT NURSE.    DO NOT TAKE THESE MEDICATIONS 5-7 DAYS PRIOR to your procedure per your surgeon's request: ASPIRIN, ALEVE, BC powder, JOSEPH SELTZER, IBUPROFEN, FISH OIL, VITAMIN E, OR HERBALS   (May take Tylenol)    If you are prescribed any types of blood thinners (Aspirin, Coumadin, Plavix, Pradaxa, Xarelto, Aggrenox, Effient, Eliquis, Savasya, Brilinta or any other), please ask your surgeon how many days before scheduled procedure should you stop taking them. You may also need to verify with prescribing physician if it is OK to stop your blood thinners.      INSTRUCTIONS IMPORTANT!!  Do not eat or drink anything between midnight and the time of your procedure- this includes gum, mints, and candy. You may brush your teeth but do not swallow.  ONLY if you are diabetic, check your sugar in the morning before your procedure.  Do not smoke, vape or drink alcoholic beverages 24 hours prior to your procedure.  If you wear contact lenses, dentures, hearing aids or glasses, bring a container to put them in during surgery and give to a family member for safe keeping.    Please leave all jewelry, piercing's and valuables at home.   Wear comfortable loose clothing and rubber soled shoes.  If your condition changes such as fever, cough, etc, please notify your surgeon.   ONLY if you have been diagnosed with sleep apnea please bring your C-PAP machine.  ONLY for patients requiring bowel prep, written instructions  will be given by your doctor's office.  Make arrangements in advance for transportation home by a responsible adult.  You must make arrangements for transportation, TAXI'S, UBER'S OR LYFTS ARE NOT ALLOWED.        If you have any questions about these instructions, call Endoscopy Nurse at [phone]

## 2023-11-09 ENCOUNTER — ANESTHESIA (OUTPATIENT)
Dept: SURGERY | Facility: HOSPITAL | Age: 83
End: 2023-11-09
Payer: MEDICARE

## 2023-11-09 ENCOUNTER — HOSPITAL ENCOUNTER (OUTPATIENT)
Facility: HOSPITAL | Age: 83
Discharge: HOME | End: 2023-11-09
Attending: INTERNAL MEDICINE | Admitting: INTERNAL MEDICINE
Payer: MEDICARE

## 2023-11-09 ENCOUNTER — ANESTHESIA EVENT (OUTPATIENT)
Dept: SURGERY | Facility: HOSPITAL | Age: 83
End: 2023-11-09
Payer: MEDICARE

## 2023-11-09 VITALS
SYSTOLIC BLOOD PRESSURE: 82 MMHG | OXYGEN SATURATION: 100 % | HEART RATE: 69 BPM | HEART RATE: 66 BPM | OXYGEN SATURATION: 99 % | TEMPERATURE: 98 F | SYSTOLIC BLOOD PRESSURE: 93 MMHG | RESPIRATION RATE: 18 BRPM | DIASTOLIC BLOOD PRESSURE: 56 MMHG | RESPIRATION RATE: 15 BRPM | DIASTOLIC BLOOD PRESSURE: 43 MMHG

## 2023-11-09 DIAGNOSIS — K92.1 MELENA: ICD-10-CM

## 2023-11-09 LAB — GLUCOSE SERPL-MCNC: 89 MG/DL (ref 70–110)

## 2023-11-09 PROCEDURE — 63600175 PHARM REV CODE 636 W HCPCS: Performed by: NURSE ANESTHETIST, CERTIFIED REGISTERED

## 2023-11-09 PROCEDURE — D9220A PRA ANESTHESIA: ICD-10-PCS | Mod: CRNA,,, | Performed by: NURSE ANESTHETIST, CERTIFIED REGISTERED

## 2023-11-09 PROCEDURE — 25000003 PHARM REV CODE 250: Performed by: INTERNAL MEDICINE

## 2023-11-09 PROCEDURE — D9220A PRA ANESTHESIA: Mod: ANES,,, | Performed by: ANESTHESIOLOGY

## 2023-11-09 PROCEDURE — D9220A PRA ANESTHESIA: ICD-10-PCS | Mod: ANES,,, | Performed by: ANESTHESIOLOGY

## 2023-11-09 PROCEDURE — 43255 EGD CONTROL BLEEDING ANY: CPT | Performed by: INTERNAL MEDICINE

## 2023-11-09 PROCEDURE — D9220A PRA ANESTHESIA: Mod: CRNA,,, | Performed by: NURSE ANESTHETIST, CERTIFIED REGISTERED

## 2023-11-09 PROCEDURE — 45378 DIAGNOSTIC COLONOSCOPY: CPT | Performed by: INTERNAL MEDICINE

## 2023-11-09 PROCEDURE — 25000003 PHARM REV CODE 250: Performed by: NURSE ANESTHETIST, CERTIFIED REGISTERED

## 2023-11-09 PROCEDURE — 37000008 HC ANESTHESIA 1ST 15 MINUTES: Performed by: INTERNAL MEDICINE

## 2023-11-09 PROCEDURE — 27202049 HC PROBE, APC ERBE: Performed by: INTERNAL MEDICINE

## 2023-11-09 PROCEDURE — 37000009 HC ANESTHESIA EA ADD 15 MINS: Performed by: INTERNAL MEDICINE

## 2023-11-09 RX ORDER — PROPOFOL 10 MG/ML
VIAL (ML) INTRAVENOUS
Status: DISCONTINUED | OUTPATIENT
Start: 2023-11-09 | End: 2023-11-09

## 2023-11-09 RX ORDER — DEXTROMETHORPHAN/PSEUDOEPHED 2.5-7.5/.8
DROPS ORAL
Status: DISCONTINUED | OUTPATIENT
Start: 2023-11-09 | End: 2023-11-09 | Stop reason: HOSPADM

## 2023-11-09 RX ADMIN — PROPOFOL 80 MG: 10 INJECTION, EMULSION INTRAVENOUS at 11:11

## 2023-11-09 RX ADMIN — PROPOFOL 40 MG: 10 INJECTION, EMULSION INTRAVENOUS at 11:11

## 2023-11-09 RX ADMIN — SODIUM CHLORIDE: 0.9 INJECTION, SOLUTION INTRAVENOUS at 11:11

## 2023-11-09 NOTE — ANESTHESIA POSTPROCEDURE EVALUATION
Anesthesia Post Evaluation    Patient: Yeyo Hollingsworth    Procedure(s) Performed: Procedure(s) (LRB):  COLONOSCOPY (N/A)  EGD (ESOPHAGOGASTRODUODENOSCOPY) (N/A)    Final Anesthesia Type: general      Patient location during evaluation: GI PACU  Patient participation: Yes- Able to Participate  Level of consciousness: awake and alert and oriented  Post-procedure vital signs: reviewed and stable  Pain management: adequate  Airway patency: patent    PONV status at discharge: No PONV  Anesthetic complications: no      Cardiovascular status: blood pressure returned to baseline  Respiratory status: unassisted, spontaneous ventilation and room air  Hydration status: euvolemic  Follow-up not needed.          Vitals Value Taken Time   BP 93/56 11/09/23 1155   Temp 36.6 °C (97.8 °F) 11/09/23 1155   Pulse 68 11/09/23 1200   Resp 18 11/09/23 1155   SpO2 98 % 11/09/23 1200   Vitals shown include unvalidated device data.      No case tracking events are documented in the log.      Pain/Hector Score: No data recorded

## 2023-11-09 NOTE — ANESTHESIA PREPROCEDURE EVALUATION
11/09/2023  Yeyo Hollingsworth is a 83 y.o., male.        Patient Active Problem List   Diagnosis    Small bowel obstruction    History of DVT (deep vein thrombosis)    Controlled type 2 diabetes mellitus without complication, without long-term current use of insulin    Obesity, Class III, BMI 40-49.9 (morbid obesity)    Urethritis    Hematuria    Acute bronchitis with asthma    Encounter for comprehensive diabetic foot examination, type 2 diabetes mellitus    HTN (hypertension)    Slowing of urinary stream     Hyperlipemia    BPH (benign prostatic hyperplasia)    Constipation, chronic    Back pain    Essential hypertension    Equinus deformity of foot    Paroxysmal atrial fibrillation    Chronic sinus complaints    Spinal stenosis of lumbar region-dr redding    DDD (degenerative disc disease), lumbar    Chronic nonseasonal allergic rhinitis due to fungal spores    Anticoagulant long-term use    Lumbar radiculitis    Rib pain on right side    Anterolisthesis-lumbar L4/l5    Acute pain of left shoulder greater than right     Pain in both knees    History of surgery- left great toe    Positive colorectal cancer screening using Cologuard test    Lumbar radiculopathy    Primary insomnia    Accident due to mechanical fall without injury    Scalp laceration    Old lacunar stroke without late effect    Cervical stenosis of spine    New daily persistent headache    Chronic pain of left ankle    Sclerosing bone dysplasia    Chronic atrial fibrillation    Prerenal azotemia    Fatigue    Tendonitis of ankle, left    Peroneal tendon tear, left, subsequent encounter    Peroneal tendinitis of left lower extremity    Grade 2 ankle sprain, left, subsequent encounter    Rupture of peroneal tendon, left, subsequent encounter    Mid back pain on right side    CKD (chronic kidney  disease), stage III    PAF (paroxysmal atrial fibrillation)    BRANDI (obstructive sleep apnea)    Type 2 diabetes mellitus, without long-term current use of insulin    Hypophosphatemia    Skin ulcer of abdomen, limited to breakdown of skin       Past Surgical History:   Procedure Laterality Date    ABDOMINAL SURGERY      origunal surg 1986-mult surgeries since    CARPAL TUNNEL RELEASE      right wrist 2009-left wrist 2010    COLON SURGERY      partial colon removal    COLONOSCOPY  11/26/2018    Dr. Salazar; normal terminal ileum; polyps removed from ascending colon and hepatic flexure; pan diverticulosis; small internal hemorrhoids; repeat in 5 years; Bx: fragments of an adenomatous polyp with mild surface glandular dysplasia and associated chronic active inflammation, no evidence of high-grade dysplasia or malignancy    COLOSTOMY  1986    colostomy reversal  1986    EYE SURGERY      hay fever      HERNIA REPAIR  1949    REPAIR OF TENDON OF LOWER EXTREMITY Left 6/24/2020    Procedure: REPAIR, TENDON, LOWER EXTREMITY;  Surgeon: Justin Mandujano DPM;  [facility] OR;  Service: Podiatry;  Laterality: Left;  ARTHEX NOTIFIED IN CASE HE WANTS ANCHOR    TOE SURGERY Left 2017    replaced a joint     TONSILLECTOMY  1947    TRANSFORAMINAL EPIDURAL INJECTION OF STEROID      x 4    TRANSFORAMINAL EPIDURAL INJECTION OF STEROID Bilateral 11/13/2019    Procedure: Injection,steroid,epidural,transforaminal approach;  Surgeon: Grant Wright MD;  [place] [facility] OR;  Service: Pain Management;  Laterality: Bilateral;  L4-5, L5-S1    TRANSFORAMINAL EPIDURAL INJECTION OF STEROID Bilateral 3/9/2021    Procedure: Injection,steroid,epidural,transforaminal approach;  Surgeon: Grant Wright MD;  [place] [facility] OR;  Service: Pain Management;  Laterality: Bilateral;  L4-5, L5-S1    TRANSFORAMINAL EPIDURAL INJECTION OF STEROID Bilateral 5/25/2021    Procedure: Injection,steroid,epidural,transforaminal approach;  Surgeon:  Grant Wright MD;  [place] [facility] OR;  Service: Pain Management;  Laterality: Bilateral;  L4-L5,L5,S1    TRANSFORAMINAL EPIDURAL INJECTION OF STEROID Bilateral 12/14/2021    Procedure: Injection,steroid,epidural,transforaminal approach Bilateral L4-5, L5-S1;  Surgeon: Grant Wright MD;  Location: [facility] OR;  Service: Pain Management;  Laterality: Bilateral;    TRANSFORAMINAL EPIDURAL INJECTION OF STEROID Bilateral 11/4/2022    Procedure: Injection,steroid,epidural,transforaminal approach;  Surgeon: Grant Wright MD;  Location: [facility] OR;  Service: Pain Management;  Laterality: Bilateral;  L4-5 and L5-s1    TRANSFORAMINAL EPIDURAL INJECTION OF STEROID Bilateral 1/20/2023    Procedure: Injection,steroid,epidural,transforaminal approach L4-L5-S1;  Surgeon: Grant Wright MD;  Location: [facility] OR;  Service: Pain Management;  Laterality: Bilateral;    TRANSFORAMINAL EPIDURAL INJECTION OF STEROID Bilateral 6/1/2023    Procedure: Injection,steroid,epidural,transforaminal approach L4-L5, L5-S1;  Surgeon: Grant Wright MD;  Location: [facility] OR;  Service: Pain Management;  Laterality: Bilateral;        Tobacco Use:  The patient  reports that he quit smoking about 17 years ago. His smoking use included cigarettes. He has never used smokeless tobacco.     Results for orders placed or performed during the hospital encounter of 11/08/23   EKG 12-lead    Collection Time: 11/08/23  8:27 AM    Narrative    Test Reason : Z01.818,    Vent. Rate : 049 BPM     Atrial Rate : 049 BPM     P-R Int : 184 ms          QRS Dur : 092 ms      QT Int : 408 ms       P-R-T Axes : 060 009 034 degrees     QTc Int : 368 ms    Sinus bradycardia  Otherwise normal ECG  When compared with ECG of 01-FEB-2023 23:46,  Vent. rate has decreased BY  28 BPM    Referred By:             Confirmed By:              Lab Results   Component Value Date    WBC 5.07 10/31/2023    HGB 12.8 (L) 10/31/2023    HCT 41.5 10/31/2023     (H) 10/31/2023     10/31/2023     BMP  Lab  Results   Component Value Date     11/08/2023    K 4.4 11/08/2023     11/08/2023    CO2 32 (H) 11/08/2023    BUN 36 (H) 11/08/2023    CREATININE 1.2 11/08/2023    CALCIUM 8.9 11/08/2023    ANIONGAP 2 (L) 11/08/2023     11/08/2023    GLU 92 02/03/2023     (H) 02/02/2023       Results for orders placed during the hospital encounter of 05/20/21    Echo Color Flow Doppler? Yes    Interpretation Summary  · Mild concentric hypertrophy and normal systolic function.  · The estimated ejection fraction is 66%.  · Normal left ventricular diastolic function.  · Normal right ventricular size with normal right ventricular systolic function.  · Mild tricuspid regurgitation.  · Normal central venous pressure (3 mmHg).  · The estimated PA systolic pressure is 21 mmHg.            Pre-op Assessment    I have reviewed the Patient Summary Reports.     I have reviewed the Nursing Notes. I have reviewed the NPO Status.   I have reviewed the Medications.     Review of Systems  Anesthesia Hx:  No problems with previous Anesthesia  Denies Family Hx of Anesthesia complications.   Denies Personal Hx of Anesthesia complications.   Social:  Former Smoker    Hematology/Oncology:  Hematology Normal      Hematology Comments: Hx of DVT   Cardiovascular:   Hypertension, well controlled Dysrhythmias (paroxysmal AF) hyperlipidemia    Pulmonary:   Asthma mild Sleep Apnea, CPAP    Renal/:   Chronic Renal Disease (stage 3 CRI), CKD BPH    Hepatic/GI:  Hepatic/GI Normal    Musculoskeletal:  Spine Disorders: (chronic back pain with occ leg symptoms) lumbar Degenerative disease and Disc disease    Neurological:   Headaches (chronic headaches)   Chronic Pain Syndrome (on chronic opioids for back pain)   Endocrine:   Diabetes, poorly controlled, type 2  Morbid Obesity / BMI > 40      Physical Exam  General: Well nourished, Cooperative, Alert and Oriented    Airway:  Mallampati: III / III  Mouth Opening: Normal  TM Distance:  Normal  Tongue: Normal  Neck ROM: Normal ROM    Dental:  Partial Dentures  Upper partial denture  Chest/Lungs:  Clear to auscultation    Heart:  Rate: Normal  Rhythm: Regular Rhythm  Sounds: Normal    Abdomen:  Normal, Soft, Nontender        Anesthesia Plan  Type of Anesthesia, risks & benefits discussed:    Anesthesia Type: Gen Natural Airway  Intra-op Monitoring Plan: Standard ASA Monitors  Post Op Pain Control Plan:   (medical reason for not using multimodal pain management)  Induction:  IV  Informed Consent: Informed consent signed with the Patient and all parties understand the risks and agree with anesthesia plan.  All questions answered. Patient consented to blood products? No  ASA Score: 3  Anesthesia Plan Notes:   General Natural Airway  POM  Propofol  Zofran    Ready For Surgery From Anesthesia Perspective.     .

## 2023-11-09 NOTE — PROVATION PATIENT INSTRUCTIONS
Discharge Summary/Instructions after an Endoscopic Procedure  Patient Name: Yeyo Hollingsworth  Patient MRN: [id number]  Patient YOB: 1940 Thursday, November 9, 2023  Prasanth Irene MD  RESTRICTIONS:  During your procedure today, you received medications for sedation.  These   medications may affect your judgment, balance and coordination.  Therefore,   for 24 hours, you have the following restrictions:   - DO NOT drive a car, operate machinery, make legal/financial decisions,   sign important papers or drink alcohol.    ACTIVITY:  Today: no heavy lifting, straining or running due to procedural   sedation/anesthesia.  The following day: return to full activity including work.  DIET:  Eat and drink normally unless instructed otherwise.     TREATMENT FOR COMMON SIDE EFFECTS:  - Mild abdominal pain, nausea, belching, bloating or excessive gas:  rest,   eat lightly and use a heating pad.  - Sore Throat: treat with throat lozenges and/or gargle with warm salt   water.  - Because air was used during the procedure, expelling large amounts of air   from your rectum or belching is normal.  - If a bowel prep was taken, you may not have a bowel movement for 1-3 days.    This is normal.  SYMPTOMS TO WATCH FOR AND REPORT TO YOUR PHYSICIAN:  1. Abdominal pain or bloating, other than gas cramps.  2. Chest pain.  3. Back pain.  4. Signs of infection such as: chills or fever occurring within 24 hours   after the procedure.  5. Rectal bleeding, which would show as bright red, maroon, or black stools.   (A tablespoon of blood from the rectum is not serious, especially if   hemorrhoids are present.)  6. Vomiting.  7. Weakness or dizziness.  GO DIRECTLY TO THE NEAREST EMERGENCY ROOM IF YOU HAVE ANY OF THE FOLLOWING:      Difficulty breathing              Chills and/or fever over 101 F   Persistent vomiting and/or vomiting blood   Severe abdominal pain   Severe chest pain   Black, tarry stools   Bleeding- more than one  tablespoon   Any other symptom or condition that you feel may need urgent attention  Your doctor recommends these additional instructions:  If any biopsies were taken, your doctors clinic will contact you in 1 to 2   weeks with any results.  - Patient has a contact number available for emergencies.  The signs and   symptoms of potential delayed complications were discussed with the   patient.  Return to normal activities tomorrow.  Written discharge   instructions were provided to the patient.   - Resume previous diet.   - Continue present medications.   - No repeat colonoscopy due to age.   - Discharge patient to home (with escort).  For questions, problems or results please call your physician - Prasanth Irene MD at Work:  [phone].  [facility], EMERGENCY ROOM PHONE NUMBER: [phone]  IF A COMPLICATION OR EMERGENCY SITUATION ARISES AND YOU ARE UNABLE TO REACH   YOUR PHYSICIAN - GO DIRECTLY TO THE EMERGENCY ROOM.  MD Prasanth Briones MD  11/9/2023 11:39:50 AM  This report has been verified and signed electronically.  Dear patient,  As a result of recent federal legislation (The Federal Cures Act), you may   receive lab or pathology results from your procedure in your [facility]   account before your physician is able to contact you. Your physician or   their representative will relay the results to you with their   recommendations at their soonest availability.  Thank you,  PROVATION

## 2023-11-09 NOTE — TRANSFER OF CARE
Anesthesia Transfer of Care Note    Patient: Yeyo Hollingsworth    Procedure(s) Performed: Procedure(s) (LRB):  COLONOSCOPY (N/A)  EGD (ESOPHAGOGASTRODUODENOSCOPY) (N/A)    Patient location: GI    Anesthesia Type: general    Transport from OR: Transported from OR on 2-3 L/min O2 by NC with adequate spontaneous ventilation    Post pain: adequate analgesia    Post assessment: no apparent anesthetic complications    Post vital signs: stable    Level of consciousness: awake    Nausea/Vomiting: no nausea/vomiting    Complications: none    Transfer of care protocol was followed      Last vitals:   Visit Vitals  /61 (BP Location: Left arm, Patient Position: Sitting)   Pulse 66   Temp 37.3 °C (99.1 °F) (Oral)   Resp 17   SpO2 99%

## 2023-11-09 NOTE — PROVATION PATIENT INSTRUCTIONS
Discharge Summary/Instructions after an Endoscopic Procedure  Patient Name: Yeyo Hollingsworth  Patient MRN: [id number]  Patient YOB: 1940 Thursday, November 9, 2023  Prasanth Irene MD  RESTRICTIONS:  During your procedure today, you received medications for sedation.  These   medications may affect your judgment, balance and coordination.  Therefore,   for 24 hours, you have the following restrictions:   - DO NOT drive a car, operate machinery, make legal/financial decisions,   sign important papers or drink alcohol.    ACTIVITY:  Today: no heavy lifting, straining or running due to procedural   sedation/anesthesia.  The following day: return to full activity including work.  DIET:  Eat and drink normally unless instructed otherwise.     TREATMENT FOR COMMON SIDE EFFECTS:  - Mild abdominal pain, nausea, belching, bloating or excessive gas:  rest,   eat lightly and use a heating pad.  - Sore Throat: treat with throat lozenges and/or gargle with warm salt   water.  - Because air was used during the procedure, expelling large amounts of air   from your rectum or belching is normal.  - If a bowel prep was taken, you may not have a bowel movement for 1-3 days.    This is normal.  SYMPTOMS TO WATCH FOR AND REPORT TO YOUR PHYSICIAN:  1. Abdominal pain or bloating, other than gas cramps.  2. Chest pain.  3. Back pain.  4. Signs of infection such as: chills or fever occurring within 24 hours   after the procedure.  5. Rectal bleeding, which would show as bright red, maroon, or black stools.   (A tablespoon of blood from the rectum is not serious, especially if   hemorrhoids are present.)  6. Vomiting.  7. Weakness or dizziness.  GO DIRECTLY TO THE NEAREST EMERGENCY ROOM IF YOU HAVE ANY OF THE FOLLOWING:      Difficulty breathing              Chills and/or fever over 101 F   Persistent vomiting and/or vomiting blood   Severe abdominal pain   Severe chest pain   Black, tarry stools   Bleeding- more than one  tablespoon   Any other symptom or condition that you feel may need urgent attention  Your doctor recommends these additional instructions:  If any biopsies were taken, your doctors clinic will contact you in 1 to 2   weeks with any results.  - Patient has a contact number available for emergencies.  The signs and   symptoms of potential delayed complications were discussed with the   patient.  Return to normal activities tomorrow.  Written discharge   instructions were provided to the patient.   - Resume previous diet.   - Continue present medications. \par- Resume eliquis in 36 hours.  - Discharge patient to home (with escort).  For questions, problems or results please call your physician - Prasanth Irene MD at Work:  [phone].  [facility], EMERGENCY ROOM PHONE NUMBER: [phone]  IF A COMPLICATION OR EMERGENCY SITUATION ARISES AND YOU ARE UNABLE TO REACH   YOUR PHYSICIAN - GO DIRECTLY TO THE EMERGENCY ROOM.  MD Prasanth Briones MD  11/9/2023 11:42:10 AM  This report has been verified and signed electronically.  Dear patient,  As a result of recent federal legislation (The Federal Cures Act), you may   receive lab or pathology results from your procedure in your [facility]   account before your physician is able to contact you. Your physician or   their representative will relay the results to you with their   recommendations at their soonest availability.  Thank you,  PROVATION

## 2023-11-09 NOTE — H&P
GASTROENTEROLOGY PRE-PROCEDURE H&P NOTE  Patient Name: Yeyo Hollingsworth  Patient MRN: [id number]  Patient : 1940    Service date: 2023    PCP: Garland Ocampo MD    No chief complaint on file.      HPI: Patient is a 83 y.o. male with PMHx as below here for evaluation of       Yeyo Hollingsworth is a 83 year old male patient who is seen today for an office visit. 83 M h/o positive cologuard presenting for evaluation of reported dark black stool.  has 2 per day. small in caliber sometimes soft.  no weight loss. no abd pain. prior h/o sbo in setting of abd wall hernia. cscope 2018 as below and now due for scope.  using 400 mg ibuprofen daily. on eliquis        Chart Review      2018 Colon- polyps, repeat 5 yrs.    2018 HPI  78 year old male with history of BRANDI on CPAP, diverticulitis with perforation and partial colectomy, subsequent bowel perf  then  and  per patient. Had a wound vac in .  sb obstruction.  blood clot in leg.  sb obstruction. 2016 afib.    Cscope 10 years ago done somewhere here in [place] but no records. STates had 8 polyps removed but results unknown. Now reports fluctuation in bowel habits with primary constipation and hard stools with occasional blood per rectum. Has tried miralax in the past which does work.    Dr Ward ordered a cologuard which was positive.      CHART REVIEW  CT ABD - sb obstruction without definitive mass lesion obstructing. stomach wall thickening. fatty liver.  CBC 10/2018  Hgb 14  Plt normal.     Past Medical History:  Past Medical History:   Diagnosis Date    Allergy     Ankle pain     left    Anticoagulant long-term use     aspirin    Anxiety     Atrial fibrillation     Back pain     Bruises easily     Cataract     Clotting disorder     left leg    Colon polyp     Diabetes mellitus     Diabetes mellitus, type 2     Diverticulitis     Hay fever     Hyperlipidemia     Hypertension     Left hip pain 2021    Seeing Dr. Beard  for hip pain     Obese     BRANDI on CPAP         Past Surgical History:  Past Surgical History:   Procedure Laterality Date    ABDOMINAL SURGERY      origunal surg 1986-mult surgeries since    CARPAL TUNNEL RELEASE      right wrist 2009-left wrist 2010    COLON SURGERY      partial colon removal    COLONOSCOPY  11/26/2018    Dr. Salazar; normal terminal ileum; polyps removed from ascending colon and hepatic flexure; pan diverticulosis; small internal hemorrhoids; repeat in 5 years; Bx: fragments of an adenomatous polyp with mild surface glandular dysplasia and associated chronic active inflammation, no evidence of high-grade dysplasia or malignancy    COLOSTOMY  1986    colostomy reversal  1986    EYE SURGERY      hay fever      HERNIA REPAIR  1949    REPAIR OF TENDON OF LOWER EXTREMITY Left 6/24/2020    Procedure: REPAIR, TENDON, LOWER EXTREMITY;  Surgeon: Justin Mandujano DPM;  [place] [facility] OR;  Service: Podiatry;  Laterality: Left;  ARTHEX NOTIFIED IN CASE HE WANTS ANCHOR    TOE SURGERY Left 2017    replaced a joint     TONSILLECTOMY  1947    TRANSFORAMINAL EPIDURAL INJECTION OF STEROID      x 4    TRANSFORAMINAL EPIDURAL INJECTION OF STEROID Bilateral 11/13/2019    Procedure: Injection,steroid,epidural,transforaminal approach;  Surgeon: Grant Wright MD;  Location: [facility] include 234 beds at the [facility] OR;  Service: Pain Management;  Laterality: Bilateral;  L4-5, L5-S1    TRANSFORAMINAL EPIDURAL INJECTION OF STEROID Bilateral 3/9/2021    Procedure: Injection,steroid,epidural,transforaminal approach;  Surgeon: Grant Wright MD;  Location: [facility] include 234 beds at the [facility] OR;  Service: Pain Management;  Laterality: Bilateral;  L4-5, L5-S1    TRANSFORAMINAL EPIDURAL INJECTION OF STEROID Bilateral 5/25/2021    Procedure: Injection,steroid,epidural,transforaminal approach;  Surgeon: Grant Wright MD;  Location: [facility] include 234 beds at the [facility] OR;  Service: Pain Management;  Laterality: Bilateral;  L4-L5,L5,S1    TRANSFORAMINAL EPIDURAL INJECTION OF STEROID Bilateral 12/14/2021    Procedure:  Injection,steroid,epidural,transforaminal approach Bilateral L4-5, L5-S1;  Surgeon: Grant Wright MD;  Location: [facility] OR;  Service: Pain Management;  Laterality: Bilateral;    TRANSFORAMINAL EPIDURAL INJECTION OF STEROID Bilateral 11/4/2022    Procedure: Injection,steroid,epidural,transforaminal approach;  Surgeon: Grant Wright MD;  Location: [facility] OR;  Service: Pain Management;  Laterality: Bilateral;  L4-5 and L5-s1    TRANSFORAMINAL EPIDURAL INJECTION OF STEROID Bilateral 1/20/2023    Procedure: Injection,steroid,epidural,transforaminal approach L4-L5-S1;  Surgeon: Grant Wright MD;  Location: [facility] OR;  Service: Pain Management;  Laterality: Bilateral;    TRANSFORAMINAL EPIDURAL INJECTION OF STEROID Bilateral 6/1/2023    Procedure: Injection,steroid,epidural,transforaminal approach L4-L5, L5-S1;  Surgeon: Grant Wright MD;  Location: [facility] OR;  Service: Pain Management;  Laterality: Bilateral;        Home Medications:  Medications Prior to Admission   Medication Sig Dispense Refill Last Dose    apixaban (ELIQUIS) 5 mg Tab Take 1 tablet (5 mg total) by mouth 2 (two) times daily. 180 tablet 3 11/7/2023    aspirin 81 MG Chew Take 1 tablet (81 mg total) by mouth once daily. 100 tablet 2 11/8/2023    atorvastatin (LIPITOR) 10 MG tablet Take 1 tablet (10 mg total) by mouth once daily. 90 tablet 3 11/8/2023    azelastine (ASTELIN) 137 mcg (0.1 %) nasal spray 2 sprays (274 mcg total) by Nasal route 2 (two) times daily. 30 mL 5 11/8/2023    blood sugar diagnostic (BLOOD GLUCOSE TEST) Strp 1 strip by Misc.(Non-Drug; Combo Route) route 2 (two) times a day. FREESTYLE LITE BG TEST STRIPS. current use of insulin  - Primary ICD-10-CM: E11.9 200 each 2 11/8/2023    diclofenac sodium (VOLTAREN) 1 % Gel Apply 2 g topically once daily. 100 g 2 11/8/2023    diphenhydrAMINE-zinc acetate 1-0.1% (BENADRYL ITCH STOPPING) cream Apply topically 3 (three) times daily as needed for Itching. 28 g 0 11/8/2023    fexofenadine (ALLEGRA) 180 MG tablet  Take 1 tablet (180 mg total) by mouth once daily. 90 tablet 3 11/8/2023    FREESTYLE LANCETS 28 gauge lancets Inject 1 lancet into the skin 3 (three) times daily. 100 each 3 11/8/2023    furosemide (LASIX) 20 MG tablet Take 1 tablet (20 mg total) by mouth every Mon, Wed, Fri. 45 tablet 3 11/8/2023    gabapentin (NEURONTIN) 300 MG capsule Take 1 capsule (300 mg total) by mouth 3 (three) times daily. 270 capsule 1 11/8/2023    HYDROcodone-acetaminophen (NORCO) 5-325 mg per tablet Take 1 tablet by mouth every 6 (six) hours as needed for Pain. 90 tablet 0 11/8/2023    JARDIANCE 10 mg tablet Take 1 tablet (10 mg total) by mouth once daily. 90 tablet 1 11/8/2023    ketoconazole (NIZORAL) 2 % cream Apply topically once daily. 60 g 1 11/8/2023    lisinopriL (PRINIVIL,ZESTRIL) 20 MG tablet Take 1 tablet (20 mg total) by mouth once daily. 90 tablet 3 11/8/2023    metoprolol tartrate (LOPRESSOR) 25 MG tablet Take 0.5 tablets (12.5 mg total) by mouth 2 (two) times daily. 180 tablet 3 11/8/2023    montelukast (SINGULAIR) 10 mg tablet Take 1 tablet (10 mg total) by mouth every evening. 90 tablet 3 11/8/2023    multivitamin with minerals tablet Take 1 tablet by mouth once daily.   11/8/2023    polyethylene glycol (GLYCOLAX) 17 gram/dose powder Take 17 g by mouth once daily. 507 g 3 11/8/2023    potassium chloride (MICRO-K) 10 MEQ CpSR Take 1 capsule (10 mEq total) by mouth every other day. 90 capsule 1 11/8/2023    SITagliptin phosphate (JANUVIA) 100 MG Tab Take 1 tablet (100 mg total) by mouth once daily. 90 tablet 3 11/8/2023    traZODone (DESYREL) 100 MG tablet Take 1 tablet (100 mg total) by mouth every evening. 90 tablet 3 11/8/2023    triamcinolone acetonide 0.1% (KENALOG) 0.1 % cream Apply topically 2 (two) times daily. 80 g 0 11/8/2023    clotrimazole (LOTRIMIN) 1 % cream Apply topically 2 (two) times daily. 60 g 10                Review of patient's allergies indicates:   Allergen Reactions    Ativan [lorazepam] Other  "(See Comments)     Mood alternating      Dilaudid [hydromorphone (bulk)] Other (See Comments)     Mood alternating      Morphine Other (See Comments)     Mood alternating      Adhesive tape-silicones     Hydromorphone        Social History:   Social History     Occupational History    Not on file   Tobacco Use    Smoking status: Former     Current packs/day: 0.00     Types: Cigarettes     Quit date: 2006     Years since quittin.7    Smokeless tobacco: Never    Tobacco comments:     ex smoker    Substance and Sexual Activity    Alcohol use: Yes     Comment: rarely    Drug use: No    Sexual activity: Yes     Partners: Female       Family History:   Family History   Problem Relation Age of Onset    Heart disease Mother     Melanoma Neg Hx     Psoriasis Neg Hx     Lupus Neg Hx     Eczema Neg Hx        Review of Systems:  A 10 point review of systems was performed and was normal, except as mentioned in the HPI, including constitutional, HEENT, heme, lymph, cardiovascular, respiratory, gastrointestinal, genitourinary, neurologic, endocrine, psychiatric and musculoskeletal.      OBJECTIVE:    Physical Exam:  24 Hour Vital Sign Ranges: Temp:  [99.1 °F (37.3 °C)] 99.1 °F (37.3 °C)  Pulse:  [66] 66  Resp:  [17] 17  SpO2:  [99 %] 99 %  BP: (104)/(61) 104/61  Most recent vitals: /61 (BP Location: Left arm, Patient Position: Sitting)   Pulse 66   Temp 99.1 °F (37.3 °C) (Oral)   Resp 17   SpO2 99%    GEN: well-developed, well-nourished, awake and alert, non-toxic appearing adult  HEENT: PERRL, sclera anicteric, oral mucosa pink and moist without lesion  NECK: trachea midline; Good ROM  CV: regular rate and rhythm, no murmurs or gallops  RESP: clear to auscultation bilaterally, no wheezes, rhonci or rales  ABD: soft, non-tender, non-distended, normal bowel sounds  EXT: no swelling or edema, 2+ pulses distally  SKIN: no rashes or jaundice  PSYCH: normal affect    Labs:   No results for input(s): "WBC", " ""MCV", "PLT" in the last 72 hours.    Invalid input(s): "HGBAU"  Recent Labs     11/08/23  0904      K 4.4      CO2 32*   BUN 36*        No results for input(s): "ALB" in the last 72 hours.    Invalid input(s): "ALKP", "SGOT", "SGPT", "TBIL", "DBIL", "TPRO"  No results for input(s): "PT", "INR", "PTT" in the last 72 hours.      IMPRESSION / RECOMMENDATIONS:  Possible melena  H/o polyps  cscope  with interventions as warranted.   RIsks, benefits, alternatives discussed in detail regarding upcoming procedures and sedation. Some of the more common endoscopic complications include but not limited to immediate or delayed perforation, bleeding, infections, pain, inadvertent injury to surrounding tissue / organs and possible need for surgical evaluation. Patient expressed understanding, all questions answered and will proceed with procedure as planned.     Prasanth Irene  11/9/2023  11:11 AM      "

## 2023-12-18 DIAGNOSIS — J30.9 ALLERGIC RHINITIS, UNSPECIFIED SEASONALITY, UNSPECIFIED TRIGGER: ICD-10-CM

## 2023-12-18 DIAGNOSIS — G89.4 CHRONIC PAIN SYNDROME: ICD-10-CM

## 2023-12-18 DIAGNOSIS — M19.90 ARTHRITIS: ICD-10-CM

## 2023-12-18 DIAGNOSIS — I10 ESSENTIAL HYPERTENSION: ICD-10-CM

## 2023-12-19 RX ORDER — FUROSEMIDE 20 MG/1
20 TABLET ORAL
Qty: 45 TABLET | Refills: 3 | Status: SHIPPED | OUTPATIENT
Start: 2023-12-20 | End: 2024-12-19

## 2023-12-19 RX ORDER — AZELASTINE 1 MG/ML
2 SPRAY, METERED NASAL 2 TIMES DAILY
Qty: 30 ML | Refills: 5 | Status: SHIPPED | OUTPATIENT
Start: 2023-12-19

## 2023-12-19 RX ORDER — HYDROCODONE BITARTRATE AND ACETAMINOPHEN 5; 325 MG/1; MG/1
1 TABLET ORAL EVERY 6 HOURS PRN
Qty: 90 TABLET | Refills: 0 | Status: SHIPPED | OUTPATIENT
Start: 2023-12-19 | End: 2024-03-10 | Stop reason: SDUPTHER

## 2023-12-21 ENCOUNTER — TELEPHONE (OUTPATIENT)
Dept: PAIN MEDICINE | Facility: CLINIC | Age: 83
End: 2023-12-21
Payer: MEDICARE

## 2023-12-21 DIAGNOSIS — M54.16 LUMBAR RADICULITIS: Primary | ICD-10-CM

## 2023-12-21 NOTE — TELEPHONE ENCOUNTER
TFESI at L4-L5, L5-S1. Can call to schedule repeat procedure if pain ok to schedule a repeat injection?

## 2023-12-21 NOTE — TELEPHONE ENCOUNTER
----- Message from Kendra Ford sent at 12/21/2023  8:55 AM CST -----  Regarding: advice  Contact: Yeyo [phone]  Type: Needs Medical Advice    Who Called:  Yeyo Chu Call Back Number: [phone]    Additional Information: pain in legs, would like to set up procedure. Please call to advise

## 2023-12-26 NOTE — TELEPHONE ENCOUNTER
100% relief x 3 months and then 70% after 3 months till 6 months later booked for TFESI 01/18     Pt is on Eliquis can  he hold this x 3 days prior to his epidural procedure?

## 2024-01-02 DIAGNOSIS — E11.9 CONTROLLED TYPE 2 DIABETES MELLITUS WITHOUT COMPLICATION, WITHOUT LONG-TERM CURRENT USE OF INSULIN: ICD-10-CM

## 2024-01-02 DIAGNOSIS — I10 ESSENTIAL HYPERTENSION: ICD-10-CM

## 2024-01-02 DIAGNOSIS — E11.9 TYPE 2 DIABETES MELLITUS WITHOUT COMPLICATION, WITHOUT LONG-TERM CURRENT USE OF INSULIN: Chronic | ICD-10-CM

## 2024-01-02 DIAGNOSIS — E78.5 HYPERLIPIDEMIA, UNSPECIFIED HYPERLIPIDEMIA TYPE: Chronic | ICD-10-CM

## 2024-01-02 DIAGNOSIS — K59.04 CHRONIC IDIOPATHIC CONSTIPATION: ICD-10-CM

## 2024-01-03 RX ORDER — POLYETHYLENE GLYCOL 3350 17 G/17G
17 POWDER, FOR SOLUTION ORAL DAILY
Qty: 507 G | Refills: 3 | Status: SHIPPED | OUTPATIENT
Start: 2024-01-03

## 2024-01-03 RX ORDER — GUAIFENESIN 100 MG/5ML
81 LIQUID (ML) ORAL DAILY
Qty: 100 TABLET | Refills: 2 | Status: SHIPPED | OUTPATIENT
Start: 2024-01-03 | End: 2025-01-02

## 2024-01-03 RX ORDER — EMPAGLIFLOZIN 10 MG/1
10 TABLET, FILM COATED ORAL DAILY
Qty: 90 TABLET | Refills: 1 | Status: SHIPPED | OUTPATIENT
Start: 2024-01-03

## 2024-01-04 ENCOUNTER — OFFICE VISIT (OUTPATIENT)
Dept: CARDIOLOGY | Facility: CLINIC | Age: 84
End: 2024-01-04
Payer: MEDICARE

## 2024-01-04 VITALS
SYSTOLIC BLOOD PRESSURE: 128 MMHG | WEIGHT: 230 LBS | BODY MASS INDEX: 34.86 KG/M2 | OXYGEN SATURATION: 96 % | HEART RATE: 62 BPM | DIASTOLIC BLOOD PRESSURE: 78 MMHG | RESPIRATION RATE: 16 BRPM | HEIGHT: 68 IN

## 2024-01-04 DIAGNOSIS — E11.69 TYPE 2 DIABETES MELLITUS WITH OTHER SPECIFIED COMPLICATION, WITHOUT LONG-TERM CURRENT USE OF INSULIN: ICD-10-CM

## 2024-01-04 DIAGNOSIS — E78.2 MIXED HYPERLIPIDEMIA: ICD-10-CM

## 2024-01-04 DIAGNOSIS — E66.01 SEVERE OBESITY: ICD-10-CM

## 2024-01-04 DIAGNOSIS — I48.0 PAF (PAROXYSMAL ATRIAL FIBRILLATION): Primary | Chronic | ICD-10-CM

## 2024-01-04 DIAGNOSIS — I10 ESSENTIAL HYPERTENSION: ICD-10-CM

## 2024-01-04 DIAGNOSIS — E78.00 HYPERCHOLESTEREMIA: ICD-10-CM

## 2024-01-04 DIAGNOSIS — R60.0 BILATERAL LOWER EXTREMITY EDEMA: ICD-10-CM

## 2024-01-04 DIAGNOSIS — G89.29 CHRONIC MIDLINE LOW BACK PAIN, UNSPECIFIED WHETHER SCIATICA PRESENT: ICD-10-CM

## 2024-01-04 DIAGNOSIS — M54.50 CHRONIC MIDLINE LOW BACK PAIN, UNSPECIFIED WHETHER SCIATICA PRESENT: ICD-10-CM

## 2024-01-04 DIAGNOSIS — G47.33 OSA (OBSTRUCTIVE SLEEP APNEA): ICD-10-CM

## 2024-01-04 PROCEDURE — 99215 OFFICE O/P EST HI 40 MIN: CPT | Mod: PBBFAC,PN | Performed by: INTERNAL MEDICINE

## 2024-01-04 PROCEDURE — 99214 OFFICE O/P EST MOD 30 MIN: CPT | Mod: S$PBB,,, | Performed by: INTERNAL MEDICINE

## 2024-01-04 PROCEDURE — 99999 PR PBB SHADOW E&M-EST. PATIENT-LVL V: CPT | Mod: PBBFAC,,, | Performed by: INTERNAL MEDICINE

## 2024-01-04 NOTE — H&P (VIEW-ONLY)
Subjective:    Patient ID:  Yeyo Hollingsworth is a 83 y.o. male     Chief Complaint   Patient presents with    Atrial Fibrillation    Hypertension       HPI:  Mr Yeyo Hollingsworth is a 83 y.o. male is here for follow-up.    Patient has been doing well no specific complaints at the present time.  His breathing has been stable denies any chest pain or tightness or heaviness denies any dizziness or lightheadedness denies any loss of consciousness falls or head injury.    He is taking all his medications regularly.        Review of patient's allergies indicates:   Allergen Reactions    Ativan [lorazepam] Other (See Comments)     Mood alternating      Dilaudid [hydromorphone (bulk)] Other (See Comments)     Mood alternating      Morphine Other (See Comments)     Mood alternating      Adhesive tape-silicones     Hydromorphone        Past Medical History:   Diagnosis Date    Allergy     Ankle pain     left    Anticoagulant long-term use     aspirin    Anxiety     Atrial fibrillation     Back pain     Bruises easily     Cataract     Clotting disorder     left leg    Colon polyp     Diabetes mellitus     Diabetes mellitus, type 2     Diverticulitis 1986    Hay fever     Hyperlipidemia     Hypertension     Left hip pain 12/26/2021    Seeing Dr. Beard for hip pain     Obese     BRANDI on CPAP      Past Surgical History:   Procedure Laterality Date    ABDOMINAL SURGERY      origunal surg 1986-mult surgeries since    CARPAL TUNNEL RELEASE      right wrist 2009-left wrist 2010    COLON SURGERY      partial colon removal    COLONOSCOPY  11/26/2018    Dr. Salazar; normal terminal ileum; polyps removed from ascending colon and hepatic flexure; pan diverticulosis; small internal hemorrhoids; repeat in 5 years; Bx: fragments of an adenomatous polyp with mild surface glandular dysplasia and associated chronic active inflammation, no evidence of high-grade dysplasia or malignancy    COLONOSCOPY N/A 11/9/2023    Procedure: COLONOSCOPY;   Surgeon: Prasanth Irene MD;  Location: [facility] ENDO;  Service: Endoscopy;  Laterality: N/A;    COLOSTOMY  1986    colostomy reversal  1986    ESOPHAGOGASTRODUODENOSCOPY N/A 11/9/2023    Procedure: EGD (ESOPHAGOGASTRODUODENOSCOPY);  Surgeon: Prasanth Irene MD;  Location: [facility] ENDO;  Service: Endoscopy;  Laterality: N/A;    EYE SURGERY      hay fever      HERNIA REPAIR  1949    REPAIR OF TENDON OF LOWER EXTREMITY Left 6/24/2020    Procedure: REPAIR, TENDON, LOWER EXTREMITY;  Surgeon: Justin Mandujano DPM;  [place] [facility] OR;  Service: Podiatry;  Laterality: Left;  ARTHEX NOTIFIED IN CASE HE WANTS ANCHOR    TOE SURGERY Left 2017    replaced a joint     TONSILLECTOMY  1947    TRANSFORAMINAL EPIDURAL INJECTION OF STEROID      x 4    TRANSFORAMINAL EPIDURAL INJECTION OF STEROID Bilateral 11/13/2019    Procedure: Injection,steroid,epidural,transforaminal approach;  Surgeon: Grant Wright MD;  Location: [facility] OR;  Service: Pain Management;  Laterality: Bilateral;  L4-5, L5-S1    TRANSFORAMINAL EPIDURAL INJECTION OF STEROID Bilateral 3/9/2021    Procedure: Injection,steroid,epidural,transforaminal approach;  Surgeon: Grant Wright MD;  Location: [facility] OR;  Service: Pain Management;  Laterality: Bilateral;  L4-5, L5-S1    TRANSFORAMINAL EPIDURAL INJECTION OF STEROID Bilateral 5/25/2021    Procedure: Injection,steroid,epidural,transforaminal approach;  Surgeon: Grant Wright MD;  Location: [facility] OR;  Service: Pain Management;  Laterality: Bilateral;  L4-L5,L5,S1    TRANSFORAMINAL EPIDURAL INJECTION OF STEROID Bilateral 12/14/2021    Procedure: Injection,steroid,epidural,transforaminal approach Bilateral L4-5, L5-S1;  Surgeon: Grant Wright MD;  Location: [facility] OR;  Service: Pain Management;  Laterality: Bilateral;    TRANSFORAMINAL EPIDURAL INJECTION OF STEROID Bilateral 11/4/2022    Procedure: Injection,steroid,epidural,transforaminal approach;  Surgeon: Grant Wright MD;  Location: [facility] OR;  Service: Pain Management;  Laterality:  Bilateral;  L4-5 and L5-s1    TRANSFORAMINAL EPIDURAL INJECTION OF STEROID Bilateral 2023    Procedure: Injection,steroid,epidural,transforaminal approach L4-L5-S1;  Surgeon: Grant Wright MD;  Location: [facility] OR;  Service: Pain Management;  Laterality: Bilateral;    TRANSFORAMINAL EPIDURAL INJECTION OF STEROID Bilateral 2023    Procedure: Injection,steroid,epidural,transforaminal approach L4-L5, L5-S1;  Surgeon: Grant Wright MD;  Location: [facility] OR;  Service: Pain Management;  Laterality: Bilateral;     Social History     Tobacco Use    Smoking status: Former     Current packs/day: 0.00     Types: Cigarettes     Quit date: 2006     Years since quittin.8    Smokeless tobacco: Never    Tobacco comments:     ex smoker    Substance Use Topics    Alcohol use: Yes     Comment: rarely    Drug use: No     Family History   Problem Relation Age of Onset    Heart disease Mother     Melanoma Neg Hx     Psoriasis Neg Hx     Lupus Neg Hx     Eczema Neg Hx         Review of Systems:   Constitution: Negative for diaphoresis and fever.   HEENT: Negative for nosebleeds.    Cardiovascular: Negative for chest pain       No dyspnea on exertion       Bilateral leg swelling        No palpitations  Respiratory: Negative for shortness of breath and wheezing.    Hematologic/Lymphatic: Negative for bleeding problem. Does not bruise/bleed easily.   Skin: Negative for color change and rash.   Musculoskeletal: Negative for falls and myalgias.   Gastrointestinal: Negative for hematemesis and hematochezia.   Genitourinary: Negative for hematuria.   Neurological: Negative for dizziness and light-headedness.   Psychiatric/Behavioral: Negative for altered mental status and memory loss.          Objective:        Vitals:    24 1534   BP: 128/78   Pulse: 62   Resp: 16       Lab Results   Component Value Date    WBC 5.07 10/31/2023    HGB 12.8 (L) 10/31/2023    HCT 41.5 10/31/2023     10/31/2023    CHOL 107 (L) 2023     TRIG 128 07/17/2023    HDL 35 (L) 07/17/2023    ALT 38 02/02/2023    AST 26 02/02/2023     11/08/2023    K 4.4 11/08/2023     11/08/2023    CREATININE 1.2 11/08/2023    BUN 36 (H) 11/08/2023    CO2 32 (H) 11/08/2023    TSH 2.010 10/01/2019    INR 1.0 05/17/2013    GLUF 96 03/09/2016    HGBA1C 6.0 07/17/2023        ECHOCARDIOGRAM RESULTS  Results for orders placed during the hospital encounter of 05/20/21    Echo Color Flow Doppler? Yes    Interpretation Summary  · Mild concentric hypertrophy and normal systolic function.  · The estimated ejection fraction is 66%.  · Normal left ventricular diastolic function.  · Normal right ventricular size with normal right ventricular systolic function.  · Mild tricuspid regurgitation.  · Normal central venous pressure (3 mmHg).  · The estimated PA systolic pressure is 21 mmHg.        CURRENT/PREVIOUS VISIT EKG  Results for orders placed or performed during the hospital encounter of 11/08/23   EKG 12-lead    Collection Time: 11/08/23  8:27 AM    Narrative    Test Reason : Z01.818,    Vent. Rate : 049 BPM     Atrial Rate : 049 BPM     P-R Int : 184 ms          QRS Dur : 092 ms      QT Int : 408 ms       P-R-T Axes : 060 009 034 degrees     QTc Int : 368 ms    Sinus bradycardia  Otherwise normal ECG  When compared with ECG of 01-FEB-2023 23:46,  Vent. rate has decreased BY  28 BPM  Confirmed by Luis Gutiérrez MD ([id number]) on 11/14/2023 5:33:55 PM    Referred By:             Confirmed By:Luis Gutiérrez MD     No valid procedures specified.   Results for orders placed in visit on 06/17/20    Nuclear Stress Test    Interpretation Summary    The EKG portion of this study is negative for ischemia.    The patient reported chest pain during the stress test.    There were no arrhythmias during stress.    ECG Stress Nuclear portion of this study will be reported separately.      Physical Exam:  CONSTITUTIONAL: No fever, no chills  HEENT: Normocephalic, atraumatic,pupils reactive  to light                 NECK:  No JVD no carotid bruit  CVS: S1S2+, RRR, systolic murmurs,   LUNGS: Clear  ABDOMEN: Soft, NT, BS+ obese  EXTREMITIES: No cyanosis, bilateral lower extremity edema  : No reeves catheter  NEURO: AAO X 3  PSY: Normal affect      Medication List with Changes/Refills   Current Medications    APIXABAN (ELIQUIS) 5 MG TAB    Take 1 tablet (5 mg total) by mouth 2 (two) times daily.    ASPIRIN 81 MG CHEW    Take 1 tablet (81 mg total) by mouth once daily.    ATORVASTATIN (LIPITOR) 10 MG TABLET    Take 1 tablet (10 mg total) by mouth once daily.    AZELASTINE (ASTELIN) 137 MCG (0.1 %) NASAL SPRAY    2 sprays (274 mcg total) by Nasal route 2 (two) times daily.    BLOOD SUGAR DIAGNOSTIC (BLOOD GLUCOSE TEST) STRP    1 strip by Misc.(Non-Drug; Combo Route) route 2 (two) times a day. FREESTYLE LITE BG TEST STRIPS. current use of insulin  - Primary ICD-10-CM: E11.9    CLOTRIMAZOLE (LOTRIMIN) 1 % CREAM    Apply topically 2 (two) times daily.    DICLOFENAC SODIUM (VOLTAREN) 1 % GEL    Apply 2 g topically once daily.    DIPHENHYDRAMINE-ZINC ACETATE 1-0.1% (BENADRYL ITCH STOPPING) CREAM    Apply topically 3 (three) times daily as needed for Itching.    FEXOFENADINE (ALLEGRA) 180 MG TABLET    Take 1 tablet (180 mg total) by mouth once daily.    FREESTYLE LANCETS 28 GAUGE LANCETS    Inject 1 lancet into the skin 3 (three) times daily.    FUROSEMIDE (LASIX) 20 MG TABLET    Take 1 tablet (20 mg total) by mouth every Mon, Wed, Fri.    GABAPENTIN (NEURONTIN) 300 MG CAPSULE    Take 1 capsule (300 mg total) by mouth 3 (three) times daily.    HYDROCODONE-ACETAMINOPHEN (NORCO) 5-325 MG PER TABLET    Take 1 tablet by mouth every 6 (six) hours as needed for Pain.    JARDIANCE 10 MG TABLET    Take 1 tablet (10 mg total) by mouth once daily.    KETOCONAZOLE (NIZORAL) 2 % CREAM    Apply topically once daily.    LISINOPRIL (PRINIVIL,ZESTRIL) 20 MG TABLET    Take 1 tablet (20 mg total) by mouth once daily.    METOPROLOL  TARTRATE (LOPRESSOR) 25 MG TABLET    Take 0.5 tablets (12.5 mg total) by mouth 2 (two) times daily.    MONTELUKAST (SINGULAIR) 10 MG TABLET    Take 1 tablet (10 mg total) by mouth every evening.    MULTIVITAMIN WITH MINERALS TABLET    Take 1 tablet by mouth once daily.    POLYETHYLENE GLYCOL (GLYCOLAX) 17 GRAM/DOSE POWDER    Take 17 g by mouth once daily.    POTASSIUM CHLORIDE (MICRO-K) 10 MEQ CPSR    Take 1 capsule (10 mEq total) by mouth every other day.    SITAGLIPTIN PHOSPHATE (JANUVIA) 100 MG TAB    Take 1 tablet (100 mg total) by mouth once daily.    TRAZODONE (DESYREL) 100 MG TABLET    Take 1 tablet (100 mg total) by mouth every evening.    TRIAMCINOLONE ACETONIDE 0.1% (KENALOG) 0.1 % CREAM    Apply topically 2 (two) times daily.             Assessment:       1. PAF (paroxysmal atrial fibrillation)    2. Essential hypertension    3. BRANDI (obstructive sleep apnea)    4. Chronic midline low back pain, unspecified whether sciatica present    5. Type 2 diabetes mellitus with other specified complication, without long-term current use of insulin    6. Hypercholesteremia    7. Severe obesity    8. Mixed hyperlipidemia    9. Bilateral lower extremity edema         Plan:   1. Paroxysmal atrial fibrillation   Patient is currently on Eliquis 5 mg p.o. b.i.d. continue the same.  Is also on aspirin 81 mg p.o. daily.  No bleeding issues.    Patient is currently in sinus rhythm.  Currently on metoprolol tartrate 12.5 mg p.o. b.i.d. continue the same.  2.  Essential hypertension  Patient's blood pressure is stable at 120 8/78 and continue all his blood pressure medications including furosemide lisinopril metoprolol.  3. Mixed hyperlipidemia   Patient is taking atorvastatin 10 mg p.o. daily continue the same.  He is currently on  on his last blood work his total cholesterol is 107 HDL is 35 LDL is 46 triglycerides of 128.  4. Diabetes mellitus   Patient is on Jardiance as well as in Januvia and he follows up with his primary  physician in regards with his blood work.  5. EKG  Reviewed his EKG independently patient is in sinus bradycardia with a heart rate of 49 beats per minute normal intervals and no acute ST T-wave changes.  6. Obesity   Patient is currently weighing 230 lb.  And patient is working to us losing weight.    7. Bilateral lower extremity edema   He is currently on furosemide 20 mg every other day.  Continue the same.  8. Continue current management and he will be followed up in the office in 12 months time.          Problem List Items Addressed This Visit          Cardiac/Vascular    PAF (paroxysmal atrial fibrillation) - Primary (Chronic)    Hyperlipemia    Essential hypertension       Endocrine    Type 2 diabetes mellitus, without long-term current use of insulin (Chronic)       Orthopedic    Back pain       Other    BRANDI (obstructive sleep apnea) (Chronic)     Other Visit Diagnoses       Hypercholesteremia        Severe obesity        Bilateral lower extremity edema                No follow-ups on file.

## 2024-01-04 NOTE — PROGRESS NOTES
Subjective:    Patient ID:  Yeyo Hollingsworth is a 83 y.o. male     Chief Complaint   Patient presents with    Atrial Fibrillation    Hypertension       HPI:  Mr Yeyo Hollingsworth is a 83 y.o. male is here for follow-up.    Patient has been doing well no specific complaints at the present time.  His breathing has been stable denies any chest pain or tightness or heaviness denies any dizziness or lightheadedness denies any loss of consciousness falls or head injury.    He is taking all his medications regularly.        Review of patient's allergies indicates:   Allergen Reactions    Ativan [lorazepam] Other (See Comments)     Mood alternating      Dilaudid [hydromorphone (bulk)] Other (See Comments)     Mood alternating      Morphine Other (See Comments)     Mood alternating      Adhesive tape-silicones     Hydromorphone        Past Medical History:   Diagnosis Date    Allergy     Ankle pain     left    Anticoagulant long-term use     aspirin    Anxiety     Atrial fibrillation     Back pain     Bruises easily     Cataract     Clotting disorder     left leg    Colon polyp     Diabetes mellitus     Diabetes mellitus, type 2     Diverticulitis 1986    Hay fever     Hyperlipidemia     Hypertension     Left hip pain 12/26/2021    Seeing Dr. Beard for hip pain     Obese     BRANDI on CPAP      Past Surgical History:   Procedure Laterality Date    ABDOMINAL SURGERY      origunal surg 1986-mult surgeries since    CARPAL TUNNEL RELEASE      right wrist 2009-left wrist 2010    COLON SURGERY      partial colon removal    COLONOSCOPY  11/26/2018    Dr. Salazar; normal terminal ileum; polyps removed from ascending colon and hepatic flexure; pan diverticulosis; small internal hemorrhoids; repeat in 5 years; Bx: fragments of an adenomatous polyp with mild surface glandular dysplasia and associated chronic active inflammation, no evidence of high-grade dysplasia or malignancy    COLONOSCOPY N/A 11/9/2023    Procedure: COLONOSCOPY;   Surgeon: Prasanth Irene MD;  Location: [facility] ENDO;  Service: Endoscopy;  Laterality: N/A;    COLOSTOMY  1986    colostomy reversal  1986    ESOPHAGOGASTRODUODENOSCOPY N/A 11/9/2023    Procedure: EGD (ESOPHAGOGASTRODUODENOSCOPY);  Surgeon: Prasanth Irene MD;  Location: [facility] ENDO;  Service: Endoscopy;  Laterality: N/A;    EYE SURGERY      hay fever      HERNIA REPAIR  1949    REPAIR OF TENDON OF LOWER EXTREMITY Left 6/24/2020    Procedure: REPAIR, TENDON, LOWER EXTREMITY;  Surgeon: Justin Mandujano DPM;  [place] [facility] OR;  Service: Podiatry;  Laterality: Left;  ARTHEX NOTIFIED IN CASE HE WANTS ANCHOR    TOE SURGERY Left 2017    replaced a joint     TONSILLECTOMY  1947    TRANSFORAMINAL EPIDURAL INJECTION OF STEROID      x 4    TRANSFORAMINAL EPIDURAL INJECTION OF STEROID Bilateral 11/13/2019    Procedure: Injection,steroid,epidural,transforaminal approach;  Surgeon: Grant Wright MD;  Location: [facility] OR;  Service: Pain Management;  Laterality: Bilateral;  L4-5, L5-S1    TRANSFORAMINAL EPIDURAL INJECTION OF STEROID Bilateral 3/9/2021    Procedure: Injection,steroid,epidural,transforaminal approach;  Surgeon: Grant Wright MD;  Location: [facility] OR;  Service: Pain Management;  Laterality: Bilateral;  L4-5, L5-S1    TRANSFORAMINAL EPIDURAL INJECTION OF STEROID Bilateral 5/25/2021    Procedure: Injection,steroid,epidural,transforaminal approach;  Surgeon: Grant Wright MD;  Location: [facility] OR;  Service: Pain Management;  Laterality: Bilateral;  L4-L5,L5,S1    TRANSFORAMINAL EPIDURAL INJECTION OF STEROID Bilateral 12/14/2021    Procedure: Injection,steroid,epidural,transforaminal approach Bilateral L4-5, L5-S1;  Surgeon: Grant Wright MD;  Location: [facility] OR;  Service: Pain Management;  Laterality: Bilateral;    TRANSFORAMINAL EPIDURAL INJECTION OF STEROID Bilateral 11/4/2022    Procedure: Injection,steroid,epidural,transforaminal approach;  Surgeon: Grant Wright MD;  Location: [facility] OR;  Service: Pain Management;  Laterality:  Bilateral;  L4-5 and L5-s1    TRANSFORAMINAL EPIDURAL INJECTION OF STEROID Bilateral 2023    Procedure: Injection,steroid,epidural,transforaminal approach L4-L5-S1;  Surgeon: Grant Wright MD;  Location: [facility] OR;  Service: Pain Management;  Laterality: Bilateral;    TRANSFORAMINAL EPIDURAL INJECTION OF STEROID Bilateral 2023    Procedure: Injection,steroid,epidural,transforaminal approach L4-L5, L5-S1;  Surgeon: Grant Wright MD;  Location: [facility] OR;  Service: Pain Management;  Laterality: Bilateral;     Social History     Tobacco Use    Smoking status: Former     Current packs/day: 0.00     Types: Cigarettes     Quit date: 2006     Years since quittin.8    Smokeless tobacco: Never    Tobacco comments:     ex smoker    Substance Use Topics    Alcohol use: Yes     Comment: rarely    Drug use: No     Family History   Problem Relation Age of Onset    Heart disease Mother     Melanoma Neg Hx     Psoriasis Neg Hx     Lupus Neg Hx     Eczema Neg Hx         Review of Systems:   Constitution: Negative for diaphoresis and fever.   HEENT: Negative for nosebleeds.    Cardiovascular: Negative for chest pain       No dyspnea on exertion       Bilateral leg swelling        No palpitations  Respiratory: Negative for shortness of breath and wheezing.    Hematologic/Lymphatic: Negative for bleeding problem. Does not bruise/bleed easily.   Skin: Negative for color change and rash.   Musculoskeletal: Negative for falls and myalgias.   Gastrointestinal: Negative for hematemesis and hematochezia.   Genitourinary: Negative for hematuria.   Neurological: Negative for dizziness and light-headedness.   Psychiatric/Behavioral: Negative for altered mental status and memory loss.          Objective:        Vitals:    24 1534   BP: 128/78   Pulse: 62   Resp: 16       Lab Results   Component Value Date    WBC 5.07 10/31/2023    HGB 12.8 (L) 10/31/2023    HCT 41.5 10/31/2023     10/31/2023    CHOL 107 (L) 2023     TRIG 128 07/17/2023    HDL 35 (L) 07/17/2023    ALT 38 02/02/2023    AST 26 02/02/2023     11/08/2023    K 4.4 11/08/2023     11/08/2023    CREATININE 1.2 11/08/2023    BUN 36 (H) 11/08/2023    CO2 32 (H) 11/08/2023    TSH 2.010 10/01/2019    INR 1.0 05/17/2013    GLUF 96 03/09/2016    HGBA1C 6.0 07/17/2023        ECHOCARDIOGRAM RESULTS  Results for orders placed during the hospital encounter of 05/20/21    Echo Color Flow Doppler? Yes    Interpretation Summary  · Mild concentric hypertrophy and normal systolic function.  · The estimated ejection fraction is 66%.  · Normal left ventricular diastolic function.  · Normal right ventricular size with normal right ventricular systolic function.  · Mild tricuspid regurgitation.  · Normal central venous pressure (3 mmHg).  · The estimated PA systolic pressure is 21 mmHg.        CURRENT/PREVIOUS VISIT EKG  Results for orders placed or performed during the hospital encounter of 11/08/23   EKG 12-lead    Collection Time: 11/08/23  8:27 AM    Narrative    Test Reason : Z01.818,    Vent. Rate : 049 BPM     Atrial Rate : 049 BPM     P-R Int : 184 ms          QRS Dur : 092 ms      QT Int : 408 ms       P-R-T Axes : 060 009 034 degrees     QTc Int : 368 ms    Sinus bradycardia  Otherwise normal ECG  When compared with ECG of 01-FEB-2023 23:46,  Vent. rate has decreased BY  28 BPM  Confirmed by Luis Gutiérrez MD ([id number]) on 11/14/2023 5:33:55 PM    Referred By:             Confirmed By:Luis Gutiérrez MD     No valid procedures specified.   Results for orders placed in visit on 06/17/20    Nuclear Stress Test    Interpretation Summary    The EKG portion of this study is negative for ischemia.    The patient reported chest pain during the stress test.    There were no arrhythmias during stress.    ECG Stress Nuclear portion of this study will be reported separately.      Physical Exam:  CONSTITUTIONAL: No fever, no chills  HEENT: Normocephalic, atraumatic,pupils reactive  to light                 NECK:  No JVD no carotid bruit  CVS: S1S2+, RRR, systolic murmurs,   LUNGS: Clear  ABDOMEN: Soft, NT, BS+ obese  EXTREMITIES: No cyanosis, bilateral lower extremity edema  : No reeves catheter  NEURO: AAO X 3  PSY: Normal affect      Medication List with Changes/Refills   Current Medications    APIXABAN (ELIQUIS) 5 MG TAB    Take 1 tablet (5 mg total) by mouth 2 (two) times daily.    ASPIRIN 81 MG CHEW    Take 1 tablet (81 mg total) by mouth once daily.    ATORVASTATIN (LIPITOR) 10 MG TABLET    Take 1 tablet (10 mg total) by mouth once daily.    AZELASTINE (ASTELIN) 137 MCG (0.1 %) NASAL SPRAY    2 sprays (274 mcg total) by Nasal route 2 (two) times daily.    BLOOD SUGAR DIAGNOSTIC (BLOOD GLUCOSE TEST) STRP    1 strip by Misc.(Non-Drug; Combo Route) route 2 (two) times a day. FREESTYLE LITE BG TEST STRIPS. current use of insulin  - Primary ICD-10-CM: E11.9    CLOTRIMAZOLE (LOTRIMIN) 1 % CREAM    Apply topically 2 (two) times daily.    DICLOFENAC SODIUM (VOLTAREN) 1 % GEL    Apply 2 g topically once daily.    DIPHENHYDRAMINE-ZINC ACETATE 1-0.1% (BENADRYL ITCH STOPPING) CREAM    Apply topically 3 (three) times daily as needed for Itching.    FEXOFENADINE (ALLEGRA) 180 MG TABLET    Take 1 tablet (180 mg total) by mouth once daily.    FREESTYLE LANCETS 28 GAUGE LANCETS    Inject 1 lancet into the skin 3 (three) times daily.    FUROSEMIDE (LASIX) 20 MG TABLET    Take 1 tablet (20 mg total) by mouth every Mon, Wed, Fri.    GABAPENTIN (NEURONTIN) 300 MG CAPSULE    Take 1 capsule (300 mg total) by mouth 3 (three) times daily.    HYDROCODONE-ACETAMINOPHEN (NORCO) 5-325 MG PER TABLET    Take 1 tablet by mouth every 6 (six) hours as needed for Pain.    JARDIANCE 10 MG TABLET    Take 1 tablet (10 mg total) by mouth once daily.    KETOCONAZOLE (NIZORAL) 2 % CREAM    Apply topically once daily.    LISINOPRIL (PRINIVIL,ZESTRIL) 20 MG TABLET    Take 1 tablet (20 mg total) by mouth once daily.    METOPROLOL  TARTRATE (LOPRESSOR) 25 MG TABLET    Take 0.5 tablets (12.5 mg total) by mouth 2 (two) times daily.    MONTELUKAST (SINGULAIR) 10 MG TABLET    Take 1 tablet (10 mg total) by mouth every evening.    MULTIVITAMIN WITH MINERALS TABLET    Take 1 tablet by mouth once daily.    POLYETHYLENE GLYCOL (GLYCOLAX) 17 GRAM/DOSE POWDER    Take 17 g by mouth once daily.    POTASSIUM CHLORIDE (MICRO-K) 10 MEQ CPSR    Take 1 capsule (10 mEq total) by mouth every other day.    SITAGLIPTIN PHOSPHATE (JANUVIA) 100 MG TAB    Take 1 tablet (100 mg total) by mouth once daily.    TRAZODONE (DESYREL) 100 MG TABLET    Take 1 tablet (100 mg total) by mouth every evening.    TRIAMCINOLONE ACETONIDE 0.1% (KENALOG) 0.1 % CREAM    Apply topically 2 (two) times daily.             Assessment:       1. PAF (paroxysmal atrial fibrillation)    2. Essential hypertension    3. BRANDI (obstructive sleep apnea)    4. Chronic midline low back pain, unspecified whether sciatica present    5. Type 2 diabetes mellitus with other specified complication, without long-term current use of insulin    6. Hypercholesteremia    7. Severe obesity    8. Mixed hyperlipidemia    9. Bilateral lower extremity edema         Plan:   1. Paroxysmal atrial fibrillation   Patient is currently on Eliquis 5 mg p.o. b.i.d. continue the same.  Is also on aspirin 81 mg p.o. daily.  No bleeding issues.    Patient is currently in sinus rhythm.  Currently on metoprolol tartrate 12.5 mg p.o. b.i.d. continue the same.  2.  Essential hypertension  Patient's blood pressure is stable at 120 8/78 and continue all his blood pressure medications including furosemide lisinopril metoprolol.  3. Mixed hyperlipidemia   Patient is taking atorvastatin 10 mg p.o. daily continue the same.  He is currently on  on his last blood work his total cholesterol is 107 HDL is 35 LDL is 46 triglycerides of 128.  4. Diabetes mellitus   Patient is on Jardiance as well as in Januvia and he follows up with his primary  physician in regards with his blood work.  5. EKG  Reviewed his EKG independently patient is in sinus bradycardia with a heart rate of 49 beats per minute normal intervals and no acute ST T-wave changes.  6. Obesity   Patient is currently weighing 230 lb.  And patient is working to us losing weight.    7. Bilateral lower extremity edema   He is currently on furosemide 20 mg every other day.  Continue the same.  8. Continue current management and he will be followed up in the office in 12 months time.          Problem List Items Addressed This Visit          Cardiac/Vascular    PAF (paroxysmal atrial fibrillation) - Primary (Chronic)    Hyperlipemia    Essential hypertension       Endocrine    Type 2 diabetes mellitus, without long-term current use of insulin (Chronic)       Orthopedic    Back pain       Other    BRANDI (obstructive sleep apnea) (Chronic)     Other Visit Diagnoses       Hypercholesteremia        Severe obesity        Bilateral lower extremity edema                No follow-ups on file.

## 2024-01-08 ENCOUNTER — OFFICE VISIT (OUTPATIENT)
Dept: PODIATRY | Facility: CLINIC | Age: 84
End: 2024-01-08
Payer: MEDICARE

## 2024-01-08 VITALS — BODY MASS INDEX: 35.61 KG/M2 | HEIGHT: 68 IN | WEIGHT: 235 LBS

## 2024-01-08 DIAGNOSIS — M72.2 PLANTAR FASCIITIS: Primary | ICD-10-CM

## 2024-01-08 DIAGNOSIS — M79.672 LEFT FOOT PAIN: ICD-10-CM

## 2024-01-08 PROCEDURE — 99999PBSHW PR PBB SHADOW TECHNICAL ONLY FILED TO HB: Mod: PBBFAC,,,

## 2024-01-08 PROCEDURE — 99999 PR PBB SHADOW E&M-EST. PATIENT-LVL V: CPT | Mod: PBBFAC,,, | Performed by: PODIATRIST

## 2024-01-08 PROCEDURE — 20550 NJX 1 TENDON SHEATH/LIGAMENT: CPT | Mod: PBBFAC,PN | Performed by: PODIATRIST

## 2024-01-08 PROCEDURE — 99215 OFFICE O/P EST HI 40 MIN: CPT | Mod: PBBFAC,PN,25 | Performed by: PODIATRIST

## 2024-01-08 PROCEDURE — 20550 NJX 1 TENDON SHEATH/LIGAMENT: CPT | Mod: S$PBB,LT,, | Performed by: PODIATRIST

## 2024-01-08 PROCEDURE — 99213 OFFICE O/P EST LOW 20 MIN: CPT | Mod: 25,S$PBB,, | Performed by: PODIATRIST

## 2024-01-08 RX ORDER — BUPIVACAINE HYDROCHLORIDE 5 MG/ML
1.5 INJECTION, SOLUTION PERINEURAL
Status: COMPLETED | OUTPATIENT
Start: 2024-01-08 | End: 2024-01-08

## 2024-01-08 RX ORDER — DEXAMETHASONE SODIUM PHOSPHATE 4 MG/ML
4 INJECTION, SOLUTION INTRA-ARTICULAR; INTRALESIONAL; INTRAMUSCULAR; INTRAVENOUS; SOFT TISSUE
Status: COMPLETED | OUTPATIENT
Start: 2024-01-08 | End: 2024-01-08

## 2024-01-08 RX ORDER — METHYLPREDNISOLONE ACETATE 40 MG/ML
20 INJECTION, SUSPENSION INTRA-ARTICULAR; INTRALESIONAL; INTRAMUSCULAR; SOFT TISSUE
Status: COMPLETED | OUTPATIENT
Start: 2024-01-08 | End: 2024-01-08

## 2024-01-08 RX ADMIN — DEXAMETHASONE SODIUM PHOSPHATE 4 MG: 4 INJECTION INTRA-ARTICULAR; INTRALESIONAL; INTRAMUSCULAR; INTRAVENOUS; SOFT TISSUE at 05:01

## 2024-01-08 RX ADMIN — METHYLPREDNISOLONE ACETATE 20 MG: 40 INJECTION, SUSPENSION INTRA-ARTICULAR; INTRALESIONAL; INTRAMUSCULAR; INTRASYNOVIAL; SOFT TISSUE at 05:01

## 2024-01-08 RX ADMIN — BUPIVACAINE HYDROCHLORIDE 7.5 MG: 5 INJECTION, SOLUTION PERINEURAL at 05:01

## 2024-01-08 NOTE — PATIENT INSTRUCTIONS
Understanding Plantar Fasciitis    Plantar fasciitis is a condition that causes foot and heel pain. The plantar fascia is a tough band of tissue that runs across the bottom of the foot from the heel to the toes. This tissue pulls on the heel bone. It supports the arch of the foot as it pushes off the ground. If the tissue becomes irritated or red and swollen (inflamed), it is called plantar fasciitis.  How to say it  PLAN-tuhr [id number]-IY-[id number]     What causes plantar fasciitis?  Plantar fasciitis most often occurs from overusing the plantar fascia. The tissue may become damaged from activities that put repeated stress on the heel and foot. Or it may wear down over time with age and ankle stiffness. You are more likely to have plantar fasciitis if you:  Do activities that require a lot of running, jumping, or dancing  Have a job that requires being on your feet for long periods  Are overweight or obese  Have certain foot problems, such as a tight Achilles tendon, flat feet, or high arches  Often wear poorly fitting shoes    Symptoms of plantar fasciitis  The condition most often causes pain in the heel and the bottom of the foot. The pain may occur when you take your first steps in the morning. It may get better as you walk throughout the day. But as you continue to put weight on the foot, the pain often returns. Pain may also occur after standing or sitting for long periods.    Treating plantar fasciitis  Treatments for plantar fasciitis include:  Resting the foot. This involves limiting movements that make your foot hurt. You may also need to avoid certain sports and types of work for a time.  Using cold packs. Put an ice pack on the heel and foot to help reduce pain and swelling.  Taking pain medicines. Prescription and over-the-counter pain medicines can help relieve pain and swelling.  Using heel cups or foot inserts (orthotics). These are placed in the shoes to help support the heel or arch and cushion the heel.  You may also be told to buy proper-fitting shoes with good arch support and cushioned soles.  Taping the foot. This supports the arch and limits the movement of the plantar fascia to help relieve symptoms.  Wearing a night splint. This stretches the plantar fascia and leg muscles while you sleep. This may help relieve pain.  Doing exercises and physical therapy. These stretch and strengthen the plantar fascia and the muscles in the leg that support the heel and foot.  Getting shots of medicine into the foot. These may help relieve symptoms for a time.  Having surgery. This may be needed if other treatments fail to relieve symptoms. During surgery, the surgeon may partially cut the plantar fascia to release tension.    Possible complications of plantar fasciitis  Without proper care and treatment, healing may take longer than normal. Also, symptoms may continue or get worse. Over time, the plantar fascia may be damaged. This can make it hard to walk or even stand without pain.    When to call your healthcare provider  Call your healthcare provider right away if you have any of these:  Fever of 100.4°F (38°C) or higher, or as directed  Symptoms that dont get better with treatment, or get worse  New symptoms, such as numbness, tingling, or weakness in the foot     Date Last Reviewed: 3/10/2016  © 0181-7585 [employer]. [address] [place] [address], [place] [address]. All rights reserved. This information is not intended as a substitute for professional medical care. Always follow your healthcare professional's instructions.

## 2024-01-08 NOTE — PROGRESS NOTES
"  [address] [facility] [address]  [place] [address]  Phone: [phone]   [phone]    Patient's PCP:Garland Ocampo MD  Referring Provider: Aaareferral Self    Subjective:      Chief Complaint:: Heel Pain (Left foot )    POOJA Hollingsworth is a 83 y.o. male who presents today with a complaint of left heel pain. The current episode started in August of last year.  The symptoms include sharp pain in heel. Probable cause of complaint bone spur.  The symptoms are aggravated by weightbearing. The problem has worsened. Treatment to date have included cocktail injection which provided some relief.     Systemic Doctor: Dr. Ocampo  Date Last Seen: 10/11/2023  Blood Sugar: 93  Hemoglobin A1c: 6.0 on 07/17/2023    Vitals:    01/08/24 0842   Weight: 106.6 kg (235 lb 0.2 oz)   Height: 5' 8" (1.727 m)   PainSc:   4      Shoe Size: 10    Past Surgical History:   Procedure Laterality Date    ABDOMINAL SURGERY      origunal surg 1986-mult surgeries since    CARPAL TUNNEL RELEASE      right wrist 2009-left wrist 2010    COLON SURGERY      partial colon removal    COLONOSCOPY  11/26/2018    Dr. Salazar; normal terminal ileum; polyps removed from ascending colon and hepatic flexure; pan diverticulosis; small internal hemorrhoids; repeat in 5 years; Bx: fragments of an adenomatous polyp with mild surface glandular dysplasia and associated chronic active inflammation, no evidence of high-grade dysplasia or malignancy    COLONOSCOPY N/A 11/9/2023    Procedure: COLONOSCOPY;  Surgeon: Prasanth Irene MD;  Location: [facility];  Service: Endoscopy;  Laterality: N/A;    COLOSTOMY  1986    colostomy reversal  1986    ESOPHAGOGASTRODUODENOSCOPY N/A 11/9/2023    Procedure: EGD (ESOPHAGOGASTRODUODENOSCOPY);  Surgeon: Prasanth Irene MD;  Location: [facility];  Service: Endoscopy;  Laterality: N/A;    EYE SURGERY      hay fever      HERNIA REPAIR  1949    REPAIR OF TENDON OF LOWER EXTREMITY Left 6/24/2020    Procedure: REPAIR, " TENDON, LOWER EXTREMITY;  Surgeon: Justin Mandujano DPM;  [place] [facility] OR;  Service: Podiatry;  Laterality: Left;  ARTHEX NOTIFIED IN CASE HE WANTS ANCHOR    TOE SURGERY Left 2017    replaced a joint     TONSILLECTOMY  1947    TRANSFORAMINAL EPIDURAL INJECTION OF STEROID      x 4    TRANSFORAMINAL EPIDURAL INJECTION OF STEROID Bilateral 11/13/2019    Procedure: Injection,steroid,epidural,transforaminal approach;  Surgeon: Grant Wright MD;  Location: [facility] OR;  Service: Pain Management;  Laterality: Bilateral;  L4-5, L5-S1    TRANSFORAMINAL EPIDURAL INJECTION OF STEROID Bilateral 3/9/2021    Procedure: Injection,steroid,epidural,transforaminal approach;  Surgeon: Grant Wright MD;  Location: [facility] OR;  Service: Pain Management;  Laterality: Bilateral;  L4-5, L5-S1    TRANSFORAMINAL EPIDURAL INJECTION OF STEROID Bilateral 5/25/2021    Procedure: Injection,steroid,epidural,transforaminal approach;  Surgeon: Grant Wright MD;  Location: [facility] OR;  Service: Pain Management;  Laterality: Bilateral;  L4-L5,L5,S1    TRANSFORAMINAL EPIDURAL INJECTION OF STEROID Bilateral 12/14/2021    Procedure: Injection,steroid,epidural,transforaminal approach Bilateral L4-5, L5-S1;  Surgeon: Grant Wright MD;  Location: [facility] OR;  Service: Pain Management;  Laterality: Bilateral;    TRANSFORAMINAL EPIDURAL INJECTION OF STEROID Bilateral 11/4/2022    Procedure: Injection,steroid,epidural,transforaminal approach;  Surgeon: Grant Wright MD;  Location: [facility] OR;  Service: Pain Management;  Laterality: Bilateral;  L4-5 and L5-s1    TRANSFORAMINAL EPIDURAL INJECTION OF STEROID Bilateral 1/20/2023    Procedure: Injection,steroid,epidural,transforaminal approach L4-L5-S1;  Surgeon: Grant Wright MD;  Location: [facility] OR;  Service: Pain Management;  Laterality: Bilateral;    TRANSFORAMINAL EPIDURAL INJECTION OF STEROID Bilateral 6/1/2023    Procedure: Injection,steroid,epidural,transforaminal approach L4-L5, L5-S1;  Surgeon: Grant Wright MD;  Location: [facility]  OR;  Service: Pain Management;  Laterality: Bilateral;     Past Medical History:   Diagnosis Date    Allergy     Ankle pain     left    Anticoagulant long-term use     aspirin    Anxiety     Atrial fibrillation     Back pain     Bruises easily     Cataract     Clotting disorder     left leg    Colon polyp     Diabetes mellitus     Diabetes mellitus, type 2     Diverticulitis 1986    Hay fever     Hyperlipidemia     Hypertension     Left hip pain 12/26/2021    Seeing Dr. Beard for hip pain     Obese     BRANDI on CPAP      Family History   Problem Relation Age of Onset    Heart disease Mother     Melanoma Neg Hx     Psoriasis Neg Hx     Lupus Neg Hx     Eczema Neg Hx         Social History:   Marital Status:   Alcohol History:  reports current alcohol use.  Tobacco History:  reports that he quit smoking about 17 years ago. His smoking use included cigarettes. He has never used smokeless tobacco.  Drug History:  reports no history of drug use.    Review of patient's allergies indicates:   Allergen Reactions    Ativan [lorazepam] Other (See Comments)     Mood alternating      Dilaudid [hydromorphone (bulk)] Other (See Comments)     Mood alternating      Morphine Other (See Comments)     Mood alternating      Adhesive tape-silicones     Hydromorphone        Current Outpatient Medications   Medication Sig Dispense Refill    apixaban (ELIQUIS) 5 mg Tab Take 1 tablet (5 mg total) by mouth 2 (two) times daily. 180 tablet 3    aspirin 81 MG Chew Take 1 tablet (81 mg total) by mouth once daily. 100 tablet 2    atorvastatin (LIPITOR) 10 MG tablet Take 1 tablet (10 mg total) by mouth once daily. 90 tablet 3    azelastine (ASTELIN) 137 mcg (0.1 %) nasal spray 2 sprays (274 mcg total) by Nasal route 2 (two) times daily. 30 mL 5    blood sugar diagnostic (BLOOD GLUCOSE TEST) Strp 1 strip by Misc.(Non-Drug; Combo Route) route 2 (two) times a day. FREESTYLE LITE BG TEST STRIPS. current use of insulin  - Primary ICD-10-CM: E11.9  200 each 2    diclofenac sodium (VOLTAREN) 1 % Gel Apply 2 g topically once daily. 100 g 2    diphenhydrAMINE-zinc acetate 1-0.1% (BENADRYL ITCH STOPPING) cream Apply topically 3 (three) times daily as needed for Itching. 28 g 0    fexofenadine (ALLEGRA) 180 MG tablet Take 1 tablet (180 mg total) by mouth once daily. 90 tablet 3    FREESTYLE LANCETS 28 gauge lancets Inject 1 lancet into the skin 3 (three) times daily. 100 each 3    furosemide (LASIX) 20 MG tablet Take 1 tablet (20 mg total) by mouth every Mon, Wed, Fri. 45 tablet 3    gabapentin (NEURONTIN) 300 MG capsule Take 1 capsule (300 mg total) by mouth 3 (three) times daily. 270 capsule 1    HYDROcodone-acetaminophen (NORCO) 5-325 mg per tablet Take 1 tablet by mouth every 6 (six) hours as needed for Pain. 90 tablet 0    JARDIANCE 10 mg tablet Take 1 tablet (10 mg total) by mouth once daily. 90 tablet 1    ketoconazole (NIZORAL) 2 % cream Apply topically once daily. 60 g 1    lisinopriL (PRINIVIL,ZESTRIL) 20 MG tablet Take 1 tablet (20 mg total) by mouth once daily. 90 tablet 3    metoprolol tartrate (LOPRESSOR) 25 MG tablet Take 0.5 tablets (12.5 mg total) by mouth 2 (two) times daily. 180 tablet 3    montelukast (SINGULAIR) 10 mg tablet Take 1 tablet (10 mg total) by mouth every evening. 90 tablet 3    multivitamin with minerals tablet Take 1 tablet by mouth once daily.      polyethylene glycol (GLYCOLAX) 17 gram/dose powder Take 17 g by mouth once daily. 507 g 3    potassium chloride (MICRO-K) 10 MEQ CpSR Take 1 capsule (10 mEq total) by mouth every other day. 90 capsule 1    SITagliptin phosphate (JANUVIA) 100 MG Tab Take 1 tablet (100 mg total) by mouth once daily. 90 tablet 3    traZODone (DESYREL) 100 MG tablet Take 1 tablet (100 mg total) by mouth every evening. 90 tablet 3    triamcinolone acetonide 0.1% (KENALOG) 0.1 % cream Apply topically 2 (two) times daily. 80 g 0    clotrimazole (LOTRIMIN) 1 % cream Apply topically 2 (two) times daily. 60 g 10      No current facility-administered medications for this visit.     Facility-Administered Medications Ordered in Other Visits   Medication Dose Route Frequency Provider Last Rate Last Admin    lactated ringers infusion   Intravenous Once PRN Grant Wright MD   Stopped at 11/04/22 1230       Review of Systems   Constitutional:  Negative for chills, fatigue, fever and unexpected weight change.   HENT:  Negative for hearing loss and trouble swallowing.    Eyes:  Negative for photophobia and visual disturbance.   Respiratory:  Negative for cough, shortness of breath and wheezing.    Cardiovascular:  Negative for chest pain, palpitations and leg swelling.   Gastrointestinal:  Negative for abdominal pain and nausea.   Genitourinary:  Negative for dysuria and frequency.   Musculoskeletal:  Positive for arthralgias, gait problem and myalgias. Negative for back pain, joint swelling and neck pain.   Skin:  Negative for rash and wound.   Neurological:  Negative for tremors, seizures, weakness, numbness and headaches.   Hematological:  Does not bruise/bleed easily.         Objective:        Physical Exam:   Foot Exam    General  General Appearance: appears stated age and healthy   Orientation: alert and oriented to person, place, and time   Affect: appropriate   Gait: antalgic       Left Foot/Ankle      Inspection and Palpation  Ecchymosis: none  Tenderness: plantar fascia   Swelling: (Mild lower extremity)  Arch: normal  Hammertoes: absent  Claw toes: absent  Hallux valgus: no  Hallux limitus: no  Skin Exam: skin intact; no drainage, no ulcer and no erythema   Neurovascular  Dorsalis pedis: 1+  Posterior tibial: 1+  Capillary refill: 3+  Varicose veins: not present  Saphenous nerve sensation: diminished  Tibial nerve sensation: diminished  Superficial peroneal nerve sensation: diminished  Deep peroneal nerve sensation: diminished  Sural nerve sensation: diminished    Muscle Strength  Ankle dorsiflexion: 4+  Ankle plantar  flexion: 5  Ankle inversion: 5  Ankle eversion: 5  Great toe extension: 4  Great toe flexion: 5    Range of Motion    Normal left ankle ROM    Tests  Anterior drawer: negative   Talar tilt: negative   PT Tinel's sign: negative  Paresthesia: negative  Comments  Pain on palpation of the infeior medial heel and central plantar heel. No pain present  with side to side compression of the calcaneus. Negative tinnel's sign  at the tarsal tunnel. Negative Ham's nerve pain. Negative Calcaneal nerve pain. No soft tissue masses. Pain absent  with dorsiflexion of the ankle. No edema, erythema, or ecchymosis noted.       Physical Exam  Cardiovascular:      Pulses:           Dorsalis pedis pulses are 1+ on the left side.        Posterior tibial pulses are 1+ on the left side.   Musculoskeletal:      Left foot: No bunion.   Feet:      Left foot:      Skin integrity: No ulcer or erythema.               Left Ankle/Foot Exam     Range of Motion   The patient has normal left ankle ROM.     Comments:  Pain on palpation of the infeior medial heel and central plantar heel. No pain present  with side to side compression of the calcaneus. Negative tinnel's sign  at the tarsal tunnel. Negative Ham's nerve pain. Negative Calcaneal nerve pain. No soft tissue masses. Pain absent  with dorsiflexion of the ankle. No edema, erythema, or ecchymosis noted.         Muscle Strength   Left Lower Extremity   Ankle Dorsiflexion:  4+   Plantar flexion:  5/5     Vascular Exam       Left Pulses  Dorsalis Pedis:      1+  Posterior Tibial:      1+           Imaging: none            Assessment:       1. Plantar fasciitis    2. Left foot pain      Plan:   Plantar fasciitis  -     Ambulatory referral/consult to Physical/Occupational Therapy; Future; Expected date: 01/15/2024  -     methylPREDNISolone acetate injection 20 mg  -     BUPivacaine injection 7.5 mg  -     dexAMETHasone injection 4 mg    Left foot pain  -     Ambulatory referral/consult to  Physical/Occupational Therapy; Future; Expected date: 01/15/2024  -     methylPREDNISolone acetate injection 20 mg  -     BUPivacaine injection 7.5 mg  -     dexAMETHasone injection 4 mg      Follow up if symptoms worsen or fail to improve.    Procedures Informed consent was obtained.  Time-out was called.  The area was prepped with alcohol, and a steroid injection was performed at left plantar fascia  using 1.5 cc of 0.5% Marcaine w/out epi, 1 cc Dexamethasone, 0.5 cc Methylprednisolone. Patient tolerated the procedure well.           We discussed different ongoing treatment options for the patient's continued plantar fasciitis pain.  Given that he has had good previous relief from steroid injection we are going to do another 1 today.  I am also going to refer him to physical therapy.      Counseling:     I provided patient education verbally regarding:   Patient diagnosis, treatment options, as well as alternatives, risks, and benefits.     Discussed different treatment options for heel pain. I gave written and verbal instructions on heel cord stretching and this was demonstrated for the patient. Patient expressed understanding. Discussed wearing appropriate shoe gear and avoiding flats, slippers, sandals, barefoot, and sockfeet. Recommended arch supports. My recommendation for OTC supports is Spenco polysorb replacement insoles or patient may elect more aggressive treatment with prescription arch supports. We also discussed cortisone injections and NSAID therapy.       This note was created using Dragon voice recognition software that occasionally misinterpreted phrases or words.

## 2024-01-11 DIAGNOSIS — Z00.00 ENCOUNTER FOR MEDICARE ANNUAL WELLNESS EXAM: ICD-10-CM

## 2024-01-18 ENCOUNTER — HOSPITAL ENCOUNTER (OUTPATIENT)
Facility: HOSPITAL | Age: 84
Discharge: HOME | End: 2024-01-18
Attending: ANESTHESIOLOGY | Admitting: ANESTHESIOLOGY
Payer: MEDICARE

## 2024-01-18 DIAGNOSIS — M54.16 LUMBAR RADICULITIS: ICD-10-CM

## 2024-01-18 LAB — POCT GLUCOSE: 97 MG/DL (ref 70–110)

## 2024-01-18 PROCEDURE — 64483 NJX AA&/STRD TFRM EPI L/S 1: CPT | Mod: 50,,, | Performed by: ANESTHESIOLOGY

## 2024-01-18 PROCEDURE — 25000003 PHARM REV CODE 250: Performed by: ANESTHESIOLOGY

## 2024-01-18 PROCEDURE — 64484 NJX AA&/STRD TFRM EPI L/S EA: CPT | Mod: 50 | Performed by: ANESTHESIOLOGY

## 2024-01-18 PROCEDURE — 64484 NJX AA&/STRD TFRM EPI L/S EA: CPT | Mod: 50,,, | Performed by: ANESTHESIOLOGY

## 2024-01-18 PROCEDURE — 25500020 PHARM REV CODE 255: Performed by: ANESTHESIOLOGY

## 2024-01-18 PROCEDURE — 64483 NJX AA&/STRD TFRM EPI L/S 1: CPT | Mod: 50 | Performed by: ANESTHESIOLOGY

## 2024-01-18 PROCEDURE — 63600175 PHARM REV CODE 636 W HCPCS: Performed by: ANESTHESIOLOGY

## 2024-01-18 RX ORDER — LIDOCAINE HYDROCHLORIDE 10 MG/ML
1 INJECTION, SOLUTION EPIDURAL; INFILTRATION; INTRACAUDAL; PERINEURAL ONCE
Status: DISCONTINUED | OUTPATIENT
Start: 2024-01-18 | End: 2024-01-18 | Stop reason: HOSPADM

## 2024-01-18 RX ORDER — SODIUM CHLORIDE, SODIUM LACTATE, POTASSIUM CHLORIDE, CALCIUM CHLORIDE 600; 310; 30; 20 MG/100ML; MG/100ML; MG/100ML; MG/100ML
INJECTION, SOLUTION INTRAVENOUS CONTINUOUS
Status: DISCONTINUED | OUTPATIENT
Start: 2024-01-18 | End: 2024-01-18 | Stop reason: HOSPADM

## 2024-01-18 RX ORDER — BUPIVACAINE HYDROCHLORIDE 2.5 MG/ML
INJECTION, SOLUTION EPIDURAL; INFILTRATION; INTRACAUDAL
Status: DISCONTINUED | OUTPATIENT
Start: 2024-01-18 | End: 2024-01-18 | Stop reason: HOSPADM

## 2024-01-18 RX ORDER — MIDAZOLAM HYDROCHLORIDE 1 MG/ML
INJECTION INTRAMUSCULAR; INTRAVENOUS
Status: DISCONTINUED | OUTPATIENT
Start: 2024-01-18 | End: 2024-01-18 | Stop reason: HOSPADM

## 2024-01-18 RX ORDER — DEXAMETHASONE SODIUM PHOSPHATE 10 MG/ML
INJECTION INTRAMUSCULAR; INTRAVENOUS
Status: DISCONTINUED | OUTPATIENT
Start: 2024-01-18 | End: 2024-01-18 | Stop reason: HOSPADM

## 2024-01-18 RX ORDER — LIDOCAINE HYDROCHLORIDE 10 MG/ML
INJECTION, SOLUTION EPIDURAL; INFILTRATION; INTRACAUDAL; PERINEURAL
Status: DISCONTINUED | OUTPATIENT
Start: 2024-01-18 | End: 2024-01-18 | Stop reason: HOSPADM

## 2024-01-18 RX ORDER — FENTANYL CITRATE 50 UG/ML
INJECTION, SOLUTION INTRAMUSCULAR; INTRAVENOUS
Status: DISCONTINUED | OUTPATIENT
Start: 2024-01-18 | End: 2024-01-18 | Stop reason: HOSPADM

## 2024-01-18 RX ADMIN — SODIUM CHLORIDE, POTASSIUM CHLORIDE, SODIUM LACTATE AND CALCIUM CHLORIDE: 600; 310; 30; 20 INJECTION, SOLUTION INTRAVENOUS at 11:01

## 2024-01-18 NOTE — DISCHARGE SUMMARY
[facility] - Periop Services  Discharge Note  Short Stay    Procedure(s) (LRB):  Injection,steroid,epidural,transforaminal approach (Bilateral)      OUTCOME: Patient tolerated treatment/procedure well without complication and is now ready for discharge.    DISPOSITION: Home or Self Care    FINAL DIAGNOSIS:  <principal problem not specified>    FOLLOWUP: In clinic    DISCHARGE INSTRUCTIONS:    Discharge Procedure Orders   Notify your health care provider if you experience any of the following:  temperature >100.4     Notify your health care provider if you experience any of the following:  severe uncontrolled pain     Notify your health care provider if you experience any of the following:  redness, tenderness, or signs of infection (pain, swelling, redness, odor or green/yellow discharge around incision site)     Activity as tolerated        TIME SPENT ON DISCHARGE:   30 minutes

## 2024-01-18 NOTE — OP NOTE
PROCEDURE DATE: 1/18/2024    PROCEDURE: Bilateral L4-5, L5-S1 transforaminal epidural steroid injection under fluoroscopy    DIAGNOSIS: Lumbar radiculitis    Post op diagnosis: Same    PHYSICIAN: Grant Wright MD    MEDICATIONS INJECTED:  Dexamethasone 2.5mg and 0.5ml 0.25% bupivicaine at each nerve root.     LOCAL ANESTHETIC INJECTED:  Lidocaine 1%. 2 ml per site.    SEDATION MEDICATIONS: RN IV sedation    ESTIMATED BLOOD LOSS:  None    COMPLICATIONS:  NOne    TECHNIQUE:   A time-out was taken to identify patient and procedure side prior to starting the procedure. The patient was placed in a prone position, prepped and draped in the usual sterile fashion using ChloraPrep and sterile towels.  The area to be injected was determined under fluoroscopic guidance in AP and oblique view.  Local anesthetic was given by raising a wheal and going down to the hub of a 25-gauge 1.5 inch needle.  In oblique view, a 5 inch 22-gauge bent-tip spinal needle was introduced towards 6 oclock position of the pedicle of each above named nerve root level.  The needle was walked medially then hinged into the neural foramen and position was confirmed in AP and lateral views.  1ml contrast dye was injected to confirm appropriate placement and that there was no vascular uptake.  After negative aspiration for blood or CSF, the medication was then injected. This was performed at the bilateral L4/5 and L5/S1 level(s). The patient tolerated the procedure well.    The patient was monitored after the procedure.  Patient was given post procedure and discharge instructions to follow at home. The patient was discharged in a stable condition.

## 2024-01-18 NOTE — PLAN OF CARE
Discharge instructions given to pt, verbalized understanding.  Tolerating PO fluids.  IV removed.  Does have slight pain when ambulating.  Wheeled out to wife  per RN in no distress.

## 2024-01-19 VITALS
HEART RATE: 57 BPM | WEIGHT: 235 LBS | TEMPERATURE: 97 F | RESPIRATION RATE: 18 BRPM | DIASTOLIC BLOOD PRESSURE: 64 MMHG | HEIGHT: 68 IN | OXYGEN SATURATION: 94 % | SYSTOLIC BLOOD PRESSURE: 128 MMHG | BODY MASS INDEX: 35.61 KG/M2

## 2024-02-07 ENCOUNTER — OFFICE VISIT (OUTPATIENT)
Dept: DERMATOLOGY | Facility: CLINIC | Age: 84
End: 2024-02-07
Payer: MEDICARE

## 2024-02-07 DIAGNOSIS — L82.1 SEBORRHEIC KERATOSES: ICD-10-CM

## 2024-02-07 DIAGNOSIS — D22.9 BENIGN NEVUS: ICD-10-CM

## 2024-02-07 DIAGNOSIS — L57.8 ACTINIC SKIN DAMAGE: ICD-10-CM

## 2024-02-07 DIAGNOSIS — L57.0 ACTINIC KERATOSIS: Primary | ICD-10-CM

## 2024-02-07 DIAGNOSIS — D18.01 CHERRY ANGIOMA: ICD-10-CM

## 2024-02-07 PROCEDURE — 99213 OFFICE O/P EST LOW 20 MIN: CPT | Mod: 25,AQ,S$GLB, | Performed by: STUDENT IN AN ORGANIZED HEALTH CARE EDUCATION/TRAINING PROGRAM

## 2024-02-07 PROCEDURE — 17003 DESTRUCT PREMALG LES 2-14: CPT | Mod: AQ,S$GLB,, | Performed by: STUDENT IN AN ORGANIZED HEALTH CARE EDUCATION/TRAINING PROGRAM

## 2024-02-07 PROCEDURE — 17000 DESTRUCT PREMALG LESION: CPT | Mod: AQ,S$GLB,, | Performed by: STUDENT IN AN ORGANIZED HEALTH CARE EDUCATION/TRAINING PROGRAM

## 2024-02-07 NOTE — PROGRESS NOTES
Subjective:      Patient ID:  Yeyo Hollingsworth is a 83 y.o. male who presents for   Chief Complaint   Patient presents with    Skin Check     UBSC     LOV 01/10/2023    Patient is here today for UBSC.   No areas of concern.    Derm Hx  Previously Derm Dr Evans 2017  Denies H/o NMSC, Phx of MM  Inflamed seborrheic keratosis  Other melanin hyperpigmentation  Allergic purpura  follicular cysts of the skin        Review of Systems   Constitutional:  Negative for fever, chills and fatigue.   Skin:  Positive for activity-related sunscreen use and wears hat. Negative for daily sunscreen use.   Hematologic/Lymphatic: Bruises/bleeds easily (asa,eliquis).       Objective:   Physical Exam   Constitutional: He appears well-developed and well-nourished. No distress.   Neurological: He is alert and oriented to person, place, and time. He is not disoriented.   Psychiatric: He has a normal mood and affect.   Skin:   Areas Examined (abnormalities noted in diagram):   Scalp / Hair Palpated and Inspected  Head / Face Inspection Performed  Neck Inspection Performed  Chest / Axilla Inspection Performed  Abdomen Inspection Performed  Back Inspection Performed  RUE Inspected  LUE Inspection Performed  Nails and Digits Inspection Performed                 Diagram Legend     Erythematous scaling macule/papule c/w actinic keratosis       Vascular papule c/w angioma      Pigmented verrucoid papule/plaque c/w seborrheic keratosis      Yellow umbilicated papule c/w sebaceous hyperplasia      Irregularly shaped tan macule c/w lentigo     1-2 mm smooth white papules consistent with Milia      Movable subcutaneous cyst with punctum c/w epidermal inclusion cyst      Subcutaneous movable cyst c/w pilar cyst      Firm pink to brown papule c/w dermatofibroma      Pedunculated fleshy papule(s) c/w skin tag(s)      Evenly pigmented macule c/w junctional nevus     Mildly variegated pigmented, slightly irregular-bordered macule c/w mildly atypical  nevus      Flesh colored to evenly pigmented papule c/w intradermal nevus       Pink pearly papule/plaque c/w basal cell carcinoma      Erythematous hyperkeratotic cursted plaque c/w SCC      Surgical scar with no sign of skin cancer recurrence      Open and closed comedones      Inflammatory papules and pustules      Verrucoid papule consistent consistent with wart     Erythematous eczematous patches and plaques     Dystrophic onycholytic nail with subungual debris c/w onychomycosis     Umbilicated papule    Erythematous-base heme-crusted tan verrucoid plaque consistent with inflamed seborrheic keratosis     Erythematous Silvery Scaling Plaque c/w Psoriasis     See annotation      Assessment / Plan:        Actinic keratosis  Cryosurgery Procedure Note    Verbal consent from the patient is obtained and the patient is aware of the precancerous quality and need for treatment of these lesions. Liquid nitrogen cryosurgery is applied to the 10 actinic keratoses, as detailed in the physical exam, to produce a freeze injury. The patient is aware that blisters may form and is instructed on wound care with gentle cleansing and use of vaseline ointment to keep moist until healed. The patient is supplied a handout on cryosurgery and is instructed to call if lesions do not completely resolve.    Seborrheic keratoses  These are benign inherited growths without a malignant potential. Reassurance given to patient. No treatment is necessary.     Benign nevus  Careful dermoscopy evaluation of nevi performed with none identified as needing biopsy today  Monitor for new mole or moles that are becoming bigger, darker, irritated, or developing irregular borders.     Actinic skin damage  Upper body skin examination performed today including at least 9 points as noted in physical examination. No lesions suspicious for malignancy noted.  Patient instructed in importance in daily broad spectrum sun protection of at least spf 30. Mineral  sunscreen ingredients preferred (Zinc +/- Titanium) and can be found OTC.   Recommend Elta MD for daily use on face and neck.  Patient encouraged to wear hat for all outdoor exposure.   Also discussed sun avoidance and use of protective clothing.    Rhoades angioma  This is a benign vascular lesion. Reassurance given. No treatment required.          1 year    No follow-ups on file.

## 2024-02-07 NOTE — PATIENT INSTRUCTIONS

## 2024-02-16 ENCOUNTER — OFFICE VISIT (OUTPATIENT)
Dept: PAIN MEDICINE | Facility: CLINIC | Age: 84
End: 2024-02-16
Payer: MEDICARE

## 2024-02-16 ENCOUNTER — TELEPHONE (OUTPATIENT)
Dept: PAIN MEDICINE | Facility: CLINIC | Age: 84
End: 2024-02-16

## 2024-02-16 VITALS
HEIGHT: 68 IN | BODY MASS INDEX: 35.61 KG/M2 | DIASTOLIC BLOOD PRESSURE: 64 MMHG | HEART RATE: 68 BPM | WEIGHT: 235 LBS | SYSTOLIC BLOOD PRESSURE: 124 MMHG

## 2024-02-16 DIAGNOSIS — M54.16 LUMBAR RADICULITIS: Primary | ICD-10-CM

## 2024-02-16 DIAGNOSIS — M51.36 DDD (DEGENERATIVE DISC DISEASE), LUMBAR: ICD-10-CM

## 2024-02-16 PROCEDURE — 99214 OFFICE O/P EST MOD 30 MIN: CPT | Mod: S$PBB,,,

## 2024-02-16 PROCEDURE — 99999 PR PBB SHADOW E&M-EST. PATIENT-LVL IV: CPT | Mod: PBBFAC,,,

## 2024-02-16 PROCEDURE — 99214 OFFICE O/P EST MOD 30 MIN: CPT | Mod: PBBFAC,PN

## 2024-02-16 NOTE — H&P (VIEW-ONLY)
Referring Physician: No ref. provider found    PCP: Garland Ocampo MD      CC:low back and left leg pain    Interval History:  Mr. Hollingsworth is a 83 y.o. male with a low back and leg pain who presents today as an established patient for the management of low back pain.  The patient is s/p Bilateral L4-5, L5-S1 transforaminal epidural steroid injection on 1/18/24 and reports of 100% relief x4 weeks. The patient reports approximately 50% relief at this time but attributes return of pain to going on a cruise and ambulating a lot. The patient reports his pain is similar to the pain he had prior to JOSE.  The patient denies pain with sitting and sleeping. Continues to c/o unrelated pain at the top of his left foot.   He denies any LE weakness or b/b changes. He takes Hydrocodone 7.5 mg sparingly prescribed by PCP.  In the past he was evaluated by [facility] and lumbar surgery was recommended. The patient denies of any significant changes in his health since his last appointment. The patient also denies of any changes in the character of his pain since his last appointment.  Pain today is rated 6/10. Patient denies of any urinary/fecal incontinence, saddle anesthesia, or weakness.        HPI:   Yeyo Hollingsworth is a 83 y.o. male ho presents with lower back and left leg pain.  Pain is been present since 2010.  No recent traumatic incident.  His intermittent aching, throbbing, electric pain in his lower back.  Pain radiates down his left buttock into his left posterior thigh.  Pain worsens with prolonged standing, walking, getting up and coughing.  Pain improves with rest.  His tried physical therapy with minimal benefit.  He has undergone lumbar JOSE's with Dr. Rendon in the past with moderate benefit.  Most recent injection was performed in September 2016.  Pain has since returned.  He desires repeat injections with us.  He denies any weakness.  No bowel bladder changes.  He rates his pain 6/10 today.    INTERVETIONAL  "THERAPIES:   01/18/2024: Bilateral L4-5, L5-S1 transforaminal epidural steroid injection- 100% X4 WEEKS, 50% relief at time time.   8/21/2023: plantar fasciatis steroid injection - Dr. Elliott-   6/1/2023: Bilateral L4-5, L5-S1 transforaminal epidural steroid injection  1/20/23: Injection,steroid,epidural,transforaminal approach L4-L5-S1- excellent relief.   11/4/2022: Bilateral L4-5 and L5-S1 transforaminal epidural steroid injection under fluoroscopy- Dr. Wright- excellent relief.       :   Reviewed.        ROS:  CONSTITUTIONAL: No fevers, chills, night sweats, wt. loss, appetite changes  SKIN: no rashes or itching  ENT: No headaches, head trauma, vision changes, or eye pain  LYMPH NODES: None noticed   CV: No chest pain, palpitations.   RESP: No shortness of breath, dyspnea on exertion, cough, wheezing, or hemoptysis  GI: No nausea, emesis, diarrhea, constipation, melena, hematochezia, pain.    : No dysuria, hematuria, urgency, or frequency   HEME: No easy bruising, bleeding problems  PSYCHIATRIC: No depression, anxiety, psychosis, hallucinations.  NEURO: No seizures, memory loss, dizziness or difficulty sleeping  MSK: + history of present illness    MEDICAL, SURGICAL, FAMILY, SOCIAL HX: reviewed    MEDICATIONS/ALLERGIES: reviewed         Medications/Allergies: See med card    Vitals:    02/16/24 1305   BP: 124/64   Pulse: 68   Weight: 106.6 kg (235 lb)   Height: 5' 8" (1.727 m)   PainSc:   6   PainLoc: Leg                 Physical exam:    GENERAL: A and O x3, the patient appears well groomed and is in no acute distress.  Skin: No rashes or obvious lesions  HEENT: normocephalic, atraumatic  CARDIOVASCULAR:  RRR  LUNGS: non labored breathing  ABDOMEN: soft, nontender   UPPER EXTREMITIES: Normal alignment, normal range of motion, no atrophy, no skin changes,  hair growth and nail growth normal and equal bilaterally. No swelling. Tenderness to right AC joint  LOWER EXTREMITIES:  Normal alignment, normal range of " motion, no atrophy, no skin changes,  hair growth and nail growth normal and equal bilaterally. No swelling, no tenderness.    CERVICAL SPINE:  Full ROM with pain on flexion and extension  Negative spurlings  Tenderness to palpation right cervical paraspinous muscles   LUMBAR SPINE  Lumbar spine: ROM is limited with flexion extension and oblique extension with moderate increased pain.    Caden's test causes no increased pain on either side.    Supine straight leg raise positive bilaterally   Internal and external rotation of the hip causes no increased pain on either side.  Myofascial exam: No tenderness to palpation across lumbar paraspinous muscles.       MENTAL STATUS: normal orientation, speech, language, and fund of knowledge for social situation.  Emotional state appropriate.    CRANIAL NERVES:  II:  PERRL bilaterally,   III,IV,VI: EOMI.    V:  Facial sensation equal bilaterally  VII:  Facial motor function normal.  VIII:  Hearing equal to finger rub bilaterally  IX/X: Gag normal, palate symmetric  XI:  Shoulder shrug equal, head turn equal  XII:  Tongue midline without fasciculations      MOTOR: Tone and bulk: normal bilateral upper and lower Strength: normal   Delt Bi Tri WE WF     R 5 5 5 5 5 5   L 5 5 5 5 5 5     IP ADD ABD Quad TA Gas HAM  R 5 5 5 5 5 5 5  L 5 5 5 5 5 5 5    SENSATION: Light touch and pinprick intact bilaterally  REFLEXES: normal, symmetric, nonbrisk.  Toes down, no clonus. No hoffmans.  GAIT: normal rise, base, steps, and arm swing.        Imaging: reviewed CT of ABD PELV renal stone study.        Assessment:  Mr. Hollingsworth is a 83 y.o. male with low back and BLE pain. Treatment plan outlined below:   1. Lumbar radiculitis    2. DDD (degenerative disc disease), lumbar              Plan:  1. Continue to monitor progress of repeat Bilateral TFESI at L4-L5, L5-S1. Schedule repeat procedure in March.   2. I have stressed the importance of physical activity and a home exercise plan to help  with chronic pain and improve health.   3. Continue PT as ordered. Patient can call for extended order if needed.   4.  continue Robaxin 500 mg po Q4H prn muscle spasms.    5. RTC for the procedure as outlined above     Janet Croft NP

## 2024-02-16 NOTE — PROGRESS NOTES
Referring Physician: No ref. provider found    PCP: Garland Ocampo MD      CC:low back and left leg pain    Interval History:  Mr. Hollingsworth is a 83 y.o. male with a low back and leg pain who presents today as an established patient for the management of low back pain.  The patient is s/p Bilateral L4-5, L5-S1 transforaminal epidural steroid injection on 1/18/24 and reports of 100% relief x4 weeks. The patient reports approximately 50% relief at this time but attributes return of pain to going on a cruise and ambulating a lot. The patient reports his pain is similar to the pain he had prior to JOSE.  The patient denies pain with sitting and sleeping. Continues to c/o unrelated pain at the top of his left foot.   He denies any LE weakness or b/b changes. He takes Hydrocodone 7.5 mg sparingly prescribed by PCP.  In the past he was evaluated by [facility] and lumbar surgery was recommended. The patient denies of any significant changes in his health since his last appointment. The patient also denies of any changes in the character of his pain since his last appointment.  Pain today is rated 6/10. Patient denies of any urinary/fecal incontinence, saddle anesthesia, or weakness.        HPI:   eYyo Hollingsworth is a 83 y.o. male ho presents with lower back and left leg pain.  Pain is been present since 2010.  No recent traumatic incident.  His intermittent aching, throbbing, electric pain in his lower back.  Pain radiates down his left buttock into his left posterior thigh.  Pain worsens with prolonged standing, walking, getting up and coughing.  Pain improves with rest.  His tried physical therapy with minimal benefit.  He has undergone lumbar JOSE's with Dr. Rendon in the past with moderate benefit.  Most recent injection was performed in September 2016.  Pain has since returned.  He desires repeat injections with us.  He denies any weakness.  No bowel bladder changes.  He rates his pain 6/10 today.    INTERVETIONAL  "THERAPIES:   01/18/2024: Bilateral L4-5, L5-S1 transforaminal epidural steroid injection- 100% X4 WEEKS, 50% relief at time time.   8/21/2023: plantar fasciatis steroid injection - Dr. Elliott-   6/1/2023: Bilateral L4-5, L5-S1 transforaminal epidural steroid injection  1/20/23: Injection,steroid,epidural,transforaminal approach L4-L5-S1- excellent relief.   11/4/2022: Bilateral L4-5 and L5-S1 transforaminal epidural steroid injection under fluoroscopy- Dr. Wright- excellent relief.       :   Reviewed.        ROS:  CONSTITUTIONAL: No fevers, chills, night sweats, wt. loss, appetite changes  SKIN: no rashes or itching  ENT: No headaches, head trauma, vision changes, or eye pain  LYMPH NODES: None noticed   CV: No chest pain, palpitations.   RESP: No shortness of breath, dyspnea on exertion, cough, wheezing, or hemoptysis  GI: No nausea, emesis, diarrhea, constipation, melena, hematochezia, pain.    : No dysuria, hematuria, urgency, or frequency   HEME: No easy bruising, bleeding problems  PSYCHIATRIC: No depression, anxiety, psychosis, hallucinations.  NEURO: No seizures, memory loss, dizziness or difficulty sleeping  MSK: + history of present illness    MEDICAL, SURGICAL, FAMILY, SOCIAL HX: reviewed    MEDICATIONS/ALLERGIES: reviewed         Medications/Allergies: See med card    Vitals:    02/16/24 1305   BP: 124/64   Pulse: 68   Weight: 106.6 kg (235 lb)   Height: 5' 8" (1.727 m)   PainSc:   6   PainLoc: Leg                 Physical exam:    GENERAL: A and O x3, the patient appears well groomed and is in no acute distress.  Skin: No rashes or obvious lesions  HEENT: normocephalic, atraumatic  CARDIOVASCULAR:  RRR  LUNGS: non labored breathing  ABDOMEN: soft, nontender   UPPER EXTREMITIES: Normal alignment, normal range of motion, no atrophy, no skin changes,  hair growth and nail growth normal and equal bilaterally. No swelling. Tenderness to right AC joint  LOWER EXTREMITIES:  Normal alignment, normal range of " motion, no atrophy, no skin changes,  hair growth and nail growth normal and equal bilaterally. No swelling, no tenderness.    CERVICAL SPINE:  Full ROM with pain on flexion and extension  Negative spurlings  Tenderness to palpation right cervical paraspinous muscles   LUMBAR SPINE  Lumbar spine: ROM is limited with flexion extension and oblique extension with moderate increased pain.    Caden's test causes no increased pain on either side.    Supine straight leg raise positive bilaterally   Internal and external rotation of the hip causes no increased pain on either side.  Myofascial exam: No tenderness to palpation across lumbar paraspinous muscles.       MENTAL STATUS: normal orientation, speech, language, and fund of knowledge for social situation.  Emotional state appropriate.    CRANIAL NERVES:  II:  PERRL bilaterally,   III,IV,VI: EOMI.    V:  Facial sensation equal bilaterally  VII:  Facial motor function normal.  VIII:  Hearing equal to finger rub bilaterally  IX/X: Gag normal, palate symmetric  XI:  Shoulder shrug equal, head turn equal  XII:  Tongue midline without fasciculations      MOTOR: Tone and bulk: normal bilateral upper and lower Strength: normal   Delt Bi Tri WE WF     R 5 5 5 5 5 5   L 5 5 5 5 5 5     IP ADD ABD Quad TA Gas HAM  R 5 5 5 5 5 5 5  L 5 5 5 5 5 5 5    SENSATION: Light touch and pinprick intact bilaterally  REFLEXES: normal, symmetric, nonbrisk.  Toes down, no clonus. No hoffmans.  GAIT: normal rise, base, steps, and arm swing.        Imaging: reviewed CT of ABD PELV renal stone study.        Assessment:  Mr. Hollingsworth is a 83 y.o. male with low back and BLE pain. Treatment plan outlined below:   1. Lumbar radiculitis    2. DDD (degenerative disc disease), lumbar              Plan:  1. Continue to monitor progress of repeat Bilateral TFESI at L4-L5, L5-S1. Schedule repeat procedure in March.   2. I have stressed the importance of physical activity and a home exercise plan to help  with chronic pain and improve health.   3. Continue PT as ordered. Patient can call for extended order if needed.   4.  continue Robaxin 500 mg po Q4H prn muscle spasms.    5. RTC for the procedure as outlined above     Janet Croft NP

## 2024-02-18 NOTE — INTERVAL H&P NOTE
The patient has been examined and the H&P has been reviewed:    I concur with the findings and no changes have occurred since H&P was written.   This patient has been cleared for surgery in an ambulatory surgical facility    ASA 3,  Mallampatti Score 3  No history of anesthetic complications  Plan for RN IV sedation      Anesthesia/Surgery risks, benefits and alternative options discussed and understood by patient/family.          Active Hospital Problems    Diagnosis  POA    Lumbar radiculopathy [M54.16]  Yes      Resolved Hospital Problems   No resolved problems to display.      18-Feb-2024 02:34

## 2024-02-26 NOTE — TELEPHONE ENCOUNTER
's message was forwarded to Emily Yeager LPN.  
Dr Ocampo, Pt to have epidural into back on 03/05 he is on eliquis and baby asa may he hold his eliquis x 3 days prior to the procedure, and his aspirin x 5 days prior? Thank you.   
Left another message for a return call.   
Left message for return call to schedule   
PT called x 2 closing encounter   
Pt has a history of DVT, please make sure that he understands the importance of walking and wearing compression stocking during the period he is off the Eliquis and Asprin. He may hold prior to the procedure.   
Spoke with pt and advised of Dr Naranjo message below.   
Spoke with pt and scheduled for 02/29 instructions given.  Dr Ocampo may he please hold his eliquis x 3 days prior to procedure and ASA x 5 days prior? Thank you.   
Types of orders made on 02/16/2024: Procedure Request      Order Date:2/16/2024   Ordering User:JANET CHARLES [[id number]]   Encounter Provider:Janet Charles NP [[id number]]   Authorizing Provider:   Janet Mclain NP [[id number]]   Supervising Provider:PREETI JACOBS [[id number]]   Type of Supervision:Collaborating Physician   Department:[facility] PAIN MANAGEMENT[[id number]]      Common Order Information   Procedure -> Transforaminal Injection (Specify level and laterality) Cmt:             bilateral L4-5, L5-S1      Pre-op Diagnosis -> Lumbar radiculopathy       -> DDD (degenerative disc disease), lumbar      Order Specific Information   Orde   r: Procedure Order to Pain Management [Custom: [id number]]  Order #:          [id number]: 1 FUTURE     Priority: Routine  Class: Clinic Performed     Future Order Information       Expires on:02/16/2025            Expected by:02/16/2024                   Comment:Please schedule the patient in March.     Associated Diagnoses       M54.16 Lumbar radiculitis       M51.36 DDD (degenerative disc disease), lum   bar       Physician -> dr. jacobs          Is patient on anti-coagulants? -> Yes          Facility Name: -> [facility]          Follow-up: -> 4 weeks              Priority: Routine  Class: Clinic Performed     Future Order Information       Expires on:02/16/2025            Expected by:02/16/2024                   Comment:Please schedule the patient in March.     Associated Diagnoses       M54.16 Lumbar radiculitis       M51.36   DDD (degenerative disc disease), lumbar       Procedure -> Transforaminal Injection (Specify level and laterality) Cmt:                 bilateral L4-5, L5-S1     
 Infant (Birth)

## 2024-02-29 ENCOUNTER — HOSPITAL ENCOUNTER (OUTPATIENT)
Facility: HOSPITAL | Age: 84
Discharge: HOME | End: 2024-02-29
Attending: ANESTHESIOLOGY | Admitting: ANESTHESIOLOGY
Payer: MEDICARE

## 2024-02-29 DIAGNOSIS — M54.16 LUMBAR RADICULITIS: ICD-10-CM

## 2024-02-29 LAB
POCT GLUCOSE: 77 MG/DL (ref 70–110)
POCT GLUCOSE: 91 MG/DL (ref 70–110)

## 2024-02-29 PROCEDURE — 99152 MOD SED SAME PHYS/QHP 5/>YRS: CPT | Performed by: ANESTHESIOLOGY

## 2024-02-29 PROCEDURE — 64484 NJX AA&/STRD TFRM EPI L/S EA: CPT | Mod: 50,,, | Performed by: ANESTHESIOLOGY

## 2024-02-29 PROCEDURE — 63600175 PHARM REV CODE 636 W HCPCS: Performed by: ANESTHESIOLOGY

## 2024-02-29 PROCEDURE — 64484 NJX AA&/STRD TFRM EPI L/S EA: CPT | Mod: 50 | Performed by: ANESTHESIOLOGY

## 2024-02-29 PROCEDURE — 25500020 PHARM REV CODE 255: Performed by: ANESTHESIOLOGY

## 2024-02-29 PROCEDURE — 64483 NJX AA&/STRD TFRM EPI L/S 1: CPT | Mod: 50,,, | Performed by: ANESTHESIOLOGY

## 2024-02-29 PROCEDURE — 25000003 PHARM REV CODE 250: Performed by: ANESTHESIOLOGY

## 2024-02-29 PROCEDURE — 64483 NJX AA&/STRD TFRM EPI L/S 1: CPT | Mod: 50 | Performed by: ANESTHESIOLOGY

## 2024-02-29 RX ORDER — LIDOCAINE HYDROCHLORIDE 10 MG/ML
1 INJECTION, SOLUTION EPIDURAL; INFILTRATION; INTRACAUDAL; PERINEURAL ONCE
Status: COMPLETED | OUTPATIENT
Start: 2024-02-29 | End: 2024-02-29

## 2024-02-29 RX ORDER — SODIUM CHLORIDE, SODIUM LACTATE, POTASSIUM CHLORIDE, CALCIUM CHLORIDE 600; 310; 30; 20 MG/100ML; MG/100ML; MG/100ML; MG/100ML
INJECTION, SOLUTION INTRAVENOUS CONTINUOUS
Status: ACTIVE | OUTPATIENT
Start: 2024-02-29

## 2024-02-29 RX ORDER — LIDOCAINE HYDROCHLORIDE 10 MG/ML
INJECTION, SOLUTION EPIDURAL; INFILTRATION; INTRACAUDAL; PERINEURAL
Status: DISCONTINUED | OUTPATIENT
Start: 2024-02-29 | End: 2024-02-29 | Stop reason: HOSPADM

## 2024-02-29 RX ORDER — DEXAMETHASONE SODIUM PHOSPHATE 10 MG/ML
INJECTION INTRAMUSCULAR; INTRAVENOUS
Status: DISCONTINUED | OUTPATIENT
Start: 2024-02-29 | End: 2024-02-29 | Stop reason: HOSPADM

## 2024-02-29 RX ORDER — BUPIVACAINE HYDROCHLORIDE 2.5 MG/ML
INJECTION, SOLUTION EPIDURAL; INFILTRATION; INTRACAUDAL
Status: DISCONTINUED | OUTPATIENT
Start: 2024-02-29 | End: 2024-02-29 | Stop reason: HOSPADM

## 2024-02-29 RX ORDER — FENTANYL CITRATE 50 UG/ML
INJECTION, SOLUTION INTRAMUSCULAR; INTRAVENOUS
Status: DISCONTINUED | OUTPATIENT
Start: 2024-02-29 | End: 2024-02-29 | Stop reason: HOSPADM

## 2024-02-29 RX ADMIN — SODIUM CHLORIDE, POTASSIUM CHLORIDE, SODIUM LACTATE AND CALCIUM CHLORIDE: 600; 310; 30; 20 INJECTION, SOLUTION INTRAVENOUS at 11:02

## 2024-02-29 RX ADMIN — LIDOCAINE HYDROCHLORIDE 0.2 ML: 10 INJECTION, SOLUTION EPIDURAL; INFILTRATION; INTRACAUDAL; PERINEURAL at 11:02

## 2024-02-29 NOTE — DISCHARGE SUMMARY
[facility] - Periop Services  Discharge Note  Short Stay    Procedure(s) (LRB):  Injection,steroid,epidural,transforaminal approach (Bilateral)      OUTCOME: Patient tolerated treatment/procedure well without complication and is now ready for discharge.    DISPOSITION: Home or Self Care    FINAL DIAGNOSIS:  <principal problem not specified>    FOLLOWUP: In clinic    DISCHARGE INSTRUCTIONS:    Discharge Procedure Orders   Notify your health care provider if you experience any of the following:  temperature >100.4     Notify your health care provider if you experience any of the following:  severe uncontrolled pain     Notify your health care provider if you experience any of the following:  redness, tenderness, or signs of infection (pain, swelling, redness, odor or green/yellow discharge around incision site)     Activity as tolerated        TIME SPENT ON DISCHARGE: 30 minutes

## 2024-02-29 NOTE — PLAN OF CARE
Plan of care reviewed with patient, patient verbalized understanding. Safety maintained while in PACU.   Pt remained free of fall/trauma. Discharge instructions explained to patient. Patient tolerating liquids. Patient is stable for discharge. Peripheral IV removed. Patient wheeled by nurse to wife .

## 2024-02-29 NOTE — OP NOTE
PROCEDURE DATE: 2/29/2024    PROCEDURE: Bilateral L4-5, L5-S1 transforaminal epidural steroid injection under fluoroscopy    DIAGNOSIS: Lumbar radiculitis    Post op diagnosis: Same    PHYSICIAN: Grant Wright MD    MEDICATIONS INJECTED:  Dexamethasone 2.5mg and 0.5ml 0.25% bupivicaine at each nerve root.     LOCAL ANESTHETIC INJECTED:  Lidocaine 1%. 2 ml per site.    SEDATION MEDICATIONS: RN IV sedation    ESTIMATED BLOOD LOSS:  None    COMPLICATIONS:  NOne    TECHNIQUE:   A time-out was taken to identify patient and procedure side prior to starting the procedure. The patient was placed in a prone position, prepped and draped in the usual sterile fashion using ChloraPrep and sterile towels.  The area to be injected was determined under fluoroscopic guidance in AP and oblique view.  Local anesthetic was given by raising a wheal and going down to the hub of a 25-gauge 1.5 inch needle.  In oblique view, a 5 inch 22-gauge bent-tip spinal needle was introduced towards 6 oclock position of the pedicle of each above named nerve root level.  The needle was walked medially then hinged into the neural foramen and position was confirmed in AP and lateral views.  1ml contrast dye was injected to confirm appropriate placement and that there was no vascular uptake.  After negative aspiration for blood or CSF, the medication was then injected. This was performed at the bilateral L4/5 and L5/S1 level(s). The patient tolerated the procedure well.    The patient was monitored after the procedure.  Patient was given post procedure and discharge instructions to follow at home. The patient was discharged in a stable condition.

## 2024-03-01 VITALS
OXYGEN SATURATION: 95 % | TEMPERATURE: 98 F | RESPIRATION RATE: 18 BRPM | HEIGHT: 68 IN | WEIGHT: 235 LBS | BODY MASS INDEX: 35.61 KG/M2 | DIASTOLIC BLOOD PRESSURE: 58 MMHG | SYSTOLIC BLOOD PRESSURE: 113 MMHG | HEART RATE: 56 BPM

## 2024-03-10 DIAGNOSIS — E11.9 CONTROLLED TYPE 2 DIABETES MELLITUS WITHOUT COMPLICATION, WITHOUT LONG-TERM CURRENT USE OF INSULIN: ICD-10-CM

## 2024-03-10 DIAGNOSIS — M54.16 LUMBAR RADICULITIS: ICD-10-CM

## 2024-03-10 DIAGNOSIS — G89.4 CHRONIC PAIN SYNDROME: ICD-10-CM

## 2024-03-10 DIAGNOSIS — J30.89 CHRONIC NONSEASONAL ALLERGIC RHINITIS DUE TO FUNGAL SPORES: ICD-10-CM

## 2024-03-10 DIAGNOSIS — I10 ESSENTIAL HYPERTENSION: ICD-10-CM

## 2024-03-10 DIAGNOSIS — M19.90 ARTHRITIS: ICD-10-CM

## 2024-03-11 NOTE — TELEPHONE ENCOUNTER
Please call back and ask if allof these are going to [facility] or do some go to a different pharmacy.

## 2024-03-12 NOTE — TELEPHONE ENCOUNTER
Called patient to see if all medications he requested were to be sent to [facility]. I received voicemail, message was left for patient to check his My Chart,

## 2024-03-13 RX ORDER — EMPAGLIFLOZIN 10 MG/1
10 TABLET, FILM COATED ORAL DAILY
Qty: 90 TABLET | Refills: 1 | Status: SHIPPED | OUTPATIENT
Start: 2024-03-13

## 2024-03-13 RX ORDER — HYDROCODONE BITARTRATE AND ACETAMINOPHEN 5; 325 MG/1; MG/1
1 TABLET ORAL EVERY 6 HOURS PRN
Qty: 90 TABLET | Refills: 0 | Status: SHIPPED | OUTPATIENT
Start: 2024-03-13

## 2024-03-13 RX ORDER — GABAPENTIN 300 MG/1
300 CAPSULE ORAL 3 TIMES DAILY
Qty: 270 CAPSULE | Refills: 1 | Status: SHIPPED | OUTPATIENT
Start: 2024-03-13 | End: 2025-03-13

## 2024-03-13 RX ORDER — MONTELUKAST SODIUM 10 MG/1
10 TABLET ORAL NIGHTLY
Qty: 90 TABLET | Refills: 3 | Status: SHIPPED | OUTPATIENT
Start: 2024-03-13

## 2024-03-13 RX ORDER — METOPROLOL TARTRATE 25 MG/1
12.5 TABLET, FILM COATED ORAL 2 TIMES DAILY
Qty: 180 TABLET | Refills: 3 | Status: SHIPPED | OUTPATIENT
Start: 2024-03-13 | End: 2026-03-03

## 2024-04-02 ENCOUNTER — LAB VISIT (OUTPATIENT)
Dept: LAB | Facility: HOSPITAL | Age: 84
End: 2024-04-02
Attending: FAMILY MEDICINE
Payer: MEDICARE

## 2024-04-02 DIAGNOSIS — E11.9 TYPE 2 DIABETES MELLITUS WITHOUT COMPLICATION, WITHOUT LONG-TERM CURRENT USE OF INSULIN: Chronic | ICD-10-CM

## 2024-04-02 DIAGNOSIS — E78.01 FAMILIAL HYPERCHOLESTEROLEMIA: ICD-10-CM

## 2024-04-02 LAB
ALBUMIN/CREAT UR: 10.2 UG/MG (ref 0–30)
CHOLEST SERPL-MCNC: 105 MG/DL (ref 120–199)
CHOLEST/HDLC SERPL: 2.8 {RATIO} (ref 2–5)
CREAT UR-MCNC: 140.4 MG/DL (ref 23–375)
HDLC SERPL-MCNC: 38 MG/DL (ref 40–75)
HDLC SERPL: 36.2 % (ref 20–50)
LDLC SERPL CALC-MCNC: 42.2 MG/DL (ref 63–159)
MICROALBUMIN UR DL<=1MG/L-MCNC: 14.3 UG/ML
NONHDLC SERPL-MCNC: 67 MG/DL
TRIGL SERPL-MCNC: 124 MG/DL (ref 30–150)

## 2024-04-02 PROCEDURE — 80061 LIPID PANEL: CPT | Performed by: FAMILY MEDICINE

## 2024-04-02 PROCEDURE — 83036 HEMOGLOBIN GLYCOSYLATED A1C: CPT | Performed by: FAMILY MEDICINE

## 2024-04-02 PROCEDURE — 82043 UR ALBUMIN QUANTITATIVE: CPT | Performed by: FAMILY MEDICINE

## 2024-04-02 PROCEDURE — 36415 COLL VENOUS BLD VENIPUNCTURE: CPT | Performed by: FAMILY MEDICINE

## 2024-04-03 LAB
ESTIMATED AVG GLUCOSE: 160 MG/DL (ref 68–131)
HBA1C MFR BLD: 7.2 % (ref 4.5–6.2)

## 2024-04-19 ENCOUNTER — OFFICE VISIT (OUTPATIENT)
Dept: FAMILY MEDICINE | Facility: CLINIC | Age: 84
End: 2024-04-19
Payer: MEDICARE

## 2024-04-19 VITALS
WEIGHT: 231.19 LBS | SYSTOLIC BLOOD PRESSURE: 149 MMHG | BODY MASS INDEX: 35.04 KG/M2 | HEIGHT: 68 IN | HEART RATE: 62 BPM | DIASTOLIC BLOOD PRESSURE: 75 MMHG | OXYGEN SATURATION: 97 %

## 2024-04-19 DIAGNOSIS — E78.01 FAMILIAL HYPERCHOLESTEROLEMIA: ICD-10-CM

## 2024-04-19 DIAGNOSIS — I10 PRIMARY HYPERTENSION: ICD-10-CM

## 2024-04-19 DIAGNOSIS — M54.16 LUMBAR RADICULITIS: Primary | ICD-10-CM

## 2024-04-19 DIAGNOSIS — E11.9 TYPE 2 DIABETES MELLITUS WITHOUT COMPLICATION, WITHOUT LONG-TERM CURRENT USE OF INSULIN: Chronic | ICD-10-CM

## 2024-04-19 PROCEDURE — 99214 OFFICE O/P EST MOD 30 MIN: CPT | Mod: S$PBB,AQ,, | Performed by: FAMILY MEDICINE

## 2024-04-19 PROCEDURE — 99215 OFFICE O/P EST HI 40 MIN: CPT | Mod: PBBFAC,PN | Performed by: FAMILY MEDICINE

## 2024-04-19 PROCEDURE — 99999 PR PBB SHADOW E&M-EST. PATIENT-LVL V: CPT | Mod: PBBFAC,,, | Performed by: FAMILY MEDICINE

## 2024-04-19 NOTE — PROGRESS NOTES
SUBJECTIVE:    Patient ID: Yeyo Hollingsworth is a 83 y.o. male.    Chief Complaint: Diabetes and Hypertension  83-year-old male with a history of diabetes hypertension hyperlipidemia chronic pain secondary to arthritis, insomnia presents to clinic today for follow-up on his diabetes hypertension and chronic pain.     A1c is slightly increased patient would like to control with diet, patient has only been taking 5 mg of Jardiance will have him increase to 10.     SPMHx:  DM: Jardiance 5mg, Januvia 100mg, ( will have him go back to 10mg Jardiance)   HTN: Lisinopril 20mg, Metoprolol 25mg, is well controlled.  Hyperlipidemia: Atorvastatin 10 mg well controlled. LDL 59  Arthritis: On several chronic narcotics and follows up with pain management, for his neck and back pain pt is planning to have a procedure.  Anxiety: Takes Valium 5mg, PRN  A-fib:  Was cardioverted, no longer in a-fib  Insomnia: Trazadone 100mg  Hx of DVT: Currently on Eliquis 5mg   BRANDI: Wears CPAP  Chronic constipation: On polyethylene Glycol 17g, manages well with this medication.     Specialists:  Cardiology: Dr Mcclelland  Pain Management: Michele Martinez  Ortho: Dr Dean  Podiatry: Dr Mandujano  Sleep: Dr Felice Bello  GS: Dr Thorne     Smoke: Quit in   ETOH: None  Exercise: Never      Hemoglobin A1C 4.5 - 6.2 % 7.2 High       Lipid  Chol: 105  Tri  HDL: 38  LDL: 42      Diabetes  He presents for his follow-up diabetic visit. He has type 2 diabetes mellitus. No MedicAlert identification noted. The initial diagnosis of diabetes was made 10 years ago. Hypoglycemia symptoms include headaches and mood changes. Pertinent negatives for hypoglycemia include no confusion, dizziness, hunger, nervousness/anxiousness, pallor, seizures, sleepiness, speech difficulty, sweats or tremors. Associated symptoms include foot paresthesias and polyuria. Pertinent negatives for diabetes include no blurred vision, no chest pain, no fatigue, no foot ulcerations, no  polydipsia, no polyphagia, no visual change, no weakness and no weight loss. Pertinent negatives for hypoglycemia complications include no blackouts, no hospitalization, no nocturnal hypoglycemia, no required assistance and no required glucagon injection. Symptoms are worsening. Diabetic complications include peripheral neuropathy. Pertinent negatives for diabetic complications include no autonomic neuropathy, CVA, heart disease, impotence, nephropathy, PVD or retinopathy. Risk factors for coronary artery disease include family history and hypertension. Current diabetic treatment includes oral agent (dual therapy). He is compliant with treatment all of the time. His weight is fluctuating minimally. He is following a diabetic, generally healthy and low salt diet. Meal planning includes avoidance of concentrated sweets. He has not had a previous visit with a dietitian. He rarely participates in exercise. He monitors blood glucose at home 3-4 x per week. He monitors urine at home <1 x per month. Blood glucose monitoring compliance is good. There is no change in his home blood glucose trend. He sees a podiatrist.Eye exam is current.   Hypertension  This is a chronic problem. The current episode started more than 1 year ago. The problem is uncontrolled. Associated symptoms include headaches. Pertinent negatives include no blurred vision, chest pain, palpitations, shortness of breath or sweats. Risk factors for coronary artery disease include diabetes mellitus, dyslipidemia, male gender, obesity and sedentary lifestyle. Past treatments include ACE inhibitors and beta blockers. The current treatment provides moderate improvement. Compliance problems include exercise.  There is no history of CVA, PVD or retinopathy.   Hyperlipidemia  This is a chronic problem. The current episode started more than 1 year ago. The problem is controlled. Recent lipid tests were reviewed and are normal. Exacerbating diseases include diabetes  and obesity. Pertinent negatives include no chest pain or shortness of breath. Current antihyperlipidemic treatment includes statins. The current treatment provides moderate improvement of lipids. Compliance problems include adherence to exercise and adherence to diet.          Past Medical History:   Diagnosis Date    Allergy     Ankle pain     left    Anticoagulant long-term use     aspirin    Anxiety     Atrial fibrillation     Back pain     Bruises easily     Cataract     Clotting disorder     left leg    Colon polyp     Diabetes mellitus     Diabetes mellitus, type 2     Diverticulitis     Hay fever     Hyperlipidemia     Hypertension     Left hip pain 2021    Seeing Dr. Beard for hip pain     Obese     BRANDI on CPAP      Social History     Socioeconomic History    Marital status:    Tobacco Use    Smoking status: Former     Current packs/day: 0.00     Types: Cigarettes     Quit date: 2006     Years since quittin.1    Smokeless tobacco: Never    Tobacco comments:     ex smoker    Substance and Sexual Activity    Alcohol use: Yes     Comment: rarely    Drug use: No    Sexual activity: Yes     Partners: Female     Social Determinants of Health     Financial Resource Strain: Low Risk  (2024)    Overall Financial Resource Strain (CARDIA)     Difficulty of Paying Living Expenses: Not hard at all   Food Insecurity: No Food Insecurity (2024)    Hunger Vital Sign     Worried About Running Out of Food in the Last Year: Never true     Ran Out of Food in the Last Year: Never true   Transportation Needs: No Transportation Needs (2024)    PRAPARE - Transportation     Lack of Transportation (Medical): No     Lack of Transportation (Non-Medical): No   Physical Activity: Inactive (2024)    Exercise Vital Sign     Days of Exercise per Week: 0 days     Minutes of Exercise per Session: 0 min   Stress: No Stress Concern Present (2024)    [employer] -  Occupational Stress Questionnaire     Feeling of Stress : Not at all   Social Connections: Unknown (2/5/2024)    Social Connection and Isolation Panel [NHANES]     Frequency of Communication with Friends and Family: Once a week     Frequency of Social Gatherings with Friends and Family: Patient declined     Active Member of Clubs or Organizations: Yes     Attends Club or Organization Meetings: Patient declined     Marital Status:    Housing Stability: Low Risk  (2/5/2024)    Housing Stability Vital Sign     Unable to Pay for Housing in the Last Year: No     Number of Places Lived in the Last Year: 1     Unstable Housing in the Last Year: No     Past Surgical History:   Procedure Laterality Date    ABDOMINAL SURGERY      origunal surg 1986-mult surgeries since    CARPAL TUNNEL RELEASE      right wrist 2009-left wrist 2010    COLON SURGERY      partial colon removal    COLONOSCOPY  11/26/2018    Dr. Salazar; normal terminal ileum; polyps removed from ascending colon and hepatic flexure; pan diverticulosis; small internal hemorrhoids; repeat in 5 years; Bx: fragments of an adenomatous polyp with mild surface glandular dysplasia and associated chronic active inflammation, no evidence of high-grade dysplasia or malignancy    COLONOSCOPY N/A 11/9/2023    Procedure: COLONOSCOPY;  Surgeon: Prasanth Irene MD;  Location: [facility];  Service: Endoscopy;  Laterality: N/A;    COLOSTOMY  1986    colostomy reversal  1986    ESOPHAGOGASTRODUODENOSCOPY N/A 11/9/2023    Procedure: EGD (ESOPHAGOGASTRODUODENOSCOPY);  Surgeon: Prasanth Irene MD;  Location: [facility];  Service: Endoscopy;  Laterality: N/A;    EYE SURGERY      hay fever      HERNIA REPAIR  1949    REPAIR OF TENDON OF LOWER EXTREMITY Left 6/24/2020    Procedure: REPAIR, TENDON, LOWER EXTREMITY;  Surgeon: Justin Mandujano DPM;  [place] [facility];  Service: Podiatry;  Laterality: Left;  ARTHEX NOTIFIED IN CASE HE WANTS ANCHOR    TOE SURGERY Left 2017     replaced a joint     TONSILLECTOMY  1947    TRANSFORAMINAL EPIDURAL INJECTION OF STEROID      x 4    TRANSFORAMINAL EPIDURAL INJECTION OF STEROID Bilateral 11/13/2019    Procedure: Injection,steroid,epidural,transforaminal approach;  Surgeon: Grant Wright MD;  Location: [facility] OR;  Service: Pain Management;  Laterality: Bilateral;  L4-5, L5-S1    TRANSFORAMINAL EPIDURAL INJECTION OF STEROID Bilateral 3/9/2021    Procedure: Injection,steroid,epidural,transforaminal approach;  Surgeon: Grant Wright MD;  Location: [facility] OR;  Service: Pain Management;  Laterality: Bilateral;  L4-5, L5-S1    TRANSFORAMINAL EPIDURAL INJECTION OF STEROID Bilateral 5/25/2021    Procedure: Injection,steroid,epidural,transforaminal approach;  Surgeon: Grant Wright MD;  Location: [facility] OR;  Service: Pain Management;  Laterality: Bilateral;  L4-L5,L5,S1    TRANSFORAMINAL EPIDURAL INJECTION OF STEROID Bilateral 12/14/2021    Procedure: Injection,steroid,epidural,transforaminal approach Bilateral L4-5, L5-S1;  Surgeon: Grant Wright MD;  Location: [facility] OR;  Service: Pain Management;  Laterality: Bilateral;    TRANSFORAMINAL EPIDURAL INJECTION OF STEROID Bilateral 11/4/2022    Procedure: Injection,steroid,epidural,transforaminal approach;  Surgeon: Grant Wright MD;  Location: [facility] OR;  Service: Pain Management;  Laterality: Bilateral;  L4-5 and L5-s1    TRANSFORAMINAL EPIDURAL INJECTION OF STEROID Bilateral 1/20/2023    Procedure: Injection,steroid,epidural,transforaminal approach L4-L5-S1;  Surgeon: Grant Wright MD;  Location: [facility] OR;  Service: Pain Management;  Laterality: Bilateral;    TRANSFORAMINAL EPIDURAL INJECTION OF STEROID Bilateral 6/1/2023    Procedure: Injection,steroid,epidural,transforaminal approach L4-L5, L5-S1;  Surgeon: Grant Wright MD;  Location: [facility] OR;  Service: Pain Management;  Laterality: Bilateral;    TRANSFORAMINAL EPIDURAL INJECTION OF STEROID Bilateral 1/18/2024    Procedure: Injection,steroid,epidural,transforaminal approach;   Surgeon: Grant Wright MD;  Location: [facility];  Service: Anesthesiology;  Laterality: Bilateral;  L4-5 and l5-s1 TFESI    TRANSFORAMINAL EPIDURAL INJECTION OF STEROID Bilateral 2/29/2024    Procedure: Injection,steroid,epidural,transforaminal approach;  Surgeon: Grant Wright MD;  Location: [facility];  Service: Anesthesiology;  Laterality: Bilateral;  L4-5 and l5-s1 TFESI     Family History   Problem Relation Name Age of Onset    Heart disease Mother      Melanoma Neg Hx      Psoriasis Neg Hx      Lupus Neg Hx      Eczema Neg Hx       Current Outpatient Medications   Medication Sig Dispense Refill    apixaban (ELIQUIS) 5 mg Tab Take 1 tablet (5 mg total) by mouth 2 (two) times daily. 180 tablet 3    aspirin 81 MG Chew Take 1 tablet (81 mg total) by mouth once daily. 100 tablet 2    atorvastatin (LIPITOR) 10 MG tablet Take 1 tablet (10 mg total) by mouth once daily. 90 tablet 3    azelastine (ASTELIN) 137 mcg (0.1 %) nasal spray 2 sprays (274 mcg total) by Nasal route 2 (two) times daily. 30 mL 5    blood sugar diagnostic (BLOOD GLUCOSE TEST) Strp 1 strip by Misc.(Non-Drug; Combo Route) route 2 (two) times a day. FREESTYLE LITE BG TEST STRIPS. current use of insulin  - Primary ICD-10-CM: E11.9 200 each 2    clotrimazole (LOTRIMIN) 1 % cream Apply topically 2 (two) times daily. 60 g 10    diclofenac sodium (VOLTAREN) 1 % Gel Apply 2 g topically once daily. 100 g 2    diphenhydrAMINE-zinc acetate 1-0.1% (BENADRYL ITCH STOPPING) cream Apply topically 3 (three) times daily as needed for Itching. 28 g 0    fexofenadine (ALLEGRA) 180 MG tablet Take 1 tablet (180 mg total) by mouth once daily. 90 tablet 3    FREESTYLE LANCETS 28 gauge lancets Inject 1 lancet into the skin 3 (three) times daily. 100 each 3    furosemide (LASIX) 20 MG tablet Take 1 tablet (20 mg total) by mouth every Mon, Wed, Fri. 45 tablet 3    gabapentin (NEURONTIN) 300 MG capsule Take 1 capsule (300 mg total) by mouth 3 (three) times daily. 270  capsule 1    HYDROcodone-acetaminophen (NORCO) 5-325 mg per tablet Take 1 tablet by mouth every 6 (six) hours as needed for Pain. 90 tablet 0    JARDIANCE 10 mg tablet Take 1 tablet (10 mg total) by mouth once daily. 90 tablet 1    ketoconazole (NIZORAL) 2 % cream Apply topically once daily. 60 g 1    lisinopriL (PRINIVIL,ZESTRIL) 20 MG tablet Take 1 tablet (20 mg total) by mouth once daily. 90 tablet 3    metoprolol tartrate (LOPRESSOR) 25 MG tablet Take 0.5 tablets (12.5 mg total) by mouth 2 (two) times daily. 180 tablet 3    montelukast (SINGULAIR) 10 mg tablet Take 1 tablet (10 mg total) by mouth every evening. 90 tablet 3    multivitamin with minerals tablet Take 1 tablet by mouth once daily.      polyethylene glycol (GLYCOLAX) 17 gram/dose powder Take 17 g by mouth once daily. 507 g 3    potassium chloride (MICRO-K) 10 MEQ CpSR Take 1 capsule (10 mEq total) by mouth every other day. 90 capsule 1    SITagliptin phosphate (JANUVIA) 100 MG Tab Take 1 tablet (100 mg total) by mouth once daily. 90 tablet 3    traZODone (DESYREL) 100 MG tablet Take 1 tablet (100 mg total) by mouth every evening. 90 tablet 3    triamcinolone acetonide 0.1% (KENALOG) 0.1 % cream Apply topically 2 (two) times daily. 80 g 0     No current facility-administered medications for this visit.     Facility-Administered Medications Ordered in Other Visits   Medication Dose Route Frequency Provider Last Rate Last Admin    lactated ringers infusion   Intravenous Once PRN Grant Wright MD   Stopped at 11/04/22 1230    lactated ringers infusion   Intravenous Continuous Grant Wright MD 25 mL/hr at 02/29/24 1154 New Bag at 02/29/24 1154     Review of patient's allergies indicates:   Allergen Reactions    Ativan [lorazepam] Other (See Comments)     Mood alternating      Dilaudid [hydromorphone (bulk)] Other (See Comments)     Mood alternating      Morphine Other (See Comments)     Mood alternating      Adhesive tape-silicones     Hydromorphone   "      Review of Systems   Constitutional:  Negative for activity change, diaphoresis, fatigue, unexpected weight change and weight loss.   HENT:  Positive for postnasal drip and rhinorrhea. Negative for congestion, sinus pressure and sinus pain.    Eyes:  Negative for blurred vision.   Respiratory:  Positive for cough. Negative for chest tightness, shortness of breath and wheezing.    Cardiovascular:  Negative for chest pain and palpitations.   Gastrointestinal:  Negative for abdominal distention, abdominal pain, anal bleeding, blood in stool and constipation.   Endocrine: Positive for polyuria. Negative for polydipsia and polyphagia.   Genitourinary:  Negative for dysuria, flank pain, frequency, impotence and urgency.   Musculoskeletal:  Positive for back pain (Chronic  back pain).   Skin:  Negative for pallor.   Neurological:  Positive for headaches. Negative for dizziness, tremors, seizures, speech difficulty and weakness.   Psychiatric/Behavioral:  Negative for confusion. The patient is not nervous/anxious.           Blood pressure (!) 149/75, pulse 62, height 5' 8" (1.727 m), weight 104.9 kg (231 lb 3.2 oz), SpO2 97%. Body mass index is 35.15 kg/m².   Objective:      Physical Exam  Vitals reviewed.   Constitutional:       General: He is not in acute distress.     Appearance: Normal appearance. He is obese. He is not ill-appearing or toxic-appearing.   HENT:      Head: Normocephalic and atraumatic.      Right Ear: Tympanic membrane normal.      Left Ear: Tympanic membrane normal.      Nose: Congestion and rhinorrhea present.      Mouth/Throat:      Mouth: Mucous membranes are moist.      Pharynx: Oropharynx is clear. No oropharyngeal exudate or posterior oropharyngeal erythema.   Cardiovascular:      Rate and Rhythm: Normal rate and regular rhythm.      Heart sounds: Normal heart sounds. No murmur heard.  Pulmonary:      Effort: Pulmonary effort is normal.      Breath sounds: Normal breath sounds.   Skin:     " General: Skin is warm and dry.      Capillary Refill: Capillary refill takes less than 2 seconds.   Neurological:      Mental Status: He is alert and oriented to person, place, and time.             Assessment:       1. Lumbar radiculitis    2. Primary hypertension    3. Familial hypercholesterolemia    4. Type 2 diabetes mellitus without complication, without long-term current use of insulin         Plan:           Lumbar radiculitis  -     WALKER FOR HOME USE    Primary hypertension  -     COMPREHENSIVE METABOLIC PANEL; Future; Expected date: 04/19/2024  Reduce the amount of salt in your diet; Lose weight; Avoid drinking too much alcohol; Exercise at least 30 minutes per day most days of the week.  Continue current medications and home BP monitoring.    Familial hypercholesterolemia  -     LIPID PANEL; Future; Expected date: 04/19/2024  Pt to continue on current dose of statins. Limit red meat, butter, fried foods, cheese, and other foods that have a lot of saturated fat. Consume more: lean meats, fish, fruits, vegetables, whole grains, beans, lentils, and nuts.  Weight loss, and 30-45 min of cardiovascular exercise daily.    Type 2 diabetes mellitus without complication, without long-term current use of insulin  -     HEMOGLOBIN A1C; Future; Expected date: 04/19/2024  Advised Mr. Hollingsworth to monitor Blood sugars at home and record them.  Exercise, watch diet and loose weight.  keep a close eye on feet and keep them clean. Annual eye examination. Annual influenza vaccine.  Monitor HgbA1c every 3 to 6 months. Monitor urine microalbumin every year.keep LDL less than 100. Monitor blood pressure and target blood pressure 120/70.

## 2024-06-24 DIAGNOSIS — I10 ESSENTIAL HYPERTENSION: ICD-10-CM

## 2024-06-24 DIAGNOSIS — G89.4 CHRONIC PAIN SYNDROME: ICD-10-CM

## 2024-06-24 DIAGNOSIS — M19.90 ARTHRITIS: ICD-10-CM

## 2024-06-26 RX ORDER — LISINOPRIL 20 MG/1
20 TABLET ORAL DAILY
Qty: 90 TABLET | Refills: 3 | Status: SHIPPED | OUTPATIENT
Start: 2024-06-26 | End: 2025-06-26

## 2024-06-26 RX ORDER — HYDROCODONE BITARTRATE AND ACETAMINOPHEN 5; 325 MG/1; MG/1
1 TABLET ORAL EVERY 6 HOURS PRN
Qty: 90 TABLET | Refills: 0 | Status: SHIPPED | OUTPATIENT
Start: 2024-06-26

## 2024-09-22 DIAGNOSIS — I48.20 CHRONIC ATRIAL FIBRILLATION: ICD-10-CM

## 2024-09-22 DIAGNOSIS — E11.9 TYPE 2 DIABETES MELLITUS WITHOUT COMPLICATION, WITHOUT LONG-TERM CURRENT USE OF INSULIN: Chronic | ICD-10-CM

## 2024-09-22 DIAGNOSIS — M54.16 LUMBAR RADICULITIS: ICD-10-CM

## 2024-09-22 DIAGNOSIS — K59.04 CHRONIC IDIOPATHIC CONSTIPATION: ICD-10-CM

## 2024-09-22 DIAGNOSIS — G89.4 CHRONIC PAIN SYNDROME: ICD-10-CM

## 2024-09-22 DIAGNOSIS — J30.89 CHRONIC NONSEASONAL ALLERGIC RHINITIS DUE TO FUNGAL SPORES: ICD-10-CM

## 2024-09-22 DIAGNOSIS — E78.01 FAMILIAL HYPERCHOLESTEROLEMIA: ICD-10-CM

## 2024-09-22 DIAGNOSIS — I10 ESSENTIAL HYPERTENSION: ICD-10-CM

## 2024-09-22 DIAGNOSIS — M19.90 ARTHRITIS: ICD-10-CM

## 2024-09-22 DIAGNOSIS — E78.5 HYPERLIPIDEMIA, UNSPECIFIED HYPERLIPIDEMIA TYPE: Chronic | ICD-10-CM

## 2024-09-22 DIAGNOSIS — E11.9 CONTROLLED TYPE 2 DIABETES MELLITUS WITHOUT COMPLICATION, WITHOUT LONG-TERM CURRENT USE OF INSULIN: ICD-10-CM

## 2024-09-24 ENCOUNTER — TELEPHONE (OUTPATIENT)
Dept: FAMILY MEDICINE | Facility: CLINIC | Age: 84
End: 2024-09-24
Payer: MEDICARE

## 2024-09-24 NOTE — TELEPHONE ENCOUNTER
----- Message from Ana Hwang sent at 9/24/2024 10:00 AM CDT -----  Vm- 8:30-wife lupe is calling for hard scripts for the medications she has requested on Sunday. She is coming at 11 for an appt and will    [phone]

## 2024-09-25 ENCOUNTER — TELEPHONE (OUTPATIENT)
Dept: FAMILY MEDICINE | Facility: CLINIC | Age: 84
End: 2024-09-25
Payer: MEDICARE

## 2024-09-25 RX ORDER — GABAPENTIN 300 MG/1
300 CAPSULE ORAL 3 TIMES DAILY
Qty: 270 CAPSULE | Refills: 1 | Status: SHIPPED | OUTPATIENT
Start: 2024-09-25 | End: 2024-09-25 | Stop reason: SDUPTHER

## 2024-09-25 RX ORDER — HYDROCODONE BITARTRATE AND ACETAMINOPHEN 5; 325 MG/1; MG/1
1 TABLET ORAL EVERY 6 HOURS PRN
Qty: 90 TABLET | Refills: 0 | Status: SHIPPED | OUTPATIENT
Start: 2024-09-25 | End: 2024-09-25 | Stop reason: SDUPTHER

## 2024-09-25 RX ORDER — NAPROXEN SODIUM 220 MG/1
81 TABLET, FILM COATED ORAL DAILY
Qty: 100 TABLET | Refills: 2 | Status: SHIPPED | OUTPATIENT
Start: 2024-09-25 | End: 2025-09-25

## 2024-09-25 RX ORDER — EMPAGLIFLOZIN 10 MG/1
10 TABLET, FILM COATED ORAL DAILY
Qty: 90 TABLET | Refills: 1 | Status: SHIPPED | OUTPATIENT
Start: 2024-09-25 | End: 2024-09-25 | Stop reason: SDUPTHER

## 2024-09-25 RX ORDER — POTASSIUM CHLORIDE 750 MG/1
10 CAPSULE, EXTENDED RELEASE ORAL EVERY OTHER DAY
Qty: 90 CAPSULE | Refills: 1 | Status: SHIPPED | OUTPATIENT
Start: 2024-09-25

## 2024-09-25 RX ORDER — EMPAGLIFLOZIN 10 MG/1
10 TABLET, FILM COATED ORAL DAILY
Qty: 90 TABLET | Refills: 1 | Status: SHIPPED | OUTPATIENT
Start: 2024-09-25

## 2024-09-25 RX ORDER — MINERAL OIL
180 ENEMA (ML) RECTAL DAILY
Qty: 90 TABLET | Refills: 3 | Status: SHIPPED | OUTPATIENT
Start: 2024-09-25 | End: 2025-09-25

## 2024-09-25 RX ORDER — MINERAL OIL
180 ENEMA (ML) RECTAL DAILY
Qty: 90 TABLET | Refills: 3 | Status: SHIPPED | OUTPATIENT
Start: 2024-09-25 | End: 2024-09-25 | Stop reason: SDUPTHER

## 2024-09-25 RX ORDER — NAPROXEN SODIUM 220 MG/1
81 TABLET, FILM COATED ORAL DAILY
Qty: 100 TABLET | Refills: 2 | Status: SHIPPED | OUTPATIENT
Start: 2024-09-25 | End: 2024-09-25 | Stop reason: SDUPTHER

## 2024-09-25 RX ORDER — LANCETS 28 GAUGE
1 EACH MISCELLANEOUS 3 TIMES DAILY
Qty: 100 EACH | Refills: 3 | Status: SHIPPED | OUTPATIENT
Start: 2024-09-25 | End: 2024-09-25 | Stop reason: SDUPTHER

## 2024-09-25 RX ORDER — ATORVASTATIN CALCIUM 10 MG/1
10 TABLET, FILM COATED ORAL DAILY
Qty: 90 TABLET | Refills: 3 | Status: SHIPPED | OUTPATIENT
Start: 2024-09-25 | End: 2024-09-25 | Stop reason: SDUPTHER

## 2024-09-25 RX ORDER — LANCETS 28 GAUGE
1 EACH MISCELLANEOUS 3 TIMES DAILY
Qty: 100 EACH | Refills: 3 | Status: SHIPPED | OUTPATIENT
Start: 2024-09-25

## 2024-09-25 RX ORDER — GABAPENTIN 300 MG/1
300 CAPSULE ORAL 3 TIMES DAILY
Qty: 270 CAPSULE | Refills: 1 | Status: SHIPPED | OUTPATIENT
Start: 2024-09-25 | End: 2025-09-25

## 2024-09-25 RX ORDER — POTASSIUM CHLORIDE 750 MG/1
10 CAPSULE, EXTENDED RELEASE ORAL EVERY OTHER DAY
Qty: 90 CAPSULE | Refills: 1 | Status: SHIPPED | OUTPATIENT
Start: 2024-09-25 | End: 2024-09-25 | Stop reason: SDUPTHER

## 2024-09-25 RX ORDER — HYDROCODONE BITARTRATE AND ACETAMINOPHEN 5; 325 MG/1; MG/1
1 TABLET ORAL EVERY 6 HOURS PRN
Qty: 90 TABLET | Refills: 0 | Status: SHIPPED | OUTPATIENT
Start: 2024-09-25

## 2024-09-25 RX ORDER — ATORVASTATIN CALCIUM 10 MG/1
10 TABLET, FILM COATED ORAL DAILY
Qty: 90 TABLET | Refills: 3 | Status: SHIPPED | OUTPATIENT
Start: 2024-09-25

## 2024-09-25 RX ORDER — POLYETHYLENE GLYCOL 3350 17 G/17G
17 POWDER, FOR SOLUTION ORAL DAILY
Qty: 507 G | Refills: 3 | Status: SHIPPED | OUTPATIENT
Start: 2024-09-25

## 2024-09-25 RX ORDER — POLYETHYLENE GLYCOL 3350 17 G/17G
17 POWDER, FOR SOLUTION ORAL DAILY
Qty: 507 G | Refills: 3 | Status: SHIPPED | OUTPATIENT
Start: 2024-09-25 | End: 2024-09-25 | Stop reason: SDUPTHER

## 2024-10-08 ENCOUNTER — OFFICE VISIT (OUTPATIENT)
Dept: PODIATRY | Facility: CLINIC | Age: 84
End: 2024-10-08
Payer: MEDICARE

## 2024-10-08 VITALS — WEIGHT: 235 LBS | BODY MASS INDEX: 35.73 KG/M2

## 2024-10-08 DIAGNOSIS — M79.672 LEFT FOOT PAIN: ICD-10-CM

## 2024-10-08 DIAGNOSIS — M72.2 PLANTAR FASCIITIS: Primary | ICD-10-CM

## 2024-10-08 PROCEDURE — 99999PBSHW PR PBB SHADOW TECHNICAL ONLY FILED TO HB: Mod: PBBFAC,,,

## 2024-10-08 PROCEDURE — 20550 NJX 1 TENDON SHEATH/LIGAMENT: CPT | Mod: S$PBB,LT,, | Performed by: PODIATRIST

## 2024-10-08 PROCEDURE — 99999 PR PBB SHADOW E&M-EST. PATIENT-LVL IV: CPT | Mod: PBBFAC,,, | Performed by: PODIATRIST

## 2024-10-08 PROCEDURE — 99213 OFFICE O/P EST LOW 20 MIN: CPT | Mod: 25,S$PBB,, | Performed by: PODIATRIST

## 2024-10-08 PROCEDURE — 20550 NJX 1 TENDON SHEATH/LIGAMENT: CPT | Mod: PBBFAC,PN,LT | Performed by: PODIATRIST

## 2024-10-08 PROCEDURE — 99214 OFFICE O/P EST MOD 30 MIN: CPT | Mod: PBBFAC,PN | Performed by: PODIATRIST

## 2024-10-08 RX ORDER — BUPIVACAINE HYDROCHLORIDE 5 MG/ML
1.5 INJECTION, SOLUTION PERINEURAL
Status: COMPLETED | OUTPATIENT
Start: 2024-10-08 | End: 2024-10-08

## 2024-10-08 RX ORDER — DEXAMETHASONE SODIUM PHOSPHATE 4 MG/ML
4 INJECTION, SOLUTION INTRA-ARTICULAR; INTRALESIONAL; INTRAMUSCULAR; INTRAVENOUS; SOFT TISSUE
Status: COMPLETED | OUTPATIENT
Start: 2024-10-08 | End: 2024-10-08

## 2024-10-08 RX ORDER — METHYLPREDNISOLONE ACETATE 40 MG/ML
20 INJECTION, SUSPENSION INTRA-ARTICULAR; INTRALESIONAL; INTRAMUSCULAR; SOFT TISSUE
Status: COMPLETED | OUTPATIENT
Start: 2024-10-08 | End: 2024-10-08

## 2024-10-08 RX ADMIN — DEXAMETHASONE SODIUM PHOSPHATE 4 MG: 4 INJECTION INTRA-ARTICULAR; INTRALESIONAL; INTRAMUSCULAR; INTRAVENOUS; SOFT TISSUE at 11:10

## 2024-10-08 RX ADMIN — BUPIVACAINE HYDROCHLORIDE 7.5 MG: 5 INJECTION, SOLUTION PERINEURAL at 11:10

## 2024-10-08 RX ADMIN — METHYLPREDNISOLONE ACETATE 20 MG: 40 INJECTION, SUSPENSION INTRA-ARTICULAR; INTRALESIONAL; INTRAMUSCULAR; INTRASYNOVIAL; SOFT TISSUE at 11:10

## 2024-10-08 NOTE — PROGRESS NOTES
[address] [facility] [address]  [place] [address]  Phone: [phone]   [phone]    Patient's PCP:Garland Ocampo MD  Referring Provider: No ref. provider found    Subjective:      Chief Complaint:: Heel Pain (LT heel)    HPI  Yeyo Hollingsworth is a 84 y.o. male who presents today with a complaint of heel pain LT. The current episode started 2+ months.  The symptoms include sharp, shooting, aching, throbbing pain. Probable cause of complaint unknown.  The symptoms are aggravated by first few steps, prolonged walking. The problem has worsened. Treatment to date have included OTC pain cream which provided some relief.     Systemic Doctor: Garland Ocampo MD  Date Last Seen: 4/19/2024  Blood Sugar: Did not check  Hemoglobin A1c: 7.2    Vitals:    10/08/24 1111   Weight: 106.6 kg (235 lb 0.2 oz)   PainSc:   2      Shoe Size: 9.-9.5    Past Surgical History:   Procedure Laterality Date    ABDOMINAL SURGERY      origunal surg 1986-mult surgeries since    CARPAL TUNNEL RELEASE      right wrist 2009-left wrist 2010    COLON SURGERY      partial colon removal    COLONOSCOPY  11/26/2018    Dr. Salazar; normal terminal ileum; polyps removed from ascending colon and hepatic flexure; pan diverticulosis; small internal hemorrhoids; repeat in 5 years; Bx: fragments of an adenomatous polyp with mild surface glandular dysplasia and associated chronic active inflammation, no evidence of high-grade dysplasia or malignancy    COLONOSCOPY N/A 11/9/2023    Procedure: COLONOSCOPY;  Surgeon: Prasanth Irene MD;  Location: [facility];  Service: Endoscopy;  Laterality: N/A;    COLOSTOMY  1986    colostomy reversal  1986    ESOPHAGOGASTRODUODENOSCOPY N/A 11/9/2023    Procedure: EGD (ESOPHAGOGASTRODUODENOSCOPY);  Surgeon: Prasanth Irene MD;  Location: [facility];  Service: Endoscopy;  Laterality: N/A;    EYE SURGERY      hay fever      HERNIA REPAIR  1949    REPAIR OF TENDON OF LOWER EXTREMITY Left 6/24/2020    Procedure: REPAIR,  TENDON, LOWER EXTREMITY;  Surgeon: Justin Mandujano DPM;  [place] [facility] OR;  Service: Podiatry;  Laterality: Left;  ARTHEX NOTIFIED IN CASE HE WANTS ANCHOR    TOE SURGERY Left 2017    replaced a joint     TONSILLECTOMY  1947    TRANSFORAMINAL EPIDURAL INJECTION OF STEROID      x 4    TRANSFORAMINAL EPIDURAL INJECTION OF STEROID Bilateral 11/13/2019    Procedure: Injection,steroid,epidural,transforaminal approach;  Surgeon: Grant Wright MD;  [facility]  Service: Pain Management;  Laterality: Bilateral;  L4-5, L5-S1    TRANSFORAMINAL EPIDURAL INJECTION OF STEROID Bilateral 3/9/2021    Procedure: Injection,steroid,epidural,transforaminal approach;  Surgeon: Grant Wright MD;  [facility]  Service: Pain Management;  Laterality: Bilateral;  L4-5, L5-S1    TRANSFORAMINAL EPIDURAL INJECTION OF STEROID Bilateral 5/25/2021    Procedure: Injection,steroid,epidural,transforaminal approach;  Surgeon: Grant Wright MD;  [facility]  Service: Pain Management;  Laterality: Bilateral;  L4-L5,L5,S1    TRANSFORAMINAL EPIDURAL INJECTION OF STEROID Bilateral 12/14/2021    Procedure: Injection,steroid,epidural,transforaminal approach Bilateral L4-5, L5-S1;  Surgeon: Grant Wright MD;  [facility]  Service: Pain Management;  Laterality: Bilateral;    TRANSFORAMINAL EPIDURAL INJECTION OF STEROID Bilateral 11/4/2022    Procedure: Injection,steroid,epidural,transforaminal approach;  Surgeon: Grant Wright MD;  [facility]  Service: Pain Management;  Laterality: Bilateral;  L4-5 and L5-s1    TRANSFORAMINAL EPIDURAL INJECTION OF STEROID Bilateral 1/20/2023    Procedure: Injection,steroid,epidural,transforaminal approach L4-L5-S1;  Surgeon: Grant Wright MD;  [facility]  Service: Pain Management;  Laterality: Bilateral;    TRANSFORAMINAL EPIDURAL INJECTION OF STEROID Bilateral 6/1/2023    Procedure: Injection,steroid,epidural,transforaminal approach L4-L5, L5-S1;  Surgeon: Grant Wright MD;  [facility]  Service: Pain Management;  Laterality: Bilateral;    TRANSFORAMINAL EPIDURAL INJECTION OF STEROID Bilateral 1/18/2024    Procedure: Injection,steroid,epidural,transforaminal approach;  Surgeon: Grant Wright MD;  Location: [facility] OR;  Service: Anesthesiology;  Laterality: Bilateral;  L4-5 and l5-s1 TFESI    TRANSFORAMINAL EPIDURAL INJECTION OF STEROID Bilateral 2/29/2024    Procedure: Injection,steroid,epidural,transforaminal approach;  Surgeon: Grant Wright MD;  Location: [facility];  Service: Anesthesiology;  Laterality: Bilateral;  L4-5 and l5-s1 TFESI     Past Medical History:   Diagnosis Date    Allergy     Ankle pain     left    Anticoagulant long-term use     aspirin    Anxiety     Atrial fibrillation     Back pain     Bruises easily     Cataract     Clotting disorder     left leg    Colon polyp     Diabetes mellitus     Diabetes mellitus, type 2     Diverticulitis 1986    Hay fever     Hyperlipidemia     Hypertension     Left hip pain 12/26/2021    Seeing Dr. Beard for hip pain     Obese     BRANDI on CPAP      Family History   Problem Relation Name Age of Onset    Heart disease Mother      Melanoma Neg Hx      Psoriasis Neg Hx      Lupus Neg Hx      Eczema Neg Hx          Social History:   Marital Status:   Alcohol History:  reports current alcohol use.  Tobacco History:  reports that he quit smoking about 18 years ago. His smoking use included cigarettes. He has never used smokeless tobacco.  Drug History:  reports no history of drug use.    Review of patient's allergies indicates:   Allergen Reactions    Ativan [lorazepam] Other (See Comments)     Mood alternating      Dilaudid [hydromorphone (bulk)] Other (See Comments)     Mood alternating      Morphine Other (See Comments)     Mood alternating      Adhesive tape-silicones     Hydromorphone        Current Outpatient Medications   Medication Sig Dispense Refill    apixaban (ELIQUIS) 5 mg Tab Take 1 tablet (5 mg total) by mouth 2 (two) times  daily. 180 tablet 3    aspirin 81 MG Chew Take 1 tablet (81 mg total) by mouth once daily. 100 tablet 2    atorvastatin (LIPITOR) 10 MG tablet Take 1 tablet (10 mg total) by mouth once daily. 90 tablet 3    azelastine (ASTELIN) 137 mcg (0.1 %) nasal spray 2 sprays (274 mcg total) by Nasal route 2 (two) times daily. 30 mL 5    blood sugar diagnostic (BLOOD GLUCOSE TEST) Strp 1 strip by Misc.(Non-Drug; Combo Route) route 2 (two) times a day. FREESTYLE LITE BG TEST STRIPS. current use of insulin  - Primary ICD-10-CM: E11.9 200 each 2    clotrimazole (LOTRIMIN) 1 % cream Apply topically 2 (two) times daily. 60 g 10    diclofenac sodium (VOLTAREN) 1 % Gel Apply 2 g topically once daily. 100 g 2    diphenhydrAMINE-zinc acetate 1-0.1% (BENADRYL ITCH STOPPING) cream Apply topically 3 (three) times daily as needed for Itching. 28 g 0    fexofenadine (ALLEGRA) 180 MG tablet Take 1 tablet (180 mg total) by mouth once daily. 90 tablet 3    FREESTYLE LANCETS 28 gauge lancets Inject 1 lancet  into the skin 3 (three) times daily. 100 each 3    furosemide (LASIX) 20 MG tablet Take 1 tablet (20 mg total) by mouth every Mon, Wed, Fri. 45 tablet 3    gabapentin (NEURONTIN) 300 MG capsule Take 1 capsule (300 mg total) by mouth 3 (three) times daily. 270 capsule 1    HYDROcodone-acetaminophen (NORCO) 5-325 mg per tablet Take 1 tablet by mouth every 6 (six) hours as needed for Pain. 90 tablet 0    JARDIANCE 10 mg tablet Take 1 tablet (10 mg total) by mouth once daily. 90 tablet 1    ketoconazole (NIZORAL) 2 % cream Apply topically once daily. 60 g 1    lisinopriL (PRINIVIL,ZESTRIL) 20 MG tablet Take 1 tablet (20 mg total) by mouth once daily. 90 tablet 3    metoprolol tartrate (LOPRESSOR) 25 MG tablet Take 0.5 tablets (12.5 mg total) by mouth 2 (two) times daily. 180 tablet 3    montelukast (SINGULAIR) 10 mg tablet Take 1 tablet (10 mg total) by mouth every evening. 90 tablet 3    multivitamin with minerals tablet Take 1 tablet by mouth  once daily.      polyethylene glycol (GLYCOLAX) 17 gram/dose powder Take 17 g by mouth once daily. 507 g 3    potassium chloride (MICRO-K) 10 MEQ CpSR Take 1 capsule (10 mEq total) by mouth every other day. 90 capsule 1    SITagliptin phosphate (JANUVIA) 100 MG Tab Take 1 tablet (100 mg total) by mouth once daily. 90 tablet 3    traZODone (DESYREL) 100 MG tablet Take 1 tablet (100 mg total) by mouth every evening. 90 tablet 3    triamcinolone acetonide 0.1% (KENALOG) 0.1 % cream Apply topically 2 (two) times daily. 80 g 0     No current facility-administered medications for this visit.     Facility-Administered Medications Ordered in Other Visits   Medication Dose Route Frequency Provider Last Rate Last Admin    lactated ringers infusion   Intravenous Once PRN Grant Wright MD   Stopped at 11/04/22 1230    lactated ringers infusion   Intravenous Continuous Grant Wright MD 25 mL/hr at 02/29/24 1154 New Bag at 02/29/24 1154       Review of Systems   Constitutional:  Negative for chills, fatigue, fever and unexpected weight change.   HENT:  Negative for hearing loss and trouble swallowing.    Eyes:  Negative for photophobia and visual disturbance.   Respiratory:  Negative for cough, shortness of breath and wheezing.    Cardiovascular:  Positive for leg swelling. Negative for chest pain and palpitations.   Gastrointestinal:  Negative for abdominal pain and nausea.   Genitourinary:  Negative for dysuria and frequency.   Musculoskeletal:  Positive for arthralgias, gait problem and myalgias. Negative for back pain, joint swelling and neck pain.   Skin:  Negative for rash and wound.   Neurological:  Negative for tremors, seizures, weakness, numbness and headaches.   Hematological:  Does not bruise/bleed easily.         Objective:        Physical Exam:   Foot Exam    General  General Appearance: appears stated age and healthy   Orientation: alert and oriented to person, place, and time   Affect: appropriate   Gait: antalgic        Left Foot/Ankle      Inspection and Palpation  Ecchymosis: none  Tenderness: plantar fascia   Swelling: (Mild lower extremity)  Arch: normal  Hammertoes: absent  Claw toes: absent  Hallux valgus: no  Hallux limitus: no  Skin Exam: skin intact; no drainage, no ulcer and no erythema   Neurovascular  Dorsalis pedis: 1+  Posterior tibial: 1+  Capillary refill: 3+  Varicose veins: not present  Saphenous nerve sensation: diminished  Tibial nerve sensation: diminished  Superficial peroneal nerve sensation: diminished  Deep peroneal nerve sensation: diminished  Sural nerve sensation: diminished    Muscle Strength  Ankle dorsiflexion: 4+  Ankle plantar flexion: 5  Ankle inversion: 5  Ankle eversion: 5  Great toe extension: 4  Great toe flexion: 5    Range of Motion    Normal left ankle ROM    Tests  Anterior drawer: negative   Talar tilt: negative   PT Tinel's sign: negative  Paresthesia: negative  Comments  Pain on palpation of the infeior medial heel and central plantar heel. No pain present  with side to side compression of the calcaneus. Negative tinnel's sign  at the tarsal tunnel. Negative Ham's nerve pain. Negative Calcaneal nerve pain. No soft tissue masses. Pain absent  with dorsiflexion of the ankle. No edema, erythema, or ecchymosis noted.       Physical Exam  Cardiovascular:      Pulses:           Dorsalis pedis pulses are 1+ on the left side.        Posterior tibial pulses are 1+ on the left side.   Musculoskeletal:      Left foot: No bunion.   Feet:      Left foot:      Skin integrity: No ulcer or erythema.               Left Ankle/Foot Exam     Range of Motion   The patient has normal left ankle ROM.     Comments:  Pain on palpation of the infeior medial heel and central plantar heel. No pain present  with side to side compression of the calcaneus. Negative tinnel's sign  at the tarsal tunnel. Negative Ham's nerve pain. Negative Calcaneal nerve pain. No soft tissue masses. Pain absent  with  dorsiflexion of the ankle. No edema, erythema, or ecchymosis noted.         Muscle Strength   Left Lower Extremity   Ankle Dorsiflexion:  4+   Plantar flexion:  5/5     Vascular Exam       Left Pulses  Dorsalis Pedis:      1+  Posterior Tibial:      1+           Imaging: none            Assessment:       1. Plantar fasciitis    2. Left foot pain      Plan:   Plantar fasciitis  -     Ambulatory referral/consult to Physical/Occupational Therapy  -     methylPREDNISolone acetate injection 20 mg  -     BUPivacaine injection 7.5 mg  -     dexAMETHasone injection 4 mg    Left foot pain  -     Ambulatory referral/consult to Physical/Occupational Therapy  -     methylPREDNISolone acetate injection 20 mg  -     BUPivacaine injection 7.5 mg  -     dexAMETHasone injection 4 mg      Follow up if symptoms worsen or fail to improve.    Discussed different treatment options for heel pain. I gave written and verbal instructions on heel cord stretching and this was demonstrated for the patient. Patient expressed understanding. Discussed wearing appropriate shoe gear and avoiding flats, slippers, sandals, barefoot, and sockfeet. Recommended arch supports. My recommendation for OTC supports is Spenco polysorb replacement insoles or patient may elect more aggressive treatment with prescription arch supports. We also discussed cortisone injections and NSAID therapy.       Procedures Informed consent was obtained.  Time-out was called.  The area was prepped with alcohol, and a steroid injection was performed at left plantar fascia using 1.5 cc of 0.5% Marcaine w/out epi, 1 cc Dexamethasone, 0.5 cc Methylprednisolone. Patient tolerated the procedure well.             Counseling:     I provided patient education verbally regarding:   Patient diagnosis, treatment options, as well as alternatives, risks, and benefits.     This note was created using Dragon voice recognition software that occasionally misinterpreted phrases or words.

## 2024-10-10 ENCOUNTER — TELEPHONE (OUTPATIENT)
Dept: PODIATRY | Facility: CLINIC | Age: 84
End: 2024-10-10
Payer: MEDICARE

## 2024-10-10 NOTE — TELEPHONE ENCOUNTER
----- Message from Leana sent at 10/10/2024  9:03 AM CDT -----  Regarding: Pt call  Patient called PT in [place] and they have not received his order for PT. Patient asked that we send it again if its already been sent.    Thank you,  Leana

## 2024-10-15 ENCOUNTER — LAB VISIT (OUTPATIENT)
Dept: LAB | Facility: HOSPITAL | Age: 84
End: 2024-10-15
Attending: FAMILY MEDICINE
Payer: MEDICARE

## 2024-10-15 DIAGNOSIS — E11.9 TYPE 2 DIABETES MELLITUS WITHOUT COMPLICATION, WITHOUT LONG-TERM CURRENT USE OF INSULIN: Chronic | ICD-10-CM

## 2024-10-15 DIAGNOSIS — I10 PRIMARY HYPERTENSION: ICD-10-CM

## 2024-10-15 DIAGNOSIS — E78.01 FAMILIAL HYPERCHOLESTEROLEMIA: ICD-10-CM

## 2024-10-15 LAB
ALBUMIN SERPL BCP-MCNC: 4 G/DL (ref 3.5–5.2)
ALP SERPL-CCNC: 33 U/L (ref 55–135)
ALT SERPL W/O P-5'-P-CCNC: 25 U/L (ref 10–44)
ANION GAP SERPL CALC-SCNC: 6 MMOL/L (ref 8–16)
AST SERPL-CCNC: 18 U/L (ref 10–40)
BILIRUB SERPL-MCNC: 0.6 MG/DL (ref 0.1–1)
BUN SERPL-MCNC: 27 MG/DL (ref 8–23)
CALCIUM SERPL-MCNC: 8.7 MG/DL (ref 8.7–10.5)
CHLORIDE SERPL-SCNC: 108 MMOL/L (ref 95–110)
CHOLEST SERPL-MCNC: 99 MG/DL (ref 120–199)
CHOLEST/HDLC SERPL: 3 {RATIO} (ref 2–5)
CO2 SERPL-SCNC: 28 MMOL/L (ref 23–29)
CREAT SERPL-MCNC: 1.3 MG/DL (ref 0.5–1.4)
EST. GFR  (NO RACE VARIABLE): 54.5 ML/MIN/1.73 M^2
GLUCOSE SERPL-MCNC: 114 MG/DL (ref 70–110)
HDLC SERPL-MCNC: 33 MG/DL (ref 40–75)
HDLC SERPL: 33.3 % (ref 20–50)
LDLC SERPL CALC-MCNC: 41.2 MG/DL (ref 63–159)
NONHDLC SERPL-MCNC: 66 MG/DL
POTASSIUM SERPL-SCNC: 4.4 MMOL/L (ref 3.5–5.1)
PROT SERPL-MCNC: 6.6 G/DL (ref 6–8.4)
SODIUM SERPL-SCNC: 142 MMOL/L (ref 136–145)
TRIGL SERPL-MCNC: 124 MG/DL (ref 30–150)

## 2024-10-15 PROCEDURE — 80061 LIPID PANEL: CPT | Performed by: FAMILY MEDICINE

## 2024-10-15 PROCEDURE — 83036 HEMOGLOBIN GLYCOSYLATED A1C: CPT | Performed by: FAMILY MEDICINE

## 2024-10-15 PROCEDURE — 80053 COMPREHEN METABOLIC PANEL: CPT | Performed by: FAMILY MEDICINE

## 2024-10-15 PROCEDURE — 36415 COLL VENOUS BLD VENIPUNCTURE: CPT | Performed by: FAMILY MEDICINE

## 2024-10-16 LAB
ESTIMATED AVG GLUCOSE: 137 MG/DL (ref 68–131)
HBA1C MFR BLD: 6.4 % (ref 4.5–6.2)

## 2024-10-21 ENCOUNTER — OFFICE VISIT (OUTPATIENT)
Dept: FAMILY MEDICINE | Facility: CLINIC | Age: 84
End: 2024-10-21
Payer: MEDICARE

## 2024-10-21 VITALS
BODY MASS INDEX: 35.72 KG/M2 | HEIGHT: 68 IN | OXYGEN SATURATION: 96 % | DIASTOLIC BLOOD PRESSURE: 66 MMHG | WEIGHT: 235.69 LBS | HEART RATE: 56 BPM | SYSTOLIC BLOOD PRESSURE: 124 MMHG

## 2024-10-21 DIAGNOSIS — J30.89 CHRONIC NONSEASONAL ALLERGIC RHINITIS DUE TO FUNGAL SPORES: ICD-10-CM

## 2024-10-21 DIAGNOSIS — N18.31 STAGE 3A CHRONIC KIDNEY DISEASE: Chronic | ICD-10-CM

## 2024-10-21 DIAGNOSIS — E11.9 CONTROLLED TYPE 2 DIABETES MELLITUS WITHOUT COMPLICATION, WITHOUT LONG-TERM CURRENT USE OF INSULIN: ICD-10-CM

## 2024-10-21 DIAGNOSIS — I10 ESSENTIAL HYPERTENSION: ICD-10-CM

## 2024-10-21 DIAGNOSIS — E78.01 FAMILIAL HYPERCHOLESTEROLEMIA: ICD-10-CM

## 2024-10-21 DIAGNOSIS — M17.0 PRIMARY OSTEOARTHRITIS OF BOTH KNEES: Primary | ICD-10-CM

## 2024-10-21 PROBLEM — L98.491: Status: RESOLVED | Noted: 2022-07-06 | Resolved: 2024-10-21

## 2024-10-21 PROCEDURE — 99999 PR PBB SHADOW E&M-EST. PATIENT-LVL V: CPT | Mod: PBBFAC,,, | Performed by: FAMILY MEDICINE

## 2024-10-21 PROCEDURE — 99215 OFFICE O/P EST HI 40 MIN: CPT | Mod: PBBFAC,PN | Performed by: FAMILY MEDICINE

## 2024-10-21 PROCEDURE — 99214 OFFICE O/P EST MOD 30 MIN: CPT | Mod: S$PBB,AQ,, | Performed by: FAMILY MEDICINE

## 2024-10-21 RX ORDER — MINERAL OIL
180 ENEMA (ML) RECTAL DAILY
Qty: 90 TABLET | Refills: 3 | Status: SHIPPED | OUTPATIENT
Start: 2024-10-21 | End: 2025-10-21

## 2024-10-21 RX ORDER — INSULIN PUMP SYRINGE, 3 ML
EACH MISCELLANEOUS
Qty: 1 EACH | Refills: 0 | Status: SHIPPED | OUTPATIENT
Start: 2024-10-21 | End: 2025-10-21

## 2024-10-21 RX ORDER — NAFTIFINE HYDROCHLORIDE 20 MG/G
CREAM TOPICAL
COMMUNITY

## 2024-10-21 RX ORDER — TIRZEPATIDE 5 MG/.5ML
5 INJECTION, SOLUTION SUBCUTANEOUS
Qty: 4 PEN | Refills: 0 | Status: SHIPPED | OUTPATIENT
Start: 2024-11-19

## 2024-10-21 RX ORDER — ISOPROPYL ALCOHOL 70 ML/100ML
1 SWAB TOPICAL
Qty: 100 EACH | Refills: 0 | Status: SHIPPED | OUTPATIENT
Start: 2024-10-21

## 2024-10-21 RX ORDER — LANCETS 28 GAUGE
1 EACH MISCELLANEOUS 3 TIMES DAILY
Qty: 100 EACH | Refills: 3 | Status: SHIPPED | OUTPATIENT
Start: 2024-10-21

## 2024-10-21 RX ORDER — MUPIROCIN 20 MG/G
OINTMENT TOPICAL
COMMUNITY
Start: 2024-07-31

## 2024-10-21 RX ORDER — TIRZEPATIDE 2.5 MG/.5ML
2.5 INJECTION, SOLUTION SUBCUTANEOUS
Qty: 4 PEN | Refills: 0 | Status: SHIPPED | OUTPATIENT
Start: 2024-10-21

## 2024-10-21 RX ORDER — TRAMADOL HYDROCHLORIDE 50 MG/1
TABLET ORAL
COMMUNITY

## 2024-10-21 RX ORDER — LANCETS
EACH MISCELLANEOUS
Qty: 200 EACH | Refills: 0 | Status: SHIPPED | OUTPATIENT
Start: 2024-10-21

## 2024-10-21 NOTE — PROGRESS NOTES
SUBJECTIVE:    Patient ID: Yeyo Hollingsworth is a 84 y.o. male.    Chief Complaint: Diabetes and Hypertension  84-year-old male with a history of diabetes hypertension hyperlipidemia chronic pain secondary to arthritis, insomnia presents to clinic today for follow-up on his diabetes hypertension and chronic pain.     His A1c is been stable, currently on Jardiance and Januvia discussed with patient adding GLP 1 medication to get better control of his diabetes we discussed about the risks and benefits.    Blood pressure is well controlled today will continue on his current lisinopril and metoprolol    Patient has been managing his severe arthritis with narcotics he is not refilling early doing well.      Visit today included increased complexity associated with the care of the episodic problem diabetes and hypertension addressed and managing the longitudinal care of the patient due to the serious and/or complex managed problem(s) diabetes, hypertension, hyperlipidemia, arthritis.    SPMHx:  DM: Jardiance 10mg, Januvia 100mg,   HTN: Lisinopril 20mg, Metoprolol 25mg, is well controlled.  Hyperlipidemia: Atorvastatin 10 mg well controlled. LDL 59  Arthritis: On several chronic narcotics and follows up with pain management, for his neck and back pain pt is planning to have a procedure.  Anxiety: Takes Valium 5mg, PRN  A-fib: 2012 Was cardioverted, no longer in a-fib  Insomnia: Trazadone 100mg  Hx of DVT: Currently on Eliquis 5mg   BRANDI: Wears CPAP  Chronic constipation: On polyethylene Glycol 17g, manages well with this medication.     Specialists:  Cardiology: Dr Luis Gutiérrez  Pain Management: Michele Martinez  Ortho: Dr Dean  Podiatry: Dr Mandujano  Sleep: Dr Felice Bello  GS: Dr Thorne     Smoke: Quit in 1988  ETOH: None  Exercise: Never     Hemoglobin A1C 4.5 - 6.2 % 6.4     Glucose 70 - 110 mg/dL 114 High      Creatinine 0.5 - 1.4 mg/dL 1.3     eGFR >60 mL/min/1.73 m^2 54.5 Abnormal        Lipids   Cholesterol: 99    Triglycerides:  124   HDL: 33   LDL: 41    Diabetes  He presents for his follow-up diabetic visit. He has type 2 diabetes mellitus. His disease course has been improving. There are no hypoglycemic associated symptoms. Pertinent negatives for hypoglycemia include no confusion, dizziness or headaches. Pertinent negatives for diabetes include no blurred vision, no chest pain, no fatigue, no foot paresthesias, no foot ulcerations, no polydipsia, no polyphagia, no polyuria, no visual change, no weakness and no weight loss. There are no hypoglycemic complications. Symptoms are stable.   Hypertension  This is a chronic problem. The current episode started more than 1 year ago. The problem is unchanged. The problem is controlled. Pertinent negatives include no blurred vision, chest pain, headaches, neck pain, palpitations or shortness of breath. Risk factors for coronary artery disease include diabetes mellitus, dyslipidemia, male gender, obesity and sedentary lifestyle. Past treatments include ACE inhibitors and beta blockers. Compliance problems include exercise and diet.    Hyperlipidemia  This is a chronic problem. The current episode started more than 1 year ago. The problem is controlled. Recent lipid tests were reviewed and are normal. Exacerbating diseases include diabetes and obesity. Factors aggravating his hyperlipidemia include beta blockers. Pertinent negatives include no chest pain or shortness of breath. Current antihyperlipidemic treatment includes statins. The current treatment provides moderate improvement of lipids. Compliance problems include adherence to exercise.          Past Medical History:   Diagnosis Date    Allergy     Ankle pain     left    Anticoagulant long-term use     aspirin    Anxiety     Atrial fibrillation     Back pain     Bruises easily     Cataract     Clotting disorder     left leg    Colon polyp     Diabetes mellitus     Diabetes mellitus, type 2     Diverticulitis 1986    Hay  fever     Hyperlipidemia     Hypertension     Left hip pain 2021    Seeing Dr. Beard for hip pain     Obese     BRANDI on CPAP      Social History     Socioeconomic History    Marital status:    Tobacco Use    Smoking status: Former     Current packs/day: 0.00     Types: Cigarettes     Quit date: 2006     Years since quittin.6    Smokeless tobacco: Never    Tobacco comments:     ex smoker    Substance and Sexual Activity    Alcohol use: Yes     Comment: rarely    Drug use: No    Sexual activity: Yes     Partners: Female     Social Drivers of Health     Financial Resource Strain: Low Risk  (2024)    Overall Financial Resource Strain (CARDIA)     Difficulty of Paying Living Expenses: Not hard at all   Food Insecurity: No Food Insecurity (2024)    Hunger Vital Sign     Worried About Running Out of Food in the Last Year: Never true     Ran Out of Food in the Last Year: Never true   Transportation Needs: No Transportation Needs (2024)    PRAPARE - Transportation     Lack of Transportation (Medical): No     Lack of Transportation (Non-Medical): No   Physical Activity: Inactive (2024)    Exercise Vital Sign     Days of Exercise per Week: 0 days     Minutes of Exercise per Session: 0 min   Stress: No Stress Concern Present (2024)    [employer] - Occupational Stress Questionnaire     Feeling of Stress : Not at all   Housing Stability: Low Risk  (2024)    Housing Stability Vital Sign     Unable to Pay for Housing in the Last Year: No     Number of Places Lived in the Last Year: 1     Unstable Housing in the Last Year: No     Past Surgical History:   Procedure Laterality Date    ABDOMINAL SURGERY      origunal surg -mult surgeries since    CARPAL TUNNEL RELEASE      right wrist -left wrist     COLON SURGERY      partial colon removal    COLONOSCOPY  2018    Dr. Salazar; normal terminal ileum; polyps removed from ascending colon  and hepatic flexure; pan diverticulosis; small internal hemorrhoids; repeat in 5 years; Bx: fragments of an adenomatous polyp with mild surface glandular dysplasia and associated chronic active inflammation, no evidence of high-grade dysplasia or malignancy    COLONOSCOPY N/A 11/9/2023    Procedure: COLONOSCOPY;  Surgeon: Prasanth Irene MD;  Location: [facility];  Service: Endoscopy;  Laterality: N/A;    COLOSTOMY  1986    colostomy reversal  1986    ESOPHAGOGASTRODUODENOSCOPY N/A 11/9/2023    Procedure: EGD (ESOPHAGOGASTRODUODENOSCOPY);  Surgeon: Prasanth Irene MD;  Location: [facility] ENDO;  Service: Endoscopy;  Laterality: N/A;    EYE SURGERY      hay fever      HERNIA REPAIR  1949    REPAIR OF TENDON OF LOWER EXTREMITY Left 6/24/2020    Procedure: REPAIR, TENDON, LOWER EXTREMITY;  Surgeon: Justin Mandujano DPM;  [place] [facility] OR;  Service: Podiatry;  Laterality: Left;  ARTHEX NOTIFIED IN CASE HE WANTS ANCHOR    TOE SURGERY Left 2017    replaced a joint     TONSILLECTOMY  1947    TRANSFORAMINAL EPIDURAL INJECTION OF STEROID      x 4    TRANSFORAMINAL EPIDURAL INJECTION OF STEROID Bilateral 11/13/2019    Procedure: Injection,steroid,epidural,transforaminal approach;  Surgeon: Grant Wright MD;  Location: [facility] OR;  Service: Pain Management;  Laterality: Bilateral;  L4-5, L5-S1    TRANSFORAMINAL EPIDURAL INJECTION OF STEROID Bilateral 3/9/2021    Procedure: Injection,steroid,epidural,transforaminal approach;  Surgeon: Grant Wright MD;  Location: [facility] OR;  Service: Pain Management;  Laterality: Bilateral;  L4-5, L5-S1    TRANSFORAMINAL EPIDURAL INJECTION OF STEROID Bilateral 5/25/2021    Procedure: Injection,steroid,epidural,transforaminal approach;  Surgeon: Grant Wright MD;  Location: [facility] OR;  Service: Pain Management;  Laterality: Bilateral;  L4-L5,L5,S1    TRANSFORAMINAL EPIDURAL INJECTION OF STEROID Bilateral 12/14/2021    Procedure: Injection,steroid,epidural,transforaminal approach Bilateral L4-5, L5-S1;   Surgeon: Grant Wright MD;  Location: [facility] OR;  Service: Pain Management;  Laterality: Bilateral;    TRANSFORAMINAL EPIDURAL INJECTION OF STEROID Bilateral 11/4/2022    Procedure: Injection,steroid,epidural,transforaminal approach;  Surgeon: Grant Wright MD;  Location: [facility] OR;  Service: Pain Management;  Laterality: Bilateral;  L4-5 and L5-s1    TRANSFORAMINAL EPIDURAL INJECTION OF STEROID Bilateral 1/20/2023    Procedure: Injection,steroid,epidural,transforaminal approach L4-L5-S1;  Surgeon: Grant Wright MD;  Location: [facility] OR;  Service: Pain Management;  Laterality: Bilateral;    TRANSFORAMINAL EPIDURAL INJECTION OF STEROID Bilateral 6/1/2023    Procedure: Injection,steroid,epidural,transforaminal approach L4-L5, L5-S1;  Surgeon: Grant Wright MD;  Location: [facility] OR;  Service: Pain Management;  Laterality: Bilateral;    TRANSFORAMINAL EPIDURAL INJECTION OF STEROID Bilateral 1/18/2024    Procedure: Injection,steroid,epidural,transforaminal approach;  Surgeon: Grant Wright MD;  Location: [facility] OR;  Service: Anesthesiology;  Laterality: Bilateral;  L4-5 and l5-s1 TFESI    TRANSFORAMINAL EPIDURAL INJECTION OF STEROID Bilateral 2/29/2024    Procedure: Injection,steroid,epidural,transforaminal approach;  Surgeon: Grant Wright MD;  Location: [facility] OR;  Service: Anesthesiology;  Laterality: Bilateral;  L4-5 and l5-s1 TFESI     Family History   Problem Relation Name Age of Onset    Heart disease Mother      Melanoma Neg Hx      Psoriasis Neg Hx      Lupus Neg Hx      Eczema Neg Hx       Current Outpatient Medications   Medication Sig Dispense Refill    apixaban (ELIQUIS) 5 mg Tab Take 1 tablet (5 mg total) by mouth 2 (two) times daily. 180 tablet 3    aspirin 81 MG Chew Take 1 tablet (81 mg total) by mouth once daily. 100 tablet 2    atorvastatin (LIPITOR) 10 MG tablet Take 1 tablet (10 mg total) by mouth once daily. 90 tablet 3    azelastine (ASTELIN) 137 mcg (0.1 %) nasal spray 2 sprays (274 mcg total) by Nasal route 2  (two) times daily. 30 mL 5    clotrimazole (LOTRIMIN) 1 % cream Apply topically 2 (two) times daily. 60 g 10    diclofenac sodium (VOLTAREN) 1 % Gel Apply 2 g topically once daily. 100 g 2    diphenhydrAMINE-zinc acetate 1-0.1% (BENADRYL ITCH STOPPING) cream Apply topically 3 (three) times daily as needed for Itching. 28 g 0    furosemide (LASIX) 20 MG tablet Take 1 tablet (20 mg total) by mouth every Mon, Wed, Fri. 45 tablet 3    gabapentin (NEURONTIN) 300 MG capsule Take 1 capsule (300 mg total) by mouth 3 (three) times daily. 270 capsule 1    HYDROcodone-acetaminophen (NORCO) 5-325 mg per tablet Take 1 tablet by mouth every 6 (six) hours as needed for Pain. 90 tablet 0    JARDIANCE 10 mg tablet Take 1 tablet (10 mg total) by mouth once daily. 90 tablet 1    ketoconazole (NIZORAL) 2 % cream Apply topically once daily. 60 g 1    lisinopriL (PRINIVIL,ZESTRIL) 20 MG tablet Take 1 tablet (20 mg total) by mouth once daily. 90 tablet 3    metoprolol tartrate (LOPRESSOR) 25 MG tablet Take 0.5 tablets (12.5 mg total) by mouth 2 (two) times daily. 180 tablet 3    montelukast (SINGULAIR) 10 mg tablet Take 1 tablet (10 mg total) by mouth every evening. 90 tablet 3    multivitamin with minerals tablet Take 1 tablet by mouth once daily.      mupirocin (BACTROBAN) 2 % ointment SMARTSIG:Sparingly Topical Twice Daily      polyethylene glycol (GLYCOLAX) 17 gram/dose powder Take 17 g by mouth once daily. 507 g 3    potassium chloride (MICRO-K) 10 MEQ CpSR Take 1 capsule (10 mEq total) by mouth every other day. 90 capsule 1    SITagliptin phosphate (JANUVIA) 100 MG Tab Take 1 tablet (100 mg total) by mouth once daily. 90 tablet 3    traZODone (DESYREL) 100 MG tablet Take 1 tablet (100 mg total) by mouth every evening. 90 tablet 3    triamcinolone acetonide 0.1% (KENALOG) 0.1 % cream Apply topically 2 (two) times daily. 80 g 0    alcohol swabs (ALCOHOL PADS) PadM Apply 1 each topically as needed (before shot). 100 each 0    blood  sugar diagnostic (BLOOD GLUCOSE TEST) Strp 1 strip by Misc.(Non-Drug; Combo Route) route 2 (two) times a day. FREESTYLE LITE BG TEST STRIPS. current use of insulin  - Primary ICD-10-CM: E11.9 200 each 2    blood sugar diagnostic Strp To check BG 1 times daily, to use with insurance preferred meter 200 strip 0    blood-glucose meter kit To check BG 1 times daily, to use with insurance preferred meter 1 each 0    diphenhydrAMINE (BENADRYL) 50 MG tablet Take as needed by oral route.      fexofenadine (ALLEGRA) 180 MG tablet Take 1 tablet (180 mg total) by mouth once daily. 90 tablet 3    FREESTYLE LANCETS 28 gauge lancets Inject 1 lancet  into the skin 3 (three) times daily. 100 each 3    lancets Misc To check BG 1 times daily, to use with insurance preferred meter 200 each 0    naftifine 2 % Crea APPLY AS A THIN LAYER TO THE AFFECTED AREA(S) PLUS A 0.5 INCH MARGIN OF HEALTHY SURROUNDING SKIN BY TOPICAL ROUTE ONCE DAILY FOR 2 WEEKS      tirzepatide (MOUNJARO) 2.5 mg/0.5 mL PnIj Inject 2.5 mg into the skin every 7 days. 4 Pen 0    [START ON 11/19/2024] tirzepatide (MOUNJARO) 5 mg/0.5 mL PnIj Inject 5 mg into the skin every 7 days. 4 Pen 0    traMADoL (ULTRAM) 50 mg tablet Take 1 tablet every 6 hours by oral route.       No current facility-administered medications for this visit.     Facility-Administered Medications Ordered in Other Visits   Medication Dose Route Frequency Provider Last Rate Last Admin    lactated ringers infusion   Intravenous Once PRN Grant Wright MD   Stopped at 11/04/22 1230    lactated ringers infusion   Intravenous Continuous Grant Wright MD 25 mL/hr at 02/29/24 1154 New Bag at 02/29/24 1154     Review of patient's allergies indicates:   Allergen Reactions    Ativan [lorazepam] Other (See Comments)     Mood alternating      Dilaudid [hydromorphone (bulk)] Other (See Comments)     Mood alternating      Morphine Other (See Comments)     Mood alternating      Adhesive tape-silicones      "Hydromorphone        Review of Systems   Constitutional:  Positive for activity change. Negative for diaphoresis, fatigue, unexpected weight change and weight loss.   HENT:  Negative for congestion, hearing loss, rhinorrhea and trouble swallowing.    Eyes:  Negative for blurred vision, discharge and visual disturbance.   Respiratory:  Negative for cough, chest tightness, shortness of breath and wheezing.    Cardiovascular:  Negative for chest pain and palpitations.   Gastrointestinal:  Negative for abdominal distention, abdominal pain, anal bleeding, blood in stool, constipation, diarrhea and vomiting.   Endocrine: Negative for polydipsia, polyphagia and polyuria.   Genitourinary:  Negative for difficulty urinating, flank pain, frequency, hematuria and urgency.   Musculoskeletal:  Positive for arthralgias. Negative for joint swelling and neck pain.   Neurological:  Negative for dizziness, weakness and headaches.   Psychiatric/Behavioral:  Negative for confusion and dysphoric mood.           Blood pressure 124/66, pulse (!) 56, height 5' 8" (1.727 m), weight 106.9 kg (235 lb 11.2 oz), SpO2 96%. Body mass index is 35.84 kg/m².   Objective:      Physical Exam  Vitals reviewed.   Constitutional:       General: He is not in acute distress.     Appearance: Normal appearance. He is obese. He is not ill-appearing or toxic-appearing.   HENT:      Head: Normocephalic and atraumatic.      Right Ear: Tympanic membrane normal.      Left Ear: Tympanic membrane normal.      Nose: No congestion or rhinorrhea.      Mouth/Throat:      Mouth: Mucous membranes are moist.      Pharynx: Oropharynx is clear. No oropharyngeal exudate or posterior oropharyngeal erythema.   Cardiovascular:      Rate and Rhythm: Normal rate and regular rhythm.      Heart sounds: Normal heart sounds. No murmur heard.  Pulmonary:      Effort: Pulmonary effort is normal.      Breath sounds: Normal breath sounds.   Skin:     General: Skin is warm and dry.      " Capillary Refill: Capillary refill takes less than 2 seconds.   Neurological:      Mental Status: He is alert and oriented to person, place, and time.         Assessment:       1. Primary osteoarthritis of both knees    2. Chronic nonseasonal allergic rhinitis due to fungal spores    3. Controlled type 2 diabetes mellitus without complication, without long-term current use of insulin    4. Essential hypertension    5. Familial hypercholesterolemia    6. Stage 3a chronic kidney disease         Plan:           Primary osteoarthritis of both knees  Will continue on regular tramadol for bilateral knee pain  Chronic nonseasonal allergic rhinitis due to fungal spores  -     fexofenadine (ALLEGRA) 180 MG tablet; Take 1 tablet (180 mg total) by mouth once daily.  Dispense: 90 tablet; Refill: 3  Will treat his non seasonal allergic rhinitis with Allegra, this is a refill  Controlled type 2 diabetes mellitus without complication, without long-term current use of insulin  -     blood-glucose meter kit; To check BG 1 times daily, to use with insurance preferred meter  Dispense: 1 each; Refill: 0  -     lancets Misc; To check BG 1 times daily, to use with insurance preferred meter  Dispense: 200 each; Refill: 0  -     blood sugar diagnostic Strp; To check BG 1 times daily, to use with insurance preferred meter  Dispense: 200 strip; Refill: 0  -     FREESTYLE LANCETS 28 gauge lancets; Inject 1 lancet  into the skin 3 (three) times daily.  Dispense: 100 each; Refill: 3  -     blood sugar diagnostic (BLOOD GLUCOSE TEST) Strp; 1 strip by Misc.(Non-Drug; Combo Route) route 2 (two) times a day. FREESTYLE LITE BG TEST STRIPS. current use of insulin  - Primary ICD-10-CM: E11.9  Dispense: 200 each; Refill: 2  -     tirzepatide (MOUNJARO) 2.5 mg/0.5 mL PnIj; Inject 2.5 mg into the skin every 7 days.  Dispense: 4 Pen; Refill: 0  -     tirzepatide (MOUNJARO) 5 mg/0.5 mL PnIj; Inject 5 mg into the skin every 7 days.  Dispense: 4 Pen; Refill:  0  -     alcohol swabs (ALCOHOL PADS) PadM; Apply 1 each topically as needed (before shot).  Dispense: 100 each; Refill: 0  Will attempt to start patient on GLP 1 medication will continue on his other to current medications  Essential hypertension  Blood pressure well controlled will continue on current dose ofLisinopril 20mg, Metoprolol 25mg  Familial hypercholesterolemia  Lipids have been controlled will continue on his current dose of atorvastatin  Stage 3a chronic kidney disease  CKD remained stable will continue to monitor

## 2024-10-25 ENCOUNTER — PATIENT MESSAGE (OUTPATIENT)
Dept: FAMILY MEDICINE | Facility: CLINIC | Age: 84
End: 2024-10-25
Payer: MEDICARE

## 2024-10-25 DIAGNOSIS — J30.89 CHRONIC NONSEASONAL ALLERGIC RHINITIS DUE TO FUNGAL SPORES: ICD-10-CM

## 2024-10-25 DIAGNOSIS — E11.9 CONTROLLED TYPE 2 DIABETES MELLITUS WITHOUT COMPLICATION, WITHOUT LONG-TERM CURRENT USE OF INSULIN: ICD-10-CM

## 2024-10-30 ENCOUNTER — TELEPHONE (OUTPATIENT)
Dept: PAIN MEDICINE | Facility: CLINIC | Age: 84
End: 2024-10-30
Payer: MEDICARE

## 2024-10-30 ENCOUNTER — OFFICE VISIT (OUTPATIENT)
Dept: PAIN MEDICINE | Facility: CLINIC | Age: 84
End: 2024-10-30
Payer: MEDICARE

## 2024-10-30 VITALS
SYSTOLIC BLOOD PRESSURE: 127 MMHG | BODY MASS INDEX: 35.72 KG/M2 | HEIGHT: 68 IN | DIASTOLIC BLOOD PRESSURE: 68 MMHG | HEART RATE: 57 BPM | WEIGHT: 235.69 LBS

## 2024-10-30 DIAGNOSIS — M54.16 LUMBAR RADICULITIS: Primary | ICD-10-CM

## 2024-10-30 DIAGNOSIS — M48.061 SPINAL STENOSIS OF LUMBAR REGION, UNSPECIFIED WHETHER NEUROGENIC CLAUDICATION PRESENT: ICD-10-CM

## 2024-10-30 DIAGNOSIS — M54.16 LUMBAR RADICULOPATHY, CHRONIC: ICD-10-CM

## 2024-10-30 DIAGNOSIS — M51.362 DEGENERATION OF INTERVERTEBRAL DISC OF LUMBAR REGION WITH DISCOGENIC BACK PAIN AND LOWER EXTREMITY PAIN: ICD-10-CM

## 2024-10-30 DIAGNOSIS — M54.9 DORSALGIA, UNSPECIFIED: ICD-10-CM

## 2024-10-30 PROCEDURE — 99214 OFFICE O/P EST MOD 30 MIN: CPT | Mod: S$PBB,,, | Performed by: PHYSICIAN ASSISTANT

## 2024-10-30 PROCEDURE — 99214 OFFICE O/P EST MOD 30 MIN: CPT | Mod: PBBFAC,PN | Performed by: PHYSICIAN ASSISTANT

## 2024-10-30 PROCEDURE — 99999 PR PBB SHADOW E&M-EST. PATIENT-LVL IV: CPT | Mod: PBBFAC,,, | Performed by: PHYSICIAN ASSISTANT

## 2024-10-30 NOTE — H&P (VIEW-ONLY)
Referring Physician: No ref. provider found    PCP: Garland Ocampo MD      CC:low back and left leg pain    Interval History:  Mr. Hollingsworth is a 84 y.o. male with a low back and leg pain who presents today as an established patient for the management of low back pain.  He s/p Bilateral L4-5, L5-S1 transforaminal epidural steroid injection in February that provided >50% for 5 months. Pain has returned but is not as bad as it was prior to 1st injection.  Pain patricia n his low back with radiation down posterior thighs.   The patient denies pain with sitting and sleeping. Continues to c/o unrelated pain at the top of his left foot.   He denies any LE weakness or b/b changes. He takes Hydrocodone 7.5 mg sparingly prescribed by PCP.  In the past he was evaluated by [facility] and lumbar surgery was recommended. The patient denies of any significant changes in his health since his last appointment. The patient also denies of any changes in the character of his pain since his last appointment.  Pain today is rated 5/10. Patient denies of any urinary/fecal incontinence, saddle anesthesia, or weakness.        HPI:   Yeyo Hollingsworth is a 84 y.o. male ho presents with lower back and left leg pain.  Pain is been present since 2010.  No recent traumatic incident.  His intermittent aching, throbbing, electric pain in his lower back.  Pain radiates down his left buttock into his left posterior thigh.  Pain worsens with prolonged standing, walking, getting up and coughing.  Pain improves with rest.  His tried physical therapy with minimal benefit.  He has undergone lumbar JSOE's with Dr. Rendon in the past with moderate benefit.  Most recent injection was performed in September 2016.  Pain has since returned.  He desires repeat injections with us.  He denies any weakness.  No bowel bladder changes.  He rates his pain 6/10 today.    INTERVETIONAL THERAPIES:   01/18/2024: Bilateral L4-5, L5-S1 transforaminal epidural steroid  "injection- 100% X4 WEEKS, 50% relief at time time.   8/21/2023: plantar fasciatis steroid injection - Dr. Elliott-   6/1/2023: Bilateral L4-5, L5-S1 transforaminal epidural steroid injection  1/20/23: Injection,steroid,epidural,transforaminal approach L4-L5-S1- excellent relief.   11/4/2022: Bilateral L4-5 and L5-S1 transforaminal epidural steroid injection under fluoroscopy- Dr. Wright- excellent relief.       :   Reviewed.        ROS:  CONSTITUTIONAL: No fevers, chills, night sweats, wt. loss, appetite changes  SKIN: no rashes or itching  ENT: No headaches, head trauma, vision changes, or eye pain  LYMPH NODES: None noticed   CV: No chest pain, palpitations.   RESP: No shortness of breath, dyspnea on exertion, cough, wheezing, or hemoptysis  GI: No nausea, emesis, diarrhea, constipation, melena, hematochezia, pain.    : No dysuria, hematuria, urgency, or frequency   HEME: No easy bruising, bleeding problems  PSYCHIATRIC: No depression, anxiety, psychosis, hallucinations.  NEURO: No seizures, memory loss, dizziness or difficulty sleeping  MSK: + history of present illness    MEDICAL, SURGICAL, FAMILY, SOCIAL HX: reviewed    MEDICATIONS/ALLERGIES: reviewed         Medications/Allergies: See med card    Vitals:    10/30/24 0836   BP: 127/68   Pulse: (!) 57   Weight: 106.9 kg (235 lb 10.8 oz)   Height: 5' 8" (1.727 m)   PainSc:   5   PainLoc: Back           Physical exam:    GENERAL: A and O x3, the patient appears well groomed and is in no acute distress.  Skin: No rashes or obvious lesions  HEENT: normocephalic, atraumatic  CARDIOVASCULAR:  bradycardia   LUNGS: non labored breathing  ABDOMEN: soft, nontender   UPPER EXTREMITIES: Normal alignment, normal range of motion, no atrophy, no skin changes,  hair growth and nail growth normal and equal bilaterally. No swelling. Tenderness to right AC joint  LOWER EXTREMITIES:  Normal alignment, normal range of motion, no atrophy, no skin changes,  hair growth and nail growth " normal and equal bilaterally. No swelling, no tenderness.    CERVICAL SPINE:  Full ROM with pain on flexion and extension  Negative spurlings  Tenderness to palpation right cervical paraspinous muscles   LUMBAR SPINE  Lumbar spine: ROM is limited with flexion extension and oblique extension with moderate increased pain.    Caden's test causes no increased pain on either side.    Supine straight leg raise positive bilaterally   Internal and external rotation of the hip causes no increased pain on either side.  Myofascial exam: No tenderness to palpation across lumbar paraspinous muscles.       MENTAL STATUS: normal orientation, speech, language, and fund of knowledge for social situation.  Emotional state appropriate.    CRANIAL NERVES:  II:  PERRL bilaterally,   III,IV,VI: EOMI.    V:  Facial sensation equal bilaterally  VII:  Facial motor function normal.  VIII:  Hearing equal to finger rub bilaterally  IX/X: Gag normal, palate symmetric  XI:  Shoulder shrug equal, head turn equal  XII:  Tongue midline without fasciculations      MOTOR: Tone and bulk: normal bilateral upper and lower Strength: normal   Delt Bi Tri WE WF     R 5 5 5 5 5 5   L 5 5 5 5 5 5     IP ADD ABD Quad TA Gas HAM  R 5 5 5 5 5 5 5  L 5 5 5 5 5 5 5    SENSATION: Light touch and pinprick intact bilaterally  REFLEXES: normal, symmetric, nonbrisk.  Toes down, no clonus. No hoffmans.  GAIT: normal rise, base, steps, and arm swing.        Imaging: reviewed CT of ABD PELV renal stone study.        Assessment:  Mr. Hollingsworth is a 84 y.o. male with low back and BLE pain. Treatment plan outlined below:   1. Lumbar radiculitis    2. Degeneration of intervertebral disc of lumbar region with discogenic back pain and lower extremity pain    3. Spinal stenosis of lumbar region, unspecified whether neurogenic claudication present      Plan:  1. Schedule repeat Bilateral TFESI at L4-L5, L5-S1. I have explained the risks, benefits, and alternatives of the  procedure in detail. The patient voices understanding and all questions have been answered. The patient agrees to proceed as planned. Written Consent obtained.   2. I have stressed the importance of physical activity and a home exercise plan to help with chronic pain and improve health.   3.  Update lumbar MRI  4.  continue Robaxin 500 mg po Q4H prn muscle spasms.    5.  RTC for the procedure as outlined above     Ivette Bautista PA-C

## 2024-11-04 RX ORDER — LANCETS
EACH MISCELLANEOUS
Qty: 200 EACH | Refills: 0 | Status: SHIPPED | OUTPATIENT
Start: 2024-11-04

## 2024-11-04 RX ORDER — MINERAL OIL
180 ENEMA (ML) RECTAL DAILY
Qty: 90 TABLET | Refills: 3 | Status: SHIPPED | OUTPATIENT
Start: 2024-11-04 | End: 2025-11-04

## 2024-11-04 RX ORDER — ISOPROPYL ALCOHOL 70 ML/100ML
1 SWAB TOPICAL
Qty: 100 EACH | Refills: 0 | Status: SHIPPED | OUTPATIENT
Start: 2024-11-04

## 2024-11-04 RX ORDER — INSULIN PUMP SYRINGE, 3 ML
EACH MISCELLANEOUS
Qty: 1 EACH | Refills: 0 | Status: SHIPPED | OUTPATIENT
Start: 2024-11-04 | End: 2025-11-04

## 2024-11-05 ENCOUNTER — PATIENT MESSAGE (OUTPATIENT)
Dept: FAMILY MEDICINE | Facility: CLINIC | Age: 84
End: 2024-11-05
Payer: MEDICARE

## 2024-11-07 ENCOUNTER — HOSPITAL ENCOUNTER (OUTPATIENT)
Dept: RADIOLOGY | Facility: HOSPITAL | Age: 84
Discharge: HOME | End: 2024-11-07
Attending: PHYSICIAN ASSISTANT
Payer: MEDICARE

## 2024-11-07 DIAGNOSIS — M54.9 DORSALGIA, UNSPECIFIED: ICD-10-CM

## 2024-11-07 DIAGNOSIS — M54.16 LUMBAR RADICULOPATHY, CHRONIC: ICD-10-CM

## 2024-11-07 PROCEDURE — 72148 MRI LUMBAR SPINE W/O DYE: CPT | Mod: TC,PO

## 2024-11-07 PROCEDURE — 72148 MRI LUMBAR SPINE W/O DYE: CPT | Mod: 26,,, | Performed by: RADIOLOGY

## 2024-11-11 ENCOUNTER — TELEPHONE (OUTPATIENT)
Dept: FAMILY MEDICINE | Facility: CLINIC | Age: 84
End: 2024-11-11
Payer: MEDICARE

## 2024-11-11 NOTE — TELEPHONE ENCOUNTER
Patient cannot get mounjaro from [facility]. Would like Trulicity printed so he can pick it up to take over to [facility].     LOV  10-21-24  NOV  12-16-24  Please advise

## 2024-11-11 NOTE — TELEPHONE ENCOUNTER
----- Message from Rhoda sent at 11/11/2024 10:41 AM CST -----  Pt needs a refill on medication they have been trying to order for 2 weeks.   [phone]  Left voicemail @9:39 am

## 2024-11-12 ENCOUNTER — TELEPHONE (OUTPATIENT)
Dept: PAIN MEDICINE | Facility: CLINIC | Age: 84
End: 2024-11-12
Payer: MEDICARE

## 2024-11-12 NOTE — TELEPHONE ENCOUNTER
Pt called to get MRI results       ----- Message from Yuan sent at 11/12/2024  2:20 PM CST -----  Regarding: rt call  Type:  Patient Returning Call    Who Called:pt    Who Left Message for Patient:Destiny Rebolledo,    Does the patient know what this is regarding?:no     Best Call Back Number:[phone]      Additional Information:

## 2024-11-12 NOTE — TELEPHONE ENCOUNTER
MRI showed same issues at L4-5 but worsening narrowing at T12-L1. We can look at treating this level with injections vs referral to neurosurgery after currently scheduled epidural.

## 2024-11-12 NOTE — TELEPHONE ENCOUNTER
Pt returned call regarding MRI results,pt understood and form there he has an upcoming procedure on  11/26

## 2024-11-20 DIAGNOSIS — E11.9 CONTROLLED TYPE 2 DIABETES MELLITUS WITHOUT COMPLICATION, WITHOUT LONG-TERM CURRENT USE OF INSULIN: Primary | ICD-10-CM

## 2024-11-20 NOTE — TELEPHONE ENCOUNTER
----- Message from Ana sent at 11/20/2024  2:35 PM CST -----  -2:08- rubén wife is calling to get a hard copy of boby to bring to [facility]. She is calling to get and update   [phone]

## 2024-11-25 ENCOUNTER — TELEPHONE (OUTPATIENT)
Dept: CARDIOLOGY | Facility: CLINIC | Age: 84
End: 2024-11-25
Payer: MEDICARE

## 2024-11-25 NOTE — TELEPHONE ENCOUNTER
----- Message from Yu sent at 11/25/2024  9:49 AM CST -----  Contact: WifeNayeli  Type:  Appointment Request    Caller is requesting a sooner appointment.  Caller declined first available appointment listed below.  Caller will not accept being placed on the waitlist and is requesting a message be sent to doctor.    Name of Caller:  Nayeli Jewell  When is the first available appointment?  N/A    Would the patient rather a call back or a response via [facility]?  Call back  Best Call Back Number:  [phone] - Nayeli    Additional Information:   States they need to schedule an appointment in January for a check up - please call - thank you

## 2024-11-25 NOTE — OR NURSING
Talked to Dr redding re: took elquis last night.  He is going to use 25 ga needle so ok and patient to hold med

## 2024-11-26 ENCOUNTER — HOSPITAL ENCOUNTER (OUTPATIENT)
Facility: HOSPITAL | Age: 84
Discharge: HOME | End: 2024-11-26
Attending: ANESTHESIOLOGY | Admitting: ANESTHESIOLOGY
Payer: MEDICARE

## 2024-11-26 DIAGNOSIS — F51.01 PRIMARY INSOMNIA: ICD-10-CM

## 2024-11-26 DIAGNOSIS — M17.0 PRIMARY OSTEOARTHRITIS OF BOTH KNEES: ICD-10-CM

## 2024-11-26 DIAGNOSIS — M54.16 LUMBAR RADICULITIS: ICD-10-CM

## 2024-11-26 DIAGNOSIS — E11.9 CONTROLLED TYPE 2 DIABETES MELLITUS WITHOUT COMPLICATION, WITHOUT LONG-TERM CURRENT USE OF INSULIN: ICD-10-CM

## 2024-11-26 LAB — POCT GLUCOSE: 95 MG/DL (ref 70–110)

## 2024-11-26 PROCEDURE — 25500020 PHARM REV CODE 255: Performed by: ANESTHESIOLOGY

## 2024-11-26 PROCEDURE — 64484 NJX AA&/STRD TFRM EPI L/S EA: CPT | Mod: 50,,, | Performed by: ANESTHESIOLOGY

## 2024-11-26 PROCEDURE — 64484 NJX AA&/STRD TFRM EPI L/S EA: CPT | Mod: 50 | Performed by: ANESTHESIOLOGY

## 2024-11-26 PROCEDURE — 64483 NJX AA&/STRD TFRM EPI L/S 1: CPT | Mod: 50,,, | Performed by: ANESTHESIOLOGY

## 2024-11-26 PROCEDURE — 64483 NJX AA&/STRD TFRM EPI L/S 1: CPT | Mod: 50 | Performed by: ANESTHESIOLOGY

## 2024-11-26 PROCEDURE — 63600175 PHARM REV CODE 636 W HCPCS: Performed by: ANESTHESIOLOGY

## 2024-11-26 RX ORDER — BUPIVACAINE HYDROCHLORIDE 2.5 MG/ML
INJECTION, SOLUTION EPIDURAL; INFILTRATION; INTRACAUDAL
Status: DISCONTINUED | OUTPATIENT
Start: 2024-11-26 | End: 2024-11-26 | Stop reason: HOSPADM

## 2024-11-26 RX ORDER — SODIUM CHLORIDE, SODIUM LACTATE, POTASSIUM CHLORIDE, CALCIUM CHLORIDE 600; 310; 30; 20 MG/100ML; MG/100ML; MG/100ML; MG/100ML
INJECTION, SOLUTION INTRAVENOUS CONTINUOUS
Status: DISCONTINUED | OUTPATIENT
Start: 2024-11-26 | End: 2024-11-26 | Stop reason: HOSPADM

## 2024-11-26 RX ORDER — DEXAMETHASONE SODIUM PHOSPHATE 10 MG/ML
INJECTION INTRAMUSCULAR; INTRAVENOUS
Status: DISCONTINUED | OUTPATIENT
Start: 2024-11-26 | End: 2024-11-26 | Stop reason: HOSPADM

## 2024-11-26 RX ORDER — LIDOCAINE HYDROCHLORIDE 10 MG/ML
INJECTION, SOLUTION EPIDURAL; INFILTRATION; INTRACAUDAL; PERINEURAL
Status: DISCONTINUED | OUTPATIENT
Start: 2024-11-26 | End: 2024-11-26 | Stop reason: HOSPADM

## 2024-11-26 RX ORDER — MIDAZOLAM HYDROCHLORIDE 1 MG/ML
INJECTION INTRAMUSCULAR; INTRAVENOUS
Status: DISCONTINUED | OUTPATIENT
Start: 2024-11-26 | End: 2024-11-26 | Stop reason: HOSPADM

## 2024-11-26 RX ORDER — LIDOCAINE HYDROCHLORIDE 10 MG/ML
1 INJECTION, SOLUTION EPIDURAL; INFILTRATION; INTRACAUDAL; PERINEURAL ONCE
Status: DISCONTINUED | OUTPATIENT
Start: 2024-11-26 | End: 2024-11-26 | Stop reason: HOSPADM

## 2024-11-26 RX ORDER — FENTANYL CITRATE 50 UG/ML
INJECTION, SOLUTION INTRAMUSCULAR; INTRAVENOUS
Status: DISCONTINUED | OUTPATIENT
Start: 2024-11-26 | End: 2024-11-26 | Stop reason: HOSPADM

## 2024-11-26 RX ADMIN — LIDOCAINE HYDROCHLORIDE 10 MG: 10 INJECTION, SOLUTION EPIDURAL; INFILTRATION; INTRACAUDAL; PERINEURAL at 02:11

## 2024-11-26 NOTE — OP NOTE
PROCEDURE DATE: 11/26/2024    PROCEDURE: Bilateral L4-5, L5-S1 transforaminal epidural steroid injection under fluoroscopy    DIAGNOSIS: Lumbar radiculitis    Post op diagnosis: Same    PHYSICIAN: Grant Wright MD    MEDICATIONS INJECTED:  Dexamethasone 2.5mg and 0.5ml 0.25% bupivicaine at each nerve root.     LOCAL ANESTHETIC INJECTED:  Lidocaine 1%. 2 ml per site.    SEDATION MEDICATIONS: RN IV sedation    ESTIMATED BLOOD LOSS:  None    COMPLICATIONS:  NOne    TECHNIQUE:   A time-out was taken to identify patient and procedure side prior to starting the procedure. The patient was placed in a prone position, prepped and draped in the usual sterile fashion using ChloraPrep and sterile towels.  The area to be injected was determined under fluoroscopic guidance in AP and oblique view.  Local anesthetic was given by raising a wheal and going down to the hub of a 25-gauge 1.5 inch needle.  In oblique view, a 5 inch 25-gauge bent-tip spinal needle was introduced towards 6 oclock position of the pedicle of each above named nerve root level.  The needle was walked medially then hinged into the neural foramen and position was confirmed in AP and lateral views.  1ml contrast dye was injected to confirm appropriate placement and that there was no vascular uptake.  After negative aspiration for blood or CSF, the medication was then injected. This was performed at the bilateral L4/5 and L5/S1 level(s). The patient tolerated the procedure well.    The patient was monitored after the procedure.  Patient was given post procedure and discharge instructions to follow at home. The patient was discharged in a stable condition.

## 2024-11-26 NOTE — PLAN OF CARE
Patient and spouse given discharge instructions. Educated on post-anesthesia precautions and when to notify MD. Instructed when to follow up. Peripheral IV discontinued with catheter intact. Transferred to car via wheelchair and left with spouse to home.

## 2024-11-26 NOTE — DISCHARGE SUMMARY
[facility] - Periop Services  Discharge Note  Short Stay    Procedure(s) (LRB):  Injection,steroid,epidural,transforaminal approach (Bilateral)      OUTCOME: Patient tolerated treatment/procedure well without complication and is now ready for discharge.    DISPOSITION: Home or Self Care    FINAL DIAGNOSIS:  <principal problem not specified>    FOLLOWUP: In clinic    DISCHARGE INSTRUCTIONS:    Discharge Procedure Orders   Notify your health care provider if you experience any of the following:  temperature >100.4     Notify your health care provider if you experience any of the following:  severe uncontrolled pain     Notify your health care provider if you experience any of the following:  redness, tenderness, or signs of infection (pain, swelling, redness, odor or green/yellow discharge around incision site)     Activity as tolerated        TIME SPENT ON DISCHARGE: 30 minutes

## 2024-11-27 VITALS
BODY MASS INDEX: 35.72 KG/M2 | WEIGHT: 235.69 LBS | SYSTOLIC BLOOD PRESSURE: 106 MMHG | HEIGHT: 68 IN | RESPIRATION RATE: 16 BRPM | TEMPERATURE: 98 F | OXYGEN SATURATION: 95 % | HEART RATE: 62 BPM | DIASTOLIC BLOOD PRESSURE: 56 MMHG

## 2024-12-11 DIAGNOSIS — G89.4 CHRONIC PAIN SYNDROME: ICD-10-CM

## 2024-12-11 DIAGNOSIS — F51.01 PRIMARY INSOMNIA: ICD-10-CM

## 2024-12-11 DIAGNOSIS — M19.90 ARTHRITIS: ICD-10-CM

## 2024-12-11 DIAGNOSIS — I10 ESSENTIAL HYPERTENSION: ICD-10-CM

## 2024-12-12 RX ORDER — HYDROCODONE BITARTRATE AND ACETAMINOPHEN 5; 325 MG/1; MG/1
1 TABLET ORAL EVERY 6 HOURS PRN
Qty: 90 TABLET | Refills: 0 | Status: SHIPPED | OUTPATIENT
Start: 2024-12-12

## 2024-12-12 RX ORDER — TRAZODONE HYDROCHLORIDE 100 MG/1
100 TABLET ORAL NIGHTLY
Qty: 90 TABLET | Refills: 3 | Status: SHIPPED | OUTPATIENT
Start: 2024-12-12 | End: 2025-12-12

## 2024-12-12 RX ORDER — FUROSEMIDE 20 MG/1
20 TABLET ORAL
Qty: 45 TABLET | Refills: 3 | Status: SHIPPED | OUTPATIENT
Start: 2024-12-13 | End: 2025-12-13

## 2024-12-13 RX ORDER — HYDROCODONE BITARTRATE AND ACETAMINOPHEN 5; 325 MG/1; MG/1
1 TABLET ORAL EVERY 6 HOURS PRN
Qty: 90 TABLET | Refills: 0 | Status: SHIPPED | OUTPATIENT
Start: 2024-12-13 | End: 2025-01-07 | Stop reason: SDUPTHER

## 2024-12-13 RX ORDER — HYDROCODONE BITARTRATE AND ACETAMINOPHEN 5; 325 MG/1; MG/1
1 TABLET ORAL EVERY 6 HOURS PRN
Qty: 30 TABLET | Refills: 0 | Status: SHIPPED | OUTPATIENT
Start: 2024-12-13 | End: 2025-01-07

## 2024-12-13 RX ORDER — TRAZODONE HYDROCHLORIDE 50 MG/1
50 TABLET ORAL NIGHTLY
Qty: 30 TABLET | Refills: 11 | Status: SHIPPED | OUTPATIENT
Start: 2024-12-13 | End: 2025-01-07

## 2024-12-13 RX ORDER — FUROSEMIDE 20 MG/1
20 TABLET ORAL 2 TIMES DAILY
Qty: 60 TABLET | Refills: 11 | Status: SHIPPED | OUTPATIENT
Start: 2024-12-13 | End: 2025-01-07

## 2024-12-13 RX ORDER — TRAZODONE HYDROCHLORIDE 50 MG/1
50 TABLET ORAL NIGHTLY
Qty: 30 TABLET | Refills: 11 | Status: SHIPPED | OUTPATIENT
Start: 2024-12-13 | End: 2024-12-13 | Stop reason: SDUPTHER

## 2024-12-30 ENCOUNTER — OFFICE VISIT (OUTPATIENT)
Dept: PAIN MEDICINE | Facility: CLINIC | Age: 84
End: 2024-12-30
Payer: MEDICARE

## 2024-12-30 VITALS
HEIGHT: 68 IN | BODY MASS INDEX: 35.72 KG/M2 | WEIGHT: 235.69 LBS | DIASTOLIC BLOOD PRESSURE: 63 MMHG | HEART RATE: 60 BPM | SYSTOLIC BLOOD PRESSURE: 114 MMHG

## 2024-12-30 DIAGNOSIS — M48.061 SPINAL STENOSIS OF LUMBAR REGION, UNSPECIFIED WHETHER NEUROGENIC CLAUDICATION PRESENT: ICD-10-CM

## 2024-12-30 DIAGNOSIS — M51.362 DEGENERATION OF INTERVERTEBRAL DISC OF LUMBAR REGION WITH DISCOGENIC BACK PAIN AND LOWER EXTREMITY PAIN: ICD-10-CM

## 2024-12-30 DIAGNOSIS — M54.16 LUMBAR RADICULITIS: Primary | ICD-10-CM

## 2024-12-30 PROCEDURE — 99999 PR PBB SHADOW E&M-EST. PATIENT-LVL IV: CPT | Mod: PBBFAC,,, | Performed by: PHYSICIAN ASSISTANT

## 2024-12-30 PROCEDURE — 99214 OFFICE O/P EST MOD 30 MIN: CPT | Mod: PBBFAC,PN | Performed by: PHYSICIAN ASSISTANT

## 2024-12-30 PROCEDURE — 99214 OFFICE O/P EST MOD 30 MIN: CPT | Mod: S$PBB,,, | Performed by: PHYSICIAN ASSISTANT

## 2024-12-30 NOTE — PROGRESS NOTES
Referring Physician: No ref. provider found    PCP: Garland Ocampo MD      CC:low back and left leg pain    Interval History:  Mr. Hollingsworth is a 84 y.o. male with a low back and leg pain who presents today as an established patient for the management of low back pain.  He s/p Bilateral L4-5, L5-S1 transforaminal epidural steroid injection that provided resolution of his pain. Continues to c/o unrelated pain at the top of his left foot.   He denies any LE weakness or b/b changes. He takes Hydrocodone 7.5 mg sparingly prescribed by PCP.  In the past he was evaluated by [facility] and lumbar surgery was recommended. The patient denies of any significant changes in his health since his last appointment. The patient also denies of any changes in the character of his pain since his last appointment.  Pain today is rated 0/10. Patient denies of any urinary/fecal incontinence, saddle anesthesia, or weakness.        HPI:   Yeyo Hollingsworth is a 84 y.o. male ho presents with lower back and left leg pain.  Pain is been present since 2010.  No recent traumatic incident.  His intermittent aching, throbbing, electric pain in his lower back.  Pain radiates down his left buttock into his left posterior thigh.  Pain worsens with prolonged standing, walking, getting up and coughing.  Pain improves with rest.  His tried physical therapy with minimal benefit.  He has undergone lumbar JOSE's with Dr. Rendon in the past with moderate benefit.  Most recent injection was performed in September 2016.  Pain has since returned.  He desires repeat injections with us.  He denies any weakness.  No bowel bladder changes.  He rates his pain 6/10 today.    INTERVETIONAL THERAPIES:   11/26/24 b/l L4-5 and L5-S1 TF JOSE 95% relief   01/18/2024: Bilateral L4-5, L5-S1 transforaminal epidural steroid injection- 100% X4 WEEKS, 50% relief at time time.   8/21/2023: plantar fasciatis steroid injection - Dr. Elliott-   6/1/2023: Bilateral L4-5, L5-S1  "transforaminal epidural steroid injection  1/20/23: Injection,steroid,epidural,transforaminal approach L4-L5-S1- excellent relief.   11/4/2022: Bilateral L4-5 and L5-S1 transforaminal epidural steroid injection under fluoroscopy- Dr. Wright- excellent relief.       :   Reviewed.        ROS:  CONSTITUTIONAL: No fevers, chills, night sweats, wt. loss, appetite changes  SKIN: no rashes or itching  ENT: No headaches, head trauma, vision changes, or eye pain  LYMPH NODES: None noticed   CV: No chest pain, palpitations.   RESP: No shortness of breath, dyspnea on exertion, cough, wheezing, or hemoptysis  GI: No nausea, emesis, diarrhea, constipation, melena, hematochezia, pain.    : No dysuria, hematuria, urgency, or frequency   HEME: No easy bruising, bleeding problems  PSYCHIATRIC: No depression, anxiety, psychosis, hallucinations.  NEURO: No seizures, memory loss, dizziness or difficulty sleeping  MSK: + history of present illness    MEDICAL, SURGICAL, FAMILY, SOCIAL HX: reviewed    MEDICATIONS/ALLERGIES: reviewed         Medications/Allergies: See med card    Vitals:    12/30/24 1016   BP: 114/63   Pulse: 60   Weight: 106.9 kg (235 lb 10.8 oz)   Height: 5' 8" (1.727 m)   PainSc: 0-No pain           Physical exam:    GENERAL: A and O x3, the patient appears well groomed and is in no acute distress.  Skin: No rashes or obvious lesions  HEENT: normocephalic, atraumatic  CARDIOVASCULAR:  RRR  LUNGS: non labored breathing  ABDOMEN: soft, nontender   UPPER EXTREMITIES: Normal alignment, normal range of motion, no atrophy, no skin changes,  hair growth and nail growth normal and equal bilaterally. No swelling. Tenderness to right AC joint  LOWER EXTREMITIES:  Normal alignment, normal range of motion, no atrophy, no skin changes,  hair growth and nail growth normal and equal bilaterally. No swelling, no tenderness.    CERVICAL SPINE:  Full ROM with pain on flexion and extension  Negative spurlings  Tenderness to palpation " right cervical paraspinous muscles   LUMBAR SPINE  Lumbar spine: ROM is limited with flexion extension and oblique extension with moderate increased pain.    Caden's test causes no increased pain on either side.    Supine straight leg raise positive bilaterally   Internal and external rotation of the hip causes no increased pain on either side.  Myofascial exam: No tenderness to palpation across lumbar paraspinous muscles.       MENTAL STATUS: normal orientation, speech, language, and fund of knowledge for social situation.  Emotional state appropriate.    CRANIAL NERVES:  II:  PERRL bilaterally,   III,IV,VI: EOMI.    V:  Facial sensation equal bilaterally  VII:  Facial motor function normal.  VIII:  Hearing equal to finger rub bilaterally  IX/X: Gag normal, palate symmetric  XI:  Shoulder shrug equal, head turn equal  XII:  Tongue midline without fasciculations      MOTOR: Tone and bulk: normal bilateral upper and lower Strength: normal   Delt Bi Tri WE WF     R 5 5 5 5 5 5   L 5 5 5 5 5 5     IP ADD ABD Quad TA Gas HAM  R 5 5 5 5 5 5 5  L 5 5 5 5 5 5 5    SENSATION: Light touch and pinprick intact bilaterally  REFLEXES: normal, symmetric, nonbrisk.  Toes down, no clonus. No hoffmans.  GAIT: normal rise, base, steps, and arm swing.        Imaging: Lumbar MRI 11/7/24  At T12-L1, there is broad-based disc bulging, with prominent facet hypertrophy and ligamentum flavum thickening, and right intraspinal calcification, producing severe spinal canal stenosis with mass effect upon the conus medullaris.     At L1-L2, there is no disc bulging or focal disc herniation, with no spinal canal stenosis.  There is bilateral facet hypertrophy and ligamentum flavum thickening.     At L2-L3, there is mild broad-based disc bulging, with bilateral facet hypertrophy and ligamentum flavum thickening, producing mild spinal canal stenosis.  There is moderate bilateral foraminal narrowing.     At L3-L4, there is broad-based disc  bulging and spondylosis, with bilateral facet hypertrophy and ligamentum flavum thickening, contributing to moderate to severe spinal canal stenosis.  There is moderate to severe bilateral foraminal narrowing left greater than right.     At L4-L5, there is anterolisthesis with mild broad-based disc bulging, with severe facet hypertrophy and ligamentum flavum thickening, with facet joint effusions.  There is severe spinal canal stenosis with trefoil appearance of the spinal canal.  There is moderate to severe bilateral foraminal narrowing, right greater than left.     At L5-S1, there is no disc bulging or focal disc herniation, with no spinal canal stenosis.  There is bilateral facet hypertrophy, with moderate left neural foraminal narrowing.     There are no signal abnormalities of the paraspinal musculature or the paraspinal ligaments.  The visualized retroperitoneum is unremarkable.     Impression:     1. Anterolisthesis of 2 mm of L4 upon L5, with severe L4-L5 spinal canal stenosis as described.  2. Moderate to severe spinal canal stenosis at L3-L4.  3. Severe spinal canal stenosis at T12-L1, with mass effect upon the conus medullaris.  4. Varying degrees of multilevel neural foraminal stenosis.      Assessment:  Mr. Hollingsworth is a 84 y.o. male with low back and BLE pain. Treatment plan outlined below:   1. Lumbar radiculitis    2. Degeneration of intervertebral disc of lumbar region with discogenic back pain and lower extremity pain    3. Spinal stenosis of lumbar region, unspecified whether neurogenic claudication present        Plan:  1. Monitor progress from repeat Bilateral TFESI at L4-L5, L5-S1. Repeat prn.   2. I have stressed the importance of physical activity and a home exercise plan to help with chronic pain and improve health.   3.  Reviewed and interpreted updated lumbar MRI images  4.  Continue Robaxin 500 mg po Q4H prn muscle spasms.    5.  RTC prn     Ivette Bautista PA-C

## 2025-01-07 ENCOUNTER — OFFICE VISIT (OUTPATIENT)
Dept: CARDIOLOGY | Facility: CLINIC | Age: 85
End: 2025-01-07
Payer: MEDICARE

## 2025-01-07 VITALS
HEART RATE: 65 BPM | SYSTOLIC BLOOD PRESSURE: 116 MMHG | WEIGHT: 238.38 LBS | DIASTOLIC BLOOD PRESSURE: 68 MMHG | BODY MASS INDEX: 36.13 KG/M2 | OXYGEN SATURATION: 95 % | HEIGHT: 68 IN

## 2025-01-07 DIAGNOSIS — E11.9 TYPE 2 DIABETES MELLITUS WITHOUT COMPLICATION, WITHOUT LONG-TERM CURRENT USE OF INSULIN: Chronic | ICD-10-CM

## 2025-01-07 DIAGNOSIS — Z86.718 HISTORY OF DVT (DEEP VEIN THROMBOSIS): ICD-10-CM

## 2025-01-07 DIAGNOSIS — E66.01 SEVERE OBESITY: ICD-10-CM

## 2025-01-07 DIAGNOSIS — I48.0 PAROXYSMAL ATRIAL FIBRILLATION: ICD-10-CM

## 2025-01-07 DIAGNOSIS — I10 ESSENTIAL HYPERTENSION: ICD-10-CM

## 2025-01-07 DIAGNOSIS — E78.01 FAMILIAL HYPERCHOLESTEROLEMIA: ICD-10-CM

## 2025-01-07 DIAGNOSIS — I48.0 PAF (PAROXYSMAL ATRIAL FIBRILLATION): Primary | ICD-10-CM

## 2025-01-07 DIAGNOSIS — G47.30 SLEEP APNEA, UNSPECIFIED TYPE: ICD-10-CM

## 2025-01-07 DIAGNOSIS — Z79.01 ANTICOAGULANT LONG-TERM USE: ICD-10-CM

## 2025-01-07 PROCEDURE — 99999 PR PBB SHADOW E&M-EST. PATIENT-LVL V: CPT | Mod: PBBFAC,,, | Performed by: NURSE PRACTITIONER

## 2025-01-07 PROCEDURE — 99215 OFFICE O/P EST HI 40 MIN: CPT | Mod: PBBFAC,PN | Performed by: NURSE PRACTITIONER

## 2025-01-07 PROCEDURE — 93010 ELECTROCARDIOGRAM REPORT: CPT | Mod: S$PBB,,, | Performed by: INTERNAL MEDICINE

## 2025-01-07 PROCEDURE — 93005 ELECTROCARDIOGRAM TRACING: CPT | Mod: PBBFAC,PN | Performed by: INTERNAL MEDICINE

## 2025-01-07 PROCEDURE — 99214 OFFICE O/P EST MOD 30 MIN: CPT | Mod: S$PBB,,, | Performed by: NURSE PRACTITIONER

## 2025-01-07 NOTE — ASSESSMENT & PLAN NOTE
Latest Reference Range & Units Most Recent   Hemoglobin A1C External 4.5 - 6.2 % 6.4 (H)  10/15/24 07:25   (H): Data is abnormally high    Low carb diet encouraged  Low impact exercise encouraged

## 2025-01-07 NOTE — PROGRESS NOTES
Subjective:    Patient ID:  Yeyo Hollingsworth is a 84 y.o. male patient here for evaluation Annual Exam and Follow-up ( No issues stated by patient )    History of Present Illness:       Patient is in clinic today with his wife for annual checkup  Previously followed by Dr. Luis Gutiérrez  He has no cardiac concerns to discuss  No chest pain, dyspnea, dizziness, palpitations, syncope    Sees PCP routinely Dr Ocampo, has short term memory issues/concerns per wife  Plays games on computer but not physically active  Patient is diabetic  Patient has sleep apnea but not able to tolerate his CPAP.  His pulmonologist has retired Dr. Lanza      Most Recent Echocardiogram Results  Results for orders placed during the hospital encounter of 05/20/21    Echo Color Flow Doppler? Yes    Interpretation Summary  · Mild concentric hypertrophy and normal systolic function.  · The estimated ejection fraction is 66%.  · Normal left ventricular diastolic function.  · Normal right ventricular size with normal right ventricular systolic function.  · Mild tricuspid regurgitation.  · Normal central venous pressure (3 mmHg).  · The estimated PA systolic pressure is 21 mmHg.      Most Recent Nuclear Stress Test Results  Results for orders placed in visit on 06/17/20    Nuclear Stress Test    Interpretation Summary    The EKG portion of this study is negative for ischemia.    The patient reported chest pain during the stress test.    There were no arrhythmias during stress.    ECG Stress Nuclear portion of this study will be reported separately.      Most Recent Cardiac PET Stress Test Results  No results found for this or any previous visit.      Most Recent Cardiovascular Angiogram results  No results found for this or any previous visit.      Other Most Recent Cardiology Results  Results for orders placed during the hospital encounter of 04/23/21    CARDIAC MONITORING STRIPS      REVIEW OF SYSTEMS: As noted in HPI       Past Medical  History:   Diagnosis Date    Allergy     Ankle pain     left    Anticoagulant long-term use     aspirin    Anxiety     Atrial fibrillation     Back pain     Bruises easily     Cataract     Clotting disorder     left leg    Colon polyp     Diabetes mellitus     Diabetes mellitus, type 2     Diverticulitis 1986    Hay fever     Hyperlipidemia     Hypertension     Left hip pain 12/26/2021    Seeing Dr. Beard for hip pain     Obese     BRANDI on CPAP      Past Surgical History:   Procedure Laterality Date    ABDOMINAL SURGERY      origunal surg 1986-mult surgeries since    CARPAL TUNNEL RELEASE      right wrist 2009-left wrist 2010    COLON SURGERY      partial colon removal    COLONOSCOPY  11/26/2018    Dr. Salazar; normal terminal ileum; polyps removed from ascending colon and hepatic flexure; pan diverticulosis; small internal hemorrhoids; repeat in 5 years; Bx: fragments of an adenomatous polyp with mild surface glandular dysplasia and associated chronic active inflammation, no evidence of high-grade dysplasia or malignancy    COLONOSCOPY N/A 11/9/2023    Procedure: COLONOSCOPY;  Surgeon: Prasanth Irene MD;  Location: [facility];  Service: Endoscopy;  Laterality: N/A;    COLOSTOMY  1986    colostomy reversal  1986    ESOPHAGOGASTRODUODENOSCOPY N/A 11/9/2023    Procedure: EGD (ESOPHAGOGASTRODUODENOSCOPY);  Surgeon: Prasanth Irene MD;  Location: [facility];  Service: Endoscopy;  Laterality: N/A;    EYE SURGERY      hay fever      HERNIA REPAIR  1949    REPAIR OF TENDON OF LOWER EXTREMITY Left 6/24/2020    Procedure: REPAIR, TENDON, LOWER EXTREMITY;  Surgeon: Justin Mandujano DPM;  [place] [facility];  Service: Podiatry;  Laterality: Left;  ARTHEX NOTIFIED IN CASE HE WANTS ANCHOR    TOE SURGERY Left 2017    replaced a joint     TONSILLECTOMY  1947    TRANSFORAMINAL EPIDURAL INJECTION OF STEROID      x 4    TRANSFORAMINAL EPIDURAL INJECTION OF STEROID Bilateral 11/13/2019    Procedure:  Injection,steroid,epidural,transforaminal approach;  Surgeon: Grant Wright MD;  Location: [facility] OR;  Service: Pain Management;  Laterality: Bilateral;  L4-5, L5-S1    TRANSFORAMINAL EPIDURAL INJECTION OF STEROID Bilateral 3/9/2021    Procedure: Injection,steroid,epidural,transforaminal approach;  Surgeon: Grant Wright MD;  Location: [facility] OR;  Service: Pain Management;  Laterality: Bilateral;  L4-5, L5-S1    TRANSFORAMINAL EPIDURAL INJECTION OF STEROID Bilateral 5/25/2021    Procedure: Injection,steroid,epidural,transforaminal approach;  Surgeon: Grant Wright MD;  Location: [facility] OR;  Service: Pain Management;  Laterality: Bilateral;  L4-L5,L5,S1    TRANSFORAMINAL EPIDURAL INJECTION OF STEROID Bilateral 12/14/2021    Procedure: Injection,steroid,epidural,transforaminal approach Bilateral L4-5, L5-S1;  Surgeon: Grant Wright MD;  Location: [facility] OR;  Service: Pain Management;  Laterality: Bilateral;    TRANSFORAMINAL EPIDURAL INJECTION OF STEROID Bilateral 11/4/2022    Procedure: Injection,steroid,epidural,transforaminal approach;  Surgeon: Grant Wright MD;  Location: [facility] OR;  Service: Pain Management;  Laterality: Bilateral;  L4-5 and L5-s1    TRANSFORAMINAL EPIDURAL INJECTION OF STEROID Bilateral 1/20/2023    Procedure: Injection,steroid,epidural,transforaminal approach L4-L5-S1;  Surgeon: Grant Wright MD;  Location: [facility] OR;  Service: Pain Management;  Laterality: Bilateral;    TRANSFORAMINAL EPIDURAL INJECTION OF STEROID Bilateral 6/1/2023    Procedure: Injection,steroid,epidural,transforaminal approach L4-L5, L5-S1;  Surgeon: Grant Wright MD;  Location: [facility] OR;  Service: Pain Management;  Laterality: Bilateral;    TRANSFORAMINAL EPIDURAL INJECTION OF STEROID Bilateral 1/18/2024    Procedure: Injection,steroid,epidural,transforaminal approach;  Surgeon: Grant Wright MD;  Location: [facility] OR;  Service: Anesthesiology;  Laterality: Bilateral;  L4-5 and l5-s1 TFESI    TRANSFORAMINAL EPIDURAL INJECTION OF STEROID Bilateral  2024    Procedure: Injection,steroid,epidural,transforaminal approach;  Surgeon: Grant Wright MD;  Location: [facility];  Service: Anesthesiology;  Laterality: Bilateral;  L4-5 and l5-s1 TFESI    TRANSFORAMINAL EPIDURAL INJECTION OF STEROID Bilateral 2024    Procedure: Injection,steroid,epidural,transforaminal approach;  Surgeon: Grant Wright MD;  Location: [facility] OR;  Service: Pain Management;  Laterality: Bilateral;  l4-5 and l5-s1 to use 25 ga needle for injection, see notes     Social History     Tobacco Use    Smoking status: Former     Current packs/day: 0.00     Types: Cigarettes     Quit date: 2006     Years since quittin.8    Smokeless tobacco: Never    Tobacco comments:     ex smoker    Substance Use Topics    Alcohol use: Yes     Comment: rarely    Drug use: No         Objective      Vitals:    25 0953   BP: 116/68   Pulse: 65       LAST EKG  Results for orders placed or performed during the hospital encounter of 23   EKG 12-lead    Collection Time: 23  8:27 AM    Narrative    Test Reason : Z01.818,    Vent. Rate : 049 BPM     Atrial Rate : 049 BPM     P-R Int : 184 ms          QRS Dur : 092 ms      QT Int : 408 ms       P-R-T Axes : 060 009 034 degrees     QTc Int : 368 ms    Sinus bradycardia  Otherwise normal ECG  When compared with ECG of 2023 23:46,  Vent. rate has decreased BY  28 BPM  Confirmed by Luis Gutiérrez MD ([id number]) on 2023 5:33:55 PM    Referred By:             Confirmed By:Luis Gutiérrez MD     LIPIDS - LAST 2   Lab Results   Component Value Date    CHOL 99 (L) 10/15/2024    CHOL 105 (L) 2024    HDL 33 (L) 10/15/2024    HDL 38 (L) 2024    LDLCALC 41.2 (L) 10/15/2024    LDLCALC 42.2 (L) 2024    TRIG 124 10/15/2024    TRIG 124 2024    CHOLHDL 33.3 10/15/2024    CHOLHDL 36.2 2024     CARDIAC PROFILE - LAST 2  Lab Results   Component Value Date    BNP 36 2023     10/11/2018    CPKMB 2.42  07/11/2012    TROPONINI <0.030 07/25/2022      CBC - LAST 2  Lab Results   Component Value Date    WBC 5.07 10/31/2023    WBC 7.40 02/03/2023    RBC 4.17 (L) 10/31/2023    RBC 4.47 (L) 02/03/2023    HGB 12.8 (L) 10/31/2023    HGB 13.8 (L) 02/03/2023    HCT 41.5 10/31/2023    HCT 44.3 02/03/2023     10/31/2023     (L) 02/03/2023     Lab Results   Component Value Date    INR 1.0 05/17/2013    INR 1.05 07/11/2012    APTT 25.4 07/11/2012     CHEMISTRY - LAST 2  Lab Results   Component Value Date     10/15/2024     11/08/2023    K 4.4 10/15/2024    K 4.4 11/08/2023     10/15/2024     11/08/2023    CO2 28 10/15/2024    CO2 32 (H) 11/08/2023    ANIONGAP 6 (L) 10/15/2024    ANIONGAP 2 (L) 11/08/2023    BUN 27 (H) 10/15/2024    BUN 36 (H) 11/08/2023    CREATININE 1.3 10/15/2024    CREATININE 1.2 11/08/2023     (H) 10/15/2024     11/08/2023    CALCIUM 8.7 10/15/2024    CALCIUM 8.9 11/08/2023    PH 7.329 (L) 02/02/2023    MG 2.2 07/28/2022    MG 2.3 07/27/2022    ALBUMIN 4.0 10/15/2024    ALBUMIN 4.3 02/02/2023    PROT 6.6 10/15/2024    PROT 7.2 02/02/2023    ALKPHOS 33 (L) 10/15/2024    ALKPHOS 38 (L) 02/02/2023    ALT 25 10/15/2024    ALT 38 02/02/2023    AST 18 10/15/2024    AST 26 02/02/2023    BILITOT 0.6 10/15/2024    BILITOT 1.1 (H) 02/02/2023      ENDOCRINE - LAST 2  Lab Results   Component Value Date    HGBA1C 6.4 (H) 10/15/2024    HGBA1C 7.2 (H) 04/02/2024    TSH 2.010 10/01/2019    TSH 1.67 03/27/2018        PHYSICAL EXAM  CONSTITUTIONAL:  Elderly gentleman breathing comfortably in no apparent distress  NECK: no carotid bruit, no JVD  LUNGS: CTA  CHEST WALL: no tenderness  HEART: regular rate and rhythm, S1, S2 normal, no murmur  ABDOMEN: soft, non-tender; bowel sounds normal; no masses  EXTREMITIES: Extremities normal, no edema, no calf tenderness noted  NEURO: AAO X 3, speech clear, memory clear    I HAVE REVIEWED :    The vital signs, most recent cardiac  testing, and most recent pertinent non-cardiology provider notes.    Current Outpatient Medications   Medication Instructions    alcohol swabs (ALCOHOL PADS) PadM 1 each, Topical (Top), As needed (PRN)    apixaban (ELIQUIS) 5 mg, Oral, 2 times daily    aspirin 81 mg, Oral, Daily    atorvastatin (LIPITOR) 10 mg, Oral, Daily    azelastine (ASTELIN) 274 mcg, Nasal, 2 times daily    blood sugar diagnostic (BLOOD GLUCOSE TEST) Strp 1 strip, Misc.(Non-Drug; Combo Route), 2 times daily, FREESTYLE LITE BG TEST STRIPS. current use of insulin  - Primary ICD-10-CM: E11.9    blood sugar diagnostic Strp To check BG 1 times daily, to use with insurance preferred meter    blood-glucose meter kit To check BG 1 times daily, to use with insurance preferred meter    clotrimazole (LOTRIMIN) 1 % cream Topical (Top), 2 times daily    diclofenac sodium (VOLTAREN) 2 g, Topical (Top), Daily    diphenhydrAMINE (BENADRYL) 50 MG tablet Take as needed by oral route.    diphenhydrAMINE-zinc acetate 1-0.1% (BENADRYL ITCH STOPPING) cream Topical (Top), 3 times daily PRN    dulaglutide (TRULICITY) 1.5 mg, Subcutaneous, Every 7 days    fexofenadine (ALLEGRA) 180 mg, Oral, Daily    FREESTYLE LANCETS 28 gauge lancets 1 lancet , Subcutaneous, 3 times daily    furosemide (LASIX) 20 mg, Oral, Every Mon, Wed, Fri    gabapentin (NEURONTIN) 300 mg, Oral, 3 times daily    HYDROcodone-acetaminophen (NORCO) 5-325 mg per tablet 1 tablet, Oral, Every 6 hours PRN    JARDIANCE 10 mg, Oral, Daily    ketoconazole (NIZORAL) 2 % cream Topical (Top), Daily    lancets Misc To check BG 1 times daily, to use with insurance preferred meter    lisinopriL (PRINIVIL,ZESTRIL) 20 mg, Oral, Daily    metoprolol tartrate (LOPRESSOR) 12.5 mg, Oral, 2 times daily    montelukast (SINGULAIR) 10 mg, Oral, Nightly    multivitamin with minerals tablet 1 tablet, Daily    mupirocin (BACTROBAN) 2 % ointment SMARTSIG:Sparingly Topical Twice Daily    naftifine 2 % Crea APPLY AS A THIN LAYER  TO THE AFFECTED AREA(S) PLUS A 0.5 INCH MARGIN OF HEALTHY SURROUNDING SKIN BY TOPICAL ROUTE ONCE DAILY FOR 2 WEEKS    polyethylene glycol (GLYCOLAX) 17 g, Oral, Daily    potassium chloride (MICRO-K) 10 MEQ CpSR 10 mEq, Oral, Every other day    SITagliptin phosphate (JANUVIA) 100 mg, Oral, Daily    traMADoL (ULTRAM) 50 mg tablet Take 1 tablet every 6 hours by oral route.    traZODone (DESYREL) 100 mg, Oral, Nightly    triamcinolone acetonide 0.1% (KENALOG) 0.1 % cream Topical (Top), 2 times daily      Assessment & Plan     Type 2 diabetes mellitus, without long-term current use of insulin   Latest Reference Range & Units Most Recent   Hemoglobin A1C External 4.5 - 6.2 % 6.4 (H)  10/15/24 07:25   (H): Data is abnormally high    Low carb diet encouraged  Low impact exercise encouraged    Anticoagulant long-term use  No bleeding on Eliquis and Aspirin  Understands if he gets head trauma needs CT    Paroxysmal atrial fibrillation  NSR in office today  No palpitations, syncope or dizziness  Continue Eliquis BID    Essential hypertension  /68   Continue Lisinopril and Metoprolol  Low sodium diet    History of DVT (deep vein thrombosis)  DVT in Left Leg 2013  On eliquis for PAF    Hyperlipemia   Latest Reference Range & Units Most Recent   Cholesterol Total 120 - 199 mg/dL 99 (L)  10/15/24 07:25   HDL 40 - 75 mg/dL 33 (L)  10/15/24 07:25   HDL/Cholesterol Ratio 20.0 - 50.0 % 33.3  10/15/24 07:25   Non-HDL Cholesterol mg/dL 66  10/15/24 07:25   Total Cholesterol/HDL Ratio 2.0 - 5.0  3.0  10/15/24 07:25   Triglycerides 30 - 150 mg/dL 124  10/15/24 07:25   LDL Cholesterol 63.0 - 159.0 mg/dL 41.2 (L)  10/15/24 07:25   (L): Data is abnormally low      Follow up in 6 months with Dr. Alexis Castorena, FRANCESP-C

## 2025-01-07 NOTE — ASSESSMENT & PLAN NOTE
Latest Reference Range & Units Most Recent   Cholesterol Total 120 - 199 mg/dL 99 (L)  10/15/24 07:25   HDL 40 - 75 mg/dL 33 (L)  10/15/24 07:25   HDL/Cholesterol Ratio 20.0 - 50.0 % 33.3  10/15/24 07:25   Non-HDL Cholesterol mg/dL 66  10/15/24 07:25   Total Cholesterol/HDL Ratio 2.0 - 5.0  3.0  10/15/24 07:25   Triglycerides 30 - 150 mg/dL 124  10/15/24 07:25   LDL Cholesterol 63.0 - 159.0 mg/dL 41.2 (L)  10/15/24 07:25   (L): Data is abnormally low

## 2025-01-09 LAB
OHS QRS DURATION: 94 MS
OHS QTC CALCULATION: 394 MS

## 2025-01-30 ENCOUNTER — OFFICE VISIT (OUTPATIENT)
Dept: PULMONOLOGY | Facility: CLINIC | Age: 85
End: 2025-01-30
Payer: MEDICARE

## 2025-01-30 VITALS
DIASTOLIC BLOOD PRESSURE: 64 MMHG | HEART RATE: 57 BPM | HEIGHT: 68 IN | BODY MASS INDEX: 36.19 KG/M2 | TEMPERATURE: 98 F | SYSTOLIC BLOOD PRESSURE: 120 MMHG | OXYGEN SATURATION: 97 % | WEIGHT: 238.81 LBS

## 2025-01-30 DIAGNOSIS — G47.30 SLEEP APNEA, UNSPECIFIED TYPE: Primary | ICD-10-CM

## 2025-01-30 PROCEDURE — 99215 OFFICE O/P EST HI 40 MIN: CPT | Mod: PBBFAC,PN | Performed by: NURSE PRACTITIONER

## 2025-01-30 PROCEDURE — 99203 OFFICE O/P NEW LOW 30 MIN: CPT | Mod: S$PBB,,, | Performed by: NURSE PRACTITIONER

## 2025-01-30 PROCEDURE — 99999 PR PBB SHADOW E&M-EST. PATIENT-LVL V: CPT | Mod: PBBFAC,,, | Performed by: NURSE PRACTITIONER

## 2025-01-30 NOTE — PROGRESS NOTES
SUBJECTIVE:    Patient ID: Yeyo Hollingsworth is a 84 y.o. male.    Chief Complaint: New Patient (New Patient/Referred by Vane Castorena for Sleep Apnea/Has CPAP that is about 10 years old, wants a new machine)    HPI    Patient referred here today from his cardiology NP. He has known BRANDI. He was a patient of Dr. Rivas. He states he has not worn the machine consistently in a long time.  He states he tosses and turns all night in bed due to back and hip discomfort. He also sleeps on his belly.  He has a nasal mask, willing to try a nasal pillow mask.  Last compliance download that can be pulled up is from 2021, which showed average use of 3 hours and AHI of 0.3.  We discussed oral device, inspire, and sleeping elevated.    Past Medical History:   Diagnosis Date    Allergy     Ankle pain     left    Anticoagulant long-term use     aspirin    Anxiety     Atrial fibrillation     Back pain     Bruises easily     Cataract     Clotting disorder     left leg    Colon polyp     Diabetes mellitus     Diabetes mellitus, type 2     Diverticulitis 1986    Hay fever     Hyperlipidemia     Hypertension     Left hip pain 12/26/2021    Seeing Dr. Beard for hip pain     Obese     BRANDI on CPAP      Past Surgical History:   Procedure Laterality Date    ABDOMINAL SURGERY      origunal surg 1986-mult surgeries since    CARPAL TUNNEL RELEASE      right wrist 2009-left wrist 2010    COLON SURGERY      partial colon removal    COLONOSCOPY  11/26/2018    Dr. Salazar; normal terminal ileum; polyps removed from ascending colon and hepatic flexure; pan diverticulosis; small internal hemorrhoids; repeat in 5 years; Bx: fragments of an adenomatous polyp with mild surface glandular dysplasia and associated chronic active inflammation, no evidence of high-grade dysplasia or malignancy    COLONOSCOPY N/A 11/9/2023    Procedure: COLONOSCOPY;  Surgeon: Prasanth Irene MD;  Location: [facility];  Service: Endoscopy;  Laterality: N/A;    COLOSTOMY   1986    colostomy reversal  1986    ESOPHAGOGASTRODUODENOSCOPY N/A 11/9/2023    Procedure: EGD (ESOPHAGOGASTRODUODENOSCOPY);  Surgeon: Praasnth Irene MD;  Location: [facility];  Service: Endoscopy;  Laterality: N/A;    EYE SURGERY      hay fever      HERNIA REPAIR  1949    REPAIR OF TENDON OF LOWER EXTREMITY Left 6/24/2020    Procedure: REPAIR, TENDON, LOWER EXTREMITY;  Surgeon: Justin Mandujano DPM;  [place] [facility] OR;  Service: Podiatry;  Laterality: Left;  ARTHEX NOTIFIED IN CASE HE WANTS ANCHOR    TOE SURGERY Left 2017    replaced a joint     TONSILLECTOMY  1947    TRANSFORAMINAL EPIDURAL INJECTION OF STEROID      x 4    TRANSFORAMINAL EPIDURAL INJECTION OF STEROID Bilateral 11/13/2019    Procedure: Injection,steroid,epidural,transforaminal approach;  Surgeon: Grant Wright MD;  Location: [facility] OR;  Service: Pain Management;  Laterality: Bilateral;  L4-5, L5-S1    TRANSFORAMINAL EPIDURAL INJECTION OF STEROID Bilateral 3/9/2021    Procedure: Injection,steroid,epidural,transforaminal approach;  Surgeon: Grant Wright MD;  Location: [facility] OR;  Service: Pain Management;  Laterality: Bilateral;  L4-5, L5-S1    TRANSFORAMINAL EPIDURAL INJECTION OF STEROID Bilateral 5/25/2021    Procedure: Injection,steroid,epidural,transforaminal approach;  Surgeon: Grant Wright MD;  Location: [facility] OR;  Service: Pain Management;  Laterality: Bilateral;  L4-L5,L5,S1    TRANSFORAMINAL EPIDURAL INJECTION OF STEROID Bilateral 12/14/2021    Procedure: Injection,steroid,epidural,transforaminal approach Bilateral L4-5, L5-S1;  Surgeon: Grant Wright MD;  Location: [facility] OR;  Service: Pain Management;  Laterality: Bilateral;    TRANSFORAMINAL EPIDURAL INJECTION OF STEROID Bilateral 11/4/2022    Procedure: Injection,steroid,epidural,transforaminal approach;  Surgeon: Grant Wright MD;  Location: [facility] OR;  Service: Pain Management;  Laterality: Bilateral;  L4-5 and L5-s1    TRANSFORAMINAL EPIDURAL INJECTION OF STEROID Bilateral 1/20/2023    Procedure:  Injection,steroid,epidural,transforaminal approach L4-L5-S1;  Surgeon: Grant Wright MD;  Location: [facility] OR;  Service: Pain Management;  Laterality: Bilateral;    TRANSFORAMINAL EPIDURAL INJECTION OF STEROID Bilateral 6/1/2023    Procedure: Injection,steroid,epidural,transforaminal approach L4-L5, L5-S1;  Surgeon: Grant Wright MD;  Location: [facility] OR;  Service: Pain Management;  Laterality: Bilateral;    TRANSFORAMINAL EPIDURAL INJECTION OF STEROID Bilateral 1/18/2024    Procedure: Injection,steroid,epidural,transforaminal approach;  Surgeon: Grant Wright MD;  Location: [facility] OR;  Service: Anesthesiology;  Laterality: Bilateral;  L4-5 and l5-s1 TFESI    TRANSFORAMINAL EPIDURAL INJECTION OF STEROID Bilateral 2/29/2024    Procedure: Injection,steroid,epidural,transforaminal approach;  Surgeon: Grant Wright MD;  [facility] OR;  Service: Anesthesiology;  Laterality: Bilateral;  L4-5 and l5-s1 TFESI    TRANSFORAMINAL EPIDURAL INJECTION OF STEROID Bilateral 11/26/2024    Procedure: Injection,steroid,epidural,transforaminal approach;  Surgeon: Grant Wright MD;  Location: [facility] OR;  Service: Pain Management;  Laterality: Bilateral;  l4-5 and l5-s1 to use 25 ga needle for injection, see notes     Family History   Problem Relation Name Age of Onset    Heart disease Mother      Melanoma Neg Hx      Psoriasis Neg Hx      Lupus Neg Hx      Eczema Neg Hx          Social History:   Marital Status:   Occupation: Data Unavailable  Alcohol History:  reports current alcohol use.  Tobacco History:  reports that he quit smoking about 18 years ago. His smoking use included cigarettes. He has never used smokeless tobacco.  Drug History:  reports no history of drug use.    Review of patient's allergies indicates:   Allergen Reactions    Ativan [lorazepam] Other (See Comments)     Mood alternating      Dilaudid [hydromorphone (bulk)] Other (See Comments)     Mood alternating      Morphine Other (See Comments)     Mood  alternating      Adhesive tape-silicones     Hydromorphone        Current Outpatient Medications   Medication Sig Dispense Refill    apixaban (ELIQUIS) 5 mg Tab Take 1 tablet (5 mg total) by mouth 2 (two) times daily. 180 tablet 3    aspirin 81 MG Chew Take 1 tablet (81 mg total) by mouth once daily. 100 tablet 2    atorvastatin (LIPITOR) 10 MG tablet Take 1 tablet (10 mg total) by mouth once daily. 90 tablet 3    azelastine (ASTELIN) 137 mcg (0.1 %) nasal spray 2 sprays (274 mcg total) by Nasal route 2 (two) times daily. 30 mL 5    clotrimazole (LOTRIMIN) 1 % cream Apply topically 2 (two) times daily. 60 g 10    diclofenac sodium (VOLTAREN) 1 % Gel Apply 2 g topically once daily. 100 g 2    diphenhydrAMINE (BENADRYL) 50 MG tablet Take as needed by oral route.      diphenhydrAMINE-zinc acetate 1-0.1% (BENADRYL ITCH STOPPING) cream Apply topically 3 (three) times daily as needed for Itching. 28 g 0    dulaglutide (TRULICITY) 1.5 mg/0.5 mL pen injector Inject 1.5 mg into the skin every 7 days. 4 pen 11    fexofenadine (ALLEGRA) 180 MG tablet Take 1 tablet (180 mg total) by mouth once daily. 90 tablet 3    furosemide (LASIX) 20 MG tablet Take 1 tablet (20 mg total) by mouth every Mon, Wed, Fri. 45 tablet 3    gabapentin (NEURONTIN) 300 MG capsule Take 1 capsule (300 mg total) by mouth 3 (three) times daily. 270 capsule 1    HYDROcodone-acetaminophen (NORCO) 5-325 mg per tablet Take 1 tablet by mouth every 6 (six) hours as needed for Pain. 90 tablet 0    JARDIANCE 10 mg tablet Take 1 tablet (10 mg total) by mouth once daily. 90 tablet 1    ketoconazole (NIZORAL) 2 % cream Apply topically once daily. 60 g 1    lisinopriL (PRINIVIL,ZESTRIL) 20 MG tablet Take 1 tablet (20 mg total) by mouth once daily. 90 tablet 3    metoprolol tartrate (LOPRESSOR) 25 MG tablet Take 0.5 tablets (12.5 mg total) by mouth 2 (two) times daily. 180 tablet 3    montelukast (SINGULAIR) 10 mg tablet Take 1 tablet (10 mg total) by mouth every  evening. 90 tablet 3    multivitamin with minerals tablet Take 1 tablet by mouth once daily.      mupirocin (BACTROBAN) 2 % ointment SMARTSIG:Sparingly Topical Twice Daily      naftifine 2 % Crea APPLY AS A THIN LAYER TO THE AFFECTED AREA(S) PLUS A 0.5 INCH MARGIN OF HEALTHY SURROUNDING SKIN BY TOPICAL ROUTE ONCE DAILY FOR 2 WEEKS      polyethylene glycol (GLYCOLAX) 17 gram/dose powder Take 17 g by mouth once daily. 507 g 3    potassium chloride (MICRO-K) 10 MEQ CpSR Take 1 capsule (10 mEq total) by mouth every other day. 90 capsule 1    SITagliptin phosphate (JANUVIA) 100 MG Tab Take 1 tablet (100 mg total) by mouth once daily. 90 tablet 3    traMADoL (ULTRAM) 50 mg tablet Take 1 tablet every 6 hours by oral route.      traZODone (DESYREL) 100 MG tablet Take 1 tablet (100 mg total) by mouth every evening. 90 tablet 3    triamcinolone acetonide 0.1% (KENALOG) 0.1 % cream Apply topically 2 (two) times daily. 80 g 0    alcohol swabs (ALCOHOL PADS) PadM Apply 1 each topically as needed (before shot). 100 each 0    blood sugar diagnostic (BLOOD GLUCOSE TEST) Strp 1 strip by Misc.(Non-Drug; Combo Route) route 2 (two) times a day. FREESTYLE LITE BG TEST STRIPS. current use of insulin  - Primary ICD-10-CM: E11.9 200 each 2    blood sugar diagnostic Strp To check BG 1 times daily, to use with insurance preferred meter 200 strip 0    blood-glucose meter kit To check BG 1 times daily, to use with insurance preferred meter 1 each 0    FREESTYLE LANCETS 28 gauge lancets Inject 1 lancet  into the skin 3 (three) times daily. 100 each 3    lancets Misc To check BG 1 times daily, to use with insurance preferred meter 200 each 0     No current facility-administered medications for this visit.     Facility-Administered Medications Ordered in Other Visits   Medication Dose Route Frequency Provider Last Rate Last Admin    lactated ringers infusion   Intravenous Once PRN Grant Wright MD   Stopped at 11/04/22 1230    lactated ringers  "infusion   Intravenous Continuous VuGrant MD 25 mL/hr at 02/29/24 1154 New Bag at 02/29/24 1154         Review of Systems  General: Feeling Well.  Eyes: Vision is good.  ENT:  No sinusitis or pharyngitis.   Heart:: No chest pain or palpitations.  Lungs: No cough, sputum, or wheezing.  GI: No Nausea, vomiting, constipation, diarrhea, or reflux.  : No dysuria, hesitancy, or nocturia.  Musculoskeletal: No joint pain or myalgias.  Skin: No lesions or rashes.  Neuro: No headaches or neuropathy.  Lymph: No edema or adenopathy.  Psych: No anxiety or depression.  Endo: No weight change.    OBJECTIVE:      /64 (BP Location: Right arm, Patient Position: Sitting)   Pulse (!) 57   Temp 98.2 °F (36.8 °C)   Ht 5' 8" (1.727 m)   Wt 108.3 kg (238 lb 12.8 oz)   SpO2 97%   BMI 36.31 kg/m²     Physical Exam  GENERAL: Older patient in no distress.  HEENT: Pupils equal and reactive. Extraocular movements intact. Nose intact.      Pharynx moist.  NECK: Supple.   HEART: Regular rate and rhythm. No murmur or gallop auscultated.  LUNGS: Clear to auscultation and percussion. Lung excursion symmetrical. No change in fremitus. No adventitial noises.  ABDOMEN: Bowel sounds present. Non-tender, no masses palpated.  EXTREMITIES: Normal muscle tone and joint movement, no cyanosis or clubbing.   LYMPHATICS: No adenopathy palpated, no edema.  SKIN: Dry, intact, no lesions.   NEURO: Cranial nerves II-XII intact. Motor strength 5/5 bilaterally, upper and lower extremities.  PSYCH: Appropriate affect.    Assessment:       1. Sleep apnea, unspecified type          Plan:           Will order nasal pillows with chin strap  Try to sleep on your device for 4 hours each night for a week so I can get a download. Please call me if he is able to do this  Discussed inspire device, and oral device as possible options but does not sound interested.   Follow up in about 3 months (around 4/30/2025).          "

## 2025-02-13 ENCOUNTER — OFFICE VISIT (OUTPATIENT)
Dept: DERMATOLOGY | Facility: CLINIC | Age: 85
End: 2025-02-13
Payer: MEDICARE

## 2025-02-13 DIAGNOSIS — L57.8 ACTINIC SKIN DAMAGE: ICD-10-CM

## 2025-02-13 DIAGNOSIS — L72.0 MILIA: ICD-10-CM

## 2025-02-13 DIAGNOSIS — D22.9 MULTIPLE BENIGN NEVI: ICD-10-CM

## 2025-02-13 DIAGNOSIS — L82.1 SEBORRHEIC KERATOSIS: Primary | ICD-10-CM

## 2025-02-13 DIAGNOSIS — L57.0 AK (ACTINIC KERATOSIS): ICD-10-CM

## 2025-02-13 DIAGNOSIS — D18.01 CHERRY ANGIOMA: ICD-10-CM

## 2025-02-13 NOTE — PROGRESS NOTES
Subjective:      Patient ID:  Yeyo Hollingsworth is a 84 y.o. male who presents for   Chief Complaint   Patient presents with    Spot     Scalp      LOV 2/7/24    Patient here today skin check UBSE  Complains of spot on scalp    Derm Hx:  Previously Derm Dr Evans 2017  Denies H/o NMSC, Phx of MM  Inflamed seborrheic keratosis  Other melanin hyperpigmentation  Allergic purpura  follicular cysts of the skin             Review of Systems   Constitutional:  Negative for fever, chills and fatigue.   Respiratory:  Negative for cough and shortness of breath.    Gastrointestinal:  Negative for nausea and vomiting.   Skin:  Positive for wears hat. Negative for daily sunscreen use and activity-related sunscreen use.   Hematologic/Lymphatic: Bruises/bleeds easily (asa,eliquis).       Objective:   Physical Exam   Constitutional: He appears well-developed and well-nourished. No distress.   Neurological: He is alert and oriented to person, place, and time. He is not disoriented.   Psychiatric: He has a normal mood and affect.   Skin:   Areas Examined (abnormalities noted in diagram):   Scalp / Hair Palpated and Inspected  Head / Face Inspection Performed  Neck Inspection Performed  Chest / Axilla Inspection Performed  Abdomen Inspection Performed  Back Inspection Performed  RUE Inspected  LUE Inspection Performed  Nails and Digits Inspection Performed                 Diagram Legend     Erythematous scaling macule/papule c/w actinic keratosis       Vascular papule c/w angioma      Pigmented verrucoid papule/plaque c/w seborrheic keratosis      Yellow umbilicated papule c/w sebaceous hyperplasia      Irregularly shaped tan macule c/w lentigo     1-2 mm smooth white papules consistent with Milia      Movable subcutaneous cyst with punctum c/w epidermal inclusion cyst      Subcutaneous movable cyst c/w pilar cyst      Firm pink to brown papule c/w dermatofibroma      Pedunculated fleshy papule(s) c/w skin tag(s)      Evenly  pigmented macule c/w junctional nevus     Mildly variegated pigmented, slightly irregular-bordered macule c/w mildly atypical nevus      Flesh colored to evenly pigmented papule c/w intradermal nevus       Pink pearly papule/plaque c/w basal cell carcinoma      Erythematous hyperkeratotic cursted plaque c/w SCC      Surgical scar with no sign of skin cancer recurrence      Open and closed comedones      Inflammatory papules and pustules      Verrucoid papule consistent consistent with wart     Erythematous eczematous patches and plaques     Dystrophic onycholytic nail with subungual debris c/w onychomycosis     Umbilicated papule    Erythematous-base heme-crusted tan verrucoid plaque consistent with inflamed seborrheic keratosis     Erythematous Silvery Scaling Plaque c/w Psoriasis     See annotation      Assessment / Plan:        Seborrheic keratosis  These are benign inherited growths without a malignant potential. Reassurance given to patient. No treatment is necessary.     Cherry angioma  This is a benign vascular lesion. Reassurance given. No treatment required.     Milia  Reassurance    Actinic skin damage  Area(s) of previous NMSC evaluated with no signs of recurrence.  Upper body skin examination performed today including at least 9 points as noted in physical examination. No lesions suspicious for malignancy noted.  Patient instructed in importance in daily broad spectrum sun protection of at least spf 30. Mineral sunscreen ingredients preferred (Zinc +/- Titanium) and can be found OTC.   Patient encouraged to wear hat for all outdoor exposure.   Also discussed sun avoidance and use of protective clothing.    AK (actinic keratosis)  Cryosurgery Procedure Note    Verbal consent from the patient is obtained and the patient is aware of the precancerous quality and need for treatment of these lesions. Liquid nitrogen cryosurgery is applied to the 9 actinic keratoses, as detailed in the physical exam, to produce  a freeze injury. The patient is aware that blisters may form and is instructed on wound care with gentle cleansing and use of vaseline ointment to keep moist until healed. The patient is supplied a handout on cryosurgery and is instructed to call if lesions do not completely resolve.    Multiple benign nevi  Careful dermoscopy evaluation of nevi performed with none identified as needing biopsy today  Monitor for new mole or moles that are becoming bigger, darker, irritated, or developing irregular borders.          1 year    No follow-ups on file.

## 2025-02-18 DIAGNOSIS — I10 ESSENTIAL HYPERTENSION: ICD-10-CM

## 2025-02-18 DIAGNOSIS — M54.16 LUMBAR RADICULITIS: ICD-10-CM

## 2025-02-18 DIAGNOSIS — E11.9 CONTROLLED TYPE 2 DIABETES MELLITUS WITHOUT COMPLICATION, WITHOUT LONG-TERM CURRENT USE OF INSULIN: ICD-10-CM

## 2025-02-18 DIAGNOSIS — J30.89 CHRONIC NONSEASONAL ALLERGIC RHINITIS DUE TO FUNGAL SPORES: ICD-10-CM

## 2025-02-18 DIAGNOSIS — J30.9 ALLERGIC RHINITIS, UNSPECIFIED SEASONALITY, UNSPECIFIED TRIGGER: ICD-10-CM

## 2025-02-18 RX ORDER — EMPAGLIFLOZIN 10 MG/1
10 TABLET, FILM COATED ORAL DAILY
Qty: 90 TABLET | Refills: 1 | Status: SHIPPED | OUTPATIENT
Start: 2025-02-18

## 2025-02-18 RX ORDER — METOPROLOL TARTRATE 25 MG/1
12.5 TABLET, FILM COATED ORAL 2 TIMES DAILY
Qty: 180 TABLET | Refills: 3 | Status: SHIPPED | OUTPATIENT
Start: 2025-02-18 | End: 2027-02-08

## 2025-02-18 RX ORDER — GABAPENTIN 300 MG/1
300 CAPSULE ORAL 3 TIMES DAILY
Qty: 270 CAPSULE | Refills: 1 | Status: SHIPPED | OUTPATIENT
Start: 2025-02-18 | End: 2026-02-18

## 2025-02-18 RX ORDER — MONTELUKAST SODIUM 10 MG/1
10 TABLET ORAL NIGHTLY
Qty: 90 TABLET | Refills: 3 | Status: SHIPPED | OUTPATIENT
Start: 2025-02-18

## 2025-02-18 RX ORDER — AZELASTINE 1 MG/ML
2 SPRAY, METERED NASAL 2 TIMES DAILY
Qty: 30 ML | Refills: 5 | Status: SHIPPED | OUTPATIENT
Start: 2025-02-18

## 2025-02-21 DIAGNOSIS — Z00.00 ENCOUNTER FOR MEDICARE ANNUAL WELLNESS EXAM: ICD-10-CM

## 2025-02-24 ENCOUNTER — OFFICE VISIT (OUTPATIENT)
Dept: FAMILY MEDICINE | Facility: CLINIC | Age: 85
End: 2025-02-24
Payer: MEDICARE

## 2025-02-24 VITALS
HEART RATE: 72 BPM | HEIGHT: 68 IN | SYSTOLIC BLOOD PRESSURE: 126 MMHG | DIASTOLIC BLOOD PRESSURE: 60 MMHG | OXYGEN SATURATION: 95 % | BODY MASS INDEX: 36.28 KG/M2 | TEMPERATURE: 98 F | WEIGHT: 239.38 LBS

## 2025-02-24 DIAGNOSIS — R41.3 MEMORY CHANGES: ICD-10-CM

## 2025-02-24 DIAGNOSIS — N18.31 TYPE 2 DIABETES MELLITUS WITH STAGE 3A CHRONIC KIDNEY DISEASE, WITHOUT LONG-TERM CURRENT USE OF INSULIN: Primary | ICD-10-CM

## 2025-02-24 DIAGNOSIS — E11.22 TYPE 2 DIABETES MELLITUS WITH STAGE 3A CHRONIC KIDNEY DISEASE, WITHOUT LONG-TERM CURRENT USE OF INSULIN: Primary | ICD-10-CM

## 2025-02-24 LAB — HBA1C MFR BLD: 6.4 %

## 2025-02-24 PROCEDURE — 99999PBSHW POCT HEMOGLOBIN A1C: Mod: PBBFAC,,,

## 2025-02-24 PROCEDURE — 83036 HEMOGLOBIN GLYCOSYLATED A1C: CPT | Mod: PBBFAC,PN | Performed by: STUDENT IN AN ORGANIZED HEALTH CARE EDUCATION/TRAINING PROGRAM

## 2025-02-24 PROCEDURE — 99999 PR PBB SHADOW E&M-EST. PATIENT-LVL III: CPT | Mod: PBBFAC,,, | Performed by: STUDENT IN AN ORGANIZED HEALTH CARE EDUCATION/TRAINING PROGRAM

## 2025-02-24 PROCEDURE — 99214 OFFICE O/P EST MOD 30 MIN: CPT | Mod: S$PBB,,, | Performed by: STUDENT IN AN ORGANIZED HEALTH CARE EDUCATION/TRAINING PROGRAM

## 2025-02-24 PROCEDURE — 99213 OFFICE O/P EST LOW 20 MIN: CPT | Mod: PBBFAC,PN | Performed by: STUDENT IN AN ORGANIZED HEALTH CARE EDUCATION/TRAINING PROGRAM

## 2025-02-24 NOTE — PROGRESS NOTES
Subjective      Yeyo Hollingsworth is a 84 y.o. male        No chief complaint on file.       HPI     Patient presents today for Chronic Care Management follow up.         Problem Noted   Memory Changes 2/24/2025    Patient with concerns of memory changes.  Per his wife, who is present in the room, she has been for several years about his memory.  She reports problems with both term and short term memory.  She would like to have him evaluated Neurology.     Type 2 Diabetes Mellitus With Stage 3a Chronic Kidney Disease, Without Long-Term Current Use of Insulin 4/23/2021    Patient with diabetes,  which is currently complicated by chronic kidney disease. The patient reports compliance with Jardiance, Januvia, and Trulicity.  Last A1C listed below.  The patient does routinely check their blood sugars but forgot his logs.       Lab Results   Component Value Date    HGBA1C 6.4 (H) 10/15/2024                       ALLERGIES  Ativan [lorazepam], Dilaudid [hydromorphone (bulk)], Morphine, Adhesive tape-silicones, and Hydromorphone     MEDICATIONS  Encounter Medications[1]     PAST MEDICAL HISTORY  Past Medical History:   Diagnosis Date    Allergy     Ankle pain     left    Anticoagulant long-term use     aspirin    Anxiety     Atrial fibrillation     Back pain     Bruises easily     Cataract     Clotting disorder     left leg    Colon polyp     Diabetes mellitus     Diabetes mellitus, type 2     Diverticulitis 1986    Hay fever     Hyperlipidemia     Hypertension     Left hip pain 12/26/2021    Seeing Dr. Beard for hip pain     Obese     BRANDI on CPAP         PAST SURGIAL HISTORY  Past Surgical History:   Procedure Laterality Date    ABDOMINAL SURGERY      origunal surg 1986-mult surgeries since    CARPAL TUNNEL RELEASE      right wrist 2009-left wrist 2010    COLON SURGERY      partial colon removal    COLONOSCOPY  11/26/2018    Dr. Salazar; normal terminal ileum; polyps removed from ascending colon and hepatic flexure; pan  diverticulosis; small internal hemorrhoids; repeat in 5 years; Bx: fragments of an adenomatous polyp with mild surface glandular dysplasia and associated chronic active inflammation, no evidence of high-grade dysplasia or malignancy    COLONOSCOPY N/A 11/9/2023    Procedure: COLONOSCOPY;  Surgeon: Prasanth Irene MD;  Location: [facility];  Service: Endoscopy;  Laterality: N/A;    COLOSTOMY  1986    colostomy reversal  1986    ESOPHAGOGASTRODUODENOSCOPY N/A 11/9/2023    Procedure: EGD (ESOPHAGOGASTRODUODENOSCOPY);  Surgeon: Prasanth Irene MD;  Location: [facility];  Service: Endoscopy;  Laterality: N/A;    EYE SURGERY      hay fever      HERNIA REPAIR  1949    REPAIR OF TENDON OF LOWER EXTREMITY Left 6/24/2020    Procedure: REPAIR, TENDON, LOWER EXTREMITY;  Surgeon: Justin Mandujano DPM;  [place] [facility] OR;  Service: Podiatry;  Laterality: Left;  ARTHEX NOTIFIED IN CASE HE WANTS ANCHOR    TOE SURGERY Left 2017    replaced a joint     TONSILLECTOMY  1947    TRANSFORAMINAL EPIDURAL INJECTION OF STEROID      x 4    TRANSFORAMINAL EPIDURAL INJECTION OF STEROID Bilateral 11/13/2019    Procedure: Injection,steroid,epidural,transforaminal approach;  Surgeon: Grant Wright MD;  Location: [facility] OR;  Service: Pain Management;  Laterality: Bilateral;  L4-5, L5-S1    TRANSFORAMINAL EPIDURAL INJECTION OF STEROID Bilateral 3/9/2021    Procedure: Injection,steroid,epidural,transforaminal approach;  Surgeon: Grant Wright MD;  Location: [facility] OR;  Service: Pain Management;  Laterality: Bilateral;  L4-5, L5-S1    TRANSFORAMINAL EPIDURAL INJECTION OF STEROID Bilateral 5/25/2021    Procedure: Injection,steroid,epidural,transforaminal approach;  Surgeon: Grant Wright MD;  Location: [facility] OR;  Service: Pain Management;  Laterality: Bilateral;  L4-L5,L5,S1    TRANSFORAMINAL EPIDURAL INJECTION OF STEROID Bilateral 12/14/2021    Procedure: Injection,steroid,epidural,transforaminal approach Bilateral L4-5, L5-S1;  Surgeon: Grant Wright MD;   Location: [facility] OR;  Service: Pain Management;  Laterality: Bilateral;    TRANSFORAMINAL EPIDURAL INJECTION OF STEROID Bilateral 11/4/2022    Procedure: Injection,steroid,epidural,transforaminal approach;  Surgeon: Grant Wright MD;  Location: [facility] OR;  Service: Pain Management;  Laterality: Bilateral;  L4-5 and L5-s1    TRANSFORAMINAL EPIDURAL INJECTION OF STEROID Bilateral 1/20/2023    Procedure: Injection,steroid,epidural,transforaminal approach L4-L5-S1;  Surgeon: Grant Wright MD;  Location: [facility] OR;  Service: Pain Management;  Laterality: Bilateral;    TRANSFORAMINAL EPIDURAL INJECTION OF STEROID Bilateral 6/1/2023    Procedure: Injection,steroid,epidural,transforaminal approach L4-L5, L5-S1;  Surgeon: Grant Wright MD;  Location: [facility] OR;  Service: Pain Management;  Laterality: Bilateral;    TRANSFORAMINAL EPIDURAL INJECTION OF STEROID Bilateral 1/18/2024    Procedure: Injection,steroid,epidural,transforaminal approach;  Surgeon: Grant Wright MD;  Location: [facility] OR;  Service: Anesthesiology;  Laterality: Bilateral;  L4-5 and l5-s1 TFESI    TRANSFORAMINAL EPIDURAL INJECTION OF STEROID Bilateral 2/29/2024    Procedure: Injection,steroid,epidural,transforaminal approach;  Surgeon: Grant Wright MD;  Location: [facility] OR;  Service: Anesthesiology;  Laterality: Bilateral;  L4-5 and l5-s1 TFESI    TRANSFORAMINAL EPIDURAL INJECTION OF STEROID Bilateral 11/26/2024    Procedure: Injection,steroid,epidural,transforaminal approach;  Surgeon: Grant Wright MD;  Location: [facility] OR;  Service: Pain Management;  Laterality: Bilateral;  l4-5 and l5-s1 to use 25 ga needle for injection, see notes        FAMILY HISTORY  Family History   Problem Relation Name Age of Onset    Heart disease Mother      Melanoma Neg Hx      Psoriasis Neg Hx      Lupus Neg Hx      Eczema Neg Hx          PAST SOCIAL HISTORY  Social History     Socioeconomic History    Marital status:    Tobacco Use    Smoking status: Former     Current  packs/day: 0.00     Types: Cigarettes     Quit date: 2006     Years since quittin.0    Smokeless tobacco: Never    Tobacco comments:     ex smoker 2006   Substance and Sexual Activity    Alcohol use: Yes     Comment: rarely    Drug use: No    Sexual activity: Yes     Partners: Female     Social Drivers of Health     Financial Resource Strain: Low Risk  (2025)    Overall Financial Resource Strain (CARDIA)     Difficulty of Paying Living Expenses: Not hard at all   Food Insecurity: No Food Insecurity (2025)    Hunger Vital Sign     Worried About Running Out of Food in the Last Year: Never true     Ran Out of Food in the Last Year: Never true   Transportation Needs: No Transportation Needs (2025)    PRAPARE - Transportation     Lack of Transportation (Medical): No     Lack of Transportation (Non-Medical): No   Physical Activity: Inactive (2025)    Exercise Vital Sign     Days of Exercise per Week: 0 days     Minutes of Exercise per Session: 0 min   Stress: No Stress Concern Present (2025)    [employer] - Occupational Stress Questionnaire     Feeling of Stress : Only a little   Housing Stability: Low Risk  (2025)    Housing Stability Vital Sign     Unable to Pay for Housing in the Last Year: No     Number of Times Moved in the Last Year: 0     Homeless in the Last Year: No        Health Maintenance   Topic Date Due    Shingles Vaccine (3 of 3) 2020    Diabetic Eye Exam  2024    Diabetes Urine Screening  2025    Hemoglobin A1c  2025    Lipid Panel  10/15/2025    TETANUS VACCINE  2029    Influenza Vaccine  Completed    COVID-19 Vaccine  Completed    RSV Vaccine (Age 60+ and Pregnant patients)  Completed    Pneumococcal Vaccines (Age 50+)  Completed           ROS        Objective   Vitals:    25 1359   BP: 126/60   BP Location: Right arm   Patient Position: Sitting   Pulse: 72   Temp: 97.7 °F (36.5 °C)   TempSrc: Oral  "  SpO2: 95%   Weight: 108.6 kg (239 lb 6.4 oz)   Height: 5' 8" (1.727 m)       Body mass index is 36.4 kg/m².       Physical Exam  Constitutional:       General: He is not in acute distress.     Appearance: He is obese. He is not ill-appearing.   HENT:      Head: Normocephalic and atraumatic.   Eyes:      Extraocular Movements: Extraocular movements intact.      Pupils: Pupils are equal, round, and reactive to light.   Cardiovascular:      Rate and Rhythm: Normal rate and regular rhythm.   Pulmonary:      Effort: Pulmonary effort is normal.      Breath sounds: Normal breath sounds.   Abdominal:      Palpations: Abdomen is soft.   Musculoskeletal:         General: Normal range of motion.      Cervical back: Normal range of motion.   Skin:     General: Skin is warm and dry.   Neurological:      General: No focal deficit present.      Mental Status: He is alert and oriented to person, place, and time. Mental status is at baseline.   Psychiatric:         Mood and Affect: Mood normal.         Thought Content: Thought content normal.          Type 2 diabetes mellitus with stage 3a chronic kidney disease, without long-term current use of insulin  Assessment & Plan:  Chronic problem stable based on POC A1C which was obtained today at 6.4%.   Continue current regimen including Jardiance, Januvia, and Trulicity. Patient would like to discuss stopping medication in the future.  I discussed that Januvia would be the medication I would suggest; however, he would like to confer with his PCP.     Orders:  -     POCT HEMOGLOBIN A1C    Memory changes  Assessment & Plan:  Chronic problem with progression not well controlled  per patient  and his family.   Will place referral to Neurology.       Orders:  -     Ambulatory referral/consult to Neurology; Future; Expected date: 03/03/2025            Dada Flores             Portions of this note were created using voice recognition software. There may be voice recognition errors found " in the text, and attempts were made to correct these errors prior to signature.          [1]   Outpatient Encounter Medications as of 2/24/2025   Medication Sig Dispense Refill    alcohol swabs (ALCOHOL PADS) PadM Apply 1 each topically as needed (before shot). 100 each 0    apixaban (ELIQUIS) 5 mg Tab Take 1 tablet (5 mg total) by mouth 2 (two) times daily. 180 tablet 3    aspirin 81 MG Chew Take 1 tablet (81 mg total) by mouth once daily. 100 tablet 2    atorvastatin (LIPITOR) 10 MG tablet Take 1 tablet (10 mg total) by mouth once daily. 90 tablet 3    azelastine (ASTELIN) 137 mcg (0.1 %) nasal spray 2 sprays (274 mcg total) by Nasal route 2 (two) times daily. 30 mL 5    blood sugar diagnostic (BLOOD GLUCOSE TEST) Strp 1 strip by Misc.(Non-Drug; Combo Route) route 2 (two) times a day. FREESTYLE LITE BG TEST STRIPS. current use of insulin  - Primary ICD-10-CM: E11.9 200 each 2    blood sugar diagnostic Strp To check BG 1 times daily, to use with insurance preferred meter 200 strip 0    blood-glucose meter kit To check BG 1 times daily, to use with insurance preferred meter 1 each 0    diclofenac sodium (VOLTAREN) 1 % Gel Apply 2 g topically once daily. 100 g 2    diphenhydrAMINE (BENADRYL) 50 MG tablet Take as needed by oral route.      diphenhydrAMINE-zinc acetate 1-0.1% (BENADRYL ITCH STOPPING) cream Apply topically 3 (three) times daily as needed for Itching. 28 g 0    dulaglutide (TRULICITY) 1.5 mg/0.5 mL pen injector Inject 1.5 mg into the skin every 7 days. 4 pen 11    fexofenadine (ALLEGRA) 180 MG tablet Take 1 tablet (180 mg total) by mouth once daily. 90 tablet 3    FREESTYLE LANCETS 28 gauge lancets Inject 1 lancet  into the skin 3 (three) times daily. 100 each 3    furosemide (LASIX) 20 MG tablet Take 1 tablet (20 mg total) by mouth every Mon, Wed, Fri. 45 tablet 3    gabapentin (NEURONTIN) 300 MG capsule Take 1 capsule (300 mg total) by mouth 3 (three) times daily. 270 capsule 1     HYDROcodone-acetaminophen (NORCO) 5-325 mg per tablet Take 1 tablet by mouth every 6 (six) hours as needed for Pain. 90 tablet 0    JARDIANCE 10 mg tablet Take 1 tablet (10 mg total) by mouth once daily. 90 tablet 1    ketoconazole (NIZORAL) 2 % cream Apply topically once daily. 60 g 1    lancets Misc To check BG 1 times daily, to use with insurance preferred meter 200 each 0    lisinopriL (PRINIVIL,ZESTRIL) 20 MG tablet Take 1 tablet (20 mg total) by mouth once daily. 90 tablet 3    metoprolol tartrate (LOPRESSOR) 25 MG tablet Take 0.5 tablets (12.5 mg total) by mouth 2 (two) times daily. 180 tablet 3    montelukast (SINGULAIR) 10 mg tablet Take 1 tablet (10 mg total) by mouth every evening. 90 tablet 3    multivitamin with minerals tablet Take 1 tablet by mouth once daily.      mupirocin (BACTROBAN) 2 % ointment SMARTSIG:Sparingly Topical Twice Daily      naftifine 2 % Crea APPLY AS A THIN LAYER TO THE AFFECTED AREA(S) PLUS A 0.5 INCH MARGIN OF HEALTHY SURROUNDING SKIN BY TOPICAL ROUTE ONCE DAILY FOR 2 WEEKS      polyethylene glycol (GLYCOLAX) 17 gram/dose powder Take 17 g by mouth once daily. 507 g 3    potassium chloride (MICRO-K) 10 MEQ CpSR Take 1 capsule (10 mEq total) by mouth every other day. 90 capsule 1    SITagliptin phosphate (JANUVIA) 100 MG Tab Take 1 tablet (100 mg total) by mouth once daily. 90 tablet 3    traMADoL (ULTRAM) 50 mg tablet Take 1 tablet every 6 hours by oral route.      traZODone (DESYREL) 100 MG tablet Take 1 tablet (100 mg total) by mouth every evening. 90 tablet 3    triamcinolone acetonide 0.1% (KENALOG) 0.1 % cream Apply topically 2 (two) times daily. 80 g 0    clotrimazole (LOTRIMIN) 1 % cream Apply topically 2 (two) times daily. 60 g 10     Facility-Administered Encounter Medications as of 2/24/2025   Medication Dose Route Frequency Provider Last Rate Last Admin    lactated ringers infusion   Intravenous Once PRN Grant Wright MD   Stopped at 11/04/22 1230    lactated ringers  infusion   Intravenous Continuous Grant Wright MD 25 mL/hr at 02/29/24 1154 New Bag at 02/29/24 1156

## 2025-02-24 NOTE — ASSESSMENT & PLAN NOTE
Chronic problem with progression not well controlled  per patient  and his family.   Will place referral to Neurology.

## 2025-02-24 NOTE — ASSESSMENT & PLAN NOTE
Chronic problem stable based on POC A1C which was obtained today at 6.4%.   Continue current regimen including Jardiance, Januvia, and Trulicity. Patient would like to discuss stopping medication in the future.  I discussed that Januvia would be the medication I would suggest; however, he would like to confer with his PCP.

## 2025-03-03 ENCOUNTER — PATIENT OUTREACH (OUTPATIENT)
Dept: ADMINISTRATIVE | Facility: HOSPITAL | Age: 85
End: 2025-03-03
Payer: MEDICARE

## 2025-03-03 NOTE — PROGRESS NOTES
Population Health Chart Review & Patient Outreach Details      Additional [facility] Notes:               Updates Requested / Reviewed:      Updated Care Coordination Note, Care Everywhere, , Care Team Updated, and Immunizations Reconciliation Completed or Queried: [facility] Topics Overdue:      [facility] Score: 1     Eye Exam    Shingles/Zoster Vaccine                  Health Maintenance Topic(s) Outreach Outcomes & Actions Taken:    Eye Exam - Outreach Outcomes & Actions Taken  : External Records Requested & Care Team Updated if Applicable

## 2025-03-03 NOTE — LETTER
AUTHORIZATION FOR RELEASE OF   CONFIDENTIAL INFORMATION    Dear Dr. Fermin Claros,     We are seeing Yeyo Hollingsworth, date of birth 1940, in the clinic at [facility] [facility]. Garland Ocampo MD is the patient's PCP. Yeyo Hollingsworth has an outstanding lab/procedure at the time we reviewed his chart. In order to help keep his health information updated, he has authorized us to request the following medical record(s):         ( x )  EYE EXAM              Please fax records to [facility], Arnold, Ryan D., MD, [phone]     If you have any questions, please contact       Emilia Marks  Nurse Clinical Care Coordinator  [facility]  Phone: [phone]  Fax: [phone]    Patient Name: Yeyo Hollingsworth  : 1940  Patient Phone #: [phone]                Yeyo Hollingsworth  MRN: [id number]  : 1940  Age: 84 y.o.  Sex: male         Patient/Legal Guardian Signature  This signature was collected at 2024           _______________________________   Printed Name/Relationship to Patient      Consent for Examination and Treatment: I hereby authorize the providers and employees of [facility] ([facility]) to provide medical treatment/services which includes, but is not limited to, performing and administering tests and diagnostic procedures that are deemed necessary, including, but not limited to, imaging examinations, blood tests and other laboratory procedures as may be required by the hospital, clinic, or may be ordered by my physician(s) or persons working under the general and/or special instructions of my physician(s).      I understand and agree that this consent covers all authorized persons, including but not limited to physicians, residents, nurse practitioners, physicians' assistants, specialists, consultants, student nurses, and independently contracted physicians, who are called upon by the physician in charge, to carry  out the diagnostic procedures and medical or surgical treatment.     I hereby authorize [facility] to retain or dispose of any specimens or tissue, should there be such remaining from any test or procedure.     I hereby authorize and give consent for [facility] providers and employees to take photographs, images or videotapes of such diagnostic, surgical or treatment procedures of Patient as may be required by [facility] or as may be ordered by a physician. I further acknowledge and agree that [facility] may use cameras or other devices for patient monitoring.     I am aware that the practice of medicine is not an exact science, and I acknowledge that no guarantees have been made to me as to the outcome of any tests, procedures or treatment.     Authorization for Release of Information: I understand that my insurance company and/or their agents may need information necessary to make determinations about payment/reimbursement. I hereby provide authorization to release to all insurance companies, their successors, assignees, other parties with whom they may have contracted, or others acting on their behalf, that are involved with payment for any hospital and/or clinic charges incurred by the patient, any information that they request and deem necessary for payment/reimbursement, and/or quality review.  I further authorize the release of my health information to physicians or other health care practitioners on staff who are involved in my health care now and in the future, and to other health care providers, entities, or institutions for the purpose of my continued care and treatment, including referrals.     REGISTRATION AUTHORIZATION  Form No. 52138 (Rev. 3/25/2024)    Page 1 of 3                       Medicare Patient's Certification and Authorization to Release Information and Payment Request:  I certify that the information given by me in applying for payment under Title XVIII of the Social Security Act is correct. I  authorize any [place] of medical or other information about me to release to the [facility], or its intermediaries or carriers, any information needed for this or a related Medicare claim. I request that payment of authorized benefits be made on my behalf.     Assignment of Insurance Benefits:   I hereby authorize any and all insurance companies, health plans, defined   benefit plans, health insurers or any entity that is or may be responsible for payment of my medical expenses to pay all hospital and medical benefits now due, and to become due and payable to me under any hospital benefits, sick benefits, injury benefits or any other benefit for services rendered to me, including Major Medical Benefits, direct to [facility] and all independently contracted physicians. I assign any and all rights that I may have against any and all insurance companies, health plans, defined benefit plans, health insurers or any entity that is or may be responsible for payment of my medical expenses, including, but not limited to any right to appeal a denial of a claim, any right to bring any action, lawsuit, administrative proceeding, or other cause of action on my behalf. I specifically assign my right to pursue litigation against any and all insurance companies, health plans, defined benefit plans, health insurers or any entity that is or may be responsible for payment of my medical expenses based upon a refusal to pay charges.            E. Valuables: It is understood and agreed that [facility] is not liable for the damage to or loss of any money, jewelry,   documents, dentures, eye glasses, hearing aids, prosthetics, or other property of value.     F. Computer Equipment: I understand and agree that should I choose to use computer equipment owned by [facility] or if I choose to access the Internet via [facility]s network, I do so at my own risk. [facility] is not responsible for any damage to my computer equipment or to any  damages of any type that might arise from my loss of equipment or data.     G. Acceptance of Financial Responsibility:  I agree that in consideration of the services and   supplies that have been   or will be furnished to the patient, I am hereby obligated to pay all charges made for or on the account of the patient according to the standard rates (in effect at the time the services and supplies are delivered) established by [facility], including its Patient Financial Assistance Policy to the extent it is applicable. I understand that I am responsible for all charges, or portions thereof, not covered by insurance or other sources. Patient refunds will be distributed only after balances at all [facility] facilities are paid.     H. Communication Authorization:  I hereby authorize [facility] and its representatives, along with any billing service   or  who may work on their behalf, to contact me on   my cell phone and/or home phone using pre- recorded messages, artificial voice messages, automatic telephone dialing devices or other computer assisted technology, or by electronic      mail, text messaging, or by any other form of electronic communication. This includes, but is not limited to, appointment reminders, yearly physical exam reminders, preventive care reminders, patient campaigns, welcome calls, and calls about account balances on my account or any account on which I am listed as a guarantor. I understand I have the right to opt out of these communications at any time.      Relationship  Between  Facility and  Provider:      I understand that some, but not all, providers furnishing services to the patient are not employees or agents of [facility]. The patient is under the care and supervision of his/her attending physician, and it is the responsibility of the facility and its nursing staff to carry out the instructions of such physicians. It is the responsibility of the patient's physician/designee to  obtain the patient's informed consent, when required, for medical or surgical treatment, special diagnostic or therapeutic procedures, or hospital services rendered for the patient under the special instructions of the physician/designee.           REGISTRATION AUTHORIZATION  Form No. 33137 (Rev. 3/25/2024)    Page 2 of 3                       Immunizations: [facility] shares immunization information with state sponsored health departments to help you and your doctor keep track of your immunization records. By signing, you consent to have this information shared with the health department in your state:                                [place] - LINKS ([place] Immunization Network for Kids Statewide)                                [place] - MIIX ([place] Immunization Information eXchange)                                [place] - ImmPRINT (Immunization Patient Registry with Integrated Technology)     TERM: This authorization is valid for this and subsequent care/treatment I receive at [facility] and will remain valid unless/until revoked in writing by me.     [facility]: As used in this document, [facility] means all [facility] owned and managed facilities, including, but not limited to, all health centers, surgery centers, clinics, urgent care centers, and hospitals.         [facility] complies with applicable Federal civil rights laws and does not discriminate on the basis of race, color, national origin, age, disability, or sex.  ATENCIÓN: si habla zoë, tiene a ramírez disposición servicios gratuitos de asistencia lingüística. Laura hogan [phone].  CHÚ Ý: N?u b?n nói Ti?ng Vi?t, có các d?ch v? h? tr? ngôn ng? mi?n phí dành cho b?n. G?i s? [phone].        REGISTRATION AUTHORIZATION  Form No. 02538 (Rev. 3/25/2024)   Page 3 of 3     Patient

## 2025-03-05 ENCOUNTER — OFFICE VISIT (OUTPATIENT)
Dept: PODIATRY | Facility: CLINIC | Age: 85
End: 2025-03-05
Payer: MEDICARE

## 2025-03-05 VITALS — BODY MASS INDEX: 36.62 KG/M2 | HEART RATE: 74 BPM | HEIGHT: 68 IN | WEIGHT: 241.63 LBS | OXYGEN SATURATION: 96 %

## 2025-03-05 DIAGNOSIS — M79.672 LEFT FOOT PAIN: ICD-10-CM

## 2025-03-05 DIAGNOSIS — M72.2 PLANTAR FASCIITIS: Primary | ICD-10-CM

## 2025-03-05 PROCEDURE — 99215 OFFICE O/P EST HI 40 MIN: CPT | Mod: PBBFAC,PN | Performed by: PODIATRIST

## 2025-03-05 PROCEDURE — 99999 PR PBB SHADOW E&M-EST. PATIENT-LVL V: CPT | Mod: PBBFAC,,, | Performed by: PODIATRIST

## 2025-03-05 PROCEDURE — 20550 NJX 1 TENDON SHEATH/LIGAMENT: CPT | Mod: PBBFAC,PN | Performed by: PODIATRIST

## 2025-03-05 PROCEDURE — 99999PBSHW PR PBB SHADOW TECHNICAL ONLY FILED TO HB: Mod: JW,PBBFAC,,

## 2025-03-05 RX ORDER — BUPIVACAINE HYDROCHLORIDE 5 MG/ML
1.5 INJECTION, SOLUTION PERINEURAL
Status: COMPLETED | OUTPATIENT
Start: 2025-03-05 | End: 2025-03-05

## 2025-03-05 RX ORDER — DEXAMETHASONE SODIUM PHOSPHATE 4 MG/ML
4 INJECTION, SOLUTION INTRA-ARTICULAR; INTRALESIONAL; INTRAMUSCULAR; INTRAVENOUS; SOFT TISSUE
Status: COMPLETED | OUTPATIENT
Start: 2025-03-05 | End: 2025-03-05

## 2025-03-05 RX ORDER — METHYLPREDNISOLONE ACETATE 40 MG/ML
20 INJECTION, SUSPENSION INTRA-ARTICULAR; INTRALESIONAL; INTRAMUSCULAR; SOFT TISSUE
Status: COMPLETED | OUTPATIENT
Start: 2025-03-05 | End: 2025-03-05

## 2025-03-05 RX ADMIN — BUPIVACAINE HYDROCHLORIDE 7.5 MG: 5 INJECTION, SOLUTION PERINEURAL at 09:03

## 2025-03-05 RX ADMIN — DEXAMETHASONE SODIUM PHOSPHATE 4 MG: 4 INJECTION INTRA-ARTICULAR; INTRALESIONAL; INTRAMUSCULAR; INTRAVENOUS; SOFT TISSUE at 09:03

## 2025-03-05 RX ADMIN — METHYLPREDNISOLONE ACETATE 20 MG: 40 INJECTION, SUSPENSION INTRA-ARTICULAR; INTRALESIONAL; INTRAMUSCULAR; SOFT TISSUE at 09:03

## 2025-03-05 NOTE — PROGRESS NOTES
"  [address] [facility] [address]  [place] [address]  Phone: [phone]   [phone]    Patient's PCP:Garland Ocampo MD  Referring Provider: No ref. provider found    Subjective:      Chief Complaint:: Foot Pain (Left heel pain)    Foot Pain  Associated symptoms include arthralgias and myalgias. Pertinent negatives include no abdominal pain, chest pain, chills, coughing, fatigue, fever, headaches, joint swelling, nausea, neck pain, numbness, rash or weakness.       Yeyo Hollingsworth is a 84 y.o. male who presents today with a complaint of left heel pain. The current episode started a few years ago.  Patient presents weight-bearing in [place].  Patient states he did have previous steroid injection which did provide relief.    Systemic Doctor:   Date Last Seen: 4/19/24  Blood Sugar: not taken  Hemoglobin A1c: 6.4    Vitals:    03/05/25 0831   Pulse: 74   SpO2: 96%   Weight: 109.6 kg (241 lb 10 oz)   Height: 5' 8" (1.727 m)   PainSc:   4      Shoe Size: 9-9.5    Past Surgical History:   Procedure Laterality Date    ABDOMINAL SURGERY      origunal surg 1986-mult surgeries since    CARPAL TUNNEL RELEASE      right wrist 2009-left wrist 2010    COLON SURGERY      partial colon removal    COLONOSCOPY  11/26/2018    Dr. Salazar; normal terminal ileum; polyps removed from ascending colon and hepatic flexure; pan diverticulosis; small internal hemorrhoids; repeat in 5 years; Bx: fragments of an adenomatous polyp with mild surface glandular dysplasia and associated chronic active inflammation, no evidence of high-grade dysplasia or malignancy    COLONOSCOPY N/A 11/9/2023    Procedure: COLONOSCOPY;  Surgeon: Prasanth Irene MD;  Location: [facility];  Service: Endoscopy;  Laterality: N/A;    COLOSTOMY  1986    colostomy reversal  1986    ESOPHAGOGASTRODUODENOSCOPY N/A 11/9/2023    Procedure: EGD (ESOPHAGOGASTRODUODENOSCOPY);  Surgeon: Prasanth Irene MD;  Location: [facility];  Service: Endoscopy;  " Laterality: N/A;    EYE SURGERY      hay fever      HERNIA REPAIR  1949    REPAIR OF TENDON OF LOWER EXTREMITY Left 6/24/2020    Procedure: REPAIR, TENDON, LOWER EXTREMITY;  Surgeon: Justin Mandujano DPM;  [place] [facility] OR;  Service: Podiatry;  Laterality: Left;  ARTHEX NOTIFIED IN CASE HE WANTS ANCHOR    TOE SURGERY Left 2017    replaced a joint     TONSILLECTOMY  1947    TRANSFORAMINAL EPIDURAL INJECTION OF STEROID      x 4    TRANSFORAMINAL EPIDURAL INJECTION OF STEROID Bilateral 11/13/2019    Procedure: Injection,steroid,epidural,transforaminal approach;  Surgeon: Grant Wright MD;  Location: [facility] OR;  Service: Pain Management;  Laterality: Bilateral;  L4-5, L5-S1    TRANSFORAMINAL EPIDURAL INJECTION OF STEROID Bilateral 3/9/2021    Procedure: Injection,steroid,epidural,transforaminal approach;  Surgeon: Grant Wright MD;  Location: [facility];  Service: Pain Management;  Laterality: Bilateral;  L4-5, L5-S1    TRANSFORAMINAL EPIDURAL INJECTION OF STEROID Bilateral 5/25/2021    Procedure: Injection,steroid,epidural,transforaminal approach;  Surgeon: Grant Wright MD;  Location: [facility] OR;  Service: Pain Management;  Laterality: Bilateral;  L4-L5,L5,S1    TRANSFORAMINAL EPIDURAL INJECTION OF STEROID Bilateral 12/14/2021    Procedure: Injection,steroid,epidural,transforaminal approach Bilateral L4-5, L5-S1;  Surgeon: Grant Wright MD;  Location: [facility] OR;  Service: Pain Management;  Laterality: Bilateral;    TRANSFORAMINAL EPIDURAL INJECTION OF STEROID Bilateral 11/4/2022    Procedure: Injection,steroid,epidural,transforaminal approach;  Surgeon: Grant Wright MD;  Location: [facility] OR;  Service: Pain Management;  Laterality: Bilateral;  L4-5 and L5-s1    TRANSFORAMINAL EPIDURAL INJECTION OF STEROID Bilateral 1/20/2023    Procedure: Injection,steroid,epidural,transforaminal approach L4-L5-S1;  Surgeon: Grant Wright MD;  Location: [facility] OR;  Service: Pain Management;  Laterality: Bilateral;    TRANSFORAMINAL EPIDURAL INJECTION OF  STEROID Bilateral 6/1/2023    Procedure: Injection,steroid,epidural,transforaminal approach L4-L5, L5-S1;  Surgeon: Grant Wright MD;  Location: [facility] OR;  Service: Pain Management;  Laterality: Bilateral;    TRANSFORAMINAL EPIDURAL INJECTION OF STEROID Bilateral 1/18/2024    Procedure: Injection,steroid,epidural,transforaminal approach;  Surgeon: Grant Wright MD;  Location: [facility] OR;  Service: Anesthesiology;  Laterality: Bilateral;  L4-5 and l5-s1 TFESI    TRANSFORAMINAL EPIDURAL INJECTION OF STEROID Bilateral 2/29/2024    Procedure: Injection,steroid,epidural,transforaminal approach;  Surgeon: Grant Wright MD;  Location: [facility] OR;  Service: Anesthesiology;  Laterality: Bilateral;  L4-5 and l5-s1 TFESI    TRANSFORAMINAL EPIDURAL INJECTION OF STEROID Bilateral 11/26/2024    Procedure: Injection,steroid,epidural,transforaminal approach;  Surgeon: Grant Wright MD;  Location: [facility] OR;  Service: Pain Management;  Laterality: Bilateral;  l4-5 and l5-s1 to use 25 ga needle for injection, see notes     Past Medical History:   Diagnosis Date    Allergy     Ankle pain     left    Anticoagulant long-term use     aspirin    Anxiety     Atrial fibrillation     Back pain     Bruises easily     Cataract     Clotting disorder     left leg    Colon polyp     Diabetes mellitus     Diabetes mellitus, type 2     Diverticulitis 1986    Hay fever     Hyperlipidemia     Hypertension     Left hip pain 12/26/2021    Seeing Dr. Beard for hip pain     Obese     BRANDI on CPAP      Family History   Problem Relation Name Age of Onset    Heart disease Mother      Melanoma Neg Hx      Psoriasis Neg Hx      Lupus Neg Hx      Eczema Neg Hx          Social History:   Marital Status:   Alcohol History:  reports current alcohol use.  Tobacco History:  reports that he quit smoking about 19 years ago. His smoking use included cigarettes. He has never used smokeless tobacco.  Drug History:  reports no history of drug use.    Review of  patient's allergies indicates:   Allergen Reactions    Ativan [lorazepam] Other (See Comments)     Mood alternating      Dilaudid [hydromorphone (bulk)] Other (See Comments)     Mood alternating      Morphine Other (See Comments)     Mood alternating      Adhesive tape-silicones     Hydromorphone        Current Medications[1]    Review of Systems   Constitutional:  Negative for chills, fatigue, fever and unexpected weight change.   HENT:  Negative for hearing loss and trouble swallowing.    Eyes:  Negative for photophobia and visual disturbance.   Respiratory:  Negative for cough, shortness of breath and wheezing.    Cardiovascular:  Positive for leg swelling. Negative for chest pain and palpitations.   Gastrointestinal:  Negative for abdominal pain and nausea.   Genitourinary:  Negative for dysuria and frequency.   Musculoskeletal:  Positive for arthralgias, gait problem and myalgias. Negative for back pain, joint swelling and neck pain.   Skin:  Negative for rash and wound.   Neurological:  Negative for tremors, seizures, weakness, numbness and headaches.   Hematological:  Does not bruise/bleed easily.         Objective:        Physical Exam:   Foot Exam    General  General Appearance: appears stated age and healthy   Orientation: alert and oriented to person, place, and time   Affect: appropriate   Gait: antalgic       Left Foot/Ankle      Inspection and Palpation  Ecchymosis: none  Tenderness: plantar fascia   Swelling: (Mild lower extremity)  Arch: normal  Hammertoes: absent  Claw toes: absent  Hallux valgus: no  Hallux limitus: no  Skin Exam: no drainage, no ulcer and no erythema   Neurovascular  Dorsalis pedis: 1+  Posterior tibial: 1+  Capillary refill: 3+  Varicose veins: not present  Saphenous nerve sensation: diminished  Tibial nerve sensation: diminished  Superficial peroneal nerve sensation: diminished  Deep peroneal nerve sensation: diminished  Sural nerve sensation: diminished    Muscle  Strength  Ankle dorsiflexion: 4+  Ankle plantar flexion: 5  Ankle inversion: 5  Ankle eversion: 5  Great toe extension: 4  Great toe flexion: 5    Range of Motion    Normal left ankle ROM    Tests  Anterior drawer: negative   Talar tilt: negative   PT Tinel's sign: negative  Paresthesia: negative  Comments  Pain on palpation of the infeior medial heel and central plantar heel. No pain present  with side to side compression of the calcaneus. Negative tinnel's sign  at the tarsal tunnel. Negative Ham's nerve pain. Negative Calcaneal nerve pain. No soft tissue masses. Pain absent  with dorsiflexion of the ankle. No edema, erythema, or ecchymosis noted.       Physical Exam  Cardiovascular:      Pulses:           Dorsalis pedis pulses are 1+ on the left side.        Posterior tibial pulses are 1+ on the left side.   Musculoskeletal:      Left foot: No bunion.   Feet:      Left foot:      Skin integrity: No ulcer or erythema.               Left Ankle/Foot Exam     Range of Motion   The patient has normal left ankle ROM.     Comments:  Pain on palpation of the infeior medial heel and central plantar heel. No pain present  with side to side compression of the calcaneus. Negative tinnel's sign  at the tarsal tunnel. Negative Ham's nerve pain. Negative Calcaneal nerve pain. No soft tissue masses. Pain absent  with dorsiflexion of the ankle. No edema, erythema, or ecchymosis noted.         Muscle Strength   Left Lower Extremity   Ankle Dorsiflexion:  4+   Plantar flexion:  5/5     Vascular Exam       Left Pulses  Dorsalis Pedis:      1+  Posterior Tibial:      1+           Imaging: none            Assessment:       1. Plantar fasciitis    2. Left foot pain      Plan:   Plantar fasciitis  -     methylPREDNISolone acetate injection 20 mg  -     BUPivacaine injection 7.5 mg  -     dexAMETHasone injection 4 mg    Left foot pain  -     methylPREDNISolone acetate injection 20 mg  -     BUPivacaine injection 7.5 mg  -      dexAMETHasone injection 4 mg      Follow up if symptoms worsen or fail to improve.      Discussed different treatment options for heel pain. I gave written and verbal instructions on heel cord stretching and this was demonstrated for the patient. Patient expressed understanding. Discussed wearing appropriate shoe gear and avoiding flats, slippers, sandals, barefoot, and sockfeet. Recommended arch supports. My recommendation for OTC supports is Spenco polysorb replacement insoles or patient may elect more aggressive treatment with prescription arch supports. We also discussed cortisone injections and NSAID therapy.       Procedures Informed consent was obtained.  Time-out was called.  The area was prepped with alcohol, and a steroid injection was performed at left plantar fascia using 1.5 cc of 0.5% Marcaine w/out epi, 1 cc Dexamethasone, 0.5 cc Methylprednisolone. Patient tolerated the procedure well.             Counseling:     I provided patient education verbally regarding:   Patient diagnosis, treatment options, as well as alternatives, risks, and benefits.     This note was created using Dragon voice recognition software that occasionally misinterpreted phrases or words.                      [1]   Current Outpatient Medications   Medication Sig Dispense Refill    alcohol swabs (ALCOHOL PADS) PadM Apply 1 each topically as needed (before shot). 100 each 0    apixaban (ELIQUIS) 5 mg Tab Take 1 tablet (5 mg total) by mouth 2 (two) times daily. 180 tablet 3    aspirin 81 MG Chew Take 1 tablet (81 mg total) by mouth once daily. 100 tablet 2    atorvastatin (LIPITOR) 10 MG tablet Take 1 tablet (10 mg total) by mouth once daily. 90 tablet 3    azelastine (ASTELIN) 137 mcg (0.1 %) nasal spray 2 sprays (274 mcg total) by Nasal route 2 (two) times daily. 30 mL 5    blood sugar diagnostic (BLOOD GLUCOSE TEST) Strp 1 strip by Misc.(Non-Drug; Combo Route) route 2 (two) times a day. FREESTYLE LITE BG TEST STRIPS. current use  of insulin  - Primary ICD-10-CM: E11.9 200 each 2    blood sugar diagnostic Strp To check BG 1 times daily, to use with insurance preferred meter 200 strip 0    blood-glucose meter kit To check BG 1 times daily, to use with insurance preferred meter 1 each 0    diclofenac sodium (VOLTAREN) 1 % Gel Apply 2 g topically once daily. 100 g 2    diphenhydrAMINE (BENADRYL) 50 MG tablet Take as needed by oral route.      diphenhydrAMINE-zinc acetate 1-0.1% (BENADRYL ITCH STOPPING) cream Apply topically 3 (three) times daily as needed for Itching. 28 g 0    dulaglutide (TRULICITY) 1.5 mg/0.5 mL pen injector Inject 1.5 mg into the skin every 7 days. 4 pen 11    fexofenadine (ALLEGRA) 180 MG tablet Take 1 tablet (180 mg total) by mouth once daily. 90 tablet 3    FREESTYLE LANCETS 28 gauge lancets Inject 1 lancet  into the skin 3 (three) times daily. 100 each 3    furosemide (LASIX) 20 MG tablet Take 1 tablet (20 mg total) by mouth every Mon, Wed, Fri. 45 tablet 3    gabapentin (NEURONTIN) 300 MG capsule Take 1 capsule (300 mg total) by mouth 3 (three) times daily. 270 capsule 1    HYDROcodone-acetaminophen (NORCO) 5-325 mg per tablet Take 1 tablet by mouth every 6 (six) hours as needed for Pain. 90 tablet 0    JARDIANCE 10 mg tablet Take 1 tablet (10 mg total) by mouth once daily. 90 tablet 1    ketoconazole (NIZORAL) 2 % cream Apply topically once daily. 60 g 1    lancets Misc To check BG 1 times daily, to use with insurance preferred meter 200 each 0    lisinopriL (PRINIVIL,ZESTRIL) 20 MG tablet Take 1 tablet (20 mg total) by mouth once daily. 90 tablet 3    metoprolol tartrate (LOPRESSOR) 25 MG tablet Take 0.5 tablets (12.5 mg total) by mouth 2 (two) times daily. 180 tablet 3    montelukast (SINGULAIR) 10 mg tablet Take 1 tablet (10 mg total) by mouth every evening. 90 tablet 3    multivitamin with minerals tablet Take 1 tablet by mouth once daily.      mupirocin (BACTROBAN) 2 % ointment SMARTSIG:Sparingly Topical Twice  Daily      naftifine 2 % Crea APPLY AS A THIN LAYER TO THE AFFECTED AREA(S) PLUS A 0.5 INCH MARGIN OF HEALTHY SURROUNDING SKIN BY TOPICAL ROUTE ONCE DAILY FOR 2 WEEKS      polyethylene glycol (GLYCOLAX) 17 gram/dose powder Take 17 g by mouth once daily. 507 g 3    potassium chloride (MICRO-K) 10 MEQ CpSR Take 1 capsule (10 mEq total) by mouth every other day. 90 capsule 1    SITagliptin phosphate (JANUVIA) 100 MG Tab Take 1 tablet (100 mg total) by mouth once daily. 90 tablet 3    traMADoL (ULTRAM) 50 mg tablet Take 1 tablet every 6 hours by oral route.      traZODone (DESYREL) 100 MG tablet Take 1 tablet (100 mg total) by mouth every evening. 90 tablet 3    triamcinolone acetonide 0.1% (KENALOG) 0.1 % cream Apply topically 2 (two) times daily. 80 g 0    clotrimazole (LOTRIMIN) 1 % cream Apply topically 2 (two) times daily. 60 g 10     No current facility-administered medications for this visit.     Facility-Administered Medications Ordered in Other Visits   Medication Dose Route Frequency Provider Last Rate Last Admin    lactated ringers infusion   Intravenous Once PRN Grant Wright MD   Stopped at 11/04/22 1230    lactated ringers infusion   Intravenous Continuous Grant Wright MD 25 mL/hr at 02/29/24 1154 New Bag at 02/29/24 1154

## 2025-04-21 ENCOUNTER — PATIENT MESSAGE (OUTPATIENT)
Dept: PULMONOLOGY | Facility: CLINIC | Age: 85
End: 2025-04-21
Payer: MEDICARE

## 2025-04-22 DIAGNOSIS — E11.9 CONTROLLED TYPE 2 DIABETES MELLITUS WITHOUT COMPLICATION, WITHOUT LONG-TERM CURRENT USE OF INSULIN: ICD-10-CM

## 2025-04-22 DIAGNOSIS — E11.9 TYPE 2 DIABETES MELLITUS WITHOUT COMPLICATION, WITHOUT LONG-TERM CURRENT USE OF INSULIN: Chronic | ICD-10-CM

## 2025-04-22 DIAGNOSIS — I10 ESSENTIAL HYPERTENSION: ICD-10-CM

## 2025-04-22 DIAGNOSIS — E78.5 HYPERLIPIDEMIA, UNSPECIFIED HYPERLIPIDEMIA TYPE: Chronic | ICD-10-CM

## 2025-04-23 NOTE — TELEPHONE ENCOUNTER
Refill Routing Note   Medication(s) are not appropriate for processing by [facility] for the following reason(s):        Outside of protocol  Other    [facility] action(s):  Route  Defer     Requires labs : Yes      Medication Therapy Plan: The pharmacy can not receive electronic prescription      Appointments  past 12m or future 3m with PCP    Date Provider   Last Visit   10/21/2024 Garland Ocampo MD   Next Visit   5/27/2025 Garland Ocampo MD   ED visits in past 90 days: 0        Note composed:10:19 PM 04/22/2025

## 2025-04-23 NOTE — TELEPHONE ENCOUNTER
Care Due:                  Date            Visit Type   Department     Provider  --------------------------------------------------------------------------------                                EP - [facility]      [address]  Last Visit: 02-      [facility]                              [facility] - [facility] [address]  Next Visit: 05-      [facility] ([facility])   [facility]                                                            Last  Test          Frequency    Reason                     Performed    Due Date  --------------------------------------------------------------------------------    CBC.........  12 months..  apixaban, diclofenac.....  10-   10-    [facility] Embedded Care Due Messages. Reference number: [id number].   4/22/2025 10:16:45 PM CDT

## 2025-04-28 ENCOUNTER — PATIENT MESSAGE (OUTPATIENT)
Dept: FAMILY MEDICINE | Facility: CLINIC | Age: 85
End: 2025-04-28
Payer: MEDICARE

## 2025-05-04 RX ORDER — NAPROXEN SODIUM 220 MG/1
81 TABLET, FILM COATED ORAL DAILY
Qty: 100 TABLET | Refills: 2 | Status: SHIPPED | OUTPATIENT
Start: 2025-05-04

## 2025-05-04 RX ORDER — EMPAGLIFLOZIN 10 MG/1
10 TABLET, FILM COATED ORAL DAILY
Qty: 90 TABLET | Refills: 1 | Status: SHIPPED | OUTPATIENT
Start: 2025-05-04

## 2025-05-04 RX ORDER — LISINOPRIL 20 MG/1
20 TABLET ORAL DAILY
Qty: 90 TABLET | Refills: 1 | Status: SHIPPED | OUTPATIENT
Start: 2025-05-04

## 2025-05-27 ENCOUNTER — OFFICE VISIT (OUTPATIENT)
Dept: FAMILY MEDICINE | Facility: CLINIC | Age: 85
End: 2025-05-27
Payer: MEDICARE

## 2025-05-27 ENCOUNTER — PATIENT OUTREACH (OUTPATIENT)
Dept: ADMINISTRATIVE | Facility: HOSPITAL | Age: 85
End: 2025-05-27
Payer: MEDICARE

## 2025-05-27 ENCOUNTER — TELEPHONE (OUTPATIENT)
Dept: FAMILY MEDICINE | Facility: CLINIC | Age: 85
End: 2025-05-27
Payer: MEDICARE

## 2025-05-27 VITALS
HEIGHT: 68 IN | WEIGHT: 234.13 LBS | HEART RATE: 65 BPM | SYSTOLIC BLOOD PRESSURE: 135 MMHG | DIASTOLIC BLOOD PRESSURE: 67 MMHG | BODY MASS INDEX: 35.48 KG/M2 | OXYGEN SATURATION: 96 %

## 2025-05-27 DIAGNOSIS — M19.90 ARTHRITIS: ICD-10-CM

## 2025-05-27 DIAGNOSIS — I10 ESSENTIAL HYPERTENSION: ICD-10-CM

## 2025-05-27 DIAGNOSIS — Z86.718 HISTORY OF DVT (DEEP VEIN THROMBOSIS): ICD-10-CM

## 2025-05-27 DIAGNOSIS — E78.01 FAMILIAL HYPERCHOLESTEROLEMIA: Primary | ICD-10-CM

## 2025-05-27 DIAGNOSIS — E11.9 CONTROLLED TYPE 2 DIABETES MELLITUS WITHOUT COMPLICATION, WITHOUT LONG-TERM CURRENT USE OF INSULIN: ICD-10-CM

## 2025-05-27 DIAGNOSIS — G89.4 CHRONIC PAIN SYNDROME: ICD-10-CM

## 2025-05-27 PROCEDURE — 99215 OFFICE O/P EST HI 40 MIN: CPT | Mod: PBBFAC,PN | Performed by: FAMILY MEDICINE

## 2025-05-27 PROCEDURE — 99999 PR PBB SHADOW E&M-EST. PATIENT-LVL V: CPT | Mod: PBBFAC,,, | Performed by: FAMILY MEDICINE

## 2025-05-27 PROCEDURE — 99214 OFFICE O/P EST MOD 30 MIN: CPT | Mod: S$PBB,,, | Performed by: FAMILY MEDICINE

## 2025-05-27 RX ORDER — EMPAGLIFLOZIN 10 MG/1
10 TABLET, FILM COATED ORAL DAILY
Qty: 90 TABLET | Refills: 1 | Status: SHIPPED | OUTPATIENT
Start: 2025-05-27

## 2025-05-27 RX ORDER — ORAL SEMAGLUTIDE 3 MG/1
3 TABLET ORAL DAILY
Qty: 30 TABLET | Refills: 0 | Status: SHIPPED | OUTPATIENT
Start: 2025-05-27

## 2025-05-27 RX ORDER — ORAL SEMAGLUTIDE 7 MG/1
7 TABLET ORAL DAILY
Qty: 30 TABLET | Refills: 0 | Status: SHIPPED | OUTPATIENT
Start: 2025-06-27

## 2025-05-27 RX ORDER — METOPROLOL TARTRATE 25 MG/1
12.5 TABLET, FILM COATED ORAL 2 TIMES DAILY
Qty: 180 TABLET | Refills: 3 | Status: SHIPPED | OUTPATIENT
Start: 2025-05-27 | End: 2027-05-17

## 2025-05-27 RX ORDER — HYDROCODONE BITARTRATE AND ACETAMINOPHEN 5; 325 MG/1; MG/1
1 TABLET ORAL EVERY 6 HOURS PRN
Qty: 90 TABLET | Refills: 0 | Status: SHIPPED | OUTPATIENT
Start: 2025-05-27

## 2025-05-27 RX ORDER — ORAL SEMAGLUTIDE 14 MG/1
14 TABLET ORAL DAILY
Qty: 90 TABLET | Refills: 3 | Status: SHIPPED | OUTPATIENT
Start: 2025-07-27

## 2025-05-27 NOTE — PROGRESS NOTES
SUBJECTIVE:    Patient ID: Yeyo Hollingsworth is a 84 y.o. male.    Chief Complaint: Diabetes  84-year-old male with a history of diabetes hypertension hyperlipidemia chronic pain secondary to arthritis, insomnia presents to clinic today for follow-up on his diabetes hypertension and chronic pain.     His A1c is been stable, currently on Jardiance and Januvia discussed with patient adding GLP 1 medication to get better control of his diabetes we discussed about the risks and benefits.     Blood pressure is well controlled today will continue on his current lisinopril and metoprolol     Patient has been managing his severe arthritis with narcotics he is not refilling early doing well.        Visit today included increased complexity associated with the care of the episodic problem diabetes and hypertension addressed and managing the longitudinal care of the patient due to the serious and/or complex managed problem(s) diabetes, hypertension, hyperlipidemia, arthritis.     SPMHx:  DM: Jardiance 10mg, Januvia 100mg, Trulicity 1.5 mg  HTN: Lisinopril 20mg, Metoprolol 25mg,   Hyperlipidemia: Atorvastatin 10 mg   Arthritis: On several chronic narcotics and follows up with pain management, for his neck and back pain pt is planning to have a procedure.  Anxiety: Takes Valium 5mg, PRN  A-fib: 2012 Was cardioverted, no longer in a-fib  Insomnia: Trazadone 100mg  Hx of DVT: Currently on Eliquis 5mg   BRANDI: Wears CPAP  Chronic constipation: On polyethylene Glycol 17g, manages well with this medication.     Specialists:  Cardiology: Dr Luis Gutiérrez  Pain Management: Michele Martinez  Ortho: Dr Dean  Podiatry: Dr Mandujano  Sleep: Dr Felice Bello  GS: Dr Thorne     Smoke: Quit in 1988  ETOH: None  Exercise: Never    Hemoglobin A1C, POC 6.4        Diabetes  He presents for his follow-up diabetic visit. He has type 2 diabetes mellitus. His disease course has been stable. There are no hypoglycemic associated symptoms. Pertinent  negatives for hypoglycemia include no confusion, dizziness or headaches. Pertinent negatives for diabetes include no blurred vision, no chest pain, no fatigue, no foot paresthesias, no foot ulcerations, no polydipsia, no polyphagia, no polyuria, no visual change, no weakness and no weight loss. There are no hypoglycemic complications. Symptoms are stable. There are no diabetic complications. Risk factors for coronary artery disease include diabetes mellitus, dyslipidemia, hypertension, male sex, obesity and sedentary lifestyle. Current diabetic treatment includes oral agent (triple therapy).   Hypertension  This is a chronic problem. The current episode started more than 1 year ago. The problem is unchanged. Pertinent negatives include no blurred vision, chest pain, headaches, neck pain, palpitations or shortness of breath. There are no associated agents to hypertension. Risk factors for coronary artery disease include diabetes mellitus, dyslipidemia, male gender, obesity and sedentary lifestyle. Past treatments include ACE inhibitors and beta blockers. The current treatment provides moderate improvement. Compliance problems include exercise.    Hyperlipidemia  This is a chronic problem. The current episode started more than 1 year ago. The problem is controlled. Recent lipid tests were reviewed and are normal. Exacerbating diseases include diabetes and obesity. Pertinent negatives include no chest pain or shortness of breath. Current antihyperlipidemic treatment includes statins. The current treatment provides moderate improvement of lipids. Compliance problems include adherence to exercise.          Past Medical History:   Diagnosis Date    Allergy     Ankle pain     left    Anticoagulant long-term use     aspirin    Anxiety     Atrial fibrillation     Back pain     Bruises easily     Cataract     Clotting disorder     left leg    Colon polyp     Diabetes mellitus     Diabetes mellitus, type 2     Diverticulitis  1986    Hay fever     Hyperlipidemia     Hypertension     Left hip pain 12/26/2021    Seeing Dr. Beard for hip pain     Obese     BRANDI on CPAP      Social History[1]  Past Surgical History:   Procedure Laterality Date    ABDOMINAL SURGERY      origunal surg 1986-mult surgeries since    CARPAL TUNNEL RELEASE      right wrist 2009-left wrist 2010    COLON SURGERY      partial colon removal    COLONOSCOPY  11/26/2018    Dr. Salazar; normal terminal ileum; polyps removed from ascending colon and hepatic flexure; pan diverticulosis; small internal hemorrhoids; repeat in 5 years; Bx: fragments of an adenomatous polyp with mild surface glandular dysplasia and associated chronic active inflammation, no evidence of high-grade dysplasia or malignancy    COLONOSCOPY N/A 11/9/2023    Procedure: COLONOSCOPY;  Surgeon: Prasanth Irene MD;  Location: [facility];  Service: Endoscopy;  Laterality: N/A;    COLOSTOMY  1986    colostomy reversal  1986    ESOPHAGOGASTRODUODENOSCOPY N/A 11/9/2023    Procedure: EGD (ESOPHAGOGASTRODUODENOSCOPY);  Surgeon: Prasanth Irene MD;  Location: [facility];  Service: Endoscopy;  Laterality: N/A;    EYE SURGERY      hay fever      HERNIA REPAIR  1949    REPAIR OF TENDON OF LOWER EXTREMITY Left 6/24/2020    Procedure: REPAIR, TENDON, LOWER EXTREMITY;  Surgeon: Justin Mandujano DPM;  [place] [facility];  Service: Podiatry;  Laterality: Left;  ARTHEX NOTIFIED IN CASE HE WANTS ANCHOR    TOE SURGERY Left 2017    replaced a joint     TONSILLECTOMY  1947    TRANSFORAMINAL EPIDURAL INJECTION OF STEROID      x 4    TRANSFORAMINAL EPIDURAL INJECTION OF STEROID Bilateral 11/13/2019    Procedure: Injection,steroid,epidural,transforaminal approach;  Surgeon: Grant Wright MD;  [facility]  Service: Pain Management;  Laterality: Bilateral;  L4-5, L5-S1    TRANSFORAMINAL EPIDURAL INJECTION OF STEROID Bilateral 3/9/2021    Procedure: Injection,steroid,epidural,transforaminal approach;  Surgeon: Grant Wright  MD;  Location: [facility] OR;  Service: Pain Management;  Laterality: Bilateral;  L4-5, L5-S1    TRANSFORAMINAL EPIDURAL INJECTION OF STEROID Bilateral 5/25/2021    Procedure: Injection,steroid,epidural,transforaminal approach;  Surgeon: Grant Wright MD;  Location: [facility] OR;  Service: Pain Management;  Laterality: Bilateral;  L4-L5,L5,S1    TRANSFORAMINAL EPIDURAL INJECTION OF STEROID Bilateral 12/14/2021    Procedure: Injection,steroid,epidural,transforaminal approach Bilateral L4-5, L5-S1;  Surgeon: Grant Wright MD;  Location: [facility] OR;  Service: Pain Management;  Laterality: Bilateral;    TRANSFORAMINAL EPIDURAL INJECTION OF STEROID Bilateral 11/4/2022    Procedure: Injection,steroid,epidural,transforaminal approach;  Surgeon: Grant Wright MD;  Location: [facility] OR;  Service: Pain Management;  Laterality: Bilateral;  L4-5 and L5-s1    TRANSFORAMINAL EPIDURAL INJECTION OF STEROID Bilateral 1/20/2023    Procedure: Injection,steroid,epidural,transforaminal approach L4-L5-S1;  Surgeon: Grant Wright MD;  Location: [facility] OR;  Service: Pain Management;  Laterality: Bilateral;    TRANSFORAMINAL EPIDURAL INJECTION OF STEROID Bilateral 6/1/2023    Procedure: Injection,steroid,epidural,transforaminal approach L4-L5, L5-S1;  Surgeon: Grant Wright MD;  Location: [facility] OR;  Service: Pain Management;  Laterality: Bilateral;    TRANSFORAMINAL EPIDURAL INJECTION OF STEROID Bilateral 1/18/2024    Procedure: Injection,steroid,epidural,transforaminal approach;  Surgeon: Grant Wright MD;  Location: [facility] OR;  Service: Anesthesiology;  Laterality: Bilateral;  L4-5 and l5-s1 TFESI    TRANSFORAMINAL EPIDURAL INJECTION OF STEROID Bilateral 2/29/2024    Procedure: Injection,steroid,epidural,transforaminal approach;  Surgeon: Grant Wright MD;  Location: [facility] OR;  Service: Anesthesiology;  Laterality: Bilateral;  L4-5 and l5-s1 TFESI    TRANSFORAMINAL EPIDURAL INJECTION OF STEROID Bilateral 11/26/2024    Procedure:  "Injection,steroid,epidural,transforaminal approach;  Surgeon: Grant Wright MD;  Location: [facility];  Service: Pain Management;  Laterality: Bilateral;  l4-5 and l5-s1 to use 25 ga needle for injection, see notes     Family History   Problem Relation Name Age of Onset    Heart disease Mother      Melanoma Neg Hx      Psoriasis Neg Hx      Lupus Neg Hx      Eczema Neg Hx       Current Medications[2]  Review of patient's allergies indicates:   Allergen Reactions    Ativan [lorazepam] Other (See Comments)     Mood alternating      Dilaudid [hydromorphone (bulk)] Other (See Comments)     Mood alternating      Morphine Other (See Comments)     Mood alternating      Adhesive tape-silicones     Hydromorphone        Review of Systems   Constitutional:  Positive for activity change. Negative for diaphoresis, fatigue, unexpected weight change and weight loss.   HENT:  Negative for congestion, hearing loss, rhinorrhea and trouble swallowing.    Eyes:  Negative for blurred vision, discharge and visual disturbance.   Respiratory:  Negative for cough, chest tightness, shortness of breath and wheezing.    Cardiovascular:  Negative for chest pain and palpitations.   Gastrointestinal:  Negative for abdominal distention, abdominal pain, anal bleeding, blood in stool, constipation, diarrhea and vomiting.   Endocrine: Negative for polydipsia, polyphagia and polyuria.   Genitourinary:  Negative for difficulty urinating, flank pain, frequency, hematuria and urgency.   Musculoskeletal:  Positive for arthralgias. Negative for joint swelling and neck pain.   Neurological:  Negative for dizziness, weakness and headaches.   Psychiatric/Behavioral:  Negative for confusion and dysphoric mood.           Blood pressure 135/67, pulse 65, height 5' 8" (1.727 m), weight 106.2 kg (234 lb 2.1 oz), SpO2 96%. Body mass index is 35.6 kg/m².   Objective:      Physical Exam  Vitals reviewed.   Constitutional:       General: He is not in acute distress.   "   Appearance: Normal appearance. He is obese. He is not ill-appearing or toxic-appearing.   HENT:      Head: Normocephalic and atraumatic.      Right Ear: Tympanic membrane normal.      Left Ear: Tympanic membrane normal.      Nose: No congestion or rhinorrhea.      Mouth/Throat:      Mouth: Mucous membranes are moist.      Pharynx: Oropharynx is clear. No oropharyngeal exudate or posterior oropharyngeal erythema.   Cardiovascular:      Rate and Rhythm: Normal rate and regular rhythm.      Heart sounds: Normal heart sounds. No murmur heard.  Pulmonary:      Effort: Pulmonary effort is normal.      Breath sounds: Normal breath sounds.   Skin:     General: Skin is warm and dry.      Capillary Refill: Capillary refill takes less than 2 seconds.   Neurological:      Mental Status: He is alert and oriented to person, place, and time.         Assessment:       1. Familial hypercholesterolemia    2. Essential hypertension    3. Controlled type 2 diabetes mellitus without complication, without long-term current use of insulin    4. Chronic pain syndrome    5. Arthritis         Plan:           Familial hypercholesterolemia  Pt to continue on current dose of statins. Limit red meat, butter, fried foods, cheese, and other foods that have a lot of saturated fat. Consume more: lean meats, fish, fruits, vegetables, whole grains, beans, lentils, and nuts.  Weight loss, and 30-45 min of cardiovascular exercise daily.    Essential hypertension  -     metoprolol tartrate (LOPRESSOR) 25 MG tablet; Take 0.5 tablets (12.5 mg total) by mouth 2 (two) times daily.  Dispense: 180 tablet; Refill: 3  -     Lipid Panel; Future; Expected date: 05/27/2025  Reduce the amount of salt in your diet; Lose weight; Avoid drinking too much alcohol; Exercise at least 30 minutes per day most days of the week.  Continue current medications and home BP monitoring.    Controlled type 2 diabetes mellitus without complication, without long-term current use of  insulin  -     JARDIANCE 10 mg tablet; Take 1 tablet (10 mg total) by mouth once daily.  Dispense: 90 tablet; Refill: 1  -     semaglutide (RYBELSUS) 3 mg tablet; Take 1 tablet (3 mg total) by mouth once daily.  Dispense: 30 tablet; Refill: 0  -     semaglutide (RYBELSUS) 7 mg tablet; Take 1 tablet (7 mg total) by mouth once daily.  Dispense: 30 tablet; Refill: 0  -     semaglutide (RYBELSUS) 14 mg tablet; Take 1 tablet (14 mg total) by mouth once daily.  Dispense: 90 tablet; Refill: 3  -     Hemoglobin A1C; Future; Expected date: 2025  -     Microalbumin/Creatinine Ratio, Urine; Future; Expected date: 2025  -     Lipid Panel; Future; Expected date: 2025  Controlled pt would like to stop the trulicity will switch to Rybelsus oral.    Chronic pain syndrome  -     HYDROcodone-acetaminophen (NORCO) 5-325 mg per tablet; Take 1 tablet by mouth every 6 (six) hours as needed for Pain.  Dispense: 90 tablet; Refill: 0    Arthritis  -     HYDROcodone-acetaminophen (NORCO) 5-325 mg per tablet; Take 1 tablet by mouth every 6 (six) hours as needed for Pain.  Dispense: 90 tablet; Refill: 0                               [1]   Social History  Socioeconomic History    Marital status:    Tobacco Use    Smoking status: Former     Current packs/day: 0.00     Types: Cigarettes     Quit date: 2006     Years since quittin.2    Smokeless tobacco: Never    Tobacco comments:     ex smoker    Substance and Sexual Activity    Alcohol use: Yes     Comment: rarely    Drug use: No    Sexual activity: Yes     Partners: Female     Social Drivers of Health     Financial Resource Strain: Low Risk  (2025)    Overall Financial Resource Strain (CARDI)     Difficulty of Paying Living Expenses: Not hard at all   Food Insecurity: No Food Insecurity (2025)    Hunger Vital Sign     Worried About Running Out of Food in the Last Year: Never true     Ran Out of Food in the Last Year: Never true   Transportation  Needs: No Transportation Needs (2/23/2025)    PRAPARE - Transportation     Lack of Transportation (Medical): No     Lack of Transportation (Non-Medical): No   Physical Activity: Inactive (2/23/2025)    Exercise Vital Sign     Days of Exercise per Week: 0 days     Minutes of Exercise per Session: 0 min   Stress: No Stress Concern Present (2/23/2025)    [employer] - Occupational Stress Questionnaire     Feeling of Stress : Only a little   Housing Stability: Low Risk  (2/23/2025)    Housing Stability Vital Sign     Unable to Pay for Housing in the Last Year: No     Number of Times Moved in the Last Year: 0     Homeless in the Last Year: No   [2]   Current Outpatient Medications   Medication Sig Dispense Refill    alcohol swabs (ALCOHOL PADS) PadM Apply 1 each topically as needed (before shot). 100 each 0    apixaban (ELIQUIS) 5 mg Tab Take 1 tablet (5 mg total) by mouth 2 (two) times daily. 180 tablet 3    aspirin 81 MG Chew Take 1 tablet (81 mg total) by mouth once daily. 100 tablet 2    atorvastatin (LIPITOR) 10 MG tablet Take 1 tablet (10 mg total) by mouth once daily. 90 tablet 3    azelastine (ASTELIN) 137 mcg (0.1 %) nasal spray 2 sprays (274 mcg total) by Nasal route 2 (two) times daily. 30 mL 5    blood sugar diagnostic (BLOOD GLUCOSE TEST) Strp 1 strip by Misc.(Non-Drug; Combo Route) route 2 (two) times a day. FREESTYLE LITE BG TEST STRIPS. current use of insulin  - Primary ICD-10-CM: E11.9 200 each 2    blood sugar diagnostic Strp To check BG 1 times daily, to use with insurance preferred meter 200 strip 0    blood-glucose meter kit To check BG 1 times daily, to use with insurance preferred meter 1 each 0    clotrimazole (LOTRIMIN) 1 % cream Apply topically 2 (two) times daily. 60 g 10    diclofenac sodium (VOLTAREN) 1 % Gel Apply 2 g topically once daily. 100 g 2    diphenhydrAMINE (BENADRYL) 50 MG tablet Take as needed by oral route.      diphenhydrAMINE-zinc acetate 1-0.1%  (BENADRYL ITCH STOPPING) cream Apply topically 3 (three) times daily as needed for Itching. 28 g 0    fexofenadine (ALLEGRA) 180 MG tablet Take 1 tablet (180 mg total) by mouth once daily. 90 tablet 3    FREESTYLE LANCETS 28 gauge lancets Inject 1 lancet  into the skin 3 (three) times daily. 100 each 3    furosemide (LASIX) 20 MG tablet Take 1 tablet (20 mg total) by mouth every Mon, Wed, Fri. 45 tablet 3    gabapentin (NEURONTIN) 300 MG capsule Take 1 capsule (300 mg total) by mouth 3 (three) times daily. 270 capsule 1    ketoconazole (NIZORAL) 2 % cream Apply topically once daily. 60 g 1    lancets Misc To check BG 1 times daily, to use with insurance preferred meter 200 each 0    lisinopriL (PRINIVIL,ZESTRIL) 20 MG tablet Take 1 tablet (20 mg total) by mouth once daily. 90 tablet 1    montelukast (SINGULAIR) 10 mg tablet Take 1 tablet (10 mg total) by mouth every evening. 90 tablet 3    multivitamin with minerals tablet Take 1 tablet by mouth once daily.      mupirocin (BACTROBAN) 2 % ointment SMARTSIG:Sparingly Topical Twice Daily      naftifine 2 % Crea APPLY AS A THIN LAYER TO THE AFFECTED AREA(S) PLUS A 0.5 INCH MARGIN OF HEALTHY SURROUNDING SKIN BY TOPICAL ROUTE ONCE DAILY FOR 2 WEEKS      polyethylene glycol (GLYCOLAX) 17 gram/dose powder Take 17 g by mouth once daily. 507 g 3    potassium chloride (MICRO-K) 10 MEQ CpSR Take 1 capsule (10 mEq total) by mouth every other day. 90 capsule 1    SITagliptin phosphate (JANUVIA) 100 MG Tab Take 1 tablet (100 mg total) by mouth once daily. 90 tablet 3    traMADoL (ULTRAM) 50 mg tablet Take 1 tablet every 6 hours by oral route.      triamcinolone acetonide 0.1% (KENALOG) 0.1 % cream Apply topically 2 (two) times daily. 80 g 0    dulaglutide (TRULICITY) 1.5 mg/0.5 mL pen injector Inject 1.5 mg into the skin every 7 days. (Patient not taking: Reported on 5/27/2025) 4 pen 11    HYDROcodone-acetaminophen (NORCO) 5-325 mg per tablet Take 1 tablet by mouth every 6 (six)  hours as needed for Pain. 90 tablet 0    JARDIANCE 10 mg tablet Take 1 tablet (10 mg total) by mouth once daily. 90 tablet 1    metoprolol tartrate (LOPRESSOR) 25 MG tablet Take 0.5 tablets (12.5 mg total) by mouth 2 (two) times daily. 180 tablet 3    [START ON 7/27/2025] semaglutide (RYBELSUS) 14 mg tablet Take 1 tablet (14 mg total) by mouth once daily. 90 tablet 3    semaglutide (RYBELSUS) 3 mg tablet Take 1 tablet (3 mg total) by mouth once daily. 30 tablet 0    [START ON 6/27/2025] semaglutide (RYBELSUS) 7 mg tablet Take 1 tablet (7 mg total) by mouth once daily. 30 tablet 0     No current facility-administered medications for this visit.     Facility-Administered Medications Ordered in Other Visits   Medication Dose Route Frequency Provider Last Rate Last Admin    lactated ringers infusion   Intravenous Once PRN Grant Wright MD   Stopped at 11/04/22 1230    lactated ringers infusion   Intravenous Continuous Grant Wright MD 25 mL/hr at 02/29/24 1154 New Bag at 02/29/24 1154

## 2025-05-27 NOTE — PROGRESS NOTES
Population Health Chart Review & Patient Outreach Details      Additional [facility] Notes:               Updates Requested / Reviewed:      Updated Care Coordination Note, Care Everywhere, , Care Team Updated, and Immunizations Reconciliation Completed or Queried: [facility] Topics Overdue:      [facility] Score: 2     Urine Screening  Eye Exam    Shingles/Zoster Vaccine                  Health Maintenance Topic(s) Outreach Outcomes & Actions Taken:    Eye Exam - Outreach Outcomes & Actions Taken  : External Records Requested & Care Team Updated if Applicable

## 2025-05-27 NOTE — TELEPHONE ENCOUNTER
Mandeep LARSON Approved 3mg    Approved  Prior Authorization Portal   Prior authorization approved  Payer:  [employer] Case ID: [id number]  Note from payer: CaseId:[id number];Status:Approved;Review Type:Prior Auth;Coverage Start Date:04/27/2025;Coverage End Date:12/31/2099;  Approval Details    Authorized from April 27, 2025 to December 31, 2099  Electronic appeal: Not supported  View History        Approved  Prior Authorization Portal   Rybelsus 7mg  Prior authorization approved    Payer:  [employer] Case ID: [id number]  Note from payer: CaseId:[id number];Status:Approved;Review Type:Prior Auth;Coverage Start Date:04/27/2025;Coverage End Date:12/31/2099;  Approval Details    Authorized from April 27, 2025 to December 31, 2099  Electronic appeal: Not supported      Approved  Prior Authorization Portal  Rybelsus 14mg  Prior authorization approved  Payer:  [employer] Case ID: [id number]  Note from payer: CaseId:[id number];Status:Approved;Review Type:Prior Auth;Coverage Start Date:04/27/2025;Coverage End Date:12/31/2099;  Approval Details    Authorized from April 27, 2025 to December 31, 2099  Electronic appeal: Not supported  View History   X ray at bedside

## 2025-05-27 NOTE — LETTER
AUTHORIZATION FOR RELEASE OF   CONFIDENTIAL INFORMATION    Dear Dr. Fermin Claros,     We are seeing Yeyo Hollingsworth, date of birth 1940, in the clinic at [facility]. Garland Ocampo MD is the patient's PCP. Yeyo Hollingsworth has an outstanding lab/procedure at the time we reviewed his chart. In order to help keep his health information updated, he has authorized us to request the following medical record(s):     ( x )  EYE EXAM              Please fax records to [facility], Arnold, Ryan D., MD, [phone]     If you have any questions, please contact       Emilia Marks  Nurse Clinical Care Coordinator  [facility]  Phone: [phone]  Fax: [phone]    Patient Name: Yeyo Hollingsworth  : 1940  Patient Phone #: [phone]              Yeyo Hollingsworth  MRN: [id number]  : 1940  Age: 84 y.o.  Sex: male         Patient/Legal Guardian Signature  This signature was collected at 2024           _______________________________   Printed Name/Relationship to Patient      Consent for Examination and Treatment: I hereby authorize the providers and employees of [facility] ([facility]) to provide medical treatment/services which includes, but is not limited to, performing and administering tests and diagnostic procedures that are deemed necessary, including, but not limited to, imaging examinations, blood tests and other laboratory procedures as may be required by the hospital, clinic, or may be ordered by my physician(s) or persons working under the general and/or special instructions of my physician(s).      I understand and agree that this consent covers all authorized persons, including but not limited to physicians, residents, nurse practitioners, physicians' assistants, specialists, consultants, student nurses, and independently contracted physicians, who are called upon by the physician in charge, to carry out  the diagnostic procedures and medical or surgical treatment.     I hereby authorize [facility] to retain or dispose of any specimens or tissue, should there be such remaining from any test or procedure.     I hereby authorize and give consent for [facility] providers and employees to take photographs, images or videotapes of such diagnostic, surgical or treatment procedures of Patient as may be required by [facility] or as may be ordered by a physician. I further acknowledge and agree that [facility] may use cameras or other devices for patient monitoring.     I am aware that the practice of medicine is not an exact science, and I acknowledge that no guarantees have been made to me as to the outcome of any tests, procedures or treatment.     Authorization for Release of Information: I understand that my insurance company and/or their agents may need information necessary to make determinations about payment/reimbursement. I hereby provide authorization to release to all insurance companies, their successors, assignees, other parties with whom they may have contracted, or others acting on their behalf, that are involved with payment for any hospital and/or clinic charges incurred by the patient, any information that they request and deem necessary for payment/reimbursement, and/or quality review.  I further authorize the release of my health information to physicians or other health care practitioners on staff who are involved in my health care now and in the future, and to other health care providers, entities, or institutions for the purpose of my continued care and treatment, including referrals.     REGISTRATION AUTHORIZATION  Form No. 14005 (Rev. 3/25/2024)    Page 1 of 3                       Medicare Patient's Certification and Authorization to Release Information and Payment Request:  I certify that the information given by me in applying for payment under Title XVIII of the Social Security Act is correct. I authorize  any [place] of medical or other information about me to release to the [facility], or its intermediaries or carriers, any information needed for this or a related Medicare claim. I request that payment of authorized benefits be made on my behalf.     Assignment of Insurance Benefits:   I hereby authorize any and all insurance companies, health plans, defined   benefit plans, health insurers or any entity that is or may be responsible for payment of my medical expenses to pay all hospital and medical benefits now due, and to become due and payable to me under any hospital benefits, sick benefits, injury benefits or any other benefit for services rendered to me, including Major Medical Benefits, direct to [facility] and all independently contracted physicians. I assign any and all rights that I may have against any and all insurance companies, health plans, defined benefit plans, health insurers or any entity that is or may be responsible for payment of my medical expenses, including, but not limited to any right to appeal a denial of a claim, any right to bring any action, lawsuit, administrative proceeding, or other cause of action on my behalf. I specifically assign my right to pursue litigation against any and all insurance companies, health plans, defined benefit plans, health insurers or any entity that is or may be responsible for payment of my medical expenses based upon a refusal to pay charges.            E. Valuables: It is understood and agreed that [facility] is not liable for the damage to or loss of any money, jewelry,   documents, dentures, eye glasses, hearing aids, prosthetics, or other property of value.     F. Computer Equipment: I understand and agree that should I choose to use computer equipment owned by [facility] or if I choose to access the Internet via [facility]s network, I do so at my own risk. [email or web address] is not responsible for any damage to my computer equipment or to any damages of  any type that might arise from my loss of equipment or data.     G. Acceptance of Financial Responsibility:  I agree that in consideration of the services and   supplies that have been   or will be furnished to the patient, I am hereby obligated to pay all charges made for or on the account of the patient according to the standard rates (in effect at the time the services and supplies are delivered) established by [facility], including its Patient Financial Assistance Policy to the extent it is applicable. I understand that I am responsible for all charges, or portions thereof, not covered by insurance or other sources. Patient refunds will be distributed only after balances at all [facility] facilities are paid.     H. Communication Authorization:  I hereby authorize [facility] and its representatives, along with any billing service   or  who may work on their behalf, to contact me on   my cell phone and/or home phone using pre- recorded messages, artificial voice messages, automatic telephone dialing devices or other computer assisted technology, or by electronic      mail, text messaging, or by any other form of electronic communication. This includes, but is not limited to, appointment reminders, yearly physical exam reminders, preventive care reminders, patient campaigns, welcome calls, and calls about account balances on my account or any account on which I am listed as a guarantor. I understand I have the right to opt out of these communications at any time.      Relationship  Between  Facility and  Provider:      I understand that some, but not all, providers furnishing services to the patient are not employees or agents of [facility]. The patient is under the care and supervision of his/her attending physician, and it is the responsibility of the facility and its nursing staff to carry out the instructions of such physicians. It is the responsibility of the patient's physician/designee to obtain the  patient's informed consent, when required, for medical or surgical treatment, special diagnostic or therapeutic procedures, or hospital services rendered for the patient under the special instructions of the physician/designee.           REGISTRATION AUTHORIZATION  Form No. 02083 (Rev. 3/25/2024)    Page 2 of 3                       Immunizations: [facility] shares immunization information with state sponsored health departments to help you and your doctor keep track of your immunization records. By signing, you consent to have this information shared with the health department in your state:                                [place] - LINKS ([place] Immunization Network for Kids Statewide)                                [place] - MIIX ([place] Immunization Information eXchange)                                [place] - ImmPRINT (Immunization Patient Registry with Integrated Technology)     TERM: This authorization is valid for this and subsequent care/treatment I receive at [facility] and will remain valid unless/until revoked in writing by me.     [facility]: As used in this document, [facility] means all [facility] owned and managed facilities, including, but not limited to, all health centers, surgery centers, clinics, urgent care centers, and hospitals.         [facility] complies with applicable Federal civil rights laws and does not discriminate on the basis of race, color, national origin, age, disability, or sex.  ATENCIÓN: si habla zoë, tiene a ramírez disposición servicios gratuitos de asistencia lingüística. Laura al [phone].  [facility] Ý: N?u b?n nói Ti?ng Vi?t, có các d?ch v? h? tr? ngôn ng? mi?n phí dành cho b?n. G?i s? [phone].        REGISTRATION AUTHORIZATION  Form No. 58024 (Rev. 3/25/2024)   Page 3 of 3     Patient

## 2025-05-30 ENCOUNTER — OFFICE VISIT (OUTPATIENT)
Dept: URGENT CARE | Facility: CLINIC | Age: 85
End: 2025-05-30
Payer: MEDICARE

## 2025-05-30 VITALS
HEIGHT: 68 IN | RESPIRATION RATE: 20 BRPM | TEMPERATURE: 98 F | SYSTOLIC BLOOD PRESSURE: 127 MMHG | HEART RATE: 92 BPM | WEIGHT: 236 LBS | OXYGEN SATURATION: 95 % | BODY MASS INDEX: 35.77 KG/M2 | DIASTOLIC BLOOD PRESSURE: 71 MMHG

## 2025-05-30 DIAGNOSIS — R09.81 SINUS CONGESTION: ICD-10-CM

## 2025-05-30 DIAGNOSIS — R09.82 POSTNASAL DRIP: ICD-10-CM

## 2025-05-30 DIAGNOSIS — J02.9 SORE THROAT: ICD-10-CM

## 2025-05-30 DIAGNOSIS — R05.9 COUGH, UNSPECIFIED TYPE: ICD-10-CM

## 2025-05-30 DIAGNOSIS — J01.40 ACUTE NON-RECURRENT PANSINUSITIS: Primary | ICD-10-CM

## 2025-05-30 LAB
CTP QC/QA: YES
FLUAV AG NPH QL: NEGATIVE
FLUBV AG NPH QL: NEGATIVE
S PYO RRNA THROAT QL PROBE: NEGATIVE
SARS-COV+SARS-COV-2 AG RESP QL IA.RAPID: NEGATIVE

## 2025-05-30 RX ORDER — FLUTICASONE PROPIONATE 50 MCG
1 SPRAY, SUSPENSION (ML) NASAL DAILY
Qty: 15.8 ML | Refills: 0 | Status: SHIPPED | OUTPATIENT
Start: 2025-05-30

## 2025-05-30 NOTE — PATIENT INSTRUCTIONS
Symptomatic treatment to include:     Rest, increase fluid intake to include electrolyte replacement  Ibuprofen/Tylenol as directed for fever, sore throat, body aches  Astelin and flonase for sinus symptoms  Coricidin HBP or Mucinex HBP over-the-counter as directed for cough due to history of high blood pressure.  Warm, salt water gargles, over the counter throat lozenges or sprays as desires.   ER for difficulty breathing not relieved by rest, excessive lethargy and/or change in mental status

## 2025-05-30 NOTE — PROGRESS NOTES
"Subjective:      Patient ID: Yeyo Hollingsworth is a 84 y.o. male.    Vitals:  height is 5' 7.5" (1.715 m) and weight is 107 kg (236 lb). His oral temperature is 97.9 °F (36.6 °C). His blood pressure is 127/71 and his pulse is 92. His respiration is 20 and oxygen saturation is 95%.     Chief Complaint: Sore Throat    84-year-old male presents for evaluation of sinus congestion, sore throat, and fatigue.  He reports it started 2 days ago.  Denies fever, chills, shortness of breath, difficulty breathing.  Reports mild cough with some mucus production.  Patient has a history of AFib, hypertension, kidney disease stage 3, and BPH.    Sore Throat   This is a new problem. The current episode started in the past 7 days. The problem has been gradually worsening. Sore throat worse side: Both sides. The pain is at a severity of 5/10. The pain is moderate. Associated symptoms include congestion, coughing and trouble swallowing. He has tried acetaminophen and oral narcotic analgesics for the symptoms. The treatment provided mild relief.       Constitution: Positive for chills and fatigue. Negative for fever.   HENT:  Positive for congestion, postnasal drip, sore throat and trouble swallowing.    Respiratory:  Positive for cough and sputum production (Minimal).       Objective:     Physical Exam   Constitutional: He is oriented to person, place, and time. He appears well-developed. He is cooperative.  Non-toxic appearance. He does not appear ill. No distress.   HENT:   Head: Normocephalic and atraumatic.   Ears:   Right Ear: Hearing, tympanic membrane, external ear and ear canal normal.   Left Ear: Hearing, tympanic membrane, external ear and ear canal normal.   Nose: Rhinorrhea and congestion present. No mucosal edema or nasal deformity. No epistaxis. Right sinus exhibits no maxillary sinus tenderness and no frontal sinus tenderness. Left sinus exhibits no maxillary sinus tenderness and no frontal sinus tenderness. "   Mouth/Throat: Uvula is midline and mucous membranes are normal. Mucous membranes are moist. No trismus in the jaw. Normal dentition. No uvula swelling. Posterior oropharyngeal erythema present. No oropharyngeal exudate or posterior oropharyngeal edema.   Eyes: Conjunctivae and lids are normal. No scleral icterus.   Neck: Trachea normal and phonation normal. Neck supple. No edema present. No erythema present. No neck rigidity present.   Cardiovascular: Normal rate, regular rhythm and normal heart sounds.   Pulmonary/Chest: Effort normal and breath sounds normal. No respiratory distress. He has no decreased breath sounds. He has no wheezes. He has no rhonchi. He has no rales.   Abdominal: Normal appearance.   Musculoskeletal: Normal range of motion.         General: No deformity. Normal range of motion.   Neurological: He is alert and oriented to person, place, and time. He displays no weakness. He exhibits normal muscle tone.   Skin: Skin is warm, dry, intact, not diaphoretic and not pale.   Psychiatric: His speech is normal and behavior is normal. Mood, judgment and thought content normal.   Nursing note and vitals reviewed.      Assessment:     1. Acute non-recurrent pansinusitis    2. Sore throat    3. Sinus congestion    4. Postnasal drip    5. Cough, unspecified type        Plan:       Acute non-recurrent pansinusitis    Sore throat  -     SARS Coronavirus 2 Antigen, POCT Manual Read  -     POCT Influenza A/B Rapid Antigen  -     POCT rapid strep A    Sinus congestion  -     fluticasone propionate (FLONASE) 50 mcg/actuation nasal spray; 1 spray (50 mcg total) by Each Nostril route once daily.  Dispense: 15.8 mL; Refill: 0    Postnasal drip    Cough, unspecified type      COVID:  FLU:  Strep: neg    Patient has Astelin currently prescribed at home, he also reports taking Allegra daily.  Discussed the addition of Flonase as well as over-the-counter Coricidin HBP or Mucinex HBP for cough and mucus  reduction.    Discussed medication with patient who acknowledges understanding and is agreeable to POC. Follow up with primary care. Increase fluid intake. Red flags for ER discussed.

## 2025-06-02 ENCOUNTER — TELEPHONE (OUTPATIENT)
Dept: URGENT CARE | Facility: CLINIC | Age: 85
End: 2025-06-02
Payer: MEDICARE

## 2025-06-23 ENCOUNTER — TELEPHONE (OUTPATIENT)
Dept: PAIN MEDICINE | Facility: CLINIC | Age: 85
End: 2025-06-23
Payer: MEDICARE

## 2025-06-23 NOTE — TELEPHONE ENCOUNTER
Spoke with pt and he states he had at least 50% relief x 6 months from last TFESI pain is the same hasn't changed. Ivette , Ok to schedule repeat?     Kellie, pt is on eliquis and ASA ok to hold x 3 days prior to the epidural injection? Last seen 01/2025. Thank you.

## 2025-06-25 NOTE — TELEPHONE ENCOUNTER
Pt ws informed we are waiting for cardiology to okay blood thinner hold then we will call him back to schedule.

## 2025-06-27 ENCOUNTER — TELEPHONE (OUTPATIENT)
Dept: CARDIOLOGY | Facility: CLINIC | Age: 85
End: 2025-06-27
Payer: MEDICARE

## 2025-06-27 NOTE — TELEPHONE ENCOUNTER
Cardiac clearance     Hold ASA 3 days prior to epidural injection.    Provider : Ivette Bautista PA-C  Pain Medicine

## 2025-06-27 NOTE — LETTER
..  [facility] [facility] [place] [facility]  [address]  [address]  [facility] [address]  Phone: [phone]  Fax: [phone] Date: 2025    Patient: Yeyo Hollingsworth                 MRN#:[id number]  : 1940  Referring Physician:  Alee Castorena NP   Provider : Ivette Bautista PA-C         Procedure: epidural injection.    Current Outpatient Medications   Medication Sig Dispense Refill    alcohol swabs (ALCOHOL PADS) PadM Apply 1 each topically as needed (before shot). 100 each 0    apixaban (ELIQUIS) 5 mg Tab Take 1 tablet (5 mg total) by mouth 2 (two) times daily. 180 tablet 3    aspirin 81 MG Chew Take 1 tablet (81 mg total) by mouth once daily. 100 tablet 2    atorvastatin (LIPITOR) 10 MG tablet Take 1 tablet (10 mg total) by mouth once daily. 90 tablet 3    azelastine (ASTELIN) 137 mcg (0.1 %) nasal spray 2 sprays (274 mcg total) by Nasal route 2 (two) times daily. 30 mL 5    blood sugar diagnostic (BLOOD GLUCOSE TEST) Strp 1 strip by Misc.(Non-Drug; Combo Route) route 2 (two) times a day. FREESTYLE LITE BG TEST STRIPS. current use of insulin  - Primary ICD-10-CM: E11.9 200 each 2    blood sugar diagnostic Strp To check BG 1 times daily, to use with insurance preferred meter 200 strip 0    blood-glucose meter kit To check BG 1 times daily, to use with insurance preferred meter 1 each 0    clotrimazole (LOTRIMIN) 1 % cream Apply topically 2 (two) times daily. 60 g 10    diclofenac sodium (VOLTAREN) 1 % Gel Apply 2 g topically once daily. 100 g 2    diphenhydrAMINE (BENADRYL) 50 MG tablet Take as needed by oral route.      diphenhydrAMINE-zinc acetate 1-0.1% (BENADRYL ITCH STOPPING) cream Apply topically 3 (three) times daily as needed for Itching. 28 g 0    dulaglutide (TRULICITY) 1.5 mg/0.5 mL pen injector Inject 1.5 mg into the skin every 7 days. 4 pen 11    fexofenadine (ALLEGRA) 180 MG tablet Take 1 tablet (180 mg total) by mouth once daily. 90 tablet 3     fluticasone propionate (FLONASE) 50 mcg/actuation nasal spray 1 spray (50 mcg total) by Each Nostril route once daily. 15.8 mL 0    FREESTYLE LANCETS 28 gauge lancets Inject 1 lancet  into the skin 3 (three) times daily. 100 each 3    furosemide (LASIX) 20 MG tablet Take 1 tablet (20 mg total) by mouth every Mon, Wed, Fri. 45 tablet 3    gabapentin (NEURONTIN) 300 MG capsule Take 1 capsule (300 mg total) by mouth 3 (three) times daily. 270 capsule 1    HYDROcodone-acetaminophen (NORCO) 5-325 mg per tablet Take 1 tablet by mouth every 6 (six) hours as needed for Pain. 90 tablet 0    JARDIANCE 10 mg tablet Take 1 tablet (10 mg total) by mouth once daily. 90 tablet 1    ketoconazole (NIZORAL) 2 % cream Apply topically once daily. 60 g 1    lancets Misc To check BG 1 times daily, to use with insurance preferred meter 200 each 0    lisinopriL (PRINIVIL,ZESTRIL) 20 MG tablet Take 1 tablet (20 mg total) by mouth once daily. 90 tablet 1    metoprolol tartrate (LOPRESSOR) 25 MG tablet Take 0.5 tablets (12.5 mg total) by mouth 2 (two) times daily. 180 tablet 3    montelukast (SINGULAIR) 10 mg tablet Take 1 tablet (10 mg total) by mouth every evening. 90 tablet 3    multivitamin with minerals tablet Take 1 tablet by mouth once daily.      mupirocin (BACTROBAN) 2 % ointment SMARTSIG:Sparingly Topical Twice Daily      naftifine 2 % Crea APPLY AS A THIN LAYER TO THE AFFECTED AREA(S) PLUS A 0.5 INCH MARGIN OF HEALTHY SURROUNDING SKIN BY TOPICAL ROUTE ONCE DAILY FOR 2 WEEKS      polyethylene glycol (GLYCOLAX) 17 gram/dose powder Take 17 g by mouth once daily. 507 g 3    potassium chloride (MICRO-K) 10 MEQ CpSR Take 1 capsule (10 mEq total) by mouth every other day. 90 capsule 1    [START ON 7/27/2025] semaglutide (RYBELSUS) 14 mg tablet Take 1 tablet (14 mg total) by mouth once daily. 90 tablet 3    semaglutide (RYBELSUS) 3 mg tablet Take 1 tablet (3 mg total) by mouth once daily. 30 tablet 0    semaglutide (RYBELSUS) 7 mg  tablet Take 1 tablet (7 mg total) by mouth once daily. 30 tablet 0    SITagliptin phosphate (JANUVIA) 100 MG Tab Take 1 tablet (100 mg total) by mouth once daily. 90 tablet 3    traMADoL (ULTRAM) 50 mg tablet Take 1 tablet every 6 hours by oral route.      triamcinolone acetonide 0.1% (KENALOG) 0.1 % cream Apply topically 2 (two) times daily. 80 g 0     No current facility-administered medications for this visit.     Facility-Administered Medications Ordered in Other Visits   Medication Dose Route Frequency Provider Last Rate Last Admin    lactated ringers infusion   Intravenous Once PRN Grant Wright MD   Stopped at 11/04/22 1230    lactated ringers infusion   Intravenous Continuous Grant Wright MD 25 mL/hr at 02/29/24 1154 New Bag at 02/29/24 1154       This patient has been assessed for risk factors for clearance of surgery with the following stipulations:    [] No Contraindications.      [x] Recommendations for ASPIRIN: Hold X 7 DAYS.    [x] Patient is MODERATE RISK    [] Cleared for surgery.    [] Not Cleared for surgery.    If you have any questions regarding the above, please contact my office at [phone]    Clearing Clinician:                PATO Patterson, RUSS-BC

## 2025-06-30 ENCOUNTER — TELEPHONE (OUTPATIENT)
Dept: PAIN MEDICINE | Facility: CLINIC | Age: 85
End: 2025-06-30
Payer: MEDICARE

## 2025-07-08 ENCOUNTER — HOSPITAL ENCOUNTER (OUTPATIENT)
Facility: HOSPITAL | Age: 85
Discharge: HOME | End: 2025-07-08
Attending: ANESTHESIOLOGY | Admitting: ANESTHESIOLOGY
Payer: MEDICARE

## 2025-07-08 DIAGNOSIS — M54.16 LUMBAR RADICULITIS: ICD-10-CM

## 2025-07-08 LAB — POCT GLUCOSE: 113 MG/DL (ref 70–110)

## 2025-07-08 PROCEDURE — 64483 NJX AA&/STRD TFRM EPI L/S 1: CPT | Mod: 50,,, | Performed by: ANESTHESIOLOGY

## 2025-07-08 PROCEDURE — 63600175 PHARM REV CODE 636 W HCPCS: Performed by: ANESTHESIOLOGY

## 2025-07-08 PROCEDURE — 64484 NJX AA&/STRD TFRM EPI L/S EA: CPT | Mod: 50 | Performed by: ANESTHESIOLOGY

## 2025-07-08 PROCEDURE — 64483 NJX AA&/STRD TFRM EPI L/S 1: CPT | Mod: 50 | Performed by: ANESTHESIOLOGY

## 2025-07-08 PROCEDURE — 64484 NJX AA&/STRD TFRM EPI L/S EA: CPT | Mod: 50,,, | Performed by: ANESTHESIOLOGY

## 2025-07-08 PROCEDURE — 25500020 PHARM REV CODE 255: Performed by: ANESTHESIOLOGY

## 2025-07-08 RX ORDER — LIDOCAINE HYDROCHLORIDE 10 MG/ML
1 INJECTION, SOLUTION EPIDURAL; INFILTRATION; INTRACAUDAL; PERINEURAL ONCE
Status: COMPLETED | OUTPATIENT
Start: 2025-07-08 | End: 2025-07-08

## 2025-07-08 RX ORDER — LIDOCAINE HYDROCHLORIDE 10 MG/ML
INJECTION, SOLUTION EPIDURAL; INFILTRATION; INTRACAUDAL; PERINEURAL
Status: DISCONTINUED | OUTPATIENT
Start: 2025-07-08 | End: 2025-07-08 | Stop reason: HOSPADM

## 2025-07-08 RX ORDER — BUPIVACAINE HYDROCHLORIDE 2.5 MG/ML
INJECTION, SOLUTION EPIDURAL; INFILTRATION; INTRACAUDAL; PERINEURAL
Status: DISCONTINUED | OUTPATIENT
Start: 2025-07-08 | End: 2025-07-08 | Stop reason: HOSPADM

## 2025-07-08 RX ORDER — DEXAMETHASONE SODIUM PHOSPHATE 10 MG/ML
INJECTION INTRAMUSCULAR; INTRAVENOUS
Status: DISCONTINUED | OUTPATIENT
Start: 2025-07-08 | End: 2025-07-08 | Stop reason: HOSPADM

## 2025-07-08 RX ORDER — FENTANYL CITRATE 50 UG/ML
INJECTION, SOLUTION INTRAMUSCULAR; INTRAVENOUS
Status: DISCONTINUED | OUTPATIENT
Start: 2025-07-08 | End: 2025-07-08 | Stop reason: HOSPADM

## 2025-07-08 RX ORDER — SODIUM CHLORIDE, SODIUM LACTATE, POTASSIUM CHLORIDE, CALCIUM CHLORIDE 600; 310; 30; 20 MG/100ML; MG/100ML; MG/100ML; MG/100ML
INJECTION, SOLUTION INTRAVENOUS CONTINUOUS
Status: DISCONTINUED | OUTPATIENT
Start: 2025-07-08 | End: 2025-07-08 | Stop reason: HOSPADM

## 2025-07-08 RX ORDER — MIDAZOLAM HYDROCHLORIDE 1 MG/ML
INJECTION INTRAMUSCULAR; INTRAVENOUS
Status: DISCONTINUED | OUTPATIENT
Start: 2025-07-08 | End: 2025-07-08 | Stop reason: HOSPADM

## 2025-07-08 RX ADMIN — LIDOCAINE HYDROCHLORIDE 10 MG: 10 INJECTION, SOLUTION EPIDURAL; INFILTRATION; INTRACAUDAL at 10:07

## 2025-07-08 NOTE — OP NOTE
PROCEDURE DATE: 7/8/2025    PROCEDURE: Bilateral L4-5, L5-S1 transforaminal epidural steroid injection under fluoroscopy    DIAGNOSIS: Lumbar radiculitis    Post op diagnosis: Same    PHYSICIAN: Grant Wright MD    MEDICATIONS INJECTED:  Dexamethasone 2.5mg and 0.5ml 0.25% bupivicaine at each nerve root.     LOCAL ANESTHETIC INJECTED:  Lidocaine 1%. 2 ml per site.    SEDATION MEDICATIONS: RN IV sedation    ESTIMATED BLOOD LOSS:  None    COMPLICATIONS:  NOne    TECHNIQUE:   A time-out was taken to identify patient and procedure side prior to starting the procedure. The patient was placed in a prone position, prepped and draped in the usual sterile fashion using ChloraPrep and sterile towels.  The area to be injected was determined under fluoroscopic guidance in AP and oblique view.  Local anesthetic was given by raising a wheal and going down to the hub of a 25-gauge 1.5 inch needle.  In oblique view, a 5 inch 25-gauge bent-tip spinal needle was introduced towards 6 oclock position of the pedicle of each above named nerve root level.  The needle was walked medially then hinged into the neural foramen and position was confirmed in AP and lateral views.  1ml contrast dye was injected to confirm appropriate placement and that there was no vascular uptake.  After negative aspiration for blood or CSF, the medication was then injected. This was performed at the bilateral L4/5 and L5/S1 level(s). The patient tolerated the procedure well.    The patient was monitored after the procedure.  Patient was given post procedure and discharge instructions to follow at home. The patient was discharged in a stable condition.

## 2025-07-08 NOTE — DISCHARGE SUMMARY
[facility] - Periop Services  Discharge Note  Short Stay    Procedure(s) (LRB):  INJECTION, SPINE, LUMBOSACRAL, TRANSFORAMINAL APPROACH l4-5 and l5-s1 TFESI (Bilateral)      OUTCOME: Patient tolerated treatment/procedure well without complication and is now ready for discharge.    DISPOSITION: Home or Self Care    FINAL DIAGNOSIS:  <principal problem not specified>    FOLLOWUP: In clinic    DISCHARGE INSTRUCTIONS:    Discharge Procedure Orders   Notify your health care provider if you experience any of the following:  temperature >100.4     Notify your health care provider if you experience any of the following:  severe uncontrolled pain     Notify your health care provider if you experience any of the following:  redness, tenderness, or signs of infection (pain, swelling, redness, odor or green/yellow discharge around incision site)     Activity as tolerated        TIME SPENT ON DISCHARGE:   30 minutes

## 2025-07-08 NOTE — PLAN OF CARE
Pt POC explained, v/u. Discharge instructions reviewed, v/u. PIV removed. All questions answered. Pt discharged via wheelchair with RN to private vehicle with family member in stable condition.

## 2025-07-08 NOTE — H&P
CC: low back pain    HPI: The patient is a 85 y.o. male with a history of low back pain here for JOSE. There are no major changes in history and physical from 5/27/25 by [facility].    Past Medical History:   Diagnosis Date    Allergy     Ankle pain     left    Anticoagulant long-term use     aspirin    Anxiety     Atrial fibrillation     Back pain     Bruises easily     Cataract     Clotting disorder     left leg    Colon polyp     Diabetes mellitus     Diabetes mellitus, type 2     Diverticulitis 1986    Hay fever     Hyperlipidemia     Hypertension     Left hip pain 12/26/2021    Seeing Dr. Beard for hip pain     Obese     BRANDI on CPAP        Past Surgical History:   Procedure Laterality Date    ABDOMINAL SURGERY      origunal surg 1986-mult surgeries since    CARPAL TUNNEL RELEASE      right wrist 2009-left wrist 2010    COLON SURGERY      partial colon removal    COLONOSCOPY  11/26/2018    Dr. Salazar; normal terminal ileum; polyps removed from ascending colon and hepatic flexure; pan diverticulosis; small internal hemorrhoids; repeat in 5 years; Bx: fragments of an adenomatous polyp with mild surface glandular dysplasia and associated chronic active inflammation, no evidence of high-grade dysplasia or malignancy    COLONOSCOPY N/A 11/9/2023    Procedure: COLONOSCOPY;  Surgeon: Prasanth Irene MD;  Location: [facility];  Service: Endoscopy;  Laterality: N/A;    COLOSTOMY  1986    colostomy reversal  1986    ESOPHAGOGASTRODUODENOSCOPY N/A 11/9/2023    Procedure: EGD (ESOPHAGOGASTRODUODENOSCOPY);  Surgeon: Prasanth Irene MD;  Location: [facility];  Service: Endoscopy;  Laterality: N/A;    EYE SURGERY      hay fever      HERNIA REPAIR  1949    REPAIR OF TENDON OF LOWER EXTREMITY Left 6/24/2020    Procedure: REPAIR, TENDON, LOWER EXTREMITY;  Surgeon: Justin Mandujano DPM;  [place] [facility];  Service: Podiatry;  Laterality: Left;  ARTHEX NOTIFIED IN CASE HE WANTS ANCHOR    TOE SURGERY Left 2017    replaced a  joint     TONSILLECTOMY  1947    TRANSFORAMINAL EPIDURAL INJECTION OF STEROID      x 4    TRANSFORAMINAL EPIDURAL INJECTION OF STEROID Bilateral 11/13/2019    Procedure: Injection,steroid,epidural,transforaminal approach;  Surgeon: Grant Wright MD;  Location: [facility] OR;  Service: Pain Management;  Laterality: Bilateral;  L4-5, L5-S1    TRANSFORAMINAL EPIDURAL INJECTION OF STEROID Bilateral 3/9/2021    Procedure: Injection,steroid,epidural,transforaminal approach;  Surgeon: Grant Wright MD;  Location: [facility] OR;  Service: Pain Management;  Laterality: Bilateral;  L4-5, L5-S1    TRANSFORAMINAL EPIDURAL INJECTION OF STEROID Bilateral 5/25/2021    Procedure: Injection,steroid,epidural,transforaminal approach;  Surgeon: Grant Wright MD;  Location: [facility] OR;  Service: Pain Management;  Laterality: Bilateral;  L4-L5,L5,S1    TRANSFORAMINAL EPIDURAL INJECTION OF STEROID Bilateral 12/14/2021    Procedure: Injection,steroid,epidural,transforaminal approach Bilateral L4-5, L5-S1;  Surgeon: Grant Wright MD;  Location: [facility] OR;  Service: Pain Management;  Laterality: Bilateral;    TRANSFORAMINAL EPIDURAL INJECTION OF STEROID Bilateral 11/4/2022    Procedure: Injection,steroid,epidural,transforaminal approach;  Surgeon: Grant Wright MD;  Location: [facility] OR;  Service: Pain Management;  Laterality: Bilateral;  L4-5 and L5-s1    TRANSFORAMINAL EPIDURAL INJECTION OF STEROID Bilateral 1/20/2023    Procedure: Injection,steroid,epidural,transforaminal approach L4-L5-S1;  Surgeon: Grant Wright MD;  Location: [facility] OR;  Service: Pain Management;  Laterality: Bilateral;    TRANSFORAMINAL EPIDURAL INJECTION OF STEROID Bilateral 6/1/2023    Procedure: Injection,steroid,epidural,transforaminal approach L4-L5, L5-S1;  Surgeon: Grant Wright MD;  Location: [facility] OR;  Service: Pain Management;  Laterality: Bilateral;    TRANSFORAMINAL EPIDURAL INJECTION OF STEROID Bilateral 1/18/2024    Procedure: Injection,steroid,epidural,transforaminal approach;  Surgeon:  Grant Wright MD;  Location: [facility];  Service: Anesthesiology;  Laterality: Bilateral;  L4-5 and l5-s1 TFESI    TRANSFORAMINAL EPIDURAL INJECTION OF STEROID Bilateral 2024    Procedure: Injection,steroid,epidural,transforaminal approach;  Surgeon: Grant Wright MD;  Location: [facility];  Service: Anesthesiology;  Laterality: Bilateral;  L4-5 and l5-s1 TFESI    TRANSFORAMINAL EPIDURAL INJECTION OF STEROID Bilateral 2024    Procedure: Injection,steroid,epidural,transforaminal approach;  Surgeon: Grant Wright MD;  Location: [facility];  Service: Pain Management;  Laterality: Bilateral;  l4-5 and l5-s1 to use 25 ga needle for injection, see notes       Family History   Problem Relation Name Age of Onset    Heart disease Mother      Melanoma Neg Hx      Psoriasis Neg Hx      Lupus Neg Hx      Eczema Neg Hx         Social History     Socioeconomic History    Marital status:    Tobacco Use    Smoking status: Former     Current packs/day: 0.00     Types: Cigarettes     Quit date: 2006     Years since quittin.3    Smokeless tobacco: Never    Tobacco comments:     ex smoker    Substance and Sexual Activity    Alcohol use: Yes     Comment: rarely    Drug use: No    Sexual activity: Yes     Partners: Female     Social Drivers of Health     Financial Resource Strain: Low Risk  (2025)    Overall Financial Resource Strain (CARDIA)     Difficulty of Paying Living Expenses: Not hard at all   Food Insecurity: No Food Insecurity (2025)    Hunger Vital Sign     Worried About Running Out of Food in the Last Year: Never true     Ran Out of Food in the Last Year: Never true   Transportation Needs: No Transportation Needs (2025)    PRAPARE - Transportation     Lack of Transportation (Medical): No     Lack of Transportation (Non-Medical): No   Physical Activity: Inactive (2025)    Exercise Vital Sign     Days of Exercise per Week: 0 days     Minutes of Exercise per Session: 0 min  "  Stress: No Stress Concern Present (2/23/2025)    [employer] [place] [employer] - Occupational Stress Questionnaire     Feeling of Stress : Only a little   Housing Stability: Low Risk  (2/23/2025)    Housing Stability Vital Sign     Unable to Pay for Housing in the Last Year: No     Number of Times Moved in the Last Year: 0     Homeless in the Last Year: No       Current Medications[1]    Review of patient's allergies indicates:   Allergen Reactions    Ativan [lorazepam] Other (See Comments)     Mood alternating      Dilaudid [hydromorphone (bulk)] Other (See Comments)     Mood alternating      Morphine Other (See Comments)     Mood alternating      Adhesive tape-silicones     Hydromorphone        Vitals:    07/01/25 1012   Weight: 107 kg (235 lb 14.3 oz)   Height: 5' 7.5" (1.715 m)       REVIEW OF SYSTEMS:     GENERAL: No weight loss, malaise or fevers.  HEENT:  No recent changes in vision or hearing  NECK: Negative for lumps, no difficulty with swallowing.  RESPIRATORY: Negative for cough, wheezing or shortness of breath, patient denies any recent URI.  CARDIOVASCULAR: Negative for chest pain, leg swelling or palpitations.  GI: Negative for abdominal discomfort, blood in stools or black stools or change in bowel habits.  MUSCULOSKELETAL: See HPI.  SKIN: Negative for lesions, rash, and itching.  PSYCH: No suicidal or homicidal ideations, no current mood disturbances.  HEMATOLOGY/LYMPHOLOGY: Negative for prolonged bleeding, bruising easily or swollen nodes. Patient is not currently taking any anti-coagulants  ENDO: No history of diabetes or thyroid dysfunction  NEURO: No history of syncope, paralysis, seizures or tremors.All other reviewed and negative other than HPI.    Physical exam:  Gen: A and O x3, pleasant, well-groomed  Skin: No rashes or obvious lesions  HEENT: PERRLA, no obvious deformities on ears or in canals. No thyroid masses, trachea midline, no palpable lymph nodes in neck, axilla.  CVS: " Regular rate and rhythm, normal S1 and S2, no murmurs.  Resp: Clear to auscultation bilaterally.  Abdomen: Soft, NT/ND, normal bowel sounds present.  Musculoskeletal/Neuro: Moving all extremities    Assessment:  Lumbar radiculitis    Other orders  -     Place in Outpatient; Standing  -     Vital signs; Standing  -     LIDOcaine (PF) 10 mg/ml (1%) injection 10 mg  -     Verify informed consent; Standing  -     Insert peripheral IV; Standing  -     Notify physician ; Standing  -     Notify physician ; Standing  -     Notify physician (specify); Standing  -     Notify physician (specify); Standing  -     Notify physician (specify); Standing  -     Diet NPO; Standing  -     lactated ringers infusion  -     IP VTE HIGH RISK PATIENT; Standing          PLAN: JOSE      This patient has been cleared for surgery in an ambulatory surgical facility    ASA 3,  Mallampatti Score 3  No history of anesthetic complications  Plan for RN IV sedation         [1]   No current facility-administered medications for this encounter.     Facility-Administered Medications Ordered in Other Encounters   Medication Dose Route Frequency Provider Last Rate Last Admin    lactated ringers infusion   Intravenous Once PRN Grant Wright MD   Stopped at 11/04/22 1230    lactated ringers infusion   Intravenous Continuous Grant Wright MD 25 mL/hr at 02/29/24 1154 New Bag at 02/29/24 1154

## 2025-07-09 ENCOUNTER — OFFICE VISIT (OUTPATIENT)
Dept: PAIN MEDICINE | Facility: CLINIC | Age: 85
End: 2025-07-09
Payer: MEDICARE

## 2025-07-09 ENCOUNTER — OFFICE VISIT (OUTPATIENT)
Dept: PULMONOLOGY | Facility: CLINIC | Age: 85
End: 2025-07-09
Payer: MEDICARE

## 2025-07-09 VITALS
HEIGHT: 68 IN | WEIGHT: 236.81 LBS | DIASTOLIC BLOOD PRESSURE: 72 MMHG | OXYGEN SATURATION: 97 % | BODY MASS INDEX: 35.89 KG/M2 | SYSTOLIC BLOOD PRESSURE: 158 MMHG | HEART RATE: 85 BPM

## 2025-07-09 VITALS
WEIGHT: 236.75 LBS | BODY MASS INDEX: 37.16 KG/M2 | HEART RATE: 71 BPM | HEIGHT: 67 IN | SYSTOLIC BLOOD PRESSURE: 144 MMHG | DIASTOLIC BLOOD PRESSURE: 67 MMHG

## 2025-07-09 VITALS
WEIGHT: 235.88 LBS | RESPIRATION RATE: 16 BRPM | OXYGEN SATURATION: 95 % | HEART RATE: 62 BPM | BODY MASS INDEX: 35.75 KG/M2 | DIASTOLIC BLOOD PRESSURE: 65 MMHG | SYSTOLIC BLOOD PRESSURE: 126 MMHG | HEIGHT: 68 IN | TEMPERATURE: 98 F

## 2025-07-09 DIAGNOSIS — M51.362 DEGENERATION OF INTERVERTEBRAL DISC OF LUMBAR REGION WITH DISCOGENIC BACK PAIN AND LOWER EXTREMITY PAIN: ICD-10-CM

## 2025-07-09 DIAGNOSIS — M48.061 SPINAL STENOSIS OF LUMBAR REGION, UNSPECIFIED WHETHER NEUROGENIC CLAUDICATION PRESENT: ICD-10-CM

## 2025-07-09 DIAGNOSIS — M54.16 LUMBAR RADICULITIS: Primary | ICD-10-CM

## 2025-07-09 DIAGNOSIS — G47.33 OSA (OBSTRUCTIVE SLEEP APNEA): Primary | ICD-10-CM

## 2025-07-09 PROCEDURE — 99999 PR PBB SHADOW E&M-EST. PATIENT-LVL IV: CPT | Mod: PBBFAC,,, | Performed by: PHYSICIAN ASSISTANT

## 2025-07-09 PROCEDURE — 99213 OFFICE O/P EST LOW 20 MIN: CPT | Mod: S$PBB,,, | Performed by: NURSE PRACTITIONER

## 2025-07-09 PROCEDURE — 99215 OFFICE O/P EST HI 40 MIN: CPT | Mod: PBBFAC,PN | Performed by: NURSE PRACTITIONER

## 2025-07-09 PROCEDURE — 99999 PR PBB SHADOW E&M-EST. PATIENT-LVL V: CPT | Mod: PBBFAC,,, | Performed by: NURSE PRACTITIONER

## 2025-07-09 PROCEDURE — 99214 OFFICE O/P EST MOD 30 MIN: CPT | Mod: S$PBB,,, | Performed by: PHYSICIAN ASSISTANT

## 2025-07-09 PROCEDURE — 99214 OFFICE O/P EST MOD 30 MIN: CPT | Mod: PBBFAC,27,PN | Performed by: PHYSICIAN ASSISTANT

## 2025-07-09 NOTE — PROGRESS NOTES
SUBJECTIVE:    Patient ID: Yeyo Hollingsworth is a 85 y.o. male.    Chief Complaint: Follow-up (6 month follow up BRANDI)        Patient here today not using his CPAP, he did not like the nasal pillows.  He states he could not get used to sleeping on on.  He felt it was disrupting his sleep more then anything. He is is pain with his hip, can't get comfortable. He is following with pain management doctor.   Past Medical History:   Diagnosis Date    Allergy     Ankle pain     left    Anticoagulant long-term use     aspirin    Anxiety     Atrial fibrillation     Back pain     Bruises easily     Cataract     Clotting disorder     left leg    Colon polyp     Diabetes mellitus     Diabetes mellitus, type 2     Diverticulitis 1986    Hay fever     Hyperlipidemia     Hypertension     Left hip pain 12/26/2021    Seeing Dr. Beard for hip pain     Obese     BRANDI on CPAP      Past Surgical History:   Procedure Laterality Date    ABDOMINAL SURGERY      origunal surg 1986-mult surgeries since    CARPAL TUNNEL RELEASE      right wrist 2009-left wrist 2010    COLON SURGERY      partial colon removal    COLONOSCOPY  11/26/2018    Dr. Salazar; normal terminal ileum; polyps removed from ascending colon and hepatic flexure; pan diverticulosis; small internal hemorrhoids; repeat in 5 years; Bx: fragments of an adenomatous polyp with mild surface glandular dysplasia and associated chronic active inflammation, no evidence of high-grade dysplasia or malignancy    COLONOSCOPY N/A 11/9/2023    Procedure: COLONOSCOPY;  Surgeon: Prasanth Irene MD;  Location: [facility];  Service: Endoscopy;  Laterality: N/A;    COLOSTOMY  1986    colostomy reversal  1986    ESOPHAGOGASTRODUODENOSCOPY N/A 11/9/2023    Procedure: EGD (ESOPHAGOGASTRODUODENOSCOPY);  Surgeon: Prasanth Irene MD;  Location: [facility];  Service: Endoscopy;  Laterality: N/A;    EYE SURGERY      hay fever      HERNIA REPAIR  1949    INJECTION, SPINE, LUMBOSACRAL, TRANSFORAMINAL  APPROACH Bilateral 7/8/2025    Procedure: INJECTION, SPINE, LUMBOSACRAL, TRANSFORAMINAL APPROACH;  Surgeon: Grant Wright MD;  Location: [facility];  Service: Pain Management;  Laterality: Bilateral;    REPAIR OF TENDON OF LOWER EXTREMITY Left 6/24/2020    Procedure: REPAIR, TENDON, LOWER EXTREMITY;  Surgeon: Justin Mandujano DPM;  [place] [facility] OR;  Service: Podiatry;  Laterality: Left;  ARTHEX NOTIFIED IN CASE HE WANTS ANCHOR    TOE SURGERY Left 2017    replaced a joint     TONSILLECTOMY  1947    TRANSFORAMINAL EPIDURAL INJECTION OF STEROID      x 4    TRANSFORAMINAL EPIDURAL INJECTION OF STEROID Bilateral 11/13/2019    Procedure: Injection,steroid,epidural,transforaminal approach;  Surgeon: Grant Wright MD;  Location: [facility];  Service: Pain Management;  Laterality: Bilateral;  L4-5, L5-S1    TRANSFORAMINAL EPIDURAL INJECTION OF STEROID Bilateral 3/9/2021    Procedure: Injection,steroid,epidural,transforaminal approach;  Surgeon: Grant Wright MD;  Location: [facility] OR;  Service: Pain Management;  Laterality: Bilateral;  L4-5, L5-S1    TRANSFORAMINAL EPIDURAL INJECTION OF STEROID Bilateral 5/25/2021    Procedure: Injection,steroid,epidural,transforaminal approach;  Surgeon: Grant Wright MD;  Location: [facility];  Service: Pain Management;  Laterality: Bilateral;  L4-L5,L5,S1    TRANSFORAMINAL EPIDURAL INJECTION OF STEROID Bilateral 12/14/2021    Procedure: Injection,steroid,epidural,transforaminal approach Bilateral L4-5, L5-S1;  Surgeon: Grant Wright MD;  Location: [facility];  Service: Pain Management;  Laterality: Bilateral;    TRANSFORAMINAL EPIDURAL INJECTION OF STEROID Bilateral 11/4/2022    Procedure: Injection,steroid,epidural,transforaminal approach;  Surgeon: Grant Wright MD;  Location: [facility];  Service: Pain Management;  Laterality: Bilateral;  L4-5 and L5-s1    TRANSFORAMINAL EPIDURAL INJECTION OF STEROID Bilateral 1/20/2023    Procedure: Injection,steroid,epidural,transforaminal approach  L4-L5-S1;  Surgeon: Grant Wright MD;  Location: [facility] OR;  Service: Pain Management;  Laterality: Bilateral;    TRANSFORAMINAL EPIDURAL INJECTION OF STEROID Bilateral 6/1/2023    Procedure: Injection,steroid,epidural,transforaminal approach L4-L5, L5-S1;  Surgeon: Grant Wright MD;  Location: [facility] OR;  Service: Pain Management;  Laterality: Bilateral;    TRANSFORAMINAL EPIDURAL INJECTION OF STEROID Bilateral 1/18/2024    Procedure: Injection,steroid,epidural,transforaminal approach;  Surgeon: Grant Wright MD;  Location: [facility] OR;  Service: Anesthesiology;  Laterality: Bilateral;  L4-5 and l5-s1 TFESI    TRANSFORAMINAL EPIDURAL INJECTION OF STEROID Bilateral 2/29/2024    Procedure: Injection,steroid,epidural,transforaminal approach;  Surgeon: Grant Wright MD;  [facility];  Service: Anesthesiology;  Laterality: Bilateral;  L4-5 and l5-s1 TFESI    TRANSFORAMINAL EPIDURAL INJECTION OF STEROID Bilateral 11/26/2024    Procedure: Injection,steroid,epidural,transforaminal approach;  Surgeon: Grant Wright MD;  Location: [facility] OR;  Service: Pain Management;  Laterality: Bilateral;  l4-5 and l5-s1 to use 25 ga needle for injection, see notes     Family History   Problem Relation Name Age of Onset    Heart disease Mother      Melanoma Neg Hx      Psoriasis Neg Hx      Lupus Neg Hx      Eczema Neg Hx          Social History:   Marital Status:   Occupation: Data Unavailable  Alcohol History:  reports current alcohol use.  Tobacco History:  reports that he quit smoking about 19 years ago. His smoking use included cigarettes. He has never used smokeless tobacco.  Drug History:  reports no history of drug use.    Review of patient's allergies indicates:   Allergen Reactions    Ativan [lorazepam] Other (See Comments)     Mood alternating      Dilaudid [hydromorphone (bulk)] Other (See Comments)     Mood alternating      Morphine Other (See Comments)     Mood alternating      Adhesive tape-silicones      Hydromorphone        Current Outpatient Medications   Medication Sig Dispense Refill    apixaban (ELIQUIS) 5 mg Tab Take 1 tablet (5 mg total) by mouth 2 (two) times daily. 180 tablet 3    aspirin 81 MG Chew Take 1 tablet (81 mg total) by mouth once daily. 100 tablet 2    atorvastatin (LIPITOR) 10 MG tablet Take 1 tablet (10 mg total) by mouth once daily. 90 tablet 3    azelastine (ASTELIN) 137 mcg (0.1 %) nasal spray 2 sprays (274 mcg total) by Nasal route 2 (two) times daily. 30 mL 5    clotrimazole (LOTRIMIN) 1 % cream Apply topically 2 (two) times daily. 60 g 10    diclofenac sodium (VOLTAREN) 1 % Gel Apply 2 g topically once daily. 100 g 2    diphenhydrAMINE (BENADRYL) 50 MG tablet Take as needed by oral route.      diphenhydrAMINE-zinc acetate 1-0.1% (BENADRYL ITCH STOPPING) cream Apply topically 3 (three) times daily as needed for Itching. 28 g 0    fexofenadine (ALLEGRA) 180 MG tablet Take 1 tablet (180 mg total) by mouth once daily. 90 tablet 3    fluticasone propionate (FLONASE) 50 mcg/actuation nasal spray 1 spray (50 mcg total) by Each Nostril route once daily. 15.8 mL 0    furosemide (LASIX) 20 MG tablet Take 1 tablet (20 mg total) by mouth every Mon, Wed, Fri. 45 tablet 3    gabapentin (NEURONTIN) 300 MG capsule Take 1 capsule (300 mg total) by mouth 3 (three) times daily. 270 capsule 1    HYDROcodone-acetaminophen (NORCO) 5-325 mg per tablet Take 1 tablet by mouth every 6 (six) hours as needed for Pain. 90 tablet 0    JARDIANCE 10 mg tablet Take 1 tablet (10 mg total) by mouth once daily. 90 tablet 1    ketoconazole (NIZORAL) 2 % cream Apply topically once daily. 60 g 1    lisinopriL (PRINIVIL,ZESTRIL) 20 MG tablet Take 1 tablet (20 mg total) by mouth once daily. 90 tablet 1    metoprolol tartrate (LOPRESSOR) 25 MG tablet Take 0.5 tablets (12.5 mg total) by mouth 2 (two) times daily. 180 tablet 3    montelukast (SINGULAIR) 10 mg tablet Take 1 tablet (10 mg total) by mouth every evening. 90 tablet 3     multivitamin with minerals tablet Take 1 tablet by mouth once daily.      mupirocin (BACTROBAN) 2 % ointment SMARTSIG:Sparingly Topical Twice Daily      naftifine 2 % Crea APPLY AS A THIN LAYER TO THE AFFECTED AREA(S) PLUS A 0.5 INCH MARGIN OF HEALTHY SURROUNDING SKIN BY TOPICAL ROUTE ONCE DAILY FOR 2 WEEKS      polyethylene glycol (GLYCOLAX) 17 gram/dose powder Take 17 g by mouth once daily. 507 g 3    potassium chloride (MICRO-K) 10 MEQ CpSR Take 1 capsule (10 mEq total) by mouth every other day. 90 capsule 1    semaglutide (RYBELSUS) 3 mg tablet Take 1 tablet (3 mg total) by mouth once daily. 30 tablet 0    SITagliptin phosphate (JANUVIA) 100 MG Tab Take 1 tablet (100 mg total) by mouth once daily. 90 tablet 3    traMADoL (ULTRAM) 50 mg tablet Take 1 tablet every 6 hours by oral route.      triamcinolone acetonide 0.1% (KENALOG) 0.1 % cream Apply topically 2 (two) times daily. 80 g 0    alcohol swabs (ALCOHOL PADS) PadM Apply 1 each topically as needed (before shot). 100 each 0    blood sugar diagnostic (BLOOD GLUCOSE TEST) Strp 1 strip by Misc.(Non-Drug; Combo Route) route 2 (two) times a day. FREESTYLE LITE BG TEST STRIPS. current use of insulin  - Primary ICD-10-CM: E11.9 200 each 2    blood sugar diagnostic Strp To check BG 1 times daily, to use with insurance preferred meter 200 strip 0    blood-glucose meter kit To check BG 1 times daily, to use with insurance preferred meter 1 each 0    dulaglutide (TRULICITY) 1.5 mg/0.5 mL pen injector Inject 1.5 mg into the skin every 7 days. (Patient not taking: Reported on 7/9/2025) 4 pen 11    FREESTYLE LANCETS 28 gauge lancets Inject 1 lancet  into the skin 3 (three) times daily. 100 each 3    lancets Misc To check BG 1 times daily, to use with insurance preferred meter 200 each 0    [START ON 7/27/2025] semaglutide (RYBELSUS) 14 mg tablet Take 1 tablet (14 mg total) by mouth once daily. (Patient not taking: Reported on 7/9/2025) 90 tablet 3    semaglutide (RYBELSUS)  "7 mg tablet Take 1 tablet (7 mg total) by mouth once daily. (Patient not taking: Reported on 7/9/2025) 30 tablet 0     No current facility-administered medications for this visit.     Facility-Administered Medications Ordered in Other Visits   Medication Dose Route Frequency Provider Last Rate Last Admin    lactated ringers infusion   Intravenous Once PRN Grant Wright MD   Stopped at 11/04/22 1230    lactated ringers infusion   Intravenous Continuous Grant Wright MD 25 mL/hr at 02/29/24 1154 New Bag at 02/29/24 1154         Review of Systems  General: Feeling Well.  Eyes: Vision is good.  ENT:  No sinusitis or pharyngitis.   Heart:: No chest pain or palpitations.  Lungs: No cough, sputum, or wheezing.  GI: No Nausea, vomiting, constipation, diarrhea, or reflux.  : No dysuria, hesitancy, or nocturia.  Musculoskeletal: pain to hips   Skin: No lesions or rashes.  Neuro: No headaches or neuropathy.  Lymph: No edema or adenopathy.  Psych: No anxiety or depression.  Endo: No weight change.    OBJECTIVE:      BP (!) 158/72   Pulse 85   Ht 5' 7.5" (1.715 m)   Wt 107.4 kg (236 lb 12.8 oz)   SpO2 97%   BMI 36.54 kg/m²     Physical Exam  GENERAL: Older patient in no distress.  HEENT: Pupils equal and reactive. Extraocular movements intact. Nose intact.      Pharynx moist.  NECK: Supple.   HEART: Regular rate and rhythm. No murmur or gallop auscultated.  LUNGS: Clear to auscultation and percussion. Lung excursion symmetrical. No change in fremitus. No adventitial noises.  ABDOMEN: Bowel sounds present. Non-tender, no masses palpated.  EXTREMITIES: Normal muscle tone and joint movement, no cyanosis or clubbing.   LYMPHATICS: No adenopathy palpated, no edema.  SKIN: Dry, intact, no lesions.   NEURO: Cranial nerves II-XII intact. Motor strength 5/5 bilaterally, upper and lower extremities.  PSYCH: Appropriate affect.    Assessment:       1. BRANDI (obstructive sleep apnea)            Plan:       You should treat your " BRANDI  Sleep elevated or in recliner  Need to lose weight      Follow up if symptoms worsen or fail to improve.

## 2025-07-09 NOTE — PROGRESS NOTES
Referring Physician: No ref. provider found    PCP: Garland Ocampo MD      CC:low back and left leg pain    Interval History:  Mr. Hollingsworth is a 85 y.o. male with a low back and leg pain who presents today as an established patient for the management of low back pain.  He is s/p Bilateral L4-5, L5-S1 transforaminal epidural steroid injection yesterday. Pain on left resolved and right side has continued. He is extremely upset he has continued pain on the right. Patient was told procedure could take 2-3 weeks for improvement however since he has had immediate resolution in past after JOSE he does not believe b/l TF JOSE was completed. He has seen xray images of procedure as well.  He denies any LE weakness or b/b changes. He takes Hydrocodone 7.5 mg sparingly prescribed by PCP.  In the past he was evaluated by [facility] and lumbar surgery was recommended. The patient denies of any significant changes in his health since his last appointment. The patient also denies of any changes in the character of his pain since his last appointment.  Pain today is rated 5/10. Patient denies of any urinary/fecal incontinence, saddle anesthesia, or weakness.        HPI:   Yeyo Hollingsworth is a 85 y.o. male ho presents with lower back and left leg pain.  Pain is been present since 2010.  No recent traumatic incident.  His intermittent aching, throbbing, electric pain in his lower back.  Pain radiates down his left buttock into his left posterior thigh.  Pain worsens with prolonged standing, walking, getting up and coughing.  Pain improves with rest.  His tried physical therapy with minimal benefit.  He has undergone lumbar JOSE's with Dr. Rendon in the past with moderate benefit.  Most recent injection was performed in September 2016.  Pain has since returned.  He desires repeat injections with us.  He denies any weakness.  No bowel bladder changes.  He rates his pain 6/10 today.    INTERVETIONAL THERAPIES:   7/8/25 b/l TF JOSE at  "L4-5 and L5-S1 resolution day 1 of right sided pain  11/26/24 b/l L4-5 and L5-S1 TF JOSE 95% relief   01/18/2024: Bilateral L4-5, L5-S1 transforaminal epidural steroid injection- 100% X4 WEEKS, 50% relief at time time.   8/21/2023: plantar fasciatis steroid injection - Dr. Elliott-   6/1/2023: Bilateral L4-5, L5-S1 transforaminal epidural steroid injection  1/20/23: Injection,steroid,epidural,transforaminal approach L4-L5-S1- excellent relief.   11/4/2022: Bilateral L4-5 and L5-S1 transforaminal epidural steroid injection under fluoroscopy- Dr. Wright- excellent relief.       :   Reviewed.        ROS:  CONSTITUTIONAL: No fevers, chills, night sweats, wt. loss, appetite changes  SKIN: no rashes or itching  ENT: No headaches, head trauma, vision changes, or eye pain  LYMPH NODES: None noticed   CV: No chest pain, palpitations.   RESP: No shortness of breath, dyspnea on exertion, cough, wheezing, or hemoptysis  GI: No nausea, emesis, diarrhea, constipation, melena, hematochezia, pain.    : No dysuria, hematuria, urgency, or frequency   HEME: No easy bruising, bleeding problems  PSYCHIATRIC: No depression, anxiety, psychosis, hallucinations.  NEURO: No seizures, memory loss, dizziness or difficulty sleeping  MSK: + history of present illness    MEDICAL, SURGICAL, FAMILY, SOCIAL HX: reviewed    MEDICATIONS/ALLERGIES: reviewed         Medications/Allergies: See med card    Vitals:    07/09/25 1301   BP: (!) 144/67   Pulse: 71   Weight: 107.4 kg (236 lb 12.4 oz)   Height: 5' 7" (1.702 m)   PainSc:   5   PainLoc: Back           Physical exam:    GENERAL: A and O x3, the patient appears well groomed and is in no acute distress.  Skin: No rashes or obvious lesions  HEENT: normocephalic, atraumatic  CARDIOVASCULAR:  RRR  LUNGS: non labored breathing  ABDOMEN: soft, nontender   UPPER EXTREMITIES: Normal alignment, normal range of motion, no atrophy, no skin changes,  hair growth and nail growth normal and equal bilaterally. No " swelling.   LOWER EXTREMITIES:  Normal alignment, normal range of motion, no atrophy, no skin changes,  hair growth and nail growth normal and equal bilaterally. No swelling, no tenderness.    CERVICAL SPINE:  Full ROM with pain on flexion and extension  Negative spurlings  Tenderness to palpation right cervical paraspinous muscles   LUMBAR SPINE  Lumbar spine: ROM is limited with flexion extension and oblique extension with moderate increased pain.    Caden's test causes no increased pain on either side.    Supine straight leg raise positive bilaterally   Internal and external rotation of the hip causes no increased pain on either side.  Myofascial exam: No tenderness to palpation across lumbar paraspinous muscles.       MENTAL STATUS: normal orientation, speech, language, and fund of knowledge for social situation.  Emotional state appropriate.    CRANIAL NERVES:  II:  PERRL bilaterally,   III,IV,VI: EOMI.    V:  Facial sensation equal bilaterally  VII:  Facial motor function normal.  VIII:  Hearing equal to finger rub bilaterally  IX/X: Gag normal, palate symmetric  XI:  Shoulder shrug equal, head turn equal  XII:  Tongue midline without fasciculations      MOTOR: Tone and bulk: normal bilateral upper and lower Strength: normal   Delt Bi Tri WE WF     R 5 5 5 5 5 5   L 5 5 5 5 5 5     IP ADD ABD Quad TA Gas HAM  R 5 5 5 5 5 5 5  L 5 5 5 5 5 5 5    SENSATION: Light touch and pinprick intact bilaterally  REFLEXES: normal, symmetric, nonbrisk.  Toes down, no clonus. No hoffmans.  GAIT: normal rise, base, steps, and arm swing.        Imaging: Lumbar MRI 11/7/24  At T12-L1, there is broad-based disc bulging, with prominent facet hypertrophy and ligamentum flavum thickening, and right intraspinal calcification, producing severe spinal canal stenosis with mass effect upon the conus medullaris.     At L1-L2, there is no disc bulging or focal disc herniation, with no spinal canal stenosis.  There is bilateral facet  hypertrophy and ligamentum flavum thickening.     At L2-L3, there is mild broad-based disc bulging, with bilateral facet hypertrophy and ligamentum flavum thickening, producing mild spinal canal stenosis.  There is moderate bilateral foraminal narrowing.     At L3-L4, there is broad-based disc bulging and spondylosis, with bilateral facet hypertrophy and ligamentum flavum thickening, contributing to moderate to severe spinal canal stenosis.  There is moderate to severe bilateral foraminal narrowing left greater than right.     At L4-L5, there is anterolisthesis with mild broad-based disc bulging, with severe facet hypertrophy and ligamentum flavum thickening, with facet joint effusions.  There is severe spinal canal stenosis with trefoil appearance of the spinal canal.  There is moderate to severe bilateral foraminal narrowing, right greater than left.     At L5-S1, there is no disc bulging or focal disc herniation, with no spinal canal stenosis.  There is bilateral facet hypertrophy, with moderate left neural foraminal narrowing.     There are no signal abnormalities of the paraspinal musculature or the paraspinal ligaments.  The visualized retroperitoneum is unremarkable.     Impression:     1. Anterolisthesis of 2 mm of L4 upon L5, with severe L4-L5 spinal canal stenosis as described.  2. Moderate to severe spinal canal stenosis at L3-L4.  3. Severe spinal canal stenosis at T12-L1, with mass effect upon the conus medullaris.  4. Varying degrees of multilevel neural foraminal stenosis.      Assessment:  Mr. Hollingsworth is a 85 y.o. male with low back and BLE pain. Treatment plan outlined below:   1. Lumbar radiculitis    2. Spinal stenosis of lumbar region, unspecified whether neurogenic claudication present    3. Degeneration of intervertebral disc of lumbar region with discogenic back pain and lower extremity pain          Plan:  1. Monitor progress from repeat Bilateral TFESI at L4-L5, L5-S1. Repeat prn.   2. I have  stressed the importance of physical activity and a home exercise plan to help with chronic pain and improve health.   3.  Reassured patient that procedure was done correctly. If no further improvement in 3 weeks will refer to neurosurgery   4. F/u s/p procedure     Ivette Bautista PA-C

## 2025-07-21 ENCOUNTER — TELEPHONE (OUTPATIENT)
Dept: PAIN MEDICINE | Facility: CLINIC | Age: 85
End: 2025-07-21

## 2025-07-21 ENCOUNTER — OFFICE VISIT (OUTPATIENT)
Dept: PAIN MEDICINE | Facility: CLINIC | Age: 85
End: 2025-07-21
Payer: MEDICARE

## 2025-07-21 VITALS
HEART RATE: 68 BPM | SYSTOLIC BLOOD PRESSURE: 131 MMHG | HEIGHT: 67 IN | DIASTOLIC BLOOD PRESSURE: 65 MMHG | BODY MASS INDEX: 37.16 KG/M2 | WEIGHT: 236.75 LBS

## 2025-07-21 DIAGNOSIS — M54.16 LUMBAR RADICULITIS: Primary | ICD-10-CM

## 2025-07-21 DIAGNOSIS — M54.16 LUMBAR RADICULOPATHY, CHRONIC: ICD-10-CM

## 2025-07-21 DIAGNOSIS — M48.061 SPINAL STENOSIS OF LUMBAR REGION, UNSPECIFIED WHETHER NEUROGENIC CLAUDICATION PRESENT: ICD-10-CM

## 2025-07-21 DIAGNOSIS — M51.369 DEGENERATION OF INTERVERTEBRAL DISC OF LUMBAR REGION, UNSPECIFIED WHETHER PAIN PRESENT: ICD-10-CM

## 2025-07-21 DIAGNOSIS — M47.896 OTHER SPONDYLOSIS, LUMBAR REGION: ICD-10-CM

## 2025-07-21 PROCEDURE — 99214 OFFICE O/P EST MOD 30 MIN: CPT | Mod: PBBFAC,PN | Performed by: ANESTHESIOLOGY

## 2025-07-21 PROCEDURE — G2211 COMPLEX E/M VISIT ADD ON: HCPCS | Mod: ,,, | Performed by: ANESTHESIOLOGY

## 2025-07-21 PROCEDURE — 99999 PR PBB SHADOW E&M-EST. PATIENT-LVL IV: CPT | Mod: PBBFAC,,, | Performed by: ANESTHESIOLOGY

## 2025-07-21 PROCEDURE — 99215 OFFICE O/P EST HI 40 MIN: CPT | Mod: S$PBB,,, | Performed by: ANESTHESIOLOGY

## 2025-07-21 RX ORDER — TRAZODONE HYDROCHLORIDE 50 MG/1
50 TABLET ORAL NIGHTLY
COMMUNITY
Start: 2025-06-24

## 2025-07-21 NOTE — LETTER
.  [facility]  [address]  [address]  [place] [address]  Phone: [phone]  Fax: [phone] Date: 2025    Patient: Yeyo Hollingsworth                    MRN#:[id number]  : 1940  Referring Physician: Dr.John Wright             Procedure: Transformational Injection   L-34,L4-5    Current Outpatient Medications   Medication Sig Dispense Refill    alcohol swabs (ALCOHOL PADS) PadM Apply 1 each topically as needed (before shot). 100 each 0    apixaban (ELIQUIS) 5 mg Tab Take 1 tablet (5 mg total) by mouth 2 (two) times daily. 180 tablet 3    aspirin 81 MG Chew Take 1 tablet (81 mg total) by mouth once daily. 100 tablet 2    atorvastatin (LIPITOR) 10 MG tablet Take 1 tablet (10 mg total) by mouth once daily. 90 tablet 3    azelastine (ASTELIN) 137 mcg (0.1 %) nasal spray 2 sprays (274 mcg total) by Nasal route 2 (two) times daily. 30 mL 5    blood sugar diagnostic (BLOOD GLUCOSE TEST) Strp 1 strip by Misc.(Non-Drug; Combo Route) route 2 (two) times a day. FREESTYLE LITE BG TEST STRIPS. current use of insulin  - Primary ICD-10-CM: E11.9 200 each 2    blood sugar diagnostic Strp To check BG 1 times daily, to use with insurance preferred meter 200 strip 0    blood-glucose meter kit To check BG 1 times daily, to use with insurance preferred meter 1 each 0    clotrimazole (LOTRIMIN) 1 % cream Apply topically 2 (two) times daily. 60 g 10    diclofenac sodium (VOLTAREN) 1 % Gel Apply 2 g topically once daily. 100 g 2    diphenhydrAMINE (BENADRYL) 50 MG tablet Take as needed by oral route.      diphenhydrAMINE-zinc acetate 1-0.1% (BENADRYL ITCH STOPPING) cream Apply topically 3 (three) times daily as needed for Itching. 28 g 0    dulaglutide (TRULICITY) 1.5 mg/0.5 mL pen injector Inject 1.5 mg into the skin every 7 days. 4 pen 11    fexofenadine (ALLEGRA) 180 MG tablet Take 1 tablet (180 mg total) by mouth once daily. 90 tablet 3    fluticasone  propionate (FLONASE) 50 mcg/actuation nasal spray 1 spray (50 mcg total) by Each Nostril route once daily. 15.8 mL 0    FREESTYLE LANCETS 28 gauge lancets Inject 1 lancet  into the skin 3 (three) times daily. 100 each 3    furosemide (LASIX) 20 MG tablet Take 1 tablet (20 mg total) by mouth every Mon, Wed, Fri. 45 tablet 3    gabapentin (NEURONTIN) 300 MG capsule Take 1 capsule (300 mg total) by mouth 3 (three) times daily. 270 capsule 1    HYDROcodone-acetaminophen (NORCO) 5-325 mg per tablet Take 1 tablet by mouth every 6 (six) hours as needed for Pain. 90 tablet 0    JARDIANCE 10 mg tablet Take 1 tablet (10 mg total) by mouth once daily. 90 tablet 1    ketoconazole (NIZORAL) 2 % cream Apply topically once daily. 60 g 1    lancets Misc To check BG 1 times daily, to use with insurance preferred meter 200 each 0    lisinopriL (PRINIVIL,ZESTRIL) 20 MG tablet Take 1 tablet (20 mg total) by mouth once daily. 90 tablet 1    metoprolol tartrate (LOPRESSOR) 25 MG tablet Take 0.5 tablets (12.5 mg total) by mouth 2 (two) times daily. 180 tablet 3    montelukast (SINGULAIR) 10 mg tablet Take 1 tablet (10 mg total) by mouth every evening. 90 tablet 3    multivitamin with minerals tablet Take 1 tablet by mouth once daily.      mupirocin (BACTROBAN) 2 % ointment SMARTSIG:Sparingly Topical Twice Daily      naftifine 2 % Crea APPLY AS A THIN LAYER TO THE AFFECTED AREA(S) PLUS A 0.5 INCH MARGIN OF HEALTHY SURROUNDING SKIN BY TOPICAL ROUTE ONCE DAILY FOR 2 WEEKS      polyethylene glycol (GLYCOLAX) 17 gram/dose powder Take 17 g by mouth once daily. 507 g 3    potassium chloride (MICRO-K) 10 MEQ CpSR Take 1 capsule (10 mEq total) by mouth every other day. 90 capsule 1    [START ON 7/27/2025] semaglutide (RYBELSUS) 14 mg tablet Take 1 tablet (14 mg total) by mouth once daily. 90 tablet 3    semaglutide (RYBELSUS) 3 mg tablet Take 1 tablet (3 mg total) by mouth once daily. 30 tablet 0    semaglutide (RYBELSUS) 7 mg tablet Take 1 tablet  (7 mg total) by mouth once daily. 30 tablet 0    SITagliptin phosphate (JANUVIA) 100 MG Tab Take 1 tablet (100 mg total) by mouth once daily. 90 tablet 3    traMADoL (ULTRAM) 50 mg tablet Take 1 tablet every 6 hours by oral route.      traZODone (DESYREL) 50 MG tablet Take 50 mg by mouth every evening.      triamcinolone acetonide 0.1% (KENALOG) 0.1 % cream Apply topically 2 (two) times daily. 80 g 0     No current facility-administered medications for this visit.     Facility-Administered Medications Ordered in Other Visits   Medication Dose Route Frequency Provider Last Rate Last Admin    lactated ringers infusion   Intravenous Once PRN Grant Wright MD   Stopped at 11/04/22 1230    lactated ringers infusion   Intravenous Continuous Grant Wright MD 25 mL/hr at 02/29/24 1154 New Bag at 02/29/24 1154                   This patient has been assessed for risk factors for clearance of surgery with the following stipulations:    [] No Contraindications.      [x] Recommendations for ASPIRIN: Hold X 7 DAYS AND  ELIQUIS 5 DAY HOLD DAY    [x] Patient is MODERATE RISK    [x] Cleared for surgery with the following restrictions:    [] Not Cleared for surgery due to the following reasons:    If you have any questions regarding the above, please contact my office at [phone]    Clearing Provider:Vane Castorena NP

## 2025-07-21 NOTE — TELEPHONE ENCOUNTER
Types of orders made on 07/21/2025: Medications, Procedure Request      Order Date:7/21/2025   Ordering User:PREETI WRIGHT [[id number]]   Encounter Provider:Preeti Wright MD [[id number]]   Authorizing Provider: Preeti Wright MD [[id number]]   Department:[facility] PAIN MANAGEMENT[[id number]]      Common Order Information   Procedure -> Transforaminal Injection (Specify level and laterality) Cmt: Right             L3-4, L4-5      Order Specific Information   Order: Procedure Request Order for Pain Management [Custom: [id number]]  Order #:          [id number]: 1 FUTURE     Priority: Routine  Class: Clinic Performed     Future Order Information       Expires on:07/21/2026            Expected by:07/21/2025                   Associated Diagnoses       M54.16 Lumbar radiculitis       Facility Name: -> [facility]              Priority: Routine  Class: Clinic Performed     Future Order Information       Expires on:07/21/2026            Expected by:07/21/2025                   Associated Diagnoses       M54.16 Lumbar radiculitis       Procedure -> Transforaminal Injection (Specify level and laterality) Cmt:                 Right L3-4, L4-5

## 2025-07-21 NOTE — TELEPHONE ENCOUNTER
Pt was cleared to hold ASA on 06/27 but not cleared to hold eliquis. Is it ok for him to hold eliquis x 3 days prior to epidural injections on or after 08/05?

## 2025-07-21 NOTE — H&P (VIEW-ONLY)
Referring Physician: No ref. provider found    PCP: Garland Ocampo MD      CC:low back and right leg pain    Interval History:  Mr. Hollingsworth is a 85 y.o. male with a low back and leg pain who presents today as an established patient for the management of low back pain.  He s/p Bilateral L4-5, L5-S1 transforaminal epidural steroid injection that provided resolution of his pain in the past.  Most recent injection only help with left side and he continues to have right sided leg pain.   He denies any LE weakness or b/b changes. He takes Hydrocodone 7.5 mg sparingly prescribed by PCP.  In the past he was evaluated by [facility] and lumbar surgery was recommended. The patient denies of any significant changes in his health since his last appointment. The patient also denies of any changes in the character of his pain since his last appointment.  Pain today is rated 0/10. Patient denies of any urinary/fecal incontinence, saddle anesthesia, or weakness.        Prior HPI:   Yeyo Hollingsworth is a 85 y.o. male ho presents with lower back and left leg pain.  Pain is been present since 2010.  No recent traumatic incident.  His intermittent aching, throbbing, electric pain in his lower back.  Pain radiates down his left buttock into his left posterior thigh.  Pain worsens with prolonged standing, walking, getting up and coughing.  Pain improves with rest.  His tried physical therapy with minimal benefit.  He has undergone lumbar JOSE's with Dr. Rendon in the past with moderate benefit.  Most recent injection was performed in September 2016.  Pain has since returned.  He desires repeat injections with us.  He denies any weakness.  No bowel bladder changes.  He rates his pain 6/10 today.    INTERVETIONAL THERAPIES:   11/26/24 b/l L4-5 and L5-S1 TF JOSE 95% relief   01/18/2024: Bilateral L4-5, L5-S1 transforaminal epidural steroid injection- 100% X4 WEEKS, 50% relief at time time.   8/21/2023: plantar fasciatis steroid injection  "- Dr. Elliott-   6/1/2023: Bilateral L4-5, L5-S1 transforaminal epidural steroid injection  1/20/23: Injection,steroid,epidural,transforaminal approach L4-L5-S1- excellent relief.   11/4/2022: Bilateral L4-5 and L5-S1 transforaminal epidural steroid injection under fluoroscopy- Dr. Wright- excellent relief.       :   Reviewed.        ROS:  CONSTITUTIONAL: No fevers, chills, night sweats, wt. loss, appetite changes  SKIN: no rashes or itching  ENT: No headaches, head trauma, vision changes, or eye pain  LYMPH NODES: None noticed   CV: No chest pain, palpitations.   RESP: No shortness of breath, dyspnea on exertion, cough, wheezing, or hemoptysis  GI: No nausea, emesis, diarrhea, constipation, melena, hematochezia, pain.    : No dysuria, hematuria, urgency, or frequency   HEME: No easy bruising, bleeding problems  PSYCHIATRIC: No depression, anxiety, psychosis, hallucinations.  NEURO: No seizures, memory loss, dizziness or difficulty sleeping  MSK: + history of present illness    MEDICAL, SURGICAL, FAMILY, SOCIAL HX: reviewed    MEDICATIONS/ALLERGIES: reviewed         Medications/Allergies: See med card    Vitals:    07/21/25 1254   BP: 131/65   Pulse: 68   Weight: 107.4 kg (236 lb 12.4 oz)   Height: 5' 7" (1.702 m)   PainSc: 10-Worst pain ever   PainLoc: Hip           Physical exam:    GENERAL: A and O x3, the patient appears well groomed and is in no acute distress.  Skin: No rashes or obvious lesions  HEENT: normocephalic, atraumatic  CARDIOVASCULAR:  RRR  LUNGS: non labored breathing  ABDOMEN: soft, nontender   UPPER EXTREMITIES: Normal alignment, normal range of motion, no atrophy, no skin changes,  hair growth and nail growth normal and equal bilaterally. No swelling. Tenderness to right AC joint  LOWER EXTREMITIES:  Normal alignment, normal range of motion, no atrophy, no skin changes,  hair growth and nail growth normal and equal bilaterally. No swelling, no tenderness.    CERVICAL SPINE:  Full ROM with pain " on flexion and extension  Negative spurlings  Tenderness to palpation right cervical paraspinous muscles   LUMBAR SPINE  Lumbar spine: ROM is limited with flexion extension and oblique extension with moderate increased pain.    Caden's test causes no increased pain on either side.    Supine straight leg raise positive bilaterally   Internal and external rotation of the hip causes no increased pain on either side.  Myofascial exam: No tenderness to palpation across lumbar paraspinous muscles.       MENTAL STATUS: normal orientation, speech, language, and fund of knowledge for social situation.  Emotional state appropriate.    CRANIAL NERVES:  II:  PERRL bilaterally,   III,IV,VI: EOMI.    V:  Facial sensation equal bilaterally  VII:  Facial motor function normal.  VIII:  Hearing equal to finger rub bilaterally  IX/X: Gag normal, palate symmetric  XI:  Shoulder shrug equal, head turn equal  XII:  Tongue midline without fasciculations      MOTOR: Tone and bulk: normal bilateral upper and lower Strength: normal   Delt Bi Tri WE WF     R 5 5 5 5 5 5   L 5 5 5 5 5 5     IP ADD ABD Quad TA Gas HAM  R 5 5 5 5 5 5 5  L 5 5 5 5 5 5 5    SENSATION: Light touch and pinprick intact bilaterally  REFLEXES: normal, symmetric, nonbrisk.  Toes down, no clonus. No hoffmans.  GAIT: normal rise, base, steps, and arm swing.        Imaging: Lumbar MRI 11/7/24  At T12-L1, there is broad-based disc bulging, with prominent facet hypertrophy and ligamentum flavum thickening, and right intraspinal calcification, producing severe spinal canal stenosis with mass effect upon the conus medullaris.     At L1-L2, there is no disc bulging or focal disc herniation, with no spinal canal stenosis.  There is bilateral facet hypertrophy and ligamentum flavum thickening.     At L2-L3, there is mild broad-based disc bulging, with bilateral facet hypertrophy and ligamentum flavum thickening, producing mild spinal canal stenosis.  There is moderate  bilateral foraminal narrowing.     At L3-L4, there is broad-based disc bulging and spondylosis, with bilateral facet hypertrophy and ligamentum flavum thickening, contributing to moderate to severe spinal canal stenosis.  There is moderate to severe bilateral foraminal narrowing left greater than right.     At L4-L5, there is anterolisthesis with mild broad-based disc bulging, with severe facet hypertrophy and ligamentum flavum thickening, with facet joint effusions.  There is severe spinal canal stenosis with trefoil appearance of the spinal canal.  There is moderate to severe bilateral foraminal narrowing, right greater than left.     At L5-S1, there is no disc bulging or focal disc herniation, with no spinal canal stenosis.  There is bilateral facet hypertrophy, with moderate left neural foraminal narrowing.     There are no signal abnormalities of the paraspinal musculature or the paraspinal ligaments.  The visualized retroperitoneum is unremarkable.     Impression:     1. Anterolisthesis of 2 mm of L4 upon L5, with severe L4-L5 spinal canal stenosis as described.  2. Moderate to severe spinal canal stenosis at L3-L4.  3. Severe spinal canal stenosis at T12-L1, with mass effect upon the conus medullaris.  4. Varying degrees of multilevel neural foraminal stenosis.      Assessment:  Mr. Hollingsworth is a 85 y.o. male with low back and BLE pain. Treatment plan outlined below:   1. Lumbar radiculitis    2. Spinal stenosis of lumbar region, unspecified whether neurogenic claudication present    3. Lumbar radiculopathy, chronic    4. Degeneration of intervertebral disc of lumbar region, unspecified whether pain present    5. Other spondylosis, lumbar region        Plan:  I have stressed the importance of physical activity and exercise to improve overall health  Reviewed pertinent imaging and records with patient  We will target different areas this time.  I think that the patient's back pain and radicular leg symptoms are  due to degenerative disc disease and have recommended a lumbar epidural steroid injection to the Right L3-4, L4-5 level(s).  May consider neurosurgical consult if no improvement from above  Follow up after procedure

## 2025-07-29 NOTE — TELEPHONE ENCOUNTER
Left a message for a return call       Pt called in and states he needs to be scheduled between 08/11 or after 08/25 . This was a message will call pt again.

## 2025-07-30 NOTE — TELEPHONE ENCOUNTER
Scheduled pt for 08/05 instructions given including holding blood thinners see letter for [place]

## 2025-08-04 DIAGNOSIS — G89.4 CHRONIC PAIN SYNDROME: ICD-10-CM

## 2025-08-04 DIAGNOSIS — M19.90 ARTHRITIS: ICD-10-CM

## 2025-08-04 NOTE — TELEPHONE ENCOUNTER
No care due was identified.  [facility] Embedded Care Due Messages. Reference number: [id number].   8/04/2025 10:37:46 AM CDT

## 2025-08-05 ENCOUNTER — HOSPITAL ENCOUNTER (OUTPATIENT)
Facility: HOSPITAL | Age: 85
Discharge: HOME | End: 2025-08-05
Attending: ANESTHESIOLOGY | Admitting: ANESTHESIOLOGY
Payer: MEDICARE

## 2025-08-05 DIAGNOSIS — M54.16 LUMBAR RADICULITIS: ICD-10-CM

## 2025-08-05 LAB
GLUCOSE SERPL-MCNC: 98 MG/DL (ref 70–110)
POCT GLUCOSE: 99 MG/DL (ref 70–110)

## 2025-08-05 PROCEDURE — 63600175 PHARM REV CODE 636 W HCPCS: Performed by: ANESTHESIOLOGY

## 2025-08-05 PROCEDURE — 64483 NJX AA&/STRD TFRM EPI L/S 1: CPT | Mod: RT | Performed by: ANESTHESIOLOGY

## 2025-08-05 PROCEDURE — 64483 NJX AA&/STRD TFRM EPI L/S 1: CPT | Mod: RT,,, | Performed by: ANESTHESIOLOGY

## 2025-08-05 PROCEDURE — 64484 NJX AA&/STRD TFRM EPI L/S EA: CPT | Mod: RT | Performed by: ANESTHESIOLOGY

## 2025-08-05 PROCEDURE — 25500020 PHARM REV CODE 255: Performed by: ANESTHESIOLOGY

## 2025-08-05 PROCEDURE — 64484 NJX AA&/STRD TFRM EPI L/S EA: CPT | Mod: RT,,, | Performed by: ANESTHESIOLOGY

## 2025-08-05 RX ORDER — SODIUM CHLORIDE, SODIUM LACTATE, POTASSIUM CHLORIDE, CALCIUM CHLORIDE 600; 310; 30; 20 MG/100ML; MG/100ML; MG/100ML; MG/100ML
INJECTION, SOLUTION INTRAVENOUS CONTINUOUS
Status: DISCONTINUED | OUTPATIENT
Start: 2025-08-05 | End: 2025-08-05 | Stop reason: HOSPADM

## 2025-08-05 RX ORDER — FENTANYL CITRATE 50 UG/ML
INJECTION, SOLUTION INTRAMUSCULAR; INTRAVENOUS
Status: DISCONTINUED | OUTPATIENT
Start: 2025-08-05 | End: 2025-08-05 | Stop reason: HOSPADM

## 2025-08-05 RX ORDER — HYDROCODONE BITARTRATE AND ACETAMINOPHEN 5; 325 MG/1; MG/1
1 TABLET ORAL EVERY 6 HOURS PRN
Qty: 90 TABLET | Refills: 0 | Status: SHIPPED | OUTPATIENT
Start: 2025-08-05 | End: 2025-08-07 | Stop reason: SDUPTHER

## 2025-08-05 RX ORDER — LIDOCAINE HYDROCHLORIDE 10 MG/ML
INJECTION, SOLUTION EPIDURAL; INFILTRATION; INTRACAUDAL; PERINEURAL
Status: DISCONTINUED | OUTPATIENT
Start: 2025-08-05 | End: 2025-08-05 | Stop reason: HOSPADM

## 2025-08-05 RX ORDER — LIDOCAINE HYDROCHLORIDE 10 MG/ML
1 INJECTION, SOLUTION EPIDURAL; INFILTRATION; INTRACAUDAL; PERINEURAL ONCE
Status: DISCONTINUED | OUTPATIENT
Start: 2025-08-05 | End: 2025-08-05 | Stop reason: HOSPADM

## 2025-08-05 RX ORDER — MIDAZOLAM HYDROCHLORIDE 1 MG/ML
INJECTION INTRAMUSCULAR; INTRAVENOUS
Status: DISCONTINUED | OUTPATIENT
Start: 2025-08-05 | End: 2025-08-05 | Stop reason: HOSPADM

## 2025-08-05 RX ORDER — BUPIVACAINE HYDROCHLORIDE 2.5 MG/ML
INJECTION, SOLUTION EPIDURAL; INFILTRATION; INTRACAUDAL; PERINEURAL
Status: DISCONTINUED | OUTPATIENT
Start: 2025-08-05 | End: 2025-08-05 | Stop reason: HOSPADM

## 2025-08-05 RX ORDER — DEXAMETHASONE SODIUM PHOSPHATE 10 MG/ML
INJECTION INTRAMUSCULAR; INTRAVENOUS
Status: DISCONTINUED | OUTPATIENT
Start: 2025-08-05 | End: 2025-08-05 | Stop reason: HOSPADM

## 2025-08-05 NOTE — PLAN OF CARE
Discharge instructions given to pt, verbalized understanding.  Tolerating PO fluids.  IV removed.  Denies pain or right leg weakness.  Wheeled out to wife  per Rn in no distress.

## 2025-08-05 NOTE — OP NOTE
PROCEDURE DATE: 8/5/2025    PROCEDURE: Right L3-4, L4-5 transforaminal epidural steroid injection under fluoroscopy    DIAGNOSIS: Lumbar radiculitis    Post op diagnosis: Same    PHYSICIAN: Grant Wright MD    MEDICATIONS INJECTED:  Dexamethasone 5mg (0.5ml) and 1.5ml 0.25% bupivicaine at each nerve root.     LOCAL ANESTHETIC INJECTED:  Lidocaine 1%. 2 ml per site.    SEDATION MEDICATIONS: RN IV sedation    ESTIMATED BLOOD LOSS:  None    COMPLICATIONS:  None    TECHNIQUE:   A time-out was taken to identify patient and procedure side prior to starting the procedure. The patient was placed in a prone position, prepped and draped in the usual sterile fashion using ChloraPrep and sterile towels.  The area to be injected was determined under fluoroscopic guidance in AP and oblique view.  Local anesthetic was given by raising a wheal and going down to the hub of a 25-gauge 1.5 inch needle.  In oblique view, a 5 inch 22-gauge bent-tip spinal needle was introduced towards 6 oclock position of the pedicle of each above named nerve root level.  The needle was walked medially then hinged into the neural foramen and position was confirmed in AP and lateral views.  1ml contrast dye was injected to confirm appropriate placement and that there was no vascular uptake.  After negative aspiration for blood or CSF, the medication was then injected. This was performed at the right L3-4, L4-5 level(s). The patient tolerated the procedure well.    The patient was monitored after the procedure.  Patient was given post procedure and discharge instructions to follow at home. The patient was discharged in a stable condition.

## 2025-08-06 VITALS
OXYGEN SATURATION: 96 % | SYSTOLIC BLOOD PRESSURE: 150 MMHG | RESPIRATION RATE: 19 BRPM | TEMPERATURE: 98 F | DIASTOLIC BLOOD PRESSURE: 74 MMHG | HEART RATE: 60 BPM

## 2025-08-07 DIAGNOSIS — G89.4 CHRONIC PAIN SYNDROME: ICD-10-CM

## 2025-08-07 DIAGNOSIS — M19.90 ARTHRITIS: ICD-10-CM

## 2025-08-07 RX ORDER — HYDROCODONE BITARTRATE AND ACETAMINOPHEN 5; 325 MG/1; MG/1
1 TABLET ORAL EVERY 6 HOURS PRN
Qty: 90 TABLET | Refills: 0 | Status: SHIPPED | OUTPATIENT
Start: 2025-08-07

## 2025-08-11 ENCOUNTER — HOSPITAL ENCOUNTER (EMERGENCY)
Facility: HOSPITAL | Age: 85
Discharge: HOME | End: 2025-08-11
Attending: STUDENT IN AN ORGANIZED HEALTH CARE EDUCATION/TRAINING PROGRAM
Payer: MEDICARE

## 2025-08-11 ENCOUNTER — HOSPITAL ENCOUNTER (EMERGENCY)
Facility: HOSPITAL | Age: 85
Discharge: STILL ADMITTED OR ADMINISTRATIVE | End: 2025-08-11
Payer: MEDICARE

## 2025-08-11 VITALS
OXYGEN SATURATION: 94 % | SYSTOLIC BLOOD PRESSURE: 140 MMHG | HEART RATE: 71 BPM | BODY MASS INDEX: 36.1 KG/M2 | TEMPERATURE: 99 F | DIASTOLIC BLOOD PRESSURE: 65 MMHG | WEIGHT: 230 LBS | RESPIRATION RATE: 18 BRPM | HEIGHT: 67 IN

## 2025-08-11 DIAGNOSIS — M79.605 PAIN IN BOTH LOWER EXTREMITIES: Primary | ICD-10-CM

## 2025-08-11 DIAGNOSIS — M79.604 PAIN IN BOTH LOWER EXTREMITIES: Primary | ICD-10-CM

## 2025-08-11 PROCEDURE — 99999 HC NO LEVEL OF SERVICE - ED ONLY: CPT

## 2025-08-11 PROCEDURE — 99284 EMERGENCY DEPT VISIT MOD MDM: CPT | Mod: 25

## 2025-08-11 PROCEDURE — 63600175 PHARM REV CODE 636 W HCPCS: Performed by: STUDENT IN AN ORGANIZED HEALTH CARE EDUCATION/TRAINING PROGRAM

## 2025-08-11 PROCEDURE — 96372 THER/PROPH/DIAG INJ SC/IM: CPT | Performed by: STUDENT IN AN ORGANIZED HEALTH CARE EDUCATION/TRAINING PROGRAM

## 2025-08-11 RX ORDER — METHOCARBAMOL 750 MG/1
750 TABLET, FILM COATED ORAL 3 TIMES DAILY PRN
Qty: 15 TABLET | Refills: 0 | Status: SHIPPED | OUTPATIENT
Start: 2025-08-11 | End: 2025-08-13 | Stop reason: SDUPTHER

## 2025-08-11 RX ORDER — ORPHENADRINE CITRATE 30 MG/ML
30 INJECTION INTRAMUSCULAR; INTRAVENOUS
Status: COMPLETED | OUTPATIENT
Start: 2025-08-11 | End: 2025-08-11

## 2025-08-11 RX ADMIN — ORPHENADRINE CITRATE 30 MG: 30 INJECTION, SOLUTION INTRAMUSCULAR; INTRAVENOUS at 09:08

## 2025-08-13 ENCOUNTER — OFFICE VISIT (OUTPATIENT)
Dept: FAMILY MEDICINE | Facility: CLINIC | Age: 85
End: 2025-08-13
Payer: MEDICARE

## 2025-08-13 VITALS
SYSTOLIC BLOOD PRESSURE: 122 MMHG | DIASTOLIC BLOOD PRESSURE: 64 MMHG | OXYGEN SATURATION: 97 % | BODY MASS INDEX: 36.32 KG/M2 | HEART RATE: 76 BPM | WEIGHT: 231.94 LBS

## 2025-08-13 DIAGNOSIS — E11.9 CONTROLLED TYPE 2 DIABETES MELLITUS WITHOUT COMPLICATION, WITHOUT LONG-TERM CURRENT USE OF INSULIN: ICD-10-CM

## 2025-08-13 DIAGNOSIS — M54.16 LUMBAR RADICULITIS: ICD-10-CM

## 2025-08-13 DIAGNOSIS — M51.362 DEGENERATION OF INTERVERTEBRAL DISC OF LUMBAR REGION WITH DISCOGENIC BACK PAIN AND LOWER EXTREMITY PAIN: Primary | ICD-10-CM

## 2025-08-13 DIAGNOSIS — M43.10 ANTEROLISTHESIS: ICD-10-CM

## 2025-08-13 DIAGNOSIS — M48.061 SPINAL STENOSIS OF LUMBAR REGION WITHOUT NEUROGENIC CLAUDICATION: ICD-10-CM

## 2025-08-13 DIAGNOSIS — Z74.09 POOR MOBILITY: ICD-10-CM

## 2025-08-13 PROCEDURE — 99214 OFFICE O/P EST MOD 30 MIN: CPT | Mod: S$PBB,,,

## 2025-08-13 PROCEDURE — 99213 OFFICE O/P EST LOW 20 MIN: CPT | Mod: PBBFAC,PN

## 2025-08-13 PROCEDURE — 99999 PR PBB SHADOW E&M-EST. PATIENT-LVL III: CPT | Mod: PBBFAC,,,

## 2025-08-13 RX ORDER — ISOPROPYL ALCOHOL 70 ML/100ML
1 SWAB TOPICAL
Qty: 100 EACH | Refills: 3 | Status: SHIPPED | OUTPATIENT
Start: 2025-08-13

## 2025-08-13 RX ORDER — ISOPROPYL ALCOHOL 70 ML/100ML
1 SWAB TOPICAL
Qty: 100 EACH | Refills: 3 | Status: SHIPPED | OUTPATIENT
Start: 2025-08-13 | End: 2025-08-13

## 2025-08-13 RX ORDER — GABAPENTIN 300 MG/1
300 CAPSULE ORAL 3 TIMES DAILY
Qty: 270 CAPSULE | Refills: 1 | Status: SHIPPED | OUTPATIENT
Start: 2025-08-13

## 2025-08-13 RX ORDER — GABAPENTIN 300 MG/1
300 CAPSULE ORAL 3 TIMES DAILY
Qty: 270 CAPSULE | Refills: 1 | Status: SHIPPED | OUTPATIENT
Start: 2025-08-13 | End: 2025-08-13

## 2025-08-13 RX ORDER — METHOCARBAMOL 750 MG/1
750 TABLET, FILM COATED ORAL 3 TIMES DAILY PRN
Qty: 30 TABLET | Refills: 0 | Status: SHIPPED | OUTPATIENT
Start: 2025-08-13

## 2025-08-22 ENCOUNTER — PATIENT OUTREACH (OUTPATIENT)
Dept: ADMINISTRATIVE | Facility: HOSPITAL | Age: 85
End: 2025-08-22
Payer: MEDICARE

## 2025-08-25 ENCOUNTER — PATIENT MESSAGE (OUTPATIENT)
Dept: PAIN MEDICINE | Facility: CLINIC | Age: 85
End: 2025-08-25
Payer: MEDICARE

## 2025-08-25 ENCOUNTER — PATIENT MESSAGE (OUTPATIENT)
Dept: FAMILY MEDICINE | Facility: CLINIC | Age: 85
End: 2025-08-25
Payer: MEDICARE

## 2025-08-26 DIAGNOSIS — M51.362 DEGENERATION OF INTERVERTEBRAL DISC OF LUMBAR REGION WITH DISCOGENIC BACK PAIN AND LOWER EXTREMITY PAIN: ICD-10-CM

## 2025-08-26 DIAGNOSIS — M48.061 SPINAL STENOSIS OF LUMBAR REGION WITHOUT NEUROGENIC CLAUDICATION: ICD-10-CM

## 2025-08-27 RX ORDER — METHOCARBAMOL 750 MG/1
750 TABLET, FILM COATED ORAL 3 TIMES DAILY PRN
Qty: 30 TABLET | Refills: 0 | Status: SHIPPED | OUTPATIENT
Start: 2025-08-27

## 2025-08-28 DIAGNOSIS — M54.16 RADICULOPATHY, LUMBAR REGION: Primary | ICD-10-CM

## 2025-08-29 ENCOUNTER — HOSPITAL ENCOUNTER (OUTPATIENT)
Dept: RADIOLOGY | Facility: HOSPITAL | Age: 85
Discharge: HOME | End: 2025-08-29
Attending: ORTHOPAEDIC SURGERY
Payer: MEDICARE

## (undated) DEVICE — TRAY SKIN PREP DRY

## (undated) DEVICE — NDL SPINAL 22GX5

## (undated) DEVICE — TRAY LOWER EXTREMITY  SMHS029-05

## (undated) DEVICE — SEE MEDLINE ITEM 153215

## (undated) DEVICE — SYR 10CC LUER LOCK

## (undated) DEVICE — SYR DISP LL 5CC

## (undated) DEVICE — GLOVE SURG ULTRA TOUCH 7.5

## (undated) DEVICE — BLADE SCALPEL #15 371115

## (undated) DEVICE — NDL SAFETY 25G X 1.5 ECLIPSE

## (undated) DEVICE — TUBING MINIBORE EXTENSION

## (undated) DEVICE — APPLICATOR CHLORAPREP CLR 10.5

## (undated) DEVICE — NDL SPINAL SPINOCAN 22GX3.5

## (undated) DEVICE — BANDAGE ESMARK 4X9 55514

## (undated) DEVICE — CHLORAPREP 10.5 ML APPLICATOR

## (undated) DEVICE — GLOVE SENSICARE PI ALOE 6

## (undated) DEVICE — SPONGE GAUZE 10S 4X4  442214

## (undated) DEVICE — SYS LABEL CORRECT MED

## (undated) DEVICE — GLOVE SENSICARE PI GRN 7.5

## (undated) DEVICE — GLOVE SURG ULTRA TOUCH 6

## (undated) DEVICE — SOLUTION SCRUB IODINE 4OZ

## (undated) DEVICE — BANDAGE ACE STERILE 4 REB3114

## (undated) DEVICE — NDL HYPODERMIC BLUNT 18G 1.5IN

## (undated) DEVICE — GLOVE SENSICARE PI ALOE 7.5

## (undated) DEVICE — STERISTRIP 1/2 R1547

## (undated) DEVICE — SOLUTION PREP IODINE 4OZ

## (undated) DEVICE — GLOVE PROTEXIS PI CLASSIC 6.5

## (undated) DEVICE — GLOVE PROTEXIS PI CLASSIC 7.5

## (undated) DEVICE — SUTURE VICRYL 2-0 RB-1 27 J306H

## (undated) DEVICE — GLOVE SENSICARE PI ALOE 6.5

## (undated) DEVICE — CUFF TOURNIQUET 34DUAL PRT 5921-034-235

## (undated) DEVICE — GLOVE SURGEONS ULTRA TOUCH 6.5

## (undated) DEVICE — SYRINGE 10ML 302995

## (undated) DEVICE — SOLUTION IRRI NS BOTTLE 1000ML R5200-01

## (undated) DEVICE — SOL LAC RINGERS 500CC

## (undated) DEVICE — PAD BOVIE ADULT

## (undated) DEVICE — NDL BLUNT W/O FILTER 18GX1.5IN

## (undated) DEVICE — GLOVE BIOGEL PI   GOLD SIZE 8

## (undated) DEVICE — SUTURE VICRYL 4-0 RB-1 27 J214H

## (undated) DEVICE — ADHESIVE MASTISOL VIAL 0523-48

## (undated) DEVICE — NEEDLE SAFETY ECLIPSE 25G 1-1/2IN 305767

## (undated) DEVICE — WALKER ANKLE LARGE

## (undated) DEVICE — BANDAGE SOFTBAND 4 441506

## (undated) DEVICE — TOWEL OR DISP STRL BLUE 4/PK

## (undated) DEVICE — SUTURE VICRYL 3-0 RB-1 27 J215H

## (undated) DEVICE — GLOVE BIOGEL PI ORTHO PRO BROWN SZ 6.5